# Patient Record
Sex: MALE | Race: WHITE | NOT HISPANIC OR LATINO | Employment: OTHER | URBAN - METROPOLITAN AREA
[De-identification: names, ages, dates, MRNs, and addresses within clinical notes are randomized per-mention and may not be internally consistent; named-entity substitution may affect disease eponyms.]

---

## 2017-07-17 ENCOUNTER — HOSPITAL ENCOUNTER (OUTPATIENT)
Dept: CARDIOLOGY | Facility: HOSPITAL | Age: 57
Setting detail: SPECIMEN
Discharge: HOME | End: 2017-07-17
Attending: INTERNAL MEDICINE | Admitting: INTERNAL MEDICINE

## 2017-07-17 LAB
CHOLEST SERPL-MCNC: 148 MG/DL
CHOLEST/HDLC SERPL: 6.6 {RATIO}
CONV LDL CHOLESTEROL DIRECT: 80 MG/DL (ref 0–100)
HDLC SERPL-MCNC: 23 MG/DL
LDLC/HDLC SERPL: 3.6 {RATIO}
LIPID INTERPRETATION: ABNORMAL
TRIGL SERPL-MCNC: 314 MG/DL
VLDLC SERPL CALC-MCNC: 45.4 MG/DL

## 2018-08-20 ENCOUNTER — HOSPITAL ENCOUNTER (OUTPATIENT)
Dept: FAMILY MEDICINE CLINIC | Facility: CLINIC | Age: 58
Setting detail: SPECIMEN
Discharge: HOME | End: 2018-08-20
Attending: INTERNAL MEDICINE | Admitting: INTERNAL MEDICINE

## 2018-08-20 LAB
CHOLEST SERPL-MCNC: 167 MG/DL
CHOLEST/HDLC SERPL: 6.3 {RATIO}
CONV LDL CHOLESTEROL DIRECT: 111 MG/DL (ref 0–100)
HDLC SERPL-MCNC: 26 MG/DL
LDLC/HDLC SERPL: 4.2 {RATIO}
LIPID INTERPRETATION: ABNORMAL
TRIGL SERPL-MCNC: 205 MG/DL
VLDLC SERPL CALC-MCNC: 30.1 MG/DL

## 2019-06-19 ENCOUNTER — HOSPITAL ENCOUNTER (EMERGENCY)
Dept: GENERAL RADIOLOGY | Facility: HOSPITAL | Age: 59
Discharge: HOME | End: 2019-06-19

## 2019-06-19 ENCOUNTER — TELEPHONE (OUTPATIENT)
Dept: CARDIOLOGY | Facility: CLINIC | Age: 59
End: 2019-06-19

## 2019-06-19 ENCOUNTER — HOSPITAL ENCOUNTER (EMERGENCY)
Facility: HOSPITAL | Age: 59
Discharge: LEFT BEFORE DISCHARGE | End: 2019-06-19
Attending: EMERGENCY MEDICINE | Admitting: EMERGENCY MEDICINE

## 2019-06-19 VITALS
HEIGHT: 67 IN | DIASTOLIC BLOOD PRESSURE: 77 MMHG | SYSTOLIC BLOOD PRESSURE: 148 MMHG | OXYGEN SATURATION: 97 % | WEIGHT: 196.65 LBS | TEMPERATURE: 97.7 F | HEART RATE: 94 BPM | RESPIRATION RATE: 18 BRPM | BODY MASS INDEX: 30.87 KG/M2

## 2019-06-19 DIAGNOSIS — I21.4 SUBENDOCARDIAL MI FIRST EPISODE CARE (HCC): ICD-10-CM

## 2019-06-19 DIAGNOSIS — R06.00 DYSPNEA, UNSPECIFIED TYPE: Primary | ICD-10-CM

## 2019-06-19 LAB
ANION GAP SERPL CALCULATED.3IONS-SCNC: 10 MMOL/L (ref 10–20)
BASOPHILS # BLD AUTO: 0.1 10*3/MM3 (ref 0–0.2)
BASOPHILS NFR BLD AUTO: 0.8 % (ref 0–1.5)
BNP SERPL-MCNC: 459 PG/ML
BUN BLD-MCNC: 17 MG/DL (ref 8–20)
BUN/CREAT SERPL: 17 (ref 6.2–20.3)
CALCIUM SPEC-SCNC: 9 MG/DL (ref 8.9–10.3)
CHLORIDE SERPL-SCNC: 106 MMOL/L (ref 101–111)
CO2 SERPL-SCNC: 23 MMOL/L (ref 22–32)
CREAT BLD-MCNC: 1 MG/DL (ref 0.7–1.2)
DEPRECATED RDW RBC AUTO: 49 FL (ref 37–54)
EOSINOPHIL # BLD AUTO: 0.4 10*3/MM3 (ref 0–0.4)
EOSINOPHIL NFR BLD AUTO: 5.6 % (ref 0.3–6.2)
ERYTHROCYTE [DISTWIDTH] IN BLOOD BY AUTOMATED COUNT: 15 % (ref 12.3–15.4)
GFR SERPL CREATININE-BSD FRML MDRD: 76 ML/MIN/1.73
GLUCOSE BLD-MCNC: 332 MG/DL (ref 65–99)
HCT VFR BLD AUTO: 37.5 % (ref 37.5–51)
HGB BLD-MCNC: 13 G/DL (ref 13–17.7)
LYMPHOCYTES # BLD AUTO: 1.9 10*3/MM3 (ref 0.7–3.1)
LYMPHOCYTES NFR BLD AUTO: 25.3 % (ref 19.6–45.3)
MCH RBC QN AUTO: 32.4 PG (ref 26.6–33)
MCHC RBC AUTO-ENTMCNC: 34.8 G/DL (ref 31.5–35.7)
MCV RBC AUTO: 93 FL (ref 79–97)
MONOCYTES # BLD AUTO: 0.5 10*3/MM3 (ref 0.1–0.9)
MONOCYTES NFR BLD AUTO: 6.4 % (ref 5–12)
NEUTROPHILS # BLD AUTO: 4.6 10*3/MM3 (ref 1.7–7)
NEUTROPHILS NFR BLD AUTO: 61.9 % (ref 42.7–76)
NRBC BLD AUTO-RTO: 0.2 /100 WBC (ref 0–0.2)
PLATELET # BLD AUTO: 165 10*3/MM3 (ref 140–450)
PMV BLD AUTO: 9.8 FL (ref 6–12)
POTASSIUM BLD-SCNC: 3.9 MMOL/L (ref 3.6–5.1)
RBC # BLD AUTO: 4.03 10*6/MM3 (ref 4.14–5.8)
SODIUM BLD-SCNC: 139 MMOL/L (ref 136–144)
TROPONIN I SERPL-MCNC: 0.12 NG/ML (ref 0–0.03)
WBC NRBC COR # BLD: 7.3 10*3/MM3 (ref 3.4–10.8)

## 2019-06-19 PROCEDURE — 93005 ELECTROCARDIOGRAM TRACING: CPT | Performed by: EMERGENCY MEDICINE

## 2019-06-19 PROCEDURE — 84484 ASSAY OF TROPONIN QUANT: CPT | Performed by: EMERGENCY MEDICINE

## 2019-06-19 PROCEDURE — 99283 EMERGENCY DEPT VISIT LOW MDM: CPT

## 2019-06-19 PROCEDURE — 83880 ASSAY OF NATRIURETIC PEPTIDE: CPT | Performed by: EMERGENCY MEDICINE

## 2019-06-19 PROCEDURE — 85025 COMPLETE CBC W/AUTO DIFF WBC: CPT | Performed by: EMERGENCY MEDICINE

## 2019-06-19 PROCEDURE — 93005 ELECTROCARDIOGRAM TRACING: CPT

## 2019-06-19 PROCEDURE — 71045 X-RAY EXAM CHEST 1 VIEW: CPT

## 2019-06-19 PROCEDURE — 80048 BASIC METABOLIC PNL TOTAL CA: CPT | Performed by: EMERGENCY MEDICINE

## 2019-06-19 RX ORDER — CLOPIDOGREL BISULFATE 75 MG/1
75 TABLET ORAL DAILY
COMMUNITY

## 2019-06-19 RX ORDER — BUDESONIDE AND FORMOTEROL FUMARATE DIHYDRATE 160; 4.5 UG/1; UG/1
2 AEROSOL RESPIRATORY (INHALATION)
Status: ON HOLD | COMMUNITY
End: 2019-06-26 | Stop reason: SDUPTHER

## 2019-06-19 RX ORDER — ICOSAPENT ETHYL 1000 MG/1
2 CAPSULE ORAL 2 TIMES DAILY WITH MEALS
COMMUNITY
End: 2019-07-02 | Stop reason: SDUPTHER

## 2019-06-19 RX ORDER — ISOSORBIDE MONONITRATE 30 MG/1
30 TABLET, EXTENDED RELEASE ORAL DAILY
COMMUNITY
End: 2019-06-21 | Stop reason: SDUPTHER

## 2019-06-19 RX ORDER — ASPIRIN 81 MG/1
81 TABLET, CHEWABLE ORAL DAILY
Status: ON HOLD | COMMUNITY
End: 2019-06-26 | Stop reason: SDUPTHER

## 2019-06-19 RX ORDER — COLCHICINE 0.6 MG/1
0.6 TABLET ORAL 2 TIMES DAILY
Status: ON HOLD | COMMUNITY
End: 2019-06-26 | Stop reason: SDUPTHER

## 2019-06-19 RX ORDER — ALBUTEROL SULFATE 1.25 MG/3ML
1 SOLUTION RESPIRATORY (INHALATION) EVERY 6 HOURS PRN
COMMUNITY
End: 2019-12-03 | Stop reason: ALTCHOICE

## 2019-06-19 RX ORDER — ALBUTEROL SULFATE 90 UG/1
2 AEROSOL, METERED RESPIRATORY (INHALATION) EVERY 4 HOURS PRN
Status: ON HOLD | COMMUNITY
End: 2019-06-26

## 2019-06-19 RX ORDER — ATORVASTATIN CALCIUM 40 MG/1
40 TABLET, FILM COATED ORAL DAILY
COMMUNITY

## 2019-06-19 RX ORDER — SODIUM CHLORIDE 0.9 % (FLUSH) 0.9 %
10 SYRINGE (ML) INJECTION AS NEEDED
Status: DISCONTINUED | OUTPATIENT
Start: 2019-06-19 | End: 2019-06-20 | Stop reason: HOSPADM

## 2019-06-19 RX ORDER — FUROSEMIDE 20 MG/1
20 TABLET ORAL 2 TIMES DAILY
COMMUNITY
End: 2019-06-21 | Stop reason: SDUPTHER

## 2019-06-19 RX ORDER — CARVEDILOL 3.12 MG/1
3.12 TABLET ORAL 3 TIMES DAILY
COMMUNITY
End: 2019-07-08 | Stop reason: SDUPTHER

## 2019-06-19 RX ORDER — GLIMEPIRIDE 4 MG/1
4 TABLET ORAL 2 TIMES DAILY
COMMUNITY

## 2019-06-19 RX ADMIN — Medication 10 ML: at 20:35

## 2019-06-19 NOTE — TELEPHONE ENCOUNTER
HAD MSG, PATIENT COUGHING, HAS APT TOMORROW. ASKING IF WE CAN SEND CHEST X-RAY ORDER TO [facility]?

## 2019-06-19 NOTE — TELEPHONE ENCOUNTER
Called woman back, explained that Dr Wing is out of the office today, can not order a chest xray today , suggested pt call pcp or Dr Wing may be able to tomorrow when he sees him for appt.  She understood said she has called and lm for pt's pcp.

## 2019-06-20 ENCOUNTER — OFFICE VISIT (OUTPATIENT)
Dept: CARDIOLOGY | Facility: CLINIC | Age: 59
End: 2019-06-20

## 2019-06-20 VITALS
HEIGHT: 68 IN | OXYGEN SATURATION: 98 % | WEIGHT: 195 LBS | DIASTOLIC BLOOD PRESSURE: 69 MMHG | SYSTOLIC BLOOD PRESSURE: 101 MMHG | HEART RATE: 97 BPM | BODY MASS INDEX: 29.55 KG/M2

## 2019-06-20 DIAGNOSIS — I10 ESSENTIAL HYPERTENSION: ICD-10-CM

## 2019-06-20 DIAGNOSIS — I50.22 CHRONIC SYSTOLIC (CONGESTIVE) HEART FAILURE (HCC): Primary | ICD-10-CM

## 2019-06-20 DIAGNOSIS — E78.5 DYSLIPIDEMIA: ICD-10-CM

## 2019-06-20 DIAGNOSIS — J43.1 PANLOBULAR EMPHYSEMA (HCC): ICD-10-CM

## 2019-06-20 DIAGNOSIS — I73.9 PERIPHERAL VASCULAR DISEASE (HCC): ICD-10-CM

## 2019-06-20 DIAGNOSIS — E11.9 TYPE II DIABETES MELLITUS WITH GOAL OF SYMPTOM MANAGEMENT (HCC): ICD-10-CM

## 2019-06-20 DIAGNOSIS — Z98.61 CAD S/P PERCUTANEOUS CORONARY ANGIOPLASTY: ICD-10-CM

## 2019-06-20 DIAGNOSIS — I25.10 CAD S/P PERCUTANEOUS CORONARY ANGIOPLASTY: ICD-10-CM

## 2019-06-20 DIAGNOSIS — R07.2 PRECORDIAL PAIN: ICD-10-CM

## 2019-06-20 PROCEDURE — 99214 OFFICE O/P EST MOD 30 MIN: CPT | Performed by: INTERNAL MEDICINE

## 2019-06-20 RX ORDER — FUROSEMIDE 20 MG/1
TABLET ORAL
COMMUNITY
Start: 2019-06-10 | End: 2019-06-21 | Stop reason: SDUPTHER

## 2019-06-20 RX ORDER — SULFAMETHOXAZOLE AND TRIMETHOPRIM 800; 160 MG/1; MG/1
1 TABLET ORAL 2 TIMES DAILY
Refills: 0 | Status: ON HOLD | COMMUNITY
Start: 2019-06-07 | End: 2019-06-26 | Stop reason: SDUPTHER

## 2019-06-20 RX ORDER — HYDROCODONE BITARTRATE AND ACETAMINOPHEN 10; 325 MG/1; MG/1
TABLET ORAL
Status: ON HOLD | COMMUNITY
End: 2019-06-26

## 2019-06-20 RX ORDER — TRAMADOL HYDROCHLORIDE 50 MG/1
TABLET ORAL
Status: ON HOLD | COMMUNITY
End: 2019-06-26 | Stop reason: SDUPTHER

## 2019-06-20 RX ORDER — CYANOCOBALAMIN (VITAMIN B-12) 500 MCG
TABLET ORAL EVERY 24 HOURS
Status: ON HOLD | COMMUNITY
Start: 2018-08-30 | End: 2019-06-26 | Stop reason: SDUPTHER

## 2019-06-20 RX ORDER — LISINOPRIL 10 MG/1
TABLET ORAL
COMMUNITY
End: 2019-06-21 | Stop reason: SDUPTHER

## 2019-06-20 RX ORDER — NITROGLYCERIN 0.4 MG/1
TABLET SUBLINGUAL
COMMUNITY
Start: 2013-03-18 | End: 2019-06-21 | Stop reason: SDUPTHER

## 2019-06-20 RX ORDER — COLCHICINE 0.6 MG/1
1 CAPSULE ORAL 2 TIMES DAILY
Refills: 0 | Status: ON HOLD | COMMUNITY
Start: 2019-06-03 | End: 2019-06-26 | Stop reason: SDUPTHER

## 2019-06-20 RX ORDER — IPRATROPIUM BROMIDE AND ALBUTEROL SULFATE 2.5; .5 MG/3ML; MG/3ML
SOLUTION RESPIRATORY (INHALATION)
COMMUNITY
End: 2019-10-07

## 2019-06-20 RX ORDER — GABAPENTIN 100 MG/1
CAPSULE ORAL
COMMUNITY
End: 2019-10-07

## 2019-06-20 RX ORDER — ALBUTEROL SULFATE 90 UG/1
AEROSOL, METERED RESPIRATORY (INHALATION)
COMMUNITY
End: 2019-10-07

## 2019-06-20 RX ORDER — ISOSORBIDE MONONITRATE 60 MG/1
TABLET, EXTENDED RELEASE ORAL
COMMUNITY
Start: 2017-01-24 | End: 2019-10-07

## 2019-06-20 RX ORDER — COLCHICINE 0.6 MG/1
TABLET ORAL EVERY 12 HOURS
COMMUNITY
Start: 2019-06-10 | End: 2019-10-07

## 2019-06-20 RX ORDER — ATORVASTATIN CALCIUM 20 MG/1
TABLET, FILM COATED ORAL
Status: ON HOLD | COMMUNITY
End: 2019-06-26 | Stop reason: SDUPTHER

## 2019-06-20 RX ORDER — BUDESONIDE AND FORMOTEROL FUMARATE DIHYDRATE 160; 4.5 UG/1; UG/1
AEROSOL RESPIRATORY (INHALATION)
COMMUNITY
Start: 2019-06-10 | End: 2019-10-07

## 2019-06-20 RX ORDER — DOXYCYCLINE 100 MG/1
100 TABLET ORAL 2 TIMES DAILY
Refills: 0 | Status: ON HOLD | COMMUNITY
Start: 2019-06-03 | End: 2019-06-26 | Stop reason: SDUPTHER

## 2019-06-21 ENCOUNTER — TELEPHONE (OUTPATIENT)
Dept: CARDIOLOGY | Facility: CLINIC | Age: 59
End: 2019-06-21

## 2019-06-21 RX ORDER — ISOSORBIDE MONONITRATE 30 MG/1
30 TABLET, EXTENDED RELEASE ORAL DAILY
Qty: 90 TABLET | Refills: 1 | Status: ON HOLD | OUTPATIENT
Start: 2019-06-21 | End: 2019-06-26 | Stop reason: SDUPTHER

## 2019-06-21 RX ORDER — FUROSEMIDE 20 MG/1
40 TABLET ORAL DAILY
Qty: 90 TABLET | Refills: 1 | Status: SHIPPED | OUTPATIENT
Start: 2019-06-21 | End: 2019-06-21 | Stop reason: DRUGHIGH

## 2019-06-21 RX ORDER — NITROGLYCERIN 0.4 MG/1
TABLET SUBLINGUAL
Qty: 25 TABLET | Refills: 0 | Status: SHIPPED | OUTPATIENT
Start: 2019-06-21 | End: 2019-12-03 | Stop reason: SDUPTHER

## 2019-06-21 RX ORDER — FUROSEMIDE 20 MG/1
20 TABLET ORAL 2 TIMES DAILY
COMMUNITY
End: 2019-07-02

## 2019-06-21 RX ORDER — LISINOPRIL 10 MG/1
10 TABLET ORAL DAILY
Qty: 90 TABLET | Refills: 1 | Status: SHIPPED | OUTPATIENT
Start: 2019-06-21 | End: 2019-06-21 | Stop reason: HOSPADM

## 2019-06-21 NOTE — TELEPHONE ENCOUNTER
Pts spouse called [facility] office regarding Home Health Nurse visit today 6/19/19 .  Pt was found to have abnormal lung sounds / slight chest pressure / presently wearing Lifevest, history of CHF.  Questioning medical direction from office.  Call was sent to [facility] office and pt was contacted at residence.  Based on symptoms pt was referred to [facility] ER Dept for complete cardiac assessment.  Spouse agreeable to proceed with pt to ER Dept.

## 2019-06-21 NOTE — TELEPHONE ENCOUNTER
Debra with home health [phone] left voicemail @ 12:14pm. Needs clarification on how pt is to take his Lisinopril & Lasix. OV and d/c summary from [facility] are different.

## 2019-06-21 NOTE — TELEPHONE ENCOUNTER
Called spoke to Debra. Med confusion.  Pt does not take Lisinopril, it was dc at the hospital.   Pt's bp has been good   Pt takes nitro, Isosorbide 30, and Lasix 20mg bid.  Pt has not had any edema and lungs sound good. Will continue taking it.     Called pharm to confirm meds.

## 2019-06-21 NOTE — TELEPHONE ENCOUNTER
NITRO STAT 0.4MG  PRN, LISINOPRIL 5MG 1 BID, FUROSMIDE 40MG ?? , ISOSORBIDE 30MG 1 QD  90 [facility] [place]    COMPLETELY OUT OF ISOSORBIE

## 2019-06-26 ENCOUNTER — HOSPITAL ENCOUNTER (INPATIENT)
Facility: HOSPITAL | Age: 59
LOS: 2 days | Discharge: HOME HEALTH | End: 2019-06-28
Attending: INTERNAL MEDICINE | Admitting: INTERNAL MEDICINE

## 2019-06-26 DIAGNOSIS — I10 BENIGN ESSENTIAL HTN: ICD-10-CM

## 2019-06-26 DIAGNOSIS — I73.9 PERIPHERAL VASCULAR DISEASE (HCC): ICD-10-CM

## 2019-06-26 DIAGNOSIS — R77.8 ELEVATED TROPONIN: ICD-10-CM

## 2019-06-26 DIAGNOSIS — E78.2 MULTIPLE-TYPE HYPERLIPIDEMIA: ICD-10-CM

## 2019-06-26 DIAGNOSIS — I25.700 CORONARY ARTERY DISEASE INVOLVING CORONARY BYPASS GRAFT WITH UNSTABLE ANGINA PECTORIS, UNSPECIFIED WHETHER NATIVE OR TRANSPLANTED HEART (HCC): ICD-10-CM

## 2019-06-26 DIAGNOSIS — I50.23 ACUTE ON CHRONIC SYSTOLIC CHF (CONGESTIVE HEART FAILURE) (HCC): Primary | ICD-10-CM

## 2019-06-26 DIAGNOSIS — R73.9 ACUTE HYPERGLYCEMIA: ICD-10-CM

## 2019-06-26 DIAGNOSIS — F17.200 TOBACCO DEPENDENCE SYNDROME: ICD-10-CM

## 2019-06-26 DIAGNOSIS — E66.9 OBESITY (BMI 30-39.9): ICD-10-CM

## 2019-06-26 PROBLEM — J44.9 COPD (CHRONIC OBSTRUCTIVE PULMONARY DISEASE) (HCC): Chronic | Status: ACTIVE | Noted: 2019-06-26

## 2019-06-26 PROBLEM — R06.00 DYSPNEA: Status: ACTIVE | Noted: 2019-06-26

## 2019-06-26 PROBLEM — I25.10 CORONARY ARTERY DISEASE: Status: ACTIVE | Noted: 2018-08-07

## 2019-06-26 PROBLEM — E11.9 TYPE 2 DIABETES MELLITUS: Chronic | Status: ACTIVE | Noted: 2019-06-26

## 2019-06-26 PROBLEM — I25.10 CORONARY ARTERY DISEASE: Chronic | Status: ACTIVE | Noted: 2018-08-07

## 2019-06-26 PROBLEM — R79.89 ELEVATED TROPONIN: Status: ACTIVE | Noted: 2019-06-26

## 2019-06-26 LAB
ANION GAP SERPL CALCULATED.3IONS-SCNC: 13.7 MMOL/L (ref 10–20)
APTT PPP: 23.6 SECONDS (ref 61–76.5)
APTT PPP: 30.9 SECONDS (ref 61–76.5)
BUN BLD-MCNC: 13 MG/DL (ref 8–20)
BUN/CREAT SERPL: 11.8 (ref 6.2–20.3)
CALCIUM SPEC-SCNC: 9 MG/DL (ref 8.9–10.3)
CHLORIDE SERPL-SCNC: 100 MMOL/L (ref 101–111)
CO2 SERPL-SCNC: 28 MMOL/L (ref 22–32)
CREAT BLD-MCNC: 1.1 MG/DL (ref 0.7–1.2)
FLUAV AG NPH QL: NEGATIVE
FLUBV AG NPH QL IA: NEGATIVE
GFR SERPL CREATININE-BSD FRML MDRD: 69 ML/MIN/1.73
GLUCOSE BLD-MCNC: 239 MG/DL (ref 65–99)
GLUCOSE BLDC GLUCOMTR-MCNC: 215 MG/DL (ref 70–105)
GLUCOSE BLDC GLUCOMTR-MCNC: 257 MG/DL (ref 70–105)
POTASSIUM BLD-SCNC: 3.7 MMOL/L (ref 3.6–5.1)
SODIUM BLD-SCNC: 138 MMOL/L (ref 136–144)
TROPONIN I SERPL-MCNC: 0.12 NG/ML (ref 0–0.03)
TROPONIN I SERPL-MCNC: 0.13 NG/ML (ref 0–0.03)

## 2019-06-26 PROCEDURE — 84484 ASSAY OF TROPONIN QUANT: CPT | Performed by: NURSE PRACTITIONER

## 2019-06-26 PROCEDURE — 99223 1ST HOSP IP/OBS HIGH 75: CPT | Performed by: INTERNAL MEDICINE

## 2019-06-26 PROCEDURE — 82962 GLUCOSE BLOOD TEST: CPT

## 2019-06-26 PROCEDURE — 93005 ELECTROCARDIOGRAM TRACING: CPT | Performed by: NURSE PRACTITIONER

## 2019-06-26 PROCEDURE — 85730 THROMBOPLASTIN TIME PARTIAL: CPT | Performed by: NURSE PRACTITIONER

## 2019-06-26 PROCEDURE — 83036 HEMOGLOBIN GLYCOSYLATED A1C: CPT | Performed by: NURSE PRACTITIONER

## 2019-06-26 PROCEDURE — 94640 AIRWAY INHALATION TREATMENT: CPT

## 2019-06-26 PROCEDURE — 63710000001 INSULIN LISPRO (HUMAN) PER 5 UNITS: Performed by: NURSE PRACTITIONER

## 2019-06-26 PROCEDURE — 87804 INFLUENZA ASSAY W/OPTIC: CPT | Performed by: INTERNAL MEDICINE

## 2019-06-26 PROCEDURE — 94799 UNLISTED PULMONARY SVC/PX: CPT

## 2019-06-26 PROCEDURE — 80048 BASIC METABOLIC PNL TOTAL CA: CPT | Performed by: NURSE PRACTITIONER

## 2019-06-26 PROCEDURE — 99222 1ST HOSP IP/OBS MODERATE 55: CPT | Performed by: INTERNAL MEDICINE

## 2019-06-26 PROCEDURE — 25010000002 FUROSEMIDE PER 20 MG: Performed by: NURSE PRACTITIONER

## 2019-06-26 RX ORDER — ASPIRIN 81 MG/1
81 TABLET ORAL DAILY
Status: DISCONTINUED | OUTPATIENT
Start: 2019-06-27 | End: 2019-06-28 | Stop reason: HOSPADM

## 2019-06-26 RX ORDER — ACETAMINOPHEN 325 MG/1
650 TABLET ORAL EVERY 4 HOURS PRN
Status: DISCONTINUED | OUTPATIENT
Start: 2019-06-26 | End: 2019-06-28 | Stop reason: HOSPADM

## 2019-06-26 RX ORDER — FUROSEMIDE 10 MG/ML
40 INJECTION INTRAMUSCULAR; INTRAVENOUS EVERY 8 HOURS
Status: DISCONTINUED | OUTPATIENT
Start: 2019-06-26 | End: 2019-06-28 | Stop reason: HOSPADM

## 2019-06-26 RX ORDER — SODIUM CHLORIDE 0.9 % (FLUSH) 0.9 %
3-10 SYRINGE (ML) INJECTION AS NEEDED
Status: DISCONTINUED | OUTPATIENT
Start: 2019-06-26 | End: 2019-06-28 | Stop reason: HOSPADM

## 2019-06-26 RX ORDER — NITROGLYCERIN 0.4 MG/1
0.4 TABLET SUBLINGUAL
Status: DISCONTINUED | OUTPATIENT
Start: 2019-06-26 | End: 2019-06-28 | Stop reason: HOSPADM

## 2019-06-26 RX ORDER — NICOTINE POLACRILEX 4 MG
15 LOZENGE BUCCAL
Status: DISCONTINUED | OUTPATIENT
Start: 2019-06-26 | End: 2019-06-28 | Stop reason: HOSPADM

## 2019-06-26 RX ORDER — ATORVASTATIN CALCIUM 40 MG/1
40 TABLET, FILM COATED ORAL NIGHTLY
Status: DISCONTINUED | OUTPATIENT
Start: 2019-06-26 | End: 2019-06-28 | Stop reason: HOSPADM

## 2019-06-26 RX ORDER — CLOPIDOGREL BISULFATE 75 MG/1
75 TABLET ORAL DAILY
Status: DISCONTINUED | OUTPATIENT
Start: 2019-06-26 | End: 2019-06-28 | Stop reason: HOSPADM

## 2019-06-26 RX ORDER — ALBUTEROL SULFATE 2.5 MG/3ML
2.5 SOLUTION RESPIRATORY (INHALATION) EVERY 6 HOURS PRN
Status: DISCONTINUED | OUTPATIENT
Start: 2019-06-26 | End: 2019-06-28 | Stop reason: HOSPADM

## 2019-06-26 RX ORDER — ALUMINA, MAGNESIA, AND SIMETHICONE 2400; 2400; 240 MG/30ML; MG/30ML; MG/30ML
15 SUSPENSION ORAL EVERY 6 HOURS PRN
Status: DISCONTINUED | OUTPATIENT
Start: 2019-06-26 | End: 2019-06-28 | Stop reason: HOSPADM

## 2019-06-26 RX ORDER — IPRATROPIUM BROMIDE AND ALBUTEROL SULFATE 2.5; .5 MG/3ML; MG/3ML
3 SOLUTION RESPIRATORY (INHALATION) EVERY 4 HOURS PRN
Status: DISCONTINUED | OUTPATIENT
Start: 2019-06-26 | End: 2019-06-28 | Stop reason: HOSPADM

## 2019-06-26 RX ORDER — GLIPIZIDE 5 MG/1
10 TABLET ORAL
Status: DISCONTINUED | OUTPATIENT
Start: 2019-06-27 | End: 2019-06-28 | Stop reason: HOSPADM

## 2019-06-26 RX ORDER — DEXTROSE MONOHYDRATE 25 G/50ML
25 INJECTION, SOLUTION INTRAVENOUS
Status: DISCONTINUED | OUTPATIENT
Start: 2019-06-26 | End: 2019-06-28 | Stop reason: HOSPADM

## 2019-06-26 RX ORDER — CARVEDILOL 3.12 MG/1
3.12 TABLET ORAL 2 TIMES DAILY WITH MEALS
Status: DISCONTINUED | OUTPATIENT
Start: 2019-06-26 | End: 2019-06-28 | Stop reason: HOSPADM

## 2019-06-26 RX ORDER — GABAPENTIN 100 MG/1
100 CAPSULE ORAL NIGHTLY
Status: DISCONTINUED | OUTPATIENT
Start: 2019-06-26 | End: 2019-06-28 | Stop reason: HOSPADM

## 2019-06-26 RX ORDER — ONDANSETRON 4 MG/1
4 TABLET, FILM COATED ORAL EVERY 6 HOURS PRN
Status: DISCONTINUED | OUTPATIENT
Start: 2019-06-26 | End: 2019-06-28 | Stop reason: HOSPADM

## 2019-06-26 RX ORDER — BISACODYL 10 MG
10 SUPPOSITORY, RECTAL RECTAL DAILY PRN
Status: DISCONTINUED | OUTPATIENT
Start: 2019-06-26 | End: 2019-06-28 | Stop reason: HOSPADM

## 2019-06-26 RX ORDER — CHOLECALCIFEROL (VITAMIN D3) 125 MCG
5 CAPSULE ORAL NIGHTLY PRN
Status: DISCONTINUED | OUTPATIENT
Start: 2019-06-26 | End: 2019-06-28 | Stop reason: HOSPADM

## 2019-06-26 RX ORDER — SODIUM CHLORIDE 0.9 % (FLUSH) 0.9 %
3 SYRINGE (ML) INJECTION EVERY 12 HOURS SCHEDULED
Status: DISCONTINUED | OUTPATIENT
Start: 2019-06-26 | End: 2019-06-28 | Stop reason: HOSPADM

## 2019-06-26 RX ORDER — ACETAMINOPHEN 650 MG/1
650 SUPPOSITORY RECTAL EVERY 4 HOURS PRN
Status: DISCONTINUED | OUTPATIENT
Start: 2019-06-26 | End: 2019-06-28 | Stop reason: HOSPADM

## 2019-06-26 RX ORDER — BUDESONIDE AND FORMOTEROL FUMARATE DIHYDRATE 160; 4.5 UG/1; UG/1
2 AEROSOL RESPIRATORY (INHALATION)
Status: DISCONTINUED | OUTPATIENT
Start: 2019-06-26 | End: 2019-06-28 | Stop reason: HOSPADM

## 2019-06-26 RX ORDER — ONDANSETRON 2 MG/ML
4 INJECTION INTRAMUSCULAR; INTRAVENOUS EVERY 6 HOURS PRN
Status: DISCONTINUED | OUTPATIENT
Start: 2019-06-26 | End: 2019-06-28 | Stop reason: HOSPADM

## 2019-06-26 RX ORDER — ISOSORBIDE MONONITRATE 60 MG/1
60 TABLET, EXTENDED RELEASE ORAL
Status: DISCONTINUED | OUTPATIENT
Start: 2019-06-27 | End: 2019-06-28 | Stop reason: HOSPADM

## 2019-06-26 RX ADMIN — SODIUM CHLORIDE, PRESERVATIVE FREE 3 ML: 5 INJECTION INTRAVENOUS at 22:15

## 2019-06-26 RX ADMIN — ATORVASTATIN CALCIUM 40 MG: 40 TABLET, FILM COATED ORAL at 20:03

## 2019-06-26 RX ADMIN — INSULIN LISPRO 4 UNITS: 100 INJECTION, SOLUTION INTRAVENOUS; SUBCUTANEOUS at 19:00

## 2019-06-26 RX ADMIN — BUDESONIDE AND FORMOTEROL FUMARATE DIHYDRATE 2 PUFF: 160; 4.5 AEROSOL RESPIRATORY (INHALATION) at 20:34

## 2019-06-26 RX ADMIN — INSULIN LISPRO 6 UNITS: 100 INJECTION, SOLUTION INTRAVENOUS; SUBCUTANEOUS at 22:15

## 2019-06-26 RX ADMIN — CLOPIDOGREL BISULFATE 75 MG: 75 TABLET ORAL at 20:03

## 2019-06-26 RX ADMIN — GABAPENTIN 100 MG: 100 CAPSULE ORAL at 20:03

## 2019-06-26 RX ADMIN — CARVEDILOL 3.12 MG: 3.12 TABLET, FILM COATED ORAL at 20:03

## 2019-06-26 RX ADMIN — FUROSEMIDE 40 MG: 10 INJECTION, SOLUTION INTRAVENOUS at 18:59

## 2019-06-27 PROBLEM — I25.700 CORONARY ARTERY DISEASE INVOLVING CORONARY BYPASS GRAFT WITH UNSTABLE ANGINA PECTORIS: Status: ACTIVE | Noted: 2019-06-26

## 2019-06-27 LAB
ANION GAP SERPL CALCULATED.3IONS-SCNC: 19.8 MMOL/L (ref 10–20)
APTT PPP: 23.3 SECONDS (ref 61–76.5)
BNP SERPL-MCNC: 554 PG/ML
BUN BLD-MCNC: 14 MG/DL (ref 8–20)
BUN/CREAT SERPL: 14 (ref 6.2–20.3)
CALCIUM SPEC-SCNC: 9.2 MG/DL (ref 8.9–10.3)
CHLORIDE SERPL-SCNC: 103 MMOL/L (ref 101–111)
CO2 SERPL-SCNC: 24 MMOL/L (ref 22–32)
CREAT BLD-MCNC: 1 MG/DL (ref 0.7–1.2)
GFR SERPL CREATININE-BSD FRML MDRD: 76 ML/MIN/1.73
GLUCOSE BLD-MCNC: 187 MG/DL (ref 65–99)
GLUCOSE BLDC GLUCOMTR-MCNC: 158 MG/DL (ref 70–105)
GLUCOSE BLDC GLUCOMTR-MCNC: 189 MG/DL (ref 70–105)
GLUCOSE BLDC GLUCOMTR-MCNC: 250 MG/DL (ref 70–105)
GLUCOSE BLDC GLUCOMTR-MCNC: 98 MG/DL (ref 70–105)
HBA1C MFR BLD: 6.6 % (ref 3.5–5.6)
MAGNESIUM SERPL-MCNC: 1.6 MG/DL (ref 1.8–2.5)
POTASSIUM BLD-SCNC: 3.8 MMOL/L (ref 3.6–5.1)
SODIUM BLD-SCNC: 143 MMOL/L (ref 136–144)
TROPONIN I SERPL-MCNC: 0.11 NG/ML (ref 0–0.03)

## 2019-06-27 PROCEDURE — 94799 UNLISTED PULMONARY SVC/PX: CPT

## 2019-06-27 PROCEDURE — 4A023N7 MEASUREMENT OF CARDIAC SAMPLING AND PRESSURE, LEFT HEART, PERCUTANEOUS APPROACH: ICD-10-PCS | Performed by: INTERNAL MEDICINE

## 2019-06-27 PROCEDURE — 0 IOPAMIDOL PER 1 ML: Performed by: INTERNAL MEDICINE

## 2019-06-27 PROCEDURE — 82962 GLUCOSE BLOOD TEST: CPT

## 2019-06-27 PROCEDURE — 99233 SBSQ HOSP IP/OBS HIGH 50: CPT | Performed by: INTERNAL MEDICINE

## 2019-06-27 PROCEDURE — 25010000002 FUROSEMIDE PER 20 MG: Performed by: NURSE PRACTITIONER

## 2019-06-27 PROCEDURE — B2151ZZ FLUOROSCOPY OF LEFT HEART USING LOW OSMOLAR CONTRAST: ICD-10-PCS | Performed by: INTERNAL MEDICINE

## 2019-06-27 PROCEDURE — 93459 L HRT ART/GRFT ANGIO: CPT | Performed by: INTERNAL MEDICINE

## 2019-06-27 PROCEDURE — C1769 GUIDE WIRE: HCPCS | Performed by: INTERNAL MEDICINE

## 2019-06-27 PROCEDURE — 99232 SBSQ HOSP IP/OBS MODERATE 35: CPT | Performed by: INTERNAL MEDICINE

## 2019-06-27 PROCEDURE — 25010000002 MIDAZOLAM PER 1 MG: Performed by: INTERNAL MEDICINE

## 2019-06-27 PROCEDURE — 25010000002 FENTANYL CITRATE (PF) 100 MCG/2ML SOLUTION: Performed by: INTERNAL MEDICINE

## 2019-06-27 PROCEDURE — C1894 INTRO/SHEATH, NON-LASER: HCPCS | Performed by: INTERNAL MEDICINE

## 2019-06-27 PROCEDURE — 83735 ASSAY OF MAGNESIUM: CPT | Performed by: NURSE PRACTITIONER

## 2019-06-27 PROCEDURE — B2181ZZ FLUOROSCOPY OF LEFT INTERNAL MAMMARY BYPASS GRAFT USING LOW OSMOLAR CONTRAST: ICD-10-PCS | Performed by: INTERNAL MEDICINE

## 2019-06-27 PROCEDURE — 80048 BASIC METABOLIC PNL TOTAL CA: CPT | Performed by: NURSE PRACTITIONER

## 2019-06-27 PROCEDURE — 63710000001 INSULIN LISPRO (HUMAN) PER 5 UNITS: Performed by: NURSE PRACTITIONER

## 2019-06-27 PROCEDURE — 83880 ASSAY OF NATRIURETIC PEPTIDE: CPT | Performed by: NURSE PRACTITIONER

## 2019-06-27 PROCEDURE — 84484 ASSAY OF TROPONIN QUANT: CPT | Performed by: NURSE PRACTITIONER

## 2019-06-27 PROCEDURE — 99152 MOD SED SAME PHYS/QHP 5/>YRS: CPT | Performed by: INTERNAL MEDICINE

## 2019-06-27 PROCEDURE — B2111ZZ FLUOROSCOPY OF MULTIPLE CORONARY ARTERIES USING LOW OSMOLAR CONTRAST: ICD-10-PCS | Performed by: INTERNAL MEDICINE

## 2019-06-27 PROCEDURE — 99153 MOD SED SAME PHYS/QHP EA: CPT | Performed by: INTERNAL MEDICINE

## 2019-06-27 PROCEDURE — 85730 THROMBOPLASTIN TIME PARTIAL: CPT | Performed by: NURSE PRACTITIONER

## 2019-06-27 PROCEDURE — B2131ZZ FLUOROSCOPY OF MULTIPLE CORONARY ARTERY BYPASS GRAFTS USING LOW OSMOLAR CONTRAST: ICD-10-PCS | Performed by: INTERNAL MEDICINE

## 2019-06-27 RX ORDER — MIDAZOLAM HYDROCHLORIDE 1 MG/ML
INJECTION INTRAMUSCULAR; INTRAVENOUS AS NEEDED
Status: DISCONTINUED | OUTPATIENT
Start: 2019-06-27 | End: 2019-06-27 | Stop reason: HOSPADM

## 2019-06-27 RX ORDER — SODIUM CHLORIDE 9 MG/ML
INJECTION, SOLUTION INTRAVENOUS CONTINUOUS PRN
Status: COMPLETED | OUTPATIENT
Start: 2019-06-27 | End: 2019-06-27

## 2019-06-27 RX ORDER — ATROPINE SULFATE 1 MG/ML
.5-1 INJECTION, SOLUTION INTRAMUSCULAR; INTRAVENOUS; SUBCUTANEOUS
Status: DISCONTINUED | OUTPATIENT
Start: 2019-06-27 | End: 2019-06-28 | Stop reason: HOSPADM

## 2019-06-27 RX ORDER — SODIUM CHLORIDE 9 MG/ML
250 INJECTION, SOLUTION INTRAVENOUS ONCE AS NEEDED
Status: DISCONTINUED | OUTPATIENT
Start: 2019-06-27 | End: 2019-06-28 | Stop reason: HOSPADM

## 2019-06-27 RX ORDER — SODIUM CHLORIDE 9 MG/ML
100 INJECTION, SOLUTION INTRAVENOUS CONTINUOUS
Status: DISCONTINUED | OUTPATIENT
Start: 2019-06-27 | End: 2019-06-28 | Stop reason: HOSPADM

## 2019-06-27 RX ORDER — LIDOCAINE HYDROCHLORIDE 20 MG/ML
INJECTION, SOLUTION INFILTRATION; PERINEURAL AS NEEDED
Status: DISCONTINUED | OUTPATIENT
Start: 2019-06-27 | End: 2019-06-27 | Stop reason: HOSPADM

## 2019-06-27 RX ORDER — FENTANYL CITRATE 50 UG/ML
INJECTION, SOLUTION INTRAMUSCULAR; INTRAVENOUS AS NEEDED
Status: DISCONTINUED | OUTPATIENT
Start: 2019-06-27 | End: 2019-06-27 | Stop reason: HOSPADM

## 2019-06-27 RX ADMIN — FUROSEMIDE 40 MG: 10 INJECTION, SOLUTION INTRAVENOUS at 02:38

## 2019-06-27 RX ADMIN — CARVEDILOL 3.12 MG: 3.12 TABLET, FILM COATED ORAL at 17:24

## 2019-06-27 RX ADMIN — ATORVASTATIN CALCIUM 40 MG: 40 TABLET, FILM COATED ORAL at 22:21

## 2019-06-27 RX ADMIN — ISOSORBIDE MONONITRATE 60 MG: 60 TABLET, EXTENDED RELEASE ORAL at 08:21

## 2019-06-27 RX ADMIN — GLIPIZIDE 10 MG: 5 TABLET ORAL at 08:21

## 2019-06-27 RX ADMIN — INSULIN LISPRO 4 UNITS: 100 INJECTION, SOLUTION INTRAVENOUS; SUBCUTANEOUS at 12:42

## 2019-06-27 RX ADMIN — ASPIRIN 81 MG: 81 TABLET, COATED ORAL at 08:21

## 2019-06-27 RX ADMIN — SODIUM CHLORIDE, PRESERVATIVE FREE 3 ML: 5 INJECTION INTRAVENOUS at 09:07

## 2019-06-27 RX ADMIN — FUROSEMIDE 40 MG: 10 INJECTION, SOLUTION INTRAVENOUS at 08:25

## 2019-06-27 RX ADMIN — CARVEDILOL 3.12 MG: 3.12 TABLET, FILM COATED ORAL at 08:21

## 2019-06-27 RX ADMIN — SODIUM CHLORIDE 100 ML/HR: 0.9 INJECTION, SOLUTION INTRAVENOUS at 20:12

## 2019-06-27 RX ADMIN — GABAPENTIN 100 MG: 100 CAPSULE ORAL at 22:21

## 2019-06-27 RX ADMIN — SODIUM CHLORIDE, PRESERVATIVE FREE 3 ML: 5 INJECTION INTRAVENOUS at 22:21

## 2019-06-27 RX ADMIN — CLOPIDOGREL BISULFATE 75 MG: 75 TABLET ORAL at 08:21

## 2019-06-27 RX ADMIN — BUDESONIDE AND FORMOTEROL FUMARATE DIHYDRATE 2 PUFF: 160; 4.5 AEROSOL RESPIRATORY (INHALATION) at 08:16

## 2019-06-28 VITALS
HEART RATE: 94 BPM | WEIGHT: 195.55 LBS | BODY MASS INDEX: 30.69 KG/M2 | HEIGHT: 67 IN | SYSTOLIC BLOOD PRESSURE: 113 MMHG | OXYGEN SATURATION: 94 % | RESPIRATION RATE: 16 BRPM | DIASTOLIC BLOOD PRESSURE: 62 MMHG | TEMPERATURE: 97.9 F

## 2019-06-28 LAB
ANION GAP SERPL CALCULATED.3IONS-SCNC: 13.5 MMOL/L (ref 10–20)
APTT PPP: 23.9 SECONDS (ref 61–76.5)
BASOPHILS # BLD AUTO: 0.1 10*3/MM3 (ref 0–0.2)
BASOPHILS NFR BLD AUTO: 0.7 % (ref 0–1.5)
BUN BLD-MCNC: 19 MG/DL (ref 8–20)
BUN/CREAT SERPL: 15.8 (ref 6.2–20.3)
CALCIUM SPEC-SCNC: 8.7 MG/DL (ref 8.9–10.3)
CHLORIDE SERPL-SCNC: 103 MMOL/L (ref 101–111)
CO2 SERPL-SCNC: 24 MMOL/L (ref 22–32)
CREAT BLD-MCNC: 1.2 MG/DL (ref 0.7–1.2)
DEPRECATED RDW RBC AUTO: 50.8 FL (ref 37–54)
EOSINOPHIL # BLD AUTO: 0.2 10*3/MM3 (ref 0–0.4)
EOSINOPHIL NFR BLD AUTO: 2.7 % (ref 0.3–6.2)
ERYTHROCYTE [DISTWIDTH] IN BLOOD BY AUTOMATED COUNT: 15.3 % (ref 12.3–15.4)
GFR SERPL CREATININE-BSD FRML MDRD: 62 ML/MIN/1.73
GLUCOSE BLD-MCNC: 261 MG/DL (ref 65–99)
GLUCOSE BLDC GLUCOMTR-MCNC: 201 MG/DL (ref 70–105)
GLUCOSE BLDC GLUCOMTR-MCNC: 290 MG/DL (ref 70–105)
HCT VFR BLD AUTO: 38.4 % (ref 37.5–51)
HGB BLD-MCNC: 13 G/DL (ref 13–17.7)
LYMPHOCYTES # BLD AUTO: 1.6 10*3/MM3 (ref 0.7–3.1)
LYMPHOCYTES NFR BLD AUTO: 20.3 % (ref 19.6–45.3)
MCH RBC QN AUTO: 32 PG (ref 26.6–33)
MCHC RBC AUTO-ENTMCNC: 33.9 G/DL (ref 31.5–35.7)
MCV RBC AUTO: 94.2 FL (ref 79–97)
MONOCYTES # BLD AUTO: 0.5 10*3/MM3 (ref 0.1–0.9)
MONOCYTES NFR BLD AUTO: 6.1 % (ref 5–12)
NEUTROPHILS # BLD AUTO: 5.5 10*3/MM3 (ref 1.7–7)
NEUTROPHILS NFR BLD AUTO: 70.2 % (ref 42.7–76)
NRBC BLD AUTO-RTO: 0.1 /100 WBC (ref 0–0.2)
PLATELET # BLD AUTO: 129 10*3/MM3 (ref 140–450)
PMV BLD AUTO: 10.2 FL (ref 6–12)
POTASSIUM BLD-SCNC: 3.5 MMOL/L (ref 3.6–5.1)
RBC # BLD AUTO: 4.08 10*6/MM3 (ref 4.14–5.8)
SODIUM BLD-SCNC: 137 MMOL/L (ref 136–144)
WBC NRBC COR # BLD: 7.9 10*3/MM3 (ref 3.4–10.8)

## 2019-06-28 PROCEDURE — 63710000001 INSULIN LISPRO (HUMAN) PER 5 UNITS: Performed by: NURSE PRACTITIONER

## 2019-06-28 PROCEDURE — 82962 GLUCOSE BLOOD TEST: CPT

## 2019-06-28 PROCEDURE — 94799 UNLISTED PULMONARY SVC/PX: CPT

## 2019-06-28 PROCEDURE — 99232 SBSQ HOSP IP/OBS MODERATE 35: CPT | Performed by: INTERNAL MEDICINE

## 2019-06-28 PROCEDURE — 99239 HOSP IP/OBS DSCHRG MGMT >30: CPT | Performed by: INTERNAL MEDICINE

## 2019-06-28 PROCEDURE — 85025 COMPLETE CBC W/AUTO DIFF WBC: CPT | Performed by: NURSE PRACTITIONER

## 2019-06-28 PROCEDURE — 80048 BASIC METABOLIC PNL TOTAL CA: CPT | Performed by: NURSE PRACTITIONER

## 2019-06-28 PROCEDURE — 25010000002 FUROSEMIDE PER 20 MG: Performed by: NURSE PRACTITIONER

## 2019-06-28 PROCEDURE — 93005 ELECTROCARDIOGRAM TRACING: CPT | Performed by: INTERNAL MEDICINE

## 2019-06-28 PROCEDURE — 93010 ELECTROCARDIOGRAM REPORT: CPT | Performed by: INTERNAL MEDICINE

## 2019-06-28 PROCEDURE — 85730 THROMBOPLASTIN TIME PARTIAL: CPT | Performed by: NURSE PRACTITIONER

## 2019-06-28 RX ORDER — POTASSIUM CHLORIDE 1.5 G/1.77G
40 POWDER, FOR SOLUTION ORAL AS NEEDED
Status: DISCONTINUED | OUTPATIENT
Start: 2019-06-28 | End: 2019-06-28 | Stop reason: HOSPADM

## 2019-06-28 RX ORDER — LISINOPRIL 2.5 MG/1
2.5 TABLET ORAL DAILY
Qty: 30 TABLET | Refills: 1 | Status: SHIPPED | OUTPATIENT
Start: 2019-06-28 | End: 2019-07-28

## 2019-06-28 RX ORDER — POTASSIUM CHLORIDE 20 MEQ/1
40 TABLET, EXTENDED RELEASE ORAL AS NEEDED
Status: DISCONTINUED | OUTPATIENT
Start: 2019-06-28 | End: 2019-06-28 | Stop reason: HOSPADM

## 2019-06-28 RX ADMIN — CARVEDILOL 3.12 MG: 3.12 TABLET, FILM COATED ORAL at 08:32

## 2019-06-28 RX ADMIN — INSULIN LISPRO 4 UNITS: 100 INJECTION, SOLUTION INTRAVENOUS; SUBCUTANEOUS at 08:32

## 2019-06-28 RX ADMIN — ASPIRIN 81 MG: 81 TABLET, COATED ORAL at 08:32

## 2019-06-28 RX ADMIN — GLIPIZIDE 10 MG: 5 TABLET ORAL at 08:32

## 2019-06-28 RX ADMIN — POTASSIUM CHLORIDE 40 MEQ: 1500 TABLET, EXTENDED RELEASE ORAL at 11:11

## 2019-06-28 RX ADMIN — CLOPIDOGREL BISULFATE 75 MG: 75 TABLET ORAL at 08:32

## 2019-06-28 RX ADMIN — INSULIN LISPRO 6 UNITS: 100 INJECTION, SOLUTION INTRAVENOUS; SUBCUTANEOUS at 11:47

## 2019-06-28 RX ADMIN — SODIUM CHLORIDE, PRESERVATIVE FREE 3 ML: 5 INJECTION INTRAVENOUS at 08:33

## 2019-06-28 RX ADMIN — FUROSEMIDE 40 MG: 10 INJECTION, SOLUTION INTRAVENOUS at 08:33

## 2019-06-28 RX ADMIN — FUROSEMIDE 40 MG: 10 INJECTION, SOLUTION INTRAVENOUS at 01:40

## 2019-06-28 RX ADMIN — BUDESONIDE AND FORMOTEROL FUMARATE DIHYDRATE 2 PUFF: 160; 4.5 AEROSOL RESPIRATORY (INHALATION) at 06:50

## 2019-06-28 RX ADMIN — ISOSORBIDE MONONITRATE 60 MG: 60 TABLET, EXTENDED RELEASE ORAL at 08:32

## 2019-06-29 ENCOUNTER — READMISSION MANAGEMENT (OUTPATIENT)
Dept: CALL CENTER | Facility: HOSPITAL | Age: 59
End: 2019-06-29

## 2019-06-29 NOTE — OUTREACH NOTE
Prep Survey      Responses   Facility patient discharged from?  [facility]   Is patient eligible?  Yes   Discharge diagnosis  Acute on chronic systolic CHF    Does the patient have one of the following disease processes/diagnoses(primary or secondary)?  CHF   Does the patient have Home health ordered?  Yes   What is the Home health agency?   Current with [facility]   Is there a DME ordered?  No   Prep survey completed?  Yes          Jayde Hatfield RN

## 2019-07-01 ENCOUNTER — READMISSION MANAGEMENT (OUTPATIENT)
Dept: CALL CENTER | Facility: HOSPITAL | Age: 59
End: 2019-07-01

## 2019-07-01 PROBLEM — Z98.61 CAD S/P PERCUTANEOUS CORONARY ANGIOPLASTY: Status: ACTIVE | Noted: 2019-07-01

## 2019-07-01 PROBLEM — I25.10 CAD S/P PERCUTANEOUS CORONARY ANGIOPLASTY: Status: ACTIVE | Noted: 2019-07-01

## 2019-07-01 NOTE — OUTREACH NOTE
CHF Week 1 Survey      Responses   Facility patient discharged from?  [facility]   Does the patient have one of the following disease processes/diagnoses(primary or secondary)?  CHF   Is there a successful TCM telephone encounter documented?  No   CHF Week 1 attempt successful?  Yes   Call start time  1746   Call end time  1750   Discharge diagnosis  Acute on chronic systolic CHF    Is patient permission given to speak with other caregiver?  No   Meds reviewed with patient/caregiver?  Yes   Is the patient having any side effects they believe may be caused by any medication additions or changes?  No   Does the patient have all medications ordered at discharge?  Yes   Is the patient taking all medications as directed (includes completed medication regime)?  Yes   Does the patient have a primary care provider?   Yes   Does the patient have an appointment with their PCP within 7 days of discharge?  No   Comments regarding PCP  Yamile Agarwal   Nursing Interventions  Educated patient on importance of making appointment   Has the patient kept scheduled appointments due by today?  N/A   Comments  Patient has appt with Dr Wing tomorrow.    What is the Home health agency?   Current with [facility]   Has home health visited the patient within 72 hours of discharge?  Call prior to 72 hours   Psychosocial issues?  No   Did the patient receive a copy of their discharge instructions?  Yes   Nursing interventions  Reviewed instructions with patient   What is the patient's perception of their health status since discharge?  Same   Nursing interventions  Nurse provided patient education   Is the patient weighing daily?  Yes   Does the patient have scales?  Yes   Daily weight interventions  Education provided on importance of daily weight   Is the patient able to teach back Heart Failure diet management?  Yes   Is the patient able to teach back Heart Failure Zones?  Yes   Is the patient able to teach back signs and symptoms of  worsening condition? (i.e. weight gain, shortness of air, etc.)  Yes   Is the patient/caregiver able to teach back the hierarchy of who to call/visit for symptoms/problems? PCP, Specialist, Home health nurse, Urgent Care, ED, 911  Yes    CHF Week 1 call completed?  Yes          Love Her RN

## 2019-07-02 ENCOUNTER — OFFICE VISIT (OUTPATIENT)
Dept: CARDIOLOGY | Facility: CLINIC | Age: 59
End: 2019-07-02

## 2019-07-02 ENCOUNTER — TELEPHONE (OUTPATIENT)
Dept: CARDIOLOGY | Facility: CLINIC | Age: 59
End: 2019-07-02

## 2019-07-02 VITALS
BODY MASS INDEX: 30.63 KG/M2 | OXYGEN SATURATION: 96 % | HEART RATE: 87 BPM | SYSTOLIC BLOOD PRESSURE: 107 MMHG | HEIGHT: 67 IN | DIASTOLIC BLOOD PRESSURE: 73 MMHG

## 2019-07-02 DIAGNOSIS — I10 ESSENTIAL HYPERTENSION: ICD-10-CM

## 2019-07-02 DIAGNOSIS — I50.22 CHRONIC SYSTOLIC CONGESTIVE HEART FAILURE (HCC): ICD-10-CM

## 2019-07-02 DIAGNOSIS — I25.10 CAD S/P PERCUTANEOUS CORONARY ANGIOPLASTY: Primary | ICD-10-CM

## 2019-07-02 DIAGNOSIS — Z98.61 CAD S/P PERCUTANEOUS CORONARY ANGIOPLASTY: Primary | ICD-10-CM

## 2019-07-02 DIAGNOSIS — I25.5 ISCHEMIC CARDIOMYOPATHY: ICD-10-CM

## 2019-07-02 DIAGNOSIS — E78.2 MIXED HYPERLIPIDEMIA: ICD-10-CM

## 2019-07-02 PROCEDURE — 99214 OFFICE O/P EST MOD 30 MIN: CPT | Performed by: INTERNAL MEDICINE

## 2019-07-02 RX ORDER — CARVEDILOL 3.12 MG/1
3.12 TABLET ORAL 3 TIMES DAILY
Qty: 90 TABLET | Refills: 5 | Status: CANCELLED | OUTPATIENT
Start: 2019-07-02

## 2019-07-02 RX ORDER — FENOFIBRATE 145 MG/1
145 TABLET, COATED ORAL DAILY
Qty: 90 TABLET | Refills: 3 | Status: SHIPPED | OUTPATIENT
Start: 2019-07-02 | End: 2020-06-30

## 2019-07-02 RX ORDER — FUROSEMIDE 40 MG/1
40 TABLET ORAL 2 TIMES DAILY
Qty: 180 TABLET | Refills: 3 | Status: SHIPPED | OUTPATIENT
Start: 2019-07-02 | End: 2020-05-08 | Stop reason: HOSPADM

## 2019-07-02 NOTE — PROGRESS NOTES
Subjective:     Encounter Date:07/02/2019      Patient ID: Bobby Steele Jr. is a 59 y.o. male.    Chief Complaint: Hospital follow-up  History of Present Illness: see below      Past Medical History:  Past Medical History:   Diagnosis Date   • CAD (coronary artery disease)     S/P CABG   • Chronic systolic CHF (congestive heart failure) (CMS/[facility])    • COPD (chronic obstructive pulmonary disease) (CMS/[facility])    • DM2 (diabetes mellitus, type 2) (CMS/[facility])    • Dyslipidemia    • Encounter for monitoring antiplatelet therapy    • Hypertension    • Myocardial infarction (CMS/[facility])     inferior wall MI--03/13   • DEBI (obstructive sleep apnea)     c-pap machine at    • Peripheral vascular disease (CMS/[facility])     S/P left fem-pop bypass   • Tobacco use      Past Surgical History:  Past Surgical History:   Procedure Laterality Date   • APPENDECTOMY      at age 8 or 9   • CARDIAC CATHETERIZATION      3-; [facility] 9-   • CARDIAC CATHETERIZATION Right 6/27/2019    Procedure: Cardiac Catheterization/with grafts groin access;  Surgeon: Juarez Wing MD;  Location: [facility] CATH INVASIVE LOCATION;  Service: Cardiovascular   • COLONOSCOPY     • CORONARY ARTERY BYPASS GRAFT      x3, 3-18-13-LIMA to LAD, and reverse individual SVG to lateral marginal and to PDA   • FEMORAL POPLITEAL BYPASS Left 01/09/2019    [facility]/ Dr. Jero Hatch   • HAND SURGERY Left     crushed hand   • TOE SURGERY      toe nail removal of big toe- age 8 or 9      Allergies:  No Known Allergies  Home Meds:  Current Meds:     Current Outpatient Medications:   •  albuterol (ACCUNEB) 1.25 MG/3ML nebulizer solution, Take 1 ampule by nebulization Every 6 (Six) Hours As Needed for Wheezing., Disp: , Rfl:   •  albuterol sulfate HFA (VENTOLIN HFA) 108 (90 Base) MCG/ACT inhaler, Ventolin HFA 90 mcg/actuation aerosol inhaler, Disp: , Rfl:   •  aspirin 81 MG tablet, ASPIRIN 81 MG ORAL TABLET, Disp: , Rfl:   •  atorvastatin (LIPITOR) 40 MG tablet, Take  40 mg by mouth Daily., Disp: , Rfl:   •  budesonide-formoterol (SYMBICORT) 160-4.5 MCG/ACT inhaler, SYMBICORT 160-4.5 MCG/ACT AERO, Disp: , Rfl:   •  carvedilol (COREG) 3.125 MG tablet, Take 3.125 mg by mouth 3 (Three) Times a Day., Disp: , Rfl:   •  clopidogrel (PLAVIX) 75 MG tablet, Take 75 mg by mouth Daily., Disp: , Rfl:   •  colchicine 0.6 MG tablet, Every 12 (Twelve) Hours., Disp: , Rfl:   •  gabapentin (NEURONTIN) 100 MG capsule, gabapentin 100 mg capsule, Disp: , Rfl:   •  glimepiride (AMARYL) 4 MG tablet, Take 4 mg by mouth Every Morning Before Breakfast., Disp: , Rfl:   •  ipratropium-albuterol (DUO-NEB) 0.5-2.5 mg/3 ml nebulizer, ipratropium-albuterol 0.5 mg-3 mg(2.5 mg base)/3 mL nebulization soln, Disp: , Rfl:   •  isosorbide mononitrate (IMDUR) 60 MG 24 hr tablet, isosorbide mononitrate ER 60 mg tablet,extended release 24 hr, Disp: , Rfl:   •  lisinopril (PRINIVIL,ZESTRIL) 2.5 MG tablet, Take 1 tablet by mouth Daily for 30 days., Disp: 30 tablet, Rfl: 1  •  metFORMIN (GLUCOPHAGE) 500 MG tablet, Take 500 mg by mouth 2 (Two) Times a Day With Meals., Disp: , Rfl:   •  nitroglycerin (NITROSTAT) 0.4 MG SL tablet, Take no more than 3 doses in 15 minutes., Disp: 25 tablet, Rfl: 0  Social History:   Social History     Tobacco Use   • Smoking status: Current Every Day Smoker     Packs/day: 1.00     Years: 25.00     Pack years: 25.00     Types: Cigarettes   • Smokeless tobacco: Never Used   • Tobacco comment: currently smokes 5 cigarettes per day   Substance Use Topics   • Alcohol use: No     Frequency: Never      Family History:  Family History   Problem Relation Age of Onset   • Hypertension Mother    • Diabetes Mother    • Heart disease Father         had CABG and re-do/  while recovering-had MI age 40s   • Diabetes Father    • Pancreatic cancer Sister    • Hyperlipidemia Brother    • Stroke Brother    • Heart disease Paternal Uncle         The following portions of the patient's history were reviewed and  "updated as appropriate: allergies, current medications, past family history, past medical history, past social history, past surgical history and problem list.    Review of Systems   Cardiovascular: Positive for chest pain and leg swelling.   Respiratory: Positive for shortness of breath.    Neurological: Negative for light-headedness and numbness.       Procedures       Objective:     Physical Exam   /73 (BP Location: Left arm, Patient Position: Sitting, Cuff Size: Adult)   Pulse 87   Ht 170.2 cm (67\")   SpO2 96%   BMI 30.63 kg/m²   General:  Appears in no acute distress  Eyes: Sclera is anicteric,  conjunctiva is clear   HEENT:  No JVD. Thyroid not visibly enlarged. No mucosal pallor or cyanosis  Respiratory: Respirations regular and unlabored at rest.  Bilaterally good breath sounds, with good air entry in all fields. No crackles, rubs or wheezes auscultated  Cardiovascular: S1,S2 Regular rate and rhythm. No murmur, rub or gallop auscultated. No carotid bruits. DP/PT pulses    . No pretibial pitting edema  Gastrointestinal: Abdomen soft, flat, non tender. Bowel sounds present. No hepatosplenomegaly. No ascites  Musculoskeletal:  No abnormal movements  Extremities: No digital clubbing or cyanosis  Skin: Color pink. Skin warm and dry to touch. No rashes  No xanthoma  Neuro: Alert and awake, no lateralizing deficits appreciated    Lab Reviewed from recent hospitalization       Assessment:          Diagnosis Plan   1. CAD S/P percutaneous coronary angioplasty     2. Chronic systolic congestive heart failure (CMS/HCC)     3. Ischemic cardiomyopathy     4. Essential hypertension     5. Mixed hyperlipidemia       History of Present Illness:   This is a 59-year-old white male with past medical history of     # Non ST-elevation myocardial infarction 5/12/19, SHILPA to SVG to RCA and proximal LAD leading into a small diagonal  # CAD status post CABG X3 V  # dilated cardiomyopathy  # COPD, continue tobacco abuse  # " Hypertension Hyperlipidemia     Here for hospital follow-up.  Patient was recently in the hospital with chest pain and shortness of breath after coughing, underwent cardiac cath 6/27/2019 which revealed patent stent to SVG to RCA patent stent in proximal LAD and small vessel disease and diagonal.  Patient is stating that he is not making enough urine 20 twice daily of Lasix.  Patient has chronic dyspnea on exertion relieved with rest.  Patient is currently on a LifeVest.    And is complaining of the cost of the vascepa cannot afford  Patient's arterial blood pressure is 107/73 heart rate 87 O2 sat of 96%     ASSESSMENT:  #Chronic CHF CHF, dilated cardiomyopathy   # NSTEMI 5/12/19  # CAD status post CABG  # COPD, tobacco abuse   # hypertension , hyperlipidemia      Recommendations:  Reviewed recent hospital data  Patient had repeat cath 6/27/2019 which revealed patent stent in SVG to RCA and patent stent in proximal LAD with severe disease in diagonal branch which is too small to be stented  Continue beta-blockers, isosorbide, aspirin, Plavix and statin as tolerated  We will continue low-dose ACE inhibitor  Increase furosemide to 40 mg p.o. twice daily  Monitor blood pressure  Patient has been fitted with a life vest  We will change vascepa to generic   we will follow-up and repeat echo as outpatient to see if his LV function improves or else we will consider ICD  Discussed with patient and his wife about CHF care

## 2019-07-08 ENCOUNTER — READMISSION MANAGEMENT (OUTPATIENT)
Dept: CALL CENTER | Facility: HOSPITAL | Age: 59
End: 2019-07-08

## 2019-07-08 ENCOUNTER — TELEPHONE (OUTPATIENT)
Dept: CARDIOLOGY | Facility: CLINIC | Age: 59
End: 2019-07-08

## 2019-07-08 RX ORDER — CARVEDILOL 3.12 MG/1
3.12 TABLET ORAL 3 TIMES DAILY
Qty: 90 TABLET | Refills: 1 | Status: SHIPPED | OUTPATIENT
Start: 2019-07-08 | End: 2019-09-19 | Stop reason: SDUPTHER

## 2019-07-08 NOTE — OUTREACH NOTE
CHF Week 2 Survey      Responses   Facility patient discharged from?  [facility]   Does the patient have one of the following disease processes/diagnoses(primary or secondary)?  CHF   Week 2 attempt successful?  Yes   Call start time  1128   Call end time  1132   Discharge diagnosis  Acute on chronic systolic CHF    Is patient permission given to speak with other caregiver?  Yes   List who call center can speak with  Suzie, spouse   Person spoke with today (if not patient) and relationship  Suzie, spouse   Meds reviewed with patient/caregiver?  Yes   Is the patient having any side effects they believe may be caused by any medication additions or changes?  No   Does the patient have all medications ordered at discharge?  Yes   Is the patient taking all medications as directed (includes completed medication regime)?  Yes   Does the patient have a primary care provider?   Yes   Comments regarding PCP  Yamile Agarwal   Has the patient kept scheduled appointments due by today?  Yes   What is the Home health agency?   Current with [facility]   Has home health visited the patient within 72 hours of discharge?  Yes   Psychosocial issues?  No   Did the patient receive a copy of their discharge instructions?  Yes   Nursing interventions  Reviewed instructions with patient   What is the patient's perception of their health status since discharge?  Improving   Nursing interventions  Nurse provided patient education   Is the patient weighing daily?  Yes   Does the patient have scales?  Yes   Is the patient able to teach back Heart Failure diet management?  Yes   Is the patient able to teach back Heart Failure Zones?  Yes   Is the patient able to teach back signs and symptoms of worsening condition? (i.e. weight gain, shortness of air, etc.)  Yes   Is the patient/caregiver able to teach back the hierarchy of who to call/visit for symptoms/problems? PCP, Specialist, Home health nurse, Urgent Care, ED, 911  Yes   CHF Week 2 call  completed?  Yes          Love Her RN

## 2019-07-09 ENCOUNTER — TELEPHONE (OUTPATIENT)
Dept: CARDIAC REHAB | Facility: HOSPITAL | Age: 59
End: 2019-07-09

## 2019-07-15 ENCOUNTER — READMISSION MANAGEMENT (OUTPATIENT)
Dept: CALL CENTER | Facility: HOSPITAL | Age: 59
End: 2019-07-15

## 2019-07-15 NOTE — OUTREACH NOTE
CHF Week 3 Survey      Responses   Facility patient discharged from?  [facility]   Does the patient have one of the following disease processes/diagnoses(primary or secondary)?  CHF   Week 3 attempt successful?  Yes   Call start time  1116   Call end time  1118   Discharge diagnosis  Acute on chronic systolic CHF    Is patient permission given to speak with other caregiver?  Yes   List who call center can speak with  celine Larsen   Meds reviewed with patient/caregiver?  Yes   Is the patient having any side effects they believe may be caused by any medication additions or changes?  No   Does the patient have all medications ordered at discharge?  Yes   Is the patient taking all medications as directed (includes completed medication regime)?  Yes   Does the patient have a primary care provider?   Yes   Does the patient have an appointment with their PCP within 7 days of discharge?  Greater than 7 days   Comments regarding PCP  Yamile Agarwal Appt 7/23/19.    Nursing Interventions  Verified appointment date/time/provider   Has the patient kept scheduled appointments due by today?  Yes   What is the Home health agency?   Current with [facility]   Has home health visited the patient within 72 hours of discharge?  Yes   Psychosocial issues?  No   Did the patient receive a copy of their discharge instructions?  Yes   Nursing interventions  Reviewed instructions with patient   Nursing interventions  Nurse provided patient education   Is the patient weighing daily?  Yes   Does the patient have scales?  Yes   Daily weight interventions  Education provided on importance of daily weight   Is the patient able to teach back Heart Failure diet management?  Yes   Is the patient able to teach back Heart Failure Zones?  Yes   Is the patient able to teach back signs and symptoms of worsening condition? (i.e. weight gain, shortness of air, etc.)  Yes   Is the patient/caregiver able to teach back the hierarchy of who to call/visit for  symptoms/problems? PCP, Specialist, Home health nurse, Urgent Care, ED, 911  Yes   CHF Week 3 call completed?  Yes          Love Her RN

## 2019-07-22 ENCOUNTER — READMISSION MANAGEMENT (OUTPATIENT)
Dept: CALL CENTER | Facility: HOSPITAL | Age: 59
End: 2019-07-22

## 2019-07-22 NOTE — OUTREACH NOTE
CHF Week 4 Survey      Responses   Facility patient discharged from?  [facility]   Does the patient have one of the following disease processes/diagnoses(primary or secondary)?  CHF   Week 4 attempt successful?  No   Rescheduled  Revoked   Revoke  Decline to participate [NO ANSWER, NO VM]          Krysten Nathan LPN

## 2019-09-06 RX ORDER — LISINOPRIL 2.5 MG/1
TABLET ORAL
COMMUNITY
End: 2019-09-16 | Stop reason: SDUPTHER

## 2019-09-10 ENCOUNTER — OFFICE VISIT (OUTPATIENT)
Dept: CARDIOLOGY | Facility: CLINIC | Age: 59
End: 2019-09-10

## 2019-09-10 VITALS
BODY MASS INDEX: 30.61 KG/M2 | HEIGHT: 67 IN | WEIGHT: 195 LBS | SYSTOLIC BLOOD PRESSURE: 119 MMHG | DIASTOLIC BLOOD PRESSURE: 79 MMHG | OXYGEN SATURATION: 96 % | HEART RATE: 85 BPM

## 2019-09-10 DIAGNOSIS — I10 ESSENTIAL HYPERTENSION: ICD-10-CM

## 2019-09-10 DIAGNOSIS — I50.22 CHRONIC SYSTOLIC CONGESTIVE HEART FAILURE (HCC): Primary | ICD-10-CM

## 2019-09-10 DIAGNOSIS — I25.10 CORONARY ARTERY DISEASE WITH HX OF MYOCARDIAL INFARCT W/O HX OF CABG: ICD-10-CM

## 2019-09-10 DIAGNOSIS — I25.2 CORONARY ARTERY DISEASE WITH HX OF MYOCARDIAL INFARCT W/O HX OF CABG: ICD-10-CM

## 2019-09-10 DIAGNOSIS — J43.1 PANLOBULAR EMPHYSEMA (HCC): ICD-10-CM

## 2019-09-10 DIAGNOSIS — E11.9 TYPE II DIABETES MELLITUS WITH GOAL OF SYMPTOM MANAGEMENT (HCC): ICD-10-CM

## 2019-09-10 DIAGNOSIS — I73.9 PERIPHERAL VASCULAR DISEASE (HCC): ICD-10-CM

## 2019-09-10 DIAGNOSIS — I25.5 ISCHEMIC CARDIOMYOPATHY: ICD-10-CM

## 2019-09-10 DIAGNOSIS — E78.5 DYSLIPIDEMIA: ICD-10-CM

## 2019-09-10 PROCEDURE — 99214 OFFICE O/P EST MOD 30 MIN: CPT | Performed by: INTERNAL MEDICINE

## 2019-09-10 RX ORDER — ISOSORBIDE MONONITRATE 30 MG/1
30 TABLET, EXTENDED RELEASE ORAL DAILY
COMMUNITY
End: 2019-12-24

## 2019-09-10 RX ORDER — SPIRONOLACTONE 25 MG/1
25 TABLET ORAL DAILY
Qty: 90 TABLET | Refills: 3 | Status: SHIPPED | OUTPATIENT
Start: 2019-09-10 | End: 2020-05-08 | Stop reason: HOSPADM

## 2019-09-10 NOTE — PROGRESS NOTES
Subjective:     Encounter Date:09/10/2019      Patient ID: Bobby Steele Jr. is a 59 y.o. male.    Chief Complaint: CHF, cardiomyopathy, echo follow-up  History of Present Illness See below      Past Medical History:  Past Medical History:   Diagnosis Date   • CAD (coronary artery disease)     S/P CABG   • Chronic systolic CHF (congestive heart failure) (CMS/Formerly [facility])    • COPD (chronic obstructive pulmonary disease) (CMS/Formerly [facility])    • DM2 (diabetes mellitus, type 2) (CMS/Formerly [facility])    • Dyslipidemia    • Encounter for monitoring antiplatelet therapy    • Hypertension    • Myocardial infarction (CMS/Formerly [facility])     inferior wall MI--03/13   • DEBI (obstructive sleep apnea)     c-pap machine at    • Peripheral vascular disease (CMS/Formerly [facility])     S/P left fem-pop bypass   • Tobacco use      Past Surgical History:  Past Surgical History:   Procedure Laterality Date   • APPENDECTOMY      at age 8 or 9   • CARDIAC CATHETERIZATION      3-; [facility] 9-   • CARDIAC CATHETERIZATION Right 6/27/2019    Procedure: Cardiac Catheterization/with grafts groin access;  Surgeon: Juarez Wing MD;  Location: [facility] INVASIVE LOCATION;  Service: Cardiovascular   • COLONOSCOPY     • CORONARY ARTERY BYPASS GRAFT      x3, 3-18-13-LIMA to LAD, and reverse individual SVG to lateral marginal and to PDA   • FEMORAL POPLITEAL BYPASS Left 01/09/2019    [facility]/ Dr. Jero Hatch   • HAND SURGERY Left     crushed hand   • TOE SURGERY      toe nail removal of big toe- age 8 or 9      Allergies:  No Known Allergies  Home Meds:  Current Meds:     Current Outpatient Medications:   •  albuterol (ACCUNEB) 1.25 MG/3ML nebulizer solution, Take 1 ampule by nebulization Every 6 (Six) Hours As Needed for Wheezing., Disp: , Rfl:   •  albuterol sulfate HFA (VENTOLIN HFA) 108 (90 Base) MCG/ACT inhaler, Ventolin HFA 90 mcg/actuation aerosol inhaler, Disp: , Rfl:   •  aspirin 81 MG tablet, PT TAKES ONE TAB BID, Disp: , Rfl:   •  atorvastatin (LIPITOR) 40 MG  tablet, Take 40 mg by mouth Daily., Disp: , Rfl:   •  budesonide-formoterol (SYMBICORT) 160-4.5 MCG/ACT inhaler, SYMBICORT 160-4.5 MCG/ACT AERO, Disp: , Rfl:   •  carvedilol (COREG) 3.125 MG tablet, Take 1 tablet by mouth 3 (Three) Times a Day., Disp: 90 tablet, Rfl: 1  •  clopidogrel (PLAVIX) 75 MG tablet, Take 75 mg by mouth Daily., Disp: , Rfl:   •  fenofibrate (TRICOR) 145 MG tablet, Take 1 tablet by mouth Daily., Disp: 90 tablet, Rfl: 3  •  furosemide (LASIX) 40 MG tablet, Take 1 tablet by mouth 2 (Two) Times a Day., Disp: 180 tablet, Rfl: 3  •  glimepiride (AMARYL) 4 MG tablet, Take 4 mg by mouth Every Morning Before Breakfast., Disp: , Rfl:   •  isosorbide mononitrate (IMDUR) 30 MG 24 hr tablet, Take 30 mg by mouth Daily., Disp: , Rfl:   •  lisinopril (PRINIVIL,ZESTRIL) 2.5 MG tablet, lisinopril 2.5 mg tablet, Disp: , Rfl:   •  metFORMIN (GLUCOPHAGE) 500 MG tablet, Take 500 mg by mouth 2 (Two) Times a Day With Meals., Disp: , Rfl:   •  nitroglycerin (NITROSTAT) 0.4 MG SL tablet, Take no more than 3 doses in 15 minutes., Disp: 25 tablet, Rfl: 0  •  colchicine 0.6 MG tablet, Every 12 (Twelve) Hours., Disp: , Rfl:   •  gabapentin (NEURONTIN) 100 MG capsule, gabapentin 100 mg capsule, Disp: , Rfl:   •  ipratropium-albuterol (DUO-NEB) 0.5-2.5 mg/3 ml nebulizer, ipratropium-albuterol 0.5 mg-3 mg(2.5 mg base)/3 mL nebulization soln, Disp: , Rfl:   •  isosorbide mononitrate (IMDUR) 60 MG 24 hr tablet, isosorbide mononitrate ER 60 mg tablet,extended release 24 hr, Disp: , Rfl:   •  spironolactone (ALDACTONE) 25 MG tablet, Take 1 tablet by mouth Daily., Disp: 90 tablet, Rfl: 3  Social History:   Social History     Tobacco Use   • Smoking status: Current Every Day Smoker     Packs/day: 1.00     Years: 25.00     Pack years: 25.00     Types: Cigarettes   • Smokeless tobacco: Never Used   • Tobacco comment: currently smokes 5 cigarettes per day   Substance Use Topics   • Alcohol use: No     Frequency: Never      Family  "History:  Family History   Problem Relation Age of Onset   • Hypertension Mother    • Diabetes Mother    • Heart disease Father         had CABG and re-do/  while recovering-had MI age 40s   • Diabetes Father    • Pancreatic cancer Sister    • Hyperlipidemia Brother    • Stroke Brother    • Heart disease Paternal Uncle         The following portions of the patient's history were reviewed and updated as appropriate: allergies, current medications, past family history, past medical history, past social history, past surgical history and problem list.    Review of Systems   Cardiovascular: Positive for leg swelling. Negative for chest pain and palpitations.   Respiratory: Positive for shortness of breath.    Neurological: Positive for dizziness. Negative for numbness.       Procedures echo done 2019 reveals EF of 35% with regional wall motion abnormalities       Objective:     Physical Exam   /79 (BP Location: Left arm, Patient Position: Sitting, Cuff Size: Adult)   Pulse 85   Ht 170.2 cm (67\")   Wt 88.5 kg (195 lb)   SpO2 96%   BMI 30.54 kg/m²   General:  Appears in no acute distress  Eyes: Sclera is anicteric,  conjunctiva is clear   HEENT:  No JVD. Thyroid not visibly enlarged. No mucosal pallor or cyanosis  Respiratory: Nonlabored breathing, clear to auscultation  Cardiovascular: S1,S2 Regular rate and rhythm.  2/6 holosystolic murmur, no  rub or gallop auscultated.. No pretibial pitting edema  Gastrointestinal: Abdomen soft, flat, non tender. Bowel sounds present.  Musculoskeletal:  No abnormal movements  Extremities: No digital clubbing or cyanosis  Skin: Color pink. Skin warm and dry to touch. No rashes  No xanthoma  Neuro: Alert and awake, no lateralizing deficits appreciated    Lab Review:       Assessment:          Diagnosis Plan   1. Chronic systolic congestive heart failure (CMS/HCC)  Case Request Cath Lab: ICD SC new, ST.SHIV      CBC (No Diff)    Basic Metabolic Panel    Protime-INR    " XR Chest 2 View   2. Ischemic cardiomyopathy  Case Request Cath Lab: ICD SC new, ST.SHIV      CBC (No Diff)    Basic Metabolic Panel    Protime-INR    XR Chest 2 View   3. Coronary artery disease with hx of myocardial infarct w/o hx of CABG  Case Request Cath Lab: ICD SC new, ST.SHIV      CBC (No Diff)    Basic Metabolic Panel    Protime-INR    XR Chest 2 View   4. Essential hypertension  Case Request Cath Lab: ICD SC new, ST.SHIV      CBC (No Diff)    Basic Metabolic Panel    Protime-INR    XR Chest 2 View   5. Dyslipidemia  Case Request Cath Lab: ICD SC new, ST.SHIV      CBC (No Diff)    Basic Metabolic Panel    Protime-INR    XR Chest 2 View   6. Peripheral vascular disease (CMS/HCC)  Case Request Cath Lab: ICD SC new, ST.SHIV      CBC (No Diff)    Basic Metabolic Panel    Protime-INR    XR Chest 2 View   7. Panlobular emphysema (CMS/HCC)  Case Request Cath Lab: ICD SC new, ST.SHIV      CBC (No Diff)    Basic Metabolic Panel    Protime-INR    XR Chest 2 View   8. Type II diabetes mellitus with goal of symptom management (CMS/HCC)  Case Request Cath Lab: ICD SC new, ST.SHIV      CBC (No Diff)    Basic Metabolic Panel    Protime-INR    XR Chest 2 View     CC : Echo CHF cardia myopathy follow-up    History of Present Illness:   This is a 59-year-old white male with past medical history of     # NSTEMI  5/12/19, SHILPA to SVG to RCA and proximal LAD leading into a small diagonal  # CAD status post CABG X3 V  # Ischemic cardiomyopathy with EF of 35%  # COPD, continue tobacco abuse  # Hypertension Hyperlipidemia     Here for follow-up.  Patient is complaining of class II-III dyspnea on exertion relieved with rest .  Patient was  recently in the hospital with chest pain and shortness of breath after coughing, underwent cardiac cath 6/27/2019 which revealed patent stent to SVG to RCA patent stent in proximal LAD and small vessel disease and diagonal.   Patient has chronic dyspnea on exertion relieved with rest.  Patient is  currently on a LifeVest. Patient's arterial blood pressure is 119/79 heart rate 85 O2 sat of 96%.  In an echocardiogram which is showing regional wall motion abnormalities and EF of 35%     ASSESSMENT:  #Chronic CHF CHF, ischemic cardiomyopathy 535%  # NSTEMI 5/12/19  # CAD status post CABG  # COPD, tobacco abuse   # hypertension , hyperlipidemia      Recommendations:  Patient continues to have severe LV dysfunction on optimal medical therapy  Patient had repeat cath 6/27/2019 which revealed patent stent in SVG to RCA and patent stent in proximal LAD with severe disease in diagonal branch which is too small to be stented  Continue beta-blockers, isosorbide, aspirin, Plavix and statin as tolerated  We will continue low-dose ACE inhibitor  Continue furosemide to 40 mg p.o. twice daily  Add Aldactone 25 mg  Monitor blood pressure  Discussed with patient and his wife about CHF care  Shared decision making for ICD since patient would benefit from single-chamber ICD for primary prevention, since he has continued CHF due to ischemic cardiomyopathy EF of 35% and is on optimal medical therapy, risk benefits alternatives explained.              Plan:

## 2019-09-11 ENCOUNTER — TELEPHONE (OUTPATIENT)
Dept: CARDIOLOGY | Facility: CLINIC | Age: 59
End: 2019-09-11

## 2019-09-11 NOTE — TELEPHONE ENCOUNTER
----- Message from Anatoly Madison sent at 9/10/2019 12:28 PM EDT -----  Regarding: Drug Interaction        [facility] called stating there  Is a possible drug interaction between patients Spironolactone and Lisinopril.  Please give [facility] a call.  Thanks.     Private car

## 2019-09-12 NOTE — TELEPHONE ENCOUNTER
Pt is being monitored, labs including potassium is being checked, this is a heart saving med, studies have been done regarding this.       Per Dr Wing

## 2019-09-16 RX ORDER — LISINOPRIL 2.5 MG/1
2.5 TABLET ORAL DAILY
Qty: 90 TABLET | Refills: 1 | Status: SHIPPED | OUTPATIENT
Start: 2019-09-16 | End: 2019-12-23

## 2019-09-19 ENCOUNTER — TELEPHONE (OUTPATIENT)
Dept: CARDIOLOGY | Facility: CLINIC | Age: 59
End: 2019-09-19

## 2019-09-19 RX ORDER — CARVEDILOL 3.12 MG/1
3.12 TABLET ORAL 3 TIMES DAILY
Qty: 270 TABLET | Refills: 1 | Status: SHIPPED | OUTPATIENT
Start: 2019-09-19 | End: 2020-05-05

## 2019-09-19 NOTE — TELEPHONE ENCOUNTER
[facility], [place]  Carvedilol 3.125 mg, takes 3 daily, 90 day supply with refills.,  Will run out this weekend.  Call wife, Suize if any issues, [phone]

## 2019-10-07 RX ORDER — BUDESONIDE AND FORMOTEROL FUMARATE DIHYDRATE 160; 4.5 UG/1; UG/1
2 AEROSOL RESPIRATORY (INHALATION) 2 TIMES DAILY
COMMUNITY
End: 2020-05-01

## 2019-10-07 RX ORDER — ALBUTEROL SULFATE 90 UG/1
2 AEROSOL, METERED RESPIRATORY (INHALATION) EVERY 4 HOURS PRN
COMMUNITY
End: 2019-12-03

## 2019-10-07 RX ORDER — ASPIRIN 81 MG/1
81 TABLET ORAL DAILY
COMMUNITY
End: 2023-01-23

## 2019-10-07 RX ORDER — IPRATROPIUM BROMIDE AND ALBUTEROL SULFATE 2.5; .5 MG/3ML; MG/3ML
3 SOLUTION RESPIRATORY (INHALATION) EVERY 4 HOURS PRN
COMMUNITY
End: 2020-05-01

## 2019-10-08 ENCOUNTER — HOSPITAL ENCOUNTER (OUTPATIENT)
Facility: HOSPITAL | Age: 59
Discharge: HOME | End: 2019-10-09
Attending: INTERNAL MEDICINE | Admitting: INTERNAL MEDICINE

## 2019-10-08 ENCOUNTER — LAB (OUTPATIENT)
Dept: LAB | Facility: HOSPITAL | Age: 59
End: 2019-10-08

## 2019-10-08 ENCOUNTER — HOSPITAL ENCOUNTER (OUTPATIENT)
Dept: GENERAL RADIOLOGY | Facility: HOSPITAL | Age: 59
Discharge: HOME | End: 2019-10-08

## 2019-10-08 DIAGNOSIS — I25.10 CORONARY ARTERY DISEASE WITH HX OF MYOCARDIAL INFARCT W/O HX OF CABG: ICD-10-CM

## 2019-10-08 DIAGNOSIS — I25.2 CORONARY ARTERY DISEASE WITH HX OF MYOCARDIAL INFARCT W/O HX OF CABG: ICD-10-CM

## 2019-10-08 DIAGNOSIS — E78.5 DYSLIPIDEMIA: ICD-10-CM

## 2019-10-08 DIAGNOSIS — I10 ESSENTIAL HYPERTENSION: ICD-10-CM

## 2019-10-08 DIAGNOSIS — J43.1 PANLOBULAR EMPHYSEMA (HCC): ICD-10-CM

## 2019-10-08 DIAGNOSIS — I73.9 PERIPHERAL VASCULAR DISEASE (HCC): ICD-10-CM

## 2019-10-08 DIAGNOSIS — I50.22 CHRONIC SYSTOLIC CONGESTIVE HEART FAILURE (HCC): ICD-10-CM

## 2019-10-08 DIAGNOSIS — I25.5 ISCHEMIC CARDIOMYOPATHY: ICD-10-CM

## 2019-10-08 DIAGNOSIS — E11.9 TYPE II DIABETES MELLITUS WITH GOAL OF SYMPTOM MANAGEMENT (HCC): ICD-10-CM

## 2019-10-08 LAB
ANION GAP SERPL CALCULATED.3IONS-SCNC: 13.3 MMOL/L (ref 5–15)
BUN BLD-MCNC: 30 MG/DL (ref 8–20)
BUN/CREAT SERPL: 23.1 (ref 6.2–20.3)
CALCIUM SPEC-SCNC: 9 MG/DL (ref 8.9–10.3)
CHLORIDE SERPL-SCNC: 103 MMOL/L (ref 101–111)
CO2 SERPL-SCNC: 28 MMOL/L (ref 22–32)
CREAT BLD-MCNC: 1.3 MG/DL (ref 0.7–1.2)
DEPRECATED RDW RBC AUTO: 45.1 FL (ref 37–54)
ERYTHROCYTE [DISTWIDTH] IN BLOOD BY AUTOMATED COUNT: 13.9 % (ref 12.3–15.4)
GFR SERPL CREATININE-BSD FRML MDRD: 57 ML/MIN/1.73
GLUCOSE BLD-MCNC: 179 MG/DL (ref 65–99)
GLUCOSE BLDC GLUCOMTR-MCNC: 195 MG/DL (ref 70–105)
HCT VFR BLD AUTO: 40 % (ref 37.5–51)
HGB BLD-MCNC: 13.9 G/DL (ref 13–17.7)
INR PPP: 1.02 (ref 0.9–1.1)
MCH RBC QN AUTO: 31.9 PG (ref 26.6–33)
MCHC RBC AUTO-ENTMCNC: 34.8 G/DL (ref 31.5–35.7)
MCV RBC AUTO: 91.6 FL (ref 79–97)
PLATELET # BLD AUTO: 178 10*3/MM3 (ref 140–450)
PMV BLD AUTO: 10 FL (ref 6–12)
POTASSIUM BLD-SCNC: 4.3 MMOL/L (ref 3.6–5.1)
PROTHROMBIN TIME: 10.6 SECONDS (ref 9.6–11.7)
RBC # BLD AUTO: 4.36 10*6/MM3 (ref 4.14–5.8)
SODIUM BLD-SCNC: 140 MMOL/L (ref 136–144)
WBC NRBC COR # BLD: 11.9 10*3/MM3 (ref 3.4–10.8)

## 2019-10-08 PROCEDURE — 25010000002 MIDAZOLAM PER 1 MG: Performed by: INTERNAL MEDICINE

## 2019-10-08 PROCEDURE — 85610 PROTHROMBIN TIME: CPT

## 2019-10-08 PROCEDURE — 85027 COMPLETE CBC AUTOMATED: CPT

## 2019-10-08 PROCEDURE — 82962 GLUCOSE BLOOD TEST: CPT

## 2019-10-08 PROCEDURE — A9270 NON-COVERED ITEM OR SERVICE: HCPCS | Performed by: INTERNAL MEDICINE

## 2019-10-08 PROCEDURE — 25010000002 FENTANYL CITRATE (PF) 100 MCG/2ML SOLUTION: Performed by: INTERNAL MEDICINE

## 2019-10-08 PROCEDURE — 33249 INSJ/RPLCMT DEFIB W/LEAD(S): CPT | Performed by: INTERNAL MEDICINE

## 2019-10-08 PROCEDURE — 71046 X-RAY EXAM CHEST 2 VIEWS: CPT

## 2019-10-08 PROCEDURE — 63710000001 SPIRONOLACTONE 25 MG TABLET: Performed by: INTERNAL MEDICINE

## 2019-10-08 PROCEDURE — 80048 BASIC METABOLIC PNL TOTAL CA: CPT

## 2019-10-08 PROCEDURE — 63710000001 HYDROCODONE-ACETAMINOPHEN 5-325 MG TABLET: Performed by: INTERNAL MEDICINE

## 2019-10-08 PROCEDURE — 36415 COLL VENOUS BLD VENIPUNCTURE: CPT

## 2019-10-08 PROCEDURE — C1722 AICD, SINGLE CHAMBER: HCPCS | Performed by: INTERNAL MEDICINE

## 2019-10-08 PROCEDURE — 93640 EP EVAL 1/2CHMBR PACG CVDFB: CPT | Performed by: INTERNAL MEDICINE

## 2019-10-08 PROCEDURE — C1892 INTRO/SHEATH,FIXED,PEEL-AWAY: HCPCS | Performed by: INTERNAL MEDICINE

## 2019-10-08 PROCEDURE — 63710000001 FUROSEMIDE 40 MG TABLET: Performed by: INTERNAL MEDICINE

## 2019-10-08 PROCEDURE — 99152 MOD SED SAME PHYS/QHP 5/>YRS: CPT | Performed by: INTERNAL MEDICINE

## 2019-10-08 PROCEDURE — 25010000002 VANCOMYCIN 10 G RECONSTITUTED SOLUTION: Performed by: INTERNAL MEDICINE

## 2019-10-08 PROCEDURE — 63710000001 CARVEDILOL 3.125 MG TABLET: Performed by: INTERNAL MEDICINE

## 2019-10-08 PROCEDURE — C1777 LEAD, AICD, ENDO SINGLE COIL: HCPCS | Performed by: INTERNAL MEDICINE

## 2019-10-08 PROCEDURE — 99153 MOD SED SAME PHYS/QHP EA: CPT | Performed by: INTERNAL MEDICINE

## 2019-10-08 DEVICE — IMPLANTABLE DEVICE: Type: IMPLANTABLE DEVICE | Status: FUNCTIONAL

## 2019-10-08 DEVICE — LD DEFIB OPTISURE DF4 1COIL 8F 58CM: Type: IMPLANTABLE DEVICE | Status: FUNCTIONAL

## 2019-10-08 RX ORDER — ATORVASTATIN CALCIUM 40 MG/1
40 TABLET, FILM COATED ORAL DAILY
Status: DISCONTINUED | OUTPATIENT
Start: 2019-10-09 | End: 2019-10-09 | Stop reason: HOSPADM

## 2019-10-08 RX ORDER — LIDOCAINE HYDROCHLORIDE AND EPINEPHRINE 10; 10 MG/ML; UG/ML
INJECTION, SOLUTION INFILTRATION; PERINEURAL AS NEEDED
Status: DISCONTINUED | OUTPATIENT
Start: 2019-10-08 | End: 2019-10-08 | Stop reason: HOSPADM

## 2019-10-08 RX ORDER — IPRATROPIUM BROMIDE AND ALBUTEROL SULFATE 2.5; .5 MG/3ML; MG/3ML
3 SOLUTION RESPIRATORY (INHALATION) EVERY 4 HOURS PRN
Status: DISCONTINUED | OUTPATIENT
Start: 2019-10-08 | End: 2019-10-09 | Stop reason: HOSPADM

## 2019-10-08 RX ORDER — MIDAZOLAM HYDROCHLORIDE 1 MG/ML
INJECTION INTRAMUSCULAR; INTRAVENOUS AS NEEDED
Status: DISCONTINUED | OUTPATIENT
Start: 2019-10-08 | End: 2019-10-08 | Stop reason: HOSPADM

## 2019-10-08 RX ORDER — ALBUTEROL SULFATE 2.5 MG/3ML
2.5 SOLUTION RESPIRATORY (INHALATION) EVERY 6 HOURS PRN
Status: DISCONTINUED | OUTPATIENT
Start: 2019-10-08 | End: 2019-10-09 | Stop reason: HOSPADM

## 2019-10-08 RX ORDER — GLIPIZIDE 5 MG/1
10 TABLET ORAL
Status: DISCONTINUED | OUTPATIENT
Start: 2019-10-09 | End: 2019-10-09 | Stop reason: HOSPADM

## 2019-10-08 RX ORDER — SPIRONOLACTONE 25 MG/1
25 TABLET ORAL DAILY
Status: DISCONTINUED | OUTPATIENT
Start: 2019-10-08 | End: 2019-10-08

## 2019-10-08 RX ORDER — FENOFIBRATE 145 MG/1
145 TABLET, COATED ORAL DAILY
Status: DISCONTINUED | OUTPATIENT
Start: 2019-10-09 | End: 2019-10-09 | Stop reason: HOSPADM

## 2019-10-08 RX ORDER — ALBUTEROL SULFATE 1.25 MG/3ML
1 SOLUTION RESPIRATORY (INHALATION) EVERY 6 HOURS PRN
Status: DISCONTINUED | OUTPATIENT
Start: 2019-10-08 | End: 2019-10-09 | Stop reason: HOSPADM

## 2019-10-08 RX ORDER — CLOPIDOGREL BISULFATE 75 MG/1
75 TABLET ORAL DAILY
Status: DISCONTINUED | OUTPATIENT
Start: 2019-10-09 | End: 2019-10-09 | Stop reason: HOSPADM

## 2019-10-08 RX ORDER — VANCOMYCIN HYDROCHLORIDE
15 ONCE
Status: COMPLETED | OUTPATIENT
Start: 2019-10-08 | End: 2019-10-08

## 2019-10-08 RX ORDER — FENTANYL CITRATE 50 UG/ML
INJECTION, SOLUTION INTRAMUSCULAR; INTRAVENOUS AS NEEDED
Status: DISCONTINUED | OUTPATIENT
Start: 2019-10-08 | End: 2019-10-08 | Stop reason: HOSPADM

## 2019-10-08 RX ORDER — SPIRONOLACTONE 25 MG/1
25 TABLET ORAL DAILY
Status: DISCONTINUED | OUTPATIENT
Start: 2019-10-08 | End: 2019-10-09 | Stop reason: HOSPADM

## 2019-10-08 RX ORDER — BUDESONIDE AND FORMOTEROL FUMARATE DIHYDRATE 160; 4.5 UG/1; UG/1
2 AEROSOL RESPIRATORY (INHALATION) 2 TIMES DAILY
Status: DISCONTINUED | OUTPATIENT
Start: 2019-10-08 | End: 2019-10-09 | Stop reason: HOSPADM

## 2019-10-08 RX ORDER — FUROSEMIDE 40 MG/1
40 TABLET ORAL 2 TIMES DAILY
Status: DISCONTINUED | OUTPATIENT
Start: 2019-10-08 | End: 2019-10-09 | Stop reason: HOSPADM

## 2019-10-08 RX ORDER — ISOSORBIDE MONONITRATE 30 MG/1
30 TABLET, EXTENDED RELEASE ORAL DAILY
Status: DISCONTINUED | OUTPATIENT
Start: 2019-10-09 | End: 2019-10-09 | Stop reason: HOSPADM

## 2019-10-08 RX ORDER — HYDROCODONE BITARTRATE AND ACETAMINOPHEN 5; 325 MG/1; MG/1
1 TABLET ORAL EVERY 4 HOURS PRN
Status: DISCONTINUED | OUTPATIENT
Start: 2019-10-08 | End: 2019-10-09 | Stop reason: HOSPADM

## 2019-10-08 RX ORDER — LISINOPRIL 5 MG/1
2.5 TABLET ORAL DAILY
Status: DISCONTINUED | OUTPATIENT
Start: 2019-10-09 | End: 2019-10-09 | Stop reason: HOSPADM

## 2019-10-08 RX ORDER — NITROGLYCERIN 0.4 MG/1
0.4 TABLET SUBLINGUAL
Status: DISCONTINUED | OUTPATIENT
Start: 2019-10-08 | End: 2019-10-09 | Stop reason: HOSPADM

## 2019-10-08 RX ORDER — CARVEDILOL 3.12 MG/1
3.12 TABLET ORAL EVERY 12 HOURS SCHEDULED
Status: DISCONTINUED | OUTPATIENT
Start: 2019-10-08 | End: 2019-10-09 | Stop reason: HOSPADM

## 2019-10-08 RX ORDER — ASPIRIN 81 MG/1
81 TABLET, CHEWABLE ORAL ONCE
Status: DISCONTINUED | OUTPATIENT
Start: 2019-10-08 | End: 2019-10-09 | Stop reason: HOSPADM

## 2019-10-08 RX ORDER — SODIUM CHLORIDE 9 MG/ML
30 INJECTION, SOLUTION INTRAVENOUS CONTINUOUS
Status: DISCONTINUED | OUTPATIENT
Start: 2019-10-08 | End: 2019-10-09 | Stop reason: HOSPADM

## 2019-10-08 RX ADMIN — FUROSEMIDE 40 MG: 40 TABLET ORAL at 19:43

## 2019-10-08 RX ADMIN — SPIRONOLACTONE 25 MG: 25 TABLET, FILM COATED ORAL at 19:43

## 2019-10-08 RX ADMIN — HYDROCODONE BITARTRATE AND ACETAMINOPHEN 1 TABLET: 5; 325 TABLET ORAL at 23:41

## 2019-10-08 RX ADMIN — HYDROCODONE BITARTRATE AND ACETAMINOPHEN 1 TABLET: 5; 325 TABLET ORAL at 18:41

## 2019-10-08 RX ADMIN — VANCOMYCIN HYDROCHLORIDE 1500 MG: 10 INJECTION, POWDER, LYOPHILIZED, FOR SOLUTION INTRAVENOUS at 11:04

## 2019-10-08 RX ADMIN — SODIUM CHLORIDE 30 ML/HR: 900 INJECTION, SOLUTION INTRAVENOUS at 09:12

## 2019-10-08 RX ADMIN — CARVEDILOL 3.12 MG: 3.12 TABLET, FILM COATED ORAL at 20:04

## 2019-10-08 NOTE — PLAN OF CARE
Problem: Patient Care Overview  Goal: Plan of Care Review  Outcome: Ongoing (interventions implemented as appropriate)   10/08/19 5343   Coping/Psychosocial   Plan of Care Reviewed With patient   Plan of Care Review   Progress no change   OTHER   Outcome Summary Back from lab, new device implanted. Still very groggy. Vitals stable on oxygen.      Goal: Individualization and Mutuality  Outcome: Ongoing (interventions implemented as appropriate)    Goal: Discharge Needs Assessment  Outcome: Ongoing (interventions implemented as appropriate)    Goal: Interprofessional Rounds/Family Conf  Outcome: Ongoing (interventions implemented as appropriate)      Problem: Cardiac Rhythm Management Device (Adult)  Goal: Signs and Symptoms of Listed Potential Problems Will be Absent, Minimized or Managed (Cardiac Rhythm Management Device)  Outcome: Ongoing (interventions implemented as appropriate)

## 2019-10-09 VITALS
RESPIRATION RATE: 20 BRPM | HEART RATE: 83 BPM | BODY MASS INDEX: 32.6 KG/M2 | OXYGEN SATURATION: 97 % | TEMPERATURE: 97.6 F | WEIGHT: 202.82 LBS | HEIGHT: 66 IN | SYSTOLIC BLOOD PRESSURE: 128 MMHG | DIASTOLIC BLOOD PRESSURE: 77 MMHG

## 2019-10-09 LAB — GLUCOSE BLDC GLUCOMTR-MCNC: 131 MG/DL (ref 70–105)

## 2019-10-09 PROCEDURE — 63710000001 GLIPIZIDE 5 MG TABLET: Performed by: INTERNAL MEDICINE

## 2019-10-09 PROCEDURE — 63710000001 CARVEDILOL 3.125 MG TABLET: Performed by: INTERNAL MEDICINE

## 2019-10-09 PROCEDURE — 63710000001 HYDROCODONE-ACETAMINOPHEN 5-325 MG TABLET: Performed by: INTERNAL MEDICINE

## 2019-10-09 PROCEDURE — G0378 HOSPITAL OBSERVATION PER HR: HCPCS

## 2019-10-09 PROCEDURE — 99024 POSTOP FOLLOW-UP VISIT: CPT | Performed by: INTERNAL MEDICINE

## 2019-10-09 PROCEDURE — 63710000001 FENOFIBRATE 145 MG TABLET: Performed by: INTERNAL MEDICINE

## 2019-10-09 PROCEDURE — 63710000001 LISINOPRIL 5 MG TABLET: Performed by: INTERNAL MEDICINE

## 2019-10-09 PROCEDURE — A9270 NON-COVERED ITEM OR SERVICE: HCPCS | Performed by: INTERNAL MEDICINE

## 2019-10-09 PROCEDURE — 63710000001 METFORMIN 500 MG TABLET: Performed by: INTERNAL MEDICINE

## 2019-10-09 PROCEDURE — 63710000001 ISOSORBIDE MONONITRATE 30 MG TABLET SUSTAINED-RELEASE 24 HOUR: Performed by: INTERNAL MEDICINE

## 2019-10-09 PROCEDURE — 63710000001 CLOPIDOGREL 75 MG TABLET: Performed by: INTERNAL MEDICINE

## 2019-10-09 PROCEDURE — 63710000001 FUROSEMIDE 40 MG TABLET: Performed by: INTERNAL MEDICINE

## 2019-10-09 PROCEDURE — 82962 GLUCOSE BLOOD TEST: CPT

## 2019-10-09 PROCEDURE — 63710000001 ATORVASTATIN 40 MG TABLET: Performed by: INTERNAL MEDICINE

## 2019-10-09 RX ADMIN — GLIPIZIDE 5 MG: 5 TABLET ORAL at 06:04

## 2019-10-09 RX ADMIN — CARVEDILOL 3.12 MG: 3.12 TABLET, FILM COATED ORAL at 06:05

## 2019-10-09 RX ADMIN — FUROSEMIDE 40 MG: 40 TABLET ORAL at 06:03

## 2019-10-09 RX ADMIN — ATORVASTATIN CALCIUM 40 MG: 40 TABLET, FILM COATED ORAL at 06:03

## 2019-10-09 RX ADMIN — METFORMIN HYDROCHLORIDE 500 MG: 500 TABLET, FILM COATED ORAL at 06:04

## 2019-10-09 RX ADMIN — ISOSORBIDE MONONITRATE 30 MG: 30 TABLET, EXTENDED RELEASE ORAL at 06:04

## 2019-10-09 RX ADMIN — CLOPIDOGREL BISULFATE 75 MG: 75 TABLET ORAL at 06:05

## 2019-10-09 RX ADMIN — HYDROCODONE BITARTRATE AND ACETAMINOPHEN 1 TABLET: 5; 325 TABLET ORAL at 06:02

## 2019-10-09 RX ADMIN — LISINOPRIL 2.5 MG: 5 TABLET ORAL at 06:03

## 2019-10-09 RX ADMIN — FENOFIBRATE 145 MG: 145 TABLET ORAL at 06:04

## 2019-10-09 NOTE — DISCHARGE SUMMARY
Date of Discharge:  10/9/2019    Discharge Diagnosis:   Active Hospital Problems    Diagnosis  POA   • Chronic systolic congestive heart failure (CMS/HCC) [I50.22]  Unknown     Priority: High   • Ischemic cardiomyopathy [I25.5]  Unknown     Priority: High   • Coronary artery disease with hx of myocardial infarct w/o hx of CABG [I25.10, I25.2]  Unknown     Priority: High   • Essential hypertension [I10]  Unknown     Priority: High   • Dyslipidemia [E78.5]  Unknown     Priority: High   • Panlobular emphysema (CMS/HCC) [J43.1]  Unknown     Priority: High   • Type II diabetes mellitus with goal of symptom management (CMS/HCC) [E11.9]  Unknown     Priority: High   • Peripheral vascular disease (CMS/HCC) [I73.9]  Unknown      Resolved Hospital Problems   No resolved problems to display.       Presenting Problem/History of Present Illness  Chronic systolic congestive heart failure (CMS/HCC) [I50.22]  Ischemic cardiomyopathy [I25.5]  Coronary artery disease with hx of myocardial infarct w/o hx of CABG [I25.10, I25.2]  Essential hypertension [I10]  Dyslipidemia [E78.5]  Peripheral vascular disease (CMS/HCC) [I73.9]  Panlobular emphysema (CMS/HCC) [J43.1]  Type II diabetes mellitus with goal of symptom management (CMS/HCC) [E11.9]  Chronic systolic congestive heart failure (CMS/HCC) [I50.22]      Hospital Course  This is a 59-year-old white male with past medical history of     # NSTEMI  5/12/19, SHILPA to SVG to RCA and proximal LAD leading into a small diagonal  # CAD status post CABG X3 V  # Ischemic cardiomyopathy with EF of 35% by echo 7/2/2019 and 5/25/2019: EF of 25% by cath 6/27/2019  # COPD, continue tobacco abuse  # Hypertension Hyperlipidemia     Here for ICD placement for primary prophylaxis.  Patient is complaining of class II-III dyspnea on exertion relieved with rest .  Patient was  recently in the hospital with chest pain and shortness of breath after coughing, underwent cardiac cath 6/27/2019 which revealed  patent stent to SVG to RCA patent stent in proximal LAD and small vessel disease and diagonal. .  Patient is currently on a LifeVest.. echocardiogram which is showing regional wall motion abnormalities and EF of 35%     ASSESSMENT:  #Chronic CHF CHF, ischemic cardiomyopathy 35%  # CAD status post CABG  # COPD, tobacco abuse   # hypertension , hyperlipidemia      Recommendations:  Patient continues to have severe LV dysfunction on optimal medical therapy  Patient had repeat cath 6/27/2019 which revealed patent stent in SVG to RCA and patent stent in proximal LAD with severe disease in diagonal branch which is too small to be stented  Continue beta-blockers, isosorbide, aspirin, Plavix and statin as tolerated  We will continue low-dose ACE inhibitor  Continue furosemide to 40 mg p.o. twice daily, Aldactone 25 mg  Monitor blood pressure  Discussed with patient and his wife about CHF care  Shared decision making for ICD since patient would benefit from single-chamber ICD for primary prevention, since he has continued CHF due to ischemic cardiomyopathy EF of 35% and is on optimal medical therapy, risk benefits alternatives explained.  Patient was brought in for single-chamber ICD and underwent ICD successfully.  Defibrillation thresholds were tested with noninvasive program stimulation and it was found to be working well.  Patient was monitored overnight is being discharged home on medical management to follow-up with my office in 1 week for staple removal.  Vision and his wife were counseled on wound care pacer care and CHF care       Procedures Performed  Procedure(s):  ICD SC new, ST.SHIV   DFT testing with noninvasive program stimulation       Consults:   Consults     No orders found for last 30 day(s).              Ejection Fraction  Cath EF 25% from 6/27/2019.    Echo EF Estimated  Lab Results   Component Value Date    ECHOEFEST 35 08/22/2019       Nuclear Stress Ejection Fraction  No components found for:  NUCEF    Cath Ejection Fraction Quantitative  No results found for: CATHEF    Condition on Discharge: Fair    Physical Exam at Discharge Physical Exam Physical Exam   General:  Appears in no acute distress  Eyes: Sclera is anicteric,  conjunctiva is clear   HEENT:  No JVD. Thyroid not visibly enlarged. No mucosal pallor or cyanosis  Respiratory: Respirations regular and unlabored at rest.  Bilaterally good breath sounds, with good air entry in all fields. No crackles, rubs or wheezes auscultated  Cardiovascular: S1,S2 Regular rate and rhythm. No murmur, rub or gallop auscultated. No carotid bruits. DP/PT pulses    . No pretibial pitting edema  Gastrointestinal: Abdomen soft, flat, non tender. Bowel sounds present. No hepatosplenomegaly. No ascites  Musculoskeletal: JOHNSON x4. No abnormal movements  Extremities: No digital clubbing or cyanosis  Skin: Color pink. Skin warm and dry to touch. No rashes  No xanthoma  Neuro: Alert and awake, no lateralizing deficits appreciated        Vital Signs  Temp:  [97.6 °F (36.4 °C)] 97.6 °F (36.4 °C)  Heart Rate:  [82-89] 83  Resp:  [20] 20  BP: (112-128)/(69-77) 128/77      Discharge Disposition Home  Home or Self Care    Discharge Medications     Discharge Medications      Changes to Medications      Instructions Start Date   nitroglycerin 0.4 MG SL tablet  Commonly known as:  NITROSTAT  What changed:    · how much to take  · how to take this  · when to take this  · reasons to take this  · additional instructions   Take no more than 3 doses in 15 minutes.         Continue These Medications      Instructions Start Date   albuterol 1.25 MG/3ML nebulizer solution  Commonly known as:  ACCUNEB   1 ampule, Nebulization, Every 6 Hours PRN      albuterol sulfate  (90 Base) MCG/ACT inhaler  Commonly known as:  PROVENTIL HFA;VENTOLIN HFA;PROAIR HFA   2 puffs, Inhalation, Every 4 Hours PRN      aspirin 81 MG chewable tablet   81 mg, Oral, 2 Times Daily      atorvastatin 40 MG  tablet  Commonly known as:  LIPITOR   40 mg, Oral, Daily      budesonide-formoterol 160-4.5 MCG/ACT inhaler  Commonly known as:  SYMBICORT   2 puffs, Inhalation, 2 Times Daily      carvedilol 3.125 MG tablet  Commonly known as:  COREG   3.125 mg, Oral, 3 Times Daily      clopidogrel 75 MG tablet  Commonly known as:  PLAVIX   75 mg, Oral, Daily      fenofibrate 145 MG tablet  Commonly known as:  TRICOR   145 mg, Oral, Daily      furosemide 40 MG tablet  Commonly known as:  LASIX   40 mg, Oral, 2 Times Daily      glimepiride 4 MG tablet  Commonly known as:  AMARYL   4 mg, Oral, 2 Times Daily      ipratropium-albuterol 0.5-2.5 mg/3 ml nebulizer  Commonly known as:  DUO-NEB   3 mL, Nebulization, Every 4 Hours PRN      isosorbide mononitrate 30 MG 24 hr tablet  Commonly known as:  IMDUR   30 mg, Oral, Daily      lisinopril 2.5 MG tablet  Commonly known as:  PRINIVIL,ZESTRIL   2.5 mg, Oral, Daily      metFORMIN 500 MG tablet  Commonly known as:  GLUCOPHAGE   500 mg, Oral, 2 Times Daily With Meals      spironolactone 25 MG tablet  Commonly known as:  ALDACTONE   25 mg, Oral, Daily             Discharge Diet:  Heart healthy diet    Activity at Discharge: Restrictions discussed with patient for the first 4 weeks of not lifting more than 5 pounds with left arm.    Follow-up Appointments  Future Appointments   Date Time Provider Department Center   10/15/2019 11:00 AM [facility]   12/3/2019 10:30 AM Juarez Wing MD [facility] None         Test Results Pending at Discharge       Juarez Wing MD  10/09/19  8:16 PM    Time: Discharge Time Spent:30

## 2019-10-09 NOTE — DISCHARGE INSTRUCTIONS
Pacemaker DC Instructions    After Procedure:    1) Avoid shoulder motion for the first twenty-four (24) hours.  2) It takes about 3 weeks for the pacemaker leads to anchor in the heart in order to maintain good placement.  Therefore; DO NOT:    a. Raise your arm on the operative side above your head for 2 weeks  b. Perform strenuous upper body work using the arm on the operative side for 2 weeks.    3) Wear arm sling, if ordered by physician, to help decrease the use of arm on operative side.    After Discharge:    1) Carry your pacemaker identification card in your wallet or purse  2) We recommend you wear a MEDIC-ALERT bracelet or necklace to alert medical personnel  3) A topical skin adhesive may be used to close the incision.  DO NOT rub, scratch, or pick at the wound.  Keep wound dry.  Avoid topical ointments.  Protect the wound from prolonged exposure to sunlight.  If steri strips are used, they should be left in place until seen by physician.  4) No driving until after your follow-up appointment with the Cardiologist.     **Please be sure to read the pacemaker booklet given to you at time of discharge**

## 2019-10-09 NOTE — PROGRESS NOTES
Discharge Planning Assessment   [facility]     Patient Name: Bobby Steele Jr.  MRN: [id number]  Today's Date: 10/9/2019    Admit Date: 10/8/2019    Discharge Needs Assessment     Row Name 10/09/19 0743       Living Environment    Lives With  spouse    Name(s) of Who Lives With Patient  Suzie - wife    Current Living Arrangements  home/apartment/condo    Primary Care Provided by  self    Family Caregiver if Needed  spouse    Family Caregiver Names  Suzie    Quality of Family Relationships  involved;helpful       Resource/Environmental Concerns    Resource/Environmental Concerns  none       Transition Planning    Patient/Family Anticipates Transition to  home with family    Patient/Family Anticipated Services at Transition  none    Transportation Anticipated  family or friend will provide       Discharge Needs Assessment    Concerns to be Addressed  no discharge needs identified;denies needs/concerns at this time    Equipment Currently Used at Home  nebulizer;cane, straight;glucometer    Anticipated Changes Related to Illness  none    Equipment Needed After Discharge  none        Discharge Plan     Row Name 10/09/19 7563       Plan    Plan  routine to home.  Pt denies any further needs.  Pt states no difficulties affording medication.  PCP Dr Agarwal    Patient/Family in Agreement with Plan  yes    Final Discharge Disposition Code  01 - home or self-care                   Lou Antony RN    [phone]

## 2019-10-09 NOTE — PLAN OF CARE
Problem: Cardiac Rhythm Management Device (Adult)  Goal: Signs and Symptoms of Listed Potential Problems Will be Absent, Minimized or Managed (Cardiac Rhythm Management Device)  Outcome: Ongoing (interventions implemented as appropriate)  Pain assessed as appropriate, no c/o pain at this time, will cont to monitor

## 2019-10-09 NOTE — PLAN OF CARE
Problem: Patient Care Overview  Goal: Individualization and Mutuality  Outcome: Outcome(s) achieved Date Met: 10/09/19    Goal: Discharge Needs Assessment  Outcome: Outcome(s) achieved Date Met: 10/08/19    Goal: Interprofessional Rounds/Family Conf  Outcome: Ongoing (interventions implemented as appropriate)

## 2019-10-10 ENCOUNTER — READMISSION MANAGEMENT (OUTPATIENT)
Dept: CALL CENTER | Facility: HOSPITAL | Age: 59
End: 2019-10-10

## 2019-10-10 NOTE — OUTREACH NOTE
Prep Survey      Responses   Facility patient discharged from?  [facility]   Is patient eligible?  Yes   Discharge diagnosis  CHF   Does the patient have one of the following disease processes/diagnoses(primary or secondary)?  CHF   Does the patient have Home health ordered?  No   Is there a DME ordered?  No   Medication alerts for this patient  NO CHANGES   Prep survey completed?  Yes          Krysten Nathan LPN

## 2019-10-11 ENCOUNTER — READMISSION MANAGEMENT (OUTPATIENT)
Dept: CALL CENTER | Facility: HOSPITAL | Age: 59
End: 2019-10-11

## 2019-10-11 NOTE — OUTREACH NOTE
CHF Week 1 Survey      Responses   Facility patient discharged from?  [facility]   Does the patient have one of the following disease processes/diagnoses(primary or secondary)?  CHF   Is there a successful TCM telephone encounter documented?  No   CHF Week 1 attempt successful?  Yes   Call start time  0904   Call end time  0908   Discharge diagnosis  CHF   Is patient permission given to speak with other caregiver?  Yes   List who call center can speak with  OMAR GALLAGHER   Person spoke with today (if not patient) and relationship  OMAR GALLAGHER - WIFE   Medication alerts for this patient  NO CHANGES   Meds reviewed with patient/caregiver?  Yes   Does the patient have all medications ordered at discharge?  N/A   Does the patient have a primary care provider?   Yes   Comments regarding PCP  WIFE STATES PATIENT HAS ALL FOLLOW UP APPTS MADE, BUT DID NOT REVEAL DATES AND TIMES TO THIS NURSE   Has the patient kept scheduled appointments due by today?  N/A   Did the patient receive a copy of their discharge instructions?  Yes   Nursing interventions  Reviewed instructions with patient   What is the patient's perception of their health status since discharge?  Improving   Nursing interventions  Nurse provided patient education   Is the patient weighing daily?  Yes   Does the patient have scales?  Yes   Daily weight interventions  Education provided on importance of daily weight   Is the patient able to teach back Heart Failure diet management?  Yes   Is the patient able to teach back Heart Failure Zones?  Yes   Is the patient able to teach back signs and symptoms of worsening condition? (i.e. weight gain, shortness of air, etc.)  Yes   Is the patient/caregiver able to teach back the hierarchy of who to call/visit for symptoms/problems? PCP, Specialist, Home health nurse, Urgent Care, ED, 911  Yes    CHF Week 1 call completed?  Yes          Krysten Nathan LPN

## 2019-10-14 ENCOUNTER — CLINICAL SUPPORT NO REQUIREMENTS (OUTPATIENT)
Dept: CARDIOLOGY | Facility: CLINIC | Age: 59
End: 2019-10-14

## 2019-10-14 DIAGNOSIS — I25.5 ISCHEMIC CARDIOMYOPATHY: ICD-10-CM

## 2019-10-14 DIAGNOSIS — I50.22 CHRONIC SYSTOLIC CONGESTIVE HEART FAILURE (HCC): ICD-10-CM

## 2019-10-14 DIAGNOSIS — I50.23 ACUTE ON CHRONIC SYSTOLIC CHF (CONGESTIVE HEART FAILURE) (HCC): Primary | ICD-10-CM

## 2019-10-14 DIAGNOSIS — Z95.810 PRESENCE OF AUTOMATIC CARDIOVERTER/DEFIBRILLATOR (AICD): ICD-10-CM

## 2019-10-15 ENCOUNTER — CLINICAL SUPPORT NO REQUIREMENTS (OUTPATIENT)
Dept: CARDIOLOGY | Facility: CLINIC | Age: 59
End: 2019-10-15

## 2019-10-15 DIAGNOSIS — Z95.810 PRESENCE OF AUTOMATIC CARDIOVERTER/DEFIBRILLATOR (AICD): ICD-10-CM

## 2019-10-15 DIAGNOSIS — I50.22 CHRONIC SYSTOLIC CONGESTIVE HEART FAILURE (HCC): ICD-10-CM

## 2019-10-15 DIAGNOSIS — I25.5 ISCHEMIC CARDIOMYOPATHY: ICD-10-CM

## 2019-10-15 DIAGNOSIS — I50.23 ACUTE ON CHRONIC SYSTOLIC CHF (CONGESTIVE HEART FAILURE) (HCC): Primary | ICD-10-CM

## 2019-10-15 PROCEDURE — 93282 PRGRMG EVAL IMPLANTABLE DFB: CPT | Performed by: INTERNAL MEDICINE

## 2019-10-15 NOTE — PROGRESS NOTES
Patient came to office s/p ICD placement. Removed dressing and staples from incision with no problem. Incision well approximated, no redness, drainage or heat to touch. Bruising to left chest area and sternum  Starting to resolve. Patient teaching r/t device function, left arm precaution and follow up care. Interrogation of device normal.

## 2019-10-18 ENCOUNTER — READMISSION MANAGEMENT (OUTPATIENT)
Dept: CALL CENTER | Facility: HOSPITAL | Age: 59
End: 2019-10-18

## 2019-10-18 NOTE — OUTREACH NOTE
CHF Week 2 Survey      Responses   Facility patient discharged from?  [facility]   Does the patient have one of the following disease processes/diagnoses(primary or secondary)?  CHF   Week 2 attempt successful?  Yes   Call start time  0822   Call end time  0826   Discharge diagnosis  CHF   Is patient permission given to speak with other caregiver?  Yes   List who call center can speak with  OMAR GALLAGHER   Person spoke with today (if not patient) and relationship  OMAR GALLAGHER - WIFE   Medication alerts for this patient  NO CHANGES   Meds reviewed with patient/caregiver?  Yes   Is the patient having any side effects they believe may be caused by any medication additions or changes?  No   Does the patient have all medications ordered at discharge?  N/A   Is the patient taking all medications as directed (includes completed medication regime)?  Yes   Does the patient have a primary care provider?   Yes   Comments regarding PCP  PATIENT HAS APPOINTMENT WITH HIS PCP NOVEMBER 8   Has the patient kept scheduled appointments due by today?  Yes   Has home health visited the patient within 72 hours of discharge?  N/A   Did the patient receive a copy of their discharge instructions?  Yes   Nursing interventions  Reviewed instructions with patient   What is the patient's perception of their health status since discharge?  Improving   Nursing interventions  Nurse provided patient education   Is the patient weighing daily?  Yes   Does the patient have scales?  Yes   Daily weight interventions  Education provided on importance of daily weight   Is the patient able to teach back Heart Failure diet management?  Yes   Is the patient able to teach back Heart Failure Zones?  Yes   Is the patient able to teach back signs and symptoms of worsening condition? (i.e. weight gain, shortness of air, etc.)  Yes   Is the patient/caregiver able to teach back the hierarchy of who to call/visit for symptoms/problems? PCP, Specialist, Home health nurse,  Urgent Care, ED, 911  Yes   CHF Week 2 call completed?  Yes          Krysten Nathan LPN

## 2019-10-25 ENCOUNTER — READMISSION MANAGEMENT (OUTPATIENT)
Dept: CALL CENTER | Facility: HOSPITAL | Age: 59
End: 2019-10-25

## 2019-10-25 NOTE — OUTREACH NOTE
CHF Week 3 Survey      Responses   Facility patient discharged from?  [facility]   Does the patient have one of the following disease processes/diagnoses(primary or secondary)?  CHF   Week 3 attempt successful?  No   Unsuccessful attempts  Attempt 1          Jessi Dye LPN

## 2019-10-28 ENCOUNTER — READMISSION MANAGEMENT (OUTPATIENT)
Dept: CALL CENTER | Facility: HOSPITAL | Age: 59
End: 2019-10-28

## 2019-10-28 NOTE — OUTREACH NOTE
CHF Week 3 Survey      Responses   Facility patient discharged from?  [facility]   Does the patient have one of the following disease processes/diagnoses(primary or secondary)?  CHF   Week 3 attempt successful?  Yes   Call start time  1052   Unsuccessful attempts  Attempt 2   Call end time  1059   Discharge diagnosis  CHF   Meds reviewed with patient/caregiver?  Yes   Does the patient have all medications ordered at discharge?  N/A   Is the patient taking all medications as directed (includes completed medication regime)?  Yes   Does the patient have a primary care provider?   Yes   Does the patient have an appointment with their PCP within 7 days of discharge?  Greater than 7 days   Comments regarding PCP  PATIENT HAS APPOINTMENT WITH HIS PCP NOVEMBER 8   What is preventing the patient from scheduling follow up appointments within 7 days of discharge?  Earlier appointment not available   Has the patient kept scheduled appointments due by today?  N/A   Comments  Patient has appt with Dr. Wing Dec 3   Has home health visited the patient within 72 hours of discharge?  N/A   Did the patient receive a copy of their discharge instructions?  Yes   Nursing interventions  Reviewed instructions with patient   What is the patient's perception of their health status since discharge?  Improving   Nursing interventions  Nurse provided patient education   Is the patient weighing daily?  Yes   Does the patient have scales?  Yes   Daily weight interventions  Education provided on importance of daily weight   Is the patient able to teach back Heart Failure diet management?  Yes   Is the patient able to teach back Heart Failure Zones?  Yes   Is the patient able to teach back signs and symptoms of worsening condition? (i.e. weight gain, shortness of air, etc.)  Yes   Is the patient/caregiver able to teach back the hierarchy of who to call/visit for symptoms/problems? PCP, Specialist, Home health nurse, Urgent Care, ED, 911  Yes    Additional teach back comments  Patient stated he started weighing himself yesterday.  States he has lost 7lb since being discharged.  Advised to write down and weigh every day.  Voiced his understanding regarding weight gain.   CHF Week 3 call completed?  Yes   Revoked  No further contact(revokes)-requires comment   Graduated/Revoked comments  Patient states he is doing well and started to weighing yesterday and will continue.  No questions or needs at this time          Jessi Dye LPN

## 2019-10-31 ENCOUNTER — CLINICAL SUPPORT NO REQUIREMENTS (OUTPATIENT)
Dept: CARDIOLOGY | Facility: CLINIC | Age: 59
End: 2019-10-31

## 2019-10-31 DIAGNOSIS — I25.5 ISCHEMIC CARDIOMYOPATHY: Primary | ICD-10-CM

## 2019-10-31 DIAGNOSIS — Z95.810 AICD (AUTOMATIC CARDIOVERTER/DEFIBRILLATOR) PRESENT: ICD-10-CM

## 2019-12-03 ENCOUNTER — OFFICE VISIT (OUTPATIENT)
Dept: CARDIOLOGY | Facility: CLINIC | Age: 59
End: 2019-12-03

## 2019-12-03 VITALS
WEIGHT: 206 LBS | OXYGEN SATURATION: 94 % | HEART RATE: 90 BPM | DIASTOLIC BLOOD PRESSURE: 67 MMHG | SYSTOLIC BLOOD PRESSURE: 96 MMHG | BODY MASS INDEX: 33.25 KG/M2

## 2019-12-03 DIAGNOSIS — I25.10 CORONARY ARTERY DISEASE WITH HX OF MYOCARDIAL INFARCT W/O HX OF CABG: ICD-10-CM

## 2019-12-03 DIAGNOSIS — E78.5 DYSLIPIDEMIA: ICD-10-CM

## 2019-12-03 DIAGNOSIS — I50.22 CHRONIC SYSTOLIC HEART FAILURE (HCC): Primary | ICD-10-CM

## 2019-12-03 DIAGNOSIS — I73.9 PERIPHERAL VASCULAR DISEASE (HCC): ICD-10-CM

## 2019-12-03 DIAGNOSIS — E11.9 TYPE II DIABETES MELLITUS WITH GOAL OF SYMPTOM MANAGEMENT (HCC): ICD-10-CM

## 2019-12-03 DIAGNOSIS — I10 ESSENTIAL HYPERTENSION: ICD-10-CM

## 2019-12-03 DIAGNOSIS — Z95.810 PRESENCE OF AUTOMATIC CARDIOVERTER/DEFIBRILLATOR (AICD): ICD-10-CM

## 2019-12-03 DIAGNOSIS — I25.2 CORONARY ARTERY DISEASE WITH HX OF MYOCARDIAL INFARCT W/O HX OF CABG: ICD-10-CM

## 2019-12-03 PROCEDURE — 93000 ELECTROCARDIOGRAM COMPLETE: CPT | Performed by: INTERNAL MEDICINE

## 2019-12-03 PROCEDURE — 99024 POSTOP FOLLOW-UP VISIT: CPT | Performed by: INTERNAL MEDICINE

## 2019-12-03 RX ORDER — NITROGLYCERIN 0.4 MG/1
TABLET SUBLINGUAL
Qty: 25 TABLET | Refills: 9 | Status: SHIPPED | OUTPATIENT
Start: 2019-12-03 | End: 2020-05-08 | Stop reason: HOSPADM

## 2019-12-03 NOTE — PROGRESS NOTES
Subjective:     Encounter Date:12/03/2019      Patient ID: Bobby Steele Jr. is a 59 y.o. male.    Chief Complaint: Post ICD follow-up visit  History of Present Illness     This is a 59-year-old white male with past medical history of     # NSTEMI  5/12/19, SHILPA to SVG to RCA and proximal LAD leading into a small diagonal  # CAD status post CABG X3 V  # Ischemic cardiomyopathy with EF of 35%, status post ICD 10/8/2019  # COPD, continue tobacco abuse  # Hypertension Hyperlipidemia     Here for follow-up.  Patient is complaining of class II  dyspnea on exertion relieved with rest .  Patient was  recently in the hospital with chest pain and shortness of breath after coughing, underwent cardiac cath 6/27/2019 which revealed patent stent to SVG to RCA patent stent in proximal LAD and small vessel disease and diagonal.   Patient has chronic dyspnea on exertion relieved with rest.  Patient is status post ICD placement 10/8/2019 for follow-up patient's arterial blood pressure is 96/67, heart rate 90, O2 sat of 94% on room air.       ASSESSMENT:  #Chronic CHF CHF, ischemic cardiomyopathy 35%, status post ICD 10/8/2019  # NSTEMI 5/12/19  # CAD status post CABG  # COPD, tobacco abuse   # hypertension , hyperlipidemia      Recommendations:  Reviewed wound care and pacer care  Patient had repeat cath 6/27/2019 which revealed patent stent in SVG to RCA and patent stent in proximal LAD with severe disease in diagonal branch which is too small to be stented  Continue beta-blockers, isosorbide, aspirin, Plavix and statin as tolerated  We will continue low-dose ACE inhibitor  Continue furosemide to 40 mg p.o. twice daily, Aldactone 25 mg  Monitor blood pressure  Discussed with patient and his wife about CHF care  Reviewed EKG results with patient  Counseled on CHF care  Counseled on diet exercise and smoking cessation    Past Medical History:  Past Medical History:   Diagnosis Date   • CAD (coronary artery disease)     S/P CABG    • Chronic systolic CHF (congestive heart failure) (CMS/[facility])    • COPD (chronic obstructive pulmonary disease) (CMS/[facility])    • DM2 (diabetes mellitus, type 2) (CMS/[facility])    • Dyslipidemia    • Encounter for monitoring antiplatelet therapy    • Hypertension    • Myocardial infarction (CMS/[facility])     inferior wall MI--03/13   • DEBI (obstructive sleep apnea)     c-pap machine at    • Peripheral vascular disease (CMS/[facility])     S/P left fem-pop bypass   • Tobacco use      Past Surgical History:  Past Surgical History:   Procedure Laterality Date   • APPENDECTOMY      at age 8 or 9   • CARDIAC CATHETERIZATION      3-; [facility] 9-   • CARDIAC CATHETERIZATION Right 6/27/2019    Procedure: Cardiac Catheterization/with grafts groin access;  Surgeon: Juarez Wing MD;  Location: [facility] CATH INVASIVE LOCATION;  Service: Cardiovascular   • CARDIAC ELECTROPHYSIOLOGY PROCEDURE N/A 10/8/2019    Procedure: ICD SC new, ST.SHIV ;  Surgeon: Juarez Wing MD;  Location: [facility] CATH INVASIVE LOCATION;  Service: Cardiovascular   • COLONOSCOPY     • CORONARY ARTERY BYPASS GRAFT      x3, 3-18-13-LIMA to LAD, and reverse individual SVG to lateral marginal and to PDA   • FEMORAL POPLITEAL BYPASS Left 01/09/2019    [facility]/ Dr. Jero Hatch   • HAND SURGERY Left     crushed hand   • TOE SURGERY      toe nail removal of big toe- age 8 or 9      Allergies:  No Known Allergies  Home Meds:  Current Meds:     Current Outpatient Medications:   •  aspirin 81 MG chewable tablet, Chew 81 mg 2 (Two) Times a Day., Disp: , Rfl:   •  atorvastatin (LIPITOR) 40 MG tablet, Take 40 mg by mouth Daily., Disp: , Rfl:   •  budesonide-formoterol (SYMBICORT) 160-4.5 MCG/ACT inhaler, Inhale 2 puffs 2 (Two) Times a Day., Disp: , Rfl:   •  carvedilol (COREG) 3.125 MG tablet, Take 1 tablet by mouth 3 (Three) Times a Day., Disp: 270 tablet, Rfl: 1  •  clopidogrel (PLAVIX) 75 MG tablet, Take 75 mg by mouth Daily., Disp: , Rfl:   •  fenofibrate  (TRICOR) 145 MG tablet, Take 1 tablet by mouth Daily., Disp: 90 tablet, Rfl: 3  •  furosemide (LASIX) 40 MG tablet, Take 1 tablet by mouth 2 (Two) Times a Day., Disp: 180 tablet, Rfl: 3  •  glimepiride (AMARYL) 4 MG tablet, Take 4 mg by mouth 2 (Two) Times a Day., Disp: , Rfl:   •  ipratropium-albuterol (DUO-NEB) 0.5-2.5 mg/3 ml nebulizer, Take 3 mL by nebulization Every 4 (Four) Hours As Needed for Wheezing., Disp: , Rfl:   •  isosorbide mononitrate (IMDUR) 30 MG 24 hr tablet, Take 30 mg by mouth Daily., Disp: , Rfl:   •  lisinopril (PRINIVIL,ZESTRIL) 2.5 MG tablet, Take 1 tablet by mouth Daily., Disp: 90 tablet, Rfl: 1  •  metFORMIN (GLUCOPHAGE) 500 MG tablet, Take 500 mg by mouth 2 (Two) Times a Day With Meals., Disp: , Rfl:   •  nitroglycerin (NITROSTAT) 0.4 MG SL tablet, Take no more than 3 doses in 15 minutes. (Patient taking differently: Place 0.4 mg under the tongue Every 5 (Five) Minutes As Needed (Take no more than 3 doses in 15 minutes.).), Disp: 25 tablet, Rfl: 0  •  spironolactone (ALDACTONE) 25 MG tablet, Take 1 tablet by mouth Daily., Disp: 90 tablet, Rfl: 3  •  albuterol (ACCUNEB) 1.25 MG/3ML nebulizer solution, Take 1 ampule by nebulization Every 6 (Six) Hours As Needed for Wheezing., Disp: , Rfl:   •  albuterol sulfate  (90 Base) MCG/ACT inhaler, Inhale 2 puffs Every 4 (Four) Hours As Needed for Wheezing., Disp: , Rfl:   Social History:   Social History     Tobacco Use   • Smoking status: Current Every Day Smoker     Packs/day: 1.00     Years: 25.00     Pack years: 25.00     Types: Cigarettes   • Smokeless tobacco: Never Used   • Tobacco comment: currently smokes 5 cigarettes per day   Substance Use Topics   • Alcohol use: No     Frequency: Never      Family History:  Family History   Problem Relation Age of Onset   • Hypertension Mother    • Diabetes Mother    • Heart disease Father         had CABG and re-do/  while recovering-had MI age 40s   • Diabetes Father    • Pancreatic cancer  Sister    • Hyperlipidemia Brother    • Stroke Brother    • Heart disease Paternal Uncle         The following portions of the patient's history were reviewed and updated as appropriate: allergies, current medications, past family history, past medical history, past social history, past surgical history and problem list.    Review of Systems   Constitution: Negative for malaise/fatigue.   Cardiovascular: Positive for chest pain. Negative for leg swelling and palpitations.   Respiratory: Positive for shortness of breath.    Skin: Negative for rash.   Neurological: Positive for dizziness and light-headedness. Negative for numbness.         ECG 12 Lead  Date/Time: 12/3/2019 1:04 PM  Performed by: Juarez Wing MD  Authorized by: Juarez iWng MD   Comparison: compared with previous ECG from 1/28/2019  Comparison to previous ECG: EKG done today reviewed by me shows sinus rhythm with rate of 83 bpm with small inferior Q waves and nonspecific ST segment depression lateral leads which is unchanged from EKG from 6/28/2019                 Objective:     Physical Exam  BP 96/67 (BP Location: Right arm, Patient Position: Sitting, Cuff Size: Large Adult)   Pulse 90   Wt 93.4 kg (206 lb)   SpO2 94%   BMI 33.25 kg/m²   General:  Appears in no acute distress  Eyes: Sclera is anicteric,  conjunctiva is clear   HEENT:  No JVD. Thyroid not visibly enlarged. No mucosal pallor or cyanosis  Respiratory: Respirations regular and unlabored at rest.  Bilaterally good breath sounds, with good air entry in all fields. No crackles, rubs or wheezes auscultated  Cardiovascular: S1,S2 Regular rate and rhythm. No murmur, rub or gallop auscultated. No pretibial pitting edema  Gastrointestinal: Abdomen soft, flat, non tender. Bowel sounds present.   Musculoskeletal:  No abnormal movements  Extremities: No digital clubbing or cyanosis  Skin: Color pink. Skin warm and dry to touch. No rashes  No xanthoma  Neuro: Alert and  awake, no lateralizing deficits appreciated    Lab Review:       Assessment:         No diagnosis found.       Plan:

## 2019-12-23 RX ORDER — LISINOPRIL 2.5 MG/1
TABLET ORAL
Qty: 90 TABLET | Refills: 1 | Status: SHIPPED | OUTPATIENT
Start: 2019-12-23 | End: 2020-10-14

## 2019-12-24 RX ORDER — ISOSORBIDE MONONITRATE 30 MG/1
TABLET, EXTENDED RELEASE ORAL
Qty: 90 TABLET | Refills: 0 | Status: SHIPPED | OUTPATIENT
Start: 2019-12-24 | End: 2020-05-08 | Stop reason: HOSPADM

## 2020-01-06 ENCOUNTER — TELEPHONE (OUTPATIENT)
Dept: CARDIOLOGY | Facility: CLINIC | Age: 60
End: 2020-01-06

## 2020-01-06 NOTE — TELEPHONE ENCOUNTER
VM left stating patient had chest pain, they pushed the button on the device, they are asking if anything could be seen.     Patient contacted, he is having intermittent chest pain that is intermittent. He was advised to go to the ER for an evaluation. Pt voiced understanding.

## 2020-01-11 ENCOUNTER — ON CAMPUS - OUTPATIENT (OUTPATIENT)
Dept: URBAN - METROPOLITAN AREA HOSPITAL 77 | Facility: HOSPITAL | Age: 60
End: 2020-01-11

## 2020-01-11 DIAGNOSIS — K85.90 ACUTE PANCREATITIS WITHOUT NECROSIS OR INFECTION, UNSPECIFIE: ICD-10-CM

## 2020-01-11 PROCEDURE — 99212 OFFICE O/P EST SF 10 MIN: CPT | Performed by: INTERNAL MEDICINE

## 2020-01-15 ENCOUNTER — CLINICAL SUPPORT NO REQUIREMENTS (OUTPATIENT)
Dept: CARDIOLOGY | Facility: CLINIC | Age: 60
End: 2020-01-15

## 2020-01-15 DIAGNOSIS — Z95.810 PRESENCE OF AUTOMATIC CARDIOVERTER/DEFIBRILLATOR (AICD): ICD-10-CM

## 2020-01-15 DIAGNOSIS — I25.5 ISCHEMIC CARDIOMYOPATHY: Primary | ICD-10-CM

## 2020-01-15 PROCEDURE — 93296 REM INTERROG EVL PM/IDS: CPT | Performed by: INTERNAL MEDICINE

## 2020-01-15 PROCEDURE — 93295 DEV INTERROG REMOTE 1/2/MLT: CPT | Performed by: INTERNAL MEDICINE

## 2020-05-01 ENCOUNTER — HOSPITAL ENCOUNTER (OUTPATIENT)
Facility: HOSPITAL | Age: 60
Setting detail: OBSERVATION
Discharge: HOME | End: 2020-05-03
Attending: HOSPITALIST | Admitting: HOSPITALIST

## 2020-05-01 ENCOUNTER — APPOINTMENT (OUTPATIENT)
Dept: GENERAL RADIOLOGY | Facility: HOSPITAL | Age: 60
End: 2020-05-01

## 2020-05-01 DIAGNOSIS — R06.00 DYSPNEA, UNSPECIFIED TYPE: Primary | ICD-10-CM

## 2020-05-01 DIAGNOSIS — R77.8 ELEVATED TROPONIN: ICD-10-CM

## 2020-05-01 PROBLEM — J44.1 COPD WITH ACUTE EXACERBATION (HCC): Status: ACTIVE | Noted: 2020-05-01

## 2020-05-01 PROBLEM — I25.9 CHEST PAIN DUE TO MYOCARDIAL ISCHEMIA: Status: ACTIVE | Noted: 2020-05-01

## 2020-05-01 LAB
ANION GAP SERPL CALCULATED.3IONS-SCNC: 16 MMOL/L (ref 5–15)
APTT PPP: 23.2 SECONDS (ref 24–31)
BASOPHILS # BLD AUTO: 0.1 10*3/MM3 (ref 0–0.2)
BASOPHILS NFR BLD AUTO: 0.7 % (ref 0–1.5)
BUN BLD-MCNC: 28 MG/DL (ref 8–23)
BUN/CREAT SERPL: 22.2 (ref 7–25)
CALCIUM SPEC-SCNC: 9.2 MG/DL (ref 8.6–10.5)
CHLORIDE SERPL-SCNC: 95 MMOL/L (ref 98–107)
CO2 SERPL-SCNC: 27 MMOL/L (ref 22–29)
CREAT BLD-MCNC: 1.26 MG/DL (ref 0.76–1.27)
DEPRECATED RDW RBC AUTO: 44.2 FL (ref 37–54)
EOSINOPHIL # BLD AUTO: 0.2 10*3/MM3 (ref 0–0.4)
EOSINOPHIL NFR BLD AUTO: 1.1 % (ref 0.3–6.2)
ERYTHROCYTE [DISTWIDTH] IN BLOOD BY AUTOMATED COUNT: 13.6 % (ref 12.3–15.4)
GFR SERPL CREATININE-BSD FRML MDRD: 58 ML/MIN/1.73
GLUCOSE BLD-MCNC: 343 MG/DL (ref 65–99)
GLUCOSE BLDC GLUCOMTR-MCNC: 303 MG/DL (ref 70–105)
GLUCOSE BLDC GLUCOMTR-MCNC: 327 MG/DL (ref 70–105)
HCT VFR BLD AUTO: 46.7 % (ref 37.5–51)
HGB BLD-MCNC: 16.2 G/DL (ref 13–17.7)
HOLD SPECIMEN: NORMAL
HOLD SPECIMEN: NORMAL
INR PPP: 1.05 (ref 0.9–1.1)
LYMPHOCYTES # BLD AUTO: 2.8 10*3/MM3 (ref 0.7–3.1)
LYMPHOCYTES NFR BLD AUTO: 19.9 % (ref 19.6–45.3)
MCH RBC QN AUTO: 31.6 PG (ref 26.6–33)
MCHC RBC AUTO-ENTMCNC: 34.7 G/DL (ref 31.5–35.7)
MCV RBC AUTO: 91.2 FL (ref 79–97)
MONOCYTES # BLD AUTO: 0.8 10*3/MM3 (ref 0.1–0.9)
MONOCYTES NFR BLD AUTO: 5.4 % (ref 5–12)
NEUTROPHILS # BLD AUTO: 10.3 10*3/MM3 (ref 1.7–7)
NEUTROPHILS NFR BLD AUTO: 72.9 % (ref 42.7–76)
NRBC BLD AUTO-RTO: 0 /100 WBC (ref 0–0.2)
NT-PROBNP SERPL-MCNC: 1115 PG/ML (ref 5–900)
PLATELET # BLD AUTO: 191 10*3/MM3 (ref 140–450)
PMV BLD AUTO: 10.6 FL (ref 6–12)
POTASSIUM BLD-SCNC: 3.5 MMOL/L (ref 3.5–5.2)
PROTHROMBIN TIME: 10.9 SECONDS (ref 9.6–11.7)
RBC # BLD AUTO: 5.12 10*6/MM3 (ref 4.14–5.8)
SODIUM BLD-SCNC: 138 MMOL/L (ref 136–145)
TROPONIN T SERPL-MCNC: 0.13 NG/ML (ref 0–0.03)
TROPONIN T SERPL-MCNC: 0.13 NG/ML (ref 0–0.03)
WBC NRBC COR # BLD: 14.1 10*3/MM3 (ref 3.4–10.8)
WHOLE BLOOD HOLD SPECIMEN: NORMAL
WHOLE BLOOD HOLD SPECIMEN: NORMAL

## 2020-05-01 PROCEDURE — 71045 X-RAY EXAM CHEST 1 VIEW: CPT

## 2020-05-01 PROCEDURE — 93005 ELECTROCARDIOGRAM TRACING: CPT | Performed by: HOSPITALIST

## 2020-05-01 PROCEDURE — 94799 UNLISTED PULMONARY SVC/PX: CPT

## 2020-05-01 PROCEDURE — 99220 PR INITIAL OBSERVATION CARE/DAY 70 MINUTES: CPT | Performed by: HOSPITALIST

## 2020-05-01 PROCEDURE — 84484 ASSAY OF TROPONIN QUANT: CPT | Performed by: NURSE PRACTITIONER

## 2020-05-01 PROCEDURE — 94640 AIRWAY INHALATION TREATMENT: CPT

## 2020-05-01 PROCEDURE — G0378 HOSPITAL OBSERVATION PER HR: HCPCS

## 2020-05-01 PROCEDURE — 96375 TX/PRO/DX INJ NEW DRUG ADDON: CPT

## 2020-05-01 PROCEDURE — 85025 COMPLETE CBC W/AUTO DIFF WBC: CPT | Performed by: NURSE PRACTITIONER

## 2020-05-01 PROCEDURE — 82962 GLUCOSE BLOOD TEST: CPT

## 2020-05-01 PROCEDURE — 99215 OFFICE O/P EST HI 40 MIN: CPT | Performed by: INTERNAL MEDICINE

## 2020-05-01 PROCEDURE — 93005 ELECTROCARDIOGRAM TRACING: CPT

## 2020-05-01 PROCEDURE — 63710000001 INSULIN LISPRO (HUMAN) PER 5 UNITS: Performed by: HOSPITALIST

## 2020-05-01 PROCEDURE — 96372 THER/PROPH/DIAG INJ SC/IM: CPT

## 2020-05-01 PROCEDURE — 80048 BASIC METABOLIC PNL TOTAL CA: CPT | Performed by: NURSE PRACTITIONER

## 2020-05-01 PROCEDURE — 85730 THROMBOPLASTIN TIME PARTIAL: CPT | Performed by: NURSE PRACTITIONER

## 2020-05-01 PROCEDURE — 85610 PROTHROMBIN TIME: CPT | Performed by: NURSE PRACTITIONER

## 2020-05-01 PROCEDURE — 84484 ASSAY OF TROPONIN QUANT: CPT | Performed by: HOSPITALIST

## 2020-05-01 PROCEDURE — 83880 ASSAY OF NATRIURETIC PEPTIDE: CPT | Performed by: NURSE PRACTITIONER

## 2020-05-01 PROCEDURE — 99284 EMERGENCY DEPT VISIT MOD MDM: CPT

## 2020-05-01 PROCEDURE — 25010000002 METHYLPREDNISOLONE PER 40 MG: Performed by: HOSPITALIST

## 2020-05-01 PROCEDURE — 25010000002 FUROSEMIDE PER 20 MG: Performed by: HOSPITALIST

## 2020-05-01 PROCEDURE — 96365 THER/PROPH/DIAG IV INF INIT: CPT

## 2020-05-01 PROCEDURE — 25010000002 HEPARIN (PORCINE) PER 1000 UNITS: Performed by: HOSPITALIST

## 2020-05-01 RX ORDER — NITROGLYCERIN 0.4 MG/1
0.4 TABLET SUBLINGUAL
Status: DISCONTINUED | OUTPATIENT
Start: 2020-05-01 | End: 2020-05-03 | Stop reason: HOSPADM

## 2020-05-01 RX ORDER — ATORVASTATIN CALCIUM 40 MG/1
40 TABLET, FILM COATED ORAL DAILY
Status: DISCONTINUED | OUTPATIENT
Start: 2020-05-01 | End: 2020-05-03 | Stop reason: HOSPADM

## 2020-05-01 RX ORDER — BENZONATATE 200 MG/1
200 CAPSULE ORAL 3 TIMES DAILY PRN
COMMUNITY
End: 2020-05-05

## 2020-05-01 RX ORDER — SODIUM CHLORIDE 0.9 % (FLUSH) 0.9 %
10 SYRINGE (ML) INJECTION AS NEEDED
Status: DISCONTINUED | OUTPATIENT
Start: 2020-05-01 | End: 2020-05-03 | Stop reason: HOSPADM

## 2020-05-01 RX ORDER — DEXTROSE MONOHYDRATE 25 G/50ML
25 INJECTION, SOLUTION INTRAVENOUS
Status: DISCONTINUED | OUTPATIENT
Start: 2020-05-01 | End: 2020-05-03 | Stop reason: HOSPADM

## 2020-05-01 RX ORDER — BISACODYL 5 MG/1
5 TABLET, DELAYED RELEASE ORAL DAILY PRN
Status: DISCONTINUED | OUTPATIENT
Start: 2020-05-01 | End: 2020-05-03 | Stop reason: HOSPADM

## 2020-05-01 RX ORDER — ACETAMINOPHEN 160 MG/5ML
650 SOLUTION ORAL EVERY 4 HOURS PRN
Status: DISCONTINUED | OUTPATIENT
Start: 2020-05-01 | End: 2020-05-03 | Stop reason: HOSPADM

## 2020-05-01 RX ORDER — BISACODYL 10 MG
10 SUPPOSITORY, RECTAL RECTAL DAILY PRN
Status: DISCONTINUED | OUTPATIENT
Start: 2020-05-01 | End: 2020-05-03 | Stop reason: HOSPADM

## 2020-05-01 RX ORDER — ISOSORBIDE MONONITRATE 30 MG/1
30 TABLET, EXTENDED RELEASE ORAL DAILY
Status: DISCONTINUED | OUTPATIENT
Start: 2020-05-01 | End: 2020-05-03 | Stop reason: HOSPADM

## 2020-05-01 RX ORDER — FUROSEMIDE 40 MG/1
40 TABLET ORAL 2 TIMES DAILY
Status: DISCONTINUED | OUTPATIENT
Start: 2020-05-01 | End: 2020-05-01

## 2020-05-01 RX ORDER — ASPIRIN 325 MG
325 TABLET ORAL DAILY
Status: DISCONTINUED | OUTPATIENT
Start: 2020-05-01 | End: 2020-05-03 | Stop reason: HOSPADM

## 2020-05-01 RX ORDER — PANTOPRAZOLE SODIUM 40 MG/1
40 TABLET, DELAYED RELEASE ORAL DAILY
Status: DISCONTINUED | OUTPATIENT
Start: 2020-05-01 | End: 2020-05-03 | Stop reason: HOSPADM

## 2020-05-01 RX ORDER — ALBUTEROL SULFATE 2.5 MG/3ML
2.5 SOLUTION RESPIRATORY (INHALATION) EVERY 6 HOURS PRN
COMMUNITY

## 2020-05-01 RX ORDER — METHYLPREDNISOLONE SODIUM SUCCINATE 40 MG/ML
40 INJECTION, POWDER, LYOPHILIZED, FOR SOLUTION INTRAMUSCULAR; INTRAVENOUS EVERY 8 HOURS SCHEDULED
Status: DISCONTINUED | OUTPATIENT
Start: 2020-05-01 | End: 2020-05-03 | Stop reason: HOSPADM

## 2020-05-01 RX ORDER — ONDANSETRON 2 MG/ML
4 INJECTION INTRAMUSCULAR; INTRAVENOUS EVERY 4 HOURS PRN
Status: DISCONTINUED | OUTPATIENT
Start: 2020-05-01 | End: 2020-05-03 | Stop reason: HOSPADM

## 2020-05-01 RX ORDER — CHOLECALCIFEROL (VITAMIN D3) 125 MCG
5 CAPSULE ORAL NIGHTLY PRN
Status: DISCONTINUED | OUTPATIENT
Start: 2020-05-01 | End: 2020-05-03 | Stop reason: HOSPADM

## 2020-05-01 RX ORDER — PREDNISONE 10 MG/1
10 TABLET ORAL DAILY
COMMUNITY
End: 2020-05-05

## 2020-05-01 RX ORDER — GUAIFENESIN AND CODEINE PHOSPHATE 100; 10 MG/5ML; MG/5ML
5 SOLUTION ORAL EVERY 4 HOURS PRN
Status: DISCONTINUED | OUTPATIENT
Start: 2020-05-01 | End: 2020-05-03 | Stop reason: HOSPADM

## 2020-05-01 RX ORDER — SPIRONOLACTONE 25 MG/1
25 TABLET ORAL DAILY
Status: DISCONTINUED | OUTPATIENT
Start: 2020-05-01 | End: 2020-05-03 | Stop reason: HOSPADM

## 2020-05-01 RX ORDER — TEMAZEPAM 15 MG/1
15 CAPSULE ORAL NIGHTLY PRN
Status: DISCONTINUED | OUTPATIENT
Start: 2020-05-01 | End: 2020-05-03 | Stop reason: HOSPADM

## 2020-05-01 RX ORDER — ACETAMINOPHEN 325 MG/1
650 TABLET ORAL EVERY 4 HOURS PRN
Status: DISCONTINUED | OUTPATIENT
Start: 2020-05-01 | End: 2020-05-03 | Stop reason: HOSPADM

## 2020-05-01 RX ORDER — FUROSEMIDE 10 MG/ML
40 INJECTION INTRAMUSCULAR; INTRAVENOUS EVERY 12 HOURS SCHEDULED
Status: DISCONTINUED | OUTPATIENT
Start: 2020-05-01 | End: 2020-05-03 | Stop reason: HOSPADM

## 2020-05-01 RX ORDER — HEPARIN SODIUM 5000 [USP'U]/ML
5000 INJECTION, SOLUTION INTRAVENOUS; SUBCUTANEOUS EVERY 8 HOURS SCHEDULED
Status: DISCONTINUED | OUTPATIENT
Start: 2020-05-01 | End: 2020-05-03 | Stop reason: HOSPADM

## 2020-05-01 RX ORDER — SODIUM CHLORIDE 0.9 % (FLUSH) 0.9 %
10 SYRINGE (ML) INJECTION EVERY 12 HOURS SCHEDULED
Status: DISCONTINUED | OUTPATIENT
Start: 2020-05-01 | End: 2020-05-03 | Stop reason: HOSPADM

## 2020-05-01 RX ORDER — LACTULOSE 10 G/15ML
20 SOLUTION ORAL 2 TIMES DAILY PRN
COMMUNITY
End: 2020-05-08 | Stop reason: HOSPADM

## 2020-05-01 RX ORDER — IPRATROPIUM BROMIDE AND ALBUTEROL SULFATE 2.5; .5 MG/3ML; MG/3ML
3 SOLUTION RESPIRATORY (INHALATION)
Status: DISCONTINUED | OUTPATIENT
Start: 2020-05-01 | End: 2020-05-03 | Stop reason: HOSPADM

## 2020-05-01 RX ORDER — ACETAMINOPHEN 650 MG/1
650 SUPPOSITORY RECTAL EVERY 4 HOURS PRN
Status: DISCONTINUED | OUTPATIENT
Start: 2020-05-01 | End: 2020-05-03 | Stop reason: HOSPADM

## 2020-05-01 RX ORDER — PANTOPRAZOLE SODIUM 40 MG/1
40 TABLET, DELAYED RELEASE ORAL DAILY
Status: ON HOLD | COMMUNITY
End: 2021-05-31

## 2020-05-01 RX ORDER — NICOTINE POLACRILEX 4 MG
15 LOZENGE BUCCAL
Status: DISCONTINUED | OUTPATIENT
Start: 2020-05-01 | End: 2020-05-03 | Stop reason: HOSPADM

## 2020-05-01 RX ORDER — ALBUTEROL SULFATE 90 UG/1
2 AEROSOL, METERED RESPIRATORY (INHALATION) EVERY 4 HOURS PRN
Status: ON HOLD | COMMUNITY
End: 2022-04-07

## 2020-05-01 RX ORDER — CLOPIDOGREL BISULFATE 75 MG/1
75 TABLET ORAL DAILY
Status: DISCONTINUED | OUTPATIENT
Start: 2020-05-01 | End: 2020-05-03 | Stop reason: HOSPADM

## 2020-05-01 RX ORDER — TEMAZEPAM 15 MG/1
15 CAPSULE ORAL NIGHTLY PRN
COMMUNITY
End: 2020-05-08 | Stop reason: HOSPADM

## 2020-05-01 RX ORDER — CARVEDILOL 3.12 MG/1
3.12 TABLET ORAL EVERY 12 HOURS SCHEDULED
Status: DISCONTINUED | OUTPATIENT
Start: 2020-05-01 | End: 2020-05-03 | Stop reason: HOSPADM

## 2020-05-01 RX ORDER — FUROSEMIDE 10 MG/ML
40 INJECTION INTRAMUSCULAR; INTRAVENOUS DAILY
Status: DISCONTINUED | OUTPATIENT
Start: 2020-05-01 | End: 2020-05-01

## 2020-05-01 RX ORDER — LACTULOSE 10 G/15ML
20 SOLUTION ORAL 2 TIMES DAILY PRN
Status: DISCONTINUED | OUTPATIENT
Start: 2020-05-01 | End: 2020-05-03 | Stop reason: HOSPADM

## 2020-05-01 RX ADMIN — ATORVASTATIN CALCIUM 40 MG: 40 TABLET, FILM COATED ORAL at 18:25

## 2020-05-01 RX ADMIN — FUROSEMIDE 40 MG: 10 INJECTION, SOLUTION INTRAMUSCULAR; INTRAVENOUS at 21:37

## 2020-05-01 RX ADMIN — IPRATROPIUM BROMIDE AND ALBUTEROL SULFATE 3 ML: .5; 3 SOLUTION RESPIRATORY (INHALATION) at 19:23

## 2020-05-01 RX ADMIN — CARVEDILOL 3.12 MG: 3.12 TABLET, FILM COATED ORAL at 21:35

## 2020-05-01 RX ADMIN — INSULIN LISPRO 5 UNITS: 100 INJECTION, SOLUTION INTRAVENOUS; SUBCUTANEOUS at 18:25

## 2020-05-01 RX ADMIN — PANTOPRAZOLE SODIUM 40 MG: 40 TABLET, DELAYED RELEASE ORAL at 18:25

## 2020-05-01 RX ADMIN — METHYLPREDNISOLONE SODIUM SUCCINATE 40 MG: 40 INJECTION, POWDER, FOR SOLUTION INTRAMUSCULAR; INTRAVENOUS at 21:37

## 2020-05-01 RX ADMIN — DOXYCYCLINE 100 MG: 100 INJECTION, POWDER, LYOPHILIZED, FOR SOLUTION INTRAVENOUS at 21:36

## 2020-05-01 RX ADMIN — Medication 10 ML: at 21:38

## 2020-05-01 RX ADMIN — ISOSORBIDE MONONITRATE 30 MG: 30 TABLET, EXTENDED RELEASE ORAL at 18:25

## 2020-05-01 RX ADMIN — SPIRONOLACTONE 25 MG: 25 TABLET, FILM COATED ORAL at 18:25

## 2020-05-01 RX ADMIN — HEPARIN SODIUM 5000 UNITS: 5000 INJECTION INTRAVENOUS; SUBCUTANEOUS at 21:37

## 2020-05-01 NOTE — ED NOTES
The patient has a hx of COPD and he was feeling short of air this morning.  He went to [facility] and the EKG caused the ER doctor to call DR. Wing his heart doctor.  The patient stated that they told him he was on the verge of a heart attack.  He was advised to come to [facility] to be admitted.  The patient left [facility] and his spouse drove him down to [facility].  Patient denies any pain at this time.  Pt. Has a hx of smoking and he smokes 1 pack a day.  The patient has a cough, no fever, no N/V.  The patient is stating at 98% on room air.  Pt denies that he uses oxygen at home only has an emergency inhaler.       Laina Silva RN  05/01/20 [id number]

## 2020-05-01 NOTE — ED PROVIDER NOTES
Subjective   Patient is a 60-year-old white male with history of hypertension, high cholesterol, COPD, CAD with CABG and stents, PVD with femoropopliteal bypass presents today with complaints of shortness of breath.  States he has had symptoms for the last 2 to 3 days.  States he has been using his home nebulizer and inhalers without improvement.  He does report a cough but states this is chronic for him and reports no new cough.  He denies any chest pain, sweats, nausea or vomiting.  He denies any fever or chills.  He denies any lower extremity pain or edema.  States he was initially seen at [facility] where he was told that he may be having a heart attack and was suggested to him to be transferred here by EMS.  He states he refused EMS and drove himself here.  He states he was given aspirin and nitroglycerin paste prior to leaving.          Review of Systems   Constitutional: Negative for chills and fever.   HENT: Negative for congestion and sore throat.    Respiratory: Positive for cough and shortness of breath. Negative for chest tightness.    Cardiovascular: Negative for chest pain, palpitations and leg swelling.   Gastrointestinal: Negative for abdominal pain, diarrhea, nausea and vomiting.   Genitourinary: Negative for difficulty urinating.   Musculoskeletal: Negative for back pain.   Skin: Negative for rash.   Neurological: Negative for dizziness and headaches.       Past Medical History:   Diagnosis Date   • CAD (coronary artery disease)     S/P CABG   • Chronic systolic CHF (congestive heart failure) (CMS/[facility])    • COPD (chronic obstructive pulmonary disease) (CMS/[facility])    • DM2 (diabetes mellitus, type 2) (CMS/[facility])    • Dyslipidemia    • Encounter for monitoring antiplatelet therapy    • Hypertension    • Myocardial infarction (CMS/[facility])     inferior wall MI--03/13   • DEBI (obstructive sleep apnea)     c-pap machine at    • Peripheral vascular disease (CMS/[facility])     S/P left fem-pop bypass   •  Tobacco use        No Known Allergies    Past Surgical History:   Procedure Laterality Date   • APPENDECTOMY      at age 8 or 9   • CARDIAC CATHETERIZATION      3-; [facility] 2014   • CARDIAC CATHETERIZATION Right 2019    Procedure: Cardiac Catheterization/with grafts groin access;  Surgeon: Juarez Wing MD;  Location: [facility] CATH INVASIVE LOCATION;  Service: Cardiovascular   • CARDIAC ELECTROPHYSIOLOGY PROCEDURE N/A 10/8/2019    Procedure: ICD SC new, ST.SHIV ;  Surgeon: Juarez Wing MD;  Location: [facility] CATH INVASIVE LOCATION;  Service: Cardiovascular   • COLONOSCOPY     • CORONARY ARTERY BYPASS GRAFT      x3, 3-18-13-LIMA to LAD, and reverse individual SVG to lateral marginal and to PDA   • FEMORAL POPLITEAL BYPASS Left 2019    [facility]/ Dr. Jero Hatch   • HAND SURGERY Left     crushed hand   • TOE SURGERY      toe nail removal of big toe- age 8 or 9       Family History   Problem Relation Age of Onset   • Hypertension Mother    • Diabetes Mother    • Heart disease Father         had CABG and re-do/  while recovering-had MI age 40s   • Diabetes Father    • Pancreatic cancer Sister    • Hyperlipidemia Brother    • Stroke Brother    • Heart disease Paternal Uncle        Social History     Socioeconomic History   • Marital status:      Spouse name: Not on file   • Number of children: Not on file   • Years of education: Not on file   • Highest education level: Not on file   Tobacco Use   • Smoking status: Current Every Day Smoker     Packs/day: 1.00     Years: 25.00     Pack years: 25.00     Types: Cigarettes   • Smokeless tobacco: Never Used   • Tobacco comment: currently smokes 5 cigarettes per day   Substance and Sexual Activity   • Alcohol use: No     Frequency: Never   • Drug use: No   • Sexual activity: Defer           Objective   Physical Exam   Constitutional: He appears well-developed.   Vital signs and triage nurse note reviewed.  Constitutional: Awake,  alert; well-developed and well-nourished. No acute distress is noted.  Obese.  HEENT: Normocephalic, atraumatic; pupils are PERRL with intact EOM; oropharynx is pink and moist without exudate or erythema.  No drooling or pooling of oral secretions.  Neck: Supple, full range of motion without pain; no cervical lymphadenopathy. Normal phonation.  Cardiovascular: Regular rate and rhythm, normal S1-S2.  No murmur noted.  Pulmonary: Respiratory effort regular nonlabored, breath sounds diffusely diminished.  No wheezes rhonchi or rales noted.  Abdomen: Soft, nontender, nondistended with normoactive bowel sounds; no rebound or guarding.  Musculoskeletal: Independent range of motion of all extremities with no palpable tenderness or edema.  Calves are symmetric and nontender.  Neuro: Alert oriented x3, speech is clear and appropriate, GCS 15.    Skin: Flesh tone, warm, dry, intact; no erythematous or petechial rash or lesion.        Procedures           ED Course  ED Course as of May 01 1427   Fri May 01, 2020   1250 EKG reviewed by me and interpreted by Dr. Nettles: Sinus rhythm with ventricular rate of 99.  Probable left atrial enlargement.  T wave abnormality diffusely without significant change from prior EKG on 6/28/2019.    [MD]      ED Course User Index  [MD] Nasra Forde APRN      Labs Reviewed   TROPONIN (IN-HOUSE) - Abnormal; Notable for the following components:       Result Value    Troponin T 0.131 (*)     All other components within normal limits    Narrative:     Troponin T Reference Range:  <= 0.03 ng/mL-   Negative for AMI  >0.03 ng/mL-     Abnormal for myocardial necrosis.  Clinicians would have to utilize clinical acumen, EKG, Troponin and serial changes to determine if it is an Acute Myocardial Infarction or myocardial injury due to an underlying chronic condition.       Results may be falsely decreased if patient taking Biotin.     BASIC METABOLIC PANEL - Abnormal; Notable for the following components:     Glucose 343 (*)     BUN 28 (*)     Chloride 95 (*)     eGFR Non  Amer 58 (*)     Anion Gap 16.0 (*)     All other components within normal limits    Narrative:     GFR Normal >60  Chronic Kidney Disease <60  Kidney Failure <15     APTT - Abnormal; Notable for the following components:    PTT 23.2 (*)     All other components within normal limits   BNP (IN-HOUSE) - Abnormal; Notable for the following components:    proBNP 1,115.0 (*)     All other components within normal limits    Narrative:     Among patients with dyspnea, NT-proBNP is highly sensitive for the detection of acute congestive heart failure. In addition NT-proBNP of <300 pg/ml effectively rules out acute congestive heart failure with 99% negative predictive value.    Results may be falsely decreased if patient taking Biotin.     CBC WITH AUTO DIFFERENTIAL - Abnormal; Notable for the following components:    WBC 14.10 (*)     Neutrophils, Absolute 10.30 (*)     All other components within normal limits   PROTIME-INR - Normal   RAINBOW DRAW    Narrative:     The following orders were created for panel order [place] Draw.  Procedure                               Abnormality         Status                     ---------                               -----------         ------                     Light Blue Top[[id number]]                                   Final result               Green Top (Gel)[[id number]]                                  Final result               Lavender Top[[id number]]                                     Final result               Gold Top - SST[[id number]]                                   Final result                 Please view results for these tests on the individual orders.   LIGHT BLUE TOP   GREEN TOP   LAVENDER TOP   GOLD TOP - SST   CBC AND DIFFERENTIAL    Narrative:     The following orders were created for panel order CBC & Differential.  Procedure                               Abnormality         Status                      ---------                               -----------         ------                     CBC Auto Differential[[id number]]        Abnormal            Final result                 Please view results for these tests on the individual orders.     No radiology results for the last day  Medications   sodium chloride 0.9 % flush 10 mL (has no administration in time range)                                          MDM  Number of Diagnoses or Management Options  Diagnosis management comments: Comorbidities: Hypertension, high cholesterol, diabetes, COPD, CAD with CABG, PVD with bypass  Differentials: Cardiac ischemia, ACS, CHF exacerbation, pneumonia, COPD exacerbation;this list is not all inclusive and does not constitute the entirety of considered causes  Discussion with provider:  Radiology interpretation: X-rays reviewed by me and interpreted by radiologist: As above  Lab interpretation: Labs viewed by me significant for: As above    Patient is placed on continuous cardiac monitor.  He had IV established.  He had labs, EKG and chest x-ray obtained.  Medical records were obtained from [facility] and reviewed.  Patient is noted to have Nitropaste on right upper arm that was applied prior to arrival.    Patient was discussed with Dr. Wing, patient's cardiologist as well as the hospitalist who agreed to admit.  Patient be admitted to the hospital for further evaluation and treatment.    Diagnosis and treatment plan discussed with patient.  Patient agreeable to plan.            Amount and/or Complexity of Data Reviewed  Clinical lab tests: ordered and reviewed  Tests in the radiology section of CPT®: ordered and reviewed    Patient Progress  Patient progress: stable      Final diagnoses:   Dyspnea, unspecified type   Elevated troponin            Nasra Forde, APRN  05/01/20 1422

## 2020-05-01 NOTE — CONSULTS
Cardiology Consult Note    Patient Identification:  Name: Bobby Steele Jr.  Age: 60 y.o.  Sex: male  :  1960  MRN: [id number]             Requesting Physician : Nasra Forde    Reason for Consultation / Chief Complaint : patient co-management shortness of breath elevated troponin    History of Present Illness:        This is a 60-year-old white male with past medical history of     # NSTEMI  19, SHILPA to SVG to RCA and proximal LAD leading into a small diagonal  # CAD status post CABG X3 V  # Ischemic cardiomyopathy with EF of 35%, status post ICD 10/8/2019  # COPD, continue tobacco abuse  # Hypertension   # Hyperlipidemia  # Diabetes with hemoglobin A1c of 7.4 on 6/3/19  #Positive family for premature heart disease with father MI 53    Here with complaint of shortness of breath with initially presented to [facility] emergency room was found to have elevated troponin therefore needed to be transferred here, I had spoken to the hospitalist to make a bed for the patient and by the time we called back the ER in the meantime signed out AMA at the rate and showed up and admitting here, was advised to go through the ER here for admission.  Patient is complaining of progressively worsening dyspnea on exertion relieved with rest .  Patient was  recently in the hospital with chest pain and shortness of breath after coughing, underwent cardiac cath 2019 which revealed patent stent to SVG to RCA patent stent in proximal LAD and small vessel disease and diagonal.   Patient has chronic dyspnea on exertion relieved with rest.  Patient is status post ICD placement 10/8/2019 .  Work-up here revealed elevated troponin at 0.131.  proBNP is 1115.  Potassium is 3.5 glucose elevated at 343        ASSESSMENT:  #elevated troponin  #Acute on chronic CHF, ischemic cardiomyopathy 35%, status post ICD 10/8/2019  # NSTEMI 19  # CAD status post CABG  # COPD, tobacco abuse   # hypertension ,  #  hyperlipidemia    #Hyperglycemia, and type 2 diabetes     Recommendations:  Telemetry  Diuresis as tolerated  Serial cardiac enzymes  Lexiscan Cardiolite after CHF is well compensated  We will follow-up and consider further evaluation and treatment    Patient had cath 6/27/2019 which revealed patent stent in SVG to RCA and patent stent in proximal LAD with severe disease in diagonal branch which is too small to be stented  Continue beta-blockers carvedilol, isosorbide, aspirin, Plavix and statin as tolerated  We will continue low-dose ACE inhibitor  Continue  Aldactone 25 mg  We will start IV furosemide and hold p.o. furosemide  Monitor blood pressure  Discussed with patient and his wife about CHF care  Counseled on smoking cessation  Counseled on CHF care  Counseled on diet exercise and smoking cessation  Diabetes control  Thank you very much for letting me participate in the care of this gentleman             Diagnosis Plan   1. Dyspnea, unspecified type     2. Elevated troponin                Past Medical History:  Past Medical History:   Diagnosis Date   • CAD (coronary artery disease)     S/P CABG   • Chronic systolic CHF (congestive heart failure) (CMS/[facility])    • COPD (chronic obstructive pulmonary disease) (CMS/[facility])    • DM2 (diabetes mellitus, type 2) (CMS/[facility])    • Dyslipidemia    • Encounter for monitoring antiplatelet therapy    • Hypertension    • Myocardial infarction (CMS/[facility])     inferior wall MI--03/13   • DEBI (obstructive sleep apnea)     c-pap machine at    • Peripheral vascular disease (CMS/[facility])     S/P left fem-pop bypass   • Tobacco use      Past Surgical History:  Past Surgical History:   Procedure Laterality Date   • APPENDECTOMY      at age 8 or 9   • CARDIAC CATHETERIZATION      3-; [facility] 9-   • CARDIAC CATHETERIZATION Right 6/27/2019    Procedure: Cardiac Catheterization/with grafts groin access;  Surgeon: Juarez Wing MD;  Location: [facility] CATH INVASIVE LOCATION;  Service:  Cardiovascular   • CARDIAC ELECTROPHYSIOLOGY PROCEDURE N/A 10/8/2019    Procedure: ICD SC new, ST.SHIV ;  Surgeon: Juarez Wing MD;  Location: [facility] CATH INVASIVE LOCATION;  Service: Cardiovascular   • COLONOSCOPY     • CORONARY ARTERY BYPASS GRAFT      x3, 3-18-13-LIMA to LAD, and reverse individual SVG to lateral marginal and to PDA   • FEMORAL POPLITEAL BYPASS Left 01/09/2019    [facility]/ Dr. Jero Hatch   • HAND SURGERY Left     crushed hand   • TOE SURGERY      toe nail removal of big toe- age 8 or 9      Allergies:  No Known Allergies  Home Meds:    (Not in a hospital admission)  Current Meds:     Current Facility-Administered Medications:   •  [COMPLETED] Insert peripheral IV, , , Once **AND** sodium chloride 0.9 % flush 10 mL, 10 mL, Intravenous, PRN, Leake, Nasra, APRN    Current Outpatient Medications:   •  albuterol (PROVENTIL) (2.5 MG/3ML) 0.083% nebulizer solution, Take 2.5 mg by nebulization Every 6 (Six) Hours As Needed for Wheezing or Shortness of Air., Disp: , Rfl:   •  aspirin 325 MG tablet, Take 325 mg by mouth Daily., Disp: , Rfl:   •  atorvastatin (LIPITOR) 40 MG tablet, Take 40 mg by mouth Daily., Disp: , Rfl:   •  carvedilol (COREG) 3.125 MG tablet, Take 1 tablet by mouth 3 (Three) Times a Day. (Patient taking differently: Take 3.125 mg by mouth Every 12 (Twelve) Hours.), Disp: 270 tablet, Rfl: 1  •  clopidogrel (PLAVIX) 75 MG tablet, Take 75 mg by mouth Daily., Disp: , Rfl:   •  fenofibrate (TRICOR) 145 MG tablet, Take 1 tablet by mouth Daily., Disp: 90 tablet, Rfl: 3  •  furosemide (LASIX) 40 MG tablet, Take 1 tablet by mouth 2 (Two) Times a Day., Disp: 180 tablet, Rfl: 3  •  glimepiride (AMARYL) 4 MG tablet, Take 4 mg by mouth 2 (Two) Times a Day., Disp: , Rfl:   •  isosorbide mononitrate (IMDUR) 30 MG 24 hr tablet, TAKE 1 TABLET BY MOUTH ONCE DAILY, Disp: 90 tablet, Rfl: 0  •  lisinopril (PRINIVIL,ZESTRIL) 2.5 MG tablet, TAKE 1 TABLET BY MOUTH ONCE DAILY, Disp: 90 tablet,  Rfl: 1  •  metFORMIN (GLUCOPHAGE) 500 MG tablet, Take 500 mg by mouth 2 (Two) Times a Day With Meals., Disp: , Rfl:   •  pantoprazole (PROTONIX) 40 MG EC tablet, Take 40 mg by mouth Daily., Disp: , Rfl:   •  predniSONE (DELTASONE) 10 MG tablet, Take 10 mg by mouth Daily., Disp: , Rfl:   •  spironolactone (ALDACTONE) 25 MG tablet, Take 1 tablet by mouth Daily., Disp: 90 tablet, Rfl: 3  •  temazepam (RESTORIL) 15 MG capsule, Take 15 mg by mouth At Night As Needed for Sleep., Disp: , Rfl:   •  albuterol sulfate  (90 Base) MCG/ACT inhaler, Inhale 2 puffs Every 4 (Four) Hours As Needed for Wheezing or Shortness of Air., Disp: , Rfl:   •  benzonatate (TESSALON) 200 MG capsule, Take 200 mg by mouth 3 (Three) Times a Day As Needed for Cough., Disp: , Rfl:   •  lactulose (CHRONULAC) 10 GM/15ML solution, Take 20 g by mouth 2 (Two) Times a Day As Needed (constipation)., Disp: , Rfl:   •  nitroglycerin (NITROSTAT) 0.4 MG SL tablet, Take no more than 3 doses in 15 minutes., Disp: 25 tablet, Rfl: 9  Social History:   Social History     Tobacco Use   • Smoking status: Current Every Day Smoker     Packs/day: 1.00     Years: 25.00     Pack years: 25.00     Types: Cigarettes   • Smokeless tobacco: Never Used   • Tobacco comment: currently smokes 5 cigarettes per day   Substance Use Topics   • Alcohol use: No     Frequency: Never      Family History:  Family History   Problem Relation Age of Onset   • Hypertension Mother    • Diabetes Mother    • Heart disease Father         had CABG and re-do/  while recovering-had MI age 40s   • Diabetes Father    • Pancreatic cancer Sister    • Hyperlipidemia Brother    • Stroke Brother    • Heart disease Paternal Uncle         Review of Systems : Review of Systems   Constitution: Negative for weight gain and weight loss.   HENT: Negative for nosebleeds.    Cardiovascular: Positive for dyspnea on exertion and palpitations. Negative for chest pain, near-syncope and syncope.   Respiratory:  "Positive for cough. Negative for hemoptysis and shortness of breath.    Endocrine: Negative for cold intolerance, heat intolerance, polydipsia and polyphagia.   Hematologic/Lymphatic: Does not bruise/bleed easily.   Skin: Negative for rash.   Musculoskeletal: Negative for arthritis and back pain.   Gastrointestinal: Negative for diarrhea, hematochezia, melena, nausea and vomiting.   Genitourinary: Negative for dysuria and hematuria.   Neurological: Negative for dizziness and seizures.   Psychiatric/Behavioral: Negative for altered mental status.          Constitutional:  Temp:  [97.9 °F (36.6 °C)] 97.9 °F (36.6 °C)  Heart Rate:  [94-97] 94  Resp:  [20] 20  BP: (119-128)/(59-82) 128/65    Physical Exam   /65   Pulse 94   Temp 97.9 °F (36.6 °C) (Oral)   Resp 20   Ht 172.7 cm (68\")   Wt 87.9 kg (193 lb 12.6 oz)   SpO2 97%   BMI 29.46 kg/m²   Physical Exam  General:  Appears in no acute distress  Eyes: Sclera is anicteric,  conjunctiva is clear   HEENT:  No JVD. Thyroid not visibly enlarged. No mucosal pallor or cyanosis  Respiratory: Respirations regular and unlabored at rest.  Decreased breath sounds bilateral bases  Cardiovascular: S1,S2 Regular rate and rhythm. No murmur, rub or gallop auscultated. No pretibial pitting edema  Gastrointestinal: Abdomen soft, flat, non tender. Bowel sounds present.   Musculoskeletal:  No abnormal movements  Extremities: No digital clubbing or cyanosis  Skin: Color pink. Skin warm and dry to touch. No rashes  No xanthoma  Neuro: Alert and awake, no lateralizing deficits appreciated    Cardiographics  ECG: EKG tracing was  personally reviewed by me  ECG 12 Lead   Preliminary Result   HEART RATE= 99  bpm   RR Interval= 608  ms   KY Interval= 152  ms   P Horizontal Axis= 2  deg   P Front Axis= 46  deg   QRSD Interval= 103  ms   QT Interval= 347  ms   QRS Axis= 84  deg   T Wave Axis= -78  deg   - ABNORMAL ECG -   Sinus rhythm   Probable left atrial enlargement   Repol abnrm " suggests ischemia, diffuse leads   Electronically Signed By:    Date and Time of Study: 2020-05-01 12:45:05          Telemetry: Sinus rhythm    Echocardiogram:   Results for orders placed in visit on 08/22/19   Adult Transthoracic Echo Limited W/ Cont if Necessary Per Protocol    Narrative · The left ventricular cavity is moderately dilated.  · Estimated EF = 35%.  · Left atrial cavity size is moderately dilated.  · Mild mitral valve regurgitation is present     Technically difficult study due to due to patient body habitus    Not all left ventricular walls are well visualized, inferior posterior   wall is best sidra segment.  Left ventricular enlargement seen,   septal dyskinesis and anteroapical hypokinesis seen.  Estimated LV   ejection fraction of 35% .  Concentric LVH seen   Normal RV size  Moderate left atrial enlargement seen   Aortic valve, mitral valve, tricuspid valve appears structurally normal,   mild MR seen  Proximal aorta appears normal in size  No significant pericardial effusion seen         Imaging  Chest X-ray:   Imaging Results (Last 24 Hours)     Procedure Component Value Units Date/Time    XR Chest 1 View [[id number]] Collected:  05/01/20 1424     Updated:  05/01/20 1427    Narrative:       DATE OF EXAM:  5/1/2020 1:34 PM     PROCEDURE:  XR CHEST 1 VW-     INDICATIONS:  soa; R06.00-Dyspnea, unspecified; R79.89-Other specified abnormal  findings of blood chemistry     COMPARISON:  2 view chest x-ray 10/08/2019, AP chest x-ray 06/19/2019, CT chest  05/12/2019.     TECHNIQUE:   Single radiographic AP view of the chest was obtained.     FINDINGS:  Cardiac silhouette appears mildly enlarged, but is exaggerated by  portable AP technique. The lungs appear grossly clear. No pneumothorax  or large pleural effusion is seen. Changes are redemonstrated from CABG.  Pacemaker is new.       Impression:       No acute cardiopulmonary abnormality is identified.     Electronically Signed By-Nadiya Rodriguez  On:5/1/2020 2:25 PM  This report was finalized on [id number] by  Nadiya Rodriguez, .          Lab Review: I have reviewed the labs  Results from last 7 days   Lab Units 05/01/20  1247   TROPONIN T ng/mL 0.131*         Results from last 7 days   Lab Units 05/01/20  1247   SODIUM mmol/L 138   POTASSIUM mmol/L 3.5   BUN mg/dL 28*   CREATININE mg/dL 1.26   CALCIUM mg/dL 9.2     @[id number]@  Results from last 7 days   Lab Units 05/01/20  1247   WBC 10*3/mm3 14.10*   HEMOGLOBIN g/dL 16.2   HEMATOCRIT % 46.7   PLATELETS 10*3/mm3 191     Results from last 7 days   Lab Units 05/01/20  1247   INR  1.05   APTT seconds 23.2*                       Juarez Wing MD  5/1/2020, 14:41      EMR Dragon/Transcription:   Dictated utilizing Dragon dictation

## 2020-05-02 LAB
ALBUMIN SERPL-MCNC: 4.2 G/DL (ref 3.5–5.2)
ALBUMIN/GLOB SERPL: 1.3 G/DL
ALP SERPL-CCNC: 51 U/L (ref 39–117)
ALT SERPL W P-5'-P-CCNC: 40 U/L (ref 1–41)
ANION GAP SERPL CALCULATED.3IONS-SCNC: 18 MMOL/L (ref 5–15)
ARTICHOKE IGE QN: 87 MG/DL (ref 0–100)
AST SERPL-CCNC: 23 U/L (ref 1–40)
BASOPHILS # BLD AUTO: 0 10*3/MM3 (ref 0–0.2)
BASOPHILS NFR BLD AUTO: 0.6 % (ref 0–1.5)
BILIRUB SERPL-MCNC: 0.5 MG/DL (ref 0.2–1.2)
BUN BLD-MCNC: 23 MG/DL (ref 8–23)
BUN/CREAT SERPL: 17.2 (ref 7–25)
CALCIUM SPEC-SCNC: 8.9 MG/DL (ref 8.6–10.5)
CHLORIDE SERPL-SCNC: 95 MMOL/L (ref 98–107)
CHOLEST SERPL-MCNC: 162 MG/DL (ref 0–200)
CO2 SERPL-SCNC: 27 MMOL/L (ref 22–29)
CREAT BLD-MCNC: 1.34 MG/DL (ref 0.76–1.27)
CRP SERPL-MCNC: 0.36 MG/DL (ref 0–0.5)
D DIMER PPP FEU-MCNC: 0.45 MCGFEU/ML (ref 0.17–0.59)
DEPRECATED RDW RBC AUTO: 43.3 FL (ref 37–54)
EOSINOPHIL # BLD AUTO: 0 10*3/MM3 (ref 0–0.4)
EOSINOPHIL NFR BLD AUTO: 0.1 % (ref 0.3–6.2)
ERYTHROCYTE [DISTWIDTH] IN BLOOD BY AUTOMATED COUNT: 13.5 % (ref 12.3–15.4)
FERRITIN SERPL-MCNC: 1431 NG/ML (ref 30–400)
GFR SERPL CREATININE-BSD FRML MDRD: 54 ML/MIN/1.73
GLOBULIN UR ELPH-MCNC: 3.2 GM/DL
GLUCOSE BLD-MCNC: 424 MG/DL (ref 65–99)
GLUCOSE BLDC GLUCOMTR-MCNC: 288 MG/DL (ref 70–105)
GLUCOSE BLDC GLUCOMTR-MCNC: 317 MG/DL (ref 70–105)
GLUCOSE BLDC GLUCOMTR-MCNC: 353 MG/DL (ref 70–105)
GLUCOSE BLDC GLUCOMTR-MCNC: 460 MG/DL (ref 70–105)
HCT VFR BLD AUTO: 43.7 % (ref 37.5–51)
HDLC SERPL-MCNC: 24 MG/DL (ref 40–60)
HGB BLD-MCNC: 15.2 G/DL (ref 13–17.7)
LDH SERPL-CCNC: 220 U/L (ref 135–225)
LDLC SERPL CALC-MCNC: ABNORMAL MG/DL
LDLC/HDLC SERPL: ABNORMAL {RATIO}
LYMPHOCYTES # BLD AUTO: 1.1 10*3/MM3 (ref 0.7–3.1)
LYMPHOCYTES NFR BLD AUTO: 13.1 % (ref 19.6–45.3)
MAGNESIUM SERPL-MCNC: 1.4 MG/DL (ref 1.6–2.4)
MCH RBC QN AUTO: 32 PG (ref 26.6–33)
MCHC RBC AUTO-ENTMCNC: 34.8 G/DL (ref 31.5–35.7)
MCV RBC AUTO: 92.1 FL (ref 79–97)
MONOCYTES # BLD AUTO: 0.3 10*3/MM3 (ref 0.1–0.9)
MONOCYTES NFR BLD AUTO: 3.1 % (ref 5–12)
NEUTROPHILS # BLD AUTO: 7.1 10*3/MM3 (ref 1.7–7)
NEUTROPHILS NFR BLD AUTO: 83.1 % (ref 42.7–76)
NRBC BLD AUTO-RTO: 0.1 /100 WBC (ref 0–0.2)
PHOSPHATE SERPL-MCNC: 4 MG/DL (ref 2.5–4.5)
PLATELET # BLD AUTO: 167 10*3/MM3 (ref 140–450)
PMV BLD AUTO: 10.9 FL (ref 6–12)
POTASSIUM BLD-SCNC: 4 MMOL/L (ref 3.5–5.2)
PROCALCITONIN SERPL-MCNC: 0.11 NG/ML (ref 0.1–0.25)
PROT SERPL-MCNC: 7.4 G/DL (ref 6–8.5)
RBC # BLD AUTO: 4.75 10*6/MM3 (ref 4.14–5.8)
SODIUM BLD-SCNC: 140 MMOL/L (ref 136–145)
T4 FREE SERPL-MCNC: 1.67 NG/DL (ref 0.93–1.7)
TRIGL SERPL-MCNC: 466 MG/DL (ref 0–150)
TROPONIN T SERPL-MCNC: 0.11 NG/ML (ref 0–0.03)
TSH SERPL DL<=0.05 MIU/L-ACNC: 0.84 UIU/ML (ref 0.27–4.2)
VLDLC SERPL-MCNC: ABNORMAL MG/DL
WBC NRBC COR # BLD: 8.5 10*3/MM3 (ref 3.4–10.8)

## 2020-05-02 PROCEDURE — 86140 C-REACTIVE PROTEIN: CPT | Performed by: HOSPITALIST

## 2020-05-02 PROCEDURE — 85379 FIBRIN DEGRADATION QUANT: CPT | Performed by: HOSPITALIST

## 2020-05-02 PROCEDURE — 83721 ASSAY OF BLOOD LIPOPROTEIN: CPT | Performed by: HOSPITALIST

## 2020-05-02 PROCEDURE — 94799 UNLISTED PULMONARY SVC/PX: CPT

## 2020-05-02 PROCEDURE — G0378 HOSPITAL OBSERVATION PER HR: HCPCS

## 2020-05-02 PROCEDURE — 85025 COMPLETE CBC W/AUTO DIFF WBC: CPT | Performed by: HOSPITALIST

## 2020-05-02 PROCEDURE — 84100 ASSAY OF PHOSPHORUS: CPT | Performed by: HOSPITALIST

## 2020-05-02 PROCEDURE — 96376 TX/PRO/DX INJ SAME DRUG ADON: CPT

## 2020-05-02 PROCEDURE — 80061 LIPID PANEL: CPT | Performed by: HOSPITALIST

## 2020-05-02 PROCEDURE — 84145 PROCALCITONIN (PCT): CPT | Performed by: HOSPITALIST

## 2020-05-02 PROCEDURE — 25010000002 MAGNESIUM SULFATE 2 GM/50ML SOLUTION: Performed by: HOSPITALIST

## 2020-05-02 PROCEDURE — 25010000002 HEPARIN (PORCINE) PER 1000 UNITS: Performed by: HOSPITALIST

## 2020-05-02 PROCEDURE — 83735 ASSAY OF MAGNESIUM: CPT | Performed by: HOSPITALIST

## 2020-05-02 PROCEDURE — 84443 ASSAY THYROID STIM HORMONE: CPT | Performed by: HOSPITALIST

## 2020-05-02 PROCEDURE — 84484 ASSAY OF TROPONIN QUANT: CPT | Performed by: HOSPITALIST

## 2020-05-02 PROCEDURE — 63710000001 INSULIN LISPRO (HUMAN) PER 5 UNITS: Performed by: HOSPITALIST

## 2020-05-02 PROCEDURE — 83615 LACTATE (LD) (LDH) ENZYME: CPT | Performed by: HOSPITALIST

## 2020-05-02 PROCEDURE — 83036 HEMOGLOBIN GLYCOSYLATED A1C: CPT | Performed by: HOSPITALIST

## 2020-05-02 PROCEDURE — 96372 THER/PROPH/DIAG INJ SC/IM: CPT

## 2020-05-02 PROCEDURE — 84439 ASSAY OF FREE THYROXINE: CPT | Performed by: HOSPITALIST

## 2020-05-02 PROCEDURE — 99215 OFFICE O/P EST HI 40 MIN: CPT | Performed by: INTERNAL MEDICINE

## 2020-05-02 PROCEDURE — 25010000002 METHYLPREDNISOLONE PER 40 MG: Performed by: HOSPITALIST

## 2020-05-02 PROCEDURE — 93005 ELECTROCARDIOGRAM TRACING: CPT | Performed by: HOSPITALIST

## 2020-05-02 PROCEDURE — 80053 COMPREHEN METABOLIC PANEL: CPT | Performed by: HOSPITALIST

## 2020-05-02 PROCEDURE — 82962 GLUCOSE BLOOD TEST: CPT

## 2020-05-02 PROCEDURE — 25010000002 FUROSEMIDE PER 20 MG: Performed by: HOSPITALIST

## 2020-05-02 PROCEDURE — 82728 ASSAY OF FERRITIN: CPT | Performed by: HOSPITALIST

## 2020-05-02 PROCEDURE — 96366 THER/PROPH/DIAG IV INF ADDON: CPT

## 2020-05-02 PROCEDURE — 99225 PR SBSQ OBSERVATION CARE/DAY 25 MINUTES: CPT | Performed by: HOSPITALIST

## 2020-05-02 RX ORDER — POTASSIUM CHLORIDE 1.5 G/1.77G
40 POWDER, FOR SOLUTION ORAL AS NEEDED
Status: DISCONTINUED | OUTPATIENT
Start: 2020-05-02 | End: 2020-05-03 | Stop reason: HOSPADM

## 2020-05-02 RX ORDER — MAGNESIUM SULFATE HEPTAHYDRATE 40 MG/ML
2 INJECTION, SOLUTION INTRAVENOUS AS NEEDED
Status: DISCONTINUED | OUTPATIENT
Start: 2020-05-02 | End: 2020-05-03 | Stop reason: HOSPADM

## 2020-05-02 RX ORDER — MAGNESIUM SULFATE HEPTAHYDRATE 40 MG/ML
4 INJECTION, SOLUTION INTRAVENOUS AS NEEDED
Status: DISCONTINUED | OUTPATIENT
Start: 2020-05-02 | End: 2020-05-03 | Stop reason: HOSPADM

## 2020-05-02 RX ORDER — POTASSIUM CHLORIDE 20 MEQ/1
40 TABLET, EXTENDED RELEASE ORAL AS NEEDED
Status: DISCONTINUED | OUTPATIENT
Start: 2020-05-02 | End: 2020-05-03 | Stop reason: HOSPADM

## 2020-05-02 RX ADMIN — ATORVASTATIN CALCIUM 40 MG: 40 TABLET, FILM COATED ORAL at 09:52

## 2020-05-02 RX ADMIN — IPRATROPIUM BROMIDE AND ALBUTEROL SULFATE 3 ML: .5; 3 SOLUTION RESPIRATORY (INHALATION) at 19:35

## 2020-05-02 RX ADMIN — METHYLPREDNISOLONE SODIUM SUCCINATE 40 MG: 40 INJECTION, POWDER, FOR SOLUTION INTRAMUSCULAR; INTRAVENOUS at 21:07

## 2020-05-02 RX ADMIN — ISOSORBIDE MONONITRATE 30 MG: 30 TABLET, EXTENDED RELEASE ORAL at 09:53

## 2020-05-02 RX ADMIN — INSULIN LISPRO 6 UNITS: 100 INJECTION, SOLUTION INTRAVENOUS; SUBCUTANEOUS at 09:52

## 2020-05-02 RX ADMIN — MAGNESIUM SULFATE HEPTAHYDRATE 2 G: 40 INJECTION, SOLUTION INTRAVENOUS at 18:06

## 2020-05-02 RX ADMIN — HEPARIN SODIUM 5000 UNITS: 5000 INJECTION INTRAVENOUS; SUBCUTANEOUS at 21:06

## 2020-05-02 RX ADMIN — FUROSEMIDE 40 MG: 10 INJECTION, SOLUTION INTRAMUSCULAR; INTRAVENOUS at 21:06

## 2020-05-02 RX ADMIN — HEPARIN SODIUM 5000 UNITS: 5000 INJECTION INTRAVENOUS; SUBCUTANEOUS at 16:43

## 2020-05-02 RX ADMIN — Medication 10 ML: at 10:14

## 2020-05-02 RX ADMIN — HEPARIN SODIUM 5000 UNITS: 5000 INJECTION INTRAVENOUS; SUBCUTANEOUS at 05:36

## 2020-05-02 RX ADMIN — DOXYCYCLINE 100 MG: 100 INJECTION, POWDER, LYOPHILIZED, FOR SOLUTION INTRAVENOUS at 09:52

## 2020-05-02 RX ADMIN — ASPIRIN 325 MG ORAL TABLET 325 MG: 325 PILL ORAL at 09:53

## 2020-05-02 RX ADMIN — METHYLPREDNISOLONE SODIUM SUCCINATE 40 MG: 40 INJECTION, POWDER, FOR SOLUTION INTRAMUSCULAR; INTRAVENOUS at 16:43

## 2020-05-02 RX ADMIN — CLOPIDOGREL BISULFATE 75 MG: 75 TABLET ORAL at 09:53

## 2020-05-02 RX ADMIN — CARVEDILOL 3.12 MG: 3.12 TABLET, FILM COATED ORAL at 21:07

## 2020-05-02 RX ADMIN — Medication 10 ML: at 21:08

## 2020-05-02 RX ADMIN — IPRATROPIUM BROMIDE AND ALBUTEROL SULFATE 3 ML: .5; 3 SOLUTION RESPIRATORY (INHALATION) at 11:04

## 2020-05-02 RX ADMIN — PANTOPRAZOLE SODIUM 40 MG: 40 TABLET, DELAYED RELEASE ORAL at 09:53

## 2020-05-02 RX ADMIN — FUROSEMIDE 40 MG: 10 INJECTION, SOLUTION INTRAMUSCULAR; INTRAVENOUS at 09:53

## 2020-05-02 RX ADMIN — CARVEDILOL 3.12 MG: 3.12 TABLET, FILM COATED ORAL at 09:53

## 2020-05-02 RX ADMIN — SPIRONOLACTONE 25 MG: 25 TABLET, FILM COATED ORAL at 09:53

## 2020-05-02 RX ADMIN — INSULIN LISPRO 14 UNITS: 100 INJECTION, SOLUTION INTRAVENOUS; SUBCUTANEOUS at 16:42

## 2020-05-02 RX ADMIN — DOXYCYCLINE 100 MG: 100 INJECTION, POWDER, LYOPHILIZED, FOR SOLUTION INTRAVENOUS at 21:08

## 2020-05-02 RX ADMIN — IPRATROPIUM BROMIDE AND ALBUTEROL SULFATE 3 ML: .5; 3 SOLUTION RESPIRATORY (INHALATION) at 07:26

## 2020-05-02 RX ADMIN — IPRATROPIUM BROMIDE AND ALBUTEROL SULFATE 3 ML: .5; 3 SOLUTION RESPIRATORY (INHALATION) at 15:41

## 2020-05-02 RX ADMIN — METHYLPREDNISOLONE SODIUM SUCCINATE 40 MG: 40 INJECTION, POWDER, FOR SOLUTION INTRAMUSCULAR; INTRAVENOUS at 05:35

## 2020-05-02 NOTE — PLAN OF CARE
Problem: Patient Care Overview  Goal: Plan of Care Review  Outcome: Ongoing (interventions implemented as appropriate)  Flowsheets  Taken 5/2/2020 1709  Progress: no change  Outcome Summary: pt is uncooperative. pt refused heart cath, but will agree to stress test. will continue to monitor.  Taken 5/2/2020 0701  Plan of Care Reviewed With: patient  Goal: Individualization and Mutuality  Outcome: Ongoing (interventions implemented as appropriate)  Goal: Discharge Needs Assessment  Outcome: Ongoing (interventions implemented as appropriate)  Goal: Interprofessional Rounds/Family Conf  Outcome: Ongoing (interventions implemented as appropriate)     Problem: Fall Risk (Adult)  Goal: Identify Related Risk Factors and Signs and Symptoms  Outcome: Ongoing (interventions implemented as appropriate)  Goal: Absence of Fall  Outcome: Ongoing (interventions implemented as appropriate)     Problem: Breathing Pattern Ineffective (Adult)  Goal: Identify Related Risk Factors and Signs and Symptoms  Outcome: Ongoing (interventions implemented as appropriate)  Goal: Effective Oxygenation/Ventilation  Outcome: Ongoing (interventions implemented as appropriate)  Goal: Anxiety/Fear Reduction  Outcome: Ongoing (interventions implemented as appropriate)

## 2020-05-02 NOTE — PLAN OF CARE
Problem: Patient Care Overview  Goal: Plan of Care Review  Outcome: Ongoing (interventions implemented as appropriate)  Flowsheets (Taken 5/2/2020 0313)  Progress: improving  Plan of Care Reviewed With: patient  Note:   Pt admitted from ER with dyspnea and elevated troponin, serial troponin is being done, cardio on board, pt is to have Lexiscan, no complaint of dyspnea during the night, no signs of distress noted, pt ambulated in the room to the bathroom, transfer self  from bed to chair. Blood glucose is monitoring, will cont to monitor.     Problem: Fall Risk (Adult)  Goal: Identify Related Risk Factors and Signs and Symptoms  Outcome: Ongoing (interventions implemented as appropriate)     Problem: Breathing Pattern Ineffective (Adult)  Goal: Effective Oxygenation/Ventilation  Outcome: Ongoing (interventions implemented as appropriate)   On room air, will cont to monitor.

## 2020-05-02 NOTE — PROGRESS NOTES
[facility] Medicine Services Daily Progress Note      Hospitalist Team  LOS 0 days      Patient Care Team:  Yamile Agarwal as PCP - General  Yamile Agarwal as PCP - Family Medicine  Yamile Agarwal as PCP - Claims Attributed  Juarez Wing MD as Consulting Physician (Cardiology)    Patient Location: 232/1      Subjective   Subjective     Chief Complaint / Subjective  Chief Complaint   Patient presents with   • Shortness of Breath         Brief Synopsis of Hospital Course/HPI  This is a 59-year-old  male with a history of ischemic cardiomyopathy status post AICD, CAD s/p CABG, DM, HTN, HLD, PVD s/p left LE femoral bypass, COPD, and continued tobacco dependence.  The patient claims to have had dry cough and shortness of breath for about a week and had presented to the ED at [facility] about a week ago and was placed on prednisone.  In the morning of 5/1/2020, the patient claims to have woken up with shortness of air that was refractory to his bronchodilators therefore went to [facility] ED.  The patient left AGAINST MEDICAL ADVICE from the ED and presented at [facility] ED after being directed there by his cardiologist.  He admits dry cough that is worse with laying supine and better when he sleeps on his side or sits up.  In addition, the patient did feel left-sided chest pressure intermittently with episodes of coughing.  Last C was in June 2019.  The patient denies recent fevers, chills, sweats, nausea, vomiting or abdominal pain.  This is a 59-year-old  male with a history of ischemic cardiomyopathy status post AICD, CAD s/p CABG, DM, HTN, HLD, PVD s/p left LE femoral bypass, COPD, and continued tobacco dependence.  The patient claims to have had dry cough and shortness of breath for about a week and had presented to the ED at [facility] about a week ago and was placed on prednisone.  In the morning of 5/1/2020, the  "patient claims to have woken up with shortness of air that was refractory to his bronchodilators therefore went to [facility] ED.  The patient left AGAINST MEDICAL ADVICE from the ED and presented at [facility] ED after being directed there by his cardiologist.  He admits dry cough that is worse with laying supine and better when he sleeps on his side or sits up.  In addition, the patient did feel left-sided chest pressure intermittently with episodes of coughing.  Last LHC was in June 2019.  The patient denies recent fevers, chills, sweats, nausea, vomiting or abdominal pain.    Date::    5/2/20: Afebrile.  Chest pain resolved.  Denies shortness of air.  Cardiology consulted.      Review of Systems   All other systems reviewed and are negative.        Objective   Objective      Vital Signs  Temp:  [97.6 °F (36.4 °C)-98.4 °F (36.9 °C)] 97.6 °F (36.4 °C)  Heart Rate:  [] 77  Resp:  [12-20] 18  BP: (106-137)/(52-82) 112/69  Oxygen Therapy  SpO2: 95 %  Pulse Oximetry Type: Intermittent  Device (Oxygen Therapy): room air  Flowsheet Rows      First Filed Value   Admission Height  172.7 cm (68\") Documented at 05/01/2020 1229   Admission Weight  87.9 kg (193 lb 12.6 oz) Documented at 05/01/2020 1229        Intake & Output (last 3 days)       04/29 0701 - 04/30 0700 04/30 0701 - 05/01 0700 05/01 0701 - 05/02 0700 05/02 0701 - 05/03 0700    P.O.   480 0    IV Piggyback   100     Total Intake(mL/kg)   580 (6.6) 0 (0)    Net   +580 0            Urine Unmeasured Occurrence   1 x         Lines, Drains & Airways    Active LDAs     Name:   Placement date:   Placement time:   Site:   Days:    Peripheral IV 05/02/20 1100 Anterior;Left Forearm   05/02/20    1100    Forearm   less than 1                  Physical Exam:    Physical Exam   Constitutional: He is oriented to person, place, and time. He appears well-developed.   HENT:   Head: Normocephalic and atraumatic.   Eyes: Pupils are equal, round, and reactive to light. " EOM are normal.   Neck: Normal range of motion. Neck supple.   Cardiovascular: Normal rate and regular rhythm.   Pulmonary/Chest: Effort normal and breath sounds normal.   Abdominal: Soft. Bowel sounds are normal.   Musculoskeletal: Normal range of motion.   Neurological: He is alert and oriented to person, place, and time.   Skin: Skin is warm.   Psychiatric: He has a normal mood and affect.            Wounds (last 24 hours)      LDA Wound     Row Name               [REMOVED] Wound 06/27/19 0845 Right anterior groin    Wound - Properties Group Date first assessed: 06/27/19  -AD, Heart cath site from 6/27  Time first assessed: 0845  -AD Side: Right  -AD Orientation: anterior  -AD Location: groin  -AD Resolution Date: 05/01/20  -AS, Not present on arrival  Resolution Time: 1250  -AS Wound Outcome: Healed  -AS Type: incision;surgical;other (see comments)  -AD       [REMOVED] Wound 10/08/19 1235 Left upper chest Incision    Wound - Properties Group Date first assessed: 10/08/19  -TJ Time first assessed: 1235  -TJ Side: Left  -TJ Orientation: upper  -TJ Location: chest  -TJ Primary Wound Type: Incision  -TJ Stage, Pressure Injury: medical device related  -TJ Resolution Date: 05/01/20  -AS Resolution Time: 1251  -AS Wound Outcome: Healed  -AS, Not present on arrival       User Key  (r) = Recorded By, (t) = Taken By, (c) = Cosigned By    Initials Name Provider Type    TJ Cami Vides RN Registered Nurse    Giana Morel RN Registered Nurse    AS Laina Silva LPN Licensed Nurse          Procedures:              Results Review:     I reviewed the patient's new clinical results.      Lab Results (last 24 hours)     Procedure Component Value Units Date/Time    POC Glucose Once [[id number]]  (Abnormal) Collected:  05/02/20 1125    Specimen:  Blood Updated:  05/02/20 1127     Glucose 317 mg/dL      Comment: Serial Number: [id number]:  [id number]       Ferritin [[id number]]  (Abnormal) Collected:  05/02/20 1028     Specimen:  Blood Updated:  05/02/20 1105     Ferritin 1,431.00 ng/mL     Narrative:       Results may be falsely decreased if patient taking Biotin.      Lactate Dehydrogenase [[id number]]  (Normal) Collected:  05/02/20 1028    Specimen:  Blood Updated:  05/02/20 1059      U/L      Comment: Specimen hemolyzed.  Results may be affected.       C-reactive Protein [[id number]]  (Normal) Collected:  05/02/20 1028    Specimen:  Blood Updated:  05/02/20 1059     C-Reactive Protein 0.36 mg/dL     D-dimer, Quantitative [[id number]]  (Normal) Collected:  05/02/20 0818    Specimen:  Blood Updated:  05/02/20 0845     D-Dimer, Quantitative 0.45 MCGFEU/mL     Narrative:       Reference Range  --------------------------------------------------------------------     < 0.50   Negative Predictive Value  0.50-0.59   Indeterminate    >= 0.60   Probable VTE             A very low percentage of patients with DVT may yield D-Dimer results   below the cut-off of 0.50 MCGFEU/mL.  This is known to be more   prevalent in patients with distal DVT.             Results of this test should always be interpreted in conjunction with   the patient's medical history, clinical presentation and other   findings.  Clinical diagnosis should not be based on the result of   INNOVANCE D-Dimer alone.    POC Glucose Once [[id number]]  (Abnormal) Collected:  05/02/20 0731    Specimen:  Blood Updated:  05/02/20 0732     Glucose 353 mg/dL      Comment: Serial Number: [id number]:  [id number]       CBC Auto Differential [[id number]]  (Abnormal) Collected:  05/02/20 0537    Specimen:  Blood Updated:  05/02/20 0731     WBC 8.50 10*3/mm3      RBC 4.75 10*6/mm3      Hemoglobin 15.2 g/dL      Hematocrit 43.7 %      MCV 92.1 fL      MCH 32.0 pg      MCHC 34.8 g/dL      Comment: Specimen was warmed prior to testing due to the presence of a cold agglutinin.         RDW 13.5 %      RDW-SD 43.3 fl      MPV 10.9 fL      Platelets 167 10*3/mm3      Neutrophil % 83.1 %       Lymphocyte % 13.1 %      Monocyte % 3.1 %      Eosinophil % 0.1 %      Basophil % 0.6 %      Neutrophils, Absolute 7.10 10*3/mm3      Lymphocytes, Absolute 1.10 10*3/mm3      Monocytes, Absolute 0.30 10*3/mm3      Eosinophils, Absolute 0.00 10*3/mm3      Basophils, Absolute 0.00 10*3/mm3      nRBC 0.1 /100 WBC     Comprehensive Metabolic Panel [[id number]]  (Abnormal) Collected:  05/02/20 0537    Specimen:  Blood Updated:  05/02/20 0717     Glucose 424 mg/dL      BUN 23 mg/dL      Creatinine 1.34 mg/dL      Sodium 140 mmol/L      Potassium 4.0 mmol/L      Chloride 95 mmol/L      CO2 27.0 mmol/L      Calcium 8.9 mg/dL      Total Protein 7.4 g/dL      Albumin 4.20 g/dL      ALT (SGPT) 40 U/L      AST (SGOT) 23 U/L      Alkaline Phosphatase 51 U/L      Total Bilirubin 0.5 mg/dL      eGFR Non African Amer 54 mL/min/1.73      Globulin 3.2 gm/dL      A/G Ratio 1.3 g/dL      BUN/Creatinine Ratio 17.2     Anion Gap 18.0 mmol/L     Narrative:       GFR Normal >60  Chronic Kidney Disease <60  Kidney Failure <15      TSH [[id number]]  (Normal) Collected:  05/02/20 0537    Specimen:  Blood Updated:  05/02/20 0716     TSH 0.841 uIU/mL      Comment: TSH results may be falsely decreased if patient taking Biotin.       Procalcitonin [[id number]]  (Normal) Collected:  05/02/20 0537    Specimen:  Blood Updated:  05/02/20 0716     Procalcitonin 0.11 ng/mL     Narrative:       As a Marker for Sepsis (Non-Neonates):   1. <0.5 ng/mL represents a low risk of severe sepsis and/or septic shock.  1. >2 ng/mL represents a high risk of severe sepsis and/or septic shock.    As a Marker for Lower Respiratory Tract Infections that require antibiotic therapy:  PCT on Admission     Antibiotic Therapy             6-12 Hrs later  > 0.5                Strongly Recommended            >0.25 - <0.5         Recommended  0.1 - 0.25           Discouraged                   Remeasure/reassess PCT  <0.1                 Strongly Discouraged           "Remeasure/reassess PCT      As 28 day mortality risk marker: \"Change in Procalcitonin Result\" (> 80 % or <=80 %) if Day 0 (or Day 1) and Day 4 values are available. Refer to [email or web address]   Change in PCT <=80 %   A decrease of PCT levels below or equal to 80 % defines a positive change in PCT test result representing a higher risk for 28-day all-cause mortality of patients diagnosed with severe sepsis or septic shock.  Change in PCT > 80 %   A decrease of PCT levels of more than 80 % defines a negative change in PCT result representing a lower risk for 28-day all-cause mortality of patients diagnosed with severe sepsis or septic shock.                Results may be falsely decreased if patient taking Biotin.     T4, Free [[id number]]  (Normal) Collected:  05/02/20 0537    Specimen:  Blood Updated:  05/02/20 0716     Free T4 1.67 ng/dL     Narrative:       Results may be falsely increased if patient taking Biotin.      Lipid Panel [[id number]]  (Abnormal) Collected:  05/02/20 0537    Specimen:  Blood Updated:  05/02/20 0715     Total Cholesterol 162 mg/dL      Triglycerides 466 mg/dL      HDL Cholesterol 24 mg/dL      LDL Cholesterol  --     Comment: Unable to calculate        VLDL Cholesterol --     Comment: Unable to calculate        LDL/HDL Ratio --     Comment: Unable to calculate       Narrative:       Cholesterol Reference Ranges  (U.S. Department of Health and Human Services ATP III Classifications)    Desirable          <200 mg/dL  Borderline High    200-239 mg/dL  High Risk          >240 mg/dL      Triglyceride Reference Ranges  (U.S. Department of Health and Human Services ATP III Classifications)    Normal           <150 mg/dL  Borderline High  150-199 mg/dL  High             200-499 mg/dL  Very High        >500 mg/dL    HDL Reference Ranges  (U.S. Department of Health and Human Services ATP III Classifcations)    Low     <40 mg/dl (major risk factor for CHD)  High    >60 mg/dl " ('negative' risk factor for CHD)        LDL Reference Ranges  (U.S. Department of Health and Human Services ATP III Classifcations)    Optimal          <100 mg/dL  Near Optimal     100-129 mg/dL  Borderline High  130-159 mg/dL  High             160-189 mg/dL  Very High        >189 mg/dL    Phosphorus [[id number]]  (Normal) Collected:  05/02/20 0537    Specimen:  Blood Updated:  05/02/20 0715     Phosphorus 4.0 mg/dL     Magnesium [[id number]]  (Abnormal) Collected:  05/02/20 0537    Specimen:  Blood Updated:  05/02/20 0714     Magnesium 1.4 mg/dL     LDL Cholesterol, Direct [[id number]] Collected:  05/02/20 0537    Specimen:  Blood Updated:  05/02/20 0714    Hemoglobin A1c [[id number]] Collected:  05/02/20 0537    Specimen:  Blood Updated:  05/02/20 0637    Troponin [[id number]]  (Abnormal) Collected:  05/02/20 0033    Specimen:  Blood Updated:  05/02/20 0232     Troponin T 0.112 ng/mL     Narrative:       Troponin T Reference Range:  <= 0.03 ng/mL-   Negative for AMI  >0.03 ng/mL-     Abnormal for myocardial necrosis.  Clinicians would have to utilize clinical acumen, EKG, Troponin and serial changes to determine if it is an Acute Myocardial Infarction or myocardial injury due to an underlying chronic condition.       Results may be falsely decreased if patient taking Biotin.      POC Glucose Once [[id number]]  (Abnormal) Collected:  05/01/20 2057    Specimen:  Blood Updated:  05/01/20 2058     Glucose 327 mg/dL      Comment: Serial Number: [id number]:  [id number]       Troponin [[id number]]  (Abnormal) Collected:  05/01/20 1850    Specimen:  Blood Updated:  05/01/20 1949     Troponin T 0.133 ng/mL     Narrative:       Troponin T Reference Range:  <= 0.03 ng/mL-   Negative for AMI  >0.03 ng/mL-     Abnormal for myocardial necrosis.  Clinicians would have to utilize clinical acumen, EKG, Troponin and serial changes to determine if it is an Acute Myocardial Infarction or myocardial injury due to an underlying chronic  condition.       Results may be falsely decreased if patient taking Biotin.      POC Glucose Once [[id number]]  (Abnormal) Collected:  05/01/20 1818    Specimen:  Blood Updated:  05/01/20 1819     Glucose 303 mg/dL      Comment: Serial Number: [id number]:  [id number]       Fults Draw [[id number]] Collected:  05/01/20 1247    Specimen:  Blood Updated:  05/01/20 1400    Narrative:       The following orders were created for panel order Fults Draw.  Procedure                               Abnormality         Status                     ---------                               -----------         ------                     Light Blue Top[[id number]]                                   Final result               Green Top (Gel)[[id number]]                                  Final result               Lavender Top[[id number]]                                     Final result               Gold Top - SST[[id number]]                                   Final result                 Please view results for these tests on the individual orders.    Light Blue Top [[id number]] Collected:  05/01/20 1247    Specimen:  Blood Updated:  05/01/20 1400     Extra Tube hold for add-on     Comment: Auto resulted       Lavender Top [[id number]] Collected:  05/01/20 1247    Specimen:  Blood Updated:  05/01/20 1400     Extra Tube hold for add-on     Comment: Auto resulted       Green Top (Gel) [[id number]] Collected:  05/01/20 1247    Specimen:  Blood Updated:  05/01/20 1400     Extra Tube Hold for add-ons.     Comment: Auto resulted.       Gold Top - SST [[id number]] Collected:  05/01/20 1247    Specimen:  Blood Updated:  05/01/20 1400     Extra Tube Hold for add-ons.     Comment: Auto resulted.       Basic Metabolic Panel [[id number]]  (Abnormal) Collected:  05/01/20 1247    Specimen:  Blood Updated:  05/01/20 1339     Glucose 343 mg/dL      BUN 28 mg/dL      Creatinine 1.26 mg/dL      Sodium 138 mmol/L      Potassium 3.5 mmol/L      Chloride 95  mmol/L      CO2 27.0 mmol/L      Calcium 9.2 mg/dL      eGFR Non African Amer 58 mL/min/1.73      BUN/Creatinine Ratio 22.2     Anion Gap 16.0 mmol/L     Narrative:       GFR Normal >60  Chronic Kidney Disease <60  Kidney Failure <15      BNP [[id number]]  (Abnormal) Collected:  05/01/20 1247    Specimen:  Blood Updated:  05/01/20 1338     proBNP 1,115.0 pg/mL     Narrative:       Among patients with dyspnea, NT-proBNP is highly sensitive for the detection of acute congestive heart failure. In addition NT-proBNP of <300 pg/ml effectively rules out acute congestive heart failure with 99% negative predictive value.    Results may be falsely decreased if patient taking Biotin.      Protime-INR [[id number]]  (Normal) Collected:  05/01/20 1247    Specimen:  Blood Updated:  05/01/20 1336     Protime 10.9 Seconds      INR 1.05    aPTT [[id number]]  (Abnormal) Collected:  05/01/20 1247    Specimen:  Blood Updated:  05/01/20 1336     PTT 23.2 seconds     CBC & Differential [[id number]] Collected:  05/01/20 1247    Specimen:  Blood Updated:  05/01/20 1331    Narrative:       The following orders were created for panel order CBC & Differential.  Procedure                               Abnormality         Status                     ---------                               -----------         ------                     CBC Auto Differential[[id number]]        Abnormal            Final result                 Please view results for these tests on the individual orders.    CBC Auto Differential [[id number]]  (Abnormal) Collected:  05/01/20 1247    Specimen:  Blood Updated:  05/01/20 1331     WBC 14.10 10*3/mm3      RBC 5.12 10*6/mm3      Hemoglobin 16.2 g/dL      Hematocrit 46.7 %      MCV 91.2 fL      MCH 31.6 pg      MCHC 34.7 g/dL      RDW 13.6 %      RDW-SD 44.2 fl      MPV 10.6 fL      Platelets 191 10*3/mm3      Neutrophil % 72.9 %      Lymphocyte % 19.9 %      Monocyte % 5.4 %      Eosinophil % 1.1 %      Basophil % 0.7 %       Neutrophils, Absolute 10.30 10*3/mm3      Lymphocytes, Absolute 2.80 10*3/mm3      Monocytes, Absolute 0.80 10*3/mm3      Eosinophils, Absolute 0.20 10*3/mm3      Basophils, Absolute 0.10 10*3/mm3      nRBC 0.0 /100 WBC     Troponin [[id number]]  (Abnormal) Collected:  05/01/20 1247    Specimen:  Blood Updated:  05/01/20 1316     Troponin T 0.131 ng/mL     Narrative:       Troponin T Reference Range:  <= 0.03 ng/mL-   Negative for AMI  >0.03 ng/mL-     Abnormal for myocardial necrosis.  Clinicians would have to utilize clinical acumen, EKG, Troponin and serial changes to determine if it is an Acute Myocardial Infarction or myocardial injury due to an underlying chronic condition.       Results may be falsely decreased if patient taking Biotin.          No results found for: HGBA1C  Results from last 7 days   Lab Units 05/01/20  1247   INR  1.05           No results found for: LIPASE  Lab Results   Component Value Date    CHOL 162 05/02/2020    TRIG 466 (H) 05/02/2020    HDL 24 (L) 05/02/2020    LDL  05/02/2020      Comment:      Unable to calculate       No results found for: INTRAOP, PREDX, FINALDX, COMDX    Microbiology Results (last 10 days)     ** No results found for the last 240 hours. **          ECG/EMG Results (most recent)     Procedure Component Value Units Date/Time    ECG 12 Lead [[id number]] Collected:  05/01/20 1245     Updated:  05/01/20 1246    Narrative:       HEART RATE= 99  bpm  RR Interval= 608  ms  TX Interval= 152  ms  P Horizontal Axis= 2  deg  P Front Axis= 46  deg  QRSD Interval= 103  ms  QT Interval= 347  ms  QRS Axis= 84  deg  T Wave Axis= -78  deg  - ABNORMAL ECG -  Sinus rhythm  Probable left atrial enlargement  Repol abnrm suggests ischemia, diffuse leads  Electronically Signed By:   Date and Time of Study: 2020-05-01 12:45:05    ECG 12 Lead [[id number]] Collected:  05/02/20 0720     Updated:  05/02/20 0721    Narrative:       HEART RATE= 76  bpm  RR Interval= 784  ms  TX Interval= 156   ms  P Horizontal Axis= -16  deg  P Front Axis= 71  deg  QRSD Interval= 103  ms  QT Interval= 361  ms  QRS Axis= 82  deg  T Wave Axis= 180  deg  - ABNORMAL ECG -  Sinus rhythm  Probable left atrial enlargement  Repol abnrm suggests ischemia, diffuse leads  Electronically Signed By:   Date and Time of Study: 2020-05-02 07:20:19               Results for orders placed in visit on 08/22/19   Adult Transthoracic Echo Limited W/ Cont if Necessary Per Protocol    Narrative · The left ventricular cavity is moderately dilated.  · Estimated EF = 35%.  · Left atrial cavity size is moderately dilated.  · Mild mitral valve regurgitation is present     Technically difficult study due to due to patient body habitus    Not all left ventricular walls are well visualized, inferior posterior   wall is best sidra segment.  Left ventricular enlargement seen,   septal dyskinesis and anteroapical hypokinesis seen.  Estimated LV   ejection fraction of 35% .  Concentric LVH seen   Normal RV size  Moderate left atrial enlargement seen   Aortic valve, mitral valve, tricuspid valve appears structurally normal,   mild MR seen  Proximal aorta appears normal in size  No significant pericardial effusion seen         Xr Chest 1 View    Result Date: 5/1/2020  No acute cardiopulmonary abnormality is identified.  Electronically Signed By-Nadiya Rodriguez On:5/1/2020 2:25 PM This report was finalized on [id number] by  Nadiya Rodriguez, .          Xrays, labs reviewed personally by physician.    Medication Review:   I have reviewed the patient's current medication list      Scheduled Meds    aspirin 325 mg Oral Daily   atorvastatin 40 mg Oral Daily   carvedilol 3.125 mg Oral Q12H   clopidogrel 75 mg Oral Daily   doxycycline 100 mg Intravenous Q12H   furosemide 40 mg Intravenous Q12H   heparin (porcine) 5,000 Units Subcutaneous Q8H   insulin lispro 0-7 Units Subcutaneous TID AC   ipratropium-albuterol 3 mL Nebulization 4x Daily - RT   isosorbide  mononitrate 30 mg Oral Daily   methylPREDNISolone sodium succinate 40 mg Intravenous Q8H   pantoprazole 40 mg Oral Daily   sodium chloride 10 mL Intravenous Q12H   spironolactone 25 mg Oral Daily       Meds Infusions       Meds PRN  •  acetaminophen **OR** acetaminophen **OR** acetaminophen  •  bisacodyl  •  bisacodyl  •  dextrose  •  dextrose  •  glucagon (human recombinant)  •  guaiFENesin-codeine  •  insulin lispro **AND** insulin lispro  •  lactulose  •  melatonin  •  nitroglycerin  •  ondansetron  •  [COMPLETED] Insert peripheral IV **AND** sodium chloride  •  sodium chloride  •  temazepam    Assessment/Plan   Assessment/Plan     Active Hospital Problems    Diagnosis  POA   • **Dyspnea [R06.00]  Yes     Priority: High   • COPD with acute exacerbation (CMS/[facility]) [J44.1]  Yes     Priority: High   • Chest pain due to myocardial ischemia [I25.9]  Yes     Priority: Medium   • Type 2 diabetes mellitus (CMS/[facility]) [E11.9]  Yes      Resolved Hospital Problems   No resolved problems to display.       MEDICAL DECISION MAKING COMPLEXITY BY PROBLEM:     Chest pain:  -Cycle cardiac enzymes every 6 hours  -Continue aspirin, and nitroglycerin PRN  -Cardiology consulted    Acute on chronic systolic heart failure:  -Continue Lasix     COPD exacerbation:  -Continue bronchodilators, doxycycline and Solu-Medrol  -Counseled the patient on tobacco cessation     Ischemic cardiomyopathy s/p AICD  -Continue IV Lasix and Aldactone        Diabetes mellitus complicated with diabetic neuropathy  -Continue ISS  -Hold metformin and glimepiride during hospitalization     CAD, h/o CABG x3 (2013):  -Last [facility] June 2019  -On aspirin, Coreg, Lipitor, Plavix, Imdur, lisinopril at home     Essential hypertension:  -Continue Coreg and Aldactone     Hyperlipidemia  -Continue statin     PVD s/p Left femoral bypass (2/2019)  - continue aspirin, Plavix, and statin        VTE Prophylaxis -   Mechanical Order History:     None      Pharmalogical Order  History:     Ordered     Dose Route Frequency Stop    05/01/20 1653  heparin (porcine) 5000 UNIT/ML injection 5,000 Units      5,000 Units SC Every 8 Hours Scheduled --            Code Status -   Code Status and Medical Interventions:   Ordered at: 05/01/20 1631     Level Of Support Discussed With:    Patient     Code Status:    CPR     Medical Interventions (Level of Support Prior to Arrest):    Full     Discharge Planning      Destination      Coordination has not been started for this encounter.      Durable Medical Equipment      Coordination has not been started for this encounter.      Dialysis/Infusion      Coordination has not been started for this encounter.      Home Medical Care      Coordination has not been started for this encounter.      Therapy      Coordination has not been started for this encounter.      Community Resources      Coordination has not been started for this encounter.            Electronically signed by Dileep Alvarez DO, 05/02/20, 12:22.  [facility] Hospitalist Team

## 2020-05-02 NOTE — PROGRESS NOTES
Cardiology [facility]      Patient Care Team:  Yamile Agarwal as PCP - General  Yamile Agarwal as PCP - Family Medicine  Yamile Agarwal as PCP - Claims Attributed  Juarez Wing MD as Consulting Physician (Cardiology)        Cardiology assessment and plan      Elevated troponin  Mild nonspecific elevation with no significant trend  Acute on chronic systolic heart failure  Congestive heart failure NYHA class IV  Ischemic cardiomyopathy with a LV ejection fraction of 35%  History of NSTEMI  5/12/19, SHILPA to SVG to RCA and proximal LAD leading into a small diagonal  CAD status post CABG X3 V  Ischemic cardiomyopathy with EF of 35%, status post ICD 10/8/2019  COPD, continue tobacco abuse  Hypertension   Hyperlipidemia  Diabetes with hemoglobin A1c of 7.4 on 6/3/19  Chronic renal insufficiency  EKG shows normal sinus rhythm with nonspecific ST-T changes    Recommendations:  Telemetry  Diuresis as tolerated  Patient clinically feels better  Lexiscan Cardiolite in a.m. if patient is agreeable  Patient had cath 6/27/2019 which revealed patent stent in SVG to RCA and patent stent in proximal LAD with severe disease in diagonal branch which is too small to be stented  Continue beta-blockers carvedilol, isosorbide, aspirin, Plavix and statin as tolerated  We will continue low-dose ACE inhibitor  Continue  Aldactone 25 mg  We will start IV furosemide and hold p.o. furosemide  Monitor blood pressure  Discussed with patient and his wife about CHF care  Counseled on smoking cessation  Counseled on CHF care  Counseled on diet exercise and smoking cessation  Diabetes control  Patient reluctant to have further inpatient work-up likes to go home today  Risk benefits and alternatives discussed with the patient  If patient is willing to stay in the hospital we will schedule patient for stress test tomorrow for further stratification    Thank you very much for letting me participate in the care of this  "gentleman                  Chief Complaint: Shortness of breath and dyspnea on exertion    Subjective clinically feels better    Interval History: Denies any chest pain    History taken from: patient    Review of Systems:  Review of Systems   Constitution: Negative for chills, decreased appetite and malaise/fatigue.   HENT: Negative for congestion.    Eyes: Negative for blurred vision and double vision.   Cardiovascular: Positive for dyspnea on exertion. Negative for chest pain, irregular heartbeat, leg swelling, near-syncope, orthopnea, palpitations, paroxysmal nocturnal dyspnea and syncope.   Respiratory: Positive for shortness of breath. Negative for cough.    Hematologic/Lymphatic: Negative for adenopathy. Does not bruise/bleed easily.   Skin: Negative for rash.   Gastrointestinal: Negative for bloating and abdominal pain.   Neurological: Negative for dizziness and focal weakness.       Objective    Vital Signs  Visit Vitals  /69 (BP Location: Right arm, Patient Position: Lying)   Pulse 77   Temp 97.6 °F (36.4 °C) (Oral)   Resp 18   Ht 170.2 cm (67\")   Wt 88.3 kg (194 lb 10.7 oz)   SpO2 95%   BMI 30.49 kg/m²     Oxygen Therapy  SpO2: 95 %  Pulse Oximetry Type: Intermittent  Device (Oxygen Therapy): room air  Flowsheet Rows      First Filed Value   Admission Height  172.7 cm (68\") Documented at 05/01/2020 1229   Admission Weight  87.9 kg (193 lb 12.6 oz) Documented at 05/01/2020 1229        Intake & Output (last 3 days)       04/29 0701 - 04/30 0700 04/30 0701 - 05/01 0700 05/01 0701 - 05/02 0700 05/02 0701 - 05/03 0700    P.O.   480 0    IV Piggyback   100     Total Intake(mL/kg)   580 (6.6) 0 (0)    Net   +580 0            Urine Unmeasured Occurrence   1 x         Lines, Drains & Airways    Active LDAs     Name:   Placement date:   Placement time:   Site:   Days:    Peripheral IV 05/01/20 1245 Right Arm   05/01/20    1245    Arm   less than 1                Physical Exam:  Physical Exam   Constitutional: " He is oriented to person, place, and time. He appears well-developed and well-nourished.   HENT:   Head: Normocephalic and atraumatic.   Eyes: Pupils are equal, round, and reactive to light. Conjunctivae are normal.   Neck: Normal range of motion. Neck supple. No thyromegaly present.   Cardiovascular: Normal rate, regular rhythm, S1 normal, S2 normal and intact distal pulses. PMI is displaced.   Murmur heard.   Early systolic murmur is present with a grade of 2/6.  Pulmonary/Chest: Effort normal and breath sounds normal.   Abdominal: Soft. Bowel sounds are normal.   Musculoskeletal: He exhibits no edema.   Neurological: He is alert and oriented to person, place, and time.   Skin: Skin is warm.   Nursing note and vitals reviewed.      Results Review:     I reviewed the patient's new clinical results.    Lab Results (last 24 hours)     Procedure Component Value Units Date/Time    POC Glucose Once [[id number]]  (Abnormal) Collected:  05/02/20 1125    Specimen:  Blood Updated:  05/02/20 1127     Glucose 317 mg/dL      Comment: Serial Number: [id number]:  [id number]       Ferritin [[id number]]  (Abnormal) Collected:  05/02/20 1028    Specimen:  Blood Updated:  05/02/20 1105     Ferritin 1,431.00 ng/mL     Narrative:       Results may be falsely decreased if patient taking Biotin.      Lactate Dehydrogenase [[id number]]  (Normal) Collected:  05/02/20 1028    Specimen:  Blood Updated:  05/02/20 1059      U/L      Comment: Specimen hemolyzed.  Results may be affected.       C-reactive Protein [[id number]]  (Normal) Collected:  05/02/20 1028    Specimen:  Blood Updated:  05/02/20 1059     C-Reactive Protein 0.36 mg/dL     D-dimer, Quantitative [[id number]]  (Normal) Collected:  05/02/20 0818    Specimen:  Blood Updated:  05/02/20 0845     D-Dimer, Quantitative 0.45 MCGFEU/mL     Narrative:       Reference Range  --------------------------------------------------------------------     < 0.50   Negative Predictive  Value  0.50-0.59   Indeterminate    >= 0.60   Probable VTE             A very low percentage of patients with DVT may yield D-Dimer results   below the cut-off of 0.50 MCGFEU/mL.  This is known to be more   prevalent in patients with distal DVT.             Results of this test should always be interpreted in conjunction with   the patient's medical history, clinical presentation and other   findings.  Clinical diagnosis should not be based on the result of   IN[place]OV[place] D-Dimer alone.    POC Glucose Once [[id number]]  (Abnormal) Collected:  05/02/20 0731    Specimen:  Blood Updated:  05/02/20 0732     Glucose 353 mg/dL      Comment: Serial Number: [id number]:  [id number]       CBC Auto Differential [[id number]]  (Abnormal) Collected:  05/02/20 0537    Specimen:  Blood Updated:  05/02/20 0731     WBC 8.50 10*3/mm3      RBC 4.75 10*6/mm3      Hemoglobin 15.2 g/dL      Hematocrit 43.7 %      MCV 92.1 fL      MCH 32.0 pg      MCHC 34.8 g/dL      Comment: Specimen was warmed prior to testing due to the presence of a cold agglutinin.         RDW 13.5 %      RDW-SD 43.3 fl      MPV 10.9 fL      Platelets 167 10*3/mm3      Neutrophil % 83.1 %      Lymphocyte % 13.1 %      Monocyte % 3.1 %      Eosinophil % 0.1 %      Basophil % 0.6 %      Neutrophils, Absolute 7.10 10*3/mm3      Lymphocytes, Absolute 1.10 10*3/mm3      Monocytes, Absolute 0.30 10*3/mm3      Eosinophils, Absolute 0.00 10*3/mm3      Basophils, Absolute 0.00 10*3/mm3      nRBC 0.1 /100 WBC     Comprehensive Metabolic Panel [[id number]]  (Abnormal) Collected:  05/02/20 0537    Specimen:  Blood Updated:  05/02/20 0717     Glucose 424 mg/dL      BUN 23 mg/dL      Creatinine 1.34 mg/dL      Sodium 140 mmol/L      Potassium 4.0 mmol/L      Chloride 95 mmol/L      CO2 27.0 mmol/L      Calcium 8.9 mg/dL      Total Protein 7.4 g/dL      Albumin 4.20 g/dL      ALT (SGPT) 40 U/L      AST (SGOT) 23 U/L      Alkaline Phosphatase 51 U/L      Total Bilirubin 0.5 mg/dL      " eGFR Non African Amer 54 mL/min/1.73      Globulin 3.2 gm/dL      A/G Ratio 1.3 g/dL      BUN/Creatinine Ratio 17.2     Anion Gap 18.0 mmol/L     Narrative:       GFR Normal >60  Chronic Kidney Disease <60  Kidney Failure <15      TSH [[id number]]  (Normal) Collected:  05/02/20 0537    Specimen:  Blood Updated:  05/02/20 0716     TSH 0.841 uIU/mL      Comment: TSH results may be falsely decreased if patient taking Biotin.       Procalcitonin [[id number]]  (Normal) Collected:  05/02/20 0537    Specimen:  Blood Updated:  05/02/20 0716     Procalcitonin 0.11 ng/mL     Narrative:       As a Marker for Sepsis (Non-Neonates):   1. <0.5 ng/mL represents a low risk of severe sepsis and/or septic shock.  1. >2 ng/mL represents a high risk of severe sepsis and/or septic shock.    As a Marker for Lower Respiratory Tract Infections that require antibiotic therapy:  PCT on Admission     Antibiotic Therapy             6-12 Hrs later  > 0.5                Strongly Recommended            >0.25 - <0.5         Recommended  0.1 - 0.25           Discouraged                   Remeasure/reassess PCT  <0.1                 Strongly Discouraged          Remeasure/reassess PCT      As 28 day mortality risk marker: \"Change in Procalcitonin Result\" (> 80 % or <=80 %) if Day 0 (or Day 1) and Day 4 values are available. Refer to http://[email or web address]-pct-calculator.com/   Change in PCT <=80 %   A decrease of PCT levels below or equal to 80 % defines a positive change in PCT test result representing a higher risk for 28-day all-cause mortality of patients diagnosed with severe sepsis or septic shock.  Change in PCT > 80 %   A decrease of PCT levels of more than 80 % defines a negative change in PCT result representing a lower risk for 28-day all-cause mortality of patients diagnosed with severe sepsis or septic shock.                Results may be falsely decreased if patient taking Biotin.     T4, Free [[id number]]  (Normal) Collected:  05/02/20 0537 "    Specimen:  Blood Updated:  05/02/20 0716     Free T4 1.67 ng/dL     Narrative:       Results may be falsely increased if patient taking Biotin.      Lipid Panel [[id number]]  (Abnormal) Collected:  05/02/20 0537    Specimen:  Blood Updated:  05/02/20 0715     Total Cholesterol 162 mg/dL      Triglycerides 466 mg/dL      HDL Cholesterol 24 mg/dL      LDL Cholesterol  --     Comment: Unable to calculate        VLDL Cholesterol --     Comment: Unable to calculate        LDL/HDL Ratio --     Comment: Unable to calculate       Narrative:       Cholesterol Reference Ranges  (U.S. Department of Health and Human Services ATP III Classifications)    Desirable          <200 mg/dL  Borderline High    200-239 mg/dL  High Risk          >240 mg/dL      Triglyceride Reference Ranges  (U.S. Department of Health and Human Services ATP III Classifications)    Normal           <150 mg/dL  Borderline High  150-199 mg/dL  High             200-499 mg/dL  Very High        >500 mg/dL    HDL Reference Ranges  (U.S. Department of Health and Human Services ATP III Classifcations)    Low     <40 mg/dl (major risk factor for CHD)  High    >60 mg/dl ('negative' risk factor for CHD)        LDL Reference Ranges  (U.S. Department of Health and Human Services ATP III Classifcations)    Optimal          <100 mg/dL  Near Optimal     100-129 mg/dL  Borderline High  130-159 mg/dL  High             160-189 mg/dL  Very High        >189 mg/dL    Phosphorus [[id number]]  (Normal) Collected:  05/02/20 0537    Specimen:  Blood Updated:  05/02/20 0715     Phosphorus 4.0 mg/dL     Magnesium [[id number]]  (Abnormal) Collected:  05/02/20 0537    Specimen:  Blood Updated:  05/02/20 0714     Magnesium 1.4 mg/dL     LDL Cholesterol, Direct [[id number]] Collected:  05/02/20 0537    Specimen:  Blood Updated:  05/02/20 0714    Hemoglobin A1c [[id number]] Collected:  05/02/20 0537    Specimen:  Blood Updated:  05/02/20 0637    Troponin [[id number]]  (Abnormal) Collected:   05/02/20 0033    Specimen:  Blood Updated:  05/02/20 0232     Troponin T 0.112 ng/mL     Narrative:       Troponin T Reference Range:  <= 0.03 ng/mL-   Negative for AMI  >0.03 ng/mL-     Abnormal for myocardial necrosis.  Clinicians would have to utilize clinical acumen, EKG, Troponin and serial changes to determine if it is an Acute Myocardial Infarction or myocardial injury due to an underlying chronic condition.       Results may be falsely decreased if patient taking Biotin.      POC Glucose Once [[id number]]  (Abnormal) Collected:  05/01/20 2057    Specimen:  Blood Updated:  05/01/20 2058     Glucose 327 mg/dL      Comment: Serial Number: [id number]:  [id number]       Troponin [[id number]]  (Abnormal) Collected:  05/01/20 1850    Specimen:  Blood Updated:  05/01/20 1949     Troponin T 0.133 ng/mL     Narrative:       Troponin T Reference Range:  <= 0.03 ng/mL-   Negative for AMI  >0.03 ng/mL-     Abnormal for myocardial necrosis.  Clinicians would have to utilize clinical acumen, EKG, Troponin and serial changes to determine if it is an Acute Myocardial Infarction or myocardial injury due to an underlying chronic condition.       Results may be falsely decreased if patient taking Biotin.      POC Glucose Once [[id number]]  (Abnormal) Collected:  05/01/20 1818    Specimen:  Blood Updated:  05/01/20 1819     Glucose 303 mg/dL      Comment: Serial Number: [id number]:  [id number]       [place] Draw [[id number]] Collected:  05/01/20 1247    Specimen:  Blood Updated:  05/01/20 1400    Narrative:       The following orders were created for panel order [place] Draw.  Procedure                               Abnormality         Status                     ---------                               -----------         ------                     Light Blue Top[[id number]]                                   Final result               Green Top (Gel)[[id number]]                                  Final result                Lavender Top[[id number]]                                     Final result               Gold Top - SST[[id number]]                                   Final result                 Please view results for these tests on the individual orders.    Light Blue Top [[id number]] Collected:  05/01/20 1247    Specimen:  Blood Updated:  05/01/20 1400     Extra Tube hold for add-on     Comment: Auto resulted       Lavender Top [[id number]] Collected:  05/01/20 1247    Specimen:  Blood Updated:  05/01/20 1400     Extra Tube hold for add-on     Comment: Auto resulted       Green Top (Gel) [[id number]] Collected:  05/01/20 1247    Specimen:  Blood Updated:  05/01/20 1400     Extra Tube Hold for add-ons.     Comment: Auto resulted.       Gold Top - SST [[id number]] Collected:  05/01/20 1247    Specimen:  Blood Updated:  05/01/20 1400     Extra Tube Hold for add-ons.     Comment: Auto resulted.       Basic Metabolic Panel [[id number]]  (Abnormal) Collected:  05/01/20 1247    Specimen:  Blood Updated:  05/01/20 1339     Glucose 343 mg/dL      BUN 28 mg/dL      Creatinine 1.26 mg/dL      Sodium 138 mmol/L      Potassium 3.5 mmol/L      Chloride 95 mmol/L      CO2 27.0 mmol/L      Calcium 9.2 mg/dL      eGFR Non African Amer 58 mL/min/1.73      BUN/Creatinine Ratio 22.2     Anion Gap 16.0 mmol/L     Narrative:       GFR Normal >60  Chronic Kidney Disease <60  Kidney Failure <15      BNP [[id number]]  (Abnormal) Collected:  05/01/20 1247    Specimen:  Blood Updated:  05/01/20 1338     proBNP 1,115.0 pg/mL     Narrative:       Among patients with dyspnea, NT-proBNP is highly sensitive for the detection of acute congestive heart failure. In addition NT-proBNP of <300 pg/ml effectively rules out acute congestive heart failure with 99% negative predictive value.    Results may be falsely decreased if patient taking Biotin.      Protime-INR [[id number]]  (Normal) Collected:  05/01/20 1247    Specimen:  Blood Updated:  05/01/20 1336     Protime 10.9  Seconds      INR 1.05    aPTT [[id number]]  (Abnormal) Collected:  05/01/20 1247    Specimen:  Blood Updated:  05/01/20 1336     PTT 23.2 seconds     CBC & Differential [[id number]] Collected:  05/01/20 1247    Specimen:  Blood Updated:  05/01/20 1331    Narrative:       The following orders were created for panel order CBC & Differential.  Procedure                               Abnormality         Status                     ---------                               -----------         ------                     CBC Auto Differential[[id number]]        Abnormal            Final result                 Please view results for these tests on the individual orders.    CBC Auto Differential [[id number]]  (Abnormal) Collected:  05/01/20 1247    Specimen:  Blood Updated:  05/01/20 1331     WBC 14.10 10*3/mm3      RBC 5.12 10*6/mm3      Hemoglobin 16.2 g/dL      Hematocrit 46.7 %      MCV 91.2 fL      MCH 31.6 pg      MCHC 34.7 g/dL      RDW 13.6 %      RDW-SD 44.2 fl      MPV 10.6 fL      Platelets 191 10*3/mm3      Neutrophil % 72.9 %      Lymphocyte % 19.9 %      Monocyte % 5.4 %      Eosinophil % 1.1 %      Basophil % 0.7 %      Neutrophils, Absolute 10.30 10*3/mm3      Lymphocytes, Absolute 2.80 10*3/mm3      Monocytes, Absolute 0.80 10*3/mm3      Eosinophils, Absolute 0.20 10*3/mm3      Basophils, Absolute 0.10 10*3/mm3      nRBC 0.0 /100 WBC     Troponin [[id number]]  (Abnormal) Collected:  05/01/20 1247    Specimen:  Blood Updated:  05/01/20 1316     Troponin T 0.131 ng/mL     Narrative:       Troponin T Reference Range:  <= 0.03 ng/mL-   Negative for AMI  >0.03 ng/mL-     Abnormal for myocardial necrosis.  Clinicians would have to utilize clinical acumen, EKG, Troponin and serial changes to determine if it is an Acute Myocardial Infarction or myocardial injury due to an underlying chronic condition.       Results may be falsely decreased if patient taking Biotin.          Results for orders placed in visit on 08/22/19    Adult Transthoracic Echo Limited W/ Cont if Necessary Per Protocol    Narrative · The left ventricular cavity is moderately dilated.  · Estimated EF = 35%.  · Left atrial cavity size is moderately dilated.  · Mild mitral valve regurgitation is present     Technically difficult study due to due to patient body habitus    Not all left ventricular walls are well visualized, inferior posterior   wall is best sidra segment.  Left ventricular enlargement seen,   septal dyskinesis and anteroapical hypokinesis seen.  Estimated LV   ejection fraction of 35% .  Concentric LVH seen   Normal RV size  Moderate left atrial enlargement seen   Aortic valve, mitral valve, tricuspid valve appears structurally normal,   mild MR seen  Proximal aorta appears normal in size  No significant pericardial effusion seen           Medication Review:   I have reviewed the patient's current medication list  Scheduled Meds:  aspirin 325 mg Oral Daily   atorvastatin 40 mg Oral Daily   carvedilol 3.125 mg Oral Q12H   clopidogrel 75 mg Oral Daily   doxycycline 100 mg Intravenous Q12H   furosemide 40 mg Intravenous Q12H   heparin (porcine) 5,000 Units Subcutaneous Q8H   insulin lispro 0-7 Units Subcutaneous TID AC   ipratropium-albuterol 3 mL Nebulization 4x Daily - RT   isosorbide mononitrate 30 mg Oral Daily   methylPREDNISolone sodium succinate 40 mg Intravenous Q8H   pantoprazole 40 mg Oral Daily   sodium chloride 10 mL Intravenous Q12H   spironolactone 25 mg Oral Daily     Continuous Infusions:   PRN Meds:.•  acetaminophen **OR** acetaminophen **OR** acetaminophen  •  bisacodyl  •  bisacodyl  •  dextrose  •  dextrose  •  glucagon (human recombinant)  •  guaiFENesin-codeine  •  insulin lispro **AND** insulin lispro  •  lactulose  •  melatonin  •  nitroglycerin  •  ondansetron  •  [COMPLETED] Insert peripheral IV **AND** sodium chloride  •  sodium chloride  •  temazepam    ECG/EMG Results (last 24 hours)     Procedure Component Value Units  Date/Time    ECG 12 Lead [[id number]] Collected:  05/01/20 1245     Updated:  05/01/20 1246    Narrative:       HEART RATE= 99  bpm  RR Interval= 608  ms  NH Interval= 152  ms  P Horizontal Axis= 2  deg  P Front Axis= 46  deg  QRSD Interval= 103  ms  QT Interval= 347  ms  QRS Axis= 84  deg  T Wave Axis= -78  deg  - ABNORMAL ECG -  Sinus rhythm  Probable left atrial enlargement  Repol abnrm suggests ischemia, diffuse leads  Electronically Signed By:   Date and Time of Study: 2020-05-01 12:45:05    ECG 12 Lead [[id number]] Collected:  05/02/20 0720     Updated:  05/02/20 0721    Narrative:       HEART RATE= 76  bpm  RR Interval= 784  ms  NH Interval= 156  ms  P Horizontal Axis= -16  deg  P Front Axis= 71  deg  QRSD Interval= 103  ms  QT Interval= 361  ms  QRS Axis= 82  deg  T Wave Axis= 180  deg  - ABNORMAL ECG -  Sinus rhythm  Probable left atrial enlargement  Repol abnrm suggests ischemia, diffuse leads  Electronically Signed By:   Date and Time of Study: 2020-05-02 07:20:19          Imaging Results (Last 24 Hours)     Procedure Component Value Units Date/Time    XR Chest 1 View [[id number]] Collected:  05/01/20 1424     Updated:  05/01/20 1427    Narrative:       DATE OF EXAM:  5/1/2020 1:34 PM     PROCEDURE:  XR CHEST 1 VW-     INDICATIONS:  soa; R06.00-Dyspnea, unspecified; R79.89-Other specified abnormal  findings of blood chemistry     COMPARISON:  2 view chest x-ray 10/08/2019, AP chest x-ray 06/19/2019, CT chest  05/12/2019.     TECHNIQUE:   Single radiographic AP view of the chest was obtained.     FINDINGS:  Cardiac silhouette appears mildly enlarged, but is exaggerated by  portable AP technique. The lungs appear grossly clear. No pneumothorax  or large pleural effusion is seen. Changes are redemonstrated from CABG.  Pacemaker is new.       Impression:       No acute cardiopulmonary abnormality is identified.     Electronically Signed By-Nadiya Rodriguez On:5/1/2020 2:25 PM  This report was finalized on  [id number] by  Nadiya Rodriguez, .          Assessment/Plan       Dyspnea    Type 2 diabetes mellitus (CMS/HCC)    Chest pain due to myocardial ischemia    COPD with acute exacerbation (CMS/HCC)        Fady Gongora MD  05/02/20  11:31

## 2020-05-03 ENCOUNTER — APPOINTMENT (OUTPATIENT)
Dept: NUCLEAR MEDICINE | Facility: HOSPITAL | Age: 60
End: 2020-05-03

## 2020-05-03 VITALS
OXYGEN SATURATION: 96 % | HEART RATE: 90 BPM | DIASTOLIC BLOOD PRESSURE: 84 MMHG | SYSTOLIC BLOOD PRESSURE: 125 MMHG | HEIGHT: 67 IN | TEMPERATURE: 97.6 F | WEIGHT: 194.67 LBS | BODY MASS INDEX: 30.55 KG/M2 | RESPIRATION RATE: 16 BRPM

## 2020-05-03 LAB
ANION GAP SERPL CALCULATED.3IONS-SCNC: 16 MMOL/L (ref 5–15)
BASOPHILS # BLD AUTO: 0 10*3/MM3 (ref 0–0.2)
BASOPHILS NFR BLD AUTO: 0.1 % (ref 0–1.5)
BH CV NUCLEAR PRIOR STUDY: 3
BH CV STRESS BP STAGE 1: NORMAL
BH CV STRESS BP STAGE 2: NORMAL
BH CV STRESS BP STAGE 3: 103
BH CV STRESS BP STAGE 4: NORMAL
BH CV STRESS COMMENTS STAGE 1: NORMAL
BH CV STRESS DOSE REGADENOSON STAGE 1: 0.4
BH CV STRESS DURATION MIN STAGE 1: 1
BH CV STRESS DURATION MIN STAGE 2: 1
BH CV STRESS DURATION MIN STAGE 3: 1
BH CV STRESS DURATION MIN STAGE 4: 0
BH CV STRESS DURATION SEC STAGE 1: 0
BH CV STRESS DURATION SEC STAGE 2: 0
BH CV STRESS DURATION SEC STAGE 3: 0
BH CV STRESS DURATION SEC STAGE 4: 56
BH CV STRESS HR STAGE 1: 95
BH CV STRESS HR STAGE 2: 105
BH CV STRESS HR STAGE 3: 106
BH CV STRESS HR STAGE 4: 103
BH CV STRESS PROTOCOL 1: NORMAL
BH CV STRESS RECOVERY BP: NORMAL MMHG
BH CV STRESS RECOVERY HR: 94 BPM
BH CV STRESS STAGE 1: 1
BH CV STRESS STAGE 2: 2
BH CV STRESS STAGE 3: 3
BH CV STRESS STAGE 4: 4
BUN BLD-MCNC: 33 MG/DL (ref 8–23)
BUN/CREAT SERPL: 20.9 (ref 7–25)
CALCIUM SPEC-SCNC: 9.2 MG/DL (ref 8.6–10.5)
CHLORIDE SERPL-SCNC: 95 MMOL/L (ref 98–107)
CO2 SERPL-SCNC: 24 MMOL/L (ref 22–29)
CREAT BLD-MCNC: 1.58 MG/DL (ref 0.76–1.27)
DEPRECATED RDW RBC AUTO: 43.3 FL (ref 37–54)
EOSINOPHIL # BLD AUTO: 0 10*3/MM3 (ref 0–0.4)
EOSINOPHIL NFR BLD AUTO: 0 % (ref 0.3–6.2)
ERYTHROCYTE [DISTWIDTH] IN BLOOD BY AUTOMATED COUNT: 13.3 % (ref 12.3–15.4)
GFR SERPL CREATININE-BSD FRML MDRD: 45 ML/MIN/1.73
GLUCOSE BLD-MCNC: 479 MG/DL (ref 65–99)
GLUCOSE BLDC GLUCOMTR-MCNC: 264 MG/DL (ref 70–105)
GLUCOSE BLDC GLUCOMTR-MCNC: 340 MG/DL (ref 70–105)
GLUCOSE BLDC GLUCOMTR-MCNC: 411 MG/DL (ref 70–105)
HCT VFR BLD AUTO: 41.2 % (ref 37.5–51)
HGB BLD-MCNC: 14.6 G/DL (ref 13–17.7)
LV EF NUC BP: 42 %
LYMPHOCYTES # BLD AUTO: 0.9 10*3/MM3 (ref 0.7–3.1)
LYMPHOCYTES NFR BLD AUTO: 9.4 % (ref 19.6–45.3)
MAGNESIUM SERPL-MCNC: 2.4 MG/DL (ref 1.6–2.4)
MAXIMAL PREDICTED HEART RATE: 160 BPM
MCH RBC QN AUTO: 32.3 PG (ref 26.6–33)
MCHC RBC AUTO-ENTMCNC: 35.5 G/DL (ref 31.5–35.7)
MCV RBC AUTO: 90.9 FL (ref 79–97)
MONOCYTES # BLD AUTO: 0.3 10*3/MM3 (ref 0.1–0.9)
MONOCYTES NFR BLD AUTO: 2.8 % (ref 5–12)
NEUTROPHILS # BLD AUTO: 8.2 10*3/MM3 (ref 1.7–7)
NEUTROPHILS NFR BLD AUTO: 87.7 % (ref 42.7–76)
NRBC BLD AUTO-RTO: 0.1 /100 WBC (ref 0–0.2)
PERCENT MAX PREDICTED HR: 66.25 %
PLATELET # BLD AUTO: 143 10*3/MM3 (ref 140–450)
PMV BLD AUTO: 10.5 FL (ref 6–12)
POTASSIUM BLD-SCNC: 4.2 MMOL/L (ref 3.5–5.2)
RBC # BLD AUTO: 4.54 10*6/MM3 (ref 4.14–5.8)
SODIUM BLD-SCNC: 135 MMOL/L (ref 136–145)
STRESS BASELINE BP: NORMAL MMHG
STRESS BASELINE HR: 90 BPM
STRESS PERCENT HR: 78 %
STRESS POST PEAK BP: NORMAL MMHG
STRESS POST PEAK HR: 106 BPM
STRESS TARGET HR: 136 BPM
WBC NRBC COR # BLD: 9.3 10*3/MM3 (ref 3.4–10.8)

## 2020-05-03 PROCEDURE — 25010000002 FUROSEMIDE PER 20 MG: Performed by: HOSPITALIST

## 2020-05-03 PROCEDURE — 25010000002 MAGNESIUM SULFATE 2 GM/50ML SOLUTION: Performed by: HOSPITALIST

## 2020-05-03 PROCEDURE — 63710000001 INSULIN LISPRO (HUMAN) PER 5 UNITS: Performed by: HOSPITALIST

## 2020-05-03 PROCEDURE — 80048 BASIC METABOLIC PNL TOTAL CA: CPT | Performed by: HOSPITALIST

## 2020-05-03 PROCEDURE — 85025 COMPLETE CBC W/AUTO DIFF WBC: CPT | Performed by: HOSPITALIST

## 2020-05-03 PROCEDURE — 94799 UNLISTED PULMONARY SVC/PX: CPT

## 2020-05-03 PROCEDURE — 99217 PR OBSERVATION CARE DISCHARGE MANAGEMENT: CPT | Performed by: HOSPITALIST

## 2020-05-03 PROCEDURE — 82962 GLUCOSE BLOOD TEST: CPT

## 2020-05-03 PROCEDURE — G0378 HOSPITAL OBSERVATION PER HR: HCPCS

## 2020-05-03 PROCEDURE — 78452 HT MUSCLE IMAGE SPECT MULT: CPT | Performed by: INTERNAL MEDICINE

## 2020-05-03 PROCEDURE — 25010000002 METHYLPREDNISOLONE PER 40 MG: Performed by: HOSPITALIST

## 2020-05-03 PROCEDURE — 93018 CV STRESS TEST I&R ONLY: CPT | Performed by: NURSE PRACTITIONER

## 2020-05-03 PROCEDURE — 96376 TX/PRO/DX INJ SAME DRUG ADON: CPT

## 2020-05-03 PROCEDURE — 99214 OFFICE O/P EST MOD 30 MIN: CPT | Performed by: INTERNAL MEDICINE

## 2020-05-03 PROCEDURE — 25010000002 HEPARIN (PORCINE) PER 1000 UNITS: Performed by: HOSPITALIST

## 2020-05-03 PROCEDURE — 83735 ASSAY OF MAGNESIUM: CPT | Performed by: HOSPITALIST

## 2020-05-03 PROCEDURE — 25010000002 REGADENOSON 0.4 MG/5ML SOLUTION: Performed by: HOSPITALIST

## 2020-05-03 PROCEDURE — 96372 THER/PROPH/DIAG INJ SC/IM: CPT

## 2020-05-03 PROCEDURE — 93017 CV STRESS TEST TRACING ONLY: CPT

## 2020-05-03 PROCEDURE — A9500 TC99M SESTAMIBI: HCPCS | Performed by: HOSPITALIST

## 2020-05-03 PROCEDURE — 78452 HT MUSCLE IMAGE SPECT MULT: CPT

## 2020-05-03 PROCEDURE — 0 TECHNETIUM SESTAMIBI: Performed by: HOSPITALIST

## 2020-05-03 RX ORDER — METOLAZONE 5 MG/1
5 TABLET ORAL DAILY
Qty: 5 TABLET | Refills: 0 | Status: SHIPPED | OUTPATIENT
Start: 2020-05-03 | End: 2020-05-08 | Stop reason: HOSPADM

## 2020-05-03 RX ADMIN — INSULIN LISPRO 8 UNITS: 100 INJECTION, SOLUTION INTRAVENOUS; SUBCUTANEOUS at 12:29

## 2020-05-03 RX ADMIN — HEPARIN SODIUM 5000 UNITS: 5000 INJECTION INTRAVENOUS; SUBCUTANEOUS at 05:41

## 2020-05-03 RX ADMIN — SPIRONOLACTONE 25 MG: 25 TABLET, FILM COATED ORAL at 09:41

## 2020-05-03 RX ADMIN — INSULIN LISPRO 14 UNITS: 100 INJECTION, SOLUTION INTRAVENOUS; SUBCUTANEOUS at 05:45

## 2020-05-03 RX ADMIN — INSULIN LISPRO 12 UNITS: 100 INJECTION, SOLUTION INTRAVENOUS; SUBCUTANEOUS at 09:40

## 2020-05-03 RX ADMIN — CLOPIDOGREL BISULFATE 75 MG: 75 TABLET ORAL at 09:41

## 2020-05-03 RX ADMIN — TECHNETIUM TC 99M SESTAMIBI 1 DOSE: 1 INJECTION INTRAVENOUS at 07:30

## 2020-05-03 RX ADMIN — METHYLPREDNISOLONE SODIUM SUCCINATE 40 MG: 40 INJECTION, POWDER, FOR SOLUTION INTRAMUSCULAR; INTRAVENOUS at 05:42

## 2020-05-03 RX ADMIN — ISOSORBIDE MONONITRATE 30 MG: 30 TABLET, EXTENDED RELEASE ORAL at 09:41

## 2020-05-03 RX ADMIN — IPRATROPIUM BROMIDE AND ALBUTEROL SULFATE 3 ML: .5; 3 SOLUTION RESPIRATORY (INHALATION) at 14:42

## 2020-05-03 RX ADMIN — IPRATROPIUM BROMIDE AND ALBUTEROL SULFATE 3 ML: .5; 3 SOLUTION RESPIRATORY (INHALATION) at 10:57

## 2020-05-03 RX ADMIN — Medication 10 ML: at 09:39

## 2020-05-03 RX ADMIN — ATORVASTATIN CALCIUM 40 MG: 40 TABLET, FILM COATED ORAL at 09:41

## 2020-05-03 RX ADMIN — IPRATROPIUM BROMIDE AND ALBUTEROL SULFATE 3 ML: .5; 3 SOLUTION RESPIRATORY (INHALATION) at 07:09

## 2020-05-03 RX ADMIN — CARVEDILOL 3.12 MG: 3.12 TABLET, FILM COATED ORAL at 09:41

## 2020-05-03 RX ADMIN — TECHNETIUM TC 99M SESTAMIBI 1 DOSE: 1 INJECTION INTRAVENOUS at 08:35

## 2020-05-03 RX ADMIN — ASPIRIN 325 MG ORAL TABLET 325 MG: 325 PILL ORAL at 09:39

## 2020-05-03 RX ADMIN — MAGNESIUM SULFATE HEPTAHYDRATE 2 G: 40 INJECTION, SOLUTION INTRAVENOUS at 00:02

## 2020-05-03 RX ADMIN — REGADENOSON 0.4 MG: 0.08 INJECTION, SOLUTION INTRAVENOUS at 08:35

## 2020-05-03 RX ADMIN — PANTOPRAZOLE SODIUM 40 MG: 40 TABLET, DELAYED RELEASE ORAL at 09:41

## 2020-05-03 NOTE — PLAN OF CARE
Problem: Patient Care Overview  Goal: Plan of Care Review  Outcome: Ongoing (interventions implemented as appropriate)  Flowsheets (Taken 5/3/2020 8841)  Progress: no change  Plan of Care Reviewed With: patient  Note:   Pt sitting up most of the night, upset on the phone with family, pt initially wanted to leave AMA, education is done, pt agreed to stay for stress test today, states , he will be leaving right after stress test.   No signs of distress noted, pt ambulated in the room, had shower, no other complaint, will cont to monitor.     Problem: Fall Risk (Adult)  Goal: Absence of Fall  Outcome: Ongoing (interventions implemented as appropriate)     Problem: Breathing Pattern Ineffective (Adult)  Goal: Identify Related Risk Factors and Signs and Symptoms  Outcome: Ongoing (interventions implemented as appropriate)   No shortness of breath reported, will cont to monitor.

## 2020-05-03 NOTE — PROGRESS NOTES
[facility]      Patient Care Team:  Yamile Agarwal as PCP - General  Yamile Agarwal as PCP - Family Medicine  Yamile Agarwal as PCP - Claims Attributed  Juarez Wing MD as Consulting Physician (Cardiology)        Cardiology assessment and plan      Elevated troponin  Mild nonspecific elevation with no significant trend  Acute on chronic systolic heart failure  Congestive heart failure NYHA class IV  Ischemic cardiomyopathy with a LV ejection fraction of 35%  History of NSTEMI  5/12/19, SHILPA to SVG to RCA and proximal LAD leading into a small diagonal  CAD status post CABG X3 V  Ischemic cardiomyopathy with EF of 35%, status post ICD 10/8/2019  COPD, continue tobacco abuse  Hypertension   Hyperlipidemia  Diabetes with hemoglobin A1c of 7.4 on 6/3/19  Chronic renal insufficiency  EKG shows normal sinus rhythm with nonspecific ST-T changes    Recommendations:  Telemetry  Diuresis as tolerated  Patient clinically feels better  Lexiscan Cardiolite   Patient had cath 6/27/2019 which revealed patent stent in SVG to RCA and patent stent in proximal LAD with severe disease in diagonal branch which is too small to be stented  Continue beta-blockers carvedilol, isosorbide, aspirin, Plavix and statin as tolerated  We will continue low-dose ACE inhibitor  Continue  Aldactone 25 mg  We will start IV furosemide and hold p.o. furosemide  Monitor blood pressure  Discussed with patient and his wife about CHF care  Counseled on smoking cessation  Counseled on CHF care  Counseled on diet exercise and smoking cessation  Diabetes control  Patient reluctant to have further inpatient work-up likes to go home today  Risk benefits and alternatives discussed with the patient  Schedule for stress test this morning    Thank you very much for letting me participate in the care of this gentleman                  Chief Complaint: Shortness of breath and dyspnea on exertion    Subjective clinically feels better    Interval  "History: Denies any chest pain    History taken from: patient    Review of Systems:  Review of Systems   Constitution: Negative for chills, decreased appetite and malaise/fatigue.   HENT: Negative for congestion.    Eyes: Negative for blurred vision and double vision.   Cardiovascular: Positive for dyspnea on exertion. Negative for chest pain, irregular heartbeat, leg swelling, near-syncope, orthopnea, palpitations, paroxysmal nocturnal dyspnea and syncope.   Respiratory: Positive for shortness of breath. Negative for cough.    Hematologic/Lymphatic: Negative for adenopathy. Does not bruise/bleed easily.   Skin: Negative for rash.   Gastrointestinal: Negative for bloating and abdominal pain.   Neurological: Negative for dizziness and focal weakness.       Objective    Vital Signs  Visit Vitals  /84 (BP Location: Left arm, Patient Position: Sitting)   Pulse 86   Temp 97.6 °F (36.4 °C) (Oral)   Resp 16   Ht 170.2 cm (67\")   Wt 88.3 kg (194 lb 10.7 oz)   SpO2 96%   BMI 30.49 kg/m²     Oxygen Therapy  SpO2: 96 %  Pulse Oximetry Type: Intermittent  Device (Oxygen Therapy): room air  Flowsheet Rows      First Filed Value   Admission Height  172.7 cm (68\") Documented at 05/01/2020 1229   Admission Weight  87.9 kg (193 lb 12.6 oz) Documented at 05/01/2020 1229        Intake & Output (last 3 days)       04/30 0701 - 05/01 0700 05/01 0701 - 05/02 0700 05/02 0701 - 05/03 0700 05/03 0701 - 05/04 0700    P.O.  480 1080     I.V. (mL/kg)   148.3 (1.7)     IV Piggyback  100 200     Total Intake(mL/kg)  580 (6.6) 1428.3 (16.2)     Net  +580 +1428.3             Urine Unmeasured Occurrence  1 x          Lines, Drains & Airways    Active LDAs     Name:   Placement date:   Placement time:   Site:   Days:    Peripheral IV 05/01/20 1245 Right Arm   05/01/20    1245    Arm   less than 1                Physical Exam:  Physical Exam   Constitutional: He is oriented to person, place, and time. He appears well-developed and " well-nourished.   HENT:   Head: Normocephalic and atraumatic.   Eyes: Pupils are equal, round, and reactive to light. Conjunctivae are normal.   Neck: Normal range of motion. Neck supple. No thyromegaly present.   Cardiovascular: Normal rate, regular rhythm, S1 normal, S2 normal and intact distal pulses. PMI is displaced.   Murmur heard.   Early systolic murmur is present with a grade of 2/6.  Pulmonary/Chest: Effort normal and breath sounds normal.   Abdominal: Soft. Bowel sounds are normal.   Musculoskeletal: He exhibits no edema.   Neurological: He is alert and oriented to person, place, and time.   Skin: Skin is warm.   Nursing note and vitals reviewed.      Results Review:     I reviewed the patient's new clinical results.    Lab Results (last 24 hours)     Procedure Component Value Units Date/Time    POC Glucose Once [[id number]]  (Abnormal) Collected:  05/03/20 0716    Specimen:  Blood Updated:  05/03/20 0718     Glucose 340 mg/dL      Comment: Serial Number: [id number]:  [id number]       POC Glucose Once [[id number]]  (Abnormal) Collected:  05/03/20 0544    Specimen:  Blood Updated:  05/03/20 0545     Glucose 411 mg/dL      Comment: Serial Number: [id number]:  [id number]       Magnesium [[id number]]  (Normal) Collected:  05/03/20 0358    Specimen:  Blood Updated:  05/03/20 0526     Magnesium 2.4 mg/dL     Basic Metabolic Panel [[id number]]  (Abnormal) Collected:  05/03/20 0358    Specimen:  Blood Updated:  05/03/20 0525     Glucose 479 mg/dL      BUN 33 mg/dL      Creatinine 1.58 mg/dL      Sodium 135 mmol/L      Potassium 4.2 mmol/L      Chloride 95 mmol/L      CO2 24.0 mmol/L      Calcium 9.2 mg/dL      eGFR Non African Amer 45 mL/min/1.73      BUN/Creatinine Ratio 20.9     Anion Gap 16.0 mmol/L     Narrative:       GFR Normal >60  Chronic Kidney Disease <60  Kidney Failure <15      CBC & Differential [[id number]] Collected:  05/03/20 0358    Specimen:  Blood Updated:  05/03/20 0448    Narrative:        The following orders were created for panel order CBC & Differential.  Procedure                               Abnormality         Status                     ---------                               -----------         ------                     CBC Auto Differential[[id number]]        Abnormal            Final result                 Please view results for these tests on the individual orders.    CBC Auto Differential [[id number]]  (Abnormal) Collected:  05/03/20 0358    Specimen:  Blood Updated:  05/03/20 0448     WBC 9.30 10*3/mm3      RBC 4.54 10*6/mm3      Hemoglobin 14.6 g/dL      Hematocrit 41.2 %      MCV 90.9 fL      MCH 32.3 pg      MCHC 35.5 g/dL      RDW 13.3 %      RDW-SD 43.3 fl      MPV 10.5 fL      Platelets 143 10*3/mm3      Neutrophil % 87.7 %      Lymphocyte % 9.4 %      Monocyte % 2.8 %      Eosinophil % 0.0 %      Basophil % 0.1 %      Neutrophils, Absolute 8.20 10*3/mm3      Lymphocytes, Absolute 0.90 10*3/mm3      Monocytes, Absolute 0.30 10*3/mm3      Eosinophils, Absolute 0.00 10*3/mm3      Basophils, Absolute 0.00 10*3/mm3      nRBC 0.1 /100 WBC     POC Glucose Once [[id number]]  (Abnormal) Collected:  05/02/20 2103    Specimen:  Blood Updated:  05/02/20 2104     Glucose 288 mg/dL      Comment: Serial Number: [id number]:  [id number]       POC Glucose Once [[id number]]  (Abnormal) Collected:  05/02/20 1628    Specimen:  Blood Updated:  05/02/20 1630     Glucose 460 mg/dL      Comment: Serial Number: [id number]:  [id number]       LDL Cholesterol, Direct [[id number]]  (Normal) Collected:  05/02/20 0537    Specimen:  Blood Updated:  05/02/20 1327     LDL Cholesterol  87 mg/dL     Narrative:       LDL Reference Ranges    (U[place] Department of Health and Human Services ATP III Classifications)    Optimal          <100 mg/dl  Near Optimal     100-129 mg/dl  Borderline High  130-159 mg/dl  High             160-189 mg/dl  Very High        >189 mg/dl      POC Glucose Once [[id number]]   (Abnormal) Collected:  05/02/20 1125    Specimen:  Blood Updated:  05/02/20 1127     Glucose 317 mg/dL      Comment: Serial Number: [id number]:  [id number]       Ferritin [[id number]]  (Abnormal) Collected:  05/02/20 1028    Specimen:  Blood Updated:  05/02/20 1105     Ferritin 1,431.00 ng/mL     Narrative:       Results may be falsely decreased if patient taking Biotin.      Lactate Dehydrogenase [[id number]]  (Normal) Collected:  05/02/20 1028    Specimen:  Blood Updated:  05/02/20 1059      U/L      Comment: Specimen hemolyzed.  Results may be affected.       C-reactive Protein [[id number]]  (Normal) Collected:  05/02/20 1028    Specimen:  Blood Updated:  05/02/20 1059     C-Reactive Protein 0.36 mg/dL         Results for orders placed in visit on 08/22/19   Adult Transthoracic Echo Limited W/ Cont if Necessary Per Protocol    Narrative · The left ventricular cavity is moderately dilated.  · Estimated EF = 35%.  · Left atrial cavity size is moderately dilated.  · Mild mitral valve regurgitation is present     Technically difficult study due to due to patient body habitus    Not all left ventricular walls are well visualized, inferior posterior   wall is best sidra segment.  Left ventricular enlargement seen,   septal dyskinesis and anteroapical hypokinesis seen.  Estimated LV   ejection fraction of 35% .  Concentric LVH seen   Normal RV size  Moderate left atrial enlargement seen   Aortic valve, mitral valve, tricuspid valve appears structurally normal,   mild MR seen  Proximal aorta appears normal in size  No significant pericardial effusion seen           Medication Review:   I have reviewed the patient's current medication list  Scheduled Meds:    aspirin 325 mg Oral Daily   atorvastatin 40 mg Oral Daily   carvedilol 3.125 mg Oral Q12H   clopidogrel 75 mg Oral Daily   doxycycline 100 mg Intravenous Q12H   furosemide 40 mg Intravenous Q12H   heparin (porcine) 5,000 Units Subcutaneous Q8H    insulin lispro 0-14 Units Subcutaneous TID AC   ipratropium-albuterol 3 mL Nebulization 4x Daily - RT   isosorbide mononitrate 30 mg Oral Daily   methylPREDNISolone sodium succinate 40 mg Intravenous Q8H   pantoprazole 40 mg Oral Daily   sodium chloride 10 mL Intravenous Q12H   spironolactone 25 mg Oral Daily     Continuous Infusions:   PRN Meds:.•  acetaminophen **OR** acetaminophen **OR** acetaminophen  •  bisacodyl  •  bisacodyl  •  dextrose  •  dextrose  •  glucagon (human recombinant)  •  guaiFENesin-codeine  •  insulin lispro **AND** insulin lispro  •  lactulose  •  magnesium sulfate **OR** magnesium sulfate **OR** magnesium sulfate  •  melatonin  •  nitroglycerin  •  ondansetron  •  potassium & sodium phosphates **OR** potassium & sodium phosphates  •  potassium chloride  •  potassium chloride  •  [COMPLETED] Insert peripheral IV **AND** sodium chloride  •  sodium chloride  •  temazepam    ECG/EMG Results (last 24 hours)     Procedure Component Value Units Date/Time    ECG 12 Lead [[id number]] Collected:  05/01/20 1245     Updated:  05/01/20 1246    Narrative:       HEART RATE= 99  bpm  RR Interval= 608  ms  DC Interval= 152  ms  P Horizontal Axis= 2  deg  P Front Axis= 46  deg  QRSD Interval= 103  ms  QT Interval= 347  ms  QRS Axis= 84  deg  T Wave Axis= -78  deg  - ABNORMAL ECG -  Sinus rhythm  Probable left atrial enlargement  Repol abnrm suggests ischemia, diffuse leads  Electronically Signed By:   Date and Time of Study: 2020-05-01 12:45:05    ECG 12 Lead [[id number]] Collected:  05/02/20 0720     Updated:  05/02/20 0721    Narrative:       HEART RATE= 76  bpm  RR Interval= 784  ms  DC Interval= 156  ms  P Horizontal Axis= -16  deg  P Front Axis= 71  deg  QRSD Interval= 103  ms  QT Interval= 361  ms  QRS Axis= 82  deg  T Wave Axis= 180  deg  - ABNORMAL ECG -  Sinus rhythm  Probable left atrial enlargement  Repol abnrm suggests ischemia, diffuse leads  Electronically Signed By:   Date and Time of Study:  2020-05-02 07:20:19          Imaging Results (Last 24 Hours)     ** No results found for the last 24 hours. **          Assessment/Plan       Dyspnea    Type 2 diabetes mellitus (CMS/HCC)    Chest pain due to myocardial ischemia    COPD with acute exacerbation (CMS/HCC)        Fady Gongora MD  05/03/20  09:45

## 2020-05-03 NOTE — PROGRESS NOTES
[facility] Medicine Services Daily Progress Note      Hospitalist Team  LOS 0 days      Patient Care Team:  Yamile Agarwal as PCP - General  Yamile Agarwal as PCP - Family Medicine  Yamile Agarwal as PCP - Claims Attributed  Juarez Wing MD as Consulting Physician (Cardiology)    Patient Location: 232/1      Subjective   Subjective     Chief Complaint / Subjective  Chief Complaint   Patient presents with   • Shortness of Breath         Brief Synopsis of Hospital Course/HPI  This is a 59-year-old  male with a history of ischemic cardiomyopathy status post AICD, CAD s/p CABG, DM, HTN, HLD, PVD s/p left LE femoral bypass, COPD, and continued tobacco dependence.  The patient claims to have had dry cough and shortness of breath for about a week and had presented to the ED at [facility] about a week ago and was placed on prednisone.  In the morning of 5/1/2020, the patient claims to have woken up with shortness of air that was refractory to his bronchodilators therefore went to [facility] ED.  The patient left AGAINST MEDICAL ADVICE from the ED and presented at [facility] ED after being directed there by his cardiologist.  He admits dry cough that is worse with laying supine and better when he sleeps on his side or sits up.  In addition, the patient did feel left-sided chest pressure intermittently with episodes of coughing.  Last C was in June 2019.  The patient denies recent fevers, chills, sweats, nausea, vomiting or abdominal pain.  This is a 59-year-old  male with a history of ischemic cardiomyopathy status post AICD, CAD s/p CABG, DM, HTN, HLD, PVD s/p left LE femoral bypass, COPD, and continued tobacco dependence.  The patient claims to have had dry cough and shortness of breath for about a week and had presented to the ED at [facility] about a week ago and was placed on prednisone.  In the morning of 5/1/2020, the  "patient claims to have woken up with shortness of air that was refractory to his bronchodilators therefore went to [facility] ED.  The patient left AGAINST MEDICAL ADVICE from the ED and presented at [facility] ED after being directed there by his cardiologist.  He admits dry cough that is worse with laying supine and better when he sleeps on his side or sits up.  In addition, the patient did feel left-sided chest pressure intermittently with episodes of coughing.  Last LHC was in June 2019.  The patient denies recent fevers, chills, sweats, nausea, vomiting or abdominal pain.    Date::    5/2/20: Afebrile.  Chest pain resolved.  Denies shortness of air.  Cardiology consulted.  5/3/20: SOA better. S/p stress test this am      Review of Systems   All other systems reviewed and are negative.        Objective   Objective      Vital Signs  Temp:  [97.6 °F (36.4 °C)-98.4 °F (36.9 °C)] 97.6 °F (36.4 °C)  Heart Rate:  [] 88  Resp:  [14-17] 16  BP: (103-138)/(78-91) 125/84  Oxygen Therapy  SpO2: 96 %  Pulse Oximetry Type: Intermittent  Device (Oxygen Therapy): room air  Flowsheet Rows      First Filed Value   Admission Height  172.7 cm (68\") Documented at 05/01/2020 1229   Admission Weight  87.9 kg (193 lb 12.6 oz) Documented at 05/01/2020 1229        Intake & Output (last 3 days)       04/30 0701 - 05/01 0700 05/01 0701 - 05/02 0700 05/02 0701 - 05/03 0700 05/03 0701 - 05/04 0700    P.O.  480 1080 480    I.V. (mL/kg)   148.3 (1.7)     IV Piggyback  100 200     Total Intake(mL/kg)  580 (6.6) 1428.3 (16.2) 480 (5.4)    Net  +580 +1428.3 +480            Urine Unmeasured Occurrence  1 x          Lines, Drains & Airways    Active LDAs     Name:   Placement date:   Placement time:   Site:   Days:    Peripheral IV 05/02/20 1100 Anterior;Left Forearm   05/02/20    1100    Forearm   less than 1                  Physical Exam:    Physical Exam   Constitutional: He is oriented to person, place, and time. He appears " well-developed.   HENT:   Head: Normocephalic and atraumatic.   Eyes: Pupils are equal, round, and reactive to light. EOM are normal.   Neck: Normal range of motion. Neck supple.   Cardiovascular: Normal rate and regular rhythm.   Pulmonary/Chest: Effort normal and breath sounds normal.   Abdominal: Soft. Bowel sounds are normal.   Musculoskeletal: Normal range of motion.   Neurological: He is alert and oriented to person, place, and time.   Skin: Skin is warm.   Psychiatric: He has a normal mood and affect.             Procedures:              Results Review:     I reviewed the patient's new clinical results.      Lab Results (last 24 hours)     Procedure Component Value Units Date/Time    POC Glucose Once [[id number]]  (Abnormal) Collected:  05/03/20 1147    Specimen:  Blood Updated:  05/03/20 1148     Glucose 264 mg/dL      Comment: Serial Number: [id number]:  [id number]       POC Glucose Once [[id number]]  (Abnormal) Collected:  05/03/20 0716    Specimen:  Blood Updated:  05/03/20 0718     Glucose 340 mg/dL      Comment: Serial Number: [id number]:  [id number]       POC Glucose Once [[id number]]  (Abnormal) Collected:  05/03/20 0544    Specimen:  Blood Updated:  05/03/20 0545     Glucose 411 mg/dL      Comment: Serial Number: [id number]:  [id number]       Magnesium [[id number]]  (Normal) Collected:  05/03/20 0358    Specimen:  Blood Updated:  05/03/20 0526     Magnesium 2.4 mg/dL     Basic Metabolic Panel [[id number]]  (Abnormal) Collected:  05/03/20 0358    Specimen:  Blood Updated:  05/03/20 0525     Glucose 479 mg/dL      BUN 33 mg/dL      Creatinine 1.58 mg/dL      Sodium 135 mmol/L      Potassium 4.2 mmol/L      Chloride 95 mmol/L      CO2 24.0 mmol/L      Calcium 9.2 mg/dL      eGFR Non African Amer 45 mL/min/1.73      BUN/Creatinine Ratio 20.9     Anion Gap 16.0 mmol/L     Narrative:       GFR Normal >60  Chronic Kidney Disease <60  Kidney Failure <15      CBC & Differential [[id number]]  Collected:  05/03/20 0358    Specimen:  Blood Updated:  05/03/20 0448    Narrative:       The following orders were created for panel order CBC & Differential.  Procedure                               Abnormality         Status                     ---------                               -----------         ------                     CBC Auto Differential[[id number]]        Abnormal            Final result                 Please view results for these tests on the individual orders.    CBC Auto Differential [[id number]]  (Abnormal) Collected:  05/03/20 0358    Specimen:  Blood Updated:  05/03/20 0448     WBC 9.30 10*3/mm3      RBC 4.54 10*6/mm3      Hemoglobin 14.6 g/dL      Hematocrit 41.2 %      MCV 90.9 fL      MCH 32.3 pg      MCHC 35.5 g/dL      RDW 13.3 %      RDW-SD 43.3 fl      MPV 10.5 fL      Platelets 143 10*3/mm3      Neutrophil % 87.7 %      Lymphocyte % 9.4 %      Monocyte % 2.8 %      Eosinophil % 0.0 %      Basophil % 0.1 %      Neutrophils, Absolute 8.20 10*3/mm3      Lymphocytes, Absolute 0.90 10*3/mm3      Monocytes, Absolute 0.30 10*3/mm3      Eosinophils, Absolute 0.00 10*3/mm3      Basophils, Absolute 0.00 10*3/mm3      nRBC 0.1 /100 WBC     POC Glucose Once [[id number]]  (Abnormal) Collected:  05/02/20 2103    Specimen:  Blood Updated:  05/02/20 2104     Glucose 288 mg/dL      Comment: Serial Number: [id number]:  [id number]       POC Glucose Once [[id number]]  (Abnormal) Collected:  05/02/20 1628    Specimen:  Blood Updated:  05/02/20 1630     Glucose 460 mg/dL      Comment: Serial Number: [id number]:  [id number]           No results found for: HGBA1C  Results from last 7 days   Lab Units 05/01/20  1247   INR  1.05           No results found for: LIPASE  Lab Results   Component Value Date    CHOL 162 05/02/2020    TRIG 466 (H) 05/02/2020    HDL 24 (L) 05/02/2020    LDL  05/02/2020      Comment:      Unable to calculate    LDL 87 05/02/2020       No results found for: INTRAOP, PREDX,  FINALDX, COMDX    Microbiology Results (last 10 days)     ** No results found for the last 240 hours. **          ECG/EMG Results (most recent)     Procedure Component Value Units Date/Time    ECG 12 Lead [[id number]] Collected:  05/01/20 1245     Updated:  05/01/20 1246    Narrative:       HEART RATE= 99  bpm  RR Interval= 608  ms  MO Interval= 152  ms  P Horizontal Axis= 2  deg  P Front Axis= 46  deg  QRSD Interval= 103  ms  QT Interval= 347  ms  QRS Axis= 84  deg  T Wave Axis= -78  deg  - ABNORMAL ECG -  Sinus rhythm  Probable left atrial enlargement  Repol abnrm suggests ischemia, diffuse leads  Electronically Signed By:   Date and Time of Study: 2020-05-01 12:45:05    ECG 12 Lead [[id number]] Collected:  05/02/20 0720     Updated:  05/02/20 0721    Narrative:       HEART RATE= 76  bpm  RR Interval= 784  ms  MO Interval= 156  ms  P Horizontal Axis= -16  deg  P Front Axis= 71  deg  QRSD Interval= 103  ms  QT Interval= 361  ms  QRS Axis= 82  deg  T Wave Axis= 180  deg  - ABNORMAL ECG -  Sinus rhythm  Probable left atrial enlargement  Repol abnrm suggests ischemia, diffuse leads  Electronically Signed By:   Date and Time of Study: 2020-05-02 07:20:19               Results for orders placed in visit on 08/22/19   Adult Transthoracic Echo Limited W/ Cont if Necessary Per Protocol    Narrative · The left ventricular cavity is moderately dilated.  · Estimated EF = 35%.  · Left atrial cavity size is moderately dilated.  · Mild mitral valve regurgitation is present     Technically difficult study due to due to patient body habitus    Not all left ventricular walls are well visualized, inferior posterior   wall is best sidra segment.  Left ventricular enlargement seen,   septal dyskinesis and anteroapical hypokinesis seen.  Estimated LV   ejection fraction of 35% .  Concentric LVH seen   Normal RV size  Moderate left atrial enlargement seen   Aortic valve, mitral valve, tricuspid valve appears structurally normal,    mild MR seen  Proximal aorta appears normal in size  No significant pericardial effusion seen         Xr Chest 1 View    Result Date: 5/1/2020  No acute cardiopulmonary abnormality is identified.  Electronically Signed By-Nadiya Rodriguez On:5/1/2020 2:25 PM This report was finalized on [id number] by  Nadiya Rodriguez, .          Xrays, labs reviewed personally by physician.    Medication Review:   I have reviewed the patient's current medication list      Scheduled Meds    aspirin 325 mg Oral Daily   atorvastatin 40 mg Oral Daily   carvedilol 3.125 mg Oral Q12H   clopidogrel 75 mg Oral Daily   doxycycline 100 mg Intravenous Q12H   furosemide 40 mg Intravenous Q12H   heparin (porcine) 5,000 Units Subcutaneous Q8H   insulin lispro 0-14 Units Subcutaneous TID AC   ipratropium-albuterol 3 mL Nebulization 4x Daily - RT   isosorbide mononitrate 30 mg Oral Daily   methylPREDNISolone sodium succinate 40 mg Intravenous Q8H   pantoprazole 40 mg Oral Daily   sodium chloride 10 mL Intravenous Q12H   spironolactone 25 mg Oral Daily       Meds Infusions       Meds PRN  •  acetaminophen **OR** acetaminophen **OR** acetaminophen  •  bisacodyl  •  bisacodyl  •  dextrose  •  dextrose  •  glucagon (human recombinant)  •  guaiFENesin-codeine  •  insulin lispro **AND** insulin lispro  •  lactulose  •  magnesium sulfate **OR** magnesium sulfate **OR** magnesium sulfate  •  melatonin  •  nitroglycerin  •  ondansetron  •  potassium & sodium phosphates **OR** potassium & sodium phosphates  •  potassium chloride  •  potassium chloride  •  [COMPLETED] Insert peripheral IV **AND** sodium chloride  •  sodium chloride  •  temazepam    Assessment/Plan   Assessment/Plan     Active Hospital Problems    Diagnosis  POA   • **Dyspnea [R06.00]  Yes     Priority: High   • COPD with acute exacerbation (CMS/[facility]) [J44.1]  Yes     Priority: High   • Chest pain due to myocardial ischemia [I25.9]  Yes     Priority: Medium   • Type 2 diabetes mellitus  (CMS/[facility]) [E11.9]  Yes      Resolved Hospital Problems   No resolved problems to display.       MEDICAL DECISION MAKING COMPLEXITY BY PROBLEM:     Chest pain:  -Cycle cardiac enzymes every 6 hours  -Continue aspirin, and nitroglycerin PRN  -Cardiology consulted    Acute on chronic systolic heart failure:  -Continue Lasix     COPD exacerbation:  -Continue bronchodilators, doxycycline and Solu-Medrol  -Counseled the patient on tobacco cessation     Ischemic cardiomyopathy s/p AICD  -Continue IV Lasix and Aldactone        Diabetes mellitus complicated with diabetic neuropathy  -Continue ISS  -Hold metformin and glimepiride during hospitalization     CAD, h/o CABG x3 (2013):  -Last [facility] June 2019  -On aspirin, Coreg, Lipitor, Plavix, Imdur, lisinopril at home     Essential hypertension:  -Continue Coreg and Aldactone     Hyperlipidemia  -Continue statin     PVD s/p Left femoral bypass (2/2019)  - continue aspirin, Plavix, and statin        VTE Prophylaxis -   Mechanical Order History:     None      Pharmalogical Order History:     Ordered     Dose Route Frequency Stop    05/01/20 1653  heparin (porcine) 5000 UNIT/ML injection 5,000 Units      5,000 Units SC Every 8 Hours Scheduled --            Code Status -   Code Status and Medical Interventions:   Ordered at: 05/01/20 1631     Level Of Support Discussed With:    Patient     Code Status:    CPR     Medical Interventions (Level of Support Prior to Arrest):    Full     Discharge Planning      Destination      Coordination has not been started for this encounter.      Durable Medical Equipment      Coordination has not been started for this encounter.      Dialysis/Infusion      Coordination has not been started for this encounter.      Home Medical Care      Coordination has not been started for this encounter.      Therapy      Coordination has not been started for this encounter.      Community Resources      Coordination has not been started for this encounter.             Electronically signed by Dileep Alvarez DO, 05/03/20, 14:06.  [facility] Hospitalist Team

## 2020-05-03 NOTE — DISCHARGE SUMMARY
[facility] Medicine Services  DISCHARGE SUMMARY        Prepared For PCP:  Yamile Agarwal    Patient Name: Bobby Steele Jr.  : 1960  MRN: [id number]      Date of Admission:   2020    Date of Discharge:  5/3/2020    Length of stay:  LOS: 0 days     Hospital Course     Presenting Problem:   Elevated troponin [R79.89]  Dyspnea, unspecified type [R06.00]      Active Hospital Problems    Diagnosis  POA   • **Acute on chronic systolic CHF (congestive heart failure) (CMS/[facility]) [I50.23]  Yes     Priority: High   • Chest pain due to myocardial ischemia [I25.9]  Yes     Priority: High   • COPD with acute exacerbation (CMS/[facility]) [J44.1]  Yes     Priority: High   • Dyspnea [R06.00]  Yes     Priority: High   • Dyslipidemia [E78.5]  Yes     Priority: Medium   • Type 2 diabetes mellitus (CMS/[facility]) [E11.9]  Yes     Priority: Medium   • Coronary artery disease involving coronary bypass graft with unstable angina pectoris (CMS/[facility]) [I25.700]  Yes     Priority: Medium   • Benign essential HTN [I10]  Yes      Resolved Hospital Problems   No resolved problems to display.           Hospital Course:    The patient was placed on observation for acute on chronic systolic heart failure, COPD exacerbation and chest pain work-up.  He was evaluated by his cardiologist, Dr. Wing.  Symptoms improved with aggressive diuresis with IV Lasix.  The patient underwent stress test in the morning 5/3/2020.  He would like to follow-up with his cardiologist, Dr. Wing, in the next 2 weeks.  The patient does not want left heart catheterization during current hospitalization.  He would like to be discharged home since his symptoms of shortness of breath and chest pain have resolved.  He has agreed to stop smoking.  Small course of metolazone has been prescribed to facilitate diuresis.        Recommendation for Outpatient Providers:             Reasons For Change In Medications and Indications for New  Medications:        Day of Discharge     HPI:     This is a 59-year-old  male with a history of ischemic cardiomyopathy s/p AICD, CAD s/p CABG, DM, HTN, HLD, PVD s/p left LE femoral bypass, COPD, and continued tobacco dependence.  The patient claims to have had dry cough and shortness of breath for about a week and had presented to the ED at [facility] about a week ago and was placed on prednisone and then discharged home.    In the morning of 5/1/2020, the patient claims to have woken up with shortness of air that was refractory to his bronchodilators therefore went to [facility] ED again.  The patient left AGAINST MEDICAL ADVICE from the ED and presented at [facility] ED after being directed there by his cardiologist.  He admits dry cough that is worse with laying supine and better when he sleeps in a recumbent position or sits up.  In addition, the patient did feel left-sided chest pressure intermittently with episodes of coughing.  Last C was in June 2019 and follows with Dr Wing.  In the ED, the patient denied recent fevers, chills, sweats, nausea, vomiting or abdominal pain.      Vital Signs:   Temp:  [97.6 °F (36.4 °C)-98.4 °F (36.9 °C)] 97.6 °F (36.4 °C)  Heart Rate:  [] 90  Resp:  [14-17] 16  BP: (103-138)/(78-91) 125/84     Physical Exam:  Physical Exam   Constitutional: He is oriented to person, place, and time. He appears well-developed and well-nourished.   HENT:   Head: Normocephalic and atraumatic.   Eyes: Pupils are equal, round, and reactive to light. EOM are normal.   Neck: Normal range of motion. Neck supple.   Cardiovascular: Normal rate and regular rhythm.   Pulmonary/Chest: Effort normal and breath sounds normal.   Abdominal: Soft. Bowel sounds are normal.   Musculoskeletal: Normal range of motion.   Neurological: He is alert and oriented to person, place, and time.   Skin: Skin is warm.   Psychiatric: He has a normal mood and affect.       Pertinent   and/or Most Recent Results     Results from last 7 days   Lab Units 05/03/20  0358 05/02/20  0537 05/01/20  1247   WBC 10*3/mm3 9.30 8.50 14.10*   HEMOGLOBIN g/dL 14.6 15.2 16.2   HEMATOCRIT % 41.2 43.7 46.7   PLATELETS 10*3/mm3 143 167 191   SODIUM mmol/L 135* 140 138   POTASSIUM mmol/L 4.2 4.0 3.5   CHLORIDE mmol/L 95* 95* 95*   CO2 mmol/L 24.0 27.0 27.0   BUN mg/dL 33* 23 28*   CREATININE mg/dL 1.58* 1.34* 1.26   GLUCOSE mg/dL 479* 424* 343*   CALCIUM mg/dL 9.2 8.9 9.2     Results from last 7 days   Lab Units 05/02/20  0537 05/01/20  1247   BILIRUBIN mg/dL 0.5  --    ALK PHOS U/L 51  --    ALT (SGPT) U/L 40  --    AST (SGOT) U/L 23  --    PROTIME Seconds  --  10.9   INR   --  1.05   APTT seconds  --  23.2*     Results from last 7 days   Lab Units 05/02/20  0537   CHOLESTEROL mg/dL 162   TRIGLYCERIDES mg/dL 466*   HDL CHOL mg/dL 24*     Results from last 7 days   Lab Units 05/02/20  0537 05/02/20  0033 05/01/20  1850 05/01/20  1247   TSH uIU/mL 0.841  --   --   --    PROBNP pg/mL  --   --   --  1,115.0*   TROPONIN T ng/mL  --  0.112* 0.133* 0.131*   PROCALCITONIN ng/mL 0.11  --   --   --        Brief Urine Lab Results     None          Microbiology Results Abnormal     None          Xr Chest 1 View    Result Date: 5/1/2020  Impression: No acute cardiopulmonary abnormality is identified.  Electronically Signed By-Nadiya Rodriguez On:5/1/2020 2:25 PM This report was finalized on [id number] by  Nadiya Rodriguez, .                Results for orders placed in visit on 08/22/19   Adult Transthoracic Echo Limited W/ Cont if Necessary Per Protocol    Narrative · The left ventricular cavity is moderately dilated.  · Estimated EF = 35%.  · Left atrial cavity size is moderately dilated.  · Mild mitral valve regurgitation is present     Technically difficult study due to due to patient body habitus    Not all left ventricular walls are well visualized, inferior posterior   wall is best sidra segment.  Left ventricular  enlargement seen,   septal dyskinesis and anteroapical hypokinesis seen.  Estimated LV   ejection fraction of 35% .  Concentric LVH seen   Normal RV size  Moderate left atrial enlargement seen   Aortic valve, mitral valve, tricuspid valve appears structurally normal,   mild MR seen  Proximal aorta appears normal in size  No significant pericardial effusion seen                 Test Results Pending at Discharge   Order Current Status    Hemoglobin A1c In process            Procedures Performed           Consults:   Consults     Date and Time Order Name Status Description    5/1/2020 1653 Inpatient Cardiology Consult      5/1/2020 1415 Cardiology (on-call MD unless specified) Completed     5/1/2020 1415 Hospitalist (on-call MD unless specified) Completed             Discharge Details        Discharge Medications      New Medications      Instructions Start Date   doxycycline 100 mg in sodium chloride 0.9 % 100 mL IVPB   100 mg, Intravenous, Every 12 Hours Scheduled      metOLazone 5 MG tablet  Commonly known as:  ZAROXOLYN   5 mg, Oral, Daily         Changes to Medications      Instructions Start Date   carvedilol 3.125 MG tablet  Commonly known as:  COREG  What changed:  when to take this   3.125 mg, Oral, 3 Times Daily         Continue These Medications      Instructions Start Date   albuterol sulfate  (90 Base) MCG/ACT inhaler  Commonly known as:  PROVENTIL HFA;VENTOLIN HFA;PROAIR HFA   2 puffs, Inhalation, Every 4 Hours PRN      albuterol (2.5 MG/3ML) 0.083% nebulizer solution  Commonly known as:  PROVENTIL   2.5 mg, Nebulization, Every 6 Hours PRN      aspirin 325 MG tablet   325 mg, Oral, Daily      atorvastatin 40 MG tablet  Commonly known as:  LIPITOR   40 mg, Oral, Daily      benzonatate 200 MG capsule  Commonly known as:  TESSALON   200 mg, Oral, 3 Times Daily PRN      clopidogrel 75 MG tablet  Commonly known as:  PLAVIX   75 mg, Oral, Daily      fenofibrate 145 MG tablet  Commonly known as:  TRICOR    145 mg, Oral, Daily      furosemide 40 MG tablet  Commonly known as:  LASIX   40 mg, Oral, 2 Times Daily      glimepiride 4 MG tablet  Commonly known as:  AMARYL   4 mg, Oral, 2 Times Daily      isosorbide mononitrate 30 MG 24 hr tablet  Commonly known as:  IMDUR   TAKE 1 TABLET BY MOUTH ONCE DAILY      lactulose 10 GM/15ML solution  Commonly known as:  CHRONULAC   20 g, Oral, 2 Times Daily PRN      lisinopril 2.5 MG tablet  Commonly known as:  PRINIVIL,ZESTRIL   TAKE 1 TABLET BY MOUTH ONCE DAILY      metFORMIN 500 MG tablet  Commonly known as:  GLUCOPHAGE   500 mg, Oral, 2 Times Daily With Meals      nitroglycerin 0.4 MG SL tablet  Commonly known as:  Nitrostat   Take no more than 3 doses in 15 minutes.      pantoprazole 40 MG EC tablet  Commonly known as:  PROTONIX   40 mg, Oral, Daily      predniSONE 10 MG tablet  Commonly known as:  DELTASONE   10 mg, Oral, Daily      spironolactone 25 MG tablet  Commonly known as:  ALDACTONE   25 mg, Oral, Daily      temazepam 15 MG capsule  Commonly known as:  RESTORIL   15 mg, Oral, Nightly PRN             No Known Allergies      Discharge Disposition:  Home or Self Care    Diet:  Hospital:  Diet Order   Procedures   • Diet Diabetic/Consistent Carbs; Diabetic - Consistent Carb         Discharge Activity:         CODE STATUS:    Code Status and Medical Interventions:   Ordered at: 05/01/20 1631     Level Of Support Discussed With:    Patient     Code Status:    CPR     Medical Interventions (Level of Support Prior to Arrest):    Full         Follow-up Appointments  Future Appointments   Date Time Provider Department Center   6/2/2020 10:00 AM JOSÉ LUIS BEE [facility] NA   6/2/2020 10:30 AM Juarez Wing MD [facility] None   6/15/2020  6:00 AM [facility] [place] NA       Additional Instructions for the Follow-ups that You Need to Schedule     Discharge Follow-up with PCP   As directed        Currently Documented PCP:    Yamile Agarwal    PCP Phone Number:    [phone]     Follow Up Details:  2 weeks               Condition on Discharge:      Stable    Electronically signed by Dileep Alvarez DO, 05/03/20, 3:39 PM.    Time: I spent  20 minutes on this discharge activity which included face-to-face encounter with the patient/reviewing the data in the system/coordination of the care with the nursing staff as well as consultants/documentation/entering orders.

## 2020-05-04 ENCOUNTER — READMISSION MANAGEMENT (OUTPATIENT)
Dept: CALL CENTER | Facility: HOSPITAL | Age: 60
End: 2020-05-04

## 2020-05-04 LAB — HBA1C MFR BLD: 10 % (ref 3.5–5.6)

## 2020-05-04 NOTE — OUTREACH NOTE
Prep Survey      Responses   [facility]?  [facility]   Is LACE score < 7 ?  No   Eligibility  Readm Mgmt   Discharge diagnosis  Acute on chronic systolic CHF, chest pain, COPD exacerbation, Dyspnea, Type 2 DM, CAD   COVID-19 Test Status  Not tested   Does the patient have one of the following disease processes/diagnoses(primary or secondary)?  CHF   Does the patient have Home health ordered?  No   Is there a DME ordered?  No   Comments regarding appointments  Needs to schedule f/u with PCP in 2 weeks   Prep survey completed?  Yes          Leny Rendon RN

## 2020-05-05 ENCOUNTER — READMISSION MANAGEMENT (OUTPATIENT)
Dept: CALL CENTER | Facility: HOSPITAL | Age: 60
End: 2020-05-05

## 2020-05-05 ENCOUNTER — APPOINTMENT (OUTPATIENT)
Dept: CT IMAGING | Facility: HOSPITAL | Age: 60
End: 2020-05-05

## 2020-05-05 ENCOUNTER — HOSPITAL ENCOUNTER (INPATIENT)
Facility: HOSPITAL | Age: 60
LOS: 3 days | Discharge: HOME | End: 2020-05-08
Attending: EMERGENCY MEDICINE | Admitting: INTERNAL MEDICINE

## 2020-05-05 DIAGNOSIS — R77.8 ELEVATED TROPONIN: Primary | ICD-10-CM

## 2020-05-05 DIAGNOSIS — E11.65 TYPE 2 DIABETES MELLITUS WITH HYPERGLYCEMIA, WITH LONG-TERM CURRENT USE OF INSULIN (HCC): ICD-10-CM

## 2020-05-05 DIAGNOSIS — F17.200 TOBACCO DEPENDENCE SYNDROME: ICD-10-CM

## 2020-05-05 DIAGNOSIS — Z79.4 TYPE 2 DIABETES MELLITUS WITH HYPERGLYCEMIA, WITH LONG-TERM CURRENT USE OF INSULIN (HCC): ICD-10-CM

## 2020-05-05 DIAGNOSIS — I25.5 ISCHEMIC CARDIOMYOPATHY: ICD-10-CM

## 2020-05-05 DIAGNOSIS — R20.2 PARESTHESIAS: ICD-10-CM

## 2020-05-05 DIAGNOSIS — Z95.810 PRESENCE OF AUTOMATIC CARDIOVERTER/DEFIBRILLATOR (AICD): ICD-10-CM

## 2020-05-05 DIAGNOSIS — I10 BENIGN ESSENTIAL HTN: ICD-10-CM

## 2020-05-05 DIAGNOSIS — I20.0 UNSTABLE ANGINA (HCC): ICD-10-CM

## 2020-05-05 DIAGNOSIS — I25.2 CORONARY ARTERY DISEASE WITH HX OF MYOCARDIAL INFARCT W/O HX OF CABG: ICD-10-CM

## 2020-05-05 DIAGNOSIS — I50.22 CHRONIC SYSTOLIC CONGESTIVE HEART FAILURE (HCC): ICD-10-CM

## 2020-05-05 DIAGNOSIS — I95.9 HYPOTENSION, UNSPECIFIED HYPOTENSION TYPE: ICD-10-CM

## 2020-05-05 DIAGNOSIS — I25.10 CORONARY ARTERY DISEASE WITH HX OF MYOCARDIAL INFARCT W/O HX OF CABG: ICD-10-CM

## 2020-05-05 LAB
ANION GAP SERPL CALCULATED.3IONS-SCNC: 15 MMOL/L (ref 5–15)
BASOPHILS # BLD AUTO: 0.1 10*3/MM3 (ref 0–0.2)
BASOPHILS NFR BLD AUTO: 0.7 % (ref 0–1.5)
BUN BLD-MCNC: 42 MG/DL (ref 8–23)
BUN/CREAT SERPL: 19.8 (ref 7–25)
CALCIUM SPEC-SCNC: 9.6 MG/DL (ref 8.6–10.5)
CHLORIDE SERPL-SCNC: 88 MMOL/L (ref 98–107)
CO2 SERPL-SCNC: 26 MMOL/L (ref 22–29)
CREAT BLD-MCNC: 2.12 MG/DL (ref 0.76–1.27)
DEPRECATED RDW RBC AUTO: 43.8 FL (ref 37–54)
EOSINOPHIL # BLD AUTO: 0.2 10*3/MM3 (ref 0–0.4)
EOSINOPHIL NFR BLD AUTO: 1.4 % (ref 0.3–6.2)
ERYTHROCYTE [DISTWIDTH] IN BLOOD BY AUTOMATED COUNT: 13.5 % (ref 12.3–15.4)
GFR SERPL CREATININE-BSD FRML MDRD: 32 ML/MIN/1.73
GLUCOSE BLD-MCNC: 519 MG/DL (ref 65–99)
GLUCOSE BLDC GLUCOMTR-MCNC: 258 MG/DL (ref 70–105)
GLUCOSE BLDC GLUCOMTR-MCNC: >500 MG/DL (ref 70–105)
HCT VFR BLD AUTO: 45.1 % (ref 37.5–51)
HGB BLD-MCNC: 16.3 G/DL (ref 13–17.7)
HOLD SPECIMEN: NORMAL
LYMPHOCYTES # BLD AUTO: 2.3 10*3/MM3 (ref 0.7–3.1)
LYMPHOCYTES NFR BLD AUTO: 15.7 % (ref 19.6–45.3)
MCH RBC QN AUTO: 33.2 PG (ref 26.6–33)
MCHC RBC AUTO-ENTMCNC: 36.2 G/DL (ref 31.5–35.7)
MCV RBC AUTO: 91.7 FL (ref 79–97)
MONOCYTES # BLD AUTO: 1.1 10*3/MM3 (ref 0.1–0.9)
MONOCYTES NFR BLD AUTO: 7.2 % (ref 5–12)
NEUTROPHILS # BLD AUTO: 10.9 10*3/MM3 (ref 1.7–7)
NEUTROPHILS NFR BLD AUTO: 75 % (ref 42.7–76)
NRBC BLD AUTO-RTO: 0 /100 WBC (ref 0–0.2)
PLATELET # BLD AUTO: 186 10*3/MM3 (ref 140–450)
PMV BLD AUTO: 11.3 FL (ref 6–12)
POTASSIUM BLD-SCNC: 3.8 MMOL/L (ref 3.5–5.2)
RBC # BLD AUTO: 4.92 10*6/MM3 (ref 4.14–5.8)
SODIUM BLD-SCNC: 129 MMOL/L (ref 136–145)
WBC NRBC COR # BLD: 14.6 10*3/MM3 (ref 3.4–10.8)
WHOLE BLOOD HOLD SPECIMEN: NORMAL

## 2020-05-05 PROCEDURE — 99284 EMERGENCY DEPT VISIT MOD MDM: CPT

## 2020-05-05 PROCEDURE — 99222 1ST HOSP IP/OBS MODERATE 55: CPT | Performed by: NURSE PRACTITIONER

## 2020-05-05 PROCEDURE — 70450 CT HEAD/BRAIN W/O DYE: CPT

## 2020-05-05 PROCEDURE — 82962 GLUCOSE BLOOD TEST: CPT

## 2020-05-05 PROCEDURE — 63710000001 INSULIN REGULAR HUMAN PER 5 UNITS: Performed by: EMERGENCY MEDICINE

## 2020-05-05 PROCEDURE — 83880 ASSAY OF NATRIURETIC PEPTIDE: CPT | Performed by: NURSE PRACTITIONER

## 2020-05-05 PROCEDURE — 93005 ELECTROCARDIOGRAM TRACING: CPT | Performed by: EMERGENCY MEDICINE

## 2020-05-05 PROCEDURE — 85025 COMPLETE CBC W/AUTO DIFF WBC: CPT | Performed by: EMERGENCY MEDICINE

## 2020-05-05 PROCEDURE — 80048 BASIC METABOLIC PNL TOTAL CA: CPT | Performed by: EMERGENCY MEDICINE

## 2020-05-05 RX ORDER — SPIRONOLACTONE 25 MG/1
25 TABLET ORAL DAILY
Status: DISCONTINUED | OUTPATIENT
Start: 2020-05-06 | End: 2020-05-06

## 2020-05-05 RX ORDER — ALBUTEROL SULFATE 2.5 MG/3ML
2.5 SOLUTION RESPIRATORY (INHALATION) EVERY 6 HOURS PRN
Status: DISCONTINUED | OUTPATIENT
Start: 2020-05-05 | End: 2020-05-08 | Stop reason: HOSPADM

## 2020-05-05 RX ORDER — PANTOPRAZOLE SODIUM 40 MG/1
40 TABLET, DELAYED RELEASE ORAL DAILY
Status: DISCONTINUED | OUTPATIENT
Start: 2020-05-06 | End: 2020-05-08 | Stop reason: HOSPADM

## 2020-05-05 RX ORDER — FUROSEMIDE 40 MG/1
40 TABLET ORAL 2 TIMES DAILY
Status: DISCONTINUED | OUTPATIENT
Start: 2020-05-05 | End: 2020-05-06

## 2020-05-05 RX ORDER — ACETAMINOPHEN 325 MG/1
650 TABLET ORAL EVERY 4 HOURS PRN
Status: DISCONTINUED | OUTPATIENT
Start: 2020-05-05 | End: 2020-05-08

## 2020-05-05 RX ORDER — CHOLECALCIFEROL (VITAMIN D3) 125 MCG
5 CAPSULE ORAL NIGHTLY PRN
Status: DISCONTINUED | OUTPATIENT
Start: 2020-05-05 | End: 2020-05-08 | Stop reason: HOSPADM

## 2020-05-05 RX ORDER — CARVEDILOL 3.12 MG/1
3.12 TABLET ORAL 2 TIMES DAILY WITH MEALS
Status: DISCONTINUED | OUTPATIENT
Start: 2020-05-05 | End: 2020-05-06

## 2020-05-05 RX ORDER — ISOSORBIDE MONONITRATE 30 MG/1
30 TABLET, EXTENDED RELEASE ORAL DAILY
Status: DISCONTINUED | OUTPATIENT
Start: 2020-05-06 | End: 2020-05-06

## 2020-05-05 RX ORDER — ALUMINA, MAGNESIA, AND SIMETHICONE 2400; 2400; 240 MG/30ML; MG/30ML; MG/30ML
15 SUSPENSION ORAL EVERY 6 HOURS PRN
Status: DISCONTINUED | OUTPATIENT
Start: 2020-05-05 | End: 2020-05-08 | Stop reason: HOSPADM

## 2020-05-05 RX ORDER — LISINOPRIL 5 MG/1
2.5 TABLET ORAL DAILY
Status: DISCONTINUED | OUTPATIENT
Start: 2020-05-06 | End: 2020-05-06

## 2020-05-05 RX ORDER — ACETAMINOPHEN 650 MG/1
650 SUPPOSITORY RECTAL EVERY 4 HOURS PRN
Status: DISCONTINUED | OUTPATIENT
Start: 2020-05-05 | End: 2020-05-08 | Stop reason: HOSPADM

## 2020-05-05 RX ORDER — NICOTINE POLACRILEX 4 MG
15 LOZENGE BUCCAL
Status: DISCONTINUED | OUTPATIENT
Start: 2020-05-05 | End: 2020-05-08 | Stop reason: HOSPADM

## 2020-05-05 RX ORDER — CARVEDILOL 3.12 MG/1
3.12 TABLET ORAL 2 TIMES DAILY WITH MEALS
COMMUNITY
End: 2020-09-17 | Stop reason: SDUPTHER

## 2020-05-05 RX ORDER — DEXTROSE MONOHYDRATE 25 G/50ML
25 INJECTION, SOLUTION INTRAVENOUS
Status: DISCONTINUED | OUTPATIENT
Start: 2020-05-05 | End: 2020-05-08 | Stop reason: HOSPADM

## 2020-05-05 RX ORDER — ONDANSETRON 4 MG/1
4 TABLET, FILM COATED ORAL EVERY 6 HOURS PRN
Status: DISCONTINUED | OUTPATIENT
Start: 2020-05-05 | End: 2020-05-08 | Stop reason: HOSPADM

## 2020-05-05 RX ORDER — ACETAMINOPHEN 160 MG/5ML
650 SOLUTION ORAL EVERY 4 HOURS PRN
Status: DISCONTINUED | OUTPATIENT
Start: 2020-05-05 | End: 2020-05-08 | Stop reason: HOSPADM

## 2020-05-05 RX ORDER — BISACODYL 5 MG/1
5 TABLET, DELAYED RELEASE ORAL DAILY PRN
Status: DISCONTINUED | OUTPATIENT
Start: 2020-05-05 | End: 2020-05-08 | Stop reason: HOSPADM

## 2020-05-05 RX ORDER — ONDANSETRON 2 MG/ML
4 INJECTION INTRAMUSCULAR; INTRAVENOUS EVERY 6 HOURS PRN
Status: DISCONTINUED | OUTPATIENT
Start: 2020-05-05 | End: 2020-05-08 | Stop reason: HOSPADM

## 2020-05-05 RX ORDER — METOLAZONE 5 MG/1
5 TABLET ORAL DAILY
Status: DISCONTINUED | OUTPATIENT
Start: 2020-05-06 | End: 2020-05-06

## 2020-05-05 RX ORDER — SODIUM CHLORIDE 0.9 % (FLUSH) 0.9 %
10 SYRINGE (ML) INJECTION AS NEEDED
Status: DISCONTINUED | OUTPATIENT
Start: 2020-05-05 | End: 2020-05-08 | Stop reason: HOSPADM

## 2020-05-05 RX ORDER — ATORVASTATIN CALCIUM 40 MG/1
40 TABLET, FILM COATED ORAL DAILY
Status: DISCONTINUED | OUTPATIENT
Start: 2020-05-06 | End: 2020-05-08 | Stop reason: HOSPADM

## 2020-05-05 RX ORDER — FENOFIBRATE 145 MG/1
145 TABLET, COATED ORAL DAILY
Status: DISCONTINUED | OUTPATIENT
Start: 2020-05-06 | End: 2020-05-08 | Stop reason: HOSPADM

## 2020-05-05 RX ORDER — ASPIRIN 325 MG
325 TABLET ORAL DAILY
Status: DISCONTINUED | OUTPATIENT
Start: 2020-05-06 | End: 2020-05-08 | Stop reason: HOSPADM

## 2020-05-05 RX ORDER — CLOPIDOGREL BISULFATE 75 MG/1
75 TABLET ORAL DAILY
Status: DISCONTINUED | OUTPATIENT
Start: 2020-05-06 | End: 2020-05-08 | Stop reason: HOSPADM

## 2020-05-05 RX ADMIN — CARVEDILOL 3.12 MG: 3.12 TABLET, FILM COATED ORAL at 23:03

## 2020-05-05 RX ADMIN — INSULIN HUMAN 6 UNITS: 100 INJECTION, SOLUTION PARENTERAL at 21:58

## 2020-05-05 RX ADMIN — SODIUM CHLORIDE 500 ML: 900 INJECTION, SOLUTION INTRAVENOUS at 23:24

## 2020-05-05 RX ADMIN — SODIUM CHLORIDE 500 ML: 900 INJECTION, SOLUTION INTRAVENOUS at 20:02

## 2020-05-05 RX ADMIN — FUROSEMIDE 40 MG: 40 TABLET ORAL at 23:03

## 2020-05-05 RX ADMIN — SODIUM CHLORIDE 500 ML: 0.9 INJECTION, SOLUTION INTRAVENOUS at 22:45

## 2020-05-05 NOTE — OUTREACH NOTE
CHF Week 1 Survey      Responses   [facility] [place] patient discharged from?  [facility]   Does the patient have one of the following disease processes/diagnoses(primary or secondary)?  CHF   Is there a successful TCM telephone encounter documented?  No   CHF Week 1 attempt successful?  Yes   Call start time  1259   Call end time  1311   Discharge diagnosis  Acute on chronic systolic CHF, chest pain, COPD exacerbation, Dyspnea, Type 2 DM, CAD   Medication alerts for this patient  Wife reports patient did not come home on IV antibiotics. PO Doycycline instead.    Meds reviewed with patient/caregiver?  Yes   Is the patient having any side effects they believe may be caused by any medication additions or changes?  No   Does the patient have all medications ordered at discharge?  Yes   Is the patient taking all medications as directed (includes completed medication regime)?  Yes   Comments regarding appointments  Cardiology on 6/2/2020   Does the patient have an appointment with their PCP within 7 days of discharge?  Greater than 7 days   Comments regarding PCP  PCP:  Yamile Agarwal- has a followup scheduled on 5/26/2020   What is preventing the patient from scheduling follow up appointments within 7 days of discharge?  Earlier appointment not available   Nursing Interventions  Verified appointment date/time/provider   Has the patient kept scheduled appointments due by today?  N/A   Has home health visited the patient within 72 hours of discharge?  N/A   Psychosocial issues?  No   Comments  Wife reports he is doing much better. No fluid retention. Instructed her patient needs to weigh daily. She verbalized understanding.    Did the patient receive a copy of their discharge instructions?  Yes   Nursing interventions  Reviewed instructions with patient   What is the patient's perception of their health status since discharge?  Improving   Nursing interventions  Nurse provided patient education   Is the patient weighing  daily?  No   Does the patient have scales?  Yes   Daily weight interventions  Education provided on importance of daily weight   Is the patient able to teach back Heart Failure diet management?  Yes   Is the patient able to teach back Heart Failure Zones?  Yes   Is the patient able to teach back signs and symptoms of worsening condition? (i.e. weight gain, shortness of air, etc.)  Yes   Is the patient/caregiver able to teach back the hierarchy of who to call/visit for symptoms/problems? PCP, Specialist, Home health nurse, Urgent Care, ED, 911  Yes    CHF Week 1 call completed?  Yes          Ayaz Tovar RN

## 2020-05-05 NOTE — ED TRIAGE NOTES
Pt reports was discharged from here on Sunday for fluid retention,pt sent home with new oral med-doxycycline. Pt c/o waking up this morning with LUE numbness, tingling to thumb and fingers, pt thinks this is a side effect of new medication.

## 2020-05-06 ENCOUNTER — APPOINTMENT (OUTPATIENT)
Dept: GENERAL RADIOLOGY | Facility: HOSPITAL | Age: 60
End: 2020-05-06

## 2020-05-06 ENCOUNTER — APPOINTMENT (OUTPATIENT)
Dept: CARDIOLOGY | Facility: HOSPITAL | Age: 60
End: 2020-05-06

## 2020-05-06 PROBLEM — I50.22 CHRONIC SYSTOLIC CONGESTIVE HEART FAILURE (HCC): Chronic | Status: ACTIVE | Noted: 2019-06-26

## 2020-05-06 LAB
ANION GAP SERPL CALCULATED.3IONS-SCNC: 16 MMOL/L (ref 5–15)
ARTICHOKE IGE QN: 64 MG/DL (ref 0–100)
B PERT DNA SPEC QL NAA+PROBE: NOT DETECTED
BASOPHILS # BLD AUTO: 0 10*3/MM3 (ref 0–0.2)
BASOPHILS NFR BLD AUTO: 0.3 % (ref 0–1.5)
BILIRUB UR QL STRIP: NEGATIVE
BUN BLD-MCNC: 43 MG/DL (ref 8–23)
BUN/CREAT SERPL: 20.7 (ref 7–25)
C PNEUM DNA NPH QL NAA+NON-PROBE: NOT DETECTED
CALCIUM SPEC-SCNC: 8.7 MG/DL (ref 8.6–10.5)
CHLORIDE SERPL-SCNC: 95 MMOL/L (ref 98–107)
CHOLEST SERPL-MCNC: 150 MG/DL (ref 0–200)
CLARITY UR: CLEAR
CO2 SERPL-SCNC: 23 MMOL/L (ref 22–29)
COLOR UR: YELLOW
CREAT BLD-MCNC: 2.08 MG/DL (ref 0.76–1.27)
D-LACTATE SERPL-SCNC: 1.4 MMOL/L (ref 0.5–2)
D-LACTATE SERPL-SCNC: 2.1 MMOL/L (ref 0.5–2)
DEPRECATED RDW RBC AUTO: 42.9 FL (ref 37–54)
EOSINOPHIL # BLD AUTO: 0.2 10*3/MM3 (ref 0–0.4)
EOSINOPHIL NFR BLD AUTO: 1.7 % (ref 0.3–6.2)
ERYTHROCYTE [DISTWIDTH] IN BLOOD BY AUTOMATED COUNT: 13.1 % (ref 12.3–15.4)
FLUAV H1 2009 PAND RNA NPH QL NAA+PROBE: NOT DETECTED
FLUAV H1 HA GENE NPH QL NAA+PROBE: NOT DETECTED
FLUAV H3 RNA NPH QL NAA+PROBE: NOT DETECTED
FLUAV SUBTYP SPEC NAA+PROBE: NOT DETECTED
FLUBV RNA ISLT QL NAA+PROBE: NOT DETECTED
FOLATE SERPL-MCNC: 9.01 NG/ML (ref 4.78–24.2)
GFR SERPL CREATININE-BSD FRML MDRD: 33 ML/MIN/1.73
GLUCOSE BLD-MCNC: 248 MG/DL (ref 65–99)
GLUCOSE BLDC GLUCOMTR-MCNC: 254 MG/DL (ref 70–105)
GLUCOSE BLDC GLUCOMTR-MCNC: 307 MG/DL (ref 70–105)
GLUCOSE BLDC GLUCOMTR-MCNC: 329 MG/DL (ref 70–105)
GLUCOSE UR STRIP-MCNC: NEGATIVE MG/DL
HADV DNA SPEC NAA+PROBE: NOT DETECTED
HCOV 229E RNA SPEC QL NAA+PROBE: NOT DETECTED
HCOV HKU1 RNA SPEC QL NAA+PROBE: NOT DETECTED
HCOV NL63 RNA SPEC QL NAA+PROBE: NOT DETECTED
HCOV OC43 RNA SPEC QL NAA+PROBE: NOT DETECTED
HCT VFR BLD AUTO: 41.1 % (ref 37.5–51)
HDLC SERPL-MCNC: 20 MG/DL (ref 40–60)
HGB BLD-MCNC: 14.5 G/DL (ref 13–17.7)
HGB UR QL STRIP.AUTO: NEGATIVE
HMPV RNA NPH QL NAA+NON-PROBE: NOT DETECTED
HPIV1 RNA SPEC QL NAA+PROBE: NOT DETECTED
HPIV2 RNA SPEC QL NAA+PROBE: NOT DETECTED
HPIV3 RNA NPH QL NAA+PROBE: NOT DETECTED
HPIV4 P GENE NPH QL NAA+PROBE: NOT DETECTED
KETONES UR QL STRIP: NEGATIVE
L PNEUMO1 AG UR QL IA: NEGATIVE
LACTATE HOLD SPECIMEN: NORMAL
LDLC SERPL CALC-MCNC: ABNORMAL MG/DL
LDLC/HDLC SERPL: ABNORMAL {RATIO}
LEUKOCYTE ESTERASE UR QL STRIP.AUTO: NEGATIVE
LYMPHOCYTES # BLD AUTO: 3.6 10*3/MM3 (ref 0.7–3.1)
LYMPHOCYTES NFR BLD AUTO: 30.6 % (ref 19.6–45.3)
M PNEUMO IGG SER IA-ACNC: NOT DETECTED
MCH RBC QN AUTO: 32.3 PG (ref 26.6–33)
MCHC RBC AUTO-ENTMCNC: 35.3 G/DL (ref 31.5–35.7)
MCV RBC AUTO: 91.5 FL (ref 79–97)
MONOCYTES # BLD AUTO: 0.8 10*3/MM3 (ref 0.1–0.9)
MONOCYTES NFR BLD AUTO: 7 % (ref 5–12)
NEUTROPHILS # BLD AUTO: 7.2 10*3/MM3 (ref 1.7–7)
NEUTROPHILS NFR BLD AUTO: 60.4 % (ref 42.7–76)
NITRITE UR QL STRIP: NEGATIVE
NRBC BLD AUTO-RTO: 0.1 /100 WBC (ref 0–0.2)
NT-PROBNP SERPL-MCNC: 1554 PG/ML (ref 5–900)
PH UR STRIP.AUTO: <=5 [PH] (ref 5–8)
PLATELET # BLD AUTO: 152 10*3/MM3 (ref 140–450)
PMV BLD AUTO: 10.7 FL (ref 6–12)
POTASSIUM BLD-SCNC: 3.6 MMOL/L (ref 3.5–5.2)
PROT UR QL STRIP: NEGATIVE
RBC # BLD AUTO: 4.5 10*6/MM3 (ref 4.14–5.8)
RHINOVIRUS RNA SPEC NAA+PROBE: NOT DETECTED
RSV RNA NPH QL NAA+NON-PROBE: NOT DETECTED
S PNEUM AG SPEC QL LA: NEGATIVE
SODIUM BLD-SCNC: 134 MMOL/L (ref 136–145)
SP GR UR STRIP: 1.01 (ref 1–1.03)
TRIGL SERPL-MCNC: 596 MG/DL (ref 0–150)
TROPONIN T SERPL-MCNC: 0.07 NG/ML (ref 0–0.03)
TSH SERPL DL<=0.05 MIU/L-ACNC: 1.49 UIU/ML (ref 0.27–4.2)
UROBILINOGEN UR QL STRIP: NORMAL
VIT B12 BLD-MCNC: 691 PG/ML (ref 211–946)
VLDLC SERPL-MCNC: ABNORMAL MG/DL
WBC NRBC COR # BLD: 11.8 10*3/MM3 (ref 3.4–10.8)

## 2020-05-06 PROCEDURE — 0099U HC BIOFIRE FILMARRAY RESP PANEL 1: CPT | Performed by: NURSE PRACTITIONER

## 2020-05-06 PROCEDURE — 25010000002 VANCOMYCIN 10 G RECONSTITUTED SOLUTION: Performed by: NURSE PRACTITIONER

## 2020-05-06 PROCEDURE — 83605 ASSAY OF LACTIC ACID: CPT | Performed by: NURSE PRACTITIONER

## 2020-05-06 PROCEDURE — 87040 BLOOD CULTURE FOR BACTERIA: CPT | Performed by: NURSE PRACTITIONER

## 2020-05-06 PROCEDURE — 63710000001 INSULIN LISPRO (HUMAN) PER 5 UNITS: Performed by: NURSE PRACTITIONER

## 2020-05-06 PROCEDURE — 99221 1ST HOSP IP/OBS SF/LOW 40: CPT | Performed by: PSYCHIATRY & NEUROLOGY

## 2020-05-06 PROCEDURE — 82962 GLUCOSE BLOOD TEST: CPT

## 2020-05-06 PROCEDURE — 93306 TTE W/DOPPLER COMPLETE: CPT

## 2020-05-06 PROCEDURE — 81003 URINALYSIS AUTO W/O SCOPE: CPT | Performed by: NURSE PRACTITIONER

## 2020-05-06 PROCEDURE — 25010000002 CEFEPIME PER 500 MG: Performed by: NURSE PRACTITIONER

## 2020-05-06 PROCEDURE — 87899 AGENT NOS ASSAY W/OPTIC: CPT | Performed by: NURSE PRACTITIONER

## 2020-05-06 PROCEDURE — 99223 1ST HOSP IP/OBS HIGH 75: CPT | Performed by: INTERNAL MEDICINE

## 2020-05-06 PROCEDURE — 05H933Z INSERTION OF INFUSION DEVICE INTO RIGHT BRACHIAL VEIN, PERCUTANEOUS APPROACH: ICD-10-PCS | Performed by: NURSE PRACTITIONER

## 2020-05-06 PROCEDURE — 80048 BASIC METABOLIC PNL TOTAL CA: CPT | Performed by: NURSE PRACTITIONER

## 2020-05-06 PROCEDURE — 71045 X-RAY EXAM CHEST 1 VIEW: CPT

## 2020-05-06 PROCEDURE — 83721 ASSAY OF BLOOD LIPOPROTEIN: CPT | Performed by: NURSE PRACTITIONER

## 2020-05-06 PROCEDURE — C1751 CATH, INF, PER/CENT/MIDLINE: HCPCS

## 2020-05-06 PROCEDURE — 82607 VITAMIN B-12: CPT | Performed by: INTERNAL MEDICINE

## 2020-05-06 PROCEDURE — 80061 LIPID PANEL: CPT | Performed by: NURSE PRACTITIONER

## 2020-05-06 PROCEDURE — 25010000002 CALCIUM GLUCONATE-NACL 1-0.675 GM/50ML-% SOLUTION: Performed by: NURSE PRACTITIONER

## 2020-05-06 PROCEDURE — 63710000001 INSULIN GLARGINE PER 5 UNITS: Performed by: INTERNAL MEDICINE

## 2020-05-06 PROCEDURE — G0108 DIAB MANAGE TRN  PER INDIV: HCPCS

## 2020-05-06 PROCEDURE — 84484 ASSAY OF TROPONIN QUANT: CPT | Performed by: INTERNAL MEDICINE

## 2020-05-06 PROCEDURE — 82746 ASSAY OF FOLIC ACID SERUM: CPT | Performed by: NURSE PRACTITIONER

## 2020-05-06 PROCEDURE — 84443 ASSAY THYROID STIM HORMONE: CPT | Performed by: INTERNAL MEDICINE

## 2020-05-06 PROCEDURE — 63710000001 INSULIN LISPRO (HUMAN) PER 5 UNITS: Performed by: INTERNAL MEDICINE

## 2020-05-06 PROCEDURE — 85025 COMPLETE CBC W/AUTO DIFF WBC: CPT | Performed by: NURSE PRACTITIONER

## 2020-05-06 PROCEDURE — 97162 PT EVAL MOD COMPLEX 30 MIN: CPT

## 2020-05-06 PROCEDURE — 25010000002 SULFUR HEXAFLUORIDE MICROSPH 60.7-25 MG RECONSTITUTED SUSPENSION: Performed by: INTERNAL MEDICINE

## 2020-05-06 PROCEDURE — 82565 ASSAY OF CREATININE: CPT | Performed by: NURSE PRACTITIONER

## 2020-05-06 RX ORDER — SODIUM CHLORIDE 9 MG/ML
75 INJECTION, SOLUTION INTRAVENOUS CONTINUOUS
Status: DISCONTINUED | OUTPATIENT
Start: 2020-05-06 | End: 2020-05-07

## 2020-05-06 RX ORDER — INSULIN GLARGINE 100 [IU]/ML
10 INJECTION, SOLUTION SUBCUTANEOUS DAILY
Status: DISCONTINUED | OUTPATIENT
Start: 2020-05-06 | End: 2020-05-08 | Stop reason: HOSPADM

## 2020-05-06 RX ORDER — NOREPINEPHRINE BIT/0.9 % NACL 8 MG/250ML
.02-.3 INFUSION BOTTLE (ML) INTRAVENOUS
Status: DISCONTINUED | OUTPATIENT
Start: 2020-05-06 | End: 2020-05-07

## 2020-05-06 RX ORDER — VANCOMYCIN 1.75 GRAM/500 ML IN 0.9 % SODIUM CHLORIDE INTRAVENOUS
20 ONCE
Status: DISCONTINUED | OUTPATIENT
Start: 2020-05-06 | End: 2020-05-06

## 2020-05-06 RX ORDER — CALCIUM GLUCONATE 20 MG/ML
1 INJECTION, SOLUTION INTRAVENOUS ONCE
Status: COMPLETED | OUTPATIENT
Start: 2020-05-06 | End: 2020-05-06

## 2020-05-06 RX ADMIN — SULFUR HEXAFLUORIDE 3 ML: KIT at 12:45

## 2020-05-06 RX ADMIN — ASPIRIN 325 MG ORAL TABLET 325 MG: 325 PILL ORAL at 08:37

## 2020-05-06 RX ADMIN — FENOFIBRATE 145 MG: 145 TABLET ORAL at 08:37

## 2020-05-06 RX ADMIN — SODIUM CHLORIDE 75 ML/HR: 900 INJECTION, SOLUTION INTRAVENOUS at 08:38

## 2020-05-06 RX ADMIN — ATORVASTATIN CALCIUM 40 MG: 40 TABLET, FILM COATED ORAL at 08:37

## 2020-05-06 RX ADMIN — INSULIN GLARGINE 10 UNITS: 100 INJECTION, SOLUTION SUBCUTANEOUS at 10:18

## 2020-05-06 RX ADMIN — CEFEPIME HYDROCHLORIDE 2 G: 2 INJECTION, POWDER, FOR SOLUTION INTRAVENOUS at 05:13

## 2020-05-06 RX ADMIN — PANTOPRAZOLE SODIUM 40 MG: 40 TABLET, DELAYED RELEASE ORAL at 08:37

## 2020-05-06 RX ADMIN — SODIUM CHLORIDE 75 ML/HR: 900 INJECTION, SOLUTION INTRAVENOUS at 23:32

## 2020-05-06 RX ADMIN — INSULIN LISPRO 10 UNITS: 100 INJECTION, SOLUTION INTRAVENOUS; SUBCUTANEOUS at 13:17

## 2020-05-06 RX ADMIN — INSULIN LISPRO 8 UNITS: 100 INJECTION, SOLUTION INTRAVENOUS; SUBCUTANEOUS at 17:24

## 2020-05-06 RX ADMIN — INSULIN LISPRO 7 UNITS: 100 INJECTION, SOLUTION INTRAVENOUS; SUBCUTANEOUS at 08:37

## 2020-05-06 RX ADMIN — CLOPIDOGREL BISULFATE 75 MG: 75 TABLET ORAL at 08:37

## 2020-05-06 RX ADMIN — CALCIUM GLUCONATE 1 G: 20 INJECTION, SOLUTION INTRAVENOUS at 06:33

## 2020-05-06 RX ADMIN — SODIUM CHLORIDE 500 ML: 900 INJECTION, SOLUTION INTRAVENOUS at 04:34

## 2020-05-06 NOTE — ED NOTES
Spoke with [facility]-hospitalist team via phone regarding pt's BP maintaining at 70s/40s. Pt HR and O2 WNL, PT is asymptomatic at this time. Pt has had 1.5 L NS. Hospitalist team to call back with further orders.     Mabel Reaves, LULU  05/06/20 0031       Mabel Reaves RN  05/06/20 0122

## 2020-05-06 NOTE — PLAN OF CARE
Problem: Patient Care Overview  Goal: Plan of Care Review  Flowsheets (Taken 5/6/2020 5037)  Plan of Care Reviewed With: patient  Note:   Pts remains hypotensive despite 2.5 liters of fluid, [facility] aware and has seen the pt. Possible need to transfer pt to ICU, will continue to monitor closely.

## 2020-05-06 NOTE — CONSULTS
"Diabetes Education  Assessment/Teaching    Patient Name:  Bobby Steele Jr.  YOB: 1960  MRN: [id number]  Admit Date:  5/5/2020      Assessment Date:  5/6/2020    Most Recent Value   General Information    Referral From:  Other (comment) [Follow-up for insulin teaching and giving glucometer.]   Height  170.2 cm (67\")   Height Method  Stated   Weight  88.5 kg (195 lb)   Weight Method  Bed scale   Pregnancy Assessment   Diabetes History   What type of diabetes do you have?  Type 2   Length of Diabetes Diagnosis  10 + years   Current DM knowledge  fair   Have you had diabetes education/teaching in the past?  no   Do you test your blood sugar at home?    Have you had high blood sugar? (>140mg/dl)    How often do you have high blood sugar?    When was your last high blood sugar?    Do you have any diabetes complications?  circulation problems   Education Preferences   What areas of diabetes would you like to learn about?  medications for diabetes, testing my blood sugar at home, avoiding high blood sugar, avoiding low blood sugar   Nutrition Information   Assessment Topics   Taking Medication - Assessment  Needs education   Problem Solving - Assessment  Needs education   Reducing Risk - Assessment  Needs education   Monitoring - Assessment  Needs education   DM Goals   Taking Medication - Goal  Today   Problem Solving - Goal  Today   Reducing Risk - Goal  Today   Monitoring - Goal  Today            Most Recent Value   DM Education Needs   Meter  Meter provided   Meter Type  One Touch [Patient given One Touch Verio Flex with return demonstartion completed by patient using the lancing device, inserting the test strip into the meter, and applying blood to the test strip.  Blood sugar 187.]   Frequency of Testing  AC/HS [Log book given to patient with discussion on checking blood sugar before meals and at bedtime.]   Medication  Insulin, Actions, Side effects, Administration, Pen [Handouts given with discussion " on types of insulin, storage of insulin, disposal of needles, injection sites, rotation of sites, and how to use an insulin pen.]   Problem Solving  Hypoglycemia, Hyperglycemia, Signs, Symptoms, Treatment [Handouts given with discussion on hyperglycemia and hypoglycemia signs, symptoms, and treatment.]   Reducing Risks  A1C testing [A1C info sheet given to patient with A1C result of 10.0% on 5/2/2020.]   Healthy Eating  Other (comment) [Patient states he eats what his wife fixes.  He tries to avoid bread and sugary foods.]   Physical Activity  Walking   Discharge Plan  Home   Motivation  Engaged, Strong   Teaching Method  Discussion, Demonstration, Handouts, Teach back   Patient Response  Verbalized understanding, Demonstrates adequately            Other Comments:  Patient did return demonstration on applying the pen needle to the insulin pen, priming the pen, administering the shot into the foam pad, and holding the pen for 10 seconds after administration. Patient given sample pack of 4 pen needles. Prescriptions started in discharge orders for lancets, test strips, alcohol wipes, Lantus, and pen needles. No further questions or needs related to diabetes at this time.        Electronically signed by:  Love Goodman RN  05/06/20 18:58

## 2020-05-06 NOTE — OUTREACH NOTE
CHF Week 2 Survey      Responses   [facility] patient discharged from?  [facility]   Does the patient have one of the following disease processes/diagnoses(primary or secondary)?  CHF   Week 2 attempt successful?  No   Revoke  Readmitted          Dolly Copeland RN

## 2020-05-06 NOTE — CONSULTS
Cardiology Consult Note    Patient Identification:  Name: Bobby Steele Jr.  Age: 60 y.o.  Sex: male  :  1960  MRN: [id number]             Requesting Physician : Scar Veronica intensivist nurse practitioner    Reason for Consultation / Chief Complaint : patient co-management hypertension, CAD, CABG, PCI    History of Present Illness:         This is a 60-year-old white male with past medical history of     # NSTEMI  19, SHILPA to SVG to RCA and proximal LAD leading into a small diagonal  # CAD status post CABG X3 V  # Ischemic cardiomyopathy with EF of 35%, status post ICD 10/8/2019  # COPD, continue tobacco abuse  # Hypertension   # Hyperlipidemia  # Diabetes with hemoglobin A1c of 7.4 on 6/3/19  #Positive family for premature heart disease with father MI 53     Here with complaint of unilateral tingling and numbness in left hand.  Patient was recently in the hospital with elevated troponin and proBNP was diagnosed with CHF diuresed and sent home on metolazone.  Patient called me as outpatient saying that he is got tingling and numbness and thinks it is a side effect of metolazone.  Since it was unilateral I advised him to come to the ER.  Work-up in the ER revealed a proBNP of 1554 which is worse than his discharge 1115.  BUN/creatinine were 42/2.12 compared to his discharge BUN/creatinine of 33/1.58 and a baseline around 1.3.  D-dimer 0.45, CBC with a white count of 14.6.  Normal UA.  CT head 2020- for active bleed.  Chest x-ray no acute infiltrates or effusions.  EKG reviewed by me from 2020 reveals sinus tachycardia at the rate of 100 bpm with nonspecific ST depression in lateral leads.    Patient had stress test 2020 which was abnormal with old inferior wall MI with cole-infarct and ischemia and EF of 42%     Patient  underwent cardiac cath 2019 which revealed patent stent to SVG to RCA patent stent in proximal LAD and small vessel disease and diagonal.   Patient has chronic  dyspnea on exertion relieved with rest.  Patient is status post ICD placement 10/8/2019 .          ASSESSMENT:   #Tingling and numbness unilateral in left hand  #Hypotension  #elevated troponin  #  chronic CHF, ischemic cardiomyopathy 35%, status post ICD 10/8/2019  # NSTEMI 5/12/19  # CAD status post CABG  # COPD, tobacco abuse   # hypertension ,  #  hyperlipidemia   #Hyperglycemia, and type 2 diabetes  #VINICIO on CKD     Recommendations:  Telemetry  Gentle hydration  Vasopressors as needed  Hold ACE inhibitor's, beta-blockers and diuretics metolazone  Serial cardiac enzymes, patient's troponin was elevated last admission    We will follow-up and consider further evaluation and treatment     Patient had cath 6/27/2019 which revealed patent stent in SVG to RCA and patent stent in proximal LAD with severe disease in diagonal branch which is too small to be stented  Continue , aspirin, Plavix and statin as tolerated    Monitor blood pressure  Discussed with patient and his wife   Counseled on smoking cessation  Counseled on CHF care  Counseled on diet exercise and smoking cessation  Diabetes control   Thank you very much for letting me participate in the care of this gentleman  We will follow           Diagnosis Plan   1. Paresthesias     2. Hypotension, unspecified hypotension type                Past Medical History:  Past Medical History:   Diagnosis Date   • CAD (coronary artery disease)     S/P CABG   • Chronic systolic CHF (congestive heart failure) (CMS/[facility])    • COPD (chronic obstructive pulmonary disease) (CMS/[facility])    • DM2 (diabetes mellitus, type 2) (CMS/[facility])    • Dyslipidemia    • Encounter for monitoring antiplatelet therapy    • Hypertension    • Myocardial infarction (CMS/[facility])     inferior wall MI--03/13   • DEBI (obstructive sleep apnea)     c-pap machine at    • Peripheral vascular disease (CMS/[facility])     S/P left fem-pop bypass   • Tobacco use      Past Surgical History:  Past Surgical History:    Procedure Laterality Date   • APPENDECTOMY      at age 8 or 9   • CARDIAC CATHETERIZATION      3-; [facility] 9-   • CARDIAC CATHETERIZATION Right 6/27/2019    Procedure: Cardiac Catheterization/with grafts groin access;  Surgeon: Juarez Wing MD;  Location: [facility] CATH INVASIVE LOCATION;  Service: Cardiovascular   • CARDIAC ELECTROPHYSIOLOGY PROCEDURE N/A 10/8/2019    Procedure: ICD SC new, ST.SHIV ;  Surgeon: Juarez Wing MD;  Location: [facility] CATH INVASIVE LOCATION;  Service: Cardiovascular   • COLONOSCOPY     • CORONARY ARTERY BYPASS GRAFT      x3, 3-18-13-LIMA to LAD, and reverse individual SVG to lateral marginal and to PDA   • FEMORAL POPLITEAL BYPASS Left 01/09/2019    [facility]/ Dr. Jero Hatch   • HAND SURGERY Left     crushed hand   • TOE SURGERY      toe nail removal of big toe- age 8 or 9      Allergies:  No Known Allergies  Home Meds:  Medications Prior to Admission   Medication Sig Dispense Refill Last Dose   • albuterol (PROVENTIL) (2.5 MG/3ML) 0.083% nebulizer solution Take 2.5 mg by nebulization Every 6 (Six) Hours As Needed for Wheezing or Shortness of Air.   4/30/2020 at Unknown time   • albuterol sulfate  (90 Base) MCG/ACT inhaler Inhale 2 puffs Every 4 (Four) Hours As Needed for Wheezing or Shortness of Air.   Unknown at Unknown time   • aspirin 325 MG tablet Take 325 mg by mouth Daily.   4/30/2020 at Unknown time   • atorvastatin (LIPITOR) 40 MG tablet Take 40 mg by mouth Daily.   4/30/2020 at Unknown time   • carvedilol (COREG) 3.125 MG tablet Take 3.125 mg by mouth 2 (Two) Times a Day With Meals.      • clopidogrel (PLAVIX) 75 MG tablet Take 75 mg by mouth Daily.   4/30/2020 at Unknown time   • doxycycline 100 mg in sodium chloride 0.9 % 100 mL IVPB Infuse 100 mg into a venous catheter Every 12 (Twelve) Hours for 3 days. Indications: COPD exacerbation 6 tablet 0    • fenofibrate (TRICOR) 145 MG tablet Take 1 tablet by mouth Daily. 90 tablet 3 4/30/2020  at Unknown time   • furosemide (LASIX) 40 MG tablet Take 1 tablet by mouth 2 (Two) Times a Day. 180 tablet 3 4/30/2020 at Unknown time   • glimepiride (AMARYL) 4 MG tablet Take 4 mg by mouth 2 (Two) Times a Day.   4/30/2020 at Unknown time   • isosorbide mononitrate (IMDUR) 30 MG 24 hr tablet TAKE 1 TABLET BY MOUTH ONCE DAILY 90 tablet 0 4/30/2020 at Unknown time   • lactulose (CHRONULAC) 10 GM/15ML solution Take 20 g by mouth 2 (Two) Times a Day As Needed (constipation).   Unknown at Unknown time   • lisinopril (PRINIVIL,ZESTRIL) 2.5 MG tablet TAKE 1 TABLET BY MOUTH ONCE DAILY 90 tablet 1 4/30/2020 at Unknown time   • metFORMIN (GLUCOPHAGE) 500 MG tablet Take 500 mg by mouth 2 (Two) Times a Day With Meals.   4/30/2020 at Unknown time   • metOLazone (ZAROXOLYN) 5 MG tablet Take 1 tablet by mouth Daily. 5 tablet 0    • nitroglycerin (NITROSTAT) 0.4 MG SL tablet Take no more than 3 doses in 15 minutes. 25 tablet 9 Unknown at Unknown time   • pantoprazole (PROTONIX) 40 MG EC tablet Take 40 mg by mouth Daily.   4/30/2020 at Unknown time   • spironolactone (ALDACTONE) 25 MG tablet Take 1 tablet by mouth Daily. 90 tablet 3 4/30/2020 at Unknown time   • temazepam (RESTORIL) 15 MG capsule Take 15 mg by mouth At Night As Needed for Sleep.   4/30/2020 at Unknown time     Current Meds:     Current Facility-Administered Medications:   •  acetaminophen (TYLENOL) tablet 650 mg, 650 mg, Oral, Q4H PRN **OR** acetaminophen (TYLENOL) 160 MG/5ML solution 650 mg, 650 mg, Oral, Q4H PRN **OR** acetaminophen (TYLENOL) suppository 650 mg, 650 mg, Rectal, Q4H PRN, Darren June APRN  •  albuterol (PROVENTIL) nebulizer solution 0.083% 2.5 mg/3mL, 2.5 mg, Nebulization, Q6H PRN, Darren June APRN  •  aluminum-magnesium hydroxide-simethicone (MAALOX MAX) 400-400-40 MG/5ML suspension 15 mL, 15 mL, Oral, Q6H PRN, Darren June APRN  •  aspirin tablet 325 mg, 325 mg, Oral, Daily, Darren June APRN, 325 mg at 05/06/20  0837  •  atorvastatin (LIPITOR) tablet 40 mg, 40 mg, Oral, Daily, Darren June, APRN, 40 mg at 05/06/20 0837  •  bisacodyl (DULCOLAX) EC tablet 5 mg, 5 mg, Oral, Daily PRN, Darren June APRN  •  clopidogrel (PLAVIX) tablet 75 mg, 75 mg, Oral, Daily, Darren June APRN, 75 mg at 05/06/20 0837  •  dextrose (D50W) 25 g/ 50mL Intravenous Solution 25 g, 25 g, Intravenous, Q15 Min PRN, Darren June APRN  •  dextrose (GLUTOSE) oral gel 15 g, 15 g, Oral, Q15 Min PRN, Darren June APRN  •  fenofibrate (TRICOR) tablet 145 mg, 145 mg, Oral, Daily, Darren June APRN, 145 mg at 05/06/20 0837  •  glucagon (human recombinant) (GLUCAGEN DIAGNOSTIC) injection 1 mg, 1 mg, Subcutaneous, Q15 Min PRN, Darren June APRN  •  insulin glargine (LANTUS) injection 10 Units, 10 Units, Subcutaneous, Daily, Reid Singleton MD, 10 Units at 05/06/20 1018  •  insulin lispro (humaLOG) injection 0-14 Units, 0-14 Units, Subcutaneous, TID AC **AND** insulin lispro (humaLOG) injection 0-14 Units, 0-14 Units, Subcutaneous, PRN, Reid Singleton MD  •  magnesium hydroxide (MILK OF MAGNESIA) suspension 2400 mg/10mL 10 mL, 10 mL, Oral, Daily PRN, Darren June APRN  •  melatonin tablet 5 mg, 5 mg, Oral, Nightly PRN, Darren June APRN  •  norepinephrine (LEVOPHED) 8 mg/250 mL (32 mcg/mL) in sodium chloride 0.9% infusion (premix), 0.02-0.3 mcg/kg/min, Intravenous, Titrated, Scar Alexis APRN  •  ondansetron (ZOFRAN) tablet 4 mg, 4 mg, Oral, Q6H PRN **OR** ondansetron (ZOFRAN) injection 4 mg, 4 mg, Intravenous, Q6H PRN, Darren June APRN  •  pantoprazole (PROTONIX) EC tablet 40 mg, 40 mg, Oral, Daily, Darren June APRN, 40 mg at 05/06/20 0837  •  [COMPLETED] Insert peripheral IV, , , Once **AND** sodium chloride 0.9 % flush 10 mL, 10 mL, Intravenous, PRN, Bobo Downs MD  •  sodium chloride 0.9 % infusion, 75 mL/hr, Intravenous, Continuous, Scar Alexis APRN, Last  "Rate: 75 mL/hr at 20 0838, 75 mL/hr at 20  Social History:   Social History     Tobacco Use   • Smoking status: Current Every Day Smoker     Packs/day: 1.00     Years: 25.00     Pack years: 25.00     Types: Cigarettes   • Smokeless tobacco: Never Used   • Tobacco comment: currently smokes 5 cigarettes per day   Substance Use Topics   • Alcohol use: No     Frequency: Never      Family History:  Family History   Problem Relation Age of Onset   • Hypertension Mother    • Diabetes Mother    • Heart disease Father         had CABG and re-do/  while recovering-had MI age 40s   • Diabetes Father    • Pancreatic cancer Sister    • Hyperlipidemia Brother    • Stroke Brother    • Heart disease Paternal Uncle         Review of Systems : Review of Systems   Constitution: Positive for malaise/fatigue. Negative for fever, weight gain and weight loss.   HENT: Negative for nosebleeds.    Cardiovascular: Negative for chest pain, dyspnea on exertion, near-syncope, palpitations and syncope.   Respiratory: Negative for cough, hemoptysis and shortness of breath.    Endocrine: Negative for cold intolerance, heat intolerance, polydipsia and polyphagia.   Hematologic/Lymphatic: Does not bruise/bleed easily.   Skin: Negative for rash.   Musculoskeletal: Negative for arthritis and back pain.   Gastrointestinal: Negative for diarrhea, hematochezia, melena, nausea and vomiting.   Genitourinary: Negative for dysuria and hematuria.   Neurological: Positive for dizziness, paresthesias and weakness. Negative for seizures.   Psychiatric/Behavioral: Negative for altered mental status.              Constitutional:  Temp:  [96.3 °F (35.7 °C)-98 °F (36.7 °C)] 97.5 °F (36.4 °C)  Heart Rate:  [] 83  Resp:  [12-18] 18  BP: ()/(37-72) 95/61    Physical Exam   BP 95/61   Pulse 83   Temp 97.5 °F (36.4 °C) (Oral)   Resp 18   Ht 170.2 cm (67\")   Wt 88.7 kg (195 lb 8.8 oz)   SpO2 98%   BMI 30.63 kg/m²   Physical " Exam  General:  Appears in no acute distress  Eyes: Sclera is anicteric,  conjunctiva is clear   HEENT:  No JVD. Thyroid not visibly enlarged. No mucosal pallor or cyanosis  Respiratory: Respirations regular and unlabored at rest.  Bilaterally good breath sounds, with good air entry in all fields. No crackles, rubs or wheezes auscultated  Cardiovascular: S1,S2 Regular rate and rhythm. No murmur, rub or gallop auscultated. No pretibial pitting edema  Gastrointestinal: Abdomen soft, flat, non tender. Bowel sounds present.   Musculoskeletal:  No abnormal movements  Extremities: No digital clubbing or cyanosis  Skin: Color pink. Skin warm and dry to touch. No rashes  No xanthoma  Neuro: Alert and awake, no lateralizing deficits appreciated    Cardiographics  ECG: EKG tracing was  personally reviewed by me  ECG 12 Lead   Preliminary Result   HEART RATE= 100  bpm   RR Interval= 600  ms   PA Interval= 151  ms   P Horizontal Axis= -21  deg   P Front Axis= 81  deg   QRSD Interval= 108  ms   QT Interval= 357  ms   QRS Axis= 83  deg   T Wave Axis= 244  deg   - ABNORMAL ECG -   Sinus tachycardia   Probable left atrial enlargement   Repol abnrm suggests ischemia, diffuse leads   When compared with ECG of 02-May-2020 7:20:19,   Significant rate increase   Electronically Signed By:    Date and Time of Study: 2020-05-05 19:41:13          Telemetry: Sinus rhythm    Echocardiogram:   Results for orders placed in visit on 08/22/19   Adult Transthoracic Echo Limited W/ Cont if Necessary Per Protocol    Narrative · The left ventricular cavity is moderately dilated.  · Estimated EF = 35%.  · Left atrial cavity size is moderately dilated.  · Mild mitral valve regurgitation is present     Technically difficult study due to due to patient body habitus    Not all left ventricular walls are well visualized, inferior posterior   wall is best sidra segment.  Left ventricular enlargement seen,   septal dyskinesis and anteroapical  hypokinesis seen.  Estimated LV   ejection fraction of 35% .  Concentric LVH seen   Normal RV size  Moderate left atrial enlargement seen   Aortic valve, mitral valve, tricuspid valve appears structurally normal,   mild MR seen  Proximal aorta appears normal in size  No significant pericardial effusion seen         Imaging  Chest X-ray:   Imaging Results (Last 24 Hours)     Procedure Component Value Units Date/Time    XR Chest 1 View [[id number]] Collected:  05/06/20 0357     Updated:  05/06/20 0402    Narrative:       DATE OF EXAM:  5/6/2020 1:00 AM     PROCEDURE:  XR CHEST 1 VW-     INDICATIONS:  Shortness of breath/air (sob/soa); R20.2-Paresthesias of the skin;  I95.9-Hypotension, unspecified. History of chronic cough and COPD.     COMPARISON:  05/01/2020, 1:36 PM.     TECHNIQUE:   Single radiographic AP view of the chest was obtained.     FINDINGS:  A single view of the chest reveals no cardiac enlargement and no focal  infiltrate. No pneumothorax.  There is a left-sided cardiac implantable  electronic device (CIED) in place, seen previously. The patient has  undergone median sternotomy and CABG surgery. External artifacts obscure  detail. There is suspected chronic calcified granulomatous disease of  the chest. Probably no significant interval change is seen since the  prior study.       Impression:       No acute infiltrate is appreciated.     Electronically Signed By-Dr. Олег Alcantar MD On:5/6/2020 4:00 AM  This report was finalized on [id number] by Dr. Олег Alcantar MD.    CT Head Without Contrast [[id number]] Collected:  05/05/20 2040     Updated:  05/05/20 2045    Narrative:       CT HEAD WO CONTRAST-     Date of Exam: 5/5/2020 8:15 PM     Indication: Sensation loss.  Left arm numbness      Comparison Exams: None available.     Technique: Multiple axial images were obtained from the skull base to  the vertex without the administration of IV contrast. The axial data was  used to generate reformatted  images in the coronal and sagittal planes.  Automated exposure control and iterative reconstruction methods were  used.     FINDINGS:  There is mild generalized parenchymal volume loss.  There is no acute  infarct or hemorrhage. No abnormal extra-axial fluid collections are  seen. There is no mass effect or hydrocephalus.     There is no evidence of skull fracture. Visualized paranasal sinuses and  mastoid air cells are clear.  There is a 1.4 cm soft tissue density  nodule within the skin of the scalp overlying the left frontal vertex  this could represent a sebaceous or dermal cyst or neoplastic process.   Clinical correlation recommended.     The globes and orbits are within normal limits.       Impression:          1. No acute intracranial abnormality.  2.  1.4 cm soft tissue density nodule within the skin overlying the left  frontal vertex.  This could represent dermal or sebaceous cyst or  neoplastic process.  Clinical correlation recommended.     Electronically Signed By-Garry Plascencia On:5/5/2020 8:43 PM  This report was finalized on [id number] by  Garry Plascencia, .          Lab Review: I have reviewed the labs  Results from last 7 days   Lab Units 05/02/20  0033 05/01/20  1850 05/01/20  1247   TROPONIN T ng/mL 0.112* 0.133* 0.131*     Results from last 7 days   Lab Units 05/03/20  0358   MAGNESIUM mg/dL 2.4     Results from last 7 days   Lab Units 05/06/20  0422   SODIUM mmol/L 134*   POTASSIUM mmol/L 3.6   BUN mg/dL 43*   CREATININE mg/dL 2.08*   CALCIUM mg/dL 8.7     @[id number]@  Results from last 7 days   Lab Units 05/06/20  0422 05/05/20  1949 05/03/20  0358   WBC 10*3/mm3 11.80* 14.60* 9.30   HEMOGLOBIN g/dL 14.5 16.3 14.6   HEMATOCRIT % 41.1 45.1 41.2   PLATELETS 10*3/mm3 152 186 143     Results from last 7 days   Lab Units 05/01/20  1247   INR  1.05   APTT seconds 23.2*                   Juarez Wing MD  5/6/2020, 11:04      EMR Dragon/Transcription:   Dictated utilizing Dragon  dictation

## 2020-05-06 NOTE — PLAN OF CARE
Problem: Patient Care Overview  Goal: Plan of Care Review  Outcome: Ongoing (interventions implemented as appropriate)  Flowsheets (Taken 5/6/2020 3378)  Plan of Care Reviewed With: patient  Outcome Summary: 59 yo male adm for onset of LUE paresthesias. CT scan of head (-). MRI pending, awaiting clearance due to presence of pacemaker. Pt moved to ICU this AM due to marked hypotension. Pt able to ambulate 350 ft independently, w/o loss of balance or soa. Other than continued paresthesias in distal L fingers, no continued signs of cva. Neuro following. Pt educated on signs/symptoms of cva. No need for further PT, as pt is indep in all mobility. Will sign off.  PPE worn: gloves, mask, face shield.

## 2020-05-06 NOTE — ED PROVIDER NOTES
Subjective   Patient is a 60-year-old male who states he woke up this morning feeling numb and tingling in his left arm.  He states that improved throughout the day until he just has tingling in his left hand and fingers.  He denies headache cough fever chest pain shortness of breath or other complaint.          Review of Systems  Negative for previous headache ears throat cough fever chest pain shortness of breath abdominal pain [place] discharges weight loss or other associated complaints.  Complete review systems was obtained and is otherwise negative  Past Medical History:   Diagnosis Date   • CAD (coronary artery disease)     S/P CABG   • Chronic systolic CHF (congestive heart failure) (CMS/[facility])    • COPD (chronic obstructive pulmonary disease) (CMS/[facility])    • DM2 (diabetes mellitus, type 2) (CMS/[facility])    • Dyslipidemia    • Encounter for monitoring antiplatelet therapy    • Hypertension    • Myocardial infarction (CMS/[facility])     inferior wall MI--03/13   • DEBI (obstructive sleep apnea)     c-pap machine at    • Peripheral vascular disease (CMS/[facility])     S/P left fem-pop bypass   • Tobacco use        No Known Allergies    Past Surgical History:   Procedure Laterality Date   • APPENDECTOMY      at age 8 or 9   • CARDIAC CATHETERIZATION      3-; [facility] 9-   • CARDIAC CATHETERIZATION Right 6/27/2019    Procedure: Cardiac Catheterization/with grafts groin access;  Surgeon: Juarez Wing MD;  Location: [facility] CATH INVASIVE LOCATION;  Service: Cardiovascular   • CARDIAC ELECTROPHYSIOLOGY PROCEDURE N/A 10/8/2019    Procedure: ICD SC new, ST.SHIV ;  Surgeon: Juarez Wing MD;  Location: [facility] CATH INVASIVE LOCATION;  Service: Cardiovascular   • COLONOSCOPY     • CORONARY ARTERY BYPASS GRAFT      x3, 3-18-13-LIMA to LAD, and reverse individual SVG to lateral marginal and to PDA   • FEMORAL POPLITEAL BYPASS Left 01/09/2019    [facility]/ Dr. Jero Hatch   • HAND SURGERY Left     crushed hand    • TOE SURGERY      toe nail removal of big toe- age 8 or 9       Family History   Problem Relation Age of Onset   • Hypertension Mother    • Diabetes Mother    • Heart disease Father         had CABG and re-do/  while recovering-had MI age 40s   • Diabetes Father    • Pancreatic cancer Sister    • Hyperlipidemia Brother    • Stroke Brother    • Heart disease Paternal Uncle        Social History     Socioeconomic History   • Marital status:      Spouse name: Not on file   • Number of children: Not on file   • Years of education: Not on file   • Highest education level: Not on file   Tobacco Use   • Smoking status: Current Every Day Smoker     Packs/day: 1.00     Years: 25.00     Pack years: 25.00     Types: Cigarettes   • Smokeless tobacco: Never Used   • Tobacco comment: currently smokes 5 cigarettes per day   Substance and Sexual Activity   • Alcohol use: No     Frequency: Never   • Drug use: No   • Sexual activity: Defer           Objective   Physical Exam  Neurologic exam is nonfocal.  NIH is 0.  HEENT exam shows TMs to be clear.  Oropharynx comers but sclera nonicteric.  Neck has no adenopathy JVD or bruits.  Lungs are clear.  Heart has a regular rate rhythm without murmur gallop.  Chest is nontender.  Abdomen is soft nontender.  Extremity exam is no cyanosis or edema.  Procedures  My EKG interpretation shows normal sinus rhythm with no acute ST change         ED Course            Results for orders placed or performed during the hospital encounter of 20   Basic Metabolic Panel   Result Value Ref Range    Glucose 519 (C) 65 - 99 mg/dL    BUN 42 (H) 8 - 23 mg/dL    Creatinine 2.12 (H) 0.76 - 1.27 mg/dL    Sodium 129 (L) 136 - 145 mmol/L    Potassium 3.8 3.5 - 5.2 mmol/L    Chloride 88 (L) 98 - 107 mmol/L    CO2 26.0 22.0 - 29.0 mmol/L    Calcium 9.6 8.6 - 10.5 mg/dL    eGFR Non African Amer 32 (L) >60 mL/min/1.73    BUN/Creatinine Ratio 19.8 7.0 - 25.0    Anion Gap 15.0 5.0 - 15.0 mmol/L   CBC  Auto Differential   Result Value Ref Range    WBC 14.60 (H) 3.40 - 10.80 10*3/mm3    RBC 4.92 4.14 - 5.80 10*6/mm3    Hemoglobin 16.3 13.0 - 17.7 g/dL    Hematocrit 45.1 37.5 - 51.0 %    MCV 91.7 79.0 - 97.0 fL    MCH 33.2 (H) 26.6 - 33.0 pg    MCHC 36.2 (H) 31.5 - 35.7 g/dL    RDW 13.5 12.3 - 15.4 %    RDW-SD 43.8 37.0 - 54.0 fl    MPV 11.3 6.0 - 12.0 fL    Platelets 186 140 - 450 10*3/mm3    Neutrophil % 75.0 42.7 - 76.0 %    Lymphocyte % 15.7 (L) 19.6 - 45.3 %    Monocyte % 7.2 5.0 - 12.0 %    Eosinophil % 1.4 0.3 - 6.2 %    Basophil % 0.7 0.0 - 1.5 %    Neutrophils, Absolute 10.90 (H) 1.70 - 7.00 10*3/mm3    Lymphocytes, Absolute 2.30 0.70 - 3.10 10*3/mm3    Monocytes, Absolute 1.10 (H) 0.10 - 0.90 10*3/mm3    Eosinophils, Absolute 0.20 0.00 - 0.40 10*3/mm3    Basophils, Absolute 0.10 0.00 - 0.20 10*3/mm3    nRBC 0.0 0.0 - 0.2 /100 WBC   POC Glucose Once   Result Value Ref Range    Glucose >500 (C) 70 - 105 mg/dL   Light Blue Top   Result Value Ref Range    Extra Tube hold for add-on    Gold Top - SST   Result Value Ref Range    Extra Tube Hold for add-ons.      Ct Head Without Contrast    Result Date: 5/5/2020   1. No acute intracranial abnormality. 2.  1.4 cm soft tissue density nodule within the skin overlying the left frontal vertex.  This could represent dermal or sebaceous cyst or neoplastic process.  Clinical correlation recommended.  Electronically Signed By-Garry Plascencia On:5/5/2020 8:43 PM This report was finalized on [id number] by  Garry Plascencia, .                                      MDM  Number of Diagnoses or Management Options  Diagnosis management comments: Patient has normal neurologic exam without focal deficit.  CT scan shows no acute abnormality.  Patient did have one episode of hypotension which responded to 500 cc normal saline bolus.  He is normotensive at this time.  There is no evidence of infectious process or metabolic abnormality.  Patient was brought in the hospital for further  neurologic evaluation.  I did speak to the on-call hospitalist.    Risk of Complications, Morbidity, and/or Mortality  Presenting problems: high  Diagnostic procedures: high  Management options: high    Patient Progress  Patient progress: stable      Final diagnoses:   Paresthesias   Hypotension, unspecified hypotension type            Bobo Downs MD  05/05/20 3338

## 2020-05-06 NOTE — H&P
[facility] Medicine Services      Patient Name: Bobby Steele Jr.  : 1960  MRN: [id number]  Primary Care Physician: Yamile Agarwal  Date of admission: 2020    Patient Care Team:  Yamile Agarwal as PCP - General  Yamile Agarwal as PCP - Family Medicine  Yamile Agarwal as PCP - Claims Attributed  Juarez Wing MD as Consulting Physician (Cardiology)          Subjective   History Present Illness     Chief Complaint:   Chief Complaint   Patient presents with   • Numbness       Mr. Steele is a 60 y.o.  male with a history of CAD, COPD, diabetes type 2, hyperlipidemia, hypertension, tobacco abuse and chronic systolic heart failure presented to the [facility] ED on 2020 with a complaint of left hand and finger tingling.  Patient states he woke up this morning with his left arm feeling numb and as the day progressed it slowly resolved however now his fingers and his hand are still tingling.  Patient states he sleeps with his hands above his head every night and this sometimes happens and it normally resolves but it has not yet resolved.  So he decided to be seen in the ED today.  Patient denies fever, cough, chest pain, shortness of breath and headache.    In the ED, CT head no acute intracranial abnormality.  Glucose 519, BUN 42, creatinine 2.12, sodium 129, potassium 3.8, WBC 14.6, Hgb 16.3, HCT 45.1, platelets 186.  NIH is 0.  EKG normal sinus rhythm.  Patient was given 6 units regular insulin IV x1.  500 mL normal saline bolus x2.  Will be admitted for further evaluation management.      Review of Systems   Constitution: Negative.   HENT: Negative.    Eyes: Negative.    Cardiovascular: Negative.    Respiratory: Negative.    Endocrine: Negative.    Hematologic/Lymphatic: Negative.    Skin: Negative.    Musculoskeletal: Negative.    Gastrointestinal: Negative.    Genitourinary: Negative.    Neurological: Positive for numbness and paresthesias.    Psychiatric/Behavioral: Negative.    Allergic/Immunologic: Negative.    All other systems reviewed and are negative.          Personal History     Past Medical History:   Past Medical History:   Diagnosis Date   • CAD (coronary artery disease)     S/P CABG   • Chronic systolic CHF (congestive heart failure) (CMS/[facility])    • COPD (chronic obstructive pulmonary disease) (CMS/[facility])    • DM2 (diabetes mellitus, type 2) (CMS/[facility])    • Dyslipidemia    • Encounter for monitoring antiplatelet therapy    • Hypertension    • Myocardial infarction (CMS/[facility])     inferior wall MI--03/13   • DEBI (obstructive sleep apnea)     c-pap machine at    • Peripheral vascular disease (CMS/[facility])     S/P left fem-pop bypass   • Tobacco use        Surgical History:      Past Surgical History:   Procedure Laterality Date   • APPENDECTOMY      at age 8 or 9   • CARDIAC CATHETERIZATION      3-; [facility] 9-   • CARDIAC CATHETERIZATION Right 6/27/2019    Procedure: Cardiac Catheterization/with grafts groin access;  Surgeon: Juarez Wing MD;  Location:  [facility] CATH INVASIVE LOCATION;  Service: Cardiovascular   • CARDIAC ELECTROPHYSIOLOGY PROCEDURE N/A 10/8/2019    Procedure: ICD SC new, ST.SHIV ;  Surgeon: Juarez Wing MD;  Location:  [facility] CATH INVASIVE LOCATION;  Service: Cardiovascular   • COLONOSCOPY     • CORONARY ARTERY BYPASS GRAFT      x3, 3-18-13-LIMA to LAD, and reverse individual SVG to lateral marginal and to PDA   • FEMORAL POPLITEAL BYPASS Left 01/09/2019    [facility]/ Dr. Jero Hatch   • HAND SURGERY Left     crushed hand   • TOE SURGERY      toe nail removal of big toe- age 8 or 9           Family History: family history includes Diabetes in his father and mother; Heart disease in his father and paternal uncle; Hyperlipidemia in his brother; Hypertension in his mother; Pancreatic cancer in his sister; Stroke in his brother. Otherwise pertinent FHx was reviewed and unremarkable.     Social History:   reports that he has been smoking cigarettes. He has a 25.00 pack-year smoking history. He has never used smokeless tobacco. He reports that he does not drink alcohol or use drugs.      Medications:  Prior to Admission medications    Medication Sig Start Date End Date Taking? Authorizing Provider   albuterol (PROVENTIL) (2.5 MG/3ML) 0.083% nebulizer solution Take 2.5 mg by nebulization Every 6 (Six) Hours As Needed for Wheezing or Shortness of Air.   Yes Shirley Eng MD   albuterol sulfate  (90 Base) MCG/ACT inhaler Inhale 2 puffs Every 4 (Four) Hours As Needed for Wheezing or Shortness of Air.   Yes Shirley Eng MD   aspirin 325 MG tablet Take 325 mg by mouth Daily.   Yes Shirley Eng MD   atorvastatin (LIPITOR) 40 MG tablet Take 40 mg by mouth Daily.   Yes Shirley Eng MD   carvedilol (COREG) 3.125 MG tablet Take 3.125 mg by mouth 2 (Two) Times a Day With Meals.   Yes Shirley Eng MD   clopidogrel (PLAVIX) 75 MG tablet Take 75 mg by mouth Daily.   Yes Shirley Eng MD   doxycycline 100 mg in sodium chloride 0.9 % 100 mL IVPB Infuse 100 mg into a venous catheter Every 12 (Twelve) Hours for 3 days. Indications: COPD exacerbation 5/3/20 5/6/20 Yes Dileep Alvarez DO   fenofibrate (TRICOR) 145 MG tablet Take 1 tablet by mouth Daily. 7/2/19  Yes Juarez Wing MD   furosemide (LASIX) 40 MG tablet Take 1 tablet by mouth 2 (Two) Times a Day. 7/2/19  Yes Juarez Wing MD   glimepiride (AMARYL) 4 MG tablet Take 4 mg by mouth 2 (Two) Times a Day.   Yes Shirley Eng MD   isosorbide mononitrate (IMDUR) 30 MG 24 hr tablet TAKE 1 TABLET BY MOUTH ONCE DAILY 12/24/19  Yes Juarez Wing MD   lactulose (CHRONULAC) 10 GM/15ML solution Take 20 g by mouth 2 (Two) Times a Day As Needed (constipation).   Yes Shirley Eng MD   lisinopril (PRINIVIL,ZESTRIL) 2.5 MG tablet TAKE 1 TABLET BY MOUTH ONCE DAILY 12/23/19  Yes  Juarez Wing MD   metFORMIN (GLUCOPHAGE) 500 MG tablet Take 500 mg by mouth 2 (Two) Times a Day With Meals.   Yes Shirley Eng MD   metOLazone (ZAROXOLYN) 5 MG tablet Take 1 tablet by mouth Daily. 5/3/20  Yes Dileep Alvarez DO   nitroglycerin (NITROSTAT) 0.4 MG SL tablet Take no more than 3 doses in 15 minutes. 12/3/19  Yes Juarez Wing MD   pantoprazole (PROTONIX) 40 MG EC tablet Take 40 mg by mouth Daily.   Yes Shirely Eng MD   spironolactone (ALDACTONE) 25 MG tablet Take 1 tablet by mouth Daily. 9/10/19  Yes Juarez Wing MD   temazepam (RESTORIL) 15 MG capsule Take 15 mg by mouth At Night As Needed for Sleep.   Yes Shirley Eng MD   benzonatate (TESSALON) 200 MG capsule Take 200 mg by mouth 3 (Three) Times a Day As Needed for Cough.  5/5/20  Shirley Eng MD   carvedilol (COREG) 3.125 MG tablet Take 1 tablet by mouth 3 (Three) Times a Day.  Patient taking differently: Take 3.125 mg by mouth Every 12 (Twelve) Hours. 9/19/19 5/5/20  Juarez Wing MD   predniSONE (DELTASONE) 10 MG tablet Take 10 mg by mouth Daily.  5/5/20  Shirley Eng MD       Allergies:  No Known Allergies    Objective   Objective     Vital Signs  Temp:  [98 °F (36.7 °C)] 98 °F (36.7 °C)  Heart Rate:  [] 90  Resp:  [18] 18  BP: ()/(45-64) 91/63  SpO2:  [94 %-97 %] 96 %  on   ;      Body mass index is 29.5 kg/m².    Physical Exam   Constitutional: He is oriented to person, place, and time. He appears well-developed and well-nourished.   HENT:   Head: Normocephalic.   Eyes: Pupils are equal, round, and reactive to light. Conjunctivae are normal.   Neck: Normal range of motion.   Cardiovascular: Normal rate, regular rhythm, normal heart sounds and intact distal pulses.   Pulmonary/Chest: Effort normal. He has wheezes.   Abdominal: Soft. Bowel sounds are normal.   Musculoskeletal: Normal range of motion.   Neurological: He is alert and  oriented to person, place, and time.   Numbness and tingling to the left fingertips   Skin: Skin is warm and dry. Capillary refill takes 2 to 3 seconds.   Vitals reviewed.      Results Review:  I have personally reviewed most recent cardiac tracings, lab results and radiology images and interpretations and agree with findings.    Results from last 7 days   Lab Units 05/05/20 1949 05/01/20  1247   WBC 10*3/mm3 14.60*   < > 14.10*   HEMOGLOBIN g/dL 16.3   < > 16.2   HEMATOCRIT % 45.1   < > 46.7   PLATELETS 10*3/mm3 186   < > 191   INR   --   --  1.05    < > = values in this interval not displayed.     Results from last 7 days   Lab Units 05/05/20 1949 05/02/20  0537 05/02/20  0033 05/01/20  1850 05/01/20  1247   SODIUM mmol/L 129*   < > 140  --   --  138   POTASSIUM mmol/L 3.8   < > 4.0  --   --  3.5   CHLORIDE mmol/L 88*   < > 95*  --   --  95*   CO2 mmol/L 26.0   < > 27.0  --   --  27.0   BUN mg/dL 42*   < > 23  --   --  28*   CREATININE mg/dL 2.12*   < > 1.34*  --   --  1.26   GLUCOSE mg/dL 519*   < > 424*  --   --  343*   CALCIUM mg/dL 9.6   < > 8.9  --   --  9.2   ALT (SGPT) U/L  --   --  40  --   --   --    AST (SGOT) U/L  --   --  23  --   --   --    TROPONIN T ng/mL  --   --   --  0.112* 0.133* 0.131*   PROBNP pg/mL  --   --   --   --   --  1,115.0*   PROCALCITONIN ng/mL  --   --  0.11  --   --   --     < > = values in this interval not displayed.     Estimated Creatinine Clearance: 39.9 mL/min (A) (by C-G formula based on SCr of 2.12 mg/dL (H)).  Brief Urine Lab Results     None          Microbiology Results (last 10 days)     ** No results found for the last 240 hours. **          ECG/EMG Results (most recent)     Procedure Component Value Units Date/Time    ECG 12 Lead [[id number]] Collected:  05/05/20 1941     Updated:  05/05/20 1942    Narrative:       HEART RATE= 100  bpm  RR Interval= 600  ms  NE Interval= 151  ms  P Horizontal Axis= -21  deg  P Front Axis= 81  deg  QRSD Interval= 108  ms  QT  Interval= 357  ms  QRS Axis= 83  deg  T Wave Axis= 244  deg  - ABNORMAL ECG -  Sinus tachycardia  Probable left atrial enlargement  Repol abnrm suggests ischemia, diffuse leads  When compared with ECG of 02-May-2020 7:20:19,  Significant rate increase  Electronically Signed By:   Date and Time of Study: 2020-05-05 19:41:13               Results for orders placed in visit on 08/22/19   Adult Transthoracic Echo Limited W/ Cont if Necessary Per Protocol    Narrative · The left ventricular cavity is moderately dilated.  · Estimated EF = 35%.  · Left atrial cavity size is moderately dilated.  · Mild mitral valve regurgitation is present     Technically difficult study due to due to patient body habitus    Not all left ventricular walls are well visualized, inferior posterior   wall is best sidra segment.  Left ventricular enlargement seen,   septal dyskinesis and anteroapical hypokinesis seen.  Estimated LV   ejection fraction of 35% .  Concentric LVH seen   Normal RV size  Moderate left atrial enlargement seen   Aortic valve, mitral valve, tricuspid valve appears structurally normal,   mild MR seen  Proximal aorta appears normal in size  No significant pericardial effusion seen         Ct Head Without Contrast    Result Date: 5/5/2020   1. No acute intracranial abnormality. 2.  1.4 cm soft tissue density nodule within the skin overlying the left frontal vertex.  This could represent dermal or sebaceous cyst or neoplastic process.  Clinical correlation recommended.  Electronically Signed By-Garry Plascencia On:5/5/2020 8:43 PM This report was finalized on [id number] by  Garry Plascencia, .    Xr Chest 1 View    Result Date: 5/1/2020  No acute cardiopulmonary abnormality is identified.  Electronically Signed By-Nadiya Rodriguez On:5/1/2020 2:25 PM This report was finalized on [id number] by  Nadiya Rodriguez, .        Estimated Creatinine Clearance: 39.9 mL/min (A) (by C-G formula based on SCr of 2.12 mg/dL  (H)).    Assessment/Plan   Assessment/Plan       Active Hospital Problems    Diagnosis  POA   • **Paresthesias [R20.2]  Yes     Priority: High   • Presence of automatic cardioverter/defibrillator (AICD) [Z95.810]  Yes     Priority: Medium   • Type 2 diabetes mellitus (CMS/[facility]) [E11.9]  Yes     Priority: Medium   • Tobacco dependence syndrome [F17.200]  Yes     Priority: Medium   • Benign essential HTN [I10]  Yes     Priority: Medium   • COPD (chronic obstructive pulmonary disease) (CMS/[facility]) [J44.9]  Yes     Priority: Medium   • Coronary artery disease involving coronary bypass graft with unstable angina pectoris (CMS/[facility]) [I25.700]  Yes     Priority: Medium   • Coronary artery disease [I25.10]  Yes     Priority: Medium   • Peripheral vascular disease (CMS/[facility]) [I73.9]  Yes     Priority: Medium   • Multiple-type hyperlipidemia [E78.2]  Yes     Priority: Medium      Resolved Hospital Problems   No resolved problems to display.     Paresthesia  -Left hand fingertips  -Initial NIH 0  -Neurochecks every 4  -Neurology to see    COPD  -Not in an acute exacerbation  -Continue Proventil mini neb q. PRN    CAD  -Continue Plavix, Imdur    Chronic systolic heart failure  -Continue Lasix, Zaroxolyn, Aldactone, Coreg  -EF 35% (echo dated 8/13/2019)    Hyperlipidemia  -Continue statin    Diabetes type 2  -Hold p.o. Medications  -Accu-Cheks AC  -Sliding scale insulin as needed  -Diabetes educator to see    GERD  -Continue PPI    Tobacco abuse  -Encourage cessation    AICD in situ            VTE Prophylaxis -   Mechanical Order History:      Ordered        05/05/20 2247  Place Sequential Compression Device  Once         05/05/20 2247  Maintain Sequential Compression Device  Continuous                 Pharmalogical Order History:     None          CODE STATUS:    Code Status and Medical Interventions:   Ordered at: 05/05/20 2247     Level Of Support Discussed With:    Patient     Code Status:    CPR     Medical Interventions (Level  of Support Prior to Arrest):    Full           I discussed the patient's findings and my recommendations with patient.        Electronically signed by JOY Green, 05/05/20, 10:49 PM.  [facility] Hospitalist Team

## 2020-05-06 NOTE — CONSULTS
Picc Team Consult:  Successful placement of ultrasound guided 5fr triple lumen picc at bedside. Attempted placement in right basilic vein, but unable to advance catheter. Attention turned to brachial vein and placed without incident. LULU Barger notified that picc is okay to use at this time.

## 2020-05-06 NOTE — SIGNIFICANT NOTE
0030  Called regarding pt's b/p being low 79/50, 85/53, 85/56, patient is asymptomatic, sitting on the side of the bed in no acute distress.  Patient given 500 mL normal saline IV bolus.  Will check chest x-ray, and BNP.  -We will hold diuretics for now

## 2020-05-06 NOTE — CONSULTS
"Diabetes Education  Assessment/Teaching    Patient Name:  Bobby Steele Jr.  YOB: 1960  MRN: [id number]  Admit Date:  5/5/2020      Assessment Date:  5/6/2020    Most Recent Value   General Information    Referral From:  Blood glucose, A1c, MD order [MD consult per stroke protocol, on 5/2/2020 A1c was 10.0%, and admission blood sugar was 519. ]   Height  170.2 cm (67\")   Height Method  Stated   Weight  88.7 kg (195 lb 8.8 oz)   Weight Method  Bed scale   Pregnancy Assessment   Diabetes History   What type of diabetes do you have?  Type 2   Current DM knowledge  fair   Have you had diabetes education/teaching in the past?  no   Do you test your blood sugar at home?  no [Patient states he had a meter but was unable to get test strips because they stopped making the meter.  Hasn't checked blood sugar for over a year.]   Have you had high blood sugar? (>140mg/dl)  yes   How often do you have high blood sugar?  unknown   When was your last high blood sugar?  Admission  blood sugar 519.   Do you have any diabetes complications?  heart disease, circulation problems   Education Preferences   What areas of diabetes would you like to learn about?  avoiding high blood sugar, medications for diabetes, testing my blood sugar at home   Nutrition Information   Assessment Topics   Taking Medication - Assessment  Needs education   Problem Solving - Assessment  Needs education   Reducing Risk - Assessment  Needs education   Monitoring - Assessment  Needs education   DM Goals   Taking Medication - Goal  Today   Problem Solving - Goal  Today   Reducing Risk - Goal  Today   Monitoring - Goal  Today            Most Recent Value   DM Education Needs   Meter  Needs meter   Medication  Oral [Patient states he takes Metformin 500 mg BID and Glimepiride 4 mg BID at home.]   Problem Solving  Hyperglycemia, Signs, Symptoms, Treatment   Reducing Risks  A1C testing [On 5/2/2020 A1c was 10.0%.  Discussed A1C target and healthy blood " sugar range.]   Healthy Eating  Other (comment) [Patient states he eats what his wife fixes.  He tries to avoid bread and sugary foods.]   Physical Activity  Walking   Discharge Plan  Home   Motivation  Moderate   Teaching Method  Discussion   Patient Response  Verbalized understanding            Other Comments:  Telephone conversation per recommendations due to COVID 19.  Discussed with patient the importance of checking blood sugar regularly. Also discussed with patient the possibility of going home on insulin. Doctor has now ordered Lantus 10 units daily.  Will see patient later today to give glucometer and do insulin teaching.        Electronically signed by:  Love Goodman RN  05/06/20 10:54

## 2020-05-06 NOTE — CONSULTS
PULMONARY/ CRITICAL CARE CONSULT NOTE        Patient Name:  Bobby Steele Jr.    :  1960    Medical Record:  [id number]    REQUESTING PHYSICIAN    Yamile Agarwal    PRIMARY CARE PHYSICIAN     Yamile Agarwal    REASON FOR CONSULTATION    Bobby Steele Jr. is a 60 y.o. male with a PMH of CAD, COPD, diabetes type 2, hyperlipidemia, hypertension, tobacco abuse and chronic systolic heart failure who we were asked to see in consultation for hypotension.    He presented to the [facility] ED on 2020 with a complaint of left hand and finger tingling.  Patient states he woke up this morning with his left arm feeling numb and as the day progressed it slowly resolved however now his fingers and his hand are still tingling.  Patient states he sleeps with his hands above his head every night and this sometimes happens and it normally resolves but it has not yet resolved. Patient denies fever, cough, chest pain, shortness of breath and headache.  In the ED, CT head no acute intracranial abnormality.  Glucose 519, BUN 42, creatinine 2.12, sodium 129, potassium 3.8, WBC 14.6, Hgb 16.3, HCT 45.1, platelets 186.  NIH is 0.  EKG normal sinus rhythm.  Patient was given 6 units regular insulin IV x1.  500 mL normal saline bolus x2.  He remained hypotensive despite 1500 cc of IVFs with a SBP in the 70's and he  Is being admitted to the ICU for pressor requirements.       Patient with recent hospitalization last week where he was treated for exacerbation of his CHF and had metolazone added to his home regimen upon discharge.       REVIEW OF SYSTEMS    Constitutional:  Denies fever or chills   Eyes:  Denies change in visual acuity   HENT:  Denies nasal congestion or sore throat   Respiratory:  Positive for cough and shortness of breath which are at baseline.    Cardiovascular:  Denies chest pain or edema   GI:  Denies abdominal pain, nausea, vomiting, bloody stools or diarrhea   :  Denies dysuria    Musculoskeletal:  Denies back pain or joint pain   Integument:  Denies rash   Neurologic:  Left arm numbness resolved.    Endocrine:  Denies polyuria or polydipsia   Lymphatic:  Denies swollen glands   Psychiatric:  Denies depression or anxiety     HOME MEDICATIONS  Prior to Admission medications    Medication Sig Start Date End Date Taking? Authorizing Provider   albuterol (PROVENTIL) (2.5 MG/3ML) 0.083% nebulizer solution Take 2.5 mg by nebulization Every 6 (Six) Hours As Needed for Wheezing or Shortness of Air.   Yes Shirley Eng MD   albuterol sulfate  (90 Base) MCG/ACT inhaler Inhale 2 puffs Every 4 (Four) Hours As Needed for Wheezing or Shortness of Air.   Yes Shirley Eng MD   aspirin 325 MG tablet Take 325 mg by mouth Daily.   Yes Shirley Eng MD   atorvastatin (LIPITOR) 40 MG tablet Take 40 mg by mouth Daily.   Yes Shirley Eng MD   carvedilol (COREG) 3.125 MG tablet Take 3.125 mg by mouth 2 (Two) Times a Day With Meals.   Yes Shirley Eng MD   clopidogrel (PLAVIX) 75 MG tablet Take 75 mg by mouth Daily.   Yes Shirley Eng MD   doxycycline 100 mg in sodium chloride 0.9 % 100 mL IVPB Infuse 100 mg into a venous catheter Every 12 (Twelve) Hours for 3 days. Indications: COPD exacerbation 5/3/20 5/6/20 Yes Dileep Alvarez DO   fenofibrate (TRICOR) 145 MG tablet Take 1 tablet by mouth Daily. 7/2/19  Yes Juarez Wing MD   furosemide (LASIX) 40 MG tablet Take 1 tablet by mouth 2 (Two) Times a Day. 7/2/19  Yes Juarez Wing MD   glimepiride (AMARYL) 4 MG tablet Take 4 mg by mouth 2 (Two) Times a Day.   Yes Shirley Eng MD   isosorbide mononitrate (IMDUR) 30 MG 24 hr tablet TAKE 1 TABLET BY MOUTH ONCE DAILY 12/24/19  Yes Juarez Wing MD   lactulose (CHRONULAC) 10 GM/15ML solution Take 20 g by mouth 2 (Two) Times a Day As Needed (constipation).   Yes Shirley Eng MD   lisinopril  (PRINIVIL,ZESTRIL) 2.5 MG tablet TAKE 1 TABLET BY MOUTH ONCE DAILY 12/23/19  Yes Juarez Wing MD   metFORMIN (GLUCOPHAGE) 500 MG tablet Take 500 mg by mouth 2 (Two) Times a Day With Meals.   Yes Provider, MD Shirley   metOLazone (ZAROXOLYN) 5 MG tablet Take 1 tablet by mouth Daily. 5/3/20  Yes Dileep Alvarez DO   nitroglycerin (NITROSTAT) 0.4 MG SL tablet Take no more than 3 doses in 15 minutes. 12/3/19  Yes Juarez Wing MD   pantoprazole (PROTONIX) 40 MG EC tablet Take 40 mg by mouth Daily.   Yes Provider, MD Shirley   spironolactone (ALDACTONE) 25 MG tablet Take 1 tablet by mouth Daily. 9/10/19  Yes Juarez Wing MD   temazepam (RESTORIL) 15 MG capsule Take 15 mg by mouth At Night As Needed for Sleep.   Yes Provider, MD Shirley       MEDICAL HISTORY    Past Medical History:   Diagnosis Date   • CAD (coronary artery disease)     S/P CABG   • Chronic systolic CHF (congestive heart failure) (CMS/[facility])    • COPD (chronic obstructive pulmonary disease) (CMS/[facility])    • DM2 (diabetes mellitus, type 2) (CMS/[facility])    • Dyslipidemia    • Encounter for monitoring antiplatelet therapy    • Hypertension    • Myocardial infarction (CMS/[facility])     inferior wall MI--03/13   • DEBI (obstructive sleep apnea)     c-pap machine at    • Peripheral vascular disease (CMS/[facility])     S/P left fem-pop bypass   • Tobacco use         SURGICAL HISTORY    Past Surgical History:   Procedure Laterality Date   • APPENDECTOMY      at age 8 or 9   • CARDIAC CATHETERIZATION      3-; [facility] 9-   • CARDIAC CATHETERIZATION Right 6/27/2019    Procedure: Cardiac Catheterization/with grafts groin access;  Surgeon: Juarez Wing MD;  Location: [facility] CATH INVASIVE LOCATION;  Service: Cardiovascular   • CARDIAC ELECTROPHYSIOLOGY PROCEDURE N/A 10/8/2019    Procedure: ICD SC new, ST.SHIV ;  Surgeon: Juarez Wing MD;  Location: [facility] CATH INVASIVE LOCATION;  Service:  Cardiovascular   • COLONOSCOPY     • CORONARY ARTERY BYPASS GRAFT      x3, 3-18-13-LIMA to LAD, and reverse individual SVG to lateral marginal and to PDA   • FEMORAL POPLITEAL BYPASS Left 2019    [facility]/ Dr. Jero Hatch   • HAND SURGERY Left     crushed hand   • TOE SURGERY      toe nail removal of big toe- age 8 or 9        FAMILY HISTORY    Family History   Problem Relation Age of Onset   • Hypertension Mother    • Diabetes Mother    • Heart disease Father         had CABG and re-do/  while recovering-had MI age 40s   • Diabetes Father    • Pancreatic cancer Sister    • Hyperlipidemia Brother    • Stroke Brother    • Heart disease Paternal Uncle        SOCIAL HISTORY    Social History     Tobacco Use   • Smoking status: Current Every Day Smoker     Packs/day: 1.00     Years: 25.00     Pack years: 25.00     Types: Cigarettes   • Smokeless tobacco: Never Used   • Tobacco comment: currently smokes 5 cigarettes per day   Substance Use Topics   • Alcohol use: No     Frequency: Never        ALLERGIES    No Known Allergies    MEDICATIONS    Scheduled Meds:  [START ON 2020] !Vancomycin Level Draw Needed  Does not apply Once   [START ON 2020] !Vancomycin Level Draw Needed  Does not apply Once   aspirin 325 mg Oral Daily   atorvastatin 40 mg Oral Daily   carvedilol 3.125 mg Oral BID With Meals   cefepime 2 g Intravenous Q12H   clopidogrel 75 mg Oral Daily   fenofibrate 145 mg Oral Daily   insulin lispro 0-9 Units Subcutaneous TID AC   isosorbide mononitrate 30 mg Oral Daily   lisinopril 2.5 mg Oral Daily   pantoprazole 40 mg Oral Daily   [START ON 2020] vancomycin 15 mg/kg Intravenous Q24H   vancomycin 20 mg/kg Intravenous Once     Continuous Infusions:  norepinephrine 0.02-0.3 mcg/kg/min   Pharmacy to dose vancomycin      PRN Meds:.acetaminophen **OR** acetaminophen **OR** acetaminophen  •  albuterol  •  aluminum-magnesium hydroxide-simethicone  •  bisacodyl  •  dextrose  •  dextrose  •  glucagon  (human recombinant)  •  insulin lispro **AND** insulin lispro  •  magnesium hydroxide  •  melatonin  •  ondansetron **OR** ondansetron  •  Pharmacy to dose vancomycin  •  [COMPLETED] Insert peripheral IV **AND** sodium chloride      PHYSICAL EXAM    tMax 24 hrs:  Temp (24hrs), Av.4 °F (36.3 °C), Min:96.3 °F (35.7 °C), Max:98 °F (36.7 °C)    Vitals Ranges:  Temp:  [96.3 °F (35.7 °C)-98 °F (36.7 °C)] 97.9 °F (36.6 °C)  Heart Rate:  [] 79  Resp:  [12-18] 18  BP: ()/(37-64) 88/52  Intake and Output Last 3 Shifts:  I/O last 3 completed shifts:  In: 2840 [P.O.:240; IV Piggyback:2600]  Out: 425 [Urine:425]    Constitutional:   Alert, no acute respiratory distress   HEENT:   Atraumatic, PERRL, conjunctiva normal, moist oral mucosa, no nasal discharge.  Trachea is midline.  Respiratory:   No respiratory distress, normal breath sounds, no rales, no wheezing   Cardiovascular:   Normal rate, normal rhythm and no murmurs.  Pulses 2+ and equal in all four extremities.    GI:   Soft, nondistended, positive bowel sounds.  Extremities:   No edema, cyanosis or tenderness.  Integument:   No rashes.   Neurologic:   Alert & oriented x 3, CN 2-12 normal, normal motor function, normal sensory function, no focal deficits noted   Psychiatric:   Speech and behavior appropriate     LABS    Lab Results (last 24 hours)     Procedure Component Value Units Date/Time    Respiratory Panel, PCR - Swab, Nasopharynx [[id number]]  (Normal) Collected:  20 0424    Specimen:  Swab from Nasopharynx Updated:  20 0646     ADENOVIRUS, PCR Not Detected     Coronavirus 229E Not Detected     Coronavirus HKU1 Not Detected     Coronavirus NL63 Not Detected     Coronavirus OC43 Not Detected     Human Metapneumovirus Not Detected     Human Rhinovirus/Enterovirus Not Detected     Influenza B PCR Not Detected     Parainfluenza Virus 1 Not Detected     Parainfluenza Virus 2 Not Detected     Parainfluenza Virus 3 Not Detected     Parainfluenza  Virus 4 Not Detected     Bordetella pertussis pcr Not Detected     Influenza A H1 2009 PCR Not Detected     Chlamydophila pneumoniae PCR Not Detected     Mycoplasma pneumo by PCR Not Detected     Influenza A PCR Not Detected     Influenza A H3 Not Detected     Influenza A H1 Not Detected     RSV, PCR Not Detected    Narrative:       The coronavirus on the RVP is NOT COVID-19 and is NOT indicative of infection with COVID-19.     TSH [[id number]]  (Normal) Collected:  05/06/20 0422    Specimen:  Blood Updated:  05/06/20 0612     TSH 1.490 uIU/mL      Comment: TSH results may be falsely decreased if patient taking Biotin.       Lipid Panel [[id number]]  (Abnormal) Collected:  05/06/20 0422    Specimen:  Blood Updated:  05/06/20 0609     Total Cholesterol 150 mg/dL      Triglycerides 596 mg/dL      HDL Cholesterol 20 mg/dL      LDL Cholesterol  --     Comment: Unable to calculate        VLDL Cholesterol --     Comment: Unable to calculate        LDL/HDL Ratio --     Comment: Unable to calculate       Narrative:       Cholesterol Reference Ranges  (U.S. Department of Health and Human Services ATP III Classifications)    Desirable          <200 mg/dL  Borderline High    200-239 mg/dL  High Risk          >240 mg/dL      Triglyceride Reference Ranges  (U.S. Department of Health and Human Services ATP III Classifications)    Normal           <150 mg/dL  Borderline High  150-199 mg/dL  High             200-499 mg/dL  Very High        >500 mg/dL    HDL Reference Ranges  (U.S. Department of Health and Human Services ATP III Classifcations)    Low     <40 mg/dl (major risk factor for CHD)  High    >60 mg/dl ('negative' risk factor for CHD)        LDL Reference Ranges  (U.S. Department of Health and Human Services ATP III Classifcations)    Optimal          <100 mg/dL  Near Optimal     100-129 mg/dL  Borderline High  130-159 mg/dL  High             160-189 mg/dL  Very High        >189 mg/dL    Basic Metabolic Panel [[id number]]   (Abnormal) Collected:  05/06/20 0422    Specimen:  Blood Updated:  05/06/20 0609     Glucose 248 mg/dL      BUN 43 mg/dL      Creatinine 2.08 mg/dL      Sodium 134 mmol/L      Potassium 3.6 mmol/L      Chloride 95 mmol/L      CO2 23.0 mmol/L      Calcium 8.7 mg/dL      eGFR Non African Amer 33 mL/min/1.73      BUN/Creatinine Ratio 20.7     Anion Gap 16.0 mmol/L     Narrative:       GFR Normal >60  Chronic Kidney Disease <60  Kidney Failure <15      LDL Cholesterol, Direct [[id number]] Collected:  05/06/20 0422    Specimen:  Blood Updated:  05/06/20 0609    Urinalysis With Culture If Indicated - Urine, Clean Catch [[id number]]  (Normal) Collected:  05/06/20 0429    Specimen:  Urine, Clean Catch Updated:  05/06/20 0530     Color, UA Yellow     Appearance, UA Clear     pH, UA <=5.0     Specific Gravity, UA 1.010     Glucose, UA Negative     Ketones, UA Negative     Bilirubin, UA Negative     Blood, UA Negative     Protein, UA Negative     Leuk Esterase, UA Negative     Nitrite, UA Negative     Urobilinogen, UA 0.2 E.U./dL    Narrative:       Urine microscopic not indicated.    Lactic Acid, Plasma [[id number]]  (Abnormal) Collected:  05/06/20 0448    Specimen:  Blood Updated:  05/06/20 0529     Lactate 2.1 mmol/L     Lactic Acid, Reflex Timer (This will reflex a repeat order 3-3:15 hours after ordered.) [[id number]] Collected:  05/06/20 0448    Specimen:  Blood Updated:  05/06/20 0529    S. Pneumo Ag Urine or CSF - Urine, Urine, Clean Catch [[id number]]  (Normal) Collected:  05/06/20 0429    Specimen:  Urine, Clean Catch Updated:  05/06/20 0524     Strep Pneumo Ag Negative    Legionella Antigen, Urine - Urine, Urine, Clean Catch [[id number]]  (Normal) Collected:  05/06/20 0429    Specimen:  Urine, Clean Catch Updated:  05/06/20 0523     LEGIONELLA ANTIGEN, URINE Negative    CBC Auto Differential [[id number]]  (Abnormal) Collected:  05/06/20 0422    Specimen:  Blood Updated:  05/06/20 0509     WBC 11.80 10*3/mm3      RBC 4.50  10*6/mm3      Hemoglobin 14.5 g/dL      Hematocrit 41.1 %      MCV 91.5 fL      MCH 32.3 pg      MCHC 35.3 g/dL      RDW 13.1 %      RDW-SD 42.9 fl      MPV 10.7 fL      Platelets 152 10*3/mm3      Neutrophil % 60.4 %      Lymphocyte % 30.6 %      Monocyte % 7.0 %      Eosinophil % 1.7 %      Basophil % 0.3 %      Neutrophils, Absolute 7.20 10*3/mm3      Lymphocytes, Absolute 3.60 10*3/mm3      Monocytes, Absolute 0.80 10*3/mm3      Eosinophils, Absolute 0.20 10*3/mm3      Basophils, Absolute 0.00 10*3/mm3      nRBC 0.1 /100 WBC     Vitamin B12 [[id number]] Collected:  05/06/20 0422    Specimen:  Blood Updated:  05/06/20 0458    Blood Culture - Blood, Hand, Right [[id number]] Collected:  05/06/20 0422    Specimen:  Blood from Hand, Right Updated:  05/06/20 0457    BNP [[id number]]  (Abnormal) Collected:  05/05/20 1949    Specimen:  Blood from Arm, Left Updated:  05/06/20 0120     proBNP 1,554.0 pg/mL     Narrative:       Among patients with dyspnea, NT-proBNP is highly sensitive for the detection of acute congestive heart failure. In addition NT-proBNP of <300 pg/ml effectively rules out acute congestive heart failure with 99% negative predictive value.    Results may be falsely decreased if patient taking Biotin.      POC Glucose Once [[id number]]  (Abnormal) Collected:  05/05/20 2336    Specimen:  Blood Updated:  05/05/20 2338     Glucose 258 mg/dL      Comment: Serial Number: [id number]:  [id number]       Extra Tubes [[id number]] Collected:  05/05/20 1949    Specimen:  Blood from Arm, Left Updated:  05/05/20 2100    Narrative:       The following orders were created for panel order Extra Tubes.  Procedure                               Abnormality         Status                     ---------                               -----------         ------                     Light Blue Top[[id number]]                                   Final result               Gold Top - [facility][[id number]]                                    Final result                 Please view results for these tests on the individual orders.    Light Blue Top [[id number]] Collected:  05/05/20 1949    Specimen:  Blood from Arm, Left Updated:  05/05/20 2100     Extra Tube hold for add-on     Comment: Auto resulted       Gold Top - [facility] [[id number]] Collected:  05/05/20 1949    Specimen:  Blood from Arm, Left Updated:  05/05/20 2100     Extra Tube Hold for add-ons.     Comment: Auto resulted.       Basic Metabolic Panel [[id number]]  (Abnormal) Collected:  05/05/20 1949    Specimen:  Blood from Arm, Left Updated:  05/05/20 2021     Glucose 519 mg/dL      BUN 42 mg/dL      Creatinine 2.12 mg/dL      Sodium 129 mmol/L      Potassium 3.8 mmol/L      Chloride 88 mmol/L      CO2 26.0 mmol/L      Calcium 9.6 mg/dL      eGFR Non African Amer 32 mL/min/1.73      BUN/Creatinine Ratio 19.8     Anion Gap 15.0 mmol/L     Narrative:       GFR Normal >60  Chronic Kidney Disease <60  Kidney Failure <15      CBC & Differential [[id number]] Collected:  05/05/20 1949    Specimen:  Blood from Arm, Left Updated:  05/05/20 1955    Narrative:       The following orders were created for panel order CBC & Differential.  Procedure                               Abnormality         Status                     ---------                               -----------         ------                     CBC Auto Differential[[id number]]        Abnormal            Final result                 Please view results for these tests on the individual orders.    CBC Auto Differential [[id number]]  (Abnormal) Collected:  05/05/20 1949    Specimen:  Blood from Arm, Left Updated:  05/05/20 1955     WBC 14.60 10*3/mm3      RBC 4.92 10*6/mm3      Hemoglobin 16.3 g/dL      Hematocrit 45.1 %      MCV 91.7 fL      MCH 33.2 pg      MCHC 36.2 g/dL      RDW 13.5 %      RDW-SD 43.8 fl      MPV 11.3 fL      Platelets 186 10*3/mm3      Neutrophil % 75.0 %      Lymphocyte % 15.7 %      Monocyte % 7.2 %      Eosinophil % 1.4 %       Basophil % 0.7 %      Neutrophils, Absolute 10.90 10*3/mm3      Lymphocytes, Absolute 2.30 10*3/mm3      Monocytes, Absolute 1.10 10*3/mm3      Eosinophils, Absolute 0.20 10*3/mm3      Basophils, Absolute 0.10 10*3/mm3      nRBC 0.0 /100 WBC     POC Glucose Once [[id number]]  (Abnormal) Collected:  05/05/20 1925    Specimen:  Blood Updated:  05/05/20 1925     Glucose >500 mg/dL      Comment: Serial Number: [id number]:  [id number]                 CBC  Results from last 7 days   Lab Units 05/06/20 0422 05/05/20 1949 05/03/20 0358 05/02/20 0537 05/01/20  1247   WBC 10*3/mm3 11.80* 14.60* 9.30 8.50 14.10*   RBC 10*6/mm3 4.50 4.92 4.54 4.75 5.12   HEMOGLOBIN g/dL 14.5 16.3 14.6 15.2 16.2   HEMATOCRIT % 41.1 45.1 41.2 43.7 46.7   MCV fL 91.5 91.7 90.9 92.1 91.2   PLATELETS 10*3/mm3 152 186 143 167 191       BMP  Results from last 7 days   Lab Units 05/06/20 0422 05/05/20 1949 05/03/20 0358 05/02/20 0537 05/01/20  1247   SODIUM mmol/L 134* 129* 135* 140 138   POTASSIUM mmol/L 3.6 3.8 4.2 4.0 3.5   CHLORIDE mmol/L 95* 88* 95* 95* 95*   CO2 mmol/L 23.0 26.0 24.0 27.0 27.0   BUN mg/dL 43* 42* 33* 23 28*   CREATININE mg/dL 2.08* 2.12* 1.58* 1.34* 1.26   GLUCOSE mg/dL 248* 519* 479* 424* 343*   MAGNESIUM mg/dL  --   --  2.4 1.4*  --    PHOSPHORUS mg/dL  --   --   --  4.0  --        CMP Results from last 7 days   Lab Units 05/06/20 0422 05/05/20 1949 05/03/20 0358 05/02/20 0537 05/01/20  1247   SODIUM mmol/L 134* 129* 135* 140 138   POTASSIUM mmol/L 3.6 3.8 4.2 4.0 3.5   CHLORIDE mmol/L 95* 88* 95* 95* 95*   CO2 mmol/L 23.0 26.0 24.0 27.0 27.0   BUN mg/dL 43* 42* 33* 23 28*   CREATININE mg/dL 2.08* 2.12* 1.58* 1.34* 1.26   GLUCOSE mg/dL 248* 519* 479* 424* 343*   ALBUMIN g/dL  --   --   --  4.20  --    BILIRUBIN mg/dL  --   --   --  0.5  --    ALK PHOS U/L  --   --   --  51  --    AST (SGOT) U/L  --   --   --  23  --    ALT (SGPT) U/L  --   --   --  40  --          proBNP      TROPONIN  Results from last 7 days    Lab Units 05/02/20  0033   TROPONIN T ng/mL 0.112*       CoAg  Results from last 7 days   Lab Units 05/01/20  1247   INR  1.05   APTT seconds 23.2*       Creatinine Clearance  Estimated Creatinine Clearance: 40.1 mL/min (A) (by C-G formula based on SCr of 2.08 mg/dL (H)).    ABG        Microbiology  Microbiology Results (last 10 days)     Procedure Component Value - Date/Time    Legionella Antigen, Urine - Urine, Urine, Clean Catch [[id number]]  (Normal) Collected:  05/06/20 0429    Lab Status:  Final result Specimen:  Urine, Clean Catch Updated:  05/06/20 0523     LEGIONELLA ANTIGEN, URINE Negative    S. Pneumo Ag Urine or CSF - Urine, Urine, Clean Catch [[id number]]  (Normal) Collected:  05/06/20 0429    Lab Status:  Final result Specimen:  Urine, Clean Catch Updated:  05/06/20 0524     Strep Pneumo Ag Negative    Respiratory Panel, PCR - Swab, Nasopharynx [[id number]]  (Normal) Collected:  05/06/20 0424    Lab Status:  Final result Specimen:  Swab from Nasopharynx Updated:  05/06/20 0646     ADENOVIRUS, PCR Not Detected     Coronavirus 229E Not Detected     Coronavirus HKU1 Not Detected     Coronavirus NL63 Not Detected     Coronavirus OC43 Not Detected     Human Metapneumovirus Not Detected     Human Rhinovirus/Enterovirus Not Detected     Influenza B PCR Not Detected     Parainfluenza Virus 1 Not Detected     Parainfluenza Virus 2 Not Detected     Parainfluenza Virus 3 Not Detected     Parainfluenza Virus 4 Not Detected     Bordetella pertussis pcr Not Detected     Influenza A H1 2009 PCR Not Detected     Chlamydophila pneumoniae PCR Not Detected     Mycoplasma pneumo by PCR Not Detected     Influenza A PCR Not Detected     Influenza A H3 Not Detected     Influenza A H1 Not Detected     RSV, PCR Not Detected    Narrative:       The coronavirus on the RVP is NOT COVID-19 and is NOT indicative of infection with COVID-19.           No results found for: ACANTHNAEG, AFBCX, BPERTUSSISCX, BLOODCX  No results found  for: BCIDPCR, CXREFLEX, CSFCX, CULTURETIS  No results found for: CULTURES, HSVCX, URCX  No results found for: EYECULTURE, GCCX, LABHSV  No results found for: LEGIONELLA, MRSACX, MUMPSCX, MYCOPLASCX  No results found for: NOCARDIACX, STOOLCX  No results found for: THROATCX, UNSTIMCULT, URINECX, CULTURE, VZVCULTUR  No results found for: VIRALCULTU, WOUNDCX    IMAGING & OTHER STUDIES    Imaging Results (Last 72 Hours)     Procedure Component Value Units Date/Time    XR Chest 1 View [[id number]] Collected:  05/06/20 0357     Updated:  05/06/20 0402    Narrative:       DATE OF EXAM:  5/6/2020 1:00 AM     PROCEDURE:  XR CHEST 1 VW-     INDICATIONS:  Shortness of breath/air (sob/soa); R20.2-Paresthesias of the skin;  I95.9-Hypotension, unspecified. History of chronic cough and COPD.     COMPARISON:  05/01/2020, 1:36 PM.     TECHNIQUE:   Single radiographic AP view of the chest was obtained.     FINDINGS:  A single view of the chest reveals no cardiac enlargement and no focal  infiltrate. No pneumothorax.  There is a left-sided cardiac implantable  electronic device (CIED) in place, seen previously. The patient has  undergone median sternotomy and CABG surgery. External artifacts obscure  detail. There is suspected chronic calcified granulomatous disease of  the chest. Probably no significant interval change is seen since the  prior study.       Impression:       No acute infiltrate is appreciated.     Electronically Signed By-Dr. Олег Alcantar MD On:5/6/2020 4:00 AM  This report was finalized on [id number] by Dr. Олег Alcantar MD.    CT Head Without Contrast [[id number]] Collected:  05/05/20 2040     Updated:  05/05/20 2045    Narrative:       CT HEAD WO CONTRAST-     Date of Exam: 5/5/2020 8:15 PM     Indication: Sensation loss.  Left arm numbness      Comparison Exams: None available.     Technique: Multiple axial images were obtained from the skull base to  the vertex without the administration of IV contrast. The  axial data was  used to generate reformatted images in the coronal and sagittal planes.  Automated exposure control and iterative reconstruction methods were  used.     FINDINGS:  There is mild generalized parenchymal volume loss.  There is no acute  infarct or hemorrhage. No abnormal extra-axial fluid collections are  seen. There is no mass effect or hydrocephalus.     There is no evidence of skull fracture. Visualized paranasal sinuses and  mastoid air cells are clear.  There is a 1.4 cm soft tissue density  nodule within the skin of the scalp overlying the left frontal vertex  this could represent a sebaceous or dermal cyst or neoplastic process.   Clinical correlation recommended.     The globes and orbits are within normal limits.       Impression:          1. No acute intracranial abnormality.  2.  1.4 cm soft tissue density nodule within the skin overlying the left  frontal vertex.  This could represent dermal or sebaceous cyst or  neoplastic process.  Clinical correlation recommended.     Electronically Signed By-Garry Plascencia On:5/5/2020 8:43 PM  This report was finalized on [id number] by  Garry Plascencia, .            ASSESSMENT/PLAN  Hypotension  -Pan culture pending  -Start on broad spectrum abx with Cefepime and Vanc  -Echo pending  -Additional 500 cc bolus without improvement in BP, start on Levophed  -Question dehydration from addition of metolazone to home regimen  -Lactic acid 2.1    VINICIO  -Creatinine 2.12, was 1.58 on 05/03/20  -Monitor creatinine  -IVFs    Paresthesia  -Left hand fingertips  -Initial NIH 0  -Neurochecks every 4  -Neurology to see  -B12 and folate  Levels  pending     COPD  -Not in an acute exacerbation  -Continue Proventil mini neb q. PRN     CAD  -Continue Plavix, Imdur     Chronic systolic heart failure with AICD  -Hold Lasix, Zaroxolyn, Aldactone, Coreg given hypotension  -EF 35% (echo dated 8/13/2019)     Hyperlipidemia  -Continue statin     Diabetes type 2  -Hold p.o.  Medications  -Accu-Cheks AC  -Sliding scale insulin as needed  -Diabetes educator to see     GERD  -Continue PPI     Tobacco abuse  -Encourage cessation       Thank you for this opportunity to take part in this patient's care and we will follow the patient along with you.              JOY Ram    Attending physician statement:    Total critical care time spent is 32 minutes which does not include any time for procedures.  Critical care time is exclusive of time spent by the nurse practitioner.  Above note scribed by nurse practitioner for me and later reviewed by me for accuracy . I've examined the patient and reviewed all labs and images.  Doubt any infection. DC Abx.  Continue IVF. Has not required any pressors yet.  Otherwise stable   I have directly participated in the evaluation and management of this patient.  Reid Singleton MD

## 2020-05-06 NOTE — PROGRESS NOTES
Discharge Planning Assessment  [facility]     Patient Name: Bobby Steele Jr.  MRN: [id number]  Today's Date: 5/6/2020    Admit Date: 5/5/2020    Discharge Needs Assessment     Row Name 05/06/20 1055       Living Environment    Lives With  spouse    Name(s) of Who Lives With Patient  Suzie    Current Living Arrangements  home/apartment/condo    Primary Care Provided by  self    Provides Primary Care For  no one    Family Caregiver if Needed  spouse    Family Caregiver Names  Wife Suzie    Able to Return to Prior Arrangements  yes       Resource/Environmental Concerns    Resource/Environmental Concerns  none       Transition Planning    Patient/Family Anticipates Transition to  home with family    Patient/Family Anticipated Services at Transition  none    Transportation Anticipated  car, drives self       Discharge Needs Assessment    Readmission Within the Last 30 Days  no previous admission in last 30 days    Concerns to be Addressed  no discharge needs identified    Equipment Currently Used at Home  glucometer;cane, straight    Anticipated Changes Related to Illness  none    Equipment Needed After Discharge  none    Provided Post Acute Provider List?  N/A    Discharge Coordination/Progress  Anticipate routine discharge home with spouse. Patient denied any needs at this time.         Discharge Plan     Row Name 05/06/20 1101       Plan    Plan  Anticipate routine home with spouse. Patient denies any needs at this time.     Patient/Family in Agreement with Plan  yes    Plan Comments  Due to covid 19 pandemic,  working off site. Spoke with patient over the phone. Patient lives with his wife and is pretty independent. Dr. Agarwal is PCP. Patient has had [facility] in the past. Patient uses no DME at this time. Barriers to discharge: patient is hypotensive and on levophed.         Demographic Summary     Row Name 05/06/20 1100       General Information    Admission Type  inpatient    Arrived From  emergency  department    Required Notices Provided  Important Message from Medicare    Reason for Consult  discharge planning    Preferred Language  English     Used During This Interaction  no       Contact Information    Permission Granted to Share Info With          Functional Status     Row Name 05/06/20 1100       Functional Status    Usual Activity Tolerance  good    Current Activity Tolerance  moderate       Mental Status    General Appearance WDL  WDL       Mental Status Summary    Recent Changes in Mental Status/Cognitive Functioning  no changes            Anna [place] [address]   [phone]  office  [phone]  fax  [email or web address]

## 2020-05-06 NOTE — SIGNIFICANT NOTE
ST received orders per routine CVA protocol. Pt passed swallow screen and is currently receiving a regular consistency diet. Per nurse report, pt is tolerating well. CT of the head showed no acute infarct. Per nurse, pt to have MRI. Will await results of MRI to determine course. If positive for acute infarct, ST will complete speech/language and cognition evaluation. If negative, will sign off as per protocol.

## 2020-05-06 NOTE — PROGRESS NOTES
"Pharmacy Antimicrobial Dosing Service    Subjective:  Bobby Steele Jr. is a 60 y.o.male admitted with paresthesias. Pharmacy has been consulted to dose Vancomycin for possible sepsis.        Assessment/Plan    1. Day #1 Vancomycin: Goal trough 10-20 mcg/mL. 1750mg x1.  Then, 1250mg IV q24 h.  Peak 5/8 at 1000 and trough 5/9 at 0500, or sooner if clinically indicated.    2. Day #1 Cefepime: 2gm IV q12h for estCrCl 30-59 mL/min.    Will continue to monitor drug levels, renal function, culture and sensitivities, and patient clinical status.       Objective:  Relevant clinical data and objective history reviewed:  172.7 cm (68\")   87.5 kg (192 lb 12.8 oz)   Ideal body weight: 68.4 kg (150 lb 12.7 oz)  Adjusted ideal body weight: 76 kg (167 lb 9.6 oz)  Body mass index is 29.32 kg/m².        Results from last 7 days   Lab Units 05/05/20 1949 05/03/20 0358 05/02/20  0537   CREATININE mg/dL 2.12* 1.58* 1.34*     Estimated Creatinine Clearance: 39.8 mL/min (A) (by C-G formula based on SCr of 2.12 mg/dL (H)).  No intake/output data recorded.    Results from last 7 days   Lab Units 05/05/20 1949 05/03/20 0358 05/02/20  0537   WBC 10*3/mm3 14.60* 9.30 8.50     Temperature    05/05/20 1923 05/06/20 0355   Temp: 98 °F (36.7 °C) 96.3 °F (35.7 °C)     Baseline culture/source/susceptibility:  Microbiology Results (last 10 days)       ** No results found for the last 240 hours. **             Anti-Infectives (From admission, onward)      Ordered     Dose/Rate Route Frequency Start Stop    05/06/20 0424  !Vancomycin Level Draw Needed     Ordering Provider:  Scar Alexis APRN     Does not apply Once 05/09/20 0500      05/06/20 0424  !Vancomycin Level Draw Needed     Ordering Provider:  Scar Alexis APRN     Does not apply Once 05/08/20 1000      05/06/20 0424  vancomycin 1250 mg/250 mL 0.9% NS IVPB ([facility])     Ordering Provider:  Scar Alexis APRN    15 mg/kg × 87.5 kg  over 120 Minutes Intravenous Every 24 Hours " 05/07/20 0600 05/13/20 0559    05/06/20 0418  cefepime 2 gm IVPB in 100 ml NS (MBP)     Ordering Provider:  Scar Alexis APRN    2 g  25 mL/hr over 4 Hours Intravenous Every 12 Hours 05/06/20 1700 05/13/20 1659    05/06/20 0424  vancomycin IVPB 1750 mg in 0.9% Sodium Chloride (premix) 500 mL     Ordering Provider:  Scar Alexis APRN    20 mg/kg × 87.5 kg  over 120 Minutes Intravenous Once 05/06/20 0600      05/06/20 0418  cefepime 2 gm IVPB in 100 ml NS (MBP)     Ordering Provider:  Scar Alexis APRN    2 g  200 mL/hr over 30 Minutes Intravenous Once 05/06/20 0500      05/06/20 0418  Pharmacy to dose vancomycin     Ordering Provider:  Scar Alexis APRN     Does not apply Continuous PRN 05/06/20 0416 05/13/20 0415            Chris Montana PharmD  05/06/20 04:26

## 2020-05-06 NOTE — CONSULTS
Chief Complaint:   Left arm numbness.      HPI:  The patient is a 60 year old gentleman with multiple medical problems including CAD, S/p CABG, COPD, PVD, HLD, DM II, s/p MI, OSAS, s/p fem pop bypass who presented to ER of [facility] yesterday secondary to numbness of left arm.  He woke up with it. He also had tingling sensation of left fingers of left hand. It occurred a few times in the past, however it persisted for much longer.  It prompted him to seek medical attention. He did not have chest pain, cough, fever, shortnes of air. The patient states that numbness has almost resolved except to tips of the left hand fingers.  The patient denies double vision, speech and swallowing problems and focal weakness.      ROS: Constitutional: no weakness, No CP, No SOA, no bowel and bladder problems, numbness and tingling +, no skin rash, no abdominal pain.      PMH:  Past Medical History:   Diagnosis Date   • CAD (coronary artery disease)     S/P CABG   • Chronic systolic CHF (congestive heart failure) (CMS/[facility])    • COPD (chronic obstructive pulmonary disease) (CMS/[facility])    • DM2 (diabetes mellitus, type 2) (CMS/[facility])    • Dyslipidemia    • Encounter for monitoring antiplatelet therapy    • Hypertension    • Myocardial infarction (CMS/[facility])     inferior wall MI--03/13   • DEBI (obstructive sleep apnea)     c-pap machine at    • Peripheral vascular disease (CMS/[facility])     S/P left fem-pop bypass   • Tobacco use      Social History     Socioeconomic History   • Marital status:      Spouse name: Not on file   • Number of children: Not on file   • Years of education: Not on file   • Highest education level: Not on file   Tobacco Use   • Smoking status: Current Every Day Smoker     Packs/day: 1.00     Years: 25.00     Pack years: 25.00     Types: Cigarettes   • Smokeless tobacco: Never Used   • Tobacco comment: currently smokes 5 cigarettes per day   Substance and Sexual Activity   • Alcohol use: No     Frequency: Never   • Drug  use: No   • Sexual activity: Defer     Past Surgical History:   Procedure Laterality Date   • APPENDECTOMY      at age 8 or 9   • CARDIAC CATHETERIZATION      3-; [facility] 2014   • CARDIAC CATHETERIZATION Right 2019    Procedure: Cardiac Catheterization/with grafts groin access;  Surgeon: Juarez Wing MD;  Location:  [facility] CATH INVASIVE LOCATION;  Service: Cardiovascular   • CARDIAC ELECTROPHYSIOLOGY PROCEDURE N/A 10/8/2019    Procedure: ICD SC new, ST.SHIV ;  Surgeon: Juarez Wing MD;  Location: [facility] CATH INVASIVE LOCATION;  Service: Cardiovascular   • COLONOSCOPY     • CORONARY ARTERY BYPASS GRAFT      x3, 3-18-13-LIMA to LAD, and reverse individual SVG to lateral marginal and to PDA   • FEMORAL POPLITEAL BYPASS Left 2019    [facility]/ Dr. Jero Hatch   • HAND SURGERY Left     crushed hand   • TOE SURGERY      toe nail removal of big toe- age 8 or 9     Family History   Problem Relation Age of Onset   • Hypertension Mother    • Diabetes Mother    • Heart disease Father         had CABG and re-do/  while recovering-had MI age 40s   • Diabetes Father    • Pancreatic cancer Sister    • Hyperlipidemia Brother    • Stroke Brother    • Heart disease Paternal Uncle        Labs:  Lab Results (last 24 hours)     Procedure Component Value Units Date/Time    LDL Cholesterol, Direct [[id number]]  (Normal) Collected:  20 0422    Specimen:  Blood Updated:  20 1259     LDL Cholesterol  64 mg/dL     Narrative:       LDL Reference Ranges    (U.S. Department of Health and Human Services ATP III Classifications)    Optimal          <100 mg/dl  Near Optimal     100-129 mg/dl  Borderline High  130-159 mg/dl  High             160-189 mg/dl  Very High        >189 mg/dl      POC Glucose Once [[id number]]  (Abnormal) Collected:  20 1156    Specimen:  Blood Updated:  20 1157     Glucose 307 mg/dL      Comment: Serial Number: [id number]:  [id number]       Vitamin B12  [[id number]]  (Normal) Collected:  05/06/20 0422    Specimen:  Blood Updated:  05/06/20 1105     Vitamin B-12 691 pg/mL     Narrative:       Results may be falsely increased if patient taking Biotin.      Lactic Acid, Reflex [[id number]]  (Normal) Collected:  05/06/20 0857    Specimen:  Blood Updated:  05/06/20 0929     Lactate 1.4 mmol/L     Folate [[id number]] Collected:  05/06/20 0857    Specimen:  Blood Updated:  05/06/20 0901    Lactic Acid, Reflex Timer (This will reflex a repeat order 3-3:15 hours after ordered.) [[id number]] Collected:  05/06/20 0448    Specimen:  Blood Updated:  05/06/20 0830     Hold Tube Hold for add-ons.     Comment: Auto resulted.       Blood Culture - Blood, Arm, Right [[id number]] Collected:  05/06/20 0803    Specimen:  Blood from Arm, Right Updated:  05/06/20 0823    POC Glucose Once [[id number]]  (Abnormal) Collected:  05/06/20 0803    Specimen:  Blood Updated:  05/06/20 0804     Glucose 329 mg/dL      Comment: Serial Number: [id number]:  [id number]       Respiratory Panel, PCR - Swab, Nasopharynx [[id number]]  (Normal) Collected:  05/06/20 0424    Specimen:  Swab from Nasopharynx Updated:  05/06/20 0646     ADENOVIRUS, PCR Not Detected     Coronavirus 229E Not Detected     Coronavirus HKU1 Not Detected     Coronavirus NL63 Not Detected     Coronavirus OC43 Not Detected     Human Metapneumovirus Not Detected     Human Rhinovirus/Enterovirus Not Detected     Influenza B PCR Not Detected     Parainfluenza Virus 1 Not Detected     Parainfluenza Virus 2 Not Detected     Parainfluenza Virus 3 Not Detected     Parainfluenza Virus 4 Not Detected     Bordetella pertussis pcr Not Detected     Influenza A H1 2009 PCR Not Detected     Chlamydophila pneumoniae PCR Not Detected     Mycoplasma pneumo by PCR Not Detected     Influenza A PCR Not Detected     Influenza A H3 Not Detected     Influenza A H1 Not Detected     RSV, PCR Not Detected    Narrative:       The coronavirus on the RVP is NOT  COVID-19 and is NOT indicative of infection with COVID-19.     TSH [[id number]]  (Normal) Collected:  05/06/20 0422    Specimen:  Blood Updated:  05/06/20 0612     TSH 1.490 uIU/mL      Comment: TSH results may be falsely decreased if patient taking Biotin.       Lipid Panel [[id number]]  (Abnormal) Collected:  05/06/20 0422    Specimen:  Blood Updated:  05/06/20 0609     Total Cholesterol 150 mg/dL      Triglycerides 596 mg/dL      HDL Cholesterol 20 mg/dL      LDL Cholesterol  --     Comment: Unable to calculate        VLDL Cholesterol --     Comment: Unable to calculate        LDL/HDL Ratio --     Comment: Unable to calculate       Narrative:       Cholesterol Reference Ranges  (U.S. Department of Health and Human Services ATP III Classifications)    Desirable          <200 mg/dL  Borderline High    200-239 mg/dL  High Risk          >240 mg/dL      Triglyceride Reference Ranges  (U.S. Department of Health and Human Services ATP III Classifications)    Normal           <150 mg/dL  Borderline High  150-199 mg/dL  High             200-499 mg/dL  Very High        >500 mg/dL    HDL Reference Ranges  (U.S. Department of Health and Human Services ATP III Classifcations)    Low     <40 mg/dl (major risk factor for CHD)  High    >60 mg/dl ('negative' risk factor for CHD)        LDL Reference Ranges  (U.S. Department of Health and Human Services ATP III Classifcations)    Optimal          <100 mg/dL  Near Optimal     100-129 mg/dL  Borderline High  130-159 mg/dL  High             160-189 mg/dL  Very High        >189 mg/dL    Basic Metabolic Panel [[id number]]  (Abnormal) Collected:  05/06/20 0422    Specimen:  Blood Updated:  05/06/20 0609     Glucose 248 mg/dL      BUN 43 mg/dL      Creatinine 2.08 mg/dL      Sodium 134 mmol/L      Potassium 3.6 mmol/L      Chloride 95 mmol/L      CO2 23.0 mmol/L      Calcium 8.7 mg/dL      eGFR Non African Amer 33 mL/min/1.73      BUN/Creatinine Ratio 20.7     Anion Gap 16.0 mmol/L      Narrative:       GFR Normal >60  Chronic Kidney Disease <60  Kidney Failure <15      Urinalysis With Culture If Indicated - Urine, Clean Catch [[id number]]  (Normal) Collected:  05/06/20 0429    Specimen:  Urine, Clean Catch Updated:  05/06/20 0530     Color, UA Yellow     Appearance, UA Clear     pH, UA <=5.0     Specific Gravity, UA 1.010     Glucose, UA Negative     Ketones, UA Negative     Bilirubin, UA Negative     Blood, UA Negative     Protein, UA Negative     Leuk Esterase, UA Negative     Nitrite, UA Negative     Urobilinogen, UA 0.2 E.U./dL    Narrative:       Urine microscopic not indicated.    Lactic Acid, Plasma [[id number]]  (Abnormal) Collected:  05/06/20 0448    Specimen:  Blood Updated:  05/06/20 0529     Lactate 2.1 mmol/L     S. Pneumo Ag Urine or CSF - Urine, Urine, Clean Catch [[id number]]  (Normal) Collected:  05/06/20 0429    Specimen:  Urine, Clean Catch Updated:  05/06/20 0524     Strep Pneumo Ag Negative    Legionella Antigen, Urine - Urine, Urine, Clean Catch [[id number]]  (Normal) Collected:  05/06/20 0429    Specimen:  Urine, Clean Catch Updated:  05/06/20 0523     LEGIONELLA ANTIGEN, URINE Negative    CBC Auto Differential [[id number]]  (Abnormal) Collected:  05/06/20 0422    Specimen:  Blood Updated:  05/06/20 0509     WBC 11.80 10*3/mm3      RBC 4.50 10*6/mm3      Hemoglobin 14.5 g/dL      Hematocrit 41.1 %      MCV 91.5 fL      MCH 32.3 pg      MCHC 35.3 g/dL      RDW 13.1 %      RDW-SD 42.9 fl      MPV 10.7 fL      Platelets 152 10*3/mm3      Neutrophil % 60.4 %      Lymphocyte % 30.6 %      Monocyte % 7.0 %      Eosinophil % 1.7 %      Basophil % 0.3 %      Neutrophils, Absolute 7.20 10*3/mm3      Lymphocytes, Absolute 3.60 10*3/mm3      Monocytes, Absolute 0.80 10*3/mm3      Eosinophils, Absolute 0.20 10*3/mm3      Basophils, Absolute 0.00 10*3/mm3      nRBC 0.1 /100 WBC     Blood Culture - Blood, Hand, Right [[id number]] Collected:  05/06/20 0422    Specimen:  Blood from Hand, Right  Updated:  05/06/20 0457    BNP [[id number]]  (Abnormal) Collected:  05/05/20 1949    Specimen:  Blood from Arm, Left Updated:  05/06/20 0120     proBNP 1,554.0 pg/mL     Narrative:       Among patients with dyspnea, NT-proBNP is highly sensitive for the detection of acute congestive heart failure. In addition NT-proBNP of <300 pg/ml effectively rules out acute congestive heart failure with 99% negative predictive value.    Results may be falsely decreased if patient taking Biotin.      POC Glucose Once [[id number]]  (Abnormal) Collected:  05/05/20 2336    Specimen:  Blood Updated:  05/05/20 2338     Glucose 258 mg/dL      Comment: Serial Number: [id number]:  [id number]       Extra Tubes [[id number]] Collected:  05/05/20 1949    Specimen:  Blood from Arm, Left Updated:  05/05/20 2100    Narrative:       The following orders were created for panel order Extra Tubes.  Procedure                               Abnormality         Status                     ---------                               -----------         ------                     Light Blue Top[[id number]]                                   Final result               Gold Top - SST[[id number]]                                   Final result                 Please view results for these tests on the individual orders.    Light Blue Top [[id number]] Collected:  05/05/20 1949    Specimen:  Blood from Arm, Left Updated:  05/05/20 2100     Extra Tube hold for add-on     Comment: Auto resulted       Gold Top - SST [[id number]] Collected:  05/05/20 1949    Specimen:  Blood from Arm, Left Updated:  05/05/20 2100     Extra Tube Hold for add-ons.     Comment: Auto resulted.       Basic Metabolic Panel [[id number]]  (Abnormal) Collected:  05/05/20 1949    Specimen:  Blood from Arm, Left Updated:  05/05/20 2021     Glucose 519 mg/dL      BUN 42 mg/dL      Creatinine 2.12 mg/dL      Sodium 129 mmol/L      Potassium 3.8 mmol/L      Chloride 88 mmol/L      CO2 26.0 mmol/L       Calcium 9.6 mg/dL      eGFR Non African Amer 32 mL/min/1.73      BUN/Creatinine Ratio 19.8     Anion Gap 15.0 mmol/L     Narrative:       GFR Normal >60  Chronic Kidney Disease <60  Kidney Failure <15      CBC & Differential [[id number]] Collected:  05/05/20 1949    Specimen:  Blood from Arm, Left Updated:  05/05/20 1955    Narrative:       The following orders were created for panel order CBC & Differential.  Procedure                               Abnormality         Status                     ---------                               -----------         ------                     CBC Auto Differential[[id number]]        Abnormal            Final result                 Please view results for these tests on the individual orders.    CBC Auto Differential [[id number]]  (Abnormal) Collected:  05/05/20 1949    Specimen:  Blood from Arm, Left Updated:  05/05/20 1955     WBC 14.60 10*3/mm3      RBC 4.92 10*6/mm3      Hemoglobin 16.3 g/dL      Hematocrit 45.1 %      MCV 91.7 fL      MCH 33.2 pg      MCHC 36.2 g/dL      RDW 13.5 %      RDW-SD 43.8 fl      MPV 11.3 fL      Platelets 186 10*3/mm3      Neutrophil % 75.0 %      Lymphocyte % 15.7 %      Monocyte % 7.2 %      Eosinophil % 1.4 %      Basophil % 0.7 %      Neutrophils, Absolute 10.90 10*3/mm3      Lymphocytes, Absolute 2.30 10*3/mm3      Monocytes, Absolute 1.10 10*3/mm3      Eosinophils, Absolute 0.20 10*3/mm3      Basophils, Absolute 0.10 10*3/mm3      nRBC 0.0 /100 WBC     POC Glucose Once [[id number]]  (Abnormal) Collected:  05/05/20 1925    Specimen:  Blood Updated:  05/05/20 1925     Glucose >500 mg/dL      Comment: Serial Number: [id number]:  [id number]               Medications:  Schedule Meds    aspirin 325 mg Oral Daily   atorvastatin 40 mg Oral Daily   clopidogrel 75 mg Oral Daily   fenofibrate 145 mg Oral Daily   insulin glargine 10 Units Subcutaneous Daily   insulin lispro 0-14 Units Subcutaneous TID AC   pantoprazole 40 mg Oral Daily         MED'S  PRN  •  acetaminophen **OR** acetaminophen **OR** acetaminophen  •  albuterol  •  aluminum-magnesium hydroxide-simethicone  •  bisacodyl  •  dextrose  •  dextrose  •  glucagon (human recombinant)  •  insulin lispro **AND** insulin lispro  •  magnesium hydroxide  •  melatonin  •  ondansetron **OR** ondansetron  •  [COMPLETED] Insert peripheral IV **AND** sodium chloride    Vitals:  Vitals:    05/06/20 1420   BP: 97/58   Pulse: 84   Resp:    Temp: 97.8   SpO2:          Physical Exam:  The patient is lying in bed in no apparent distress.  Head NC, Neck supple. Lungs CTA, CV  S1-S2.  Abdomen soft. Ext no edema.    Neurologic Exam: The patient is awake, alert, oriented x 3, speech is fluent with good comprehension, follow commands, name common objects.  CN VFFC, EOMI, no facial droop. Motor 5/5.  Sensory light touch intact.  Reflexes absent, plantar mute.  Cerebellum finger to nose intact.  Gait able to walk with minimum assistance.      Impression:  The patient is a 60 year old gentleman with multiple medical problems including, DM, CAD, s/p   CABG, PVD who had developed numbness of the left arm which has resolved.  The clinical features are suggestive of TIA vs., small higher parietal stroke.  The patient was not a candidate for tPA secondary to being out of window period.      Recomendations:  Will continue ASA and Plavix at the current dose.  Will obtain a MRI of Brain without contrast and will consider 2-D Echocardiogram.  Will obtain blood work up.  The patient is advised to keep BP < 130/80, HbA1C < 6.5, Vitamin B12 >500, LDL < 70.  The patient is also advised to stop smoking. Will follow.

## 2020-05-06 NOTE — PLAN OF CARE
Problem: Patient Care Overview  Goal: Plan of Care Review  Outcome: Ongoing (interventions implemented as appropriate)  Flowsheets (Taken 5/6/2020 3913)  Progress: no change  Plan of Care Reviewed With: patient  Outcome Summary: Patient is currently on room air and NS @75. Plan for a MRI tomorrow once cardiologist fills out form for his AICD. Will continue to monitor.

## 2020-05-07 ENCOUNTER — APPOINTMENT (OUTPATIENT)
Dept: CARDIOLOGY | Facility: HOSPITAL | Age: 60
End: 2020-05-07

## 2020-05-07 ENCOUNTER — APPOINTMENT (OUTPATIENT)
Dept: MRI IMAGING | Facility: HOSPITAL | Age: 60
End: 2020-05-07

## 2020-05-07 PROBLEM — I25.2 CORONARY ARTERY DISEASE WITH HX OF MYOCARDIAL INFARCT W/O HX OF CABG: Chronic | Status: ACTIVE | Noted: 2019-09-10

## 2020-05-07 PROBLEM — Z79.4 TYPE 2 DIABETES MELLITUS WITH HYPERGLYCEMIA, WITH LONG-TERM CURRENT USE OF INSULIN: Status: ACTIVE | Noted: 2020-05-05

## 2020-05-07 PROBLEM — E11.65 TYPE 2 DIABETES MELLITUS WITH HYPERGLYCEMIA (HCC): Chronic | Status: ACTIVE | Noted: 2019-06-26

## 2020-05-07 PROBLEM — K21.9 GERD WITHOUT ESOPHAGITIS: Chronic | Status: ACTIVE | Noted: 2020-05-07

## 2020-05-07 PROBLEM — J44.1 COPD WITH ACUTE EXACERBATION: Status: RESOLVED | Noted: 2020-05-01 | Resolved: 2020-05-07

## 2020-05-07 PROBLEM — N17.9 ACUTE KIDNEY INJURY: Status: ACTIVE | Noted: 2020-05-05

## 2020-05-07 PROBLEM — I25.9 CHEST PAIN DUE TO MYOCARDIAL ISCHEMIA: Status: RESOLVED | Noted: 2020-05-01 | Resolved: 2020-05-07

## 2020-05-07 PROBLEM — I25.700 CORONARY ARTERY DISEASE INVOLVING CORONARY BYPASS GRAFT WITH UNSTABLE ANGINA PECTORIS: Chronic | Status: ACTIVE | Noted: 2019-06-26

## 2020-05-07 PROBLEM — N17.9 ACUTE KIDNEY INJURY (HCC): Status: RESOLVED | Noted: 2020-05-05 | Resolved: 2020-05-07

## 2020-05-07 PROBLEM — E11.65 TYPE 2 DIABETES MELLITUS WITH HYPERGLYCEMIA, WITH LONG-TERM CURRENT USE OF INSULIN: Status: ACTIVE | Noted: 2020-05-05

## 2020-05-07 PROBLEM — R06.00 DYSPNEA: Status: RESOLVED | Noted: 2019-06-26 | Resolved: 2020-05-07

## 2020-05-07 PROBLEM — I95.9 HYPOTENSION: Status: RESOLVED | Noted: 2020-05-06 | Resolved: 2020-05-07

## 2020-05-07 PROBLEM — R79.89 ELEVATED TROPONIN: Status: ACTIVE | Noted: 2020-05-06

## 2020-05-07 PROBLEM — Z95.810 PRESENCE OF AUTOMATIC CARDIOVERTER/DEFIBRILLATOR (AICD): Chronic | Status: ACTIVE | Noted: 2019-10-15

## 2020-05-07 PROBLEM — N17.9 ACUTE KIDNEY INJURY: Status: ACTIVE | Noted: 2020-05-07

## 2020-05-07 PROBLEM — E11.65 TYPE 2 DIABETES MELLITUS WITH HYPERGLYCEMIA (HCC): Status: ACTIVE | Noted: 2019-06-26

## 2020-05-07 PROBLEM — R77.8 ELEVATED TROPONIN: Status: ACTIVE | Noted: 2020-05-06

## 2020-05-07 PROBLEM — I25.10 CORONARY ARTERY DISEASE WITH HX OF MYOCARDIAL INFARCT W/O HX OF CABG: Chronic | Status: ACTIVE | Noted: 2019-09-10

## 2020-05-07 PROBLEM — I25.5 ISCHEMIC CARDIOMYOPATHY: Chronic | Status: ACTIVE | Noted: 2019-09-10

## 2020-05-07 PROBLEM — I20.0 UNSTABLE ANGINA: Status: ACTIVE | Noted: 2020-05-05

## 2020-05-07 PROBLEM — I95.9 HYPOTENSION: Status: ACTIVE | Noted: 2020-05-06

## 2020-05-07 PROBLEM — G47.33 OSA (OBSTRUCTIVE SLEEP APNEA): Chronic | Status: ACTIVE | Noted: 2020-05-07

## 2020-05-07 LAB
ANION GAP SERPL CALCULATED.3IONS-SCNC: 13 MMOL/L (ref 5–15)
ARTICHOKE IGE QN: 72 MG/DL (ref 0–100)
BH CV ECHO MEAS - ACS: 2.1 CM
BH CV ECHO MEAS - AO MAX PG (FULL): -0.74 MMHG
BH CV ECHO MEAS - AO MAX PG: 3.5 MMHG
BH CV ECHO MEAS - AO MEAN PG (FULL): 0.48 MMHG
BH CV ECHO MEAS - AO MEAN PG: 2.1 MMHG
BH CV ECHO MEAS - AO ROOT AREA (BSA CORRECTED): 2.1
BH CV ECHO MEAS - AO ROOT AREA: 14.1 CM^2
BH CV ECHO MEAS - AO ROOT DIAM: 4.2 CM
BH CV ECHO MEAS - AO V2 MAX: 93.9 CM/SEC
BH CV ECHO MEAS - AO V2 MEAN: 67.6 CM/SEC
BH CV ECHO MEAS - AO V2 VTI: 17.5 CM
BH CV ECHO MEAS - ASC AORTA: 3.6 CM
BH CV ECHO MEAS - AVA(I,A): 3.5 CM^2
BH CV ECHO MEAS - AVA(I,D): 3.5 CM^2
BH CV ECHO MEAS - AVA(V,A): 4 CM^2
BH CV ECHO MEAS - AVA(V,D): 4 CM^2
BH CV ECHO MEAS - BSA(HAYCOCK): 2.1 M^2
BH CV ECHO MEAS - BSA: 2 M^2
BH CV ECHO MEAS - BZI_BMI: 30.5 KILOGRAMS/M^2
BH CV ECHO MEAS - BZI_METRIC_HEIGHT: 170.2 CM
BH CV ECHO MEAS - BZI_METRIC_WEIGHT: 88.5 KG
BH CV ECHO MEAS - CONTRAST EF 4CH: 49 CM2
BH CV ECHO MEAS - EDV(CUBED): 146.5 ML
BH CV ECHO MEAS - EDV(MOD-SP4): 101.5 ML
BH CV ECHO MEAS - EDV(TEICH): 133.7 ML
BH CV ECHO MEAS - EF(CUBED): 49.1 %
BH CV ECHO MEAS - EF(MOD-SP4): 49.5 %
BH CV ECHO MEAS - EF(TEICH): 40.9 %
BH CV ECHO MEAS - ESV(CUBED): 74.6 ML
BH CV ECHO MEAS - ESV(MOD-SP4): 51.3 ML
BH CV ECHO MEAS - ESV(TEICH): 79 ML
BH CV ECHO MEAS - FS: 20.1 %
BH CV ECHO MEAS - IVS/LVPW: 0.89
BH CV ECHO MEAS - IVSD: 1.2 CM
BH CV ECHO MEAS - LA DIMENSION(2D): 3.6 CM
BH CV ECHO MEAS - LV DIASTOLIC VOL/BSA (35-75): 50.7 ML/M^2
BH CV ECHO MEAS - LV MASS(C)D: 261 GRAMS
BH CV ECHO MEAS - LV MASS(C)DI: 130.5 GRAMS/M^2
BH CV ECHO MEAS - LV MAX PG: 4.3 MMHG
BH CV ECHO MEAS - LV MEAN PG: 1.6 MMHG
BH CV ECHO MEAS - LV SYSTOLIC VOL/BSA (12-30): 25.6 ML/M^2
BH CV ECHO MEAS - LV V1 MAX: 103.2 CM/SEC
BH CV ECHO MEAS - LV V1 MEAN: 57.8 CM/SEC
BH CV ECHO MEAS - LV V1 VTI: 16.8 CM
BH CV ECHO MEAS - LVIDD: 5.3 CM
BH CV ECHO MEAS - LVIDS: 4.2 CM
BH CV ECHO MEAS - LVOT AREA: 3.6 CM^2
BH CV ECHO MEAS - LVOT DIAM: 2.1 CM
BH CV ECHO MEAS - LVPWD: 1.3 CM
BH CV ECHO MEAS - MV A MAX VEL: 89 CM/SEC
BH CV ECHO MEAS - MV DEC SLOPE: 377.7 CM/SEC^2
BH CV ECHO MEAS - MV DEC TIME: 0.17 SEC
BH CV ECHO MEAS - MV E MAX VEL: 65.8 CM/SEC
BH CV ECHO MEAS - MV E/A: 0.74
BH CV ECHO MEAS - MV MAX PG: 3.5 MMHG
BH CV ECHO MEAS - MV MEAN PG: 1.4 MMHG
BH CV ECHO MEAS - MV V2 MAX: 93.3 CM/SEC
BH CV ECHO MEAS - MV V2 MEAN: 56.3 CM/SEC
BH CV ECHO MEAS - MV V2 VTI: 23.4 CM
BH CV ECHO MEAS - MVA(VTI): 2.6 CM^2
BH CV ECHO MEAS - PA ACC TIME: 0.07 SEC
BH CV ECHO MEAS - PA MAX PG (FULL): 0.68 MMHG
BH CV ECHO MEAS - PA MAX PG: 3.1 MMHG
BH CV ECHO MEAS - PA MEAN PG (FULL): 0.69 MMHG
BH CV ECHO MEAS - PA MEAN PG: 1.8 MMHG
BH CV ECHO MEAS - PA PR(ACCEL): 47.9 MMHG
BH CV ECHO MEAS - PA V2 MAX: 87.8 CM/SEC
BH CV ECHO MEAS - PA V2 MEAN: 61.4 CM/SEC
BH CV ECHO MEAS - PA V2 VTI: 15.3 CM
BH CV ECHO MEAS - PULM A REVS DUR: 0.12 SEC
BH CV ECHO MEAS - PULM A REVS VEL: 29.9 CM/SEC
BH CV ECHO MEAS - PULM DIAS VEL: 35 CM/SEC
BH CV ECHO MEAS - PULM S/D: 1.5
BH CV ECHO MEAS - PULM SYS VEL: 54.1 CM/SEC
BH CV ECHO MEAS - PVA(I,A): 3.2 CM^2
BH CV ECHO MEAS - PVA(I,D): 3.2 CM^2
BH CV ECHO MEAS - PVA(V,A): 3.1 CM^2
BH CV ECHO MEAS - PVA(V,D): 3.1 CM^2
BH CV ECHO MEAS - QP/QS: 0.81
BH CV ECHO MEAS - RAP SYSTOLE: 3 MMHG
BH CV ECHO MEAS - RV MAX PG: 2.4 MMHG
BH CV ECHO MEAS - RV MEAN PG: 1.1 MMHG
BH CV ECHO MEAS - RV V1 MAX: 77.5 CM/SEC
BH CV ECHO MEAS - RV V1 MEAN: 46.7 CM/SEC
BH CV ECHO MEAS - RV V1 VTI: 14 CM
BH CV ECHO MEAS - RVDD: 2.7 CM
BH CV ECHO MEAS - RVOT AREA: 3.5 CM^2
BH CV ECHO MEAS - RVOT DIAM: 2.1 CM
BH CV ECHO MEAS - RVSP: 8.4 MMHG
BH CV ECHO MEAS - SI(AO): 122.9 ML/M^2
BH CV ECHO MEAS - SI(CUBED): 36 ML/M^2
BH CV ECHO MEAS - SI(LVOT): 30.3 ML/M^2
BH CV ECHO MEAS - SI(MOD-SP4): 25.1 ML/M^2
BH CV ECHO MEAS - SI(TEICH): 27.3 ML/M^2
BH CV ECHO MEAS - SV(AO): 245.9 ML
BH CV ECHO MEAS - SV(CUBED): 71.9 ML
BH CV ECHO MEAS - SV(LVOT): 60.6 ML
BH CV ECHO MEAS - SV(MOD-SP4): 50.2 ML
BH CV ECHO MEAS - SV(RVOT): 49.3 ML
BH CV ECHO MEAS - SV(TEICH): 54.7 ML
BH CV ECHO MEAS - TR MAX VEL: 116.3 CM/SEC
BH CV XLRA MEAS LEFT CCA RATIO VEL: 109 CM/SEC
BH CV XLRA MEAS LEFT DIST CCA EDV: -17.2 CM/SEC
BH CV XLRA MEAS LEFT DIST CCA PSV: -61.6 CM/SEC
BH CV XLRA MEAS LEFT DIST ICA EDV: -28 CM/SEC
BH CV XLRA MEAS LEFT DIST ICA PSV: -80.1 CM/SEC
BH CV XLRA MEAS LEFT ICA RATIO VEL: -80.1 CM/SEC
BH CV XLRA MEAS LEFT ICA/CCA RATIO: -0.73
BH CV XLRA MEAS LEFT PROX CCA EDV: 19.6 CM/SEC
BH CV XLRA MEAS LEFT PROX CCA PSV: 109 CM/SEC
BH CV XLRA MEAS LEFT PROX ECA PSV: -77.4 CM/SEC
BH CV XLRA MEAS LEFT PROX ICA EDV: -16.3 CM/SEC
BH CV XLRA MEAS LEFT PROX ICA PSV: -48.4 CM/SEC
BH CV XLRA MEAS LEFT PROX SCLA PSV: 103 CM/SEC
BH CV XLRA MEAS LEFT VERTEBRAL A PSV: 47 CM/SEC
BH CV XLRA MEAS RIGHT CCA RATIO VEL: 93.8 CM/SEC
BH CV XLRA MEAS RIGHT DIST CCA EDV: 24.9 CM/SEC
BH CV XLRA MEAS RIGHT DIST CCA PSV: 88.8 CM/SEC
BH CV XLRA MEAS RIGHT DIST ICA EDV: -30.2 CM/SEC
BH CV XLRA MEAS RIGHT DIST ICA PSV: -88.4 CM/SEC
BH CV XLRA MEAS RIGHT ICA RATIO VEL: -88.4 CM/SEC
BH CV XLRA MEAS RIGHT ICA/CCA RATIO: -0.94
BH CV XLRA MEAS RIGHT PROX CCA EDV: 14.3 CM/SEC
BH CV XLRA MEAS RIGHT PROX CCA PSV: 93.8 CM/SEC
BH CV XLRA MEAS RIGHT PROX ECA PSV: -118 CM/SEC
BH CV XLRA MEAS RIGHT PROX ICA EDV: -23.5 CM/SEC
BH CV XLRA MEAS RIGHT PROX ICA PSV: -52.2 CM/SEC
BH CV XLRA MEAS RIGHT PROX SCLA PSV: 91.1 CM/SEC
BH CV XLRA MEAS RIGHT VERTEBRAL A PSV: 48.3 CM/SEC
BUN BLD-MCNC: 30 MG/DL (ref 8–23)
BUN/CREAT SERPL: 24 (ref 7–25)
CALCIUM SPEC-SCNC: 9.3 MG/DL (ref 8.6–10.5)
CHLORIDE SERPL-SCNC: 95 MMOL/L (ref 98–107)
CHOLEST SERPL-MCNC: 149 MG/DL (ref 0–200)
CO2 SERPL-SCNC: 27 MMOL/L (ref 22–29)
CREAT BLD-MCNC: 1.25 MG/DL (ref 0.76–1.27)
CREAT BLD-MCNC: 1.96 MG/DL (ref 0.76–1.27)
GFR SERPL CREATININE-BSD FRML MDRD: 35 ML/MIN/1.73
GFR SERPL CREATININE-BSD FRML MDRD: 59 ML/MIN/1.73
GLUCOSE BLD-MCNC: 254 MG/DL (ref 65–99)
GLUCOSE BLDC GLUCOMTR-MCNC: 259 MG/DL (ref 70–105)
GLUCOSE BLDC GLUCOMTR-MCNC: 275 MG/DL (ref 70–105)
GLUCOSE BLDC GLUCOMTR-MCNC: 336 MG/DL (ref 70–105)
HDLC SERPL-MCNC: 24 MG/DL (ref 40–60)
LDLC SERPL CALC-MCNC: ABNORMAL MG/DL
LDLC/HDLC SERPL: ABNORMAL {RATIO}
MAGNESIUM SERPL-MCNC: 1.5 MG/DL (ref 1.6–2.4)
PHOSPHATE SERPL-MCNC: 3.1 MG/DL (ref 2.5–4.5)
POTASSIUM BLD-SCNC: 4 MMOL/L (ref 3.5–5.2)
SODIUM BLD-SCNC: 135 MMOL/L (ref 136–145)
TRIGL SERPL-MCNC: 504 MG/DL (ref 0–150)
TROPONIN T SERPL-MCNC: 0.06 NG/ML (ref 0–0.03)
TROPONIN T SERPL-MCNC: 0.07 NG/ML (ref 0–0.03)
TROPONIN T SERPL-MCNC: 0.07 NG/ML (ref 0–0.03)
TROPONIN T SERPL-MCNC: 0.08 NG/ML (ref 0–0.03)
TSH SERPL DL<=0.05 MIU/L-ACNC: 1.14 UIU/ML (ref 0.27–4.2)
VIT B12 BLD-MCNC: 660 PG/ML (ref 211–946)
VLDLC SERPL-MCNC: ABNORMAL MG/DL

## 2020-05-07 PROCEDURE — 84100 ASSAY OF PHOSPHORUS: CPT | Performed by: INTERNAL MEDICINE

## 2020-05-07 PROCEDURE — 99233 SBSQ HOSP IP/OBS HIGH 50: CPT | Performed by: INTERNAL MEDICINE

## 2020-05-07 PROCEDURE — 82607 VITAMIN B-12: CPT | Performed by: PSYCHIATRY & NEUROLOGY

## 2020-05-07 PROCEDURE — 83735 ASSAY OF MAGNESIUM: CPT | Performed by: INTERNAL MEDICINE

## 2020-05-07 PROCEDURE — 63710000001 INSULIN LISPRO (HUMAN) PER 5 UNITS: Performed by: NURSE PRACTITIONER

## 2020-05-07 PROCEDURE — 93306 TTE W/DOPPLER COMPLETE: CPT | Performed by: INTERNAL MEDICINE

## 2020-05-07 PROCEDURE — 82962 GLUCOSE BLOOD TEST: CPT

## 2020-05-07 PROCEDURE — 99231 SBSQ HOSP IP/OBS SF/LOW 25: CPT | Performed by: PSYCHIATRY & NEUROLOGY

## 2020-05-07 PROCEDURE — 93880 EXTRACRANIAL BILAT STUDY: CPT

## 2020-05-07 PROCEDURE — 80061 LIPID PANEL: CPT | Performed by: PSYCHIATRY & NEUROLOGY

## 2020-05-07 PROCEDURE — 25010000002 MAGNESIUM SULFATE 2 GM/50ML SOLUTION: Performed by: NURSE PRACTITIONER

## 2020-05-07 PROCEDURE — 63710000001 INSULIN GLARGINE PER 5 UNITS: Performed by: INTERNAL MEDICINE

## 2020-05-07 PROCEDURE — 84443 ASSAY THYROID STIM HORMONE: CPT | Performed by: PSYCHIATRY & NEUROLOGY

## 2020-05-07 PROCEDURE — 70551 MRI BRAIN STEM W/O DYE: CPT

## 2020-05-07 PROCEDURE — 84484 ASSAY OF TROPONIN QUANT: CPT | Performed by: NURSE PRACTITIONER

## 2020-05-07 PROCEDURE — 84484 ASSAY OF TROPONIN QUANT: CPT | Performed by: INTERNAL MEDICINE

## 2020-05-07 PROCEDURE — 63710000001 INSULIN LISPRO (HUMAN) PER 5 UNITS: Performed by: INTERNAL MEDICINE

## 2020-05-07 PROCEDURE — 80048 BASIC METABOLIC PNL TOTAL CA: CPT | Performed by: INTERNAL MEDICINE

## 2020-05-07 PROCEDURE — 99232 SBSQ HOSP IP/OBS MODERATE 35: CPT | Performed by: INTERNAL MEDICINE

## 2020-05-07 PROCEDURE — 83721 ASSAY OF BLOOD LIPOPROTEIN: CPT | Performed by: PSYCHIATRY & NEUROLOGY

## 2020-05-07 RX ORDER — MAGNESIUM SULFATE 1 G/100ML
1 INJECTION INTRAVENOUS AS NEEDED
Status: DISCONTINUED | OUTPATIENT
Start: 2020-05-07 | End: 2020-05-08 | Stop reason: HOSPADM

## 2020-05-07 RX ORDER — CARVEDILOL 3.12 MG/1
3.12 TABLET ORAL 2 TIMES DAILY WITH MEALS
Status: DISCONTINUED | OUTPATIENT
Start: 2020-05-07 | End: 2020-05-08 | Stop reason: HOSPADM

## 2020-05-07 RX ORDER — POTASSIUM CHLORIDE 20 MEQ/1
40 TABLET, EXTENDED RELEASE ORAL AS NEEDED
Status: DISCONTINUED | OUTPATIENT
Start: 2020-05-07 | End: 2020-05-08 | Stop reason: HOSPADM

## 2020-05-07 RX ORDER — MAGNESIUM SULFATE HEPTAHYDRATE 40 MG/ML
2 INJECTION, SOLUTION INTRAVENOUS ONCE
Status: COMPLETED | OUTPATIENT
Start: 2020-05-07 | End: 2020-05-07

## 2020-05-07 RX ORDER — GLIPIZIDE 5 MG/1
10 TABLET ORAL
Status: DISCONTINUED | OUTPATIENT
Start: 2020-05-07 | End: 2020-05-08 | Stop reason: HOSPADM

## 2020-05-07 RX ORDER — LISINOPRIL 5 MG/1
2.5 TABLET ORAL
Status: DISCONTINUED | OUTPATIENT
Start: 2020-05-07 | End: 2020-05-08 | Stop reason: HOSPADM

## 2020-05-07 RX ORDER — DIPHENHYDRAMINE HYDROCHLORIDE 50 MG/ML
50 INJECTION INTRAMUSCULAR; INTRAVENOUS ONCE
Status: DISCONTINUED | OUTPATIENT
Start: 2020-05-07 | End: 2020-05-07

## 2020-05-07 RX ORDER — MAGNESIUM SULFATE HEPTAHYDRATE 40 MG/ML
2 INJECTION, SOLUTION INTRAVENOUS AS NEEDED
Status: DISCONTINUED | OUTPATIENT
Start: 2020-05-07 | End: 2020-05-08 | Stop reason: HOSPADM

## 2020-05-07 RX ORDER — ALBUTEROL SULFATE 2.5 MG/3ML
2.5 SOLUTION RESPIRATORY (INHALATION) EVERY 4 HOURS PRN
Status: DISCONTINUED | OUTPATIENT
Start: 2020-05-07 | End: 2020-05-07

## 2020-05-07 RX ORDER — DIPHENHYDRAMINE HYDROCHLORIDE 50 MG/ML
50 INJECTION INTRAMUSCULAR; INTRAVENOUS ONCE
Status: COMPLETED | OUTPATIENT
Start: 2020-05-08 | End: 2020-05-08

## 2020-05-07 RX ADMIN — INSULIN LISPRO 10 UNITS: 100 INJECTION, SOLUTION INTRAVENOUS; SUBCUTANEOUS at 18:01

## 2020-05-07 RX ADMIN — CLOPIDOGREL BISULFATE 75 MG: 75 TABLET ORAL at 08:21

## 2020-05-07 RX ADMIN — INSULIN LISPRO 8 UNITS: 100 INJECTION, SOLUTION INTRAVENOUS; SUBCUTANEOUS at 12:01

## 2020-05-07 RX ADMIN — MAGNESIUM SULFATE HEPTAHYDRATE 2 G: 40 INJECTION, SOLUTION INTRAVENOUS at 12:36

## 2020-05-07 RX ADMIN — MAGNESIUM HYDROXIDE 10 ML: 2400 SUSPENSION ORAL at 22:50

## 2020-05-07 RX ADMIN — ATORVASTATIN CALCIUM 40 MG: 40 TABLET, FILM COATED ORAL at 08:21

## 2020-05-07 RX ADMIN — INSULIN LISPRO 8 UNITS: 100 INJECTION, SOLUTION INTRAVENOUS; SUBCUTANEOUS at 08:23

## 2020-05-07 RX ADMIN — INSULIN GLARGINE 10 UNITS: 100 INJECTION, SOLUTION SUBCUTANEOUS at 08:21

## 2020-05-07 RX ADMIN — ASPIRIN 325 MG ORAL TABLET 325 MG: 325 PILL ORAL at 08:21

## 2020-05-07 RX ADMIN — CARVEDILOL 3.12 MG: 3.12 TABLET, FILM COATED ORAL at 17:54

## 2020-05-07 RX ADMIN — LISINOPRIL 2.5 MG: 5 TABLET ORAL at 12:36

## 2020-05-07 RX ADMIN — FENOFIBRATE 145 MG: 145 TABLET ORAL at 08:21

## 2020-05-07 RX ADMIN — BISACODYL 5 MG: 5 TABLET, COATED ORAL at 18:02

## 2020-05-07 RX ADMIN — GLIPIZIDE 10 MG: 5 TABLET ORAL at 17:54

## 2020-05-07 RX ADMIN — PANTOPRAZOLE SODIUM 40 MG: 40 TABLET, DELAYED RELEASE ORAL at 08:21

## 2020-05-07 NOTE — PROGRESS NOTES
PULMONARY/ CRITICAL CARE PROGRESS NOTE        Patient Name:  Bobby Steele Jr.    :  1960    Medical Record:  [id number]    REQUESTING PHYSICIAN    Yamile Agarwal    PRIMARY CARE PHYSICIAN     Yamile Agarwal    REASON FOR CONSULTATION    Bobby Steele Jr. is a 60 y.o. male with a PMH of CAD, COPD, diabetes type 2, hyperlipidemia, hypertension, tobacco abuse and chronic systolic heart failure who we were asked to see in consultation for hypotension.    He presented to the [facility] ED on 2020 with a complaint of left hand and finger tingling.  Patient states he woke up this morning with his left arm feeling numb and as the day progressed it slowly resolved however now his fingers and his hand are still tingling.  Patient states he sleeps with his hands above his head every night and this sometimes happens and it normally resolves but it has not yet resolved. Patient denies fever, cough, chest pain, shortness of breath and headache.  In the ED, CT head no acute intracranial abnormality.  Glucose 519, BUN 42, creatinine 2.12, sodium 129, potassium 3.8, WBC 14.6, Hgb 16.3, HCT 45.1, platelets 186.  NIH is 0.  EKG normal sinus rhythm.  Patient was given 6 units regular insulin IV x1.  500 mL normal saline bolus x2.  He remained hypotensive despite 1500 cc of IVFs with a SBP in the 70's and he  Is being admitted to the ICU for pressor requirements.       Patient with recent hospitalization last week where he was treated for exacerbation of his CHF and had metolazone added to his home regimen upon discharge.         :  No new issues overnight.       REVIEW OF SYSTEMS    As above     HOME MEDICATIONS  Prior to Admission medications    Medication Sig Start Date End Date Taking? Authorizing Provider   albuterol (PROVENTIL) (2.5 MG/3ML) 0.083% nebulizer solution Take 2.5 mg by nebulization Every 6 (Six) Hours As Needed for Wheezing or Shortness of Air.   Yes Provider, MD Shirley   albuterol  sulfate  (90 Base) MCG/ACT inhaler Inhale 2 puffs Every 4 (Four) Hours As Needed for Wheezing or Shortness of Air.   Yes Shirley Eng MD   aspirin 325 MG tablet Take 325 mg by mouth Daily.   Yes Shirley Eng MD   atorvastatin (LIPITOR) 40 MG tablet Take 40 mg by mouth Daily.   Yes Shirley Eng MD   carvedilol (COREG) 3.125 MG tablet Take 3.125 mg by mouth 2 (Two) Times a Day With Meals.   Yes Shirley Eng MD   clopidogrel (PLAVIX) 75 MG tablet Take 75 mg by mouth Daily.   Yes Shirley Eng MD   doxycycline 100 mg in sodium chloride 0.9 % 100 mL IVPB Infuse 100 mg into a venous catheter Every 12 (Twelve) Hours for 3 days. Indications: COPD exacerbation 5/3/20 5/6/20 Yes Dileep Alvarez DO   fenofibrate (TRICOR) 145 MG tablet Take 1 tablet by mouth Daily. 7/2/19  Yes Juarez Wing MD   furosemide (LASIX) 40 MG tablet Take 1 tablet by mouth 2 (Two) Times a Day. 7/2/19  Yes Juarez Wing MD   glimepiride (AMARYL) 4 MG tablet Take 4 mg by mouth 2 (Two) Times a Day.   Yes Shirley Eng MD   isosorbide mononitrate (IMDUR) 30 MG 24 hr tablet TAKE 1 TABLET BY MOUTH ONCE DAILY 12/24/19  Yes Juarez Wing MD   lactulose (CHRONULAC) 10 GM/15ML solution Take 20 g by mouth 2 (Two) Times a Day As Needed (constipation).   Yes Shirley Eng MD   lisinopril (PRINIVIL,ZESTRIL) 2.5 MG tablet TAKE 1 TABLET BY MOUTH ONCE DAILY 12/23/19  Yes Juarez Wing MD   metFORMIN (GLUCOPHAGE) 500 MG tablet Take 500 mg by mouth 2 (Two) Times a Day With Meals.   Yes Shirley Eng MD   metOLazone (ZAROXOLYN) 5 MG tablet Take 1 tablet by mouth Daily. 5/3/20  Yes Dileep Alvarez DO   nitroglycerin (NITROSTAT) 0.4 MG SL tablet Take no more than 3 doses in 15 minutes. 12/3/19  Yes Juarez Wing MD   pantoprazole (PROTONIX) 40 MG EC tablet Take 40 mg by mouth Daily.   Yes Provider, MD Shirley    spironolactone (ALDACTONE) 25 MG tablet Take 1 tablet by mouth Daily. 9/10/19  Yes Juarez Wing MD   temazepam (RESTORIL) 15 MG capsule Take 15 mg by mouth At Night As Needed for Sleep.   Yes Provider, MD Shirley       MEDICAL HISTORY    Past Medical History:   Diagnosis Date   • CAD (coronary artery disease)     S/P CABG   • Chronic systolic CHF (congestive heart failure) (CMS/[facility])    • COPD (chronic obstructive pulmonary disease) (CMS/[facility])    • DM2 (diabetes mellitus, type 2) (CMS/[facility])    • Dyslipidemia    • Encounter for monitoring antiplatelet therapy    • Hypertension    • Myocardial infarction (CMS/[facility])     inferior wall MI--   • DEBI (obstructive sleep apnea)     c-pap machine at    • Peripheral vascular disease (CMS/[facility])     S/P left fem-pop bypass   • Tobacco use         SURGICAL HISTORY    Past Surgical History:   Procedure Laterality Date   • APPENDECTOMY      at age 8 or 9   • CARDIAC CATHETERIZATION      3-; [facility] 2014   • CARDIAC CATHETERIZATION Right 2019    Procedure: Cardiac Catheterization/with grafts groin access;  Surgeon: Juarez Wing MD;  Location: [facility] CATH INVASIVE LOCATION;  Service: Cardiovascular   • CARDIAC ELECTROPHYSIOLOGY PROCEDURE N/A 10/8/2019    Procedure: ICD SC new, ST.SHIV ;  Surgeon: Juarez Wing MD;  Location: [facility] CATH INVASIVE LOCATION;  Service: Cardiovascular   • COLONOSCOPY     • CORONARY ARTERY BYPASS GRAFT      x3, 3-18-13-LIMA to LAD, and reverse individual SVG to lateral marginal and to PDA   • FEMORAL POPLITEAL BYPASS Left 2019    [facility]/ Dr. Jero Hatch   • HAND SURGERY Left     crushed hand   • TOE SURGERY      toe nail removal of big toe- age 8 or 9        FAMILY HISTORY    Family History   Problem Relation Age of Onset   • Hypertension Mother    • Diabetes Mother    • Heart disease Father         had CABG and re-do/  while recovering-had MI age 40s   • Diabetes Father    • Pancreatic  cancer Sister    • Hyperlipidemia Brother    • Stroke Brother    • Heart disease Paternal Uncle        SOCIAL HISTORY    Social History     Tobacco Use   • Smoking status: Current Every Day Smoker     Packs/day: 1.00     Years: 25.00     Pack years: 25.00     Types: Cigarettes   • Smokeless tobacco: Never Used   • Tobacco comment: currently smokes 5 cigarettes per day   Substance Use Topics   • Alcohol use: No     Frequency: Never        ALLERGIES    No Known Allergies    MEDICATIONS    Scheduled Meds:    aspirin 325 mg Oral Daily   atorvastatin 40 mg Oral Daily   clopidogrel 75 mg Oral Daily   fenofibrate 145 mg Oral Daily   insulin glargine 10 Units Subcutaneous Daily   insulin lispro 0-14 Units Subcutaneous TID AC   pantoprazole 40 mg Oral Daily     Continuous Infusions:    norepinephrine 0.02-0.3 mcg/kg/min Last Rate: Stopped (20 0715)   sodium chloride 75 mL/hr Last Rate: 75 mL/hr (20 2332)     PRN Meds:.•  acetaminophen **OR** acetaminophen **OR** acetaminophen  •  albuterol  •  aluminum-magnesium hydroxide-simethicone  •  bisacodyl  •  dextrose  •  dextrose  •  glucagon (human recombinant)  •  insulin lispro **AND** insulin lispro  •  magnesium hydroxide  •  melatonin  •  ondansetron **OR** ondansetron  •  [COMPLETED] Insert peripheral IV **AND** sodium chloride      PHYSICAL EXAM    tMax 24 hrs:  Temp (24hrs), Av.2 °F (36.8 °C), Min:97.7 °F (36.5 °C), Max:98.7 °F (37.1 °C)    Vitals Ranges:  Temp:  [97.7 °F (36.5 °C)-98.7 °F (37.1 °C)] 98.7 °F (37.1 °C)  Heart Rate:  [75-96] 86  BP: ()/(54-92) 109/77  Intake and Output Last 3 Shifts:  I/O last 3 completed shifts:  In: 5218 [P.O.:1080; I.V.:1538; IV Piggyback:2600]  Out: 3750 [Urine:3750]    Constitutional:   Alert, no acute respiratory distress   HEENT:   Atraumatic, PERRL, conjunctiva normal, moist oral mucosa, no nasal discharge.  Trachea is midline.  Respiratory:   No respiratory distress, normal breath sounds, no rales, no wheezing    Cardiovascular:   Normal rate, normal rhythm and no murmurs.  Pulses 2+ and equal in all four extremities.    GI:   Soft, nondistended, positive bowel sounds.  Extremities:   No edema, cyanosis or tenderness.  Integument:   No rashes.   Neurologic:   Alert & oriented x 3, CN 2-12 normal, normal motor function, normal sensory function, no focal deficits noted   Psychiatric:   Speech and behavior appropriate     LABS    Lab Results (last 24 hours)     Procedure Component Value Units Date/Time    Blood Culture - Blood, Arm, Right [[id number]] Collected:  05/06/20 0803    Specimen:  Blood from Arm, Right Updated:  05/07/20 0830     Blood Culture No growth at 24 hours    POC Glucose Once [[id number]]  (Abnormal) Collected:  05/07/20 0817    Specimen:  Blood Updated:  05/07/20 0818     Glucose 275 mg/dL      Comment: Serial Number: [id number]:  [id number]       Troponin [[id number]]  (Abnormal) Collected:  05/07/20 0535    Specimen:  Blood Updated:  05/07/20 0610     Troponin T 0.077 ng/mL     Narrative:       Troponin T Reference Range:  <= 0.03 ng/mL-   Negative for AMI  >0.03 ng/mL-     Abnormal for myocardial necrosis.  Clinicians would have to utilize clinical acumen, EKG, Troponin and serial changes to determine if it is an Acute Myocardial Infarction or myocardial injury due to an underlying chronic condition.       Results may be falsely decreased if patient taking Biotin.      Vitamin B12 [[id number]] Collected:  05/07/20 0536    Specimen:  Blood Updated:  05/07/20 0546    Blood Culture - Blood, Hand, Right [[id number]] Collected:  05/06/20 0422    Specimen:  Blood from Hand, Right Updated:  05/07/20 0500     Blood Culture No growth at 24 hours    Troponin [[id number]]  (Abnormal) Collected:  05/07/20 0023    Specimen:  Blood Updated:  05/07/20 0104     Troponin T 0.072 ng/mL     Narrative:       Troponin T Reference Range:  <= 0.03 ng/mL-   Negative for AMI  >0.03 ng/mL-     Abnormal for myocardial necrosis.   Clinicians would have to utilize clinical acumen, EKG, Troponin and serial changes to determine if it is an Acute Myocardial Infarction or myocardial injury due to an underlying chronic condition.       Results may be falsely decreased if patient taking Biotin.      TSH [[id number]]  (Normal) Collected:  05/07/20 0023    Specimen:  Blood Updated:  05/07/20 0059     TSH 1.140 uIU/mL      Comment: TSH results may be falsely decreased if patient taking Biotin.       Lipid Panel [[id number]]  (Abnormal) Collected:  05/07/20 0023    Specimen:  Blood Updated:  05/07/20 0054     Total Cholesterol 149 mg/dL      Triglycerides 504 mg/dL      HDL Cholesterol 24 mg/dL      LDL Cholesterol  --     Comment: Unable to calculate        VLDL Cholesterol --     Comment: Unable to calculate        LDL/HDL Ratio --     Comment: Unable to calculate       Narrative:       Cholesterol Reference Ranges  (U.S. Department of Health and Human Services ATP III Classifications)    Desirable          <200 mg/dL  Borderline High    200-239 mg/dL  High Risk          >240 mg/dL      Triglyceride Reference Ranges  (U.S. Department of Health and Human Services ATP III Classifications)    Normal           <150 mg/dL  Borderline High  150-199 mg/dL  High             200-499 mg/dL  Very High        >500 mg/dL    HDL Reference Ranges  (U.S. Department of Health and Human Services ATP III Classifcations)    Low     <40 mg/dl (major risk factor for CHD)  High    >60 mg/dl ('negative' risk factor for CHD)        LDL Reference Ranges  (U.S. Department of Health and Human Services ATP III Classifcations)    Optimal          <100 mg/dL  Near Optimal     100-129 mg/dL  Borderline High  130-159 mg/dL  High             160-189 mg/dL  Very High        >189 mg/dL    LDL Cholesterol, Direct [[id number]] Collected:  05/07/20 0023    Specimen:  Blood Updated:  05/07/20 0054    Creatinine, Serum [[id number]]  (Abnormal) Collected:  05/06/20 0422    Specimen:  Blood  Updated:  05/07/20 0038     Creatinine 1.96 mg/dL      eGFR Non African Amer 35 mL/min/1.73     Narrative:       GFR Normal >60  Chronic Kidney Disease <60  Kidney Failure <15      Troponin [[id number]]  (Abnormal) Collected:  05/06/20 1723    Specimen:  Blood Updated:  05/06/20 1753     Troponin T 0.066 ng/mL     Narrative:       Troponin T Reference Range:  <= 0.03 ng/mL-   Negative for AMI  >0.03 ng/mL-     Abnormal for myocardial necrosis.  Clinicians would have to utilize clinical acumen, EKG, Troponin and serial changes to determine if it is an Acute Myocardial Infarction or myocardial injury due to an underlying chronic condition.       Results may be falsely decreased if patient taking Biotin.      POC Glucose Once [[id number]]  (Abnormal) Collected:  05/06/20 1622    Specimen:  Blood Updated:  05/06/20 1623     Glucose 254 mg/dL      Comment: Serial Number: [id number]:  [id number]       Folate [[id number]]  (Normal) Collected:  05/06/20 0857    Specimen:  Blood Updated:  05/06/20 1619     Folate 9.01 ng/mL     Narrative:       Results may be falsely increased if patient taking Biotin.      LDL Cholesterol, Direct [[id number]]  (Normal) Collected:  05/06/20 0422    Specimen:  Blood Updated:  05/06/20 1259     LDL Cholesterol  64 mg/dL     Narrative:       LDL Reference Ranges    (U.S. Department of Health and Human Services ATP III Classifications)    Optimal          <100 mg/dl  Near Optimal     100-129 mg/dl  Borderline High  130-159 mg/dl  High             160-189 mg/dl  Very High        >189 mg/dl      POC Glucose Once [[id number]]  (Abnormal) Collected:  05/06/20 1156    Specimen:  Blood Updated:  05/06/20 1157     Glucose 307 mg/dL      Comment: Serial Number: [id number]:  [id number]       Vitamin B12 [[id number]]  (Normal) Collected:  05/06/20 0422    Specimen:  Blood Updated:  05/06/20 1105     Vitamin B-12 691 pg/mL     Narrative:       Results may be falsely increased if patient taking  Biotin.      Lactic Acid, Reflex [[id number]]  (Normal) Collected:  05/06/20 0857    Specimen:  Blood Updated:  05/06/20 0929     Lactate 1.4 mmol/L               CBC  Results from last 7 days   Lab Units 05/06/20 0422 05/05/20 1949 05/03/20 0358 05/02/20 0537 05/01/20  1247   WBC 10*3/mm3 11.80* 14.60* 9.30 8.50 14.10*   RBC 10*6/mm3 4.50 4.92 4.54 4.75 5.12   HEMOGLOBIN g/dL 14.5 16.3 14.6 15.2 16.2   HEMATOCRIT % 41.1 45.1 41.2 43.7 46.7   MCV fL 91.5 91.7 90.9 92.1 91.2   PLATELETS 10*3/mm3 152 186 143 167 191       BMP  Results from last 7 days   Lab Units 05/06/20 0422 05/05/20 1949 05/03/20 0358 05/02/20 0537 05/01/20  1247   SODIUM mmol/L 134* 129* 135* 140 138   POTASSIUM mmol/L 3.6 3.8 4.2 4.0 3.5   CHLORIDE mmol/L 95* 88* 95* 95* 95*   CO2 mmol/L 23.0 26.0 24.0 27.0 27.0   BUN mg/dL 43* 42* 33* 23 28*   CREATININE mg/dL 1.96*  2.08* 2.12* 1.58* 1.34* 1.26   GLUCOSE mg/dL 248* 519* 479* 424* 343*   MAGNESIUM mg/dL  --   --  2.4 1.4*  --    PHOSPHORUS mg/dL  --   --   --  4.0  --        CMP   Results from last 7 days   Lab Units 05/06/20 0422 05/05/20 1949 05/03/20 0358 05/02/20 0537 05/01/20  1247   SODIUM mmol/L 134* 129* 135* 140 138   POTASSIUM mmol/L 3.6 3.8 4.2 4.0 3.5   CHLORIDE mmol/L 95* 88* 95* 95* 95*   CO2 mmol/L 23.0 26.0 24.0 27.0 27.0   BUN mg/dL 43* 42* 33* 23 28*   CREATININE mg/dL 1.96*  2.08* 2.12* 1.58* 1.34* 1.26   GLUCOSE mg/dL 248* 519* 479* 424* 343*   ALBUMIN g/dL  --   --   --  4.20  --    BILIRUBIN mg/dL  --   --   --  0.5  --    ALK PHOS U/L  --   --   --  51  --    AST (SGOT) U/L  --   --   --  23  --    ALT (SGPT) U/L  --   --   --  40  --          proBNP      TROPONIN  Results from last 7 days   Lab Units 05/07/20  0535   TROPONIN T ng/mL 0.077*       CoAg  Results from last 7 days   Lab Units 05/01/20  1247   INR  1.05   APTT seconds 23.2*       Creatinine Clearance  Estimated Creatinine Clearance: 40.3 mL/min (A) (by C-G formula based on SCr of 2.08 mg/dL  (H)).    ABG        Microbiology  Microbiology Results (last 10 days)     Procedure Component Value - Date/Time    Blood Culture - Blood, Arm, Right [[id number]] Collected:  05/06/20 0803    Lab Status:  Preliminary result Specimen:  Blood from Arm, Right Updated:  05/07/20 0830     Blood Culture No growth at 24 hours    Legionella Antigen, Urine - Urine, Urine, Clean Catch [[id number]]  (Normal) Collected:  05/06/20 0429    Lab Status:  Final result Specimen:  Urine, Clean Catch Updated:  05/06/20 0523     LEGIONELLA ANTIGEN, URINE Negative    S. Pneumo Ag Urine or CSF - Urine, Urine, Clean Catch [[id number]]  (Normal) Collected:  05/06/20 0429    Lab Status:  Final result Specimen:  Urine, Clean Catch Updated:  05/06/20 0524     Strep Pneumo Ag Negative    Respiratory Panel, PCR - Swab, Nasopharynx [[id number]]  (Normal) Collected:  05/06/20 0424    Lab Status:  Final result Specimen:  Swab from Nasopharynx Updated:  05/06/20 0646     ADENOVIRUS, PCR Not Detected     Coronavirus 229E Not Detected     Coronavirus HKU1 Not Detected     Coronavirus NL63 Not Detected     Coronavirus OC43 Not Detected     Human Metapneumovirus Not Detected     Human Rhinovirus/Enterovirus Not Detected     Influenza B PCR Not Detected     Parainfluenza Virus 1 Not Detected     Parainfluenza Virus 2 Not Detected     Parainfluenza Virus 3 Not Detected     Parainfluenza Virus 4 Not Detected     Bordetella pertussis pcr Not Detected     Influenza A H1 2009 PCR Not Detected     Chlamydophila pneumoniae PCR Not Detected     Mycoplasma pneumo by PCR Not Detected     Influenza A PCR Not Detected     Influenza A H3 Not Detected     Influenza A H1 Not Detected     RSV, PCR Not Detected    Narrative:       The coronavirus on the RVP is NOT COVID-19 and is NOT indicative of infection with COVID-19.     Blood Culture - Blood, Hand, Right [[id number]] Collected:  05/06/20 0422    Lab Status:  Preliminary result Specimen:  Blood from Hand, Right Updated:   05/07/20 0500     Blood Culture No growth at 24 hours          No results found for: ACANTHNAEG, AFBCX, BPERTUSSISCX, BLOODCX  No results found for: BCIDPCR, CXREFLEX, CSFCX, CULTURETIS  No results found for: CULTURES, HSVCX, URCX  No results found for: EYECULTURE, GCCX, LABHSV  No results found for: LEGIONELLA, MRSACX, MUMPSCX, MYCOPLASCX  No results found for: NOCARDIACX, STOOLCX  No results found for: THROATCX, UNSTIMCULT, URINECX, CULTURE, VZVCULTUR  No results found for: VIRALCULTU, WOUNDCX    IMAGING & OTHER STUDIES    Imaging Results (Last 72 Hours)     Procedure Component Value Units Date/Time    XR Chest 1 View [[id number]] Collected:  05/06/20 0357     Updated:  05/06/20 0402    Narrative:       DATE OF EXAM:  5/6/2020 1:00 AM     PROCEDURE:  XR CHEST 1 VW-     INDICATIONS:  Shortness of breath/air (sob/soa); R20.2-Paresthesias of the skin;  I95.9-Hypotension, unspecified. History of chronic cough and COPD.     COMPARISON:  05/01/2020, 1:36 PM.     TECHNIQUE:   Single radiographic AP view of the chest was obtained.     FINDINGS:  A single view of the chest reveals no cardiac enlargement and no focal  infiltrate. No pneumothorax.  There is a left-sided cardiac implantable  electronic device (CIED) in place, seen previously. The patient has  undergone median sternotomy and CABG surgery. External artifacts obscure  detail. There is suspected chronic calcified granulomatous disease of  the chest. Probably no significant interval change is seen since the  prior study.       Impression:       No acute infiltrate is appreciated.     Electronically Signed By-Dr. Олег Alcantar MD On:5/6/2020 4:00 AM  This report was finalized on [id number] by Dr. Олег Alcantar MD.    CT Head Without Contrast [[id number]] Collected:  05/05/20 2040     Updated:  05/05/20 2045    Narrative:       CT HEAD WO CONTRAST-     Date of Exam: 5/5/2020 8:15 PM     Indication: Sensation loss.  Left arm numbness      Comparison Exams: None  available.     Technique: Multiple axial images were obtained from the skull base to  the vertex without the administration of IV contrast. The axial data was  used to generate reformatted images in the coronal and sagittal planes.  Automated exposure control and iterative reconstruction methods were  used.     FINDINGS:  There is mild generalized parenchymal volume loss.  There is no acute  infarct or hemorrhage. No abnormal extra-axial fluid collections are  seen. There is no mass effect or hydrocephalus.     There is no evidence of skull fracture. Visualized paranasal sinuses and  mastoid air cells are clear.  There is a 1.4 cm soft tissue density  nodule within the skin of the scalp overlying the left frontal vertex  this could represent a sebaceous or dermal cyst or neoplastic process.   Clinical correlation recommended.     The globes and orbits are within normal limits.       Impression:          1. No acute intracranial abnormality.  2.  1.4 cm soft tissue density nodule within the skin overlying the left  frontal vertex.  This could represent dermal or sebaceous cyst or  neoplastic process.  Clinical correlation recommended.     Electronically Signed By-Garry Plascencia On:5/5/2020 8:43 PM  This report was finalized on [id number] by  Garry Plascencia, .            ASSESSMENT/PLAN  Hypotension  -pan cx neg to date   -off abx   -Echo report pending   -Additional 500 cc bolus without improvement in BP, start on Levophed - OFF   -Question dehydration from addition of metolazone to home regimen  -Lactic acid 1.4  -Hopefully can stop IVF once Cr improves and BP remains stable    VINICIO    -Monitor creatinine  -IVFs  -Cr now 1.25    Paresthesia  -Left hand fingertips possibly Carpal tunnel syndrome  -Initial NIH 0  -Neurochecks every 4  -Neurology following  -B12 691 and folate 9     COPD  -Not in an acute exacerbation  -Continue Proventil mini neb q. PRN     CAD  -Continue Plavix, Imdur     Chronic systolic heart  failure with AICD  -Hold Lasix, Zaroxolyn, Aldactone, Coreg given hypotension  -EF 35% (echo dated 8/13/2019)     Hyperlipidemia  -Continue statin     Diabetes type 2  -Accu-Cheks AC  -Sliding scale insulin as needed  -Diabetes educator to see     GERD  -Continue PPI     Tobacco abuse  -Encourage cessation     PT/OT   Diet ordered  Transfer out of ICU, Hospitalist to see    Attending physician statement:  Above note scribed by nurse practitioner for me and later reviewed for accuracy. I've examined the patient and reviewed all labs and images.   I have directly participated in the evaluation and management of this patient.  Reid otto MD

## 2020-05-07 NOTE — PROGRESS NOTES
Cardiology Progress Note    Patient Identification:  Name: Bobby Steele Jr.  Age: 60 y.o.  Sex: male  :  1960  MRN: [id number]                 Follow Up / Chief Complaint: Follow-up for elevated troponin, hypertension, CAD, CABG, CHF, cardiomyopathy, PCI  Chief Complaint   Patient presents with   • Numbness       Interval History: Patient's blood pressure is improved is off vasopressors       Subjective: Denies any chest pain shortness of breath      Objective:         History of Present Illness:          This is a 60-year-old white male with past medical history of     # NSTEMI  19, SHILPA to SVG to RCA and proximal LAD leading into a small diagonal  # CAD status post CABG X3 V  # Ischemic cardiomyopathy with EF of 35%, status post ICD 10/8/2019  # COPD, continue tobacco abuse  # Hypertension   # Hyperlipidemia  # Diabetes with hemoglobin A1c of 7.4 on 6/3/19  #Positive family for premature heart disease with father MI 53     Here with complaint of unilateral tingling and numbness in left hand.  Patient was recently in the hospital with elevated troponin and proBNP was diagnosed with CHF diuresed and sent home on metolazone.  Patient called me as outpatient saying that he is got tingling and numbness and thinks it is a side effect of metolazone.  Since it was unilateral I advised him to come to the ER.  Work-up in the ER revealed a proBNP of 1554 which is worse than his discharge 1115.  BUN/creatinine were 42/2.12 compared to his discharge BUN/creatinine of 33/1.58 and a baseline around 1.3.  D-dimer 0.45, CBC with a white count of 14.6.  Normal UA.  CT head 2020- for active bleed.  Chest x-ray no acute infiltrates or effusions.  EKG reviewed by me from 2020 reveals sinus tachycardia at the rate of 100 bpm with nonspecific ST depression in lateral leads.     Patient had stress test 2020 which was abnormal with old inferior wall MI with cole-infarct and ischemia and EF of 42%      Patient   underwent cardiac cath 6/27/2019 which revealed patent stent to SVG to RCA patent stent in proximal LAD and small vessel disease and diagonal.   Patient has chronic dyspnea on exertion relieved with rest.  Patient is status post ICD placement 10/8/2019 .           ASSESSMENT:   #Tingling and numbness unilateral in left hand  #Hypotension  #elevated troponin, possible non-ST elevation MI  #  chronic CHF, ischemic cardiomyopathy 35%, status post ICD 10/8/2019  # NSTEMI 5/12/19  # CAD status post CABG  # COPD, tobacco abuse   # hypertension ,  #  hyperlipidemia   #Hyperglycemia, and type 2 diabetes  #VINICIO on CKD     Recommendations:  Telemetry is revealing sinus rhythm  Gentle hydration  Patient is off vasopressors  Hold  diuretics metolazone  Will gradually restart beta-blockers and ACE inhibitor's as tolerated  Serial cardiac enzymes are mildly elevated  We will proceed with cardiac cath to evaluate coronary anatomy risk benefits alternatives explained  We will follow-up and consider further evaluation and treatment     Patient had cath 6/27/2019 which revealed patent stent in SVG to RCA and patent stent in proximal LAD with severe disease in diagonal branch which is too small to be stented  Continue , aspirin, Plavix and statin as tolerated     Monitor blood pressure  Discussed with patient and his wife   Counseled on smoking cessation  Counseled on CHF care  Counseled on diet exercise and smoking cessation  Diabetes control   Patient is getting MRI today called [employer] ICD rep to come and adjust ICD device patient can have MRI done  We will follow  Discussed with RN taking care of patient       Past Medical History:  Past Medical History:   Diagnosis Date   • CAD (coronary artery disease)     S/P CABG   • Chronic systolic CHF (congestive heart failure) (CMS/Former[facility])    • COPD (chronic obstructive pulmonary disease) (CMS/Formerly [facility])    • DM2 (diabetes mellitus, type 2) (CMS/Formerly [facility])    • Dyslipidemia    • Encounter for  monitoring antiplatelet therapy    • Hypertension    • Myocardial infarction (CMS/HCC)     inferior wall MI--   • DEBI (obstructive sleep apnea)     noncompliant with CPAP   • Peripheral vascular disease (CMS/HCC)     S/P left fem-pop bypass   • Tobacco use      Past Surgical History:  Past Surgical History:   Procedure Laterality Date   • APPENDECTOMY      at age 8 or 9   • CARDIAC CATHETERIZATION      3-; [facility] 2014   • CARDIAC CATHETERIZATION Right 2019    Procedure: Cardiac Catheterization/with grafts groin access;  Surgeon: Juarez Wing MD;  Location: [facility] CATH INVASIVE LOCATION;  Service: Cardiovascular   • CARDIAC ELECTROPHYSIOLOGY PROCEDURE N/A 10/8/2019    Procedure: ICD SC new, ST.SHIV ;  Surgeon: Juarez Wing MD;  Location: [facility] CATH INVASIVE LOCATION;  Service: Cardiovascular   • COLONOSCOPY     • CORONARY ARTERY BYPASS GRAFT      x3, 3-18-13-LIMA to LAD, and reverse individual SVG to lateral marginal and to PDA   • FEMORAL POPLITEAL BYPASS Left 2019    [facility]/ Dr. Jero Hatch   • HAND SURGERY Left     crushed hand   • TOE SURGERY      toe nail removal of big toe- age 8 or 9        Social History:   Social History     Tobacco Use   • Smoking status: Current Every Day Smoker     Packs/day: 1.00     Years: 25.00     Pack years: 25.00     Types: Cigarettes   • Smokeless tobacco: Never Used   • Tobacco comment: currently smokes 5 cigarettes per day   Substance Use Topics   • Alcohol use: No     Frequency: Never      Family History:  Family History   Problem Relation Age of Onset   • Hypertension Mother    • Diabetes Mother    • Heart disease Father         had CABG and re-do/  while recovering-had MI age 40s   • Diabetes Father    • Pancreatic cancer Sister    • Hyperlipidemia Brother    • Stroke Brother    • Heart disease Paternal Uncle           Allergies:  No Known Allergies  Scheduled Meds:    aspirin 325 mg Daily   atorvastatin 40 mg Daily  "  clopidogrel 75 mg Daily   fenofibrate 145 mg Daily   glipizide 10 mg BID AC   insulin glargine 10 Units Daily   insulin lispro 0-14 Units TID AC   pantoprazole 40 mg Daily           INTAKE AND OUTPUT:    Intake/Output Summary (Last 24 hours) at 5/7/2020 1148  Last data filed at 5/7/2020 0825  Gross per 24 hour   Intake 2378 ml   Output 3575 ml   Net -1197 ml       Review of Systems:   Review of Systems   Constitution: Positive for fever. Negative for chills.   Cardiovascular: Negative for chest pain and dyspnea on exertion.   Respiratory: Negative for cough and hemoptysis.    Gastrointestinal: Negative for nausea and vomiting.       Constitutional:  Temp:  [97.7 °F (36.5 °C)-98.7 °F (37.1 °C)] 98.7 °F (37.1 °C)  Heart Rate:  [75-96] 92  BP: ()/(54-92) 109/77    Physical Exam   /77   Pulse 92   Temp 98.7 °F (37.1 °C) (Oral)   Resp 18   Ht 170.2 cm (67\")   Wt 89.3 kg (196 lb 13.9 oz)   SpO2 97%   BMI 30.83 kg/m²   General:  Appears in no acute distress  Eyes: Sclera is anicteric,  conjunctiva is clear   HEENT:  No JVD. Thyroid not visibly enlarged. No mucosal pallor or cyanosis  Respiratory: Respirations regular and unlabored at rest.  Clear to auscultation  Cardiovascular: S1,S2 Regular rate and rhythm. No murmur, rub or gallop auscultated.  . No pretibial pitting edema  Gastrointestinal: Abdomen soft, flat, non tender. Bowel sounds present.   Musculoskeletal:  No abnormal movements  Extremities: No digital clubbing or cyanosis  Skin: Color pink. Skin warm and dry to touch. No rashes  No xanthoma  Neuro: Alert and awake, no lateralizing deficits appreciated        Cardiographics  Telemetry: Sinus rhythm    ECG:   ECG 12 Lead   Preliminary Result   HEART RATE= 100  bpm   RR Interval= 600  ms   IL Interval= 151  ms   P Horizontal Axis= -21  deg   P Front Axis= 81  deg   QRSD Interval= 108  ms   QT Interval= 357  ms   QRS Axis= 83  deg   T Wave Axis= 244  deg   - ABNORMAL ECG -   Sinus tachycardia " "  Probable left atrial enlargement   Repol abnrm suggests ischemia, diffuse leads   When compared with ECG of 02-May-2020 7:20:19,   Significant rate increase   Electronically Signed By:    Date and Time of Study: 2020-05-05 19:41:13        I have personally reviewed EKG    Echocardiogram: Results for orders placed during the hospital encounter of 05/05/20   Adult Transthoracic Echo Complete W/ Cont if Necessary Per Protocol    Narrative Technically difficult study.  Mild LVE with severe apical hypokinesis and, moderate global hypokinesis   estimated LV ejection fraction of 35%   Normal RV size  Mild left atrial enlargement seen  Aortic valve, mitral valve, tricuspid valve appears structurally normal,   no significant regurgitation seen.  No pericardial effusion seen.  Proximal aorta appears normal in size.       Lab Review   I have reviewed the labs  Results from last 7 days   Lab Units 05/07/20  0535 05/07/20  0023 05/06/20  1723   TROPONIN T ng/mL 0.077* 0.072* 0.066*     Results from last 7 days   Lab Units 05/07/20  0535   MAGNESIUM mg/dL 1.5*     Results from last 7 days   Lab Units 05/07/20  0535   SODIUM mmol/L 135*   POTASSIUM mmol/L 4.0   BUN mg/dL 30*   CREATININE mg/dL 1.25   CALCIUM mg/dL 9.3         Results from last 7 days   Lab Units 05/06/20  0422 05/05/20  1949 05/03/20  0358   WBC 10*3/mm3 11.80* 14.60* 9.30   HEMOGLOBIN g/dL 14.5 16.3 14.6   HEMATOCRIT % 41.1 45.1 41.2   PLATELETS 10*3/mm3 152 186 143     Results from last 7 days   Lab Units 05/01/20  1247   INR  1.05   APTT seconds 23.2*       RADIOLOGY:  Imaging Results (Last 24 Hours)     ** No results found for the last 24 hours. **                )5/7/2020  Juarez Wing MD      EMR Dragon/Transcription:   \"Dictated utilizing Dragon dictation\".   "

## 2020-05-07 NOTE — PLAN OF CARE
Pt reports he feels he is at his baseline.  No further numbness or tingling.  Will discharge from OT caseload at this time.    PPE: mask, gloves, faceshield.

## 2020-05-07 NOTE — NURSING NOTE
Patient did go down and have his MRI with the [employer] representative being present. There was no acute distress while the patient was off the unit to MRI.   Patient report was called to [facility] as the patient is transporting to . The patient is aware of going to be transported to the [facility] and that Dr. Wing is scheduling him for a left heart cath. All belongings were packed up at this time. And we are awaiting transport.

## 2020-05-07 NOTE — CONSULTS
[facility] Medicine Services Daily Progress Note      Hospitalist Team  LOS 2 days      Patient Care Team:  Yamile Agarwal as PCP - General  Yamile Agarwal as PCP - Family Medicine  Yamile Agarwal as PCP - Claims Attributed  Juarez Wing MD as Consulting Physician (Cardiology)    Patient Location: 2213/1      Subjective   Subjective     Chief Complaint / Subjective  Chief Complaint   Patient presents with   • Numbness         Brief Synopsis of Hospital Course/HPI    Mr. Steele is a 60 y.o.  male with a history of CAD, COPD, diabetes type 2, hyperlipidemia, hypertension, tobacco abuse and chronic systolic heart failure who presented to the [facility] ED on 05/05/2020 with a complaint of paresthesia of his left hand and fingers. In the ED the patient's CT head was unremarkable. His NIH was reportedly 0. His labs revealed acute hyperglycemia (519) and acute kidney injury. He was given insulin and IV fluids. He became hypotensive, but his blood pressure improved with IV fluids. He was admitted for further evaluation.     After admission the patient became hypotensive again. He was transferred to ICU for vasopressor support.       Hospital course:    05/06/2020:  Remains on Levophed. Troponin elevated at 0.066. Cardiology consulted. Neurology also consulted. Diabetes educator consulted for uncontrolled Type 2 diabetes with A1c 10.0% (05/02/20).     05/07:  BP improved; off Levophed. Troponin increasing (0.072, 0.077). 2D echo pending. Renal function has returned to normal limits. MRI brain pending. Patient downgraded to MERY and Hospitalist team re-consulted for medical management.       The patient denies any current complaints, including left hand paresthesia, chest pain, or shortness of breath. He states he wants to go home. He is refusing MRI because he states Dr. Wing told him previously that he could not have a MRI after his ICD was placed. The  "patient's ICD was just placed in Oct. 2019. I requested the patient's RN contact Dr. Wing's office to clarify MRI compatibility. The patient's blood pressure remains stable, but he has not been restarted on any of his antihypertensives yet. Defer to cardiology.           Review of Systems   All other systems reviewed and are negative.        Objective   Objective      Vital Signs  Temp:  [97.7 °F (36.5 °C)-98.7 °F (37.1 °C)] 98.7 °F (37.1 °C)  Heart Rate:  [75-96] 86  BP: ()/(54-92) 109/77  Oxygen Therapy  SpO2: 97 %  Pulse Oximetry Type: Continuous  Device (Oxygen Therapy): room air  Flowsheet Rows      First Filed Value   Admission Height  172.7 cm (68\") Documented at 05/05/2020 1923   Admission Weight  88 kg (194 lb 0.1 oz) Documented at 05/05/2020 1923        Intake & Output (last 3 days)       05/04 0701 - 05/05 0700 05/05 0701 - 05/06 0700 05/06 0701 - 05/07 0700 05/07 0701 - 05/08 0700    P.O.  240 840     I.V. (mL/kg)   1538 (17.2)     IV Piggyback  2600      Total Intake(mL/kg)  2840 (32) 2378 (26.6)     Urine (mL/kg/hr)  425 3325 (1.6) 700 (2)    Total Output  425 3325 700    Net  +2415 -947 -700            Urine Unmeasured Occurrence    3 x        Lines, Drains & Airways    Active LDAs     Name:   Placement date:   Placement time:   Site:   Days:    PICC Triple Lumen 05/06/20 Right Brachial   05/06/20    1010    Brachial   1    Peripheral IV 05/05/20 1942 Left Antecubital   05/05/20 1942    Antecubital   1                  Physical Exam:    Physical Exam   Constitutional: He is oriented to person, place, and time. He appears well-developed.   obese   HENT:   Head: Normocephalic and atraumatic.   Mouth/Throat: Oropharynx is clear and moist.   Eyes: Pupils are equal, round, and reactive to light. Conjunctivae and EOM are normal.   Neck: Normal range of motion. Neck supple.   Cardiovascular: Normal rate, regular rhythm and intact distal pulses.   Pulmonary/Chest: Effort normal. He has decreased " breath sounds in the right lower field and the left lower field.   On room air   Abdominal: Soft. Bowel sounds are normal. He exhibits no distension. There is no tenderness.   Musculoskeletal: Normal range of motion. He exhibits no edema.   Neurological: He is alert and oriented to person, place, and time.   Skin: Skin is warm and dry. Capillary refill takes 2 to 3 seconds.   Psychiatric: He has a normal mood and affect. His behavior is normal.   Vitals reviewed.            Procedures:    n/a          Results Review:     I reviewed the patient's new clinical results.      Lab Results (last 24 hours)     Procedure Component Value Units Date/Time    Magnesium [[id number]]  (Abnormal) Collected:  05/07/20 0535    Specimen:  Blood Updated:  05/07/20 0942     Magnesium 1.5 mg/dL     Basic Metabolic Panel [[id number]]  (Abnormal) Collected:  05/07/20 0535    Specimen:  Blood Updated:  05/07/20 0941     Glucose 254 mg/dL      BUN 30 mg/dL      Creatinine 1.25 mg/dL      Sodium 135 mmol/L      Potassium 4.0 mmol/L      Chloride 95 mmol/L      CO2 27.0 mmol/L      Calcium 9.3 mg/dL      eGFR Non African Amer 59 mL/min/1.73      BUN/Creatinine Ratio 24.0     Anion Gap 13.0 mmol/L     Narrative:       GFR Normal >60  Chronic Kidney Disease <60  Kidney Failure <15      Phosphorus [[id number]]  (Normal) Collected:  05/07/20 0535    Specimen:  Blood Updated:  05/07/20 0941     Phosphorus 3.1 mg/dL     Blood Culture - Blood, Arm, Right [[id number]] Collected:  05/06/20 0803    Specimen:  Blood from Arm, Right Updated:  05/07/20 0830     Blood Culture No growth at 24 hours    POC Glucose Once [[id number]]  (Abnormal) Collected:  05/07/20 0817    Specimen:  Blood Updated:  05/07/20 0818     Glucose 275 mg/dL      Comment: Serial Number: [id number]:  [id number]       Troponin [[id number]]  (Abnormal) Collected:  05/07/20 0535    Specimen:  Blood Updated:  05/07/20 0610     Troponin T 0.077 ng/mL     Narrative:       Troponin T  Reference Range:  <= 0.03 ng/mL-   Negative for AMI  >0.03 ng/mL-     Abnormal for myocardial necrosis.  Clinicians would have to utilize clinical acumen, EKG, Troponin and serial changes to determine if it is an Acute Myocardial Infarction or myocardial injury due to an underlying chronic condition.       Results may be falsely decreased if patient taking Biotin.      Vitamin B12 [[id number]] Collected:  05/07/20 0536    Specimen:  Blood Updated:  05/07/20 0546    Blood Culture - Blood, Hand, Right [[id number]] Collected:  05/06/20 0422    Specimen:  Blood from Hand, Right Updated:  05/07/20 0500     Blood Culture No growth at 24 hours    Troponin [[id number]]  (Abnormal) Collected:  05/07/20 0023    Specimen:  Blood Updated:  05/07/20 0104     Troponin T 0.072 ng/mL     Narrative:       Troponin T Reference Range:  <= 0.03 ng/mL-   Negative for AMI  >0.03 ng/mL-     Abnormal for myocardial necrosis.  Clinicians would have to utilize clinical acumen, EKG, Troponin and serial changes to determine if it is an Acute Myocardial Infarction or myocardial injury due to an underlying chronic condition.       Results may be falsely decreased if patient taking Biotin.      TSH [[id number]]  (Normal) Collected:  05/07/20 0023    Specimen:  Blood Updated:  05/07/20 0059     TSH 1.140 uIU/mL      Comment: TSH results may be falsely decreased if patient taking Biotin.       Lipid Panel [[id number]]  (Abnormal) Collected:  05/07/20 0023    Specimen:  Blood Updated:  05/07/20 0054     Total Cholesterol 149 mg/dL      Triglycerides 504 mg/dL      HDL Cholesterol 24 mg/dL      LDL Cholesterol  --     Comment: Unable to calculate        VLDL Cholesterol --     Comment: Unable to calculate        LDL/HDL Ratio --     Comment: Unable to calculate       Narrative:       Cholesterol Reference Ranges  (U.S. Department of Health and Human Services ATP III Classifications)    Desirable          <200 mg/dL  Borderline High    200-239 mg/dL  High  Risk          >240 mg/dL      Triglyceride Reference Ranges  (U.S. Department of Health and Human Services ATP III Classifications)    Normal           <150 mg/dL  Borderline High  150-199 mg/dL  High             200-499 mg/dL  Very High        >500 mg/dL    HDL Reference Ranges  (U.S. Department of Health and Human Services ATP III Classifcations)    Low     <40 mg/dl (major risk factor for CHD)  High    >60 mg/dl ('negative' risk factor for CHD)        LDL Reference Ranges  (U.S. Department of Health and Human Services ATP III Classifcations)    Optimal          <100 mg/dL  Near Optimal     100-129 mg/dL  Borderline High  130-159 mg/dL  High             160-189 mg/dL  Very High        >189 mg/dL    LDL Cholesterol, Direct [[id number]] Collected:  05/07/20 0023    Specimen:  Blood Updated:  05/07/20 0054    Creatinine, Serum [[id number]]  (Abnormal) Collected:  05/06/20 0422    Specimen:  Blood Updated:  05/07/20 0038     Creatinine 1.96 mg/dL      eGFR Non African Amer 35 mL/min/1.73     Narrative:       GFR Normal >60  Chronic Kidney Disease <60  Kidney Failure <15      Troponin [[id number]]  (Abnormal) Collected:  05/06/20 1723    Specimen:  Blood Updated:  05/06/20 1753     Troponin T 0.066 ng/mL     Narrative:       Troponin T Reference Range:  <= 0.03 ng/mL-   Negative for AMI  >0.03 ng/mL-     Abnormal for myocardial necrosis.  Clinicians would have to utilize clinical acumen, EKG, Troponin and serial changes to determine if it is an Acute Myocardial Infarction or myocardial injury due to an underlying chronic condition.       Results may be falsely decreased if patient taking Biotin.      POC Glucose Once [[id number]]  (Abnormal) Collected:  05/06/20 1622    Specimen:  Blood Updated:  05/06/20 1623     Glucose 254 mg/dL      Comment: Serial Number: [id number]:  [id number]       Folate [[id number]]  (Normal) Collected:  05/06/20 0857    Specimen:  Blood Updated:  05/06/20 1619     Folate 9.01 ng/mL      Narrative:       Results may be falsely increased if patient taking Biotin.      LDL Cholesterol, Direct [[id number]]  (Normal) Collected:  05/06/20 0422    Specimen:  Blood Updated:  05/06/20 1259     LDL Cholesterol  64 mg/dL     Narrative:       LDL Reference Ranges    (U.S. Department of Health and Human Services ATP III Classifications)    Optimal          <100 mg/dl  Near Optimal     100-129 mg/dl  Borderline High  130-159 mg/dl  High             160-189 mg/dl  Very High        >189 mg/dl      POC Glucose Once [[id number]]  (Abnormal) Collected:  05/06/20 1156    Specimen:  Blood Updated:  05/06/20 1157     Glucose 307 mg/dL      Comment: Serial Number: [id number]:  [id number]       Vitamin B12 [[id number]]  (Normal) Collected:  05/06/20 0422    Specimen:  Blood Updated:  05/06/20 1105     Vitamin B-12 691 pg/mL     Narrative:       Results may be falsely increased if patient taking Biotin.          No results found for: HGBA1C  Results from last 7 days   Lab Units 05/01/20  1247   INR  1.05           No results found for: LIPASE  Lab Results   Component Value Date    CHOL 149 05/07/2020    TRIG 504 (H) 05/07/2020    HDL 24 (L) 05/07/2020    LDL  05/07/2020      Comment:      Unable to calculate       No results found for: INTRAOP, PREDX, FINALDX, COMDX    Microbiology Results (last 10 days)     Procedure Component Value - Date/Time    Blood Culture - Blood, Arm, Right [[id number]] Collected:  05/06/20 0803    Lab Status:  Preliminary result Specimen:  Blood from Arm, Right Updated:  05/07/20 0830     Blood Culture No growth at 24 hours    Legionella Antigen, Urine - Urine, Urine, Clean Catch [[id number]]  (Normal) Collected:  05/06/20 0429    Lab Status:  Final result Specimen:  Urine, Clean Catch Updated:  05/06/20 0523     LEGIONELLA ANTIGEN, URINE Negative    S. Pneumo Ag Urine or CSF - Urine, Urine, Clean Catch [[id number]]  (Normal) Collected:  05/06/20 0429    Lab Status:  Final result Specimen:  Urine,  Clean Catch Updated:  05/06/20 0524     Strep Pneumo Ag Negative    Respiratory Panel, PCR - Swab, Nasopharynx [[id number]]  (Normal) Collected:  05/06/20 0424    Lab Status:  Final result Specimen:  Swab from Nasopharynx Updated:  05/06/20 0646     ADENOVIRUS, PCR Not Detected     Coronavirus 229E Not Detected     Coronavirus HKU1 Not Detected     Coronavirus NL63 Not Detected     Coronavirus OC43 Not Detected     Human Metapneumovirus Not Detected     Human Rhinovirus/Enterovirus Not Detected     Influenza B PCR Not Detected     Parainfluenza Virus 1 Not Detected     Parainfluenza Virus 2 Not Detected     Parainfluenza Virus 3 Not Detected     Parainfluenza Virus 4 Not Detected     Bordetella pertussis pcr Not Detected     Influenza A H1 2009 PCR Not Detected     Chlamydophila pneumoniae PCR Not Detected     Mycoplasma pneumo by PCR Not Detected     Influenza A PCR Not Detected     Influenza A H3 Not Detected     Influenza A H1 Not Detected     RSV, PCR Not Detected    Narrative:       The coronavirus on the RVP is NOT COVID-19 and is NOT indicative of infection with COVID-19.     Blood Culture - Blood, Hand, Right [[id number]] Collected:  05/06/20 0422    Lab Status:  Preliminary result Specimen:  Blood from Hand, Right Updated:  05/07/20 0500     Blood Culture No growth at 24 hours          ECG/EMG Results (most recent)     Procedure Component Value Units Date/Time    ECG 12 Lead [[id number]] Collected:  05/05/20 1941     Updated:  05/05/20 1942    Narrative:       HEART RATE= 100  bpm  RR Interval= 600  ms  AR Interval= 151  ms  P Horizontal Axis= -21  deg  P Front Axis= 81  deg  QRSD Interval= 108  ms  QT Interval= 357  ms  QRS Axis= 83  deg  T Wave Axis= 244  deg  - ABNORMAL ECG -  Sinus tachycardia  Probable left atrial enlargement  Repol abnrm suggests ischemia, diffuse leads  When compared with ECG of 02-May-2020 7:20:19,  Significant rate increase  Electronically Signed By:   Date and Time of Study:  2020-05-05 19:41:13    Adult Transthoracic Echo Complete W/ Cont if Necessary Per Protocol [[id number]] Collected:  05/06/20 1214     Updated:  05/06/20 1304     BSA 2.0 m^2      RVIDd 2.7 cm      IVSd 1.2 cm      LVIDd 5.3 cm      LVIDs 4.2 cm      LVPWd 1.3 cm      IVS/LVPW 0.89     FS 20.1 %      EDV(Teich) 133.7 ml      ESV(Teich) 79.0 ml      EF(Teich) 40.9 %      EDV(cubed) 146.5 ml      ESV(cubed) 74.6 ml      EF(cubed) 49.1 %      LV mass(C)d 261.0 grams      LV mass(C)dI 130.5 grams/m^2      SV(Teich) 54.7 ml      SI(Teich) 27.3 ml/m^2      SV(cubed) 71.9 ml      SI(cubed) 36.0 ml/m^2      Ao root diam 4.2 cm      Ao root area 14.1 cm^2      ACS 2.1 cm      asc Aorta Diam 3.6 cm      LVOT diam 2.1 cm      LVOT area 3.6 cm^2      RVOT diam 2.1 cm      RVOT area 3.5 cm^2      EDV(MOD-sp4) 101.5 ml      ESV(MOD-sp4) 51.3 ml      EF(MOD-sp4) 49.5 %      SV(MOD-sp4) 50.2 ml      SI(MOD-sp4) 25.1 ml/m^2      Ao root area (BSA corrected) 2.1     LV Valerio Vol (BSA corrected) 50.7 ml/m^2      LV Sys Vol (BSA corrected) 25.6 ml/m^2      MV E max devon 65.8 cm/sec      MV A max devon 89.0 cm/sec      MV E/A 0.74     MV V2 max 93.3 cm/sec      MV max PG 3.5 mmHg      MV V2 mean 56.3 cm/sec      MV mean PG 1.4 mmHg      MV V2 VTI 23.4 cm      MVA(VTI) 2.6 cm^2      MV dec slope 377.7 cm/sec^2      MV dec time 0.17 sec      Ao pk devon 93.9 cm/sec      Ao max PG 3.5 mmHg      Ao max PG (full) -0.74 mmHg      Ao V2 mean 67.6 cm/sec      Ao mean PG 2.1 mmHg      Ao mean PG (full) 0.48 mmHg      Ao V2 VTI 17.5 cm      EVERETTE(I,A) 3.5 cm^2      EVERETTE(I,D) 3.5 cm^2      EVERETTE(V,A) 4.0 cm^2      EVERETTE(V,D) 4.0 cm^2      LV V1 max PG 4.3 mmHg      LV V1 mean PG 1.6 mmHg      LV V1 max 103.2 cm/sec      LV V1 mean 57.8 cm/sec      LV V1 VTI 16.8 cm      SV(Ao) 245.9 ml      SI(Ao) 122.9 ml/m^2      SV(LVOT) 60.6 ml      SV(RVOT) 49.3 ml      SI(LVOT) 30.3 ml/m^2      PA V2 max 87.8 cm/sec      PA max PG 3.1 mmHg      PA max PG (full) 0.68 mmHg       PA V2 mean 61.4 cm/sec      PA mean PG 1.8 mmHg      PA mean PG (full) 0.69 mmHg      PA V2 VTI 15.3 cm      PVA(I,A) 3.2 cm^2       CV ECHO NESHA - PVA(I,D) 3.2 cm^2      BH CV ECHO NESHA - PVA(V,A) 3.1 cm^2       CV ECHO NESHA - PVA(V,D) 3.1 cm^2      PA acc time 0.07 sec      RV V1 max PG 2.4 mmHg      RV V1 mean PG 1.1 mmHg      RV V1 max 77.5 cm/sec      RV V1 mean 46.7 cm/sec      RV V1 VTI 14.0 cm      TR max jose 116.3 cm/sec      RVSP(TR) 8.4 mmHg      RAP systole 3.0 mmHg      PA pr(Accel) 47.9 mmHg      Pulm Sys Jose 54.1 cm/sec      Pulm Valerio Jose 35.0 cm/sec      Pulm S/D 1.5     Qp/Qs 0.81     Pulm A Revs Dur 0.12 sec      Pulm A Revs Jose 29.9 cm/sec       CV ECHO NESHA - BZI_BMI 30.5 kilograms/m^2       CV ECHO NESHA - BSA(HAYCOCK) 2.1 m^2       CV ECHO NESHA - BZI_METRIC_WEIGHT 88.5 kg       CV ECHO NESHA - BZI_METRIC_HEIGHT 170.2 cm      LA dimension(2D) 3.6 cm      EF - Contrast (4Ch) 49.0 cm2           Results for orders placed during the hospital encounter of 05/05/20   Duplex Carotid Ultrasound CAR    Narrative · Proximal right internal carotid artery plaque without significant   stenosis.  · Proximal left internal carotid artery plaque without significant   stenosis.          Results for orders placed in visit on 08/22/19   Adult Transthoracic Echo Limited W/ Cont if Necessary Per Protocol    Narrative · The left ventricular cavity is moderately dilated.  · Estimated EF = 35%.  · Left atrial cavity size is moderately dilated.  · Mild mitral valve regurgitation is present     Technically difficult study due to due to patient body habitus    Not all left ventricular walls are well visualized, inferior posterior   wall is best sidra segment.  Left ventricular enlargement seen,   septal dyskinesis and anteroapical hypokinesis seen.  Estimated LV   ejection fraction of 35% .  Concentric LVH seen   Normal RV size  Moderate left atrial enlargement seen   Aortic valve, mitral valve,  tricuspid valve appears structurally normal,   mild MR seen  Proximal aorta appears normal in size  No significant pericardial effusion seen         Ct Head Without Contrast    Result Date: 5/5/2020   1. No acute intracranial abnormality. 2.  1.4 cm soft tissue density nodule within the skin overlying the left frontal vertex.  This could represent dermal or sebaceous cyst or neoplastic process.  Clinical correlation recommended.  Electronically Signed By-Garry Plascencia On:5/5/2020 8:43 PM This report was finalized on [id number] by  Garry Plascencia, .    Xr Chest 1 View    Result Date: 5/6/2020  No acute infiltrate is appreciated.  Electronically Signed By-Dr. Олег Alcantar MD On:5/6/2020 4:00 AM This report was finalized on [id number] by Dr. Олег Alcantar MD.    Xr Chest 1 View    Result Date: 5/1/2020  No acute cardiopulmonary abnormality is identified.  Electronically Signed By-Nadiya Rodriguez On:5/1/2020 2:25 PM This report was finalized on [id number] by  Nadiya Rodriguez, .          Xrays, labs reviewed personally by physician.    Medication Review:   I have reviewed the patient's current medication list      Scheduled Meds    aspirin 325 mg Oral Daily   atorvastatin 40 mg Oral Daily   clopidogrel 75 mg Oral Daily   fenofibrate 145 mg Oral Daily   insulin glargine 10 Units Subcutaneous Daily   insulin lispro 0-14 Units Subcutaneous TID AC   pantoprazole 40 mg Oral Daily       Meds Infusions       Meds PRN  •  acetaminophen **OR** acetaminophen **OR** acetaminophen  •  albuterol  •  aluminum-magnesium hydroxide-simethicone  •  bisacodyl  •  dextrose  •  dextrose  •  glucagon (human recombinant)  •  insulin lispro **AND** insulin lispro  •  magnesium hydroxide  •  magnesium sulfate **OR** magnesium sulfate in D5W 1g/100mL (PREMIX)  •  melatonin  •  ondansetron **OR** ondansetron  •  potassium chloride  •  [COMPLETED] Insert peripheral IV **AND** sodium chloride          Assessment/Plan   Assessment/Plan      Active Hospital Problems    Diagnosis  POA   • **Paresthesias [R20.2]  Yes     Priority: High   • Elevated troponin [R79.89]  Yes     Priority: Medium   • Type 2 diabetes mellitus with hyperglycemia (CMS/[facility]) [E11.65]  Yes     Priority: Low   • GERD without esophagitis [K21.9]  Yes   • DEBI (obstructive sleep apnea) [G47.33]  Yes   • Presence of automatic cardioverter/defibrillator (AICD) [Z95.810]  Yes   • Ischemic cardiomyopathy [I25.5]  Yes   • Coronary artery disease with hx of myocardial infarct w/o hx of CABG [I25.10, I25.2]  Not Applicable   • Tobacco dependence syndrome [F17.200]  Yes   • Benign essential HTN [I10]  Yes   • COPD (chronic obstructive pulmonary disease) (CMS/[facility]) [J44.9]  Yes   • Chronic systolic congestive heart failure (CMS/[facility]) [I50.22]  Yes   • Obesity (BMI 30-39.9) [E66.9]  Yes   • Peripheral vascular disease (CMS/[facility]) [I73.9]  Yes   • Multiple-type hyperlipidemia [E78.2]  Yes      Resolved Hospital Problems    Diagnosis Date Resolved POA   • Hypotension [I95.9] 05/07/2020 Yes     Priority: High   • Acute kidney injury (CMS/[facility]) [N17.9] 05/07/2020 Yes     Priority: Medium       MEDICAL DECISION MAKING COMPLEXITY BY PROBLEM:       Paresthesias  -r/o TIA/CVA  -symptoms now resolved  -CTH:  No acute findings  -neurology consulted  -MRI brain pending  -2D echo pending  -bilateral carotid duplex:  No significant stenosis   -B12 691, TSH WNL, A1c 10.0%, LDL unable to calculate  -antithrombotics:  Aspirin 325 mg + Plavix 75 mg PO daily  -statin:  Atorvastatin 40 mg PO qHS  -PT/OT signed off  -CM following     Elevated troponin  -demand ischemia secondary to hypotension vs NSTEMI  -serial troponin:  0.066, 0.072, 0.077  -cardiology following  -on aspirin, Plavix, and statin  -2D echo pending  -05/02/20 stress Myoview:  Abnormal myocardial perfusion study suggestive of old inferior MI with cole-infarct ischemia involving the inferior lateral and inferior apical segments.  Left ventricular  ejection fraction is mildly reduced (Calculated EF = 42%).  There is no prior study available for comparison.  Clinical correlation suggested.    Type 2 diabetes mellitus with hyperglycemia   -A1c 10.0% on 05/02/20  -diabetes educator consulted  -accu check TID AC with SSI  -continue Lantus  -restart glimepiride (glipizide substituted)  -metformin on hold d/t VINICIO    Benign essential HTN, chronic  -patient was hypotensive after admission and transferred to ICU for vasopressor support; now off Levophed and BP improved  -antihypertensives on hold; restart per cardiology's recommendation   -closely monitor BP    Chronic systolic congestive heart failure secondary to Ischemic cardiomyopathy, ICD in situ  -compensated  -Lasix, metolazone, Aldactone, Coreg, lisinopril, and Imdur on hold d/t hypotension/VINICIO  -monitor I&Os and daily weight  -cardiology following  -2D echo pending     Peripheral vascular disease   -continue aspirin and statin     COPD (chronic obstructive pulmonary disease), Tobacco dependence syndrome  -stable without exacerbation  -continue albuterol PRN  -encourage smoking cessation     Obesity (BMI 30-39.9)  -BMI 30.83  -encourage lifestyle modifications     Coronary artery disease with hx of myocardial infarct w/o hx of CABG / HLD   -continue aspirin, Plavix, statin, and fenofibrate   -Imdur and antihypertensives on hold d/t hypotension     GERD without esophagitis  -continue PPI     DEBI (obstructive sleep apnea)  -noncompliant with CPAP         VTE Prophylaxis -   Mechanical Order History:      Ordered        05/06/20 1721  Place Sequential Compression Device  Once         05/06/20 1721  Maintain Sequential Compression Device  Continuous         05/05/20 2247  Place Sequential Compression Device  Once         05/05/20 2247  Maintain Sequential Compression Device  Continuous                 Pharmalogical Order History:     None            Code Status -   Code Status and Medical Interventions:   Ordered  at: 05/05/20 7867     Level Of Support Discussed With:    Patient     Code Status:    CPR     Medical Interventions (Level of Support Prior to Arrest):    Full           Discharge Planning        Destination      Coordination has not been started for this encounter.      Durable Medical Equipment      Coordination has not been started for this encounter.      Dialysis/Infusion      Coordination has not been started for this encounter.      Home Medical Care      Coordination has not been started for this encounter.      Therapy      Coordination has not been started for this encounter.      Community Resources      Coordination has not been started for this encounter.            Electronically signed by JOY Cosby, 05/07/20, 10:58.  [facility] Hospitalist Team

## 2020-05-07 NOTE — PROGRESS NOTES
Cc:  Follow up of possible TIA.      S:   The patient is sitting in no apparent distress.     O:  Vitals stable. O/E the patient is awake, alert, follow commands.  Speech is good.  CN EOMI, no facial droop. Motor 5/5.      A:  Possible TIA.      P:  Work up in progress.  MRI of Brain pending. Will follow.

## 2020-05-08 VITALS
TEMPERATURE: 97.5 F | RESPIRATION RATE: 22 BRPM | WEIGHT: 194.67 LBS | HEART RATE: 81 BPM | SYSTOLIC BLOOD PRESSURE: 112 MMHG | HEIGHT: 67 IN | OXYGEN SATURATION: 95 % | DIASTOLIC BLOOD PRESSURE: 66 MMHG | BODY MASS INDEX: 30.55 KG/M2

## 2020-05-08 PROBLEM — R79.89 ELEVATED TROPONIN: Chronic | Status: ACTIVE | Noted: 2020-05-06

## 2020-05-08 PROBLEM — R77.8 ELEVATED TROPONIN: Chronic | Status: ACTIVE | Noted: 2020-05-06

## 2020-05-08 PROBLEM — I20.0 UNSTABLE ANGINA: Chronic | Status: ACTIVE | Noted: 2020-05-05

## 2020-05-08 LAB
ANION GAP SERPL CALCULATED.3IONS-SCNC: 12 MMOL/L (ref 5–15)
BASOPHILS # BLD AUTO: 0 10*3/MM3 (ref 0–0.2)
BASOPHILS NFR BLD AUTO: 0.5 % (ref 0–1.5)
BUN BLD-MCNC: 32 MG/DL (ref 8–23)
BUN/CREAT SERPL: 28.6 (ref 7–25)
CALCIUM SPEC-SCNC: 9.4 MG/DL (ref 8.6–10.5)
CHLORIDE SERPL-SCNC: 92 MMOL/L (ref 98–107)
CO2 SERPL-SCNC: 28 MMOL/L (ref 22–29)
CREAT BLD-MCNC: 1.12 MG/DL (ref 0.76–1.27)
DEPRECATED RDW RBC AUTO: 42.9 FL (ref 37–54)
EOSINOPHIL # BLD AUTO: 0.2 10*3/MM3 (ref 0–0.4)
EOSINOPHIL NFR BLD AUTO: 2 % (ref 0.3–6.2)
ERYTHROCYTE [DISTWIDTH] IN BLOOD BY AUTOMATED COUNT: 13.4 % (ref 12.3–15.4)
GFR SERPL CREATININE-BSD FRML MDRD: 67 ML/MIN/1.73
GLUCOSE BLD-MCNC: 237 MG/DL (ref 65–99)
GLUCOSE BLDC GLUCOMTR-MCNC: 137 MG/DL (ref 70–105)
GLUCOSE BLDC GLUCOMTR-MCNC: 191 MG/DL (ref 70–105)
GLUCOSE BLDC GLUCOMTR-MCNC: 221 MG/DL (ref 70–105)
HCT VFR BLD AUTO: 41.2 % (ref 37.5–51)
HGB BLD-MCNC: 14.5 G/DL (ref 13–17.7)
LYMPHOCYTES # BLD AUTO: 2.3 10*3/MM3 (ref 0.7–3.1)
LYMPHOCYTES NFR BLD AUTO: 23.2 % (ref 19.6–45.3)
MAGNESIUM SERPL-MCNC: 2.1 MG/DL (ref 1.6–2.4)
MCH RBC QN AUTO: 31.7 PG (ref 26.6–33)
MCHC RBC AUTO-ENTMCNC: 35.1 G/DL (ref 31.5–35.7)
MCV RBC AUTO: 90.2 FL (ref 79–97)
MONOCYTES # BLD AUTO: 0.6 10*3/MM3 (ref 0.1–0.9)
MONOCYTES NFR BLD AUTO: 6.6 % (ref 5–12)
NEUTROPHILS # BLD AUTO: 6.6 10*3/MM3 (ref 1.7–7)
NEUTROPHILS NFR BLD AUTO: 67.7 % (ref 42.7–76)
NRBC BLD AUTO-RTO: 0.1 /100 WBC (ref 0–0.2)
PLATELET # BLD AUTO: 113 10*3/MM3 (ref 140–450)
PMV BLD AUTO: 10.9 FL (ref 6–12)
POTASSIUM BLD-SCNC: 4 MMOL/L (ref 3.5–5.2)
RBC # BLD AUTO: 4.57 10*6/MM3 (ref 4.14–5.8)
SODIUM BLD-SCNC: 132 MMOL/L (ref 136–145)
WBC NRBC COR # BLD: 9.8 10*3/MM3 (ref 3.4–10.8)

## 2020-05-08 PROCEDURE — B2181ZZ FLUOROSCOPY OF LEFT INTERNAL MAMMARY BYPASS GRAFT USING LOW OSMOLAR CONTRAST: ICD-10-PCS | Performed by: INTERNAL MEDICINE

## 2020-05-08 PROCEDURE — 63710000001 INSULIN LISPRO (HUMAN) PER 5 UNITS: Performed by: NURSE PRACTITIONER

## 2020-05-08 PROCEDURE — 99152 MOD SED SAME PHYS/QHP 5/>YRS: CPT | Performed by: INTERNAL MEDICINE

## 2020-05-08 PROCEDURE — 99153 MOD SED SAME PHYS/QHP EA: CPT | Performed by: INTERNAL MEDICINE

## 2020-05-08 PROCEDURE — 83735 ASSAY OF MAGNESIUM: CPT | Performed by: NURSE PRACTITIONER

## 2020-05-08 PROCEDURE — 25010000002 PHENYLEPHRINE 10 MG/ML SOLUTION: Performed by: INTERNAL MEDICINE

## 2020-05-08 PROCEDURE — 25010000002 MIDAZOLAM PER 1 MG: Performed by: INTERNAL MEDICINE

## 2020-05-08 PROCEDURE — 93459 L HRT ART/GRFT ANGIO: CPT | Performed by: INTERNAL MEDICINE

## 2020-05-08 PROCEDURE — 85025 COMPLETE CBC W/AUTO DIFF WBC: CPT | Performed by: NURSE PRACTITIONER

## 2020-05-08 PROCEDURE — 4A023N7 MEASUREMENT OF CARDIAC SAMPLING AND PRESSURE, LEFT HEART, PERCUTANEOUS APPROACH: ICD-10-PCS | Performed by: INTERNAL MEDICINE

## 2020-05-08 PROCEDURE — C1894 INTRO/SHEATH, NON-LASER: HCPCS | Performed by: INTERNAL MEDICINE

## 2020-05-08 PROCEDURE — 25010000002 FENTANYL CITRATE (PF) 100 MCG/2ML SOLUTION: Performed by: INTERNAL MEDICINE

## 2020-05-08 PROCEDURE — 80048 BASIC METABOLIC PNL TOTAL CA: CPT | Performed by: NURSE PRACTITIONER

## 2020-05-08 PROCEDURE — C1769 GUIDE WIRE: HCPCS | Performed by: INTERNAL MEDICINE

## 2020-05-08 PROCEDURE — B2151ZZ FLUOROSCOPY OF LEFT HEART USING LOW OSMOLAR CONTRAST: ICD-10-PCS | Performed by: INTERNAL MEDICINE

## 2020-05-08 PROCEDURE — 99232 SBSQ HOSP IP/OBS MODERATE 35: CPT | Performed by: INTERNAL MEDICINE

## 2020-05-08 PROCEDURE — 0 IOPAMIDOL PER 1 ML: Performed by: INTERNAL MEDICINE

## 2020-05-08 PROCEDURE — 99239 HOSP IP/OBS DSCHRG MGMT >30: CPT | Performed by: INTERNAL MEDICINE

## 2020-05-08 PROCEDURE — 99231 SBSQ HOSP IP/OBS SF/LOW 25: CPT | Performed by: PSYCHIATRY & NEUROLOGY

## 2020-05-08 PROCEDURE — 63710000001 INSULIN GLARGINE PER 5 UNITS: Performed by: NURSE PRACTITIONER

## 2020-05-08 PROCEDURE — B2111ZZ FLUOROSCOPY OF MULTIPLE CORONARY ARTERIES USING LOW OSMOLAR CONTRAST: ICD-10-PCS | Performed by: INTERNAL MEDICINE

## 2020-05-08 PROCEDURE — 82962 GLUCOSE BLOOD TEST: CPT

## 2020-05-08 PROCEDURE — 25010000002 DIPHENHYDRAMINE PER 50 MG: Performed by: INTERNAL MEDICINE

## 2020-05-08 RX ORDER — NITROGLYCERIN 5 MG/ML
INJECTION, SOLUTION INTRAVENOUS AS NEEDED
Status: DISCONTINUED | OUTPATIENT
Start: 2020-05-08 | End: 2020-05-08 | Stop reason: HOSPADM

## 2020-05-08 RX ORDER — PHENYLEPHRINE HYDROCHLORIDE 10 MG/ML
INJECTION INTRAVENOUS AS NEEDED
Status: DISCONTINUED | OUTPATIENT
Start: 2020-05-08 | End: 2020-05-08 | Stop reason: HOSPADM

## 2020-05-08 RX ORDER — ACETAMINOPHEN 325 MG/1
650 TABLET ORAL EVERY 4 HOURS PRN
Status: DISCONTINUED | OUTPATIENT
Start: 2020-05-08 | End: 2020-05-08 | Stop reason: HOSPADM

## 2020-05-08 RX ORDER — SODIUM CHLORIDE 9 MG/ML
100 INJECTION, SOLUTION INTRAVENOUS CONTINUOUS
Status: DISCONTINUED | OUTPATIENT
Start: 2020-05-08 | End: 2020-05-08 | Stop reason: HOSPADM

## 2020-05-08 RX ORDER — FENTANYL CITRATE 50 UG/ML
INJECTION, SOLUTION INTRAMUSCULAR; INTRAVENOUS AS NEEDED
Status: DISCONTINUED | OUTPATIENT
Start: 2020-05-08 | End: 2020-05-08 | Stop reason: HOSPADM

## 2020-05-08 RX ORDER — MIDAZOLAM HYDROCHLORIDE 1 MG/ML
INJECTION INTRAMUSCULAR; INTRAVENOUS AS NEEDED
Status: DISCONTINUED | OUTPATIENT
Start: 2020-05-08 | End: 2020-05-08 | Stop reason: HOSPADM

## 2020-05-08 RX ORDER — PEN NEEDLE, DIABETIC 30 GX3/16"
1 NEEDLE, DISPOSABLE MISCELLANEOUS DAILY
Qty: 100 EACH | Refills: 0 | Status: ON HOLD | OUTPATIENT
Start: 2020-05-08 | End: 2021-05-31

## 2020-05-08 RX ORDER — LIDOCAINE HYDROCHLORIDE 20 MG/ML
INJECTION, SOLUTION INFILTRATION; PERINEURAL AS NEEDED
Status: DISCONTINUED | OUTPATIENT
Start: 2020-05-08 | End: 2020-05-08 | Stop reason: HOSPADM

## 2020-05-08 RX ORDER — DEXTROSE MONOHYDRATE 50 MG/ML
30 INJECTION, SOLUTION INTRAVENOUS CONTINUOUS
Status: DISCONTINUED | OUTPATIENT
Start: 2020-05-08 | End: 2020-05-08 | Stop reason: HOSPADM

## 2020-05-08 RX ORDER — LANCETS 30 GAUGE
1 EACH MISCELLANEOUS
Qty: 200 EACH | Refills: 0 | Status: ON HOLD | OUTPATIENT
Start: 2020-05-08 | End: 2021-05-31

## 2020-05-08 RX ORDER — ISOPROPYL ALCOHOL 0.7 ML/1
1 SWAB TOPICAL
Qty: 200 EACH | Refills: 0 | Status: ON HOLD | OUTPATIENT
Start: 2020-05-08 | End: 2021-05-31

## 2020-05-08 RX ADMIN — SODIUM CHLORIDE 100 ML/HR: 900 INJECTION, SOLUTION INTRAVENOUS at 16:24

## 2020-05-08 RX ADMIN — INSULIN GLARGINE 10 UNITS: 100 INJECTION, SOLUTION SUBCUTANEOUS at 08:17

## 2020-05-08 RX ADMIN — LISINOPRIL 2.5 MG: 5 TABLET ORAL at 11:52

## 2020-05-08 RX ADMIN — ASPIRIN 325 MG ORAL TABLET 325 MG: 325 PILL ORAL at 08:11

## 2020-05-08 RX ADMIN — FENOFIBRATE 145 MG: 145 TABLET ORAL at 16:01

## 2020-05-08 RX ADMIN — DIPHENHYDRAMINE HYDROCHLORIDE 50 MG: 50 INJECTION, SOLUTION INTRAMUSCULAR; INTRAVENOUS at 12:35

## 2020-05-08 RX ADMIN — PANTOPRAZOLE SODIUM 40 MG: 40 TABLET, DELAYED RELEASE ORAL at 16:02

## 2020-05-08 RX ADMIN — INSULIN LISPRO 5 UNITS: 100 INJECTION, SOLUTION INTRAVENOUS; SUBCUTANEOUS at 08:10

## 2020-05-08 RX ADMIN — CARVEDILOL 3.12 MG: 3.12 TABLET, FILM COATED ORAL at 17:35

## 2020-05-08 RX ADMIN — CARVEDILOL 3.12 MG: 3.12 TABLET, FILM COATED ORAL at 11:51

## 2020-05-08 RX ADMIN — CLOPIDOGREL BISULFATE 75 MG: 75 TABLET ORAL at 16:02

## 2020-05-08 RX ADMIN — INSULIN LISPRO 3 UNITS: 100 INJECTION, SOLUTION INTRAVENOUS; SUBCUTANEOUS at 11:53

## 2020-05-08 RX ADMIN — DEXTROSE MONOHYDRATE 30 ML/HR: 5 INJECTION, SOLUTION INTRAVENOUS at 08:33

## 2020-05-08 RX ADMIN — ATORVASTATIN CALCIUM 40 MG: 40 TABLET, FILM COATED ORAL at 16:01

## 2020-05-08 RX ADMIN — GLIPIZIDE 10 MG: 5 TABLET ORAL at 17:35

## 2020-05-08 NOTE — PROGRESS NOTES
Continued Stay Note  JO-ANN Ortiz     Patient Name: Bobby Steele Jr.  MRN: [id number]  Today's Date: 5/8/2020    Admit Date: 5/5/2020    Discharge Plan      Anticipate routine d/c to home. DC Barriers - Patient had MRI done today. Pending Cardiac cath    Shelbie Hernandez RN, CM  Office Phone [phone]  Cell [phone]

## 2020-05-08 NOTE — NURSING NOTE
Pt v/s stable and monitored following heart catheterization. Pt no c/o of pain,cath site and peripheral pulses assessed. Pt will return to hospital 5/12/20 for intervention with Cardiology due to findings of heart catheterization. Pt AOx4. Rn could not due careplan and education due to Rn with nightshift completing discharge.

## 2020-05-08 NOTE — PROGRESS NOTES
PULMONARY/ CRITICAL CARE PROGRESS NOTE        Patient Name:  Bobby Steele Jr.    :  1960    Medical Record:  [id number]    REQUESTING PHYSICIAN    Yamile Agarwal    PRIMARY CARE PHYSICIAN     Yamile Agarwal    REASON FOR CONSULTATION    Bobby Steele Jr. is a 60 y.o. male with a PMH of CAD, COPD, diabetes type 2, hyperlipidemia, hypertension, tobacco abuse and chronic systolic heart failure who we were asked to see in consultation for hypotension.    He presented to the [facility] ED on 2020 with a complaint of left hand and finger tingling.  Patient states he woke up this morning with his left arm feeling numb and as the day progressed it slowly resolved however now his fingers and his hand are still tingling.  Patient states he sleeps with his hands above his head every night and this sometimes happens and it normally resolves but it has not yet resolved. Patient denies fever, cough, chest pain, shortness of breath and headache.  In the ED, CT head no acute intracranial abnormality.  Glucose 519, BUN 42, creatinine 2.12, sodium 129, potassium 3.8, WBC 14.6, Hgb 16.3, HCT 45.1, platelets 186.  NIH is 0.  EKG normal sinus rhythm.  Patient was given 6 units regular insulin IV x1.  500 mL normal saline bolus x2.  He remained hypotensive despite 1500 cc of IVFs with a SBP in the 70's and he  Is being admitted to the ICU for pressor requirements.       Patient with recent hospitalization last week where he was treated for exacerbation of his CHF and had metolazone added to his home regimen upon discharge.         :  No new issues overnight.    no SOA/CP       REVIEW OF SYSTEMS    As above     HOME MEDICATIONS  Prior to Admission medications    Medication Sig Start Date End Date Taking? Authorizing Provider   albuterol (PROVENTIL) (2.5 MG/3ML) 0.083% nebulizer solution Take 2.5 mg by nebulization Every 6 (Six) Hours As Needed for Wheezing or Shortness of Air.   Yes Provider, MD Shirley    albuterol sulfate  (90 Base) MCG/ACT inhaler Inhale 2 puffs Every 4 (Four) Hours As Needed for Wheezing or Shortness of Air.   Yes Shirley Eng MD   aspirin 325 MG tablet Take 325 mg by mouth Daily.   Yes Shirley Eng MD   atorvastatin (LIPITOR) 40 MG tablet Take 40 mg by mouth Daily.   Yes Shirley Eng MD   carvedilol (COREG) 3.125 MG tablet Take 3.125 mg by mouth 2 (Two) Times a Day With Meals.   Yes Shirley Eng MD   clopidogrel (PLAVIX) 75 MG tablet Take 75 mg by mouth Daily.   Yes ProviderShirley MD   doxycycline 100 mg in sodium chloride 0.9 % 100 mL IVPB Infuse 100 mg into a venous catheter Every 12 (Twelve) Hours for 3 days. Indications: COPD exacerbation 5/3/20 5/6/20 Yes Dileep Alvarez DO   fenofibrate (TRICOR) 145 MG tablet Take 1 tablet by mouth Daily. 7/2/19  Yes Juarez Wing MD   furosemide (LASIX) 40 MG tablet Take 1 tablet by mouth 2 (Two) Times a Day. 7/2/19  Yes Juarez Wing MD   glimepiride (AMARYL) 4 MG tablet Take 4 mg by mouth 2 (Two) Times a Day.   Yes Shirley Eng MD   isosorbide mononitrate (IMDUR) 30 MG 24 hr tablet TAKE 1 TABLET BY MOUTH ONCE DAILY 12/24/19  Yes Juarez Wing MD   lactulose (CHRONULAC) 10 GM/15ML solution Take 20 g by mouth 2 (Two) Times a Day As Needed (constipation).   Yes Shirley Eng MD   lisinopril (PRINIVIL,ZESTRIL) 2.5 MG tablet TAKE 1 TABLET BY MOUTH ONCE DAILY 12/23/19  Yes Juarez Wing MD   metFORMIN (GLUCOPHAGE) 500 MG tablet Take 500 mg by mouth 2 (Two) Times a Day With Meals.   Yes Shirley Eng MD   metOLazone (ZAROXOLYN) 5 MG tablet Take 1 tablet by mouth Daily. 5/3/20  Yes Dileep Alvarez DO   nitroglycerin (NITROSTAT) 0.4 MG SL tablet Take no more than 3 doses in 15 minutes. 12/3/19  Yes Juarez Wing MD   pantoprazole (PROTONIX) 40 MG EC tablet Take 40 mg by mouth Daily.   Yes Provider, Historical,  MD   spironolactone (ALDACTONE) 25 MG tablet Take 1 tablet by mouth Daily. 9/10/19  Yes Juarez Wing MD   temazepam (RESTORIL) 15 MG capsule Take 15 mg by mouth At Night As Needed for Sleep.   Yes Provider, MD Shirley       MEDICAL HISTORY    Past Medical History:   Diagnosis Date   • CAD (coronary artery disease)     S/P CABG   • Chronic systolic CHF (congestive heart failure) (CMS/[facility])    • COPD (chronic obstructive pulmonary disease) (CMS/[facility])    • DM2 (diabetes mellitus, type 2) (CMS/[facility])    • Dyslipidemia    • Encounter for monitoring antiplatelet therapy    • Hypertension    • Myocardial infarction (CMS/[facility])     inferior wall MI--   • DEBI (obstructive sleep apnea)     noncompliant with CPAP   • Peripheral vascular disease (CMS/[facility])     S/P left fem-pop bypass   • Tobacco use         SURGICAL HISTORY    Past Surgical History:   Procedure Laterality Date   • APPENDECTOMY      at age 8 or 9   • CARDIAC CATHETERIZATION      3-; [facility] 2014   • CARDIAC CATHETERIZATION Right 2019    Procedure: Cardiac Catheterization/with grafts groin access;  Surgeon: Juarez Wing MD;  Location: [facility] CATH INVASIVE LOCATION;  Service: Cardiovascular   • CARDIAC ELECTROPHYSIOLOGY PROCEDURE N/A 10/8/2019    Procedure: ICD SC new, ST.SHIV ;  Surgeon: Juarez Wing MD;  Location: [facility] CATH INVASIVE LOCATION;  Service: Cardiovascular   • COLONOSCOPY     • CORONARY ARTERY BYPASS GRAFT      x3, 3-18-13-LIMA to LAD, and reverse individual SVG to lateral marginal and to PDA   • FEMORAL POPLITEAL BYPASS Left 2019    [facility]/ Dr. Jero Hatch   • HAND SURGERY Left     crushed hand   • TOE SURGERY      toe nail removal of big toe- age 8 or 9        FAMILY HISTORY    Family History   Problem Relation Age of Onset   • Hypertension Mother    • Diabetes Mother    • Heart disease Father         had CABG and re-do/  while recovering-had MI age 40s   • Diabetes Father    •  Pancreatic cancer Sister    • Hyperlipidemia Brother    • Stroke Brother    • Heart disease Paternal Uncle        SOCIAL HISTORY    Social History     Tobacco Use   • Smoking status: Current Every Day Smoker     Packs/day: 1.00     Years: 25.00     Pack years: 25.00     Types: Cigarettes   • Smokeless tobacco: Never Used   • Tobacco comment: currently smokes 5 cigarettes per day   Substance Use Topics   • Alcohol use: No     Frequency: Never        ALLERGIES    No Known Allergies    MEDICATIONS    Scheduled Meds:    aspirin 325 mg Oral Daily   atorvastatin 40 mg Oral Daily   carvedilol 3.125 mg Oral BID With Meals   clopidogrel 75 mg Oral Daily   fenofibrate 145 mg Oral Daily   glipizide 10 mg Oral BID AC   insulin glargine 10 Units Subcutaneous Daily   insulin lispro 0-14 Units Subcutaneous TID AC   lisinopril 2.5 mg Oral Q24H   pantoprazole 40 mg Oral Daily     Continuous Infusions:    dextrose 30 mL/hr Last Rate: 30 mL/hr (20 08)   sodium chloride 100 mL/hr      PRN Meds:.•  [DISCONTINUED] acetaminophen **OR** acetaminophen **OR** acetaminophen  •  acetaminophen  •  albuterol  •  aluminum-magnesium hydroxide-simethicone  •  bisacodyl  •  dextrose  •  dextrose  •  glucagon (human recombinant)  •  insulin lispro **AND** insulin lispro  •  magnesium hydroxide  •  magnesium sulfate **OR** magnesium sulfate in D5W 1g/100mL (PREMIX)  •  melatonin  •  ondansetron **OR** ondansetron  •  potassium chloride  •  [COMPLETED] Insert peripheral IV **AND** sodium chloride      PHYSICAL EXAM    tMax 24 hrs:  Temp (24hrs), Av.9 °F (37.2 °C), Min:98.3 °F (36.8 °C), Max:99.3 °F (37.4 °C)    Vitals Ranges:  Temp:  [98.3 °F (36.8 °C)-99.3 °F (37.4 °C)] 99.3 °F (37.4 °C)  Heart Rate:  [69-91] 69  Resp:  [15-19] 17  BP: ()/(53-81) 110/63  Intake and Output Last 3 Shifts:  I/O last 3 completed shifts:  In: 2916 [P.O.:1378; I.V.:1538]  Out: 2625 [Urine:2625]    Constitutional:   Alert, no acute respiratory distress    HEENT:   Atraumatic, PERRL, conjunctiva normal, moist oral mucosa, no nasal discharge.  Trachea is midline.  Respiratory:   No respiratory distress, normal breath sounds, no rales, no wheezing   Cardiovascular:   Normal rate, normal rhythm and no murmurs.  Pulses 2+ and equal in all four extremities.    GI:   Soft, nondistended, positive bowel sounds.  Extremities:   No edema, cyanosis or tenderness.  Integument:   No rashes.   Neurologic:   Alert & oriented x 3, CN 2-12 normal, normal motor function, normal sensory function, no focal deficits noted   Psychiatric:   Speech and behavior appropriate     LABS    Lab Results (last 24 hours)     Procedure Component Value Units Date/Time    POC Glucose Once [[id number]]  (Abnormal) Collected:  05/08/20 1133    Specimen:  Blood Updated:  05/08/20 1134     Glucose 191 mg/dL      Comment: Serial Number: [id number]:  [id number]       Magnesium [[id number]]  (Normal) Collected:  05/08/20 0904    Specimen:  Blood Updated:  05/08/20 0957     Magnesium 2.1 mg/dL     Basic Metabolic Panel [[id number]]  (Abnormal) Collected:  05/08/20 0904    Specimen:  Blood Updated:  05/08/20 0949     Glucose 237 mg/dL      BUN 32 mg/dL      Creatinine 1.12 mg/dL      Sodium 132 mmol/L      Potassium 4.0 mmol/L      Chloride 92 mmol/L      CO2 28.0 mmol/L      Calcium 9.4 mg/dL      eGFR Non African Amer 67 mL/min/1.73      BUN/Creatinine Ratio 28.6     Anion Gap 12.0 mmol/L     Narrative:       GFR Normal >60  Chronic Kidney Disease <60  Kidney Failure <15      CBC & Differential [[id number]] Collected:  05/08/20 0904    Specimen:  Blood Updated:  05/08/20 0936    Narrative:       The following orders were created for panel order CBC & Differential.  Procedure                               Abnormality         Status                     ---------                               -----------         ------                     CBC Auto Differential[[id number]]        Abnormal            Final  result                 Please view results for these tests on the individual orders.    CBC Auto Differential [[id number]]  (Abnormal) Collected:  05/08/20 0904    Specimen:  Blood Updated:  05/08/20 0936     WBC 9.80 10*3/mm3      RBC 4.57 10*6/mm3      Hemoglobin 14.5 g/dL      Comment: Result checked         Hematocrit 41.2 %      MCV 90.2 fL      MCH 31.7 pg      MCHC 35.1 g/dL      RDW 13.4 %      RDW-SD 42.9 fl      MPV 10.9 fL      Platelets 113 10*3/mm3      Neutrophil % 67.7 %      Lymphocyte % 23.2 %      Monocyte % 6.6 %      Eosinophil % 2.0 %      Basophil % 0.5 %      Neutrophils, Absolute 6.60 10*3/mm3      Lymphocytes, Absolute 2.30 10*3/mm3      Monocytes, Absolute 0.60 10*3/mm3      Eosinophils, Absolute 0.20 10*3/mm3      Basophils, Absolute 0.00 10*3/mm3      nRBC 0.1 /100 WBC     Blood Culture - Blood, Arm, Right [[id number]] Collected:  05/06/20 0803    Specimen:  Blood from Arm, Right Updated:  05/08/20 0830     Blood Culture No growth at 2 days    POC Glucose Once [[id number]]  (Abnormal) Collected:  05/08/20 0730    Specimen:  Blood Updated:  05/08/20 0732     Glucose 221 mg/dL      Comment: Serial Number: [id number]:  [id number]       Blood Culture - Blood, Hand, Right [[id number]] Collected:  05/06/20 0422    Specimen:  Blood from Hand, Right Updated:  05/08/20 0500     Blood Culture No growth at 2 days    Troponin [[id number]]  (Abnormal) Collected:  05/07/20 1815    Specimen:  Blood Updated:  05/07/20 1859     Troponin T 0.063 ng/mL     Narrative:       Troponin T Reference Range:  <= 0.03 ng/mL-   Negative for AMI  >0.03 ng/mL-     Abnormal for myocardial necrosis.  Clinicians would have to utilize clinical acumen, EKG, Troponin and serial changes to determine if it is an Acute Myocardial Infarction or myocardial injury due to an underlying chronic condition.       Results may be falsely decreased if patient taking Biotin.      POC Glucose Once [[id number]]  (Abnormal) Collected:   05/07/20 1754    Specimen:  Blood Updated:  05/07/20 1755     Glucose 336 mg/dL      Comment: Serial Number: [id number]:  [id number]                 CBC  Results from last 7 days   Lab Units 05/08/20 0904 05/06/20 0422 05/05/20 1949 05/03/20 0358 05/02/20  0537   WBC 10*3/mm3 9.80 11.80* 14.60* 9.30 8.50   RBC 10*6/mm3 4.57 4.50 4.92 4.54 4.75   HEMOGLOBIN g/dL 14.5 14.5 16.3 14.6 15.2   HEMATOCRIT % 41.2 41.1 45.1 41.2 43.7   MCV fL 90.2 91.5 91.7 90.9 92.1   PLATELETS 10*3/mm3 113* 152 186 143 167       BMP  Results from last 7 days   Lab Units 05/08/20 0904 05/07/20 0535 05/06/20 0422 05/05/20 1949 05/03/20 0358 05/02/20  0537   SODIUM mmol/L 132* 135* 134* 129* 135* 140   POTASSIUM mmol/L 4.0 4.0 3.6 3.8 4.2 4.0   CHLORIDE mmol/L 92* 95* 95* 88* 95* 95*   CO2 mmol/L 28.0 27.0 23.0 26.0 24.0 27.0   BUN mg/dL 32* 30* 43* 42* 33* 23   CREATININE mg/dL 1.12 1.25 1.96*  2.08* 2.12* 1.58* 1.34*   GLUCOSE mg/dL 237* 254* 248* 519* 479* 424*   MAGNESIUM mg/dL 2.1 1.5*  --   --  2.4 1.4*   PHOSPHORUS mg/dL  --  3.1  --   --   --  4.0       CMP   Results from last 7 days   Lab Units 05/08/20 0904 05/07/20 0535 05/06/20 0422 05/05/20 1949 05/03/20 0358 05/02/20  0537   SODIUM mmol/L 132* 135* 134* 129* 135* 140   POTASSIUM mmol/L 4.0 4.0 3.6 3.8 4.2 4.0   CHLORIDE mmol/L 92* 95* 95* 88* 95* 95*   CO2 mmol/L 28.0 27.0 23.0 26.0 24.0 27.0   BUN mg/dL 32* 30* 43* 42* 33* 23   CREATININE mg/dL 1.12 1.25 1.96*  2.08* 2.12* 1.58* 1.34*   GLUCOSE mg/dL 237* 254* 248* 519* 479* 424*   ALBUMIN g/dL  --   --   --   --   --  4.20   BILIRUBIN mg/dL  --   --   --   --   --  0.5   ALK PHOS U/L  --   --   --   --   --  51   AST (SGOT) U/L  --   --   --   --   --  23   ALT (SGPT) U/L  --   --   --   --   --  40         proBNP      TROPONIN  Results from last 7 days   Lab Units 05/07/20  1815   TROPONIN T ng/mL 0.063*       CoAg        Creatinine Clearance  Estimated Creatinine Clearance: 74.4 mL/min (by C-G formula  based on SCr of 1.12 mg/dL).    ABG        Microbiology  Microbiology Results (last 10 days)     Procedure Component Value - Date/Time    Blood Culture - Blood, Arm, Right [[id number]] Collected:  05/06/20 0803    Lab Status:  Preliminary result Specimen:  Blood from Arm, Right Updated:  05/08/20 0830     Blood Culture No growth at 2 days    Legionella Antigen, Urine - Urine, Urine, Clean Catch [[id number]]  (Normal) Collected:  05/06/20 0429    Lab Status:  Final result Specimen:  Urine, Clean Catch Updated:  05/06/20 0523     LEGIONELLA ANTIGEN, URINE Negative    S. Pneumo Ag Urine or CSF - Urine, Urine, Clean Catch [[id number]]  (Normal) Collected:  05/06/20 0429    Lab Status:  Final result Specimen:  Urine, Clean Catch Updated:  05/06/20 0524     Strep Pneumo Ag Negative    Respiratory Panel, PCR - Swab, Nasopharynx [[id number]]  (Normal) Collected:  05/06/20 0424    Lab Status:  Final result Specimen:  Swab from Nasopharynx Updated:  05/06/20 0646     ADENOVIRUS, PCR Not Detected     Coronavirus 229E Not Detected     Coronavirus HKU1 Not Detected     Coronavirus NL63 Not Detected     Coronavirus OC43 Not Detected     Human Metapneumovirus Not Detected     Human Rhinovirus/Enterovirus Not Detected     Influenza B PCR Not Detected     Parainfluenza Virus 1 Not Detected     Parainfluenza Virus 2 Not Detected     Parainfluenza Virus 3 Not Detected     Parainfluenza Virus 4 Not Detected     Bordetella pertussis pcr Not Detected     Influenza A H1 2009 PCR Not Detected     Chlamydophila pneumoniae PCR Not Detected     Mycoplasma pneumo by PCR Not Detected     Influenza A PCR Not Detected     Influenza A H3 Not Detected     Influenza A H1 Not Detected     RSV, PCR Not Detected    Narrative:       The coronavirus on the RVP is NOT COVID-19 and is NOT indicative of infection with COVID-19.     Blood Culture - Blood, Hand, Right [[id number]] Collected:  05/06/20 0422    Lab Status:  Preliminary result Specimen:  Blood from  Hand, Right Updated:  05/08/20 0500     Blood Culture No growth at 2 days          No results found for: ACANTHNAEG, AFBCX, BPERTUSSISCX, BLOODCX  No results found for: BCIDPCR, CXREFLEX, CSFCX, CULTURETIS  No results found for: CULTURES, HSVCX, URCX  No results found for: EYECULTURE, GCCX, LABHSV  No results found for: LEGIONELLA, MRSACX, MUMPSCX, MYCOPLASCX  No results found for: NOCARDIACX, STOOLCX  No results found for: THROATCX, UNSTIMCULT, URINECX, CULTURE, VZVCULTUR  No results found for: VIRALCULTU, WOUNDCX    IMAGING & OTHER STUDIES    Imaging Results (Last 72 Hours)     Procedure Component Value Units Date/Time    MRI Brain Without Contrast [[id number]] Collected:  05/07/20 1423     Updated:  05/07/20 1427    Narrative:          DATE OF EXAM:   5/7/2020 1:40 PM     PROCEDURE:   MRI BRAIN WO CONTRAST-     INDICATIONS:   Stroke; R20.2-Paresthesia of skin; I95.9-Hypotension, unspecified     COMPARISON:  No Comparisons Available     TECHNIQUE:   Routine magnetic resonance imaging of the brain was performed without  administration of contrast.     FINDINGS:   There is generalized prominence of the ventricles and CSF containing  spaces. No intra or extra-axial mass lesions, fluid collections, or mass  effect are seen. No abnormal areas of low or high signal are identified.  Diffusion imaging appears normal. There is a scalp lesion over the left  parietal convexity, likely representing a sebaceous cyst        Impression:          1. Generalized atrophy. No acute intracranial findings.  2. Benign-appearing lesion in the scalp over the left parietal  convexity, possibly a sebaceous cyst.     Electronically Signed By-Ethan Hooper On:5/7/2020 2:25 PM  This report was finalized on [id number] by  Ethan Hooper, .    XR Chest 1 View [[id number]] Collected:  05/06/20 0357     Updated:  05/06/20 0402    Narrative:       DATE OF EXAM:  5/6/2020 1:00 AM     PROCEDURE:  XR CHEST 1 VW-     INDICATIONS:  Shortness of breath/air  (sob/soa); R20.2-Paresthesias of the skin;  I95.9-Hypotension, unspecified. History of chronic cough and COPD.     COMPARISON:  05/01/2020, 1:36 PM.     TECHNIQUE:   Single radiographic AP view of the chest was obtained.     FINDINGS:  A single view of the chest reveals no cardiac enlargement and no focal  infiltrate. No pneumothorax.  There is a left-sided cardiac implantable  electronic device (CIED) in place, seen previously. The patient has  undergone median sternotomy and CABG surgery. External artifacts obscure  detail. There is suspected chronic calcified granulomatous disease of  the chest. Probably no significant interval change is seen since the  prior study.       Impression:       No acute infiltrate is appreciated.     Electronically Signed By-Dr. Олег Alcantar MD On:5/6/2020 4:00 AM  This report was finalized on [id number] by Dr. Олег Alcantar MD.    CT Head Without Contrast [[id number]] Collected:  05/05/20 2040     Updated:  05/05/20 2045    Narrative:       CT HEAD WO CONTRAST-     Date of Exam: 5/5/2020 8:15 PM     Indication: Sensation loss.  Left arm numbness      Comparison Exams: None available.     Technique: Multiple axial images were obtained from the skull base to  the vertex without the administration of IV contrast. The axial data was  used to generate reformatted images in the coronal and sagittal planes.  Automated exposure control and iterative reconstruction methods were  used.     FINDINGS:  There is mild generalized parenchymal volume loss.  There is no acute  infarct or hemorrhage. No abnormal extra-axial fluid collections are  seen. There is no mass effect or hydrocephalus.     There is no evidence of skull fracture. Visualized paranasal sinuses and  mastoid air cells are clear.  There is a 1.4 cm soft tissue density  nodule within the skin of the scalp overlying the left frontal vertex  this could represent a sebaceous or dermal cyst or neoplastic process.   Clinical  correlation recommended.     The globes and orbits are within normal limits.       Impression:          1. No acute intracranial abnormality.  2.  1.4 cm soft tissue density nodule within the skin overlying the left  frontal vertex.  This could represent dermal or sebaceous cyst or  neoplastic process.  Clinical correlation recommended.     Electronically Signed By-Garry Plascencia On:5/5/2020 8:43 PM  This report was finalized on [id number] by  Garry Plascencia, .            ASSESSMENT/PLAN  Hypotension  -pan cx neg to date   -off abx   -Echo report pending   -Additional 500 cc bolus without improvement in BP, start on Levophed - OFF   -Question dehydration from addition of metolazone to home regimen  -Lactic acid 1.4  -Hopefully can stop IVF once Cr improves and BP remains stable    VINICIO    -Monitor creatinine  - on IVFs   -Cr now 1.12  - D/C IVF tomorrow if Creat stable.     Paresthesia  -Left hand fingertips possibly Carpal tunnel syndrome  -Initial NIH 0  -Neurochecks every 4  -Neurology following  -B12 691 and folate 9     COPD  -Not in an acute exacerbation  -Continue Proventil mini neb q. PRN     CAD  -Continue Plavix, Imdur     Chronic systolic heart failure with AICD  -Holding Lasix, Zaroxolyn, Aldactone, due to VINICIO   -On Coreg, Lisinopril   -EF 35% (echo dated 8/13/2019)     Hyperlipidemia  -Continue statin     Diabetes type 2  -Accu-Cheks AC  -Sliding scale insulin as needed  -Diabetes educator to see     GERD  -Continue PPI     Tobacco abuse  -Encourage cessation     PT/OT

## 2020-05-08 NOTE — PROGRESS NOTES
Cardiology Progress Note    Patient Identification:  Name: Bobby Steele Jr.  Age: 60 y.o.  Sex: male  :  1960  MRN: [id number]                 Follow Up / Chief Complaint: Follow-up for elevated troponin, hypertension, CAD, CABG, CHF, cardiomyopathy, PCI  Chief Complaint   Patient presents with   • Numbness       Interval History: Patient's blood pressure is improved is off vasopressors       Subjective: Denies any chest pain shortness of breath.  Left arm tingling and numbness is improved      Objective: Patient had MRI of the head which is negative for active stroke         History of Present Illness:          This is a 60-year-old white male with past medical history of     # NSTEMI  19, SHILPA to SVG to RCA and proximal LAD leading into a small diagonal  # CAD status post CABG X3 V  # Ischemic cardiomyopathy with EF of 35%, status post ICD 10/8/2019  # COPD, continue tobacco abuse  # Hypertension   # Hyperlipidemia  # Diabetes with hemoglobin A1c of 7.4 on 6/3/19  #Positive family for premature heart disease with father MI 53     Here with complaint of unilateral tingling and numbness in left hand.  Patient was recently in the hospital with elevated troponin and proBNP was diagnosed with CHF diuresed and sent home on metolazone.  Patient called me as outpatient saying that he is got tingling and numbness and thinks it is a side effect of metolazone.  Since it was unilateral I advised him to come to the ER.  Work-up in the ER revealed a proBNP of 1554 which is worse than his discharge 1115.  BUN/creatinine were 42/2.12 compared to his discharge BUN/creatinine of 33/1.58 and a baseline around 1.3.  D-dimer 0.45, CBC with a white count of 14.6.  Normal UA.  CT head 2020- for active bleed.  Chest x-ray no acute infiltrates or effusions.  EKG reviewed by me from 2020 reveals sinus tachycardia at the rate of 100 bpm with nonspecific ST depression in lateral leads.     Patient had stress test  5/2/2020 which was abnormal with old inferior wall MI with cole-infarct and ischemia and EF of 42%      Patient  underwent cardiac cath 6/27/2019 which revealed patent stent to SVG to RCA patent stent in proximal LAD and small vessel disease and diagonal.   Patient has chronic dyspnea on exertion relieved with rest.  Patient is status post ICD placement 10/8/2019 .           ASSESSMENT:   #Tingling and numbness unilateral in left hand  #Hypotension  #elevated troponin, possible non-ST elevation MI  #  chronic CHF, ischemic cardiomyopathy 35%, status post ICD 10/8/2019  # NSTEMI 5/12/19  # CAD status post CABG  # COPD, tobacco abuse   # hypertension ,  #  hyperlipidemia   #Hyperglycemia, and type 2 diabetes  #VINICIO on CKD     Recommendations:  Telemetry is revealing sinus rhythm  Gentle hydration  Patient is off vasopressors  Hold  diuretics metolazone  Will gradually restart beta-blockers and ACE inhibitor's as tolerated  Serial cardiac enzymes are mildly elevated  We will proceed with cardiac cath to evaluate coronary anatomy risk benefits alternatives explained  We will follow-up and consider further evaluation and treatment     Patient had cath 6/27/2019 which revealed patent stent in SVG to RCA and patent stent in proximal LAD with severe disease in diagonal branch which is too small to be stented  Continue , aspirin, Plavix and statin as tolerated     Monitor blood pressure  Discussed with patient and his wife   Counseled on smoking cessation  Counseled on CHF care  Counseled on diet exercise and smoking cessation  Diabetes control   Patient is getting MRI today called [employer] rep to come and adjust ICD device patient can have MRI done  We will follow  Discussed with RN taking care of patient       Past Medical History:  Past Medical History:   Diagnosis Date   • CAD (coronary artery disease)     S/P CABG   • Chronic systolic CHF (congestive heart failure) (CMS/[facility])    • COPD (chronic obstructive pulmonary  disease) (CMS/[facility])    • DM2 (diabetes mellitus, type 2) (CMS/[facility])    • Dyslipidemia    • Encounter for monitoring antiplatelet therapy    • Hypertension    • Myocardial infarction (CMS/HCC)     inferior wall MI--   • DEBI (obstructive sleep apnea)     noncompliant with CPAP   • Peripheral vascular disease (CMS/[facility])     S/P left fem-pop bypass   • Tobacco use      Past Surgical History:  Past Surgical History:   Procedure Laterality Date   • APPENDECTOMY      at age 8 or 9   • CARDIAC CATHETERIZATION      3-; [facility] 2014   • CARDIAC CATHETERIZATION Right 2019    Procedure: Cardiac Catheterization/with grafts groin access;  Surgeon: Juarez Wing MD;  Location:  [facility] CATH INVASIVE LOCATION;  Service: Cardiovascular   • CARDIAC ELECTROPHYSIOLOGY PROCEDURE N/A 10/8/2019    Procedure: ICD SC new, ST.SHIV ;  Surgeon: Juarez Wing MD;  Location:  [facility] CATH INVASIVE LOCATION;  Service: Cardiovascular   • COLONOSCOPY     • CORONARY ARTERY BYPASS GRAFT      x3, 3-18-13-LIMA to LAD, and reverse individual SVG to lateral marginal and to PDA   • FEMORAL POPLITEAL BYPASS Left 2019    [facility]/ Dr. Jero Hatch   • HAND SURGERY Left     crushed hand   • TOE SURGERY      toe nail removal of big toe- age 8 or 9        Social History:   Social History     Tobacco Use   • Smoking status: Current Every Day Smoker     Packs/day: 1.00     Years: 25.00     Pack years: 25.00     Types: Cigarettes   • Smokeless tobacco: Never Used   • Tobacco comment: currently smokes 5 cigarettes per day   Substance Use Topics   • Alcohol use: No     Frequency: Never      Family History:  Family History   Problem Relation Age of Onset   • Hypertension Mother    • Diabetes Mother    • Heart disease Father         had CABG and re-do/  while recovering-had MI age 40s   • Diabetes Father    • Pancreatic cancer Sister    • Hyperlipidemia Brother    • Stroke Brother    • Heart disease Paternal Uncle      "      Allergies:  No Known Allergies  Scheduled Meds:    aspirin 325 mg Daily   atorvastatin 40 mg Daily   carvedilol 3.125 mg BID With Meals   clopidogrel 75 mg Daily   diphenhydrAMINE 50 mg Once   fenofibrate 145 mg Daily   glipizide 10 mg BID AC   insulin glargine 10 Units Daily   insulin lispro 0-14 Units TID AC   lisinopril 2.5 mg Q24H   pantoprazole 40 mg Daily           INTAKE AND OUTPUT:    Intake/Output Summary (Last 24 hours) at 5/8/2020 1158  Last data filed at 5/8/2020 0840  Gross per 24 hour   Intake 1818 ml   Output --   Net 1818 ml       Review of Systems:   Review of Systems   Constitution: Negative for chills and fever.   Cardiovascular: Negative for chest pain and dyspnea on exertion.   Respiratory: Negative for cough and hemoptysis.    Gastrointestinal: Negative for nausea and vomiting.       Constitutional:  Temp:  [98.3 °F (36.8 °C)-99.3 °F (37.4 °C)] 99.3 °F (37.4 °C)  Heart Rate:  [78-91] 79  Resp:  [15-19] 19  BP: ()/(54-90) 100/68    Physical Exam   /68 (BP Location: Left arm, Patient Position: Sitting)   Pulse 79   Temp 99.3 °F (37.4 °C) (Oral)   Resp 19   Ht 170.2 cm (67\")   Wt 88.3 kg (194 lb 10.7 oz)   SpO2 91%   BMI 30.49 kg/m²   General:  Appears in no acute distress  Eyes: Sclera is anicteric,  conjunctiva is clear   HEENT:  No JVD. Thyroid not visibly enlarged. No mucosal pallor or cyanosis  Respiratory: Respirations regular and unlabored at rest.  Clear to auscultation  Cardiovascular: S1,S2 Regular rate and rhythm. No murmur, rub or gallop auscultated.  . No pretibial pitting edema  Gastrointestinal: Abdomen soft, flat, non tender. Bowel sounds present.   Musculoskeletal:  No abnormal movements  Extremities: No digital clubbing or cyanosis  Skin: Color pink. Skin warm and dry to touch. No rashes  No xanthoma  Neuro: Alert and awake, no lateralizing deficits appreciated        Cardiographics  Telemetry: Sinus rhythm    ECG:   ECG 12 Lead   Preliminary Result "   HEART RATE= 100  bpm   RR Interval= 600  ms   ID Interval= 151  ms   P Horizontal Axis= -21  deg   P Front Axis= 81  deg   QRSD Interval= 108  ms   QT Interval= 357  ms   QRS Axis= 83  deg   T Wave Axis= 244  deg   - ABNORMAL ECG -   Sinus tachycardia   Probable left atrial enlargement   Repol abnrm suggests ischemia, diffuse leads   When compared with ECG of 02-May-2020 7:20:19,   Significant rate increase   Electronically Signed By:    Date and Time of Study: 2020-05-05 19:41:13        I have personally reviewed EKG    Echocardiogram:   Results for orders placed during the hospital encounter of 05/05/20   Adult Transthoracic Echo Complete W/ Cont if Necessary Per Protocol    Narrative Technically difficult study.  Mild LVE with severe apical hypokinesis and, moderate global hypokinesis   estimated LV ejection fraction of 35%   Normal RV size  Mild left atrial enlargement seen  Aortic valve, mitral valve, tricuspid valve appears structurally normal,   no significant regurgitation seen.  No pericardial effusion seen.  Proximal aorta appears normal in size.       Lab Review   I have reviewed the labs  Results from last 7 days   Lab Units 05/07/20  1815 05/07/20  1203 05/07/20  0535   TROPONIN T ng/mL 0.063* 0.070* 0.077*     Results from last 7 days   Lab Units 05/08/20  0904   MAGNESIUM mg/dL 2.1     Results from last 7 days   Lab Units 05/08/20  0904   SODIUM mmol/L 132*   POTASSIUM mmol/L 4.0   BUN mg/dL 32*   CREATININE mg/dL 1.12   CALCIUM mg/dL 9.4         Results from last 7 days   Lab Units 05/08/20  0904 05/06/20  0422 05/05/20  1949   WBC 10*3/mm3 9.80 11.80* 14.60*   HEMOGLOBIN g/dL 14.5 14.5 16.3   HEMATOCRIT % 41.2 41.1 45.1   PLATELETS 10*3/mm3 113* 152 186     Results from last 7 days   Lab Units 05/01/20  1247   INR  1.05   APTT seconds 23.2*       RADIOLOGY:  Imaging Results (Last 24 Hours)     Procedure Component Value Units Date/Time    MRI Brain Without Contrast [[id number]] Collected:  05/07/20  "1423     Updated:  05/07/20 1427    Narrative:          DATE OF EXAM:   5/7/2020 1:40 PM     PROCEDURE:   MRI BRAIN WO CONTRAST-     INDICATIONS:   Stroke; R20.2-Paresthesia of skin; I95.9-Hypotension, unspecified     COMPARISON:  No Comparisons Available     TECHNIQUE:   Routine magnetic resonance imaging of the brain was performed without  administration of contrast.     FINDINGS:   There is generalized prominence of the ventricles and CSF containing  spaces. No intra or extra-axial mass lesions, fluid collections, or mass  effect are seen. No abnormal areas of low or high signal are identified.  Diffusion imaging appears normal. There is a scalp lesion over the left  parietal convexity, likely representing a sebaceous cyst        Impression:          1. Generalized atrophy. No acute intracranial findings.  2. Benign-appearing lesion in the scalp over the left parietal  convexity, possibly a sebaceous cyst.     Electronically Signed By-Ethan Hooper On:5/7/2020 2:25 PM  This report was finalized on [id number] by  Ethan Hooper, .                )5/8/2020  Juarez Wing MD      [facility] Monster/Transcription:   \"Dictated utilizing Dragon dictation\".   "

## 2020-05-08 NOTE — PLAN OF CARE
Problem: Patient Care Overview  Goal: Plan of Care Review  Outcome: Ongoing (interventions implemented as appropriate)  Goal: Individualization and Mutuality  Outcome: Ongoing (interventions implemented as appropriate)  Goal: Discharge Needs Assessment  Outcome: Ongoing (interventions implemented as appropriate)  Goal: Interprofessional Rounds/Family Conf  Outcome: Ongoing (interventions implemented as appropriate)     Problem: Fall Risk (Adult)  Goal: Identify Related Risk Factors and Signs and Symptoms  Outcome: Ongoing (interventions implemented as appropriate)  Goal: Absence of Fall  Outcome: Ongoing (interventions implemented as appropriate)     Problem: Stroke (Ischemic) (Adult)  Goal: Signs and Symptoms of Listed Potential Problems Will be Absent, Minimized or Managed (Stroke)  Outcome: Ongoing (interventions implemented as appropriate)

## 2020-05-08 NOTE — NURSING NOTE
Prior to discharge RN and Dr Bustamante were unable to reach Dr Wing to clarify meds prior to procedure on Tuesday. RN and jonah discussed with patient the need to call cardiology on Monday to decide what meds will need to be held if any.

## 2020-05-08 NOTE — H&P (VIEW-ONLY)
Cardiology Progress Note    Patient Identification:  Name: Bobby Steele Jr.  Age: 60 y.o.  Sex: male  :  1960  MRN: [id number]                 Follow Up / Chief Complaint: Follow-up for elevated troponin, hypertension, CAD, CABG, CHF, cardiomyopathy, PCI  Chief Complaint   Patient presents with   • Numbness       Interval History: Patient's blood pressure is improved is off vasopressors       Subjective: Denies any chest pain shortness of breath.  Left arm tingling and numbness is improved      Objective: Patient had MRI of the head which is negative for active stroke         History of Present Illness:          This is a 60-year-old white male with past medical history of     # NSTEMI  19, SHILPA to SVG to RCA and proximal LAD leading into a small diagonal  # CAD status post CABG X3 V  # Ischemic cardiomyopathy with EF of 35%, status post ICD 10/8/2019  # COPD, continue tobacco abuse  # Hypertension   # Hyperlipidemia  # Diabetes with hemoglobin A1c of 7.4 on 6/3/19  #Positive family for premature heart disease with father MI 53     Here with complaint of unilateral tingling and numbness in left hand.  Patient was recently in the hospital with elevated troponin and proBNP was diagnosed with CHF diuresed and sent home on metolazone.  Patient called me as outpatient saying that he is got tingling and numbness and thinks it is a side effect of metolazone.  Since it was unilateral I advised him to come to the ER.  Work-up in the ER revealed a proBNP of 1554 which is worse than his discharge 1115.  BUN/creatinine were 42/2.12 compared to his discharge BUN/creatinine of 33/1.58 and a baseline around 1.3.  D-dimer 0.45, CBC with a white count of 14.6.  Normal UA.  CT head 2020- for active bleed.  Chest x-ray no acute infiltrates or effusions.  EKG reviewed by me from 2020 reveals sinus tachycardia at the rate of 100 bpm with nonspecific ST depression in lateral leads.     Patient had stress test  5/2/2020 which was abnormal with old inferior wall MI with cole-infarct and ischemia and EF of 42%      Patient  underwent cardiac cath 6/27/2019 which revealed patent stent to SVG to RCA patent stent in proximal LAD and small vessel disease and diagonal.   Patient has chronic dyspnea on exertion relieved with rest.  Patient is status post ICD placement 10/8/2019 .           ASSESSMENT:   #Tingling and numbness unilateral in left hand  #Hypotension  #elevated troponin, possible non-ST elevation MI  #  chronic CHF, ischemic cardiomyopathy 35%, status post ICD 10/8/2019  # NSTEMI 5/12/19  # CAD status post CABG  # COPD, tobacco abuse   # hypertension ,  #  hyperlipidemia   #Hyperglycemia, and type 2 diabetes  #VINICIO on CKD     Recommendations:  Telemetry is revealing sinus rhythm  Gentle hydration  Patient is off vasopressors  Hold  diuretics metolazone  Will gradually restart beta-blockers and ACE inhibitor's as tolerated  Serial cardiac enzymes are mildly elevated  We will proceed with cardiac cath to evaluate coronary anatomy risk benefits alternatives explained  We will follow-up and consider further evaluation and treatment     Patient had cath 6/27/2019 which revealed patent stent in SVG to RCA and patent stent in proximal LAD with severe disease in diagonal branch which is too small to be stented  Continue , aspirin, Plavix and statin as tolerated     Monitor blood pressure  Discussed with patient and his wife   Counseled on smoking cessation  Counseled on CHF care  Counseled on diet exercise and smoking cessation  Diabetes control   Patient is getting MRI today called [employer] rep to come and adjust ICD device patient can have MRI done  We will follow  Discussed with RN taking care of patient       Past Medical History:  Past Medical History:   Diagnosis Date   • CAD (coronary artery disease)     S/P CABG   • Chronic systolic CHF (congestive heart failure) (CMS/[facility])    • COPD (chronic obstructive pulmonary  disease) (CMS/[facility])    • DM2 (diabetes mellitus, type 2) (CMS/[facility])    • Dyslipidemia    • Encounter for monitoring antiplatelet therapy    • Hypertension    • Myocardial infarction (CMS/HCC)     inferior wall MI--   • DEBI (obstructive sleep apnea)     noncompliant with CPAP   • Peripheral vascular disease (CMS/[facility])     S/P left fem-pop bypass   • Tobacco use      Past Surgical History:  Past Surgical History:   Procedure Laterality Date   • APPENDECTOMY      at age 8 or 9   • CARDIAC CATHETERIZATION      3-; [facility] 2014   • CARDIAC CATHETERIZATION Right 2019    Procedure: Cardiac Catheterization/with grafts groin access;  Surgeon: Juarez Wing MD;  Location:  [facility]ATH [facility];  Service: Cardiovascular   • CARDIAC ELECTROPHYSIOLOGY PROCEDURE N/A 10/8/2019    Procedure: ICD SC new, ST.SHIV ;  Surgeon: Juarez Wing MD;  Location:  [facility];  Service: Cardiovascular   • COLONOSCOPY     • CORONARY ARTERY BYPASS GRAFT      x3, 3-18-13-LIMA to LAD, and reverse individual SVG to lateral marginal and to PDA   • FEMORAL POPLITEAL BYPASS Left 2019    [facility]/ Dr. Jero Hatch   • HAND SURGERY Left     crushed hand   • TOE SURGERY      toe nail removal of big toe- age 8 or 9        Social History:   Social History     Tobacco Use   • Smoking status: Current Every Day Smoker     Packs/day: 1.00     Years: 25.00     Pack years: 25.00     Types: Cigarettes   • Smokeless tobacco: Never Used   • Tobacco comment: currently smokes 5 cigarettes per day   Substance Use Topics   • Alcohol use: No     Frequency: Never      Family History:  Family History   Problem Relation Age of Onset   • Hypertension Mother    • Diabetes Mother    • Heart disease Father         had CABG and re-do/  while recovering-had MI age 40s   • Diabetes Father    • Pancreatic cancer Sister    • Hyperlipidemia Brother    • Stroke Brother    • Heart disease Paternal Uncle      "      Allergies:  No Known Allergies  Scheduled Meds:    aspirin 325 mg Daily   atorvastatin 40 mg Daily   carvedilol 3.125 mg BID With Meals   clopidogrel 75 mg Daily   diphenhydrAMINE 50 mg Once   fenofibrate 145 mg Daily   glipizide 10 mg BID AC   insulin glargine 10 Units Daily   insulin lispro 0-14 Units TID AC   lisinopril 2.5 mg Q24H   pantoprazole 40 mg Daily           INTAKE AND OUTPUT:    Intake/Output Summary (Last 24 hours) at 5/8/2020 1158  Last data filed at 5/8/2020 0840  Gross per 24 hour   Intake 1818 ml   Output --   Net 1818 ml       Review of Systems:   Review of Systems   Constitution: Negative for chills and fever.   Cardiovascular: Negative for chest pain and dyspnea on exertion.   Respiratory: Negative for cough and hemoptysis.    Gastrointestinal: Negative for nausea and vomiting.       Constitutional:  Temp:  [98.3 °F (36.8 °C)-99.3 °F (37.4 °C)] 99.3 °F (37.4 °C)  Heart Rate:  [78-91] 79  Resp:  [15-19] 19  BP: ()/(54-90) 100/68    Physical Exam   /68 (BP Location: Left arm, Patient Position: Sitting)   Pulse 79   Temp 99.3 °F (37.4 °C) (Oral)   Resp 19   Ht 170.2 cm (67\")   Wt 88.3 kg (194 lb 10.7 oz)   SpO2 91%   BMI 30.49 kg/m²   General:  Appears in no acute distress  Eyes: Sclera is anicteric,  conjunctiva is clear   HEENT:  No JVD. Thyroid not visibly enlarged. No mucosal pallor or cyanosis  Respiratory: Respirations regular and unlabored at rest.  Clear to auscultation  Cardiovascular: S1,S2 Regular rate and rhythm. No murmur, rub or gallop auscultated.  . No pretibial pitting edema  Gastrointestinal: Abdomen soft, flat, non tender. Bowel sounds present.   Musculoskeletal:  No abnormal movements  Extremities: No digital clubbing or cyanosis  Skin: Color pink. Skin warm and dry to touch. No rashes  No xanthoma  Neuro: Alert and awake, no lateralizing deficits appreciated        Cardiographics  Telemetry: Sinus rhythm    ECG:   ECG 12 Lead   Preliminary Result "   HEART RATE= 100  bpm   RR Interval= 600  ms   ND Interval= 151  ms   P Horizontal Axis= -21  deg   P Front Axis= 81  deg   QRSD Interval= 108  ms   QT Interval= 357  ms   QRS Axis= 83  deg   T Wave Axis= 244  deg   - ABNORMAL ECG -   Sinus tachycardia   Probable left atrial enlargement   Repol abnrm suggests ischemia, diffuse leads   When compared with ECG of 02-May-2020 7:20:19,   Significant rate increase   Electronically Signed By:    Date and Time of Study: 2020-05-05 19:41:13        I have personally reviewed EKG    Echocardiogram:   Results for orders placed during the hospital encounter of 05/05/20   Adult Transthoracic Echo Complete W/ Cont if Necessary Per Protocol    Narrative Technically difficult study.  Mild LVE with severe apical hypokinesis and, moderate global hypokinesis   estimated LV ejection fraction of 35%   Normal RV size  Mild left atrial enlargement seen  Aortic valve, mitral valve, tricuspid valve appears structurally normal,   no significant regurgitation seen.  No pericardial effusion seen.  Proximal aorta appears normal in size.       Lab Review   I have reviewed the labs  Results from last 7 days   Lab Units 05/07/20  1815 05/07/20  1203 05/07/20  0535   TROPONIN T ng/mL 0.063* 0.070* 0.077*     Results from last 7 days   Lab Units 05/08/20  0904   MAGNESIUM mg/dL 2.1     Results from last 7 days   Lab Units 05/08/20  0904   SODIUM mmol/L 132*   POTASSIUM mmol/L 4.0   BUN mg/dL 32*   CREATININE mg/dL 1.12   CALCIUM mg/dL 9.4         Results from last 7 days   Lab Units 05/08/20  0904 05/06/20  0422 05/05/20  1949   WBC 10*3/mm3 9.80 11.80* 14.60*   HEMOGLOBIN g/dL 14.5 14.5 16.3   HEMATOCRIT % 41.2 41.1 45.1   PLATELETS 10*3/mm3 113* 152 186     Results from last 7 days   Lab Units 05/01/20  1247   INR  1.05   APTT seconds 23.2*       RADIOLOGY:  Imaging Results (Last 24 Hours)     Procedure Component Value Units Date/Time    MRI Brain Without Contrast [[id number]] Collected:  05/07/20  "1423     Updated:  05/07/20 1427    Narrative:          DATE OF EXAM:   5/7/2020 1:40 PM     PROCEDURE:   MRI BRAIN WO CONTRAST-     INDICATIONS:   Stroke; R20.2-Paresthesia of skin; I95.9-Hypotension, unspecified     COMPARISON:  No Comparisons Available     TECHNIQUE:   Routine magnetic resonance imaging of the brain was performed without  administration of contrast.     FINDINGS:   There is generalized prominence of the ventricles and CSF containing  spaces. No intra or extra-axial mass lesions, fluid collections, or mass  effect are seen. No abnormal areas of low or high signal are identified.  Diffusion imaging appears normal. There is a scalp lesion over the left  parietal convexity, likely representing a sebaceous cyst        Impression:          1. Generalized atrophy. No acute intracranial findings.  2. Benign-appearing lesion in the scalp over the left parietal  convexity, possibly a sebaceous cyst.     Electronically Signed By-Ethan Hooper On:5/7/2020 2:25 PM  This report was finalized on [id number] by  Ethan Hooper, .                )5/8/2020  Juarez Wing MD      [facility] Monster/Transcription:   \"Dictated utilizing Dragon dictation\".   "

## 2020-05-08 NOTE — SIGNIFICANT NOTE
ST following pt as orders received per stroke workup. Noted results of MRI with no new infarct evident. Pt passed swallow screen and tolerating diet with no reported difficulties. ST will sign off at this point as per protocol. Please re-consult if our services are warranted in the future. Thank you!

## 2020-05-08 NOTE — PROGRESS NOTES
Cc:   Follow up of TIA      S:   The patient is lying in bed, s/p cardiac cath.    O: Vitals stable. O/E the patient is lying in bed in no apparent distress.  Awake, alert, oriented x 3.  Speech is good.  Follow commands. CN EOMI, no facial droop. Motor unchanged.      A:  60 year old gentleman with multiple medical problems who probably had a TIA. Work up was unremarkable.     P:  Will continue current care.  Patient is signed off.  Please call for further assistance.

## 2020-05-09 ENCOUNTER — READMISSION MANAGEMENT (OUTPATIENT)
Dept: CALL CENTER | Facility: HOSPITAL | Age: 60
End: 2020-05-09

## 2020-05-09 NOTE — OUTREACH NOTE
Prep Survey      Responses   [facility]?  [facility]   Is LACE score < 7 ?  No   Eligibility  Readm Mgmt   Discharge diagnosis  Paresthesias   Does the patient have one of the following disease processes/diagnoses(primary or secondary)?  Other   Does the patient have Home health ordered?  No   Is there a DME ordered?  No   Prep survey completed?  Yes          Dolly Copeland RN

## 2020-05-09 NOTE — DISCHARGE SUMMARY
[facility] Medicine Services  DISCHARGE SUMMARY        Prepared For PCP:  Yamile Agarwal    Patient Name: Bobby Steele Jr.  : 1960  MRN: [id number]      Date of Admission:   2020    Date of Discharge:  2020    Length of stay:  LOS: 3 days     Hospital Course     Presenting Problem:   Paresthesias [R20.2]  Hypotension, unspecified hypotension type [I95.9]  Hypotension [I95.9]      Active Hospital Problems    Diagnosis  POA   • **Paresthesias [R20.2]  Yes   • GERD without esophagitis [K21.9]  Yes   • DEBI (obstructive sleep apnea) [G47.33]  Yes   • Elevated troponin [R79.89]  Yes   • Unstable angina (CMS/[facility]) [I20.0]  Yes   • Type 2 diabetes mellitus with hyperglycemia, with long-term current use of insulin (CMS/[facility]) [E11.65, Z79.4]  Yes   • Presence of automatic cardioverter/defibrillator (AICD) [Z95.810]  Yes   • Ischemic cardiomyopathy [I25.5]  Yes   • Coronary artery disease with hx of myocardial infarct w/o hx of CABG [I25.10, I25.2]  Not Applicable   • Tobacco dependence syndrome [F17.200]  Yes   • Benign essential HTN [I10]  Yes   • COPD (chronic obstructive pulmonary disease) (CMS/[facility]) [J44.9]  Yes   • Chronic systolic congestive heart failure (CMS/[facility]) [I50.22]  Yes   • Obesity (BMI 30-39.9) [E66.9]  Yes   • Peripheral vascular disease (CMS/[facility]) [I73.9]  Yes   • Multiple-type hyperlipidemia [E78.2]  Yes      Resolved Hospital Problems    Diagnosis Date Resolved POA   • Hypotension [I95.9] 2020 Yes   • Acute kidney injury (CMS/[facility]) [N17.9] 2020 Yes           Hospital Course:  Bobby Steele Jr. is a 60 y.o. male with a history of CAD, COPD, diabetes type 2, hyperlipidemia, hypertension, tobacco abuse and chronic systolic heart failure presented to the [facility] ED on 2020 with a complaint of left hand and finger tingling.  Patient states he woke up this morning with his left arm feeling numb and as the day progressed it slowly resolved however  now his fingers and his hand are still tingling.  Patient states he sleeps with his hands above his head every night and this sometimes happens and it normally resolves but it has not yet resolved.  So he decided to be seen in the ED today.  Patient denies fever, cough, chest pain, shortness of breath and headache.     In the ED, CT head no acute intracranial abnormality.  Glucose 519, BUN 42, creatinine 2.12, sodium 129, potassium 3.8, WBC 14.6, Hgb 16.3, HCT 45.1, platelets 186.  NIH is 0.  EKG normal sinus rhythm.  Patient was given 6 units regular insulin IV x1.  500 mL normal saline bolus x2.    Patient acutely became hypotensive without any symptoms while sitting bedside, he received multiple fluid boluses with little effect on his blood pressure.  Critical care was consulted and the patient was transferred to ICU and started on Levophed.  Patient was also evaluated by cardiology.  He had had a recent stress test on a previous admission on 5/2/2020 and once again had elevated troponins on this admission.  He had metolazone added to his regimen on the previous admission but it was discontinued along with other medications that would drop his blood pressure on this admission.  He was able to wean off of Levophed the next day and transfer back out to the medical floor.  (Please see the medication list in the note below).  Once the patient was more stabilized he did undergo cardiac catheterization on 5/8/2020 which which showed:  Severe triple-vessel disease with patent CAMPOS to LAD  New occlusion at the anastomotic site of SVG to RCA  Chronically occluded SVG to diagonal with stent patent and proximal LAD with 80% diagonal disease  High-grade disease in mid circumflex and total distal circumflex  Severe LV dysfunction EF of 35%    Since the patient had acute kidney injury on this admission, the cardiologist decided to stage an Impella assisted attempted PCI to the totally occluded RCA diagonal and LCx in a staged  manner on a planned readmission.  His kidney function did improve with IV fluids.    Neurology was also consulted to evaluate the patient for the presenting complaint of paresthesias.  Clinical features were thought suggestive of a TIA versus a small higher parietal stroke.  Patient was not a candidate for TPA secondary to being outside a recommended window of treatment.  CT head and MRI brain were unremarkable with no acute intracranial findings.    Patient was able to discharge home in good condition after the cardiac catheterization with instructions for readmission for the upcoming procedure.    Recommendation for Outpatient Providers:             Reasons For Change In Medications and Indications for New Medications:        Day of Discharge     HPI: No new complaints or acute issues.      Vital Signs:   Temp:  [97.5 °F (36.4 °C)-99.3 °F (37.4 °C)] 97.5 °F (36.4 °C)  Heart Rate:  [68-86] 81  Resp:  [13-22] 22  BP: ()/(53-81) 112/66     Physical Exam:  General: well-developed and well-nourished, NAD  Head: Normocephalic and atraumatic.   Eyes: EOM are normal. Pupils are equal, round, and reactive to light.   Heart: Normal rate and regular rhythm. No murmur   Chest: Effort normal and breath sounds normal. No wheezing, rales or rhonchi  Abdominal: Soft. NT/ND. Bowel sounds present  Musculoskeletal: Normal ROM.  No edema. No calf tenderness.  Neurological: AAOx3, no focal deficits  Skin: Skin is warm and dry. No rash  Psychiatric: Normal mood and affect.    Pertinent  and/or Most Recent Results     Results from last 7 days   Lab Units 05/08/20  0904 05/07/20  0535 05/06/20  0422 05/05/20  1949 05/03/20  0358 05/02/20  0537   WBC 10*3/mm3 9.80  --  11.80* 14.60* 9.30 8.50   HEMOGLOBIN g/dL 14.5  --  14.5 16.3 14.6 15.2   HEMATOCRIT % 41.2  --  41.1 45.1 41.2 43.7   PLATELETS 10*3/mm3 113*  --  152 186 143 167   SODIUM mmol/L 132* 135* 134* 129* 135* 140   POTASSIUM mmol/L 4.0 4.0 3.6 3.8 4.2 4.0   CHLORIDE  mmol/L 92* 95* 95* 88* 95* 95*   CO2 mmol/L 28.0 27.0 23.0 26.0 24.0 27.0   BUN mg/dL 32* 30* 43* 42* 33* 23   CREATININE mg/dL 1.12 1.25 1.96*  2.08* 2.12* 1.58* 1.34*   GLUCOSE mg/dL 237* 254* 248* 519* 479* 424*   CALCIUM mg/dL 9.4 9.3 8.7 9.6 9.2 8.9     Results from last 7 days   Lab Units 05/02/20  0537   BILIRUBIN mg/dL 0.5   ALK PHOS U/L 51   ALT (SGPT) U/L 40   AST (SGOT) U/L 23     Results from last 7 days   Lab Units 05/07/20  0023 05/06/20  0422 05/02/20  0537   CHOLESTEROL mg/dL 149 150 162   TRIGLYCERIDES mg/dL 504* 596* 466*   HDL CHOL mg/dL 24* 20* 24*     Results from last 7 days   Lab Units 05/07/20  1815 05/07/20  1203 05/07/20  0535 05/07/20  0023 05/06/20  1723 05/06/20  0857 05/06/20  0448 05/06/20  0422 05/05/20  1949 05/02/20  0537   TSH uIU/mL  --   --   --  1.140  --   --   --  1.490  --  0.841   HEMOGLOBIN A1C %  --   --   --   --   --   --   --   --   --  10.0*   PROBNP pg/mL  --   --   --   --   --   --   --   --  1,554.0*  --    TROPONIN T ng/mL 0.063* 0.070* 0.077* 0.072* 0.066*  --   --   --   --   --    PROCALCITONIN ng/mL  --   --   --   --   --   --   --   --   --  0.11   LACTATE mmol/L  --   --   --   --   --  1.4 2.1*  --   --   --        Brief Urine Lab Results  (Last result in the past 365 days)      Color   Clarity   Blood   Leuk Est   Nitrite   Protein   CREAT   Urine HCG        05/06/20 0429 Yellow Clear Negative Negative Negative Negative               Microbiology Results Abnormal     Procedure Component Value - Date/Time    Blood Culture - Blood, Arm, Right [[id number]] Collected:  05/06/20 0803    Lab Status:  Preliminary result Specimen:  Blood from Arm, Right Updated:  05/08/20 0830     Blood Culture No growth at 2 days    Blood Culture - Blood, Hand, Right [[id number]] Collected:  05/06/20 0422    Lab Status:  Preliminary result Specimen:  Blood from Hand, Right Updated:  05/08/20 0500     Blood Culture No growth at 2 days    Respiratory Panel, PCR - Swab, Nasopharynx  [[id number]]  (Normal) Collected:  05/06/20 0424    Lab Status:  Final result Specimen:  Swab from Nasopharynx Updated:  05/06/20 0646     ADENOVIRUS, PCR Not Detected     Coronavirus 229E Not Detected     Coronavirus HKU1 Not Detected     Coronavirus NL63 Not Detected     Coronavirus OC43 Not Detected     Human Metapneumovirus Not Detected     Human Rhinovirus/Enterovirus Not Detected     Influenza B PCR Not Detected     Parainfluenza Virus 1 Not Detected     Parainfluenza Virus 2 Not Detected     Parainfluenza Virus 3 Not Detected     Parainfluenza Virus 4 Not Detected     Bordetella pertussis pcr Not Detected     Influenza A H1 2009 PCR Not Detected     Chlamydophila pneumoniae PCR Not Detected     Mycoplasma pneumo by PCR Not Detected     Influenza A PCR Not Detected     Influenza A H3 Not Detected     Influenza A H1 Not Detected     RSV, PCR Not Detected    Narrative:       The coronavirus on the RVP is NOT COVID-19 and is NOT indicative of infection with COVID-19.     S. Pneumo Ag Urine or CSF - Urine, Urine, Clean Catch [[id number]]  (Normal) Collected:  05/06/20 0429    Lab Status:  Final result Specimen:  Urine, Clean Catch Updated:  05/06/20 0524     Strep Pneumo Ag Negative    Legionella Antigen, Urine - Urine, Urine, Clean Catch [[id number]]  (Normal) Collected:  05/06/20 0429    Lab Status:  Final result Specimen:  Urine, Clean Catch Updated:  05/06/20 0523     LEGIONELLA ANTIGEN, URINE Negative          Ct Head Without Contrast    Result Date: 5/5/2020  Impression:  1. No acute intracranial abnormality. 2.  1.4 cm soft tissue density nodule within the skin overlying the left frontal vertex.  This could represent dermal or sebaceous cyst or neoplastic process.  Clinical correlation recommended.  Electronically Signed By-Garry Plascencia On:5/5/2020 8:43 PM This report was finalized on [id number] by  Garry Plascencia, .    Mri Brain Without Contrast    Result Date: 5/7/2020  Impression:  1. Generalized  atrophy. No acute intracranial findings. 2. Benign-appearing lesion in the scalp over the left parietal convexity, possibly a sebaceous cyst.  Electronically Signed By-Ethan Hooper On:5/7/2020 2:25 PM This report was finalized on [id number] by  Ethan Hooper, .    Xr Chest 1 View    Result Date: 5/6/2020  Impression: No acute infiltrate is appreciated.  Electronically Signed By-Dr. Олег Alcantar MD On:5/6/2020 4:00 AM This report was finalized on [id number] by Dr. Олег Alcantar MD.    Xr Chest 1 View    Result Date: 5/1/2020  Impression: No acute cardiopulmonary abnormality is identified.  Electronically Signed By-Nadiya Rodriguez On:5/1/2020 2:25 PM This report was finalized on [id number] by  Nadiya Rodriguez, .      Results for orders placed during the hospital encounter of 05/05/20   Duplex Carotid Ultrasound CAR    Narrative · Proximal right internal carotid artery plaque without significant   stenosis.  · Proximal left internal carotid artery plaque without significant   stenosis.          Results for orders placed during the hospital encounter of 05/05/20   Duplex Carotid Ultrasound CAR    Narrative · Proximal right internal carotid artery plaque without significant   stenosis.  · Proximal left internal carotid artery plaque without significant   stenosis.          Results for orders placed during the hospital encounter of 05/05/20   Adult Transthoracic Echo Complete W/ Cont if Necessary Per Protocol    Narrative Technically difficult study.  Mild LVE with severe apical hypokinesis and, moderate global hypokinesis   estimated LV ejection fraction of 35%   Normal RV size  Mild left atrial enlargement seen  Aortic valve, mitral valve, tricuspid valve appears structurally normal,   no significant regurgitation seen.  No pericardial effusion seen.  Proximal aorta appears normal in size.               Test Results Pending at Discharge   Order Current Status    Blood Culture - Blood, Arm, Right Preliminary result     Blood Culture - Blood, Hand, Right Preliminary result            Procedures Performed  Procedure(s):  Left Heart Cath with grafts         Consults:   Consults     Date and Time Order Name Status Description    5/7/2020 1632 Inpatient Hospitalist Consult      5/7/2020 1012 Inpatient Hospitalist Consult Completed     5/6/2020 0816 Inpatient Cardiology Consult Completed     5/6/2020 0553 Inpatient Intensivist Consult Completed     5/5/2020 2105 Hospitalist (on-call MD unless specified) Completed     5/1/2020 1415 Cardiology (on-call MD unless specified) Completed     5/1/2020 1415 Hospitalist (on-call MD unless specified) Completed             Discharge Details        Discharge Medications      New Medications      Instructions Start Date   Alcohol Wipes 70 % pads   1 each, Does not apply, 4 Times Daily Before Meals & Nightly      glucose blood test strip  Commonly known as:  OneTouch Verio   1 each, Other, 4 Times Daily Before Meals & Nightly, Use as instructed      Insulin Glargine 100 UNIT/ML injection pen  Commonly known as:  LANTUS SOLOSTAR   10 Units, Subcutaneous, Nightly      OneTouch Delica Plus [id number] misc   1 each, Does not apply, 4 Times Daily Before Meals & Nightly      Pen Needles 32G X 4 MM misc   1 each, Does not apply, Daily         Continue These Medications      Instructions Start Date   albuterol sulfate  (90 Base) MCG/ACT inhaler  Commonly known as:  PROVENTIL HFA;VENTOLIN HFA;PROAIR HFA   2 puffs, Inhalation, Every 4 Hours PRN      albuterol (2.5 MG/3ML) 0.083% nebulizer solution  Commonly known as:  PROVENTIL   2.5 mg, Nebulization, Every 6 Hours PRN      aspirin 325 MG tablet   325 mg, Oral, Daily      atorvastatin 40 MG tablet  Commonly known as:  LIPITOR   40 mg, Oral, Daily      carvedilol 3.125 MG tablet  Commonly known as:  COREG   3.125 mg, Oral, 2 Times Daily With Meals      clopidogrel 75 MG tablet  Commonly known as:  PLAVIX   75 mg, Oral, Daily      fenofibrate 145 MG  tablet  Commonly known as:  TRICOR   145 mg, Oral, Daily      glimepiride 4 MG tablet  Commonly known as:  AMARYL   4 mg, Oral, 2 Times Daily      lisinopril 2.5 MG tablet  Commonly known as:  PRINIVIL,ZESTRIL   TAKE 1 TABLET BY MOUTH ONCE DAILY      pantoprazole 40 MG EC tablet  Commonly known as:  PROTONIX   40 mg, Oral, Daily         Stop These Medications    doxycycline 100 mg in sodium chloride 0.9 % 100 mL IVPB     furosemide 40 MG tablet  Commonly known as:  LASIX     isosorbide mononitrate 30 MG 24 hr tablet  Commonly known as:  IMDUR     lactulose 10 GM/15ML solution  Commonly known as:  CHRONULAC     metFORMIN 500 MG tablet  Commonly known as:  GLUCOPHAGE     metOLazone 5 MG tablet  Commonly known as:  ZAROXOLYN     nitroglycerin 0.4 MG SL tablet  Commonly known as:  Nitrostat     spironolactone 25 MG tablet  Commonly known as:  ALDACTONE     temazepam 15 MG capsule  Commonly known as:  RESTORIL            No Known Allergies      Discharge Disposition:  Home or Self Care    Diet:  Hospital:  Diet Order   Procedures   • Diet Cardiac, Diabetic/Consistent Carbs; Healthy Heart; Diabetic - Consistent Carb         Discharge Activity:   Activity Instructions     Activity as Tolerated              CODE STATUS:    Code Status and Medical Interventions:   Ordered at: 05/05/20 1652     Level Of Support Discussed With:    Patient     Code Status:    CPR     Medical Interventions (Level of Support Prior to Arrest):    Full         Follow-up Appointments  Future Appointments   Date Time Provider Department Center   [facility]   6/2/2020 10:30 AM Juarez Wing MD [facility] [place] [facility] None   6/15/2020  6:00 AM [facility]       Additional Instructions for the Follow-ups that You Need to Schedule     Call MD With Problems / Concerns   As directed      Instructions: Call [phone] or email  [email or web address] for problems or concerns.    Order Comments:  Instructions: Call [phone] or email [email or web address] for problems or concerns.          Discharge Follow-up with PCP   As directed       Currently Documented PCP:    Yamile Agarwal    PCP Phone Number:    [phone]     Follow Up Details:  as scheduled         Discharge Follow-up with Specialty: Cardiology - Dr. Wing; 4 Days   As directed      Specialty:  Cardiology - Dr. Wing    Follow Up:  4 Days    Follow Up Details:  Cardiac procedure at [facility] - call his office for time and to review medications.                 Condition on Discharge:      Stable      This patient has been examined wearing appropriate Personal Protective Equipment. 05/08/20      Electronically signed by Claire Bustamante MD, 05/08/20, 8:03 PM.      Time: I spent  35  minutes on this discharge activity which included face-to-face encounter with the patient/reviewing the data in the system/coordination of the care with the nursing staff as well as consultants/documentation/entering orders.

## 2020-05-11 ENCOUNTER — TELEPHONE (OUTPATIENT)
Dept: CARDIOLOGY | Facility: CLINIC | Age: 60
End: 2020-05-11

## 2020-05-11 ENCOUNTER — LAB (OUTPATIENT)
Dept: LAB | Facility: HOSPITAL | Age: 60
End: 2020-05-11

## 2020-05-11 ENCOUNTER — HOSPITAL ENCOUNTER (OUTPATIENT)
Dept: GENERAL RADIOLOGY | Facility: HOSPITAL | Age: 60
Discharge: HOME | End: 2020-05-11
Admitting: INTERNAL MEDICINE

## 2020-05-11 DIAGNOSIS — I21.4 NON-ST ELEVATION MI (NSTEMI) (HCC): ICD-10-CM

## 2020-05-11 DIAGNOSIS — I10 BENIGN ESSENTIAL HTN: ICD-10-CM

## 2020-05-11 DIAGNOSIS — I25.10 CAD S/P PERCUTANEOUS CORONARY ANGIOPLASTY: ICD-10-CM

## 2020-05-11 DIAGNOSIS — I50.22 CHRONIC SYSTOLIC CONGESTIVE HEART FAILURE (HCC): ICD-10-CM

## 2020-05-11 DIAGNOSIS — E78.5 DYSLIPIDEMIA: ICD-10-CM

## 2020-05-11 DIAGNOSIS — E11.65 TYPE 2 DIABETES MELLITUS WITH HYPERGLYCEMIA, WITH LONG-TERM CURRENT USE OF INSULIN (HCC): ICD-10-CM

## 2020-05-11 DIAGNOSIS — I21.4 NON-ST ELEVATION MI (NSTEMI) (HCC): Primary | ICD-10-CM

## 2020-05-11 DIAGNOSIS — Z79.4 TYPE 2 DIABETES MELLITUS WITH HYPERGLYCEMIA, WITH LONG-TERM CURRENT USE OF INSULIN (HCC): ICD-10-CM

## 2020-05-11 DIAGNOSIS — Z98.61 CAD S/P PERCUTANEOUS CORONARY ANGIOPLASTY: ICD-10-CM

## 2020-05-11 DIAGNOSIS — I25.5 ISCHEMIC CARDIOMYOPATHY: ICD-10-CM

## 2020-05-11 LAB
ANION GAP SERPL CALCULATED.3IONS-SCNC: 9.8 MMOL/L (ref 5–15)
BACTERIA SPEC AEROBE CULT: NORMAL
BACTERIA SPEC AEROBE CULT: NORMAL
BUN BLD-MCNC: 25 MG/DL (ref 8–23)
BUN/CREAT SERPL: 18.7 (ref 7–25)
CALCIUM SPEC-SCNC: 9.8 MG/DL (ref 8.6–10.5)
CHLORIDE SERPL-SCNC: 95 MMOL/L (ref 98–107)
CO2 SERPL-SCNC: 27.2 MMOL/L (ref 22–29)
CREAT BLD-MCNC: 1.34 MG/DL (ref 0.76–1.27)
DEPRECATED RDW RBC AUTO: 42.6 FL (ref 37–54)
ERYTHROCYTE [DISTWIDTH] IN BLOOD BY AUTOMATED COUNT: 12.5 % (ref 12.3–15.4)
GFR SERPL CREATININE-BSD FRML MDRD: 54 ML/MIN/1.73
GLUCOSE BLD-MCNC: 332 MG/DL (ref 65–99)
HCT VFR BLD AUTO: 42.3 % (ref 37.5–51)
HGB BLD-MCNC: 14.6 G/DL (ref 13–17.7)
INR PPP: 0.97 (ref 0.9–1.1)
MCH RBC QN AUTO: 32.2 PG (ref 26.6–33)
MCHC RBC AUTO-ENTMCNC: 34.5 G/DL (ref 31.5–35.7)
MCV RBC AUTO: 93.2 FL (ref 79–97)
PLATELET # BLD AUTO: 155 10*3/MM3 (ref 140–450)
PMV BLD AUTO: 12.7 FL (ref 6–12)
POTASSIUM BLD-SCNC: 5.1 MMOL/L (ref 3.5–5.2)
PROTHROMBIN TIME: 10.2 SECONDS (ref 9.6–11.7)
RBC # BLD AUTO: 4.54 10*6/MM3 (ref 4.14–5.8)
SODIUM BLD-SCNC: 132 MMOL/L (ref 136–145)
WBC NRBC COR # BLD: 9.95 10*3/MM3 (ref 3.4–10.8)

## 2020-05-11 PROCEDURE — 71046 X-RAY EXAM CHEST 2 VIEWS: CPT

## 2020-05-11 PROCEDURE — 36415 COLL VENOUS BLD VENIPUNCTURE: CPT

## 2020-05-11 PROCEDURE — U0004 COV-19 TEST NON-CDC HGH THRU: HCPCS

## 2020-05-11 PROCEDURE — 80048 BASIC METABOLIC PNL TOTAL CA: CPT

## 2020-05-11 PROCEDURE — 85610 PROTHROMBIN TIME: CPT

## 2020-05-11 PROCEDURE — 85027 COMPLETE CBC AUTOMATED: CPT

## 2020-05-11 NOTE — TELEPHONE ENCOUNTER
Pt's wife Suzie called saying she needs info regarding a procedure tomorrow    Pt had cath on Friday.     [phone]

## 2020-05-11 NOTE — PROGRESS NOTES
Case Management Discharge Note           Provided Post Acute Provider List?: N/A         Final Discharge Disposition Code: 01 - home or self-care

## 2020-05-12 ENCOUNTER — TELEPHONE (OUTPATIENT)
Dept: DIABETES SERVICES | Facility: HOSPITAL | Age: 60
End: 2020-05-12

## 2020-05-12 ENCOUNTER — READMISSION MANAGEMENT (OUTPATIENT)
Dept: CALL CENTER | Facility: HOSPITAL | Age: 60
End: 2020-05-12

## 2020-05-12 LAB
REF LAB TEST METHOD: NORMAL
SARS-COV-2 RNA RESP QL NAA+PROBE: NOT DETECTED

## 2020-05-12 NOTE — TELEPHONE ENCOUNTER
Pt called educator back. Pt states he did receive all the necessary rxs. He states he has the insulin pen and has been taking the injections at hs as prescribed. He states he also picked up test strips and lancets and has been checking bs bid daily. Discussed importance of notifying MD of readings. Pt states bs are still running in the 200s. Pt verbalized understanding of importance of f/u with MD. Pt with no additional questions at this time.

## 2020-05-12 NOTE — OUTREACH NOTE
Medical Week 1 Survey      Responses   [facility] patient discharged from?  [facility]   COVID-19 Test Status  Not tested   Does the patient have one of the following disease processes/diagnoses(primary or secondary)?  Other   Is there a successful TCM telephone encounter documented?  No   Week 1 attempt successful?  No   Unsuccessful attempts  Attempt 1          Jessi Dye LPN

## 2020-05-13 ENCOUNTER — READMISSION MANAGEMENT (OUTPATIENT)
Dept: CALL CENTER | Facility: HOSPITAL | Age: 60
End: 2020-05-13

## 2020-05-13 ENCOUNTER — HOSPITAL ENCOUNTER (INPATIENT)
Facility: HOSPITAL | Age: 60
LOS: 2 days | Discharge: HOME | End: 2020-05-15
Attending: INTERNAL MEDICINE | Admitting: INTERNAL MEDICINE

## 2020-05-13 DIAGNOSIS — E11.65 TYPE 2 DIABETES MELLITUS WITH HYPERGLYCEMIA, WITH LONG-TERM CURRENT USE OF INSULIN (HCC): ICD-10-CM

## 2020-05-13 DIAGNOSIS — E78.5 DYSLIPIDEMIA: ICD-10-CM

## 2020-05-13 DIAGNOSIS — Z79.4 TYPE 2 DIABETES MELLITUS WITH HYPERGLYCEMIA, WITH LONG-TERM CURRENT USE OF INSULIN (HCC): ICD-10-CM

## 2020-05-13 DIAGNOSIS — I21.4 NON-ST ELEVATION MI (NSTEMI) (HCC): ICD-10-CM

## 2020-05-13 DIAGNOSIS — I25.10 CAD S/P PERCUTANEOUS CORONARY ANGIOPLASTY: ICD-10-CM

## 2020-05-13 DIAGNOSIS — I50.22 CHRONIC SYSTOLIC CONGESTIVE HEART FAILURE (HCC): ICD-10-CM

## 2020-05-13 DIAGNOSIS — Z98.61 CAD S/P PERCUTANEOUS CORONARY ANGIOPLASTY: ICD-10-CM

## 2020-05-13 DIAGNOSIS — I25.5 ISCHEMIC CARDIOMYOPATHY: ICD-10-CM

## 2020-05-13 DIAGNOSIS — I10 BENIGN ESSENTIAL HTN: ICD-10-CM

## 2020-05-13 LAB
ACT BLD: 131 SECONDS (ref 89–137)
ACT BLD: 186 SECONDS (ref 89–137)
ACT BLD: 219 SECONDS (ref 89–137)
ACT BLD: 224 SECONDS (ref 89–137)
GLUCOSE BLDC GLUCOMTR-MCNC: 176 MG/DL (ref 70–105)
GLUCOSE BLDC GLUCOMTR-MCNC: 182 MG/DL (ref 70–105)
GLUCOSE BLDC GLUCOMTR-MCNC: 248 MG/DL (ref 70–105)

## 2020-05-13 PROCEDURE — C1725 CATH, TRANSLUMIN NON-LASER: HCPCS | Performed by: INTERNAL MEDICINE

## 2020-05-13 PROCEDURE — 25010000002 HEPARIN (PORCINE) PER 1000 UNITS: Performed by: INTERNAL MEDICINE

## 2020-05-13 PROCEDURE — B2111ZZ FLUOROSCOPY OF MULTIPLE CORONARY ARTERIES USING LOW OSMOLAR CONTRAST: ICD-10-PCS | Performed by: INTERNAL MEDICINE

## 2020-05-13 PROCEDURE — 027135Z DILATION OF CORONARY ARTERY, TWO ARTERIES WITH TWO DRUG-ELUTING INTRALUMINAL DEVICES, PERCUTANEOUS APPROACH: ICD-10-PCS | Performed by: INTERNAL MEDICINE

## 2020-05-13 PROCEDURE — C1769 GUIDE WIRE: HCPCS | Performed by: INTERNAL MEDICINE

## 2020-05-13 PROCEDURE — C1885 CATH, TRANSLUMIN ANGIO LASER: HCPCS | Performed by: INTERNAL MEDICINE

## 2020-05-13 PROCEDURE — C1894 INTRO/SHEATH, NON-LASER: HCPCS | Performed by: INTERNAL MEDICINE

## 2020-05-13 PROCEDURE — C1874 STENT, COATED/COV W/DEL SYS: HCPCS | Performed by: INTERNAL MEDICINE

## 2020-05-13 PROCEDURE — 02HA3RJ INSERTION OF SHORT-TERM EXTERNAL HEART ASSIST SYSTEM INTO HEART, INTRAOPERATIVE, PERCUTANEOUS APPROACH: ICD-10-PCS | Performed by: INTERNAL MEDICINE

## 2020-05-13 PROCEDURE — 25010000002 MIDAZOLAM PER 1 MG: Performed by: INTERNAL MEDICINE

## 2020-05-13 PROCEDURE — 99153 MOD SED SAME PHYS/QHP EA: CPT | Performed by: INTERNAL MEDICINE

## 2020-05-13 PROCEDURE — 94799 UNLISTED PULMONARY SVC/PX: CPT

## 2020-05-13 PROCEDURE — 82962 GLUCOSE BLOOD TEST: CPT

## 2020-05-13 PROCEDURE — 92937 PRQ TRLUML REVSC CAB GRF 1: CPT | Performed by: INTERNAL MEDICINE

## 2020-05-13 PROCEDURE — 33990 INSJ PERQ VAD L HRT ARTERIAL: CPT | Performed by: INTERNAL MEDICINE

## 2020-05-13 PROCEDURE — 0 IOPAMIDOL PER 1 ML: Performed by: INTERNAL MEDICINE

## 2020-05-13 PROCEDURE — 25010000002 HYDROMORPHONE PER 4 MG: Performed by: INTERNAL MEDICINE

## 2020-05-13 PROCEDURE — 92928 PRQ TCAT PLMT NTRAC ST 1 LES: CPT | Performed by: INTERNAL MEDICINE

## 2020-05-13 PROCEDURE — C9604 PERC D-E COR REVASC T CABG S: HCPCS | Performed by: INTERNAL MEDICINE

## 2020-05-13 PROCEDURE — C1760 CLOSURE DEV, VASC: HCPCS | Performed by: INTERNAL MEDICINE

## 2020-05-13 PROCEDURE — C1887 CATHETER, GUIDING: HCPCS | Performed by: INTERNAL MEDICINE

## 2020-05-13 PROCEDURE — 99152 MOD SED SAME PHYS/QHP 5/>YRS: CPT | Performed by: INTERNAL MEDICINE

## 2020-05-13 PROCEDURE — 5A0221D ASSISTANCE WITH CARDIAC OUTPUT USING IMPELLER PUMP, CONTINUOUS: ICD-10-PCS | Performed by: INTERNAL MEDICINE

## 2020-05-13 PROCEDURE — 4A023N7 MEASUREMENT OF CARDIAC SAMPLING AND PRESSURE, LEFT HEART, PERCUTANEOUS APPROACH: ICD-10-PCS | Performed by: INTERNAL MEDICINE

## 2020-05-13 PROCEDURE — 25010000002 PHENYLEPHRINE 10 MG/ML SOLUTION: Performed by: INTERNAL MEDICINE

## 2020-05-13 PROCEDURE — 85347 COAGULATION TIME ACTIVATED: CPT

## 2020-05-13 PROCEDURE — 25010000002 FENTANYL CITRATE (PF) 100 MCG/2ML SOLUTION: Performed by: INTERNAL MEDICINE

## 2020-05-13 PROCEDURE — C9600 PERC DRUG-EL COR STENT SING: HCPCS | Performed by: INTERNAL MEDICINE

## 2020-05-13 DEVICE — XIENCE SIERRA™ EVEROLIMUS ELUTING CORONARY STENT SYSTEM 3.00 MM X 12 MM / RAPID-EXCHANGE
Type: IMPLANTABLE DEVICE | Status: FUNCTIONAL
Brand: XIENCE SIERRA™

## 2020-05-13 DEVICE — XIENCE SIERRA™ EVEROLIMUS ELUTING CORONARY STENT SYSTEM 2.25 MM X 15 MM / RAPID-EXCHANGE
Type: IMPLANTABLE DEVICE | Status: FUNCTIONAL
Brand: XIENCE SIERRA™

## 2020-05-13 RX ORDER — FENTANYL CITRATE 50 UG/ML
INJECTION, SOLUTION INTRAMUSCULAR; INTRAVENOUS AS NEEDED
Status: DISCONTINUED | OUTPATIENT
Start: 2020-05-13 | End: 2020-05-13 | Stop reason: HOSPADM

## 2020-05-13 RX ORDER — SODIUM CHLORIDE 9 MG/ML
100 INJECTION, SOLUTION INTRAVENOUS CONTINUOUS
Status: DISCONTINUED | OUTPATIENT
Start: 2020-05-13 | End: 2020-05-15 | Stop reason: HOSPADM

## 2020-05-13 RX ORDER — CLOPIDOGREL BISULFATE 75 MG/1
75 TABLET ORAL DAILY
Status: DISCONTINUED | OUTPATIENT
Start: 2020-05-14 | End: 2020-05-15 | Stop reason: HOSPADM

## 2020-05-13 RX ORDER — ASPIRIN 325 MG
TABLET ORAL AS NEEDED
Status: DISCONTINUED | OUTPATIENT
Start: 2020-05-13 | End: 2020-05-13 | Stop reason: HOSPADM

## 2020-05-13 RX ORDER — HYDROMORPHONE HCL 110MG/55ML
PATIENT CONTROLLED ANALGESIA SYRINGE INTRAVENOUS AS NEEDED
Status: DISCONTINUED | OUTPATIENT
Start: 2020-05-13 | End: 2020-05-13 | Stop reason: HOSPADM

## 2020-05-13 RX ORDER — SODIUM CHLORIDE 9 MG/ML
30 INJECTION, SOLUTION INTRAVENOUS CONTINUOUS
Status: DISCONTINUED | OUTPATIENT
Start: 2020-05-13 | End: 2020-05-15 | Stop reason: HOSPADM

## 2020-05-13 RX ORDER — LISINOPRIL 5 MG/1
2.5 TABLET ORAL DAILY
Status: DISCONTINUED | OUTPATIENT
Start: 2020-05-13 | End: 2020-05-15 | Stop reason: HOSPADM

## 2020-05-13 RX ORDER — MIDAZOLAM HYDROCHLORIDE 1 MG/ML
INJECTION INTRAMUSCULAR; INTRAVENOUS AS NEEDED
Status: DISCONTINUED | OUTPATIENT
Start: 2020-05-13 | End: 2020-05-13 | Stop reason: HOSPADM

## 2020-05-13 RX ORDER — ALBUTEROL SULFATE 2.5 MG/3ML
2.5 SOLUTION RESPIRATORY (INHALATION) EVERY 6 HOURS PRN
Status: DISCONTINUED | OUTPATIENT
Start: 2020-05-13 | End: 2020-05-15 | Stop reason: HOSPADM

## 2020-05-13 RX ORDER — NITROGLYCERIN 5 MG/ML
INJECTION, SOLUTION INTRAVENOUS AS NEEDED
Status: DISCONTINUED | OUTPATIENT
Start: 2020-05-13 | End: 2020-05-13 | Stop reason: HOSPADM

## 2020-05-13 RX ORDER — ATORVASTATIN CALCIUM 40 MG/1
40 TABLET, FILM COATED ORAL NIGHTLY
Status: DISCONTINUED | OUTPATIENT
Start: 2020-05-13 | End: 2020-05-15 | Stop reason: HOSPADM

## 2020-05-13 RX ORDER — PANTOPRAZOLE SODIUM 40 MG/1
40 TABLET, DELAYED RELEASE ORAL DAILY
Status: DISCONTINUED | OUTPATIENT
Start: 2020-05-14 | End: 2020-05-15 | Stop reason: HOSPADM

## 2020-05-13 RX ORDER — CLOPIDOGREL BISULFATE 75 MG/1
TABLET ORAL AS NEEDED
Status: DISCONTINUED | OUTPATIENT
Start: 2020-05-13 | End: 2020-05-13 | Stop reason: HOSPADM

## 2020-05-13 RX ORDER — ACETAMINOPHEN 325 MG/1
650 TABLET ORAL EVERY 4 HOURS PRN
Status: DISCONTINUED | OUTPATIENT
Start: 2020-05-13 | End: 2020-05-15 | Stop reason: HOSPADM

## 2020-05-13 RX ORDER — LIDOCAINE HYDROCHLORIDE 20 MG/ML
INJECTION, SOLUTION INFILTRATION; PERINEURAL AS NEEDED
Status: DISCONTINUED | OUTPATIENT
Start: 2020-05-13 | End: 2020-05-13 | Stop reason: HOSPADM

## 2020-05-13 RX ORDER — SODIUM CHLORIDE 0.9 % (FLUSH) 0.9 %
10 SYRINGE (ML) INJECTION EVERY 12 HOURS SCHEDULED
Status: DISCONTINUED | OUTPATIENT
Start: 2020-05-13 | End: 2020-05-15 | Stop reason: HOSPADM

## 2020-05-13 RX ORDER — ASPIRIN 325 MG
325 TABLET ORAL DAILY
Status: DISCONTINUED | OUTPATIENT
Start: 2020-05-14 | End: 2020-05-15 | Stop reason: HOSPADM

## 2020-05-13 RX ORDER — HEPARIN SODIUM 1000 [USP'U]/ML
INJECTION, SOLUTION INTRAVENOUS; SUBCUTANEOUS AS NEEDED
Status: DISCONTINUED | OUTPATIENT
Start: 2020-05-13 | End: 2020-05-13 | Stop reason: HOSPADM

## 2020-05-13 RX ORDER — CARVEDILOL 3.12 MG/1
3.12 TABLET ORAL 2 TIMES DAILY WITH MEALS
Status: DISCONTINUED | OUTPATIENT
Start: 2020-05-13 | End: 2020-05-15 | Stop reason: HOSPADM

## 2020-05-13 RX ORDER — PHENYLEPHRINE HYDROCHLORIDE 10 MG/ML
INJECTION INTRAVENOUS AS NEEDED
Status: DISCONTINUED | OUTPATIENT
Start: 2020-05-13 | End: 2020-05-13 | Stop reason: HOSPADM

## 2020-05-13 RX ORDER — SODIUM CHLORIDE 0.9 % (FLUSH) 0.9 %
10 SYRINGE (ML) INJECTION AS NEEDED
Status: DISCONTINUED | OUTPATIENT
Start: 2020-05-13 | End: 2020-05-15 | Stop reason: HOSPADM

## 2020-05-13 RX ADMIN — CARVEDILOL 3.12 MG: 3.12 TABLET, FILM COATED ORAL at 19:01

## 2020-05-13 RX ADMIN — ATORVASTATIN CALCIUM 40 MG: 40 TABLET, FILM COATED ORAL at 21:37

## 2020-05-13 RX ADMIN — LISINOPRIL 2.5 MG: 5 TABLET ORAL at 19:01

## 2020-05-13 RX ADMIN — Medication 10 ML: at 21:38

## 2020-05-13 RX ADMIN — SODIUM CHLORIDE 100 ML/HR: 900 INJECTION, SOLUTION INTRAVENOUS at 17:08

## 2020-05-13 RX ADMIN — SODIUM CHLORIDE 30 ML/HR: 900 INJECTION, SOLUTION INTRAVENOUS at 13:31

## 2020-05-13 NOTE — NURSING NOTE
Patient arrived on unit from cath lab recovery. RN to Rn bedside shift report was completed. Patient had a Fem Stop on left femoral site from impella assisted cath procedure. Drainage from 4x4 on site could be seen, was reported to be old drainage. Right arterial femoral sheath was discussed, site was checked and charted on. Patient was assessed and found to be in stable condition. Upon one of the left femoral site checks new bleeding was noted. Fem stop was removed and stacey pressure was held while fem stop was adjusted and put back on femoral site. Pressure on fem stop was increased to occlude pulse in left foot for 3 mins, then pressure was slowly decreased to be 30 mmhg above patient current systolic BP and held there for 20 mins. Fem stop pressure was again decreased to 30 mmhg and kept there. Left femoral site is currently NOT bleeding and right femoral sit has no bleeding and dressing is clean, dry, and intact. Patient currently resting in bed in stable condition. Will cont to monitor pt.

## 2020-05-13 NOTE — PLAN OF CARE
Problem: Patient Care Overview  Goal: Plan of Care Review  Outcome: Ongoing (interventions implemented as appropriate)  Flowsheets (Taken 5/13/2020 4622)  Progress: improving  Plan of Care Reviewed With: patient  Note:   PT IS POST CATH WITH A FEMO-STOP IN PLACE...THIS CON'T TO BLEED AFTER THE PT WAS RECEIVED AND ALSO HAS A HEMATOMA ON THE LEFT SIDE OF GROIN..DR LUCIA WAS CALLED...HE HAS NOT CALLED BACK AT THIS TIME...WILL CON'T TO UPDATE THE ON CALL MD ON PT CONDITION...

## 2020-05-13 NOTE — NURSING NOTE
Pt arrived approx 1700..FEMO-STOP ALREADY ON  APPLIED.... CHECKED SITE FOR BLEEDING AND WAS ACTIVELY BLEEDING....REAPPLIED FEMO-STOP..PT HAD HEMATOMA ON THE LEFT SIDE OF GROIN WHEN WE RECEIVED HIM..THIS IS STILL ON GOING TO STOP THE BLEEDING.. WAS CALLED TO BE INFORMED OF THE PROBLEM..WILL CON'T TO ASSESS AND MONITOR..

## 2020-05-14 LAB
ANION GAP SERPL CALCULATED.3IONS-SCNC: 10 MMOL/L (ref 5–15)
BUN BLD-MCNC: 13 MG/DL (ref 8–23)
BUN/CREAT SERPL: 13 (ref 7–25)
CALCIUM SPEC-SCNC: 8.9 MG/DL (ref 8.6–10.5)
CHLORIDE SERPL-SCNC: 109 MMOL/L (ref 98–107)
CO2 SERPL-SCNC: 23 MMOL/L (ref 22–29)
CREAT BLD-MCNC: 1 MG/DL (ref 0.76–1.27)
DEPRECATED RDW RBC AUTO: 43.3 FL (ref 37–54)
ERYTHROCYTE [DISTWIDTH] IN BLOOD BY AUTOMATED COUNT: 13.3 % (ref 12.3–15.4)
GFR SERPL CREATININE-BSD FRML MDRD: 76 ML/MIN/1.73
GLUCOSE BLD-MCNC: 186 MG/DL (ref 65–99)
GLUCOSE BLDC GLUCOMTR-MCNC: 184 MG/DL (ref 70–105)
GLUCOSE BLDC GLUCOMTR-MCNC: 235 MG/DL (ref 70–105)
HCT VFR BLD AUTO: 36.3 % (ref 37.5–51)
HGB BLD-MCNC: 12.7 G/DL (ref 13–17.7)
MCH RBC QN AUTO: 32.1 PG (ref 26.6–33)
MCHC RBC AUTO-ENTMCNC: 34.9 G/DL (ref 31.5–35.7)
MCV RBC AUTO: 91.8 FL (ref 79–97)
PLATELET # BLD AUTO: 129 10*3/MM3 (ref 140–450)
PMV BLD AUTO: 10 FL (ref 6–12)
POTASSIUM BLD-SCNC: 4.3 MMOL/L (ref 3.5–5.2)
RBC # BLD AUTO: 3.96 10*6/MM3 (ref 4.14–5.8)
SODIUM BLD-SCNC: 142 MMOL/L (ref 136–145)
WBC NRBC COR # BLD: 9.3 10*3/MM3 (ref 3.4–10.8)

## 2020-05-14 PROCEDURE — 93005 ELECTROCARDIOGRAM TRACING: CPT | Performed by: INTERNAL MEDICINE

## 2020-05-14 PROCEDURE — 82962 GLUCOSE BLOOD TEST: CPT

## 2020-05-14 PROCEDURE — 99232 SBSQ HOSP IP/OBS MODERATE 35: CPT | Performed by: INTERNAL MEDICINE

## 2020-05-14 PROCEDURE — 80048 BASIC METABOLIC PNL TOTAL CA: CPT | Performed by: INTERNAL MEDICINE

## 2020-05-14 PROCEDURE — 93010 ELECTROCARDIOGRAM REPORT: CPT | Performed by: INTERNAL MEDICINE

## 2020-05-14 PROCEDURE — 85027 COMPLETE CBC AUTOMATED: CPT | Performed by: INTERNAL MEDICINE

## 2020-05-14 PROCEDURE — 94799 UNLISTED PULMONARY SVC/PX: CPT

## 2020-05-14 RX ADMIN — ASPIRIN 325 MG ORAL TABLET 325 MG: 325 PILL ORAL at 08:39

## 2020-05-14 RX ADMIN — CLOPIDOGREL BISULFATE 75 MG: 75 TABLET ORAL at 08:39

## 2020-05-14 RX ADMIN — CARVEDILOL 3.12 MG: 3.12 TABLET, FILM COATED ORAL at 08:39

## 2020-05-14 RX ADMIN — PANTOPRAZOLE SODIUM 40 MG: 40 TABLET, DELAYED RELEASE ORAL at 08:39

## 2020-05-14 RX ADMIN — LISINOPRIL 2.5 MG: 5 TABLET ORAL at 08:39

## 2020-05-14 RX ADMIN — CARVEDILOL 3.12 MG: 3.12 TABLET, FILM COATED ORAL at 17:48

## 2020-05-14 RX ADMIN — ATORVASTATIN CALCIUM 40 MG: 40 TABLET, FILM COATED ORAL at 20:17

## 2020-05-14 RX ADMIN — Medication 10 ML: at 20:17

## 2020-05-14 RX ADMIN — Medication 10 ML: at 08:43

## 2020-05-14 NOTE — PROGRESS NOTES
Discharge Planning Assessment   [facility]     Patient Name: Bobby Steele Jr.  MRN: [id number]  Today's Date: 5/14/2020    Admit Date: 5/13/2020    Discharge Needs Assessment     Row Name 05/14/20 0921       Living Environment    Lives With  spouse    Name(s) of Who Lives With Patient  wifeSuzie    Current Living Arrangements  home/apartment/condo    Primary Care Provided by  self    Provides Primary Care For  no one    Family Caregiver if Needed  spouse    Quality of Family Relationships  helpful;supportive    Able to Return to Prior Arrangements  yes       Resource/Environmental Concerns    Resource/Environmental Concerns  none    Transportation Concerns  car, none       Transition Planning    Patient/Family Anticipates Transition to  home with family    Patient/Family Anticipated Services at Transition  none    Transportation Anticipated  family or friend will provide       Discharge Needs Assessment    Concerns to be Addressed  denies needs/concerns at this time    Equipment Currently Used at Home  walker, standard;cane, straight;bipap/cpap;nebulizer    Anticipated Changes Related to Illness  none    Equipment Needed After Discharge  none        Discharge Plan     Row Name 05/14/20 0921       Plan    Plan  D/C Plan: Home with family    Patient/Family in Agreement with Plan  yes    Plan Comments  Due to COVID-19 pandemic,  working off site. Called and spoke to patient via phone. Patient IADLs and drives. Pharmacy verified. Denies any difficulty affording meds. PCP ERICK Agarwal. Denies any d/c needs at this time. Barrier to D/C: 2L O2, s/p heart cath.         Expected Discharge Date and Time     Expected Discharge Date Expected Discharge Time    May 14, 2020         Demographic Summary     Row Name 05/14/20 0920       General Information    Admission Type  inpatient    Arrived From  emergency department    Referral Source  admission list    Reason for Consult  discharge planning    Preferred Language   English     Used During This Interaction  no        Functional Status     Row Name 05/14/20 0920       Functional Status    Usual Activity Tolerance  good    Current Activity Tolerance  moderate       Functional Status, IADL    Medications  independent    Meal Preparation  independent    Housekeeping  independent    Laundry  independent    Shopping  independent       Mental Status Summary    Recent Changes in Mental Status/Cognitive Functioning  no changes         Case Management Readmission Assessment Note       Case Management Readmission Assessment (all recorded)      Readmission Interview     Row Name 05/14/20 0920             Readmission Indications    Is this hospitalization related to the prior hospital diagnosis?  Yes      What was the reason you were admitted?  chest pain, CHF      [facility] Name 05/14/20 0920             Recommendation for rehospitalization    Did you speak with your physician prior to coming to the hospital  No      Did you seek care elsewhere prior to coming to the hospital?  No      Row Name 05/14/20 0920             Follow up appointment    Do you have a PCP?  Yes      Did you have an appointment with PCP/specialist after hospitalization within 7 days?  No      [facility] Name 05/14/20 0920             Medications    Did you have newly prescribed medications at discharge?  No      Are you taking all of you prescribed medications?  Yes      Row Name 05/14/20 0920             Discharge Instructions    Did you understand your discharge instructions?  Yes      Were you given a number of someone to call if you had questions or concerns?  Yes      Row Name 05/14/20 0920             Index discharge location/services    Where did you go upon discharge?  Home      Do you have supportive family or friends in the home?  Yes      Row Name 05/14/20 0920             Discharge Readiness    On a scale of 1-5 (5 being well prepared), how ready were you for discharge  5

## 2020-05-14 NOTE — DISCHARGE SUMMARY
Date of Discharge:  5/14/2020    Discharge Diagnosis: CAD s/p PCI, ICM     Presenting Problem/History of Present Illness  Active Hospital Problems    Diagnosis  POA   • Non-ST elevation MI (NSTEMI) (CMS/[facility]) [I21.4]  Unknown   • CAD S/P percutaneous coronary angioplasty [I25.10, Z98.61]  Unknown   • Dyslipidemia [E78.5]  Unknown   • Type 2 diabetes mellitus with hyperglycemia, with long-term current use of insulin (CMS/HCC) [E11.65, Z79.4]  Not Applicable   • Ischemic cardiomyopathy [I25.5]  Unknown   • Chronic systolic congestive heart failure (CMS/[facility]) [I50.22]  Unknown   • Benign essential HTN [I10]  Unknown      Resolved Hospital Problems   No resolved problems to display.          Hospital Course  Patient was brought in for an elective repeat cardiac cath for stent placement with impella assistance.  Patient has successful PTCA and drug-eluting stent to SVG to RCA and native proximal LCx with Impella LV assist catheter backup.  Patient was observed in CVCU; he did experience some bleeding and fem stop device was used to help control bleeding.  Patient's bleeding stopped and has been up and moving around the room without any issues.  Patient denies complaints and wants to go home today.  Discussed post cath discharge instructions with patient including increasing no lifting, pushing pulling restrictions to 10 days.   Discussed with Dr. Wing who will see patient prior to discharge. Discussed with LULU Barger as well.     Procedures Performed    Procedure(s):  Percutaneous Coronary Intervention, Impella backup  Left Ventricular Assist Device  -------------------       Consults:   Consults     Date and Time Order Name Status Description    5/7/2020 1012 Inpatient Hospitalist Consult Completed     5/6/2020 0816 Inpatient Cardiology Consult Completed     5/6/2020 0553 Inpatient Intensivist Consult Completed     5/5/2020 2105 Hospitalist (on-call MD unless specified) Completed     5/1/2020 1416 Cardiology  (on-call MD unless specified) Completed     5/1/2020 1415 Hospitalist (on-call MD unless specified) Completed           Pertinent Test Results:   cardiac graphics:    ECG:   ECG 12 Lead   Preliminary Result   HEART RATE= 86  bpm   RR Interval= 696  ms   NE Interval= 161  ms   P Horizontal Axis= 1  deg   P Front Axis= 46  deg   QRSD Interval= 97  ms   QT Interval= 324  ms   QRS Axis= 73  deg   T Wave Axis= 195  deg   - ABNORMAL ECG -   Sinus rhythm   Nonspecific repol abnormality, diffuse leads   When compared with ECG of 05-May-2020 19:41:13,   Significant axis, voltage or hypertrophy change   Electronically Signed By:    Date and Time of Study: 2020-05-14 04:00:21         Echocardiogram:   Results for orders placed during the hospital encounter of 05/05/20   Adult Transthoracic Echo Complete W/ Cont if Necessary Per Protocol    Narrative Technically difficult study.  Mild LVE with severe apical hypokinesis and, moderate global hypokinesis   estimated LV ejection fraction of 35%   Normal RV size  Mild left atrial enlargement seen  Aortic valve, mitral valve, tricuspid valve appears structurally normal,   no significant regurgitation seen.  No pericardial effusion seen.  Proximal aorta appears normal in size.      Stress Test:     LABS:    CBC    Results from last 7 days   Lab Units 05/14/20  0356 05/11/20  1439 05/08/20  0904   WBC 10*3/mm3 9.30 9.95 9.80   HEMOGLOBIN g/dL 12.7* 14.6 14.5   PLATELETS 10*3/mm3 129* 155 113*     BMP   Results from last 7 days   Lab Units 05/14/20  0356 05/11/20  1439 05/08/20  0904   SODIUM mmol/L 142 132* 132*   POTASSIUM mmol/L 4.3 5.1 4.0   CHLORIDE mmol/L 109* 95* 92*   CO2 mmol/L 23.0 27.2 28.0   BUN mg/dL 13 25* 32*   CREATININE mg/dL 1.00 1.34* 1.12   GLUCOSE mg/dL 186* 332* 237*   MAGNESIUM mg/dL  --   --  2.1     Coag   Results from last 7 days   Lab Units 05/11/20  1439   INR  0.97     HbA1C   Lab Results   Component Value Date    HGBA1C 10.0 (H) 05/02/2020    HGBA1C 6.6 (H)  06/26/2019    HGBA1C 7.0 (H) 05/30/2019       Imaging Results (Last 24 Hours)     ** No results found for the last 24 hours. **          Results from last 7 days   Lab Units 05/07/20  1815   TROPONIN T ng/mL 0.063*         Condition on Discharge:  Stable    Vital Signs  Temp:  [97.6 °F (36.4 °C)-98.2 °F (36.8 °C)] 97.6 °F (36.4 °C)  Heart Rate:  [75-95] 85  Resp:  [17-20] 17  BP: ()/(40-98) 105/72  Arterial Line BP: (108-151)/(59-81) 134/69    Physical Exam:  Physical Exam   Constitutional: He is oriented to person, place, and time. He appears well-developed and well-nourished.   HENT:   Head: Normocephalic and atraumatic.   Eyes: Conjunctivae are normal.   Neck: Neck supple. No JVD present.   Cardiovascular: Normal rate, regular rhythm, normal heart sounds and intact distal pulses.   No murmur heard.  Pulmonary/Chest: Effort normal. He has wheezes.   Few scattered wheezes noted    Abdominal: Bowel sounds are normal. He exhibits distension.   Musculoskeletal: Normal range of motion.   Trace bilateral lower extremity edema- chronic per patient   Neurological: He is alert and oriented to person, place, and time.   Skin: Skin is warm and dry.   Right and left groin sites with dressing clean and dry.  Significant bruising noted to left groin.  Right groin with mild bruising.  No hematoma noted    Psychiatric: He has a normal mood and affect. His behavior is normal.   Nursing note and vitals reviewed.      Discharge Disposition  Home or Self Care    Discharge Medications     Discharge Medications      Continue These Medications      Instructions Start Date   albuterol sulfate  (90 Base) MCG/ACT inhaler  Commonly known as:  PROVENTIL HFA;VENTOLIN HFA;PROAIR HFA   2 puffs, Inhalation, Every 4 Hours PRN      albuterol (2.5 MG/3ML) 0.083% nebulizer solution  Commonly known as:  PROVENTIL   2.5 mg, Nebulization, Every 6 Hours PRN      Alcohol Wipes 70 % pads   1 each, Does not apply, 4 Times Daily Before Meals &  Nightly      aspirin 325 MG tablet   325 mg, Oral, Daily      atorvastatin 40 MG tablet  Commonly known as:  LIPITOR   40 mg, Oral, Daily      carvedilol 3.125 MG tablet  Commonly known as:  COREG   3.125 mg, Oral, 2 Times Daily With Meals      clopidogrel 75 MG tablet  Commonly known as:  PLAVIX   75 mg, Oral, Daily      fenofibrate 145 MG tablet  Commonly known as:  TRICOR   145 mg, Oral, Daily      glimepiride 4 MG tablet  Commonly known as:  AMARYL   4 mg, Oral, 2 Times Daily      glucose blood test strip  Commonly known as:  OneTouch Verio   1 each, Other, 4 Times Daily Before Meals & Nightly, Use as instructed      Insulin Glargine 100 UNIT/ML injection pen  Commonly known as:  LANTUS SOLOSTAR   10 Units, Subcutaneous, Nightly      lisinopril 2.5 MG tablet  Commonly known as:  PRINIVIL,ZESTRIL   TAKE 1 TABLET BY MOUTH ONCE DAILY      OneTouch Delica Plus [id number] misc   1 each, Does not apply, 4 Times Daily Before Meals & Nightly      pantoprazole 40 MG EC tablet  Commonly known as:  PROTONIX   40 mg, Oral, Daily      Pen Needles 32G X 4 MM misc   1 each, Does not apply, Daily             Discharge Diet: 2gm sodium     Activity at Discharge:     Follow-up Appointments  Future Appointments   Date Time Provider Department Center   [facility] CARD [facility] NA   6/2/2020 10:30 AM Juarez Wing MD [facility] None   6/15/2020  6:00 AM [facility] NA CARD CTR NA     Additional Instructions for the Follow-ups that You Need to Schedule     Ambulatory Referral to Cardiac Rehab   As directed            Test Results Pending at Discharge       Aranza Ramirez, JOY  05/14/20  10:21    Time: Discharge 32 min

## 2020-05-14 NOTE — PROGRESS NOTES
Cardiology Progress Note    Patient Identification:  Name: Bobby Steele Jr.  Age: 60 y.o.  Sex: male  :  1960  MRN: [id number]                 Follow Up / Chief Complaint: CAD, PCI.    Interval History: Patient underwent Impella assisted PCI to SVG to RCA and native LCx 2020       Subjective: Denies any chest pain or shortness of breath      Objective: Reportedly had oozing in the left groin and has some ecchymosis in the left groin        History of Present Illness:          This is a 60-year-old white male with past medical history of     # NSTEMI  19, SHILPA to SVG to RCA and proximal LAD leading into a small diagonal  # CAD status post CABG X3 V  # Ischemic cardiomyopathy with EF of 35%, status post ICD 10/8/2019  # COPD, continue tobacco abuse  # Hypertension   # Hyperlipidemia  # Diabetes with hemoglobin A1c of 7.4 on 6/3/19  #Positive family for premature heart disease with father MI 53     Here Impella assisted PCI.  Patient was recently in the hospital with elevated troponin and proBNP was diagnosed with CHF diuresed and sent home on metolazone.  Patient called me as outpatient saying that he is got tingling and numbness and thinks it is a side effect of metolazone.  Since it was unilateral I advised him to come to the ER.  Work-up in the ER revealed a proBNP of 1554 which is worse than his discharge 1115.  BUN/creatinine were 42/2.12 compared to his discharge BUN/creatinine of 33/1.58 and a baseline around 1.3.  D-dimer 0.45, CBC with a white count of 14.6.  Normal UA.  CT head 2020- for active bleed.  Chest x-ray no acute infiltrates or effusions.  EKG reviewed by me from 2020 reveals sinus tachycardia at the rate of 100 bpm with nonspecific ST depression in lateral leads.     Patient had stress test 2020 which was abnormal with old inferior wall MI with cole-infarct and ischemia and EF of 42%      Patient  underwent cardiac cath 2019 which revealed patent stent to SVG to  RCA patent stent in proximal LAD and small vessel disease and diagonal.   Patient has chronic dyspnea on exertion relieved with rest.  Patient is status post ICD placement 10/8/2019.  Patient underwent Impella assisted drug-eluting stent to SVG to RCA and native LCx           ASSESSMENT:     #.  Unstable angina  #. H CAD status post PCI  # elevated troponin, recent non-ST elevation MI  #  chronic CHF, ischemic cardiomyopathy 35%, status post ICD 10/8/2019  # NSTEMI 5/12/19  # CAD status post CABG  # COPD, tobacco abuse   # hypertension ,  #  hyperlipidemia   #Hyperglycemia, and type 2 diabetes  #VINICIO on CKD     Recommendations:  Telemetry is revealing sinus rhythm  Continue medical management with beta-blockers ACE inhibitor statins Plavix aspirin as tolerated  We will transfer to telemetry and increase activity  Monitor groin bleeding closely and consider getting CBC  Patient CBC does not reveal any drop in hemoglobin  We will follow-up and consider further evaluation and treatment     Monitor blood pressure  Discussed with patient and his wife   Counseled on smoking cessation  Counseled on CHF care  Counseled on diet exercise and smoking cessation  Diabetes control   We will follow  Discussed with RN taking care of patient         Past Medical History:  Past Medical History:   Diagnosis Date   • CAD (coronary artery disease)     S/P CABG   • Chronic systolic CHF (congestive heart failure) (CMS/[facility])    • COPD (chronic obstructive pulmonary disease) (CMS/[facility])    • DM2 (diabetes mellitus, type 2) (CMS/[facility])    • Dyslipidemia    • Encounter for monitoring antiplatelet therapy    • Hypertension    • Myocardial infarction (CMS/[facility])     inferior wall MI--03/13   • DEBI (obstructive sleep apnea)     noncompliant with CPAP   • Peripheral vascular disease (CMS/[facility])     S/P left fem-pop bypass   • Tobacco use      Past Surgical History:  Past Surgical History:   Procedure Laterality Date   • APPENDECTOMY      at age 8 or 9   •  CARDIAC CATHETERIZATION      3-; [facility] 2014   • CARDIAC CATHETERIZATION Right 2019    Procedure: Cardiac Catheterization/with grafts groin access;  Surgeon: Juarez Wing MD;  Location: [facility] CATH INVASIVE LOCATION;  Service: Cardiovascular   • CARDIAC CATHETERIZATION N/A 2020    Procedure: Left Heart Cath with grafts;  Surgeon: Juarez Wing MD;  Location: [facility] CATH INVASIVE LOCATION;  Service: Cardiovascular;  Laterality: N/A;   • CARDIAC ELECTROPHYSIOLOGY PROCEDURE N/A 10/8/2019    Procedure: ICD SC new, ST.SHIV ;  Surgeon: Juarez Wing MD;  Location: [facility] CATH INVASIVE LOCATION;  Service: Cardiovascular   • COLONOSCOPY     • CORONARY ARTERY BYPASS GRAFT      x3, 3-18-13-LIMA to LAD, and reverse individual SVG to lateral marginal and to PDA   • FEMORAL POPLITEAL BYPASS Left 2019    [facility]/ Dr. Jero Hatch   • HAND SURGERY Left     crushed hand   • TOE SURGERY      toe nail removal of big toe- age 8 or 9        Social History:   Social History     Tobacco Use   • Smoking status: Current Every Day Smoker     Packs/day: 1.00     Years: 25.00     Pack years: 25.00     Types: Cigarettes   • Smokeless tobacco: Never Used   • Tobacco comment: currently smokes 5 cigarettes per day   Substance Use Topics   • Alcohol use: No     Frequency: Never      Family History:  Family History   Problem Relation Age of Onset   • Hypertension Mother    • Diabetes Mother    • Heart disease Father         had CABG and re-do/  while recovering-had MI age 40s   • Diabetes Father    • Pancreatic cancer Sister    • Hyperlipidemia Brother    • Stroke Brother    • Heart disease Paternal Uncle           Allergies:  No Known Allergies  Scheduled Meds:    aspirin 325 mg Daily   atorvastatin 40 mg Nightly   carvedilol 3.125 mg BID With Meals   clopidogrel 75 mg Daily   lisinopril 2.5 mg Daily   pantoprazole 40 mg Daily   sodium chloride 10 mL Q12H           INTAKE AND  "OUTPUT:    Intake/Output Summary (Last 24 hours) at 5/14/2020 1308  Last data filed at 5/14/2020 0600  Gross per 24 hour   Intake 1868 ml   Output 575 ml   Net 1293 ml       Review of Systems:   Review of Systems   Constitution: Negative for chills and fever.   Cardiovascular: Negative for chest pain and palpitations.   Respiratory: Negative for cough and hemoptysis.    Gastrointestinal: Negative for nausea and vomiting.         Constitutional:  Temp:  [97.6 °F (36.4 °C)-98.2 °F (36.8 °C)] 97.6 °F (36.4 °C)  Heart Rate:  [75-95] 88  Resp:  [17-20] 17  BP: ()/(40-98) 106/63  Arterial Line BP: (108-151)/(59-81) 134/69    Physical Exam   /63   Pulse 88   Temp 97.6 °F (36.4 °C) (Oral)   Resp 17   Ht 170.2 cm (67\")   Wt 89.2 kg (196 lb 10.4 oz)   SpO2 92%   BMI 30.80 kg/m²   General:  Appears in no acute distress  Eyes: Sclera is anicteric,  conjunctiva is clear   HEENT:  No JVD. Thyroid not visibly enlarged. No mucosal pallor or cyanosis  Respiratory: Respirations regular and unlabored at rest.  Bilaterally good breath sounds, with good air entry in all fields. No crackles, rubs or wheezes auscultated  Cardiovascular: S1,S2 Regular rate and rhythm. No murmur, rub or gallop auscultated.  . No pretibial pitting edema  Gastrointestinal: Abdomen soft, flat, non tender. Bowel sounds present.   Musculoskeletal:  No abnormal movements  Extremities: Left groin has ecchymosis, no hematoma  Skin: Color pink. Skin warm and dry to touch. No rashes  No xanthoma  Neuro: Alert and awake, no lateralizing deficits appreciated        Cardiographics  Telemetry: sinus rhythm    ECG:   ECG 12 Lead   Final Result   HEART RATE= 86  bpm   RR Interval= 696  ms   AZ Interval= 161  ms   P Horizontal Axis= 1  deg   P Front Axis= 46  deg   QRSD Interval= 97  ms   QT Interval= 324  ms   QRS Axis= 73  deg   T Wave Axis= 195  deg   - ABNORMAL ECG -   Sinus rhythm   Nonspecific repol abnormality, diffuse leads   When compared with " "ECG of 05-May-2020 19:41:13,   Significant axis, voltage or hypertrophy change   Electronically Signed By: Juarez Wing (AJ) 14-May-2020 11:18:33   Date and Time of Study: 2020-05-14 04:00:21        I have personally reviewed EKG    Echocardiogram: Results for orders placed during the hospital encounter of 05/05/20   Adult Transthoracic Echo Complete W/ Cont if Necessary Per Protocol    Narrative Technically difficult study.  Mild LVE with severe apical hypokinesis and, moderate global hypokinesis   estimated LV ejection fraction of 35%   Normal RV size  Mild left atrial enlargement seen  Aortic valve, mitral valve, tricuspid valve appears structurally normal,   no significant regurgitation seen.  No pericardial effusion seen.  Proximal aorta appears normal in size.       Lab Review   I have reviewed the labs  Results from last 7 days   Lab Units 05/07/20  1815   TROPONIN T ng/mL 0.063*     Results from last 7 days   Lab Units 05/08/20  0904   MAGNESIUM mg/dL 2.1     Results from last 7 days   Lab Units 05/14/20  0356   SODIUM mmol/L 142   POTASSIUM mmol/L 4.3   BUN mg/dL 13   CREATININE mg/dL 1.00   CALCIUM mg/dL 8.9         Results from last 7 days   Lab Units 05/14/20  0356 05/11/20  1439 05/08/20  0904   WBC 10*3/mm3 9.30 9.95 9.80   HEMOGLOBIN g/dL 12.7* 14.6 14.5   HEMATOCRIT % 36.3* 42.3 41.2   PLATELETS 10*3/mm3 129* 155 113*     Results from last 7 days   Lab Units 05/11/20  1439   INR  0.97       RADIOLOGY:  Imaging Results (Last 24 Hours)     ** No results found for the last 24 hours. **                )5/14/2020  Juarez Wing MD      EMR Dragon/Transcription:   \"Dictated utilizing Dragon dictation\".   "

## 2020-05-14 NOTE — OUTREACH NOTE
Medical Week 1 Survey      Responses   [facility] patient discharged from?  [facility]   COVID-19 Test Status  Not tested   Does the patient have one of the following disease processes/diagnoses(primary or secondary)?  Other   Is there a successful TCM telephone encounter documented?  No   Week 1 attempt successful?  No   Revoke  Readmitted          Jayde Hatfield RN

## 2020-05-14 NOTE — PLAN OF CARE
Cancel discharge today    Transfer patient to PCU to observe again overnight for any active bleeding from groin sites.      Progress note to Follow from Dr. Wing

## 2020-05-14 NOTE — CONSULTS
Cardiac rehab evaluation s/p stent. Cardiac rehab brochure, antiplatelet, heart failure sheet, healthy eating packet with sodium facts given. Explained program and benefits. [place] would be closer for patient. Will send information there after discharge.

## 2020-05-14 NOTE — PLAN OF CARE
Patient transferred from CVCU to PCU (2117). Patient rest most of the day with no complaints. Will continue to monitor.  Problem: Patient Care Overview  Goal: Plan of Care Review  Outcome: Ongoing (interventions implemented as appropriate)  Goal: Individualization and Mutuality  Outcome: Ongoing (interventions implemented as appropriate)  Goal: Discharge Needs Assessment  Outcome: Ongoing (interventions implemented as appropriate)  Goal: Interprofessional Rounds/Family Conf  Outcome: Ongoing (interventions implemented as appropriate)     Problem: Cardiac: ACS (Acute Coronary Syndrome) (Adult)  Goal: Signs and Symptoms of Listed Potential Problems Will be Absent, Minimized or Managed (Cardiac: ACS)  Outcome: Ongoing (interventions implemented as appropriate)

## 2020-05-15 ENCOUNTER — READMISSION MANAGEMENT (OUTPATIENT)
Dept: CALL CENTER | Facility: HOSPITAL | Age: 60
End: 2020-05-15

## 2020-05-15 VITALS
HEART RATE: 88 BPM | TEMPERATURE: 98 F | BODY MASS INDEX: 31.76 KG/M2 | DIASTOLIC BLOOD PRESSURE: 69 MMHG | HEIGHT: 67 IN | WEIGHT: 202.38 LBS | OXYGEN SATURATION: 93 % | RESPIRATION RATE: 20 BRPM | SYSTOLIC BLOOD PRESSURE: 119 MMHG

## 2020-05-15 LAB
ANION GAP SERPL CALCULATED.3IONS-SCNC: 11 MMOL/L (ref 5–15)
BASOPHILS # BLD AUTO: 0.1 10*3/MM3 (ref 0–0.2)
BASOPHILS NFR BLD AUTO: 1 % (ref 0–1.5)
BUN BLD-MCNC: 14 MG/DL (ref 8–23)
BUN/CREAT SERPL: 11.9 (ref 7–25)
CALCIUM SPEC-SCNC: 9.1 MG/DL (ref 8.6–10.5)
CHLORIDE SERPL-SCNC: 103 MMOL/L (ref 98–107)
CO2 SERPL-SCNC: 26 MMOL/L (ref 22–29)
CREAT BLD-MCNC: 1.18 MG/DL (ref 0.76–1.27)
DEPRECATED RDW RBC AUTO: 43.8 FL (ref 37–54)
EOSINOPHIL # BLD AUTO: 0.2 10*3/MM3 (ref 0–0.4)
EOSINOPHIL NFR BLD AUTO: 2.3 % (ref 0.3–6.2)
ERYTHROCYTE [DISTWIDTH] IN BLOOD BY AUTOMATED COUNT: 13.4 % (ref 12.3–15.4)
GFR SERPL CREATININE-BSD FRML MDRD: 63 ML/MIN/1.73
GLUCOSE BLD-MCNC: 252 MG/DL (ref 65–99)
HCT VFR BLD AUTO: 33.2 % (ref 37.5–51)
HGB BLD-MCNC: 12.1 G/DL (ref 13–17.7)
LYMPHOCYTES # BLD AUTO: 1.9 10*3/MM3 (ref 0.7–3.1)
LYMPHOCYTES NFR BLD AUTO: 26.1 % (ref 19.6–45.3)
MCH RBC QN AUTO: 33.2 PG (ref 26.6–33)
MCHC RBC AUTO-ENTMCNC: 36.3 G/DL (ref 31.5–35.7)
MCV RBC AUTO: 91.4 FL (ref 79–97)
MONOCYTES # BLD AUTO: 0.5 10*3/MM3 (ref 0.1–0.9)
MONOCYTES NFR BLD AUTO: 6.7 % (ref 5–12)
NEUTROPHILS # BLD AUTO: 4.7 10*3/MM3 (ref 1.7–7)
NEUTROPHILS NFR BLD AUTO: 63.9 % (ref 42.7–76)
NRBC BLD AUTO-RTO: 0 /100 WBC (ref 0–0.2)
PLATELET # BLD AUTO: 132 10*3/MM3 (ref 140–450)
PMV BLD AUTO: 10.6 FL (ref 6–12)
POTASSIUM BLD-SCNC: 4.3 MMOL/L (ref 3.5–5.2)
RBC # BLD AUTO: 3.63 10*6/MM3 (ref 4.14–5.8)
SODIUM BLD-SCNC: 140 MMOL/L (ref 136–145)
WBC NRBC COR # BLD: 7.4 10*3/MM3 (ref 3.4–10.8)

## 2020-05-15 PROCEDURE — 99238 HOSP IP/OBS DSCHRG MGMT 30/<: CPT | Performed by: INTERNAL MEDICINE

## 2020-05-15 PROCEDURE — 80048 BASIC METABOLIC PNL TOTAL CA: CPT | Performed by: NURSE PRACTITIONER

## 2020-05-15 PROCEDURE — 85025 COMPLETE CBC W/AUTO DIFF WBC: CPT | Performed by: NURSE PRACTITIONER

## 2020-05-15 RX ADMIN — LISINOPRIL 2.5 MG: 5 TABLET ORAL at 09:16

## 2020-05-15 RX ADMIN — CLOPIDOGREL BISULFATE 75 MG: 75 TABLET ORAL at 09:17

## 2020-05-15 RX ADMIN — CARVEDILOL 3.12 MG: 3.12 TABLET, FILM COATED ORAL at 09:14

## 2020-05-15 RX ADMIN — PANTOPRAZOLE SODIUM 40 MG: 40 TABLET, DELAYED RELEASE ORAL at 09:14

## 2020-05-15 RX ADMIN — Medication 10 ML: at 09:18

## 2020-05-15 RX ADMIN — ASPIRIN 325 MG ORAL TABLET 325 MG: 325 PILL ORAL at 09:14

## 2020-05-15 NOTE — DISCHARGE INSTR - DIET
Healthy heart diet  · Heart-healthy meal planning includes eating less unhealthy fats, eating more healthy fats, and making other changes in your diet.  · Eat a balanced diet. This includes fruits and vegetables, low-fat or nonfat dairy, lean protein, nuts and legumes, whole grains, and heart-healthy oils and fats.

## 2020-05-15 NOTE — OUTREACH NOTE
Prep Survey      Responses   [facility] facility patient discharged from?  [facility]   Is LACE score < 7 ?  No   Eligibility  Readm Mgmt   Discharge diagnosis  non-stemi MI   COVID-19 Test Status  Negative   Does the patient have one of the following disease processes/diagnoses(primary or secondary)?  Acute MI (STEMI,NSTEMI)   Does the patient have Home health ordered?  No   Is there a DME ordered?  No   Prep survey completed?  Yes          Gypsy Collins RN         normal...

## 2020-05-15 NOTE — DISCHARGE SUMMARY
Date of Discharge:  5/15/2020    Discharge Diagnosis:   Active Hospital Problems    Diagnosis  POA   • Non-ST elevation MI (NSTEMI) (CMS/Formerly [facility]) [I21.4]  Unknown     Priority: High   • CAD S/P percutaneous coronary angioplasty [I25.10, Z98.61]  Unknown     Priority: High   • Dyslipidemia [E78.5]  Unknown     Priority: High   • Type 2 diabetes mellitus with hyperglycemia, with long-term current use of insulin (CMS/Formerly [facility]) [E11.65, Z79.4]  Not Applicable     Priority: High   • Ischemic cardiomyopathy [I25.5]  Unknown     Priority: High   • Chronic systolic congestive heart failure (CMS/Formerly [facility]) [I50.22]  Unknown   • Benign essential HTN [I10]  Unknown      Resolved Hospital Problems   No resolved problems to display.       Presenting Problem/History of Present Illness  Non-ST elevation MI (NSTEMI) (CMS/Formerly [facility]) [I21.4]  CAD S/P percutaneous coronary angioplasty [I25.10, Z98.61]  Chronic systolic congestive heart failure (CMS/Formerly [facility]) [I50.22]  Type 2 diabetes mellitus with hyperglycemia, with long-term current use of insulin (CMS/Formerly [facility]) [E11.65, Z79.4]  Ischemic cardiomyopathy [I25.5]  Benign essential HTN [I10]  Dyslipidemia [E78.5]  Non-ST elevation MI (NSTEMI) (CMS/Formerly [facility]) [I21.4]      Hospital Course  Patient is a 60 y.o. male.   with past medical history of     # NSTEMI  5/12/19, SHILPA to SVG to RCA and proximal LAD leading into a small diagonal  # CAD status post CABG X3 V  # Ischemic cardiomyopathy with EF of 35%, status post ICD 10/8/2019  # COPD, continue tobacco abuse  # Hypertension   # Hyperlipidemia  # Diabetes with hemoglobin A1c of 7.4 on 6/3/19  #Positive family for premature heart disease with father MI 53     Here Impella assisted PCI.  Patient was recently in the hospital with elevated troponin and proBNP was diagnosed with CHF diuresed and sent home on metolazone.  Patient called me as outpatient saying that he is got tingling and numbness and thinks it is a side effect of metolazone.  Since it was unilateral I advised  him to come to the ER.  Work-up in the ER revealed a proBNP of 1554 which is worse than his discharge 1115.  BUN/creatinine were 42/2.12 compared to his discharge BUN/creatinine of 33/1.58 and a baseline around 1.3.  D-dimer 0.45, CBC with a white count of 14.6.  Normal UA.  CT head 5/5/2020- for active bleed.  Chest x-ray no acute infiltrates or effusions.  EKG reviewed by me from 5/5/2020 reveals sinus tachycardia at the rate of 100 bpm with nonspecific ST depression in lateral leads.     Patient had stress test 5/2/2020 which was abnormal with old inferior wall MI with cole-infarct and ischemia and EF of 42%      Patient  underwent cardiac cath 6/27/2019 which revealed patent stent to SVG to RCA patent stent in proximal LAD and small vessel disease and diagonal.   Patient has chronic dyspnea on exertion relieved with rest.  Patient is status post ICD placement 10/8/2019.  Patient underwent Impella assisted drug-eluting stent to SVG to RCA and native LCx           ASSESSMENT:      #.  Unstable angina  #. H CAD status post PCI  # elevated troponin, recent non-ST elevation MI  #  chronic CHF, ischemic cardiomyopathy 35%, status post ICD 10/8/2019  # NSTEMI 5/12/19  # CAD status post CABG  # COPD, tobacco abuse   # hypertension ,  #  hyperlipidemia   #Hyperglycemia, and type 2 diabetes  #History of VINICIO on CKD     Recommendations:  Telemetry is revealing sinus rhythm  Continue medical management with beta-blockers ACE inhibitor statins Plavix aspirin as tolerated  We will transfer to telemetry and increase activity  Monitor groin bleeding closely and consider getting CBC  Patient CBC does not reveal any drop in hemoglobin  We will follow-up and consider further evaluation and treatment     Monitor blood pressure  Discussed with patient and his wife   Counseled on smoking cessation  Counseled on CHF care  Counseled on diet exercise and smoking cessation  Diabetes control   We will follow  Discussed with RN taking care  of patient        Procedures Performed  Procedure(s):  Percutaneous Coronary Intervention, Impella backup  Left Ventricular Assist Device       Consults:   Consults     Date and Time Order Name Status Description    5/7/2020 1012 Inpatient Hospitalist Consult Completed     5/6/2020 0816 Inpatient Cardiology Consult Completed     5/6/2020 0553 Inpatient Intensivist Consult Completed     5/5/2020 2105 Hospitalist (on-call MD unless specified) Completed     5/1/2020 1415 Cardiology (on-call MD unless specified) Completed     5/1/2020 1415 Hospitalist (on-call MD unless specified) Completed           Pertinent Test Results:     Ejection Fraction  Lab Results   Component Value Date    EF 42 05/03/2020       Echo EF Estimated  Lab Results   Component Value Date    ECHOEFEST 35 08/22/2019       Nuclear Stress Ejection Fraction  No components found for: NUCEF    Cath Ejection Fraction Quantitative  No results found for: CATHEF    Condition on Discharge: Stable    Physical Exam at Discharge Physical Exam Physical Exam   General:  Appears in no acute distress  Eyes: Sclera is anicteric,  conjunctiva is clear   HEENT:  No JVD. Thyroid not visibly enlarged. No mucosal pallor or cyanosis  Respiratory: Respirations regular and unlabored at rest.  Bilaterally good breath sounds, with good air entry in all fields. No crackles, rubs or wheezes auscultated  Cardiovascular: S1,S2 Regular rate and rhythm. No murmur, rub or gallop auscultated. No carotid bruits. DP/PT pulses    . No pretibial pitting edema  Gastrointestinal: Abdomen soft, flat, non tender. Bowel sounds present. No hepatosplenomegaly. No ascites  Musculoskeletal: JOHNSON x4. No abnormal movements  Extremities: No digital clubbing or cyanosis, groin with ecchymosis on the left groin.  Skin: Color pink. Skin warm and dry to touch. No rashes  No xanthoma  Neuro: Alert and awake, no lateralizing deficits appreciated        Vital Signs  Temp:  [97.5 °F (36.4 °C)-98.1 °F (36.7  °C)] 98 °F (36.7 °C)  Heart Rate:  [] 88  Resp:  [17-20] 20  BP: (106-125)/(56-73) 119/69      Discharge Disposition  Home or Self Care    Discharge Medications     Discharge Medications      Continue These Medications      Instructions Start Date   albuterol sulfate  (90 Base) MCG/ACT inhaler  Commonly known as:  PROVENTIL HFA;VENTOLIN HFA;PROAIR HFA   2 puffs, Inhalation, Every 4 Hours PRN      albuterol (2.5 MG/3ML) 0.083% nebulizer solution  Commonly known as:  PROVENTIL   2.5 mg, Nebulization, Every 6 Hours PRN      Alcohol Wipes 70 % pads   1 each, Does not apply, 4 Times Daily Before Meals & Nightly      aspirin 325 MG tablet   325 mg, Oral, Daily      atorvastatin 40 MG tablet  Commonly known as:  LIPITOR   40 mg, Oral, Daily      carvedilol 3.125 MG tablet  Commonly known as:  COREG   3.125 mg, Oral, 2 Times Daily With Meals      clopidogrel 75 MG tablet  Commonly known as:  PLAVIX   75 mg, Oral, Daily      fenofibrate 145 MG tablet  Commonly known as:  TRICOR   145 mg, Oral, Daily      glimepiride 4 MG tablet  Commonly known as:  AMARYL   4 mg, Oral, 2 Times Daily      glucose blood test strip  Commonly known as:  OneTouch Verio   1 each, Other, 4 Times Daily Before Meals & Nightly, Use as instructed      Insulin Glargine 100 UNIT/ML injection pen  Commonly known as:  LANTUS SOLOSTAR   10 Units, Subcutaneous, Nightly      lisinopril 2.5 MG tablet  Commonly known as:  PRINIVIL,ZESTRIL   TAKE 1 TABLET BY MOUTH ONCE DAILY      OneTouch Delica Plus Fchcyx19R misc   1 each, Does not apply, 4 Times Daily Before Meals & Nightly      pantoprazole 40 MG EC tablet  Commonly known as:  PROTONIX   40 mg, Oral, Daily      Pen Needles 32G X 4 MM misc   1 each, Does not apply, Daily             Discharge Diet:     Activity at Discharge:     Follow-up Appointments  Future Appointments   Date Time Provider Department Center   6/2/2020 10:00 AM JOSÉ LUIS BEE, [facility]    6/2/2020 10:30 AM Juarez Wing MD [facility] None   6/15/2020  6:00 AM PACEART REMOTE, [facility]     Additional Instructions for the Follow-ups that You Need to Schedule     Ambulatory Referral to Cardiac Rehab   As directed            Test Results Pending at Discharge       Juarez Wing MD  05/15/20  10:06    Time: Discharge Time Spent:30

## 2020-05-15 NOTE — DISCHARGE INSTR - ACTIVITY
Activity  · Do not drive for 24 hours if you were given a medicine to help you relax (sedative).  · Do not lift anything that is heavier than 10 lb (4.5 kg) for 5 days after your procedure or as directed by your health care provider.  · Ask your health care provider when it is okay for you:  ? To return to work or school.  ? To resume usual physical activities or sports.  ? To resume sexual activity.

## 2020-05-15 NOTE — PLAN OF CARE
Problem: Patient Care Overview  Goal: Plan of Care Review  Outcome: Ongoing (interventions implemented as appropriate)  Flowsheets  Taken 5/15/2020 0422  Progress: improving  Taken 5/15/2020 0400  Plan of Care Reviewed With: patient  Note:   Pt slept comfortably throughout the night with no complaints. Groin sights ecchymotic. No signs of active bleeding. Dressing clean, dry and intact. Plan is to return home following D/C.

## 2020-05-18 ENCOUNTER — READMISSION MANAGEMENT (OUTPATIENT)
Dept: CALL CENTER | Facility: HOSPITAL | Age: 60
End: 2020-05-18

## 2020-05-18 NOTE — OUTREACH NOTE
AMI Week 1 Survey      Responses   [facility] patient discharged from?  [facility]   Does the patient have one of the following disease processes/diagnoses(primary or secondary)?  Acute MI (STEMI,NSTEMI)   Is there a successful TCM telephone encounter documented?  No   Week 1 attempt successful?  Yes   Call start time  1115   Call end time  1117   Discharge diagnosis  non-stemi MI   Is patient permission given to speak with other caregiver?  Yes   Person spoke with today (if not patient) and relationship  Spouse   Meds reviewed with patient/caregiver?  Yes   Is the patient having any side effects they believe may be caused by any medication additions or changes?  No   Does the patient have all prescriptions related to this admission filled (includes statins,anticoagulants,HTN meds,anti-arrhythmia meds)  Yes   Is the patient taking all medications as directed (includes completed medication regime)?  Yes   Does the patient have a primary care provider?   Yes   Does the patient have an appointment with their PCP,cardiologist,or clinic within 7 days of discharge?  Yes   Has the patient kept scheduled appointments due by today?  Yes   Has home health visited the patient within 72 hours of discharge?  N/A   Psychosocial issues?  No   Did the patient receive a copy of their discharge instructions?  Yes   Nursing interventions  Reviewed instructions with patient   What is the patient's perception of their health status since discharge?  Improving   Nursing interventions  Nurse provided patient education   Is the patient/caregiver able to teach back signs and symptoms of when to call for help immediately:  Sudden chest discomfort, Sudden discomfort in arms, back, neck or jaw   Nursing interventions  Nurse provided patient education   Is the pateint /caregiver able to teach back the importance of cardiac rehab?  Yes   Nursing interventions  Provided education on importance of cardiac rehab   Is the patient/caregiver able to  teach back lifestyle changes to help prevent MIs  Regular exercise as approved by provider, Heart healthy diet, Maintaining a healthy weight   Is the patient/caregiver able to teach back ways to prevent a second heart attack:  Follow up with MD, Manage risk factors, Participate in Cardiac Rehab, Take medications   Is the patient/caregiver able to teach back the hierarchy of who to call/visit for symptoms/problems? PCP, Specialist, Home health nurse, Urgent Care, ED, 911  Yes   Week 1 call completed?  Yes          Gavin Vergara, RN

## 2020-05-26 ENCOUNTER — READMISSION MANAGEMENT (OUTPATIENT)
Dept: CALL CENTER | Facility: HOSPITAL | Age: 60
End: 2020-05-26

## 2020-05-26 NOTE — OUTREACH NOTE
AMI Week 2 Survey      Responses   [facility] patient discharged from?  [facility]   Does the patient have one of the following disease processes/diagnoses(primary or secondary)?  Acute MI (STEMI,NSTEMI)   Week 2 attempt successful?  No   Unsuccessful attempts  Attempt 1          Jessi Dye LPN

## 2020-05-29 ENCOUNTER — READMISSION MANAGEMENT (OUTPATIENT)
Dept: CALL CENTER | Facility: HOSPITAL | Age: 60
End: 2020-05-29

## 2020-05-29 ENCOUNTER — TRANSCRIBE ORDERS (OUTPATIENT)
Dept: CARDIAC REHAB | Facility: HOSPITAL | Age: 60
End: 2020-05-29

## 2020-05-29 DIAGNOSIS — Z95.5 STATUS POST INSERTION OF DRUG ELUTING CORONARY ARTERY STENT: Primary | ICD-10-CM

## 2020-05-29 DIAGNOSIS — I21.4 NSTEMI (NON-ST ELEVATED MYOCARDIAL INFARCTION) (HCC): ICD-10-CM

## 2020-05-29 NOTE — OUTREACH NOTE
AMI Week 2 Survey      Responses   [facility] patient discharged from?  [facility]   Does the patient have one of the following disease processes/diagnoses(primary or secondary)?  Acute MI (STEMI,NSTEMI)   Week 2 attempt successful?  Yes   Call start time  1123   Call end time  1124   Discharge diagnosis  non-stemi MI   Is patient permission given to speak with other caregiver?  Yes   List who call center can speak with  Suzie- Wife   Person spoke with today (if not patient) and relationship  Suzie- Wife   Meds reviewed with patient/caregiver?  Yes   Is the patient having any side effects they believe may be caused by any medication additions or changes?  No   Does the patient have all prescriptions related to this admission filled (includes statins,anticoagulants,HTN meds,anti-arrhythmia meds)  Yes   Is the patient taking all medications as directed (includes completed medication regime)?  Yes   Does the patient have a primary care provider?   Yes   Does the patient have an appointment with their PCP,cardiologist,or clinic within 7 days of discharge?  Yes   Has the patient kept scheduled appointments due by today?  Yes   Psychosocial issues?  No   Did the patient receive a copy of their discharge instructions?  Yes   Nursing interventions  Reviewed instructions with patient, Educated on MyChart   What is the patient's perception of their health status since discharge?  Improving   Nursing interventions  Nurse provided patient education   Is the patient/caregiver able to teach back the hierarchy of who to call/visit for symptoms/problems? PCP, Specialist, Home health nurse, Urgent Care, ED, 911  Yes   Week 2 call completed?  Yes          Libby Bergman RN

## 2020-06-02 ENCOUNTER — OFFICE VISIT (OUTPATIENT)
Dept: CARDIOLOGY | Facility: CLINIC | Age: 60
End: 2020-06-02

## 2020-06-02 VITALS
BODY MASS INDEX: 31.39 KG/M2 | HEIGHT: 67 IN | OXYGEN SATURATION: 94 % | HEART RATE: 90 BPM | WEIGHT: 200 LBS | DIASTOLIC BLOOD PRESSURE: 64 MMHG | SYSTOLIC BLOOD PRESSURE: 97 MMHG

## 2020-06-02 DIAGNOSIS — E78.5 DYSLIPIDEMIA: ICD-10-CM

## 2020-06-02 DIAGNOSIS — I50.22 CHRONIC SYSTOLIC CONGESTIVE HEART FAILURE (HCC): ICD-10-CM

## 2020-06-02 DIAGNOSIS — Z79.4 TYPE 2 DIABETES MELLITUS WITH HYPERGLYCEMIA, WITH LONG-TERM CURRENT USE OF INSULIN (HCC): ICD-10-CM

## 2020-06-02 DIAGNOSIS — I25.5 ISCHEMIC CARDIOMYOPATHY: ICD-10-CM

## 2020-06-02 DIAGNOSIS — I25.10 CAD S/P PERCUTANEOUS CORONARY ANGIOPLASTY: Primary | ICD-10-CM

## 2020-06-02 DIAGNOSIS — Z95.810 ICD (IMPLANTABLE CARDIOVERTER-DEFIBRILLATOR) IN PLACE: ICD-10-CM

## 2020-06-02 DIAGNOSIS — E11.65 TYPE 2 DIABETES MELLITUS WITH HYPERGLYCEMIA, WITH LONG-TERM CURRENT USE OF INSULIN (HCC): ICD-10-CM

## 2020-06-02 DIAGNOSIS — I10 BENIGN ESSENTIAL HTN: ICD-10-CM

## 2020-06-02 DIAGNOSIS — Z98.61 CAD S/P PERCUTANEOUS CORONARY ANGIOPLASTY: Primary | ICD-10-CM

## 2020-06-02 PROCEDURE — 99214 OFFICE O/P EST MOD 30 MIN: CPT | Performed by: INTERNAL MEDICINE

## 2020-06-02 PROCEDURE — 93000 ELECTROCARDIOGRAM COMPLETE: CPT | Performed by: INTERNAL MEDICINE

## 2020-06-02 NOTE — PROGRESS NOTES
Subjective:     Encounter Date:06/02/2020      Patient ID: Bobby Steele Jr. is a 60 y.o. male.    Chief Complaint : History of CAD CABG CHF cardiomyopathy PCI here for hospital follow-up  History of Present Illness       This is a 60-year-old white male with past medical history of     # NSTEMI  5/12/19, SHILPA to SVG to RCA and proximal LAD leading into a small diagonal, cath 5/11/2020 underwent SHILPA to SVG to RCA and native proximal LCx with Impella assistance  # CAD status post CABG X3 V  # Ischemic cardiomyopathy with EF of 35%, status post ICD 10/8/2019  # COPD, continue tobacco abuse  # Hypertension   # Hyperlipidemia  # Diabetes with hemoglobin A1c of 7.4 on 6/3/19  #Positive family for premature heart disease with father MI 53     Here for follow-up.  Patient denies any chest pain shortness of breath palpitations.  Is feeling followed better since PCI.  Patient was recently in the hospital with complaint of unilateral tingling and numbness in left hand.  Had MRI of the head done which was negative for stroke.  patient was recently in the hospital with elevated troponin and proBNP was diagnosed with CHF diuresed and sent home on metolazone.  Patient called me as outpatient saying that he is got tingling and numbness and thinks it is a side effect of metolazone.  Since it was unilateral I advised him to come to the ER.  Work-up in the ER revealed a proBNP of 1554 which is worse than his discharge 1115.  BUN/creatinine were 42/2.12 compared to his discharge BUN/creatinine of 33/1.58 and a baseline around 1.3.D-dimer 0.45, CBC with a white count of 14.6.  Normal UA.  CT head 5/5/2020- for active bleed.  Chest x-ray no acute infiltrates or effusions.  EKG reviewed by me from 5/5/2020 reveals sinus tachycardia at the rate of 100 bpm with nonspecific ST depression in lateral leads.     Patient had stress test 5/2/2020 which was abnormal with old inferior wall MI with cole-infarct and ischemia and EF of  42%      Patient  underwent cardiac cath 6/27/2019 which revealed patent stent to SVG to RCA patent stent in proximal LAD and small vessel disease and diagonal.   Patient has chronic dyspnea on exertion relieved with rest.  Patient is status post ICD placement 10/8/2019 .  Patient had repeat catheter 11/20/2018 SHILPA to SVG to RCA and native proximal LCx with Impella back     Patient's arterial blood pressure is 97/64, heart rate 90, O2 sat of 94% on room     ASSESSMENT:   #Status post PTCA and stent I  #  chronic CHF, ischemic cardiomyopathy 35%, status post ICD 10/8/2019  # NSTEMI 5/12/19  # CAD status post CABG  # COPD, tobacco abuse   # hypertension ,  #  hyperlipidemia   #Hyperglycemia, and type 2 diabetes  #  CKD     Recommendations:    Will continue beta-blockers and ACE inhibitor's as tolerated  Continue , aspirin, Plavix and statin as tolerated   Monitor blood pressure  Advised patient to check blood pressure at  Counseled on smoking cessation  Counseled on CHF care  Counseled on diet exercise and smoking cessation  Diabetes control   Reviewed EKG results with patient  We will follow-up in pacemaker clinic  Check lipid profile CMP and CK in follow-up  Will follow-up in 6 months or sooner if needed  Reviewed records of recent admission and reviewed the results with patient    Assessment:         No diagnosis found.       Plan:         Past Medical History:  Past Medical History:   Diagnosis Date   • CAD (coronary artery disease)     S/P CABG   • Chronic systolic CHF (congestive heart failure) (CMS/[facility])    • COPD (chronic obstructive pulmonary disease) (CMS/[facility])    • DM2 (diabetes mellitus, type 2) (CMS/[facility])    • Dyslipidemia    • Encounter for monitoring antiplatelet therapy    • Hypertension    • Myocardial infarction (CMS/[facility])     inferior wall MI--03/13   • DEBI (obstructive sleep apnea)     noncompliant with CPAP   • Peripheral vascular disease (CMS/[facility])     S/P left fem-pop bypass   • Tobacco use      Past  Surgical History:  Past Surgical History:   Procedure Laterality Date   • APPENDECTOMY      at age 8 or 9   • BIVENTRICULAR ASSIST DEVICE/LEFT VENTRICULAR ASSIST DEVICE INSERTION N/A 5/13/2020    Procedure: Left Ventricular Assist Device;  Surgeon: Juarez Wing MD;  Location: [facility] CATH INVASIVE LOCATION;  Service: Cardiovascular;  Laterality: N/A;   • CARDIAC CATHETERIZATION      3-; [facility] 9-   • CARDIAC CATHETERIZATION Right 6/27/2019    Procedure: Cardiac Catheterization/with grafts groin access;  Surgeon: Juarez Wing MD;  Location: [facility] CATH INVASIVE LOCATION;  Service: Cardiovascular   • CARDIAC CATHETERIZATION N/A 5/8/2020    Procedure: Left Heart Cath with grafts;  Surgeon: Juarez Wing MD;  Location: [facility] CATH INVASIVE LOCATION;  Service: Cardiovascular;  Laterality: N/A;   • CARDIAC CATHETERIZATION N/A 5/13/2020    Procedure: Percutaneous Coronary Intervention, Impella backup;  Surgeon: Juarez Wing MD;  Location: [facility] CATH INVASIVE LOCATION;  Service: Cardiovascular;  Laterality: N/A;   • CARDIAC ELECTROPHYSIOLOGY PROCEDURE N/A 10/8/2019    Procedure: ICD SC new, ST.SHIV ;  Surgeon: Juarez Wing MD;  Location: [facility] CATH INVASIVE LOCATION;  Service: Cardiovascular   • COLONOSCOPY     • CORONARY ARTERY BYPASS GRAFT      x3, 3-18-13-LIMA to LAD, and reverse individual SVG to lateral marginal and to PDA   • FEMORAL POPLITEAL BYPASS Left 01/09/2019    [facility]/ Dr. Jero Hatch   • HAND SURGERY Left     crushed hand   • TOE SURGERY      toe nail removal of big toe- age 8 or 9      Allergies:  No Known Allergies  Home Meds:  Current Meds:     Current Outpatient Medications:   •  albuterol (PROVENTIL) (2.5 MG/3ML) 0.083% nebulizer solution, Take 2.5 mg by nebulization Every 6 (Six) Hours As Needed for Wheezing or Shortness of Air., Disp: , Rfl:   •  albuterol sulfate  (90 Base) MCG/ACT inhaler, Inhale 2 puffs Every 4 (Four)  Hours As Needed for Wheezing or Shortness of Air., Disp: , Rfl:   •  Alcohol Swabs (ALCOHOL WIPES) 70 % pads, 1 each 4 (Four) Times a Day Before Meals & at Bedtime., Disp: 200 each, Rfl: 0  •  aspirin 325 MG tablet, Take 325 mg by mouth Daily., Disp: , Rfl:   •  atorvastatin (LIPITOR) 40 MG tablet, Take 40 mg by mouth Daily., Disp: , Rfl:   •  carvedilol (COREG) 3.125 MG tablet, Take 3.125 mg by mouth 2 (Two) Times a Day With Meals., Disp: , Rfl:   •  clopidogrel (PLAVIX) 75 MG tablet, Take 75 mg by mouth Daily., Disp: , Rfl:   •  fenofibrate (TRICOR) 145 MG tablet, Take 1 tablet by mouth Daily., Disp: 90 tablet, Rfl: 3  •  glimepiride (AMARYL) 4 MG tablet, Take 4 mg by mouth 2 (Two) Times a Day., Disp: , Rfl:   •  glucose blood (OneTouch Verio) test strip, 1 each by Other route 4 (Four) Times a Day Before Meals & at Bedtime. Use as instructed, Disp: 200 each, Rfl: 0  •  Insulin Glargine (LANTUS SOLOSTAR) 100 UNIT/ML injection pen, Inject 10 Units under the skin into the appropriate area as directed Every Night., Disp: 3 mL, Rfl: 2  •  Insulin Pen Needle (PEN NEEDLES) 32G X 4 MM misc, 1 each Daily., Disp: 100 each, Rfl: 0  •  Lancets (ONETOUCH DELICA PLUS [id number]) misc, 1 each 4 (Four) Times a Day Before Meals & at Bedtime., Disp: 200 each, Rfl: 0  •  lisinopril (PRINIVIL,ZESTRIL) 2.5 MG tablet, TAKE 1 TABLET BY MOUTH ONCE DAILY, Disp: 90 tablet, Rfl: 1  •  pantoprazole (PROTONIX) 40 MG EC tablet, Take 40 mg by mouth Daily., Disp: , Rfl:   Social History:   Social History     Tobacco Use   • Smoking status: Current Every Day Smoker     Packs/day: 1.00     Years: 25.00     Pack years: 25.00     Types: Cigarettes   • Smokeless tobacco: Never Used   • Tobacco comment: currently smokes 5 cigarettes per day   Substance Use Topics   • Alcohol use: No     Frequency: Never      Family History:  Family History   Problem Relation Age of Onset   • Hypertension Mother    • Diabetes Mother    • Heart disease Father         had  "CABG and re-do/  while recovering-had MI age 40s   • Diabetes Father    • Pancreatic cancer Sister    • Hyperlipidemia Brother    • Stroke Brother    • Heart disease Paternal Uncle         The following portions of the patient's history were reviewed and updated as appropriate: allergies, current medications, past family history, past medical history, past social history, past surgical history and problem list.      Review of Systems   Cardiovascular: Positive for leg swelling. Negative for chest pain and palpitations.   Respiratory: Positive for shortness of breath.    Neurological: Negative for dizziness and numbness.     Comprehensive review of systems were reviewed and all others review of systems were found to be negative other than HPI      ECG 12 Lead  Date/Time: 2020 10:50 AM  Performed by: Juarez Wing MD  Authorized by: Juarez Wing MD   Comparison: compared with previous ECG from 2020  Comparison to previous ECG: EKG done today reviewed by me shows sinus rhythm with rate of 85 bpm with nonspecific ST-T changes in inferolateral leads unchanged from EKG from 20                 Objective:     Physical Exam  BP 97/64 (BP Location: Left arm, Patient Position: Sitting, Cuff Size: Large Adult)   Pulse 90   Ht 170.2 cm (67\")   Wt 90.7 kg (200 lb)   SpO2 94%   BMI 31.32 kg/m²   General:  Appears in no acute distress  Eyes: Sclera is anicteric,  conjunctiva is clear   HEENT:  No JVD. Thyroid not visibly enlarged. No mucosal pallor or cyanosis  Respiratory: Respirations regular and unlabored at rest.  Bilaterally good breath sounds, with good air entry in all fields. No crackles, rubs or wheezes auscultated  Cardiovascular: S1,S2 Regular rate and rhythm. No murmur, rub or gallop auscultated. No pretibial pitting edema  Gastrointestinal: Abdomen soft, flat, non tender. Bowel sounds present.   Musculoskeletal:  No abnormal movements  Extremities: No digital clubbing or " cyanosis  Skin: Color pink. Skin warm and dry to touch. No rashes  No xanthoma  Neuro: Alert and awake, no lateralizing deficits appreciated    Lab Reviewed:

## 2020-06-03 ENCOUNTER — READMISSION MANAGEMENT (OUTPATIENT)
Dept: CALL CENTER | Facility: HOSPITAL | Age: 60
End: 2020-06-03

## 2020-06-03 NOTE — OUTREACH NOTE
AMI Week 3 Survey      Responses   [facility] patient discharged from?  [facility]   Does the patient have one of the following disease processes/diagnoses(primary or secondary)?  Acute MI (STEMI,NSTEMI)   Week 3 attempt successful?  Yes   Call start time  1650   Call end time  1651   Discharge diagnosis  non-stemi MI   List who call center can speak with  Suzie- Wife   Person spoke with today (if not patient) and relationship  Suzie- Wife   Meds reviewed with patient/caregiver?  Yes   Is the patient taking all medications as directed (includes completed medication regime)?  Yes   Has the patient kept scheduled appointments due by today?  Yes   Psychosocial issues?  No   Comments  Pt is doing well, no concerns at this time    What is the patient's perception of their health status since discharge?  Improving   Week 3 call completed?  Yes          Aleisha Mark RN

## 2020-06-10 ENCOUNTER — READMISSION MANAGEMENT (OUTPATIENT)
Dept: CALL CENTER | Facility: HOSPITAL | Age: 60
End: 2020-06-10

## 2020-06-10 NOTE — OUTREACH NOTE
AMI Week 4 Survey      Responses   [facility] patient discharged from?  [facility]   Does the patient have one of the following disease processes/diagnoses(primary or secondary)?  Acute MI (STEMI,NSTEMI)   Week 4 attempt successful?  No          Krysten Nathan LPN

## 2020-06-15 ENCOUNTER — CLINICAL SUPPORT NO REQUIREMENTS (OUTPATIENT)
Dept: CARDIOLOGY | Facility: CLINIC | Age: 60
End: 2020-06-15

## 2020-06-15 DIAGNOSIS — Z95.810 PRESENCE OF AUTOMATIC CARDIOVERTER/DEFIBRILLATOR (AICD): ICD-10-CM

## 2020-06-15 DIAGNOSIS — I25.5 ISCHEMIC CARDIOMYOPATHY: Primary | ICD-10-CM

## 2020-06-15 PROCEDURE — 93296 REM INTERROG EVL PM/IDS: CPT | Performed by: INTERNAL MEDICINE

## 2020-06-15 PROCEDURE — 93295 DEV INTERROG REMOTE 1/2/MLT: CPT | Performed by: INTERNAL MEDICINE

## 2020-06-30 DIAGNOSIS — E78.2 MIXED HYPERLIPIDEMIA: ICD-10-CM

## 2020-06-30 RX ORDER — FENOFIBRATE 145 MG/1
TABLET, COATED ORAL
Qty: 90 TABLET | Refills: 2 | Status: SHIPPED | OUTPATIENT
Start: 2020-06-30 | End: 2021-04-22

## 2020-07-17 DIAGNOSIS — I50.22 CHRONIC SYSTOLIC CONGESTIVE HEART FAILURE (HCC): ICD-10-CM

## 2020-07-20 DIAGNOSIS — I50.22 CHRONIC SYSTOLIC CONGESTIVE HEART FAILURE (HCC): ICD-10-CM

## 2020-07-20 RX ORDER — FUROSEMIDE 40 MG/1
TABLET ORAL
Qty: 180 TABLET | Refills: 0 | OUTPATIENT
Start: 2020-07-20

## 2020-07-30 ENCOUNTER — OUTSIDE FACILITY SERVICE (OUTPATIENT)
Dept: CARDIOLOGY | Facility: CLINIC | Age: 60
End: 2020-07-30

## 2020-07-30 PROCEDURE — 99223 1ST HOSP IP/OBS HIGH 75: CPT | Performed by: INTERNAL MEDICINE

## 2020-07-31 ENCOUNTER — OUTSIDE FACILITY SERVICE (OUTPATIENT)
Dept: CARDIOLOGY | Facility: CLINIC | Age: 60
End: 2020-07-31

## 2020-07-31 PROCEDURE — 93306 TTE W/DOPPLER COMPLETE: CPT | Performed by: INTERNAL MEDICINE

## 2020-07-31 PROCEDURE — 99232 SBSQ HOSP IP/OBS MODERATE 35: CPT | Performed by: INTERNAL MEDICINE

## 2020-08-01 ENCOUNTER — OUTSIDE FACILITY SERVICE (OUTPATIENT)
Dept: CARDIOLOGY | Facility: CLINIC | Age: 60
End: 2020-08-01

## 2020-08-01 PROCEDURE — 99232 SBSQ HOSP IP/OBS MODERATE 35: CPT | Performed by: INTERNAL MEDICINE

## 2020-08-02 ENCOUNTER — OUTSIDE FACILITY SERVICE (OUTPATIENT)
Dept: CARDIOLOGY | Facility: CLINIC | Age: 60
End: 2020-08-02

## 2020-08-02 PROCEDURE — 99232 SBSQ HOSP IP/OBS MODERATE 35: CPT | Performed by: INTERNAL MEDICINE

## 2020-08-03 PROCEDURE — 99232 SBSQ HOSP IP/OBS MODERATE 35: CPT | Performed by: INTERNAL MEDICINE

## 2020-09-16 ENCOUNTER — TELEPHONE (OUTPATIENT)
Dept: CARDIOLOGY | Facility: CLINIC | Age: 60
End: 2020-09-16

## 2020-09-16 NOTE — TELEPHONE ENCOUNTER
DR MARRERO  PH: [phone]  FX: [phone]    REOPERATIVE FEMORAL-ANTERIOR TIBIAL BYPASS GRAFT W/ CRYOVEIN    NOT SCHEDULED YET    *LAID ON DR LUCIA'S DESK FOR REVIEW*

## 2020-09-17 RX ORDER — CARVEDILOL 3.12 MG/1
3.12 TABLET ORAL 2 TIMES DAILY WITH MEALS
Qty: 180 TABLET | Refills: 1 | Status: SHIPPED | OUTPATIENT
Start: 2020-09-17

## 2020-09-18 ENCOUNTER — TELEPHONE (OUTPATIENT)
Dept: CARDIOLOGY | Facility: CLINIC | Age: 60
End: 2020-09-18

## 2020-09-18 NOTE — TELEPHONE ENCOUNTER
Dr Hatch office wants pt to hold blood thinners for a procedure,  Dr Wing said no because pt had stent in May. Too soon to hold thinners.     I let their office know.     Dr Wing said to schedule appt with pt if he feels he needs to come in to discuss

## 2020-09-18 NOTE — TELEPHONE ENCOUNTER
Valencia from Dr Hatch office called they want pt to hold blood thinners Dr Wing had written on clearance to not hold blood thinners.     I discussed with Dr Wing pt had stent in May can not hold blood thinners.  I called and let Valencia know, she wants it in writing and to fax to them.    I will discuss with Dr Wing.

## 2020-09-21 ENCOUNTER — TELEPHONE (OUTPATIENT)
Dept: CARDIOLOGY | Facility: CLINIC | Age: 60
End: 2020-09-21

## 2020-09-21 NOTE — TELEPHONE ENCOUNTER
Received a call from patient's vascular surgeon Dr. Thomas, patient is having gangrene of the toes and impending amputation needs to have emergent surgery.  Cannot wait 6 months to hold Plavix.  Discussed risk benefits alternatives of holding Plavix before 6 months after drug-eluting stent.  Since this is emergent situation and patient has impending amputation for his gangrenous toes would recommend holding Plavix for 3 days before surgery and restart as soon as possible.

## 2020-10-14 RX ORDER — LISINOPRIL 2.5 MG/1
TABLET ORAL
Qty: 90 TABLET | Refills: 0 | Status: ON HOLD | OUTPATIENT
Start: 2020-10-14 | End: 2021-05-31

## 2020-10-19 ENCOUNTER — TELEPHONE (OUTPATIENT)
Dept: CARDIAC REHAB | Facility: HOSPITAL | Age: 60
End: 2020-10-19

## 2020-12-01 ENCOUNTER — OFFICE VISIT (OUTPATIENT)
Dept: CARDIOLOGY | Facility: CLINIC | Age: 60
End: 2020-12-01

## 2020-12-01 VITALS
HEIGHT: 67 IN | SYSTOLIC BLOOD PRESSURE: 105 MMHG | BODY MASS INDEX: 27.78 KG/M2 | HEART RATE: 74 BPM | OXYGEN SATURATION: 93 % | DIASTOLIC BLOOD PRESSURE: 71 MMHG | WEIGHT: 177 LBS

## 2020-12-01 DIAGNOSIS — E78.2 MIXED HYPERLIPIDEMIA: ICD-10-CM

## 2020-12-01 DIAGNOSIS — E78.5 DYSLIPIDEMIA: ICD-10-CM

## 2020-12-01 DIAGNOSIS — E11.65 TYPE 2 DIABETES MELLITUS WITH HYPERGLYCEMIA, WITH LONG-TERM CURRENT USE OF INSULIN (HCC): ICD-10-CM

## 2020-12-01 DIAGNOSIS — Z98.61 CAD S/P PERCUTANEOUS CORONARY ANGIOPLASTY: ICD-10-CM

## 2020-12-01 DIAGNOSIS — I50.22 CHRONIC SYSTOLIC CONGESTIVE HEART FAILURE (HCC): ICD-10-CM

## 2020-12-01 DIAGNOSIS — N52.9 ERECTILE DYSFUNCTION, UNSPECIFIED ERECTILE DYSFUNCTION TYPE: Primary | ICD-10-CM

## 2020-12-01 DIAGNOSIS — I10 BENIGN ESSENTIAL HTN: ICD-10-CM

## 2020-12-01 DIAGNOSIS — I25.5 ISCHEMIC CARDIOMYOPATHY: ICD-10-CM

## 2020-12-01 DIAGNOSIS — Z79.4 TYPE 2 DIABETES MELLITUS WITH HYPERGLYCEMIA, WITH LONG-TERM CURRENT USE OF INSULIN (HCC): ICD-10-CM

## 2020-12-01 DIAGNOSIS — I25.10 CAD S/P PERCUTANEOUS CORONARY ANGIOPLASTY: ICD-10-CM

## 2020-12-01 DIAGNOSIS — Z95.810 PRESENCE OF AUTOMATIC CARDIOVERTER/DEFIBRILLATOR (AICD): ICD-10-CM

## 2020-12-01 DIAGNOSIS — Z95.810 ICD (IMPLANTABLE CARDIOVERTER-DEFIBRILLATOR) IN PLACE: ICD-10-CM

## 2020-12-01 DIAGNOSIS — I73.9 PAD (PERIPHERAL ARTERY DISEASE) (HCC): ICD-10-CM

## 2020-12-01 PROCEDURE — 99214 OFFICE O/P EST MOD 30 MIN: CPT | Performed by: INTERNAL MEDICINE

## 2020-12-01 RX ORDER — ISOSORBIDE MONONITRATE 30 MG/1
30 TABLET, EXTENDED RELEASE ORAL DAILY
COMMUNITY
End: 2020-12-01 | Stop reason: SDUPTHER

## 2020-12-01 RX ORDER — DULOXETIN HYDROCHLORIDE 60 MG/1
CAPSULE, DELAYED RELEASE ORAL
Status: ON HOLD | COMMUNITY
Start: 2020-11-07 | End: 2022-04-07

## 2020-12-01 RX ORDER — SPIRONOLACTONE 25 MG/1
25 TABLET ORAL DAILY
COMMUNITY

## 2020-12-01 RX ORDER — FUROSEMIDE 40 MG/1
40 TABLET ORAL 2 TIMES DAILY
COMMUNITY
End: 2021-03-26

## 2020-12-01 NOTE — PROGRESS NOTES
Subjective:     Encounter Date:12/01/2020      Patient ID: Bobby Steele Jr. is a 60 y.o. male.    Chief Complaint : Follow-up for CHF, cardiomyopathy, CAD, CABG, PCI, dyslipidemia, peripheral arterial disease, hypertension, history of diabetes  History of Present Illness       This is a 60-year-old white male with past medical history of     # NSTEMI  5/12/19, SHILPA to SVG to RCA and proximal LAD leading into a small diagonal, cath 5/11/2020 underwent SHILPA to SVG to RCA and native proximal LCx with Impella assistance  # CAD status post CABG X3 V  # Ischemic cardiomyopathy with EF of 35%, status post ICD 10/8/2019  # COPD, continue tobacco abuse  # Hypertension   # Hyperlipidemia  # Diabetes with hemoglobin A1c of 7.4 on 6/3/19  #Positive family for premature heart disease with father MI 53     Here for follow-up.  Patient is complaining of erectile dysfunction and wants to try nitroglycerin paste locally for erectile dysfunction since his friends used it and had success.  Patient denies any chest pain shortness of breath.  Is not very active because of recent foot surgery for gangrene and wound VAC.  Review of records revealed  BUN/creatinine were 42/2.12 compared to his discharge BUN/creatinine of 33/1.58 and a baseline around 1.3.D-dimer 0.45, CBC with a white count of 14.6.  Normal UA.  CT head 5/5/2020- for active bleed.  Chest x-ray no acute infiltrates or effusions.  Labs from 5/15/2020 reveal BMP with glucose of 252 CBC with a platelet count of 132.  Labs from 5/7/2020 reveal cholesterol 149 HDL low at 24.  EKG reviewed by me from 5/5/2020 reveals sinus tachycardia at the rate of 100 bpm with nonspecific ST depression in lateral leads.     Patient had stress test 5/2/2020 which was abnormal with old inferior wall MI with cole-infarct and ischemia and EF of 42%      Patient  underwent cardiac cath 6/27/2019 which revealed patent stent to SVG to RCA patent stent in proximal LAD and small vessel disease and  diagonal.   Patient has chronic dyspnea on exertion relieved with rest.  Patient is status post ICD placement 10/8/2019 .  Patient had repeat catheter 11/20/2018 SHILPA to SVG to RCA and native proximal LCx with Impella back     Patient's arterial blood pressure is 105/71, heart rate 74, O2 sat of 93% on room     ASSESSMENT:   #Erectile dysfunction  #  chronic CHF, ischemic cardiomyopathy 35%, status post ICD 10/8/2019  # NSTEMI 5/12/19  # CAD status post CABG  # COPD, tobacco abuse   # hypertension ,  #Dyslipidemia  #Hyperglycemia, and type 2 diabetes  #  CKD  # PAD     Recommendations:    We will give Nitropaste and discontinue isosorbide  Continue medical management with aspirin, clopidogrel, atorvastatin, carvedilol, furosemide, lisinopril and Aldactone as tolerated  Advised patient to monitor blood pressure at home.  Counseled on smoking cessation  Advised flu shot in shingles shot advised him to follow-up with PMD for the same.  Follow-up in pacemaker clinic  Will follow-up in 6 months or sooner if needed  Reviewed records of recent admission and reviewed the results with patient  Reviewed low HDL and counseled on walking exercise for low HDL      Assessment:          Diagnosis Plan   1. Erectile dysfunction, unspecified erectile dysfunction type     2. Chronic systolic congestive heart failure (CMS/HCC)     3. Mixed hyperlipidemia     4. Ischemic cardiomyopathy     5. Presence of automatic cardioverter/defibrillator (AICD)     6. CAD S/P percutaneous coronary angioplasty     7. Type 2 diabetes mellitus with hyperglycemia, with long-term current use of insulin (CMS/[facility])     8. Benign essential HTN     9. Dyslipidemia     10. PAD (peripheral artery disease) (CMS/[facility])            Plan:         Past Medical History:  Past Medical History:   Diagnosis Date   • CAD (coronary artery disease)     S/P CABG   • Chronic systolic CHF (congestive heart failure) (CMS/[facility])    • COPD (chronic obstructive pulmonary disease)  (CMS/[facility])    • DM2 (diabetes mellitus, type 2) (CMS/[facility])    • Dyslipidemia    • Encounter for monitoring antiplatelet therapy    • Hypertension    • Myocardial infarction (CMS/[facility])     inferior wall MI--03/13   • DEBI (obstructive sleep apnea)     noncompliant with CPAP   • Peripheral vascular disease (CMS/[facility])     S/P left fem-pop bypass   • Tobacco use      Past Surgical History:  Past Surgical History:   Procedure Laterality Date   • AMPUTATION FOOT / TOE      left   • APPENDECTOMY      at age 8 or 9   • BIVENTRICULAR ASSIST DEVICE/LEFT VENTRICULAR ASSIST DEVICE INSERTION N/A 5/13/2020    Procedure: Left Ventricular Assist Device;  Surgeon: Juarez Wing MD;  Location: [facility] CATH INVASIVE LOCATION;  Service: Cardiovascular;  Laterality: N/A;   • CARDIAC CATHETERIZATION      3-; [facility] 9-   • CARDIAC CATHETERIZATION Right 6/27/2019    Procedure: Cardiac Catheterization/with grafts groin access;  Surgeon: Juarez Wing MD;  Location: [facility] CATH INVASIVE LOCATION;  Service: Cardiovascular   • CARDIAC CATHETERIZATION N/A 5/8/2020    Procedure: Left Heart Cath with grafts;  Surgeon: Juarez Wing MD;  Location: [facility] CATH INVASIVE LOCATION;  Service: Cardiovascular;  Laterality: N/A;   • CARDIAC CATHETERIZATION N/A 5/13/2020    Procedure: Percutaneous Coronary Intervention, Impella backup;  Surgeon: Juarez Wing MD;  Location: [facility] CATH INVASIVE LOCATION;  Service: Cardiovascular;  Laterality: N/A;   • CARDIAC ELECTROPHYSIOLOGY PROCEDURE N/A 10/8/2019    Procedure: ICD SC new, ST.SHIV ;  Surgeon: Juarez Wing MD;  Location: [facility] CATH INVASIVE LOCATION;  Service: Cardiovascular   • COLONOSCOPY     • CORONARY ARTERY BYPASS GRAFT      x3, 3-18-13-LIMA to LAD, and reverse individual SVG to lateral marginal and to PDA   • FEMORAL POPLITEAL BYPASS Left 01/09/2019    [facility]/ Dr. Jero Hatch   • HAND SURGERY Left     crushed hand   • TOE SURGERY       toe nail removal of big toe- age 8 or 9      Allergies:  No Known Allergies  Home Meds:  Current Meds:     Current Outpatient Medications:   •  albuterol (PROVENTIL) (2.5 MG/3ML) 0.083% nebulizer solution, Take 2.5 mg by nebulization Every 6 (Six) Hours As Needed for Wheezing or Shortness of Air., Disp: , Rfl:   •  albuterol sulfate  (90 Base) MCG/ACT inhaler, Inhale 2 puffs Every 4 (Four) Hours As Needed for Wheezing or Shortness of Air., Disp: , Rfl:   •  Alcohol Swabs (ALCOHOL WIPES) 70 % pads, 1 each 4 (Four) Times a Day Before Meals & at Bedtime., Disp: 200 each, Rfl: 0  •  aspirin 325 MG tablet, Take 325 mg by mouth Daily., Disp: , Rfl:   •  atorvastatin (LIPITOR) 40 MG tablet, Take 40 mg by mouth Daily., Disp: , Rfl:   •  carvedilol (COREG) 3.125 MG tablet, Take 1 tablet by mouth 2 (Two) Times a Day With Meals., Disp: 180 tablet, Rfl: 1  •  clopidogrel (PLAVIX) 75 MG tablet, Take 75 mg by mouth Daily., Disp: , Rfl:   •  DULoxetine (CYMBALTA) 60 MG capsule, TAKE 1 CAPSULE BY MOUTH ONCE DAILY FOR 90 DAYS, Disp: , Rfl:   •  fenofibrate (TRICOR) 145 MG tablet, Take 1 tablet by mouth once daily, Disp: 90 tablet, Rfl: 2  •  furosemide (LASIX) 40 MG tablet, Take 40 mg by mouth 2 (Two) Times a Day., Disp: , Rfl:   •  glimepiride (AMARYL) 4 MG tablet, Take 4 mg by mouth 2 (Two) Times a Day., Disp: , Rfl:   •  glucose blood (OneTouch Verio) test strip, 1 each by Other route 4 (Four) Times a Day Before Meals & at Bedtime. Use as instructed, Disp: 200 each, Rfl: 0  •  Insulin Glargine (LANTUS SOLOSTAR) 100 UNIT/ML injection pen, Inject 10 Units under the skin into the appropriate area as directed Every Night., Disp: 3 mL, Rfl: 2  •  Insulin Pen Needle (PEN NEEDLES) 32G X 4 MM misc, 1 each Daily., Disp: 100 each, Rfl: 0  •  Lancets (ONETOUCH DELICA PLUS [id number]) misc, 1 each 4 (Four) Times a Day Before Meals & at Bedtime., Disp: 200 each, Rfl: 0  •  lisinopril (PRINIVIL,ZESTRIL) 2.5 MG tablet, Take 1 tablet by  "mouth once daily, Disp: 90 tablet, Rfl: 0  •  pantoprazole (PROTONIX) 40 MG EC tablet, Take 40 mg by mouth Daily., Disp: , Rfl:   •  spironolactone (ALDACTONE) 25 MG tablet, Take 25 mg by mouth Daily., Disp: , Rfl:   •  nitroglycerin (NITROSTAT) 2 % ointment, Place 0.5 inches on the skin as directed by provider Daily., Disp: 30 g, Rfl: 5  Social History:   Social History     Tobacco Use   • Smoking status: Current Every Day Smoker     Packs/day: 1.00     Years: 25.00     Pack years: 25.00     Types: Cigarettes   • Smokeless tobacco: Never Used   • Tobacco comment: currently smokes 5 cigarettes per day   Substance Use Topics   • Alcohol use: No     Frequency: Never      Family History:  Family History   Problem Relation Age of Onset   • Hypertension Mother    • Diabetes Mother    • Heart disease Father         had CABG and re-do/  while recovering-had MI age 40s   • Diabetes Father    • Pancreatic cancer Sister    • Hyperlipidemia Brother    • Stroke Brother    • Heart disease Paternal Uncle         The following portions of the patient's history were reviewed and updated as appropriate: allergies, current medications, past family history, past medical history, past social history, past surgical history and problem list.      Review of Systems   Constitution: Negative for malaise/fatigue.   Cardiovascular: Positive for leg swelling. Negative for chest pain and palpitations.   Respiratory: Positive for cough and shortness of breath.    Skin: Negative for rash.   Genitourinary:        Erectile dysfunction   Neurological: Negative for dizziness, light-headedness and numbness.     All other systems are negative    Procedures EKG from 2020 reviewed by me shows sinus rhythm at the rate of 85 bpm with nonspecific ST-T changes       Objective:     Physical Exam  /71   Pulse 74   Ht 170.2 cm (67\")   Wt 80.3 kg (177 lb)   SpO2 93%   BMI 27.72 kg/m²   General:  Appears in no acute distress  Eyes: Sclera is " anicteric,  conjunctiva is clear   HEENT:  No JVD. Thyroid not visibly enlarged. No mucosal pallor or cyanosis  Respiratory: Respirations regular and unlabored at rest.  Scattered rhonchi  Cardiovascular: S1,S2 Regular rate and rhythm.  2/6 holosystolic murmur, no  rub or gallop auscultated. No pretibial pitting edema  Gastrointestinal: Abdomen soft, flat, non tender. Bowel sounds present.   Musculoskeletal:  No abnormal movements  Extremities: Left foot is in a boot with wound VAC  Skin: Color pink. Skin warm and dry to touch. No rashes  No xanthoma  Neuro: Alert and awake, no lateralizing deficits appreciated    Lab Reviewed:

## 2020-12-12 ENCOUNTER — APPOINTMENT (OUTPATIENT)
Dept: GENERAL RADIOLOGY | Facility: HOSPITAL | Age: 60
End: 2020-12-12

## 2020-12-12 ENCOUNTER — HOSPITAL ENCOUNTER (OUTPATIENT)
Facility: HOSPITAL | Age: 60
Setting detail: OBSERVATION
LOS: 1 days | Discharge: HOME | End: 2020-12-13
Attending: HOSPITALIST | Admitting: HOSPITALIST

## 2020-12-12 PROBLEM — J96.01 ACUTE HYPOXEMIC RESPIRATORY FAILURE: Status: ACTIVE | Noted: 2020-12-12

## 2020-12-12 LAB
ALBUMIN SERPL-MCNC: 4.3 G/DL (ref 3.5–5.2)
ALBUMIN/GLOB SERPL: 1.4 G/DL
ALP SERPL-CCNC: 50 U/L (ref 39–117)
ALT SERPL W P-5'-P-CCNC: 28 U/L (ref 1–41)
ANION GAP SERPL CALCULATED.3IONS-SCNC: 11 MMOL/L (ref 5–15)
AST SERPL-CCNC: 22 U/L (ref 1–40)
B PARAPERT DNA SPEC QL NAA+PROBE: NOT DETECTED
B PERT DNA SPEC QL NAA+PROBE: NOT DETECTED
BASOPHILS # BLD AUTO: 0 10*3/MM3 (ref 0–0.2)
BASOPHILS NFR BLD AUTO: 0.3 % (ref 0–1.5)
BILIRUB SERPL-MCNC: 0.3 MG/DL (ref 0–1.2)
BUN SERPL-MCNC: 16 MG/DL (ref 8–23)
BUN/CREAT SERPL: 14.5 (ref 7–25)
C PNEUM DNA NPH QL NAA+NON-PROBE: NOT DETECTED
CALCIUM SPEC-SCNC: 9.2 MG/DL (ref 8.6–10.5)
CHLORIDE SERPL-SCNC: 99 MMOL/L (ref 98–107)
CO2 SERPL-SCNC: 30 MMOL/L (ref 22–29)
CREAT SERPL-MCNC: 1.1 MG/DL (ref 0.76–1.27)
DEPRECATED RDW RBC AUTO: 45.5 FL (ref 37–54)
EOSINOPHIL # BLD AUTO: 0 10*3/MM3 (ref 0–0.4)
EOSINOPHIL NFR BLD AUTO: 0 % (ref 0.3–6.2)
ERYTHROCYTE [DISTWIDTH] IN BLOOD BY AUTOMATED COUNT: 14.2 % (ref 12.3–15.4)
FLUAV SUBTYP SPEC NAA+PROBE: NOT DETECTED
FLUBV RNA ISLT QL NAA+PROBE: NOT DETECTED
GFR SERPL CREATININE-BSD FRML MDRD: 68 ML/MIN/1.73
GLOBULIN UR ELPH-MCNC: 3.1 GM/DL
GLUCOSE BLDC GLUCOMTR-MCNC: 160 MG/DL (ref 70–105)
GLUCOSE BLDC GLUCOMTR-MCNC: 326 MG/DL (ref 70–105)
GLUCOSE SERPL-MCNC: 234 MG/DL (ref 65–99)
HADV DNA SPEC NAA+PROBE: NOT DETECTED
HCOV 229E RNA SPEC QL NAA+PROBE: NOT DETECTED
HCOV HKU1 RNA SPEC QL NAA+PROBE: NOT DETECTED
HCOV NL63 RNA SPEC QL NAA+PROBE: NOT DETECTED
HCOV OC43 RNA SPEC QL NAA+PROBE: NOT DETECTED
HCT VFR BLD AUTO: 39.7 % (ref 37.5–51)
HGB BLD-MCNC: 13 G/DL (ref 13–17.7)
HMPV RNA NPH QL NAA+NON-PROBE: NOT DETECTED
HPIV1 RNA SPEC QL NAA+PROBE: NOT DETECTED
HPIV2 RNA SPEC QL NAA+PROBE: NOT DETECTED
HPIV3 RNA NPH QL NAA+PROBE: NOT DETECTED
HPIV4 P GENE NPH QL NAA+PROBE: NOT DETECTED
LYMPHOCYTES # BLD AUTO: 0.6 10*3/MM3 (ref 0.7–3.1)
LYMPHOCYTES NFR BLD AUTO: 9 % (ref 19.6–45.3)
M PNEUMO IGG SER IA-ACNC: NOT DETECTED
MAGNESIUM SERPL-MCNC: 1.2 MG/DL (ref 1.6–2.4)
MCH RBC QN AUTO: 30.4 PG (ref 26.6–33)
MCHC RBC AUTO-ENTMCNC: 32.8 G/DL (ref 31.5–35.7)
MCV RBC AUTO: 92.8 FL (ref 79–97)
MONOCYTES # BLD AUTO: 0.2 10*3/MM3 (ref 0.1–0.9)
MONOCYTES NFR BLD AUTO: 2.5 % (ref 5–12)
NEUTROPHILS NFR BLD AUTO: 5.7 10*3/MM3 (ref 1.7–7)
NEUTROPHILS NFR BLD AUTO: 88.2 % (ref 42.7–76)
NRBC BLD AUTO-RTO: 0 /100 WBC (ref 0–0.2)
NT-PROBNP SERPL-MCNC: 3670 PG/ML (ref 0–900)
PHOSPHATE SERPL-MCNC: 3.4 MG/DL (ref 2.5–4.5)
PLATELET # BLD AUTO: 166 10*3/MM3 (ref 140–450)
PMV BLD AUTO: 10.6 FL (ref 6–12)
POTASSIUM SERPL-SCNC: 4.2 MMOL/L (ref 3.5–5.2)
PROCALCITONIN SERPL-MCNC: 0.14 NG/ML (ref 0–0.25)
PROT SERPL-MCNC: 7.4 G/DL (ref 6–8.5)
RBC # BLD AUTO: 4.28 10*6/MM3 (ref 4.14–5.8)
RHINOVIRUS RNA SPEC NAA+PROBE: NOT DETECTED
RSV RNA NPH QL NAA+NON-PROBE: NOT DETECTED
SARS-COV-2 RNA NPH QL NAA+NON-PROBE: NOT DETECTED
SODIUM SERPL-SCNC: 140 MMOL/L (ref 136–145)
TROPONIN T SERPL-MCNC: 0.01 NG/ML (ref 0–0.03)
TROPONIN T SERPL-MCNC: <0.01 NG/ML (ref 0–0.03)
TSH SERPL DL<=0.05 MIU/L-ACNC: 0.62 UIU/ML (ref 0.27–4.2)
WBC # BLD AUTO: 6.4 10*3/MM3 (ref 3.4–10.8)

## 2020-12-12 PROCEDURE — 80053 COMPREHEN METABOLIC PANEL: CPT | Performed by: HOSPITALIST

## 2020-12-12 PROCEDURE — 82962 GLUCOSE BLOOD TEST: CPT

## 2020-12-12 PROCEDURE — 84443 ASSAY THYROID STIM HORMONE: CPT | Performed by: HOSPITALIST

## 2020-12-12 PROCEDURE — 84484 ASSAY OF TROPONIN QUANT: CPT | Performed by: HOSPITALIST

## 2020-12-12 PROCEDURE — 71045 X-RAY EXAM CHEST 1 VIEW: CPT

## 2020-12-12 PROCEDURE — 99220 PR INITIAL OBSERVATION CARE/DAY 70 MINUTES: CPT | Performed by: HOSPITALIST

## 2020-12-12 PROCEDURE — 83880 ASSAY OF NATRIURETIC PEPTIDE: CPT | Performed by: HOSPITALIST

## 2020-12-12 PROCEDURE — 25010000002 HEPARIN (PORCINE) PER 1000 UNITS: Performed by: HOSPITALIST

## 2020-12-12 PROCEDURE — 63710000001 PREDNISONE PER 1 MG: Performed by: HOSPITALIST

## 2020-12-12 PROCEDURE — 63710000001 INSULIN GLARGINE PER 5 UNITS: Performed by: HOSPITALIST

## 2020-12-12 PROCEDURE — 63710000001 INSULIN LISPRO (HUMAN) PER 5 UNITS: Performed by: HOSPITALIST

## 2020-12-12 PROCEDURE — 25010000002 FUROSEMIDE PER 20 MG: Performed by: HOSPITALIST

## 2020-12-12 PROCEDURE — 96375 TX/PRO/DX INJ NEW DRUG ADDON: CPT

## 2020-12-12 PROCEDURE — 96365 THER/PROPH/DIAG IV INF INIT: CPT

## 2020-12-12 PROCEDURE — 0202U NFCT DS 22 TRGT SARS-COV-2: CPT | Performed by: HOSPITALIST

## 2020-12-12 PROCEDURE — 85025 COMPLETE CBC W/AUTO DIFF WBC: CPT | Performed by: HOSPITALIST

## 2020-12-12 PROCEDURE — 84145 PROCALCITONIN (PCT): CPT | Performed by: HOSPITALIST

## 2020-12-12 PROCEDURE — 83735 ASSAY OF MAGNESIUM: CPT | Performed by: HOSPITALIST

## 2020-12-12 PROCEDURE — 84100 ASSAY OF PHOSPHORUS: CPT | Performed by: HOSPITALIST

## 2020-12-12 PROCEDURE — 25010000002 MAGNESIUM SULFATE 2 GM/50ML SOLUTION: Performed by: HOSPITALIST

## 2020-12-12 PROCEDURE — 96372 THER/PROPH/DIAG INJ SC/IM: CPT

## 2020-12-12 PROCEDURE — 96366 THER/PROPH/DIAG IV INF ADDON: CPT

## 2020-12-12 RX ORDER — HEPARIN SODIUM 5000 [USP'U]/ML
5000 INJECTION, SOLUTION INTRAVENOUS; SUBCUTANEOUS EVERY 8 HOURS SCHEDULED
Status: DISCONTINUED | OUTPATIENT
Start: 2020-12-12 | End: 2020-12-13 | Stop reason: HOSPADM

## 2020-12-12 RX ORDER — ACETAMINOPHEN 325 MG/1
650 TABLET ORAL EVERY 4 HOURS PRN
Status: DISCONTINUED | OUTPATIENT
Start: 2020-12-12 | End: 2020-12-13 | Stop reason: HOSPADM

## 2020-12-12 RX ORDER — ONDANSETRON 2 MG/ML
4 INJECTION INTRAMUSCULAR; INTRAVENOUS EVERY 4 HOURS PRN
Status: DISCONTINUED | OUTPATIENT
Start: 2020-12-12 | End: 2020-12-13 | Stop reason: HOSPADM

## 2020-12-12 RX ORDER — BISACODYL 10 MG
10 SUPPOSITORY, RECTAL RECTAL DAILY PRN
Status: DISCONTINUED | OUTPATIENT
Start: 2020-12-12 | End: 2020-12-13 | Stop reason: HOSPADM

## 2020-12-12 RX ORDER — MAGNESIUM SULFATE HEPTAHYDRATE 40 MG/ML
2 INJECTION, SOLUTION INTRAVENOUS AS NEEDED
Status: DISCONTINUED | OUTPATIENT
Start: 2020-12-12 | End: 2020-12-13 | Stop reason: HOSPADM

## 2020-12-12 RX ORDER — PANTOPRAZOLE SODIUM 40 MG/1
40 TABLET, DELAYED RELEASE ORAL DAILY
Status: DISCONTINUED | OUTPATIENT
Start: 2020-12-12 | End: 2020-12-13 | Stop reason: HOSPADM

## 2020-12-12 RX ORDER — NICOTINE POLACRILEX 4 MG
15 LOZENGE BUCCAL
Status: DISCONTINUED | OUTPATIENT
Start: 2020-12-12 | End: 2020-12-13 | Stop reason: HOSPADM

## 2020-12-12 RX ORDER — ALBUTEROL SULFATE 90 UG/1
2 AEROSOL, METERED RESPIRATORY (INHALATION) EVERY 4 HOURS PRN
Status: DISCONTINUED | OUTPATIENT
Start: 2020-12-12 | End: 2020-12-13 | Stop reason: HOSPADM

## 2020-12-12 RX ORDER — ATORVASTATIN CALCIUM 40 MG/1
40 TABLET, FILM COATED ORAL DAILY
Status: DISCONTINUED | OUTPATIENT
Start: 2020-12-12 | End: 2020-12-13 | Stop reason: HOSPADM

## 2020-12-12 RX ORDER — BISACODYL 5 MG/1
5 TABLET, DELAYED RELEASE ORAL DAILY PRN
Status: DISCONTINUED | OUTPATIENT
Start: 2020-12-12 | End: 2020-12-13 | Stop reason: HOSPADM

## 2020-12-12 RX ORDER — FUROSEMIDE 10 MG/ML
40 INJECTION INTRAMUSCULAR; INTRAVENOUS EVERY 12 HOURS
Status: DISCONTINUED | OUTPATIENT
Start: 2020-12-12 | End: 2020-12-13 | Stop reason: HOSPADM

## 2020-12-12 RX ORDER — ACETAMINOPHEN 650 MG/1
650 SUPPOSITORY RECTAL EVERY 4 HOURS PRN
Status: DISCONTINUED | OUTPATIENT
Start: 2020-12-12 | End: 2020-12-13 | Stop reason: HOSPADM

## 2020-12-12 RX ORDER — POTASSIUM CHLORIDE 20 MEQ/1
40 TABLET, EXTENDED RELEASE ORAL AS NEEDED
Status: DISCONTINUED | OUTPATIENT
Start: 2020-12-12 | End: 2020-12-13 | Stop reason: HOSPADM

## 2020-12-12 RX ORDER — DEXTROSE MONOHYDRATE 25 G/50ML
25 INJECTION, SOLUTION INTRAVENOUS
Status: DISCONTINUED | OUTPATIENT
Start: 2020-12-12 | End: 2020-12-13 | Stop reason: HOSPADM

## 2020-12-12 RX ORDER — INSULIN GLARGINE 100 [IU]/ML
10 INJECTION, SOLUTION SUBCUTANEOUS NIGHTLY
Status: DISCONTINUED | OUTPATIENT
Start: 2020-12-12 | End: 2020-12-13 | Stop reason: HOSPADM

## 2020-12-12 RX ORDER — CLOPIDOGREL BISULFATE 75 MG/1
75 TABLET ORAL DAILY
Status: DISCONTINUED | OUTPATIENT
Start: 2020-12-12 | End: 2020-12-13 | Stop reason: HOSPADM

## 2020-12-12 RX ORDER — ASPIRIN 325 MG
325 TABLET ORAL DAILY
Status: DISCONTINUED | OUTPATIENT
Start: 2020-12-12 | End: 2020-12-13 | Stop reason: HOSPADM

## 2020-12-12 RX ORDER — INSULIN LISPRO 100 [IU]/ML
0-7 INJECTION, SOLUTION INTRAVENOUS; SUBCUTANEOUS
Status: DISCONTINUED | OUTPATIENT
Start: 2020-12-12 | End: 2020-12-13 | Stop reason: HOSPADM

## 2020-12-12 RX ORDER — POTASSIUM CHLORIDE 1.5 G/1.77G
40 POWDER, FOR SOLUTION ORAL AS NEEDED
Status: DISCONTINUED | OUTPATIENT
Start: 2020-12-12 | End: 2020-12-13 | Stop reason: HOSPADM

## 2020-12-12 RX ORDER — INSULIN LISPRO 100 [IU]/ML
0-7 INJECTION, SOLUTION INTRAVENOUS; SUBCUTANEOUS AS NEEDED
Status: DISCONTINUED | OUTPATIENT
Start: 2020-12-12 | End: 2020-12-13 | Stop reason: HOSPADM

## 2020-12-12 RX ORDER — MAGNESIUM SULFATE HEPTAHYDRATE 40 MG/ML
4 INJECTION, SOLUTION INTRAVENOUS AS NEEDED
Status: DISCONTINUED | OUTPATIENT
Start: 2020-12-12 | End: 2020-12-13 | Stop reason: HOSPADM

## 2020-12-12 RX ORDER — CARVEDILOL 3.12 MG/1
3.12 TABLET ORAL 2 TIMES DAILY WITH MEALS
Status: DISCONTINUED | OUTPATIENT
Start: 2020-12-12 | End: 2020-12-13 | Stop reason: HOSPADM

## 2020-12-12 RX ORDER — PREDNISONE 20 MG/1
40 TABLET ORAL DAILY
Status: DISCONTINUED | OUTPATIENT
Start: 2020-12-12 | End: 2020-12-13 | Stop reason: HOSPADM

## 2020-12-12 RX ORDER — SODIUM CHLORIDE 0.9 % (FLUSH) 0.9 %
10 SYRINGE (ML) INJECTION AS NEEDED
Status: DISCONTINUED | OUTPATIENT
Start: 2020-12-12 | End: 2020-12-13 | Stop reason: HOSPADM

## 2020-12-12 RX ORDER — SPIRONOLACTONE 25 MG/1
25 TABLET ORAL DAILY
Status: DISCONTINUED | OUTPATIENT
Start: 2020-12-12 | End: 2020-12-13 | Stop reason: HOSPADM

## 2020-12-12 RX ORDER — SODIUM CHLORIDE 0.9 % (FLUSH) 0.9 %
10 SYRINGE (ML) INJECTION EVERY 12 HOURS SCHEDULED
Status: DISCONTINUED | OUTPATIENT
Start: 2020-12-12 | End: 2020-12-13 | Stop reason: HOSPADM

## 2020-12-12 RX ORDER — DOXYCYCLINE 100 MG/1
100 TABLET ORAL 2 TIMES DAILY
Status: DISCONTINUED | OUTPATIENT
Start: 2020-12-12 | End: 2020-12-13 | Stop reason: HOSPADM

## 2020-12-12 RX ORDER — DULOXETIN HYDROCHLORIDE 30 MG/1
60 CAPSULE, DELAYED RELEASE ORAL DAILY
Status: DISCONTINUED | OUTPATIENT
Start: 2020-12-12 | End: 2020-12-13 | Stop reason: HOSPADM

## 2020-12-12 RX ORDER — ACETAMINOPHEN 160 MG/5ML
650 SOLUTION ORAL EVERY 4 HOURS PRN
Status: DISCONTINUED | OUTPATIENT
Start: 2020-12-12 | End: 2020-12-13 | Stop reason: HOSPADM

## 2020-12-12 RX ADMIN — CARVEDILOL 3.12 MG: 3.12 TABLET, FILM COATED ORAL at 18:32

## 2020-12-12 RX ADMIN — HEPARIN SODIUM 5000 UNITS: 5000 INJECTION INTRAVENOUS; SUBCUTANEOUS at 21:14

## 2020-12-12 RX ADMIN — INSULIN GLARGINE 10 UNITS: 100 INJECTION, SOLUTION SUBCUTANEOUS at 21:15

## 2020-12-12 RX ADMIN — PANTOPRAZOLE SODIUM 40 MG: 40 TABLET, DELAYED RELEASE ORAL at 21:14

## 2020-12-12 RX ADMIN — DULOXETINE HYDROCHLORIDE 60 MG: 30 CAPSULE, DELAYED RELEASE ORAL at 21:14

## 2020-12-12 RX ADMIN — DOXYCYCLINE 100 MG: 100 TABLET, FILM COATED ORAL at 21:14

## 2020-12-12 RX ADMIN — MAGNESIUM SULFATE HEPTAHYDRATE 2 G: 40 INJECTION, SOLUTION INTRAVENOUS at 23:01

## 2020-12-12 RX ADMIN — MAGNESIUM SULFATE HEPTAHYDRATE 2 G: 40 INJECTION, SOLUTION INTRAVENOUS at 21:55

## 2020-12-12 RX ADMIN — Medication 10 ML: at 21:14

## 2020-12-12 RX ADMIN — CLOPIDOGREL BISULFATE 75 MG: 75 TABLET ORAL at 21:14

## 2020-12-12 RX ADMIN — SPIRONOLACTONE 25 MG: 25 TABLET ORAL at 21:14

## 2020-12-12 RX ADMIN — ATORVASTATIN CALCIUM 40 MG: 40 TABLET, FILM COATED ORAL at 21:14

## 2020-12-12 RX ADMIN — PREDNISONE 40 MG: 20 TABLET ORAL at 18:31

## 2020-12-12 RX ADMIN — FUROSEMIDE 40 MG: 10 INJECTION, SOLUTION INTRAMUSCULAR; INTRAVENOUS at 18:31

## 2020-12-12 RX ADMIN — INSULIN LISPRO 5 UNITS: 100 INJECTION, SOLUTION INTRAVENOUS; SUBCUTANEOUS at 18:32

## 2020-12-12 NOTE — H&P
[facility] Medicine Services      Patient Name: Bobby Steele Jr.  : 1960  MRN: [id number]  Primary Care Physician: Yamile Agarwal  Date of admission: 2020    Patient Care Team:  Yamile Agarwal as PCP - General  Yamile Agarwal as PCP - Family Medicine  Juarez Wing MD as Consulting Physician (Cardiology)          Subjective   History Present Illness     Chief Complaint: SOA and cough      The patient is a 60 years old male with history of CAD s/p CABG, ischemic cardiomyopathy s/p AICD, DM, HLD, COPD and PVD s/p left femoropopliteal bypass.    The patient experienced acute episode of shortness of air in the morning of 20 therefore went to [facility] ED.  The patient thinks that he may have had SOMERS and orthopnea.  The patient was reported to have been requiring 3L oxygen in the ED and had received IVF bolus for low BP.     The patient admits to productive yellow cough but denies fevers, chills, sweats, chest pain, nausea, vomiting or abdominal pain.     Laboratory work from [facility] ED on 2020:  1.  BMP: Sodium 143, potassium 3.8, chloride 104, bicarb 26.7, anion gap 12.3, BUN 15, creatinine 1.05, glucose 107  2.  LFTs: Total bilirubin 0.4, AST 20, ALT 37, alk phos 47 and albumin 3.9.  3.  First set troponin 0 0.036, second set 0.1  4.  ABG: pH 7.38, PCO2 45.2, PaO2 87.8, on 34% FiO2  5.  CBC: WBC 10.6, hemoglobin 13.9, MCV 92.5, platelet count 204.      Review of Systems   All other systems reviewed and are negative.          Personal History     Past Medical History:   Past Medical History:   Diagnosis Date   • CAD (coronary artery disease)     S/P CABG   • Chronic systolic CHF (congestive heart failure) (CMS/HCC)    • COPD (chronic obstructive pulmonary disease) (CMS/HCC)    • DM2 (diabetes mellitus, type 2) (CMS/HCC)    • Dyslipidemia    • Encounter for monitoring antiplatelet therapy    • Hypertension    • Myocardial infarction (CMS/HCC)      inferior wall MI--03/13   • DEBI (obstructive sleep apnea)     noncompliant with CPAP   • Peripheral vascular disease (CMS/HCC)     S/P left fem-pop bypass   • Tobacco use        Surgical History:      Past Surgical History:   Procedure Laterality Date   • AMPUTATION FOOT / TOE      left   • APPENDECTOMY      at age 8 or 9   • BIVENTRICULAR ASSIST DEVICE/LEFT VENTRICULAR ASSIST DEVICE INSERTION N/A 5/13/2020    Procedure: Left Ventricular Assist Device;  Surgeon: Juarez Wing MD;  Location: [facility] CATH INVASIVE LOCATION;  Service: Cardiovascular;  Laterality: N/A;   • CARDIAC CATHETERIZATION      3-; [facility] 9-   • CARDIAC CATHETERIZATION Right 6/27/2019    Procedure: Cardiac Catheterization/with grafts groin access;  Surgeon: Juarez Wing MD;  Location: [facility] CATH INVASIVE LOCATION;  Service: Cardiovascular   • CARDIAC CATHETERIZATION N/A 5/8/2020    Procedure: Left Heart Cath with grafts;  Surgeon: Juarez Wing MD;  Location: [facility] CATH INVASIVE LOCATION;  Service: Cardiovascular;  Laterality: N/A;   • CARDIAC CATHETERIZATION N/A 5/13/2020    Procedure: Percutaneous Coronary Intervention, Impella backup;  Surgeon: Juarez Wing MD;  Location: [facility] CATH INVASIVE LOCATION;  Service: Cardiovascular;  Laterality: N/A;   • CARDIAC ELECTROPHYSIOLOGY PROCEDURE N/A 10/8/2019    Procedure: ICD SC new, ST.SHIV ;  Surgeon: Juarez Wing MD;  Location: [facility] CATH INVASIVE LOCATION;  Service: Cardiovascular   • CARDIAC SURGERY  2013   • COLONOSCOPY     • CORONARY ARTERY BYPASS GRAFT      x3, 3-18-13-LIMA to LAD, and reverse individual SVG to lateral marginal and to PDA   • FEMORAL POPLITEAL BYPASS Left 01/09/2019    [facility]/ Dr. Jero Hatch   • HAND SURGERY Left     crushed hand   • PACEMAKER IMPLANTATION     • TOE SURGERY      toe nail removal of big toe- age 8 or 9           Family History: family history includes Diabetes in his father and  mother; Heart disease in his father and paternal uncle; Hyperlipidemia in his brother; Hypertension in his mother; Pancreatic cancer in his sister; Stroke in his brother. Otherwise pertinent FHx was reviewed and unremarkable.     Social History:  reports that he has been smoking cigarettes. He has a 25.00 pack-year smoking history. He has never used smokeless tobacco. He reports that he does not drink alcohol or use drugs.      Medications:  Prior to Admission medications    Medication Sig Start Date End Date Taking? Authorizing Provider   albuterol (PROVENTIL) (2.5 MG/3ML) 0.083% nebulizer solution Take 2.5 mg by nebulization Every 6 (Six) Hours As Needed for Wheezing or Shortness of Air.    Shirley Eng MD   albuterol sulfate  (90 Base) MCG/ACT inhaler Inhale 2 puffs Every 4 (Four) Hours As Needed for Wheezing or Shortness of Air.    Shirley Eng MD   Alcohol Swabs (ALCOHOL WIPES) 70 % pads 1 each 4 (Four) Times a Day Before Meals & at Bedtime. 5/8/20   Claire Bustamante MD   aspirin 325 MG tablet Take 325 mg by mouth Daily.    Shirley Eng MD   atorvastatin (LIPITOR) 40 MG tablet Take 40 mg by mouth Daily.    Shirley Eng MD   carvedilol (COREG) 3.125 MG tablet Take 1 tablet by mouth 2 (Two) Times a Day With Meals. 9/17/20   Juarez Wing MD   clopidogrel (PLAVIX) 75 MG tablet Take 75 mg by mouth Daily.    Shirley Eng MD   DULoxetine (CYMBALTA) 60 MG capsule TAKE 1 CAPSULE BY MOUTH ONCE DAILY FOR 90 DAYS 11/7/20   Shirley Eng MD   fenofibrate (TRICOR) 145 MG tablet Take 1 tablet by mouth once daily 6/30/20   Juarez Wing MD   furosemide (LASIX) 40 MG tablet Take 40 mg by mouth 2 (Two) Times a Day.    Shirley Eng MD   glimepiride (AMARYL) 4 MG tablet Take 4 mg by mouth 2 (Two) Times a Day.    Shirley Eng MD   glucose blood (OneTouch Verio) test strip 1 each by Other route 4 (Four) Times a Day Before Meals  & at Bedtime. Use as instructed 5/8/20   Claire Bustamante MD   Insulin Glargine (LANTUS SOLOSTAR) 100 UNIT/ML injection pen Inject 10 Units under the skin into the appropriate area as directed Every Night. 5/8/20   Claire Bustamante MD   Insulin Pen Needle (PEN NEEDLES) 32G X 4 MM misc 1 each Daily. 5/8/20   Claire Bustamante MD   Lancets (ONETOUCH DELICA PLUS [id number]) misc 1 each 4 (Four) Times a Day Before Meals & at Bedtime. 5/8/20   Claire Bustamante MD   lisinopril (PRINIVIL,ZESTRIL) 2.5 MG tablet Take 1 tablet by mouth once daily 10/14/20   Saleem Block MD   nitroglycerin (NITROSTAT) 2 % ointment Place 0.5 inches on the skin as directed by provider Daily. 12/1/20   Juarez Wing MD   pantoprazole (PROTONIX) 40 MG EC tablet Take 40 mg by mouth Daily.    ProviderShirley MD   spironolactone (ALDACTONE) 25 MG tablet Take 25 mg by mouth Daily.    Provider, MD Shirley       Allergies:  No Known Allergies    Objective   Objective     Vital Signs        on   ;      There is no height or weight on file to calculate BMI.    Physical Exam  HENT:      Head: Normocephalic.      Nose: Nose normal.   Eyes:      Extraocular Movements: Extraocular movements intact.      Pupils: Pupils are equal, round, and reactive to light.   Pulmonary:      Effort: Pulmonary effort is normal.      Breath sounds: Examination of the left-lower field reveals rales. Rales present.   Abdominal:      General: Bowel sounds are normal.      Palpations: Abdomen is soft.   Musculoskeletal:      Comments: Left medial leg surgical scar.  Left foot first toe amputation.  Left foot in boot.   Lymphadenopathy:      Cervical: No cervical adenopathy.   Skin:     General: Skin is warm.   Neurological:      Mental Status: He is alert and oriented to person, place, and time.   Psychiatric:         Mood and Affect: Mood normal.         Speech: Speech normal.         Cognition and Memory: Cognition normal.          Results Review:  I have personally reviewed most recent lab results .    Results from last 7 days   Lab Units 12/12/20  1743   WBC 10*3/mm3 6.40   HEMOGLOBIN g/dL 13.0   HEMATOCRIT % 39.7   PLATELETS 10*3/mm3 166           Invalid input(s):  ALKPHOS  CrCl cannot be calculated (Patient's most recent lab result is older than the maximum 30 days allowed.).  Brief Urine Lab Results  (Last result in the past 365 days)      Color   Clarity   Blood   Leuk Est   Nitrite   Protein   CREAT   Urine HCG        05/06/20 0429 Yellow Clear Negative Negative Negative Negative               Microbiology Results (last 10 days)     ** No results found for the last 240 hours. **          ECG/EMG Results (most recent)     None          Results for orders placed during the hospital encounter of 05/05/20   Duplex Carotid Ultrasound CAR    Narrative · Proximal right internal carotid artery plaque without significant   stenosis.  · Proximal left internal carotid artery plaque without significant   stenosis.          Results for orders placed during the hospital encounter of 05/05/20   Adult Transthoracic Echo Complete W/ Cont if Necessary Per Protocol    Narrative Technically difficult study.  Mild LVE with severe apical hypokinesis and, moderate global hypokinesis   estimated LV ejection fraction of 35%   Normal RV size  Mild left atrial enlargement seen  Aortic valve, mitral valve, tricuspid valve appears structurally normal,   no significant regurgitation seen.  No pericardial effusion seen.  Proximal aorta appears normal in size.       No radiology results for the last 7 days      CrCl cannot be calculated (Patient's most recent lab result is older than the maximum 30 days allowed.).    Assessment/Plan   Assessment/Plan       Active Hospital Problems    Diagnosis  POA   • Acute hypoxemic respiratory failure (CMS/[facility]) [J96.01]  Yes      Resolved Hospital Problems   No resolved problems to display.     1.  Acute on chronic  systolic heart failure:  -Continue Lasix  -Ordered COVID-19 test  -Daily weights    2.  Elevated troponin:  -Continue to trend  -Denies chest pain  -last [facility] 5/11/20--> SHILPA to SVG to RCA and native proximal LCx  -Consult cardiology    3.  Bronchitis:  -Patient admits to productive yellow cough  -Continue bronchodilators and doxycycline    4.  CAD s/p CABG:  -Continue aspirin, Coreg, statin and lisinopril    5. Insulin-dependent diabetes mellitus:  -Continue ISS and hold oral medication  -Check A1c and lipid panel    6.  PVD s/p left femoropopliteal bypass and toe amputation October 2020:  -Work-up done at [facility]  -Continue antiplatelet therapy and statin  -Patient still smoking    7.  Hypertension:  -On lisinopril and Coreg at home    8.  Hyperlipidemia:  -Continue statin                  VTE Prophylaxis -   Mechanical Order History:      Ordered        12/12/20 1727  Place Sequential Compression Device  Once         12/12/20 1727  Maintain Sequential Compression Device  Continuous                 Pharmalogical Order History:      Ordered     Dose Route Frequency Stop    12/12/20 1727  heparin (porcine) 5000 UNIT/ML injection 5,000 Units      5,000 Units SC Every 8 Hours Scheduled --                CODE STATUS:    Code Status and Medical Interventions:   Ordered at: 12/12/20 1727     Code Status:    CPR     Medical Interventions (Level of Support Prior to Arrest):    Full       This patient has been examined wearing appropriate Personal Protective Equipment and discussed with nursing. 12/12/20      I discussed the patient's findings and my recommendations with patient.      Signature: Electronically signed by Dileep Alvarez DO, 12/12/20, 5:56 PM EST.    [facility] Hospitalist Team

## 2020-12-13 VITALS
SYSTOLIC BLOOD PRESSURE: 123 MMHG | DIASTOLIC BLOOD PRESSURE: 83 MMHG | WEIGHT: 178.13 LBS | TEMPERATURE: 98 F | OXYGEN SATURATION: 98 % | HEART RATE: 91 BPM | BODY MASS INDEX: 27.9 KG/M2 | RESPIRATION RATE: 20 BRPM

## 2020-12-13 PROBLEM — I50.23 ACUTE ON CHRONIC SYSTOLIC CHF (CONGESTIVE HEART FAILURE): Status: ACTIVE | Noted: 2020-12-13

## 2020-12-13 PROBLEM — J40 BRONCHITIS: Status: ACTIVE | Noted: 2020-12-13

## 2020-12-13 PROBLEM — J96.01 ACUTE HYPOXEMIC RESPIRATORY FAILURE (HCC): Status: RESOLVED | Noted: 2020-12-12 | Resolved: 2020-12-13

## 2020-12-13 PROBLEM — I50.23 ACUTE ON CHRONIC SYSTOLIC CHF (CONGESTIVE HEART FAILURE) (HCC): Status: RESOLVED | Noted: 2020-12-13 | Resolved: 2020-12-13

## 2020-12-13 LAB
ANION GAP SERPL CALCULATED.3IONS-SCNC: 11 MMOL/L (ref 5–15)
BASOPHILS # BLD AUTO: 0 10*3/MM3 (ref 0–0.2)
BASOPHILS NFR BLD AUTO: 0.2 % (ref 0–1.5)
BUN SERPL-MCNC: 22 MG/DL (ref 8–23)
BUN/CREAT SERPL: 15.9 (ref 7–25)
CALCIUM SPEC-SCNC: 9 MG/DL (ref 8.6–10.5)
CHLORIDE SERPL-SCNC: 99 MMOL/L (ref 98–107)
CHOLEST SERPL-MCNC: 134 MG/DL (ref 0–200)
CO2 SERPL-SCNC: 28 MMOL/L (ref 22–29)
CREAT SERPL-MCNC: 1.38 MG/DL (ref 0.76–1.27)
DEPRECATED RDW RBC AUTO: 44.2 FL (ref 37–54)
EOSINOPHIL # BLD AUTO: 0 10*3/MM3 (ref 0–0.4)
EOSINOPHIL NFR BLD AUTO: 0 % (ref 0.3–6.2)
ERYTHROCYTE [DISTWIDTH] IN BLOOD BY AUTOMATED COUNT: 13.9 % (ref 12.3–15.4)
GFR SERPL CREATININE-BSD FRML MDRD: 53 ML/MIN/1.73
GLUCOSE BLDC GLUCOMTR-MCNC: 220 MG/DL (ref 70–105)
GLUCOSE BLDC GLUCOMTR-MCNC: 245 MG/DL (ref 70–105)
GLUCOSE SERPL-MCNC: 231 MG/DL (ref 65–99)
HCT VFR BLD AUTO: 37.2 % (ref 37.5–51)
HDLC SERPL-MCNC: 33 MG/DL (ref 40–60)
HGB BLD-MCNC: 12.5 G/DL (ref 13–17.7)
LDLC SERPL CALC-MCNC: 74 MG/DL (ref 0–100)
LDLC/HDLC SERPL: 2.11 {RATIO}
LYMPHOCYTES # BLD AUTO: 0.6 10*3/MM3 (ref 0.7–3.1)
LYMPHOCYTES NFR BLD AUTO: 8.9 % (ref 19.6–45.3)
MAGNESIUM SERPL-MCNC: 2.2 MG/DL (ref 1.6–2.4)
MCH RBC QN AUTO: 30.7 PG (ref 26.6–33)
MCHC RBC AUTO-ENTMCNC: 33.5 G/DL (ref 31.5–35.7)
MCV RBC AUTO: 91.6 FL (ref 79–97)
MONOCYTES # BLD AUTO: 0.2 10*3/MM3 (ref 0.1–0.9)
MONOCYTES NFR BLD AUTO: 2.3 % (ref 5–12)
NEUTROPHILS NFR BLD AUTO: 5.7 10*3/MM3 (ref 1.7–7)
NEUTROPHILS NFR BLD AUTO: 88.6 % (ref 42.7–76)
NRBC BLD AUTO-RTO: 0.1 /100 WBC (ref 0–0.2)
PHOSPHATE SERPL-MCNC: 2.6 MG/DL (ref 2.5–4.5)
PLATELET # BLD AUTO: 150 10*3/MM3 (ref 140–450)
PMV BLD AUTO: 10.7 FL (ref 6–12)
POTASSIUM SERPL-SCNC: 4.1 MMOL/L (ref 3.5–5.2)
QT INTERVAL: 391 MS
RBC # BLD AUTO: 4.05 10*6/MM3 (ref 4.14–5.8)
SODIUM SERPL-SCNC: 138 MMOL/L (ref 136–145)
T4 FREE SERPL-MCNC: 1.23 NG/DL (ref 0.93–1.7)
TRIGL SERPL-MCNC: 157 MG/DL (ref 0–150)
TROPONIN T SERPL-MCNC: <0.01 NG/ML (ref 0–0.03)
TROPONIN T SERPL-MCNC: <0.01 NG/ML (ref 0–0.03)
TSH SERPL DL<=0.05 MIU/L-ACNC: 0.45 UIU/ML (ref 0.27–4.2)
VLDLC SERPL-MCNC: 27 MG/DL (ref 5–40)
WBC # BLD AUTO: 6.4 10*3/MM3 (ref 3.4–10.8)

## 2020-12-13 PROCEDURE — 99217 PR OBSERVATION CARE DISCHARGE MANAGEMENT: CPT | Performed by: HOSPITALIST

## 2020-12-13 PROCEDURE — 84439 ASSAY OF FREE THYROXINE: CPT | Performed by: HOSPITALIST

## 2020-12-13 PROCEDURE — 25010000002 MAGNESIUM SULFATE 2 GM/50ML SOLUTION: Performed by: HOSPITALIST

## 2020-12-13 PROCEDURE — 25010000002 FUROSEMIDE PER 20 MG: Performed by: HOSPITALIST

## 2020-12-13 PROCEDURE — 83735 ASSAY OF MAGNESIUM: CPT | Performed by: HOSPITALIST

## 2020-12-13 PROCEDURE — 93010 ELECTROCARDIOGRAM REPORT: CPT | Performed by: INTERNAL MEDICINE

## 2020-12-13 PROCEDURE — 96376 TX/PRO/DX INJ SAME DRUG ADON: CPT

## 2020-12-13 PROCEDURE — 63710000001 PREDNISONE PER 1 MG: Performed by: HOSPITALIST

## 2020-12-13 PROCEDURE — 84443 ASSAY THYROID STIM HORMONE: CPT | Performed by: HOSPITALIST

## 2020-12-13 PROCEDURE — 84484 ASSAY OF TROPONIN QUANT: CPT | Performed by: HOSPITALIST

## 2020-12-13 PROCEDURE — 90686 IIV4 VACC NO PRSV 0.5 ML IM: CPT | Performed by: HOSPITALIST

## 2020-12-13 PROCEDURE — G0378 HOSPITAL OBSERVATION PER HR: HCPCS

## 2020-12-13 PROCEDURE — 85025 COMPLETE CBC W/AUTO DIFF WBC: CPT | Performed by: HOSPITALIST

## 2020-12-13 PROCEDURE — 25010000002 HEPARIN (PORCINE) PER 1000 UNITS: Performed by: HOSPITALIST

## 2020-12-13 PROCEDURE — 82962 GLUCOSE BLOOD TEST: CPT

## 2020-12-13 PROCEDURE — 63710000001 INSULIN LISPRO (HUMAN) PER 5 UNITS: Performed by: HOSPITALIST

## 2020-12-13 PROCEDURE — 96372 THER/PROPH/DIAG INJ SC/IM: CPT

## 2020-12-13 PROCEDURE — 96366 THER/PROPH/DIAG IV INF ADDON: CPT

## 2020-12-13 PROCEDURE — 93005 ELECTROCARDIOGRAM TRACING: CPT | Performed by: HOSPITALIST

## 2020-12-13 PROCEDURE — 84100 ASSAY OF PHOSPHORUS: CPT | Performed by: HOSPITALIST

## 2020-12-13 PROCEDURE — 80048 BASIC METABOLIC PNL TOTAL CA: CPT | Performed by: HOSPITALIST

## 2020-12-13 PROCEDURE — 80061 LIPID PANEL: CPT | Performed by: HOSPITALIST

## 2020-12-13 PROCEDURE — G0008 ADMIN INFLUENZA VIRUS VAC: HCPCS | Performed by: HOSPITALIST

## 2020-12-13 PROCEDURE — 25010000002 INFLUENZA VAC SPLIT QUAD 0.5 ML SUSPENSION PREFILLED SYRINGE: Performed by: HOSPITALIST

## 2020-12-13 RX ORDER — PREDNISONE 20 MG/1
40 TABLET ORAL DAILY
Qty: 5 TABLET | Refills: 0 | Status: SHIPPED | OUTPATIENT
Start: 2020-12-14 | End: 2020-12-30 | Stop reason: HOSPADM

## 2020-12-13 RX ORDER — DOXYCYCLINE 100 MG/1
100 TABLET ORAL 2 TIMES DAILY
Qty: 8 TABLET | Refills: 0 | Status: SHIPPED | OUTPATIENT
Start: 2020-12-13 | End: 2020-12-17

## 2020-12-13 RX ORDER — METOLAZONE 5 MG/1
5 TABLET ORAL DAILY
Qty: 3 TABLET | Refills: 0 | Status: ON HOLD | OUTPATIENT
Start: 2020-12-13 | End: 2021-05-31

## 2020-12-13 RX ADMIN — DOXYCYCLINE 100 MG: 100 TABLET, FILM COATED ORAL at 09:32

## 2020-12-13 RX ADMIN — INSULIN LISPRO 2 UNITS: 100 INJECTION, SOLUTION INTRAVENOUS; SUBCUTANEOUS at 09:35

## 2020-12-13 RX ADMIN — FUROSEMIDE 40 MG: 10 INJECTION, SOLUTION INTRAMUSCULAR; INTRAVENOUS at 05:52

## 2020-12-13 RX ADMIN — HEPARIN SODIUM 5000 UNITS: 5000 INJECTION INTRAVENOUS; SUBCUTANEOUS at 05:52

## 2020-12-13 RX ADMIN — Medication 10 ML: at 09:35

## 2020-12-13 RX ADMIN — CARVEDILOL 3.12 MG: 3.12 TABLET, FILM COATED ORAL at 09:34

## 2020-12-13 RX ADMIN — MAGNESIUM SULFATE HEPTAHYDRATE 2 G: 40 INJECTION, SOLUTION INTRAVENOUS at 00:48

## 2020-12-13 RX ADMIN — HEPARIN SODIUM 5000 UNITS: 5000 INJECTION INTRAVENOUS; SUBCUTANEOUS at 12:14

## 2020-12-13 RX ADMIN — PREDNISONE 40 MG: 20 TABLET ORAL at 09:34

## 2020-12-13 RX ADMIN — INFLUENZA VIRUS VACCINE 0.5 ML: 15; 15; 15; 15 SUSPENSION INTRAMUSCULAR at 13:18

## 2020-12-13 RX ADMIN — INSULIN LISPRO 3 UNITS: 100 INJECTION, SOLUTION INTRAVENOUS; SUBCUTANEOUS at 12:14

## 2020-12-13 NOTE — DISCHARGE SUMMARY
[facility] Medicine Services  DISCHARGE SUMMARY        Prepared For PCP:  Yamile Agarwal    Patient Name: Bobby Steele Jr.  : 1960  MRN: [id number]      Date of Admission:   2020    Date of Discharge:  2020    Length of stay:  LOS: 1 day     Hospital Course     Presenting Problem:   Acute hypoxemic respiratory failure (CMS/[facility]) [J96.01]  Acute hypoxemic respiratory failure (CMS/[facility]) [J96.01]      Active Hospital Problems    Diagnosis  POA   • Bronchitis [J40]  Yes     Priority: High   • GERD without esophagitis [K21.9]  Yes     Priority: Medium   • Ischemic cardiomyopathy [I25.5]  Yes     Priority: Medium   • Tobacco dependence syndrome [F17.200]  Yes     Priority: Medium   • Benign essential HTN [I10]  Yes     Priority: Medium   • COPD (chronic obstructive pulmonary disease) (CMS/[facility]) [J44.9]  Yes     Priority: Medium   • Dyslipidemia [E78.5]  Yes   • CAD S/P percutaneous coronary angioplasty [I25.10, Z98.61]  Not Applicable   • DEBI (obstructive sleep apnea) [G47.33]  Yes   • Type 2 diabetes mellitus with hyperglycemia, with long-term current use of insulin (CMS/[facility]) [E11.65, Z79.4]  Not Applicable      Resolved Hospital Problems    Diagnosis Date Resolved POA   • **Acute hypoxemic respiratory failure (CMS/[facility]) [J96.01] 2020 Yes     Priority: High   • Acute on chronic systolic CHF (congestive heart failure) (CMS/[facility]) [I50.23] 2020 Yes     Priority: High           Hospital Course:    The patient was placed on observation for treatment of acute on chronic systolic heart failure.  Troponins were within normal limits when they were trended here during hospitalization.  He has not been requiring supplemental oxygen.  Symptoms of shortness of air resolved in the morning of 20.  Doxycycline and prednisone were given for bronchitis which may be contributing to some of his symptoms.  The patient was strongly advised to abstain from tobacco use.  He should  follow-up with his vascular surgeon at [place] regarding the left lower extremity bypass surgery which was done in October 2020.  He should also follow-up with his cardiologist in the office.      Problem list during hospitalization    1.  Acute on chronic systolic heart failure:  -Continue Lasix  -Ordered COVID-19 test  -Daily weights  -last [facility] 5/11/20--> SHILPA to SVG to RCA and native proximal LCx     2.  Bronchitis:  -Patient admits to productive yellow cough  -Continue bronchodilators and doxycycline     3.  CAD s/p CABG:  -Continue aspirin, Coreg, statin and lisinopril     4. Insulin-dependent diabetes mellitus:  -Continue ISS and hold oral medication  -Check A1c and lipid panel     5.  PVD s/p left femoropopliteal bypass and toe amputation October 2020:  -Work-up done at [facility]  -Continue antiplatelet therapy and statin  -Patient still smoking     6.  Hypertension:  -On lisinopril and Coreg at home     8.  Hyperlipidemia:  -Continue statin          Recommendation for Outpatient Providers:             Reasons For Change In Medications and Indications for New Medications:        Day of Discharge     HPI:     The patient is a 60 years old male with history of CAD s/p CABG, ischemic cardiomyopathy s/p AICD, DM, HLD, COPD and PVD s/p left femoropopliteal bypass.     The patient experienced acute episode of shortness of air in the morning of 12/12/20 therefore went to [facility] ED.  The patient thinks that he may have had SOMERS and orthopnea.  The patient was reported to have been requiring 3L oxygen in the ED and had received IVF bolus for low BP.      The patient admits to productive yellow cough but denies fevers, chills, sweats, chest pain, nausea, vomiting or abdominal pain.         Vital Signs:   Temp:  [97.3 °F (36.3 °C)-98 °F (36.7 °C)] 98 °F (36.7 °C)  Heart Rate:  [80-91] 91  Resp:  [16-22] 20  BP: (108-137)/(69-83) 123/83     Physical Exam:  Physical Exam  HENT:      Head: Normocephalic.      Nose:  Nose normal.   Eyes:      General: No scleral icterus.     Extraocular Movements: Extraocular movements intact.      Pupils: Pupils are equal, round, and reactive to light.   Neck:      Musculoskeletal: Normal range of motion.   Cardiovascular:      Rate and Rhythm: Normal rate and regular rhythm.   Pulmonary:      Effort: Pulmonary effort is normal.      Breath sounds: Normal breath sounds.   Abdominal:      General: Bowel sounds are normal.      Palpations: Abdomen is soft.   Musculoskeletal: Normal range of motion.   Skin:     General: Skin is warm.   Neurological:      General: No focal deficit present.      Mental Status: He is alert and oriented to person, place, and time.   Psychiatric:         Mood and Affect: Mood normal.         Pertinent  and/or Most Recent Results     Results from last 7 days   Lab Units 12/13/20  0018 12/12/20  1743   WBC 10*3/mm3 6.40 6.40   HEMOGLOBIN g/dL 12.5* 13.0   HEMATOCRIT % 37.2* 39.7   PLATELETS 10*3/mm3 150 166   SODIUM mmol/L 138 140   POTASSIUM mmol/L 4.1 4.2   CHLORIDE mmol/L 99 99   CO2 mmol/L 28.0 30.0*   BUN mg/dL 22 16   CREATININE mg/dL 1.38* 1.10   GLUCOSE mg/dL 231* 234*   CALCIUM mg/dL 9.0 9.2     Results from last 7 days   Lab Units 12/12/20  1743   BILIRUBIN mg/dL 0.3   ALK PHOS U/L 50   ALT (SGPT) U/L 28   AST (SGOT) U/L 22     Results from last 7 days   Lab Units 12/13/20  0018   CHOLESTEROL mg/dL 134   TRIGLYCERIDES mg/dL 157*   HDL CHOL mg/dL 33*     Results from last 7 days   Lab Units 12/13/20  0611 12/13/20  0018 12/12/20  2033 12/12/20  1743   TSH uIU/mL  --  0.447  --  0.623   PROBNP pg/mL  --   --   --  3,670.0*   TROPONIN T ng/mL <0.010 <0.010 <0.010 0.012   PROCALCITONIN ng/mL  --   --   --  0.14       Brief Urine Lab Results  (Last result in the past 365 days)      Color   Clarity   Blood   Leuk Est   Nitrite   Protein   CREAT   Urine HCG        05/06/20 0429 Yellow Clear Negative Negative Negative Negative               Microbiology Results  Abnormal     Procedure Component Value - Date/Time    Respiratory Panel PCR w/COVID-19(SARS-CoV-2) RUBY/CALEB/AJ/PAD/COR/MAD/GARY In-House, NP Swab in UTM/VTM, 3-4 HR TAT - Swab, Nasopharynx [[id number]]  (Normal) Collected: 12/12/20 1864    Lab Status: Final result Specimen: Swab from Nasopharynx Updated: 12/12/20 2557     ADENOVIRUS, PCR Not Detected     Coronavirus 229E Not Detected     Coronavirus HKU1 Not Detected     Coronavirus NL63 Not Detected     Coronavirus OC43 Not Detected     COVID19 Not Detected     Human Metapneumovirus Not Detected     Human Rhinovirus/Enterovirus Not Detected     Influenza A PCR Not Detected     Influenza B PCR Not Detected     Parainfluenza Virus 1 Not Detected     Parainfluenza Virus 2 Not Detected     Parainfluenza Virus 3 Not Detected     Parainfluenza Virus 4 Not Detected     RSV, PCR Not Detected     Bordetella pertussis pcr Not Detected     Bordetella parapertussis PCR Not Detected     Chlamydophila pneumoniae PCR Not Detected     Mycoplasma pneumo by PCR Not Detected    Narrative:      Fact sheet for providers: https://docs.[employer]/wp-content/uploads/KDT6090-6389-QK8.1-EUA-Provider-Fact-Sheet-3.pdf    Fact sheet for patients: https://docs.[employer]/wp-content/uploads/TTQ0100-7405-BE6.1-EUA-Patient-Fact-Sheet-1.pdf    Test performed by PCR.          Xr Chest 1 View    Result Date: 12/12/2020  Impression: No active cardiopulmonary disease.   Electronically Signed By-Irvin Rodas DO On:12/12/2020 6:07 PM This report was finalized on [id number] by  Irvin Rodas DO.      Results for orders placed during the hospital encounter of 05/05/20   Duplex Carotid Ultrasound CAR    Narrative · Proximal right internal carotid artery plaque without significant   stenosis.  · Proximal left internal carotid artery plaque without significant   stenosis.          Results for orders placed during the hospital encounter of 05/05/20   Duplex Carotid Ultrasound CAR    Narrative ·  Proximal right internal carotid artery plaque without significant   stenosis.  · Proximal left internal carotid artery plaque without significant   stenosis.          Results for orders placed during the hospital encounter of 05/05/20   Adult Transthoracic Echo Complete W/ Cont if Necessary Per Protocol    Narrative Technically difficult study.  Mild LVE with severe apical hypokinesis and, moderate global hypokinesis   estimated LV ejection fraction of 35%   Normal RV size  Mild left atrial enlargement seen  Aortic valve, mitral valve, tricuspid valve appears structurally normal,   no significant regurgitation seen.  No pericardial effusion seen.  Proximal aorta appears normal in size.               Test Results Pending at Discharge        Procedures Performed           Consults:   Consults     No orders found for last 30 day(s).            Discharge Details        Discharge Medications      New Medications      Instructions Start Date   doxycycline 100 MG tablet  Commonly known as: ADOXA   100 mg, Oral, 2 Times Daily      metOLazone 5 MG tablet  Commonly known as: ZAROXOLYN   5 mg, Oral, Daily      predniSONE 20 MG tablet  Commonly known as: DELTASONE   40 mg, Oral, Daily   Start Date: December 14, 2020        Continue These Medications      Instructions Start Date   albuterol sulfate  (90 Base) MCG/ACT inhaler  Commonly known as: PROVENTIL HFA;VENTOLIN HFA;PROAIR HFA   2 puffs, Inhalation, Every 4 Hours PRN      albuterol (2.5 MG/3ML) 0.083% nebulizer solution  Commonly known as: PROVENTIL   2.5 mg, Nebulization, Every 6 Hours PRN      Alcohol Wipes 70 % pads   1 each, Does not apply, 4 Times Daily Before Meals & Nightly      aspirin 325 MG tablet   325 mg, Oral, Daily      atorvastatin 40 MG tablet  Commonly known as: LIPITOR   40 mg, Oral, Daily      carvedilol 3.125 MG tablet  Commonly known as: COREG   3.125 mg, Oral, 2 Times Daily With Meals      clopidogrel 75 MG tablet  Commonly known as: PLAVIX   75  mg, Oral, Daily      DULoxetine 60 MG capsule  Commonly known as: CYMBALTA   TAKE 1 CAPSULE BY MOUTH ONCE DAILY FOR 90 DAYS      fenofibrate 145 MG tablet  Commonly known as: TRICOR   Take 1 tablet by mouth once daily      furosemide 40 MG tablet  Commonly known as: LASIX   40 mg, Oral, 2 Times Daily      glimepiride 4 MG tablet  Commonly known as: AMARYL   4 mg, Oral, 2 Times Daily      glucose blood test strip  Commonly known as: OneTouch Verio   1 each, Other, 4 Times Daily Before Meals & Nightly, Use as instructed      Insulin Glargine 100 UNIT/ML injection pen  Commonly known as: LANTUS SOLOSTAR   10 Units, Subcutaneous, Nightly      lisinopril 2.5 MG tablet  Commonly known as: PRINIVIL,ZESTRIL   Take 1 tablet by mouth once daily      nitroglycerin 2 % ointment  Commonly known as: NITROSTAT   0.5 inches, Transdermal, Daily      OneTouch Delica Plus [id number] misc   1 each, Does not apply, 4 Times Daily Before Meals & Nightly      pantoprazole 40 MG EC tablet  Commonly known as: PROTONIX   40 mg, Oral, Daily      Pen Needles 32G X 4 MM misc   1 each, Does not apply, Daily      spironolactone 25 MG tablet  Commonly known as: ALDACTONE   25 mg, Oral, Daily             No Known Allergies      Discharge Disposition:  Home or Self Care    Diet:  Hospital:  Diet Order   Procedures   • Diet Diabetic/Consistent Carbs, Cardiac; Healthy Heart; Diabetic - Consistent Carb         Discharge Activity: as tolerated        CODE STATUS:    Code Status and Medical Interventions:   Ordered at: 12/12/20 1727     Code Status:    CPR     Medical Interventions (Level of Support Prior to Arrest):    Full         Follow-up Appointments  Future Appointments   Date Time Provider Department Center   6/1/2021  9:30 AM Juarez Wing MD [facility] None       Additional Instructions for the Follow-ups that You Need to Schedule     Discharge Follow-up with PCP   As directed       Currently Documented PCP:    Yamile Agarwal    PCP  Phone Number:    [phone]     Follow Up Details: cardiologist                 Condition on Discharge:      Stable      This patient has been examined wearing appropriate Personal Protective Equipment and discussed with nursing. 12/13/20      Electronically signed by Dileep Alvarez DO, 12/13/20, 1:13 PM EST.      Time: I spent  15 minutes on this discharge activity which included face-to-face encounter with the patient/reviewing the data in the system/coordination of the care with the nursing staff as well as consultants/documentation/entering orders.

## 2020-12-13 NOTE — PROGRESS NOTES
[facility] Medicine Services Daily Progress Note      Hospitalist Team  LOS 1 days      Patient Care Team:  Yamile Agarwal as PCP - General  Yamile Agarwal as PCP - Family Medicine  Juarez Wing MD as Consulting Physician (Cardiology)    Patient Location: 2106/1      Subjective   Subjective     Chief Complaint / Subjective  No chief complaint on file.        Brief Synopsis of Hospital Course/HPI      The patient is a 60 years old male with history of CAD s/p CABG, ischemic cardiomyopathy s/p AICD, DM, HLD, COPD and PVD s/p left femoropopliteal bypass.     The patient experienced acute episode of shortness of air in the morning of 12/12/20 therefore went to [facility] ED.  The patient thinks that he may have had SOMERS and orthopnea.  The patient was reported to have been requiring 3L oxygen in the ED and had received IVF bolus for low BP.      The patient admits to productive yellow cough but denies fevers, chills, sweats, chest pain, nausea, vomiting or abdominal pain.     Laboratory work from [facility] ED on 12/12/2020:  1.  BMP: Sodium 143, potassium 3.8, chloride 104, bicarb 26.7, anion gap 12.3, BUN 15, creatinine 1.05, glucose 107  2.  LFTs: Total bilirubin 0.4, AST 20, ALT 37, alk phos 47 and albumin 3.9.  3.  First set troponin 0 0.036, second set 0.1  4.  ABG: pH 7.38, PCO2 45.2, PaO2 87.8, on 34% FiO2  5.  CBC: WBC 10.6, hemoglobin 13.9, MCV 92.5, platelet count 204.      Date::      12/13/20: feels better. Denies CP.    Review of Systems   All other systems reviewed and are negative.        Objective   Objective      Vital Signs  Temp:  [97.3 °F (36.3 °C)-97.7 °F (36.5 °C)] 97.3 °F (36.3 °C)  Heart Rate:  [80-91] 83  Resp:  [16-22] 16  BP: (108-137)/(69-76) 137/72  Oxygen Therapy  SpO2: 95 %  Device (Oxygen Therapy): room air  Flowsheet Rows      First Filed Value   Admission Height  --   Admission Weight  80.8 kg (178 lb 2.1 oz) Documented at 12/13/2020 0500         Intake & Output (last 3 days)       12/10 0701 - 12/11 0700 12/11 0701 - 12/12 0700 12/12 0701 - 12/13 0700 12/13 0701 - 12/14 0700    P.O.   240     I.V. (mL/kg)   150 (1.9)     Total Intake(mL/kg)   390 (4.8)     Urine (mL/kg/hr)   300 900 (2.4)    Total Output   300 900    Net   +90 -900                Lines, Drains & Airways    Active LDAs     Name:   Placement date:   Placement time:   Site:   Days:    Peripheral IV 12/12/20 Right;Upper Arm   12/12/20    --    Arm   1                  Physical Exam:    Physical Exam  HENT:      Head: Normocephalic and atraumatic.      Nose: Nose normal.   Eyes:      General: No scleral icterus.     Extraocular Movements: Extraocular movements intact.      Pupils: Pupils are equal, round, and reactive to light.   Neck:      Musculoskeletal: Normal range of motion.   Cardiovascular:      Rate and Rhythm: Normal rate and regular rhythm.   Pulmonary:      Effort: Pulmonary effort is normal.      Breath sounds: Normal breath sounds.   Abdominal:      General: Bowel sounds are normal.      Palpations: Abdomen is soft.   Musculoskeletal: Normal range of motion.   Skin:     General: Skin is warm and dry.   Neurological:      General: No focal deficit present.      Mental Status: He is alert.   Psychiatric:         Mood and Affect: Mood normal.              Wounds (last 24 hours)      LDA Wound     Row Name 12/13/20 0800 12/13/20 0320 12/12/20 2340       Wound 12/12/20 1935 Left anterior fifth toe Arterial Ulcer    Wound - Properties Group Placement Date: 12/12/20  -DC Placement Time: 1935  -DC Present on Hospital Admission: Y  -DC Side: Left  -DC Orientation: anterior  -DC Location: fifth toe  -DC Primary Wound Type: Arterial ulc  -DC, gangreneous left great toe amputated, non healing poor circ     Closure  LIZA  -JG  LIZA  -DC  LIZA  -DC    Periwound  intact  -JG  intact  -DC  intact  -DC    Periwound Temperature  warm  -JG  warm  -DC  warm  -DC    Periwound Skin Turgor  soft  -JG   soft  -DC  soft  -DC    Drainage Amount  none  -JG  none  -DC  none  -DC    Retired Wound - Properties Group Date first assessed: 12/12/20  -DC Time first assessed: 1935  -DC Present on Hospital Admission: Y  -DC Side: Left  -DC Location: fifth toe  -DC Primary Wound Type: Arterial ulc  -DC, gangreneous left great toe amputated, non healing poor circ     Row Name 12/12/20 1935             Wound 12/12/20 1935 Left anterior fifth toe Arterial Ulcer    Wound - Properties Group Placement Date: 12/12/20  -DC Placement Time: 1935  -DC Present on Hospital Admission: Y  -DC Side: Left  -DC Orientation: anterior  -DC Location: fifth toe  -DC Primary Wound Type: Arterial ulc  -DC, gangreneous left great toe amputated, non healing poor circ     Dressing Appearance  dry;intact  -DC      Closure  LIZA  -DC      Periwound  intact  -DC      Periwound Temperature  warm  -DC      Periwound Skin Turgor  soft  -DC      Drainage Amount  none  -DC      Retired Wound - Properties Group Date first assessed: 12/12/20  -DC Time first assessed: 1935  -DC Present on Hospital Admission: Y  -DC Side: Left  -DC Location: fifth toe  -DC Primary Wound Type: Arterial ulc  -DC, gangreneous left great toe amputated, non healing poor circ       User Key  (r) = Recorded By, (t) = Taken By, (c) = Cosigned By    Initials Name Provider Type    Mary Artis, RN Registered Nurse    Roberta Shukla RN Registered Nurse          Procedures:              Results Review:     I reviewed the patient's new clinical results.      Lab Results (last 24 hours)     Procedure Component Value Units Date/Time    POC Glucose Once [[id number]]  (Abnormal) Collected: 12/13/20 0730    Specimen: Blood Updated: 12/13/20 0731     Glucose 220 mg/dL      Comment: Serial Number: [id number]:  [id number]       Troponin [[id number]]  (Normal) Collected: 12/13/20 0611    Specimen: Blood Updated: 12/13/20 0655     Troponin T <0.010 ng/mL     Narrative:      Troponin T  Reference Range:  <= 0.03 ng/mL-   Negative for AMI  >0.03 ng/mL-     Abnormal for myocardial necrosis.  Clinicians would have to utilize clinical acumen, EKG, Troponin and serial changes to determine if it is an Acute Myocardial Infarction or myocardial injury due to an underlying chronic condition.       Results may be falsely decreased if patient taking Biotin.      Magnesium [[id number]]  (Normal) Collected: 12/13/20 0018    Specimen: Blood Updated: 12/13/20 0124     Magnesium 2.2 mg/dL     Troponin [[id number]]  (Normal) Collected: 12/13/20 0018    Specimen: Blood Updated: 12/13/20 0119     Troponin T <0.010 ng/mL     Narrative:      Troponin T Reference Range:  <= 0.03 ng/mL-   Negative for AMI  >0.03 ng/mL-     Abnormal for myocardial necrosis.  Clinicians would have to utilize clinical acumen, EKG, Troponin and serial changes to determine if it is an Acute Myocardial Infarction or myocardial injury due to an underlying chronic condition.       Results may be falsely decreased if patient taking Biotin.      Basic Metabolic Panel [[id number]]  (Abnormal) Collected: 12/13/20 0018    Specimen: Blood Updated: 12/13/20 0119     Glucose 231 mg/dL      BUN 22 mg/dL      Creatinine 1.38 mg/dL      Sodium 138 mmol/L      Potassium 4.1 mmol/L      Chloride 99 mmol/L      CO2 28.0 mmol/L      Calcium 9.0 mg/dL      eGFR Non African Amer 53 mL/min/1.73      BUN/Creatinine Ratio 15.9     Anion Gap 11.0 mmol/L     Narrative:      GFR Normal >60  Chronic Kidney Disease <60  Kidney Failure <15      Phosphorus [[id number]]  (Normal) Collected: 12/13/20 0018    Specimen: Blood Updated: 12/13/20 0119     Phosphorus 2.6 mg/dL     Lipid Panel [[id number]]  (Abnormal) Collected: 12/13/20 0018    Specimen: Blood Updated: 12/13/20 0119     Total Cholesterol 134 mg/dL      Triglycerides 157 mg/dL      HDL Cholesterol 33 mg/dL      LDL Cholesterol  74 mg/dL      VLDL Cholesterol 27 mg/dL      LDL/HDL Ratio 2.11    Narrative:       Cholesterol Reference Ranges  (U.S. Department of Health and Human Services ATP III Classifications)    Desirable          <200 mg/dL  Borderline High    200-239 mg/dL  High Risk          >240 mg/dL      Triglyceride Reference Ranges  (U.S. Department of Health and Human Services ATP III Classifications)    Normal           <150 mg/dL  Borderline High  150-199 mg/dL  High             200-499 mg/dL  Very High        >500 mg/dL    HDL Reference Ranges  (U.S. Department of Health and Human Services ATP III Classifcations)    Low     <40 mg/dl (major risk factor for CHD)  High    >60 mg/dl ('negative' risk factor for CHD)        LDL Reference Ranges  (U.S. Department of Health and Human Services ATP III Classifcations)    Optimal          <100 mg/dL  Near Optimal     100-129 mg/dL  Borderline High  130-159 mg/dL  High             160-189 mg/dL  Very High        >189 mg/dL    TSH [[id number]]  (Normal) Collected: 12/13/20 0018    Specimen: Blood Updated: 12/13/20 0117     TSH 0.447 uIU/mL     T4, Free [[id number]]  (Normal) Collected: 12/13/20 0018    Specimen: Blood Updated: 12/13/20 0117     Free T4 1.23 ng/dL     Narrative:      Results may be falsely increased if patient taking Biotin.      CBC Auto Differential [[id number]]  (Abnormal) Collected: 12/13/20 0018    Specimen: Blood Updated: 12/13/20 0048     WBC 6.40 10*3/mm3      RBC 4.05 10*6/mm3      Hemoglobin 12.5 g/dL      Hematocrit 37.2 %      MCV 91.6 fL      MCH 30.7 pg      MCHC 33.5 g/dL      RDW 13.9 %      RDW-SD 44.2 fl      MPV 10.7 fL      Platelets 150 10*3/mm3      Neutrophil % 88.6 %      Lymphocyte % 8.9 %      Monocyte % 2.3 %      Eosinophil % 0.0 %      Basophil % 0.2 %      Neutrophils, Absolute 5.70 10*3/mm3      Lymphocytes, Absolute 0.60 10*3/mm3      Monocytes, Absolute 0.20 10*3/mm3      Eosinophils, Absolute 0.00 10*3/mm3      Basophils, Absolute 0.00 10*3/mm3      nRBC 0.1 /100 WBC     Troponin [[id number]]  (Normal) Collected: 12/12/20 2033     Specimen: Blood Updated: 12/12/20 2122     Troponin T <0.010 ng/mL     Narrative:      Troponin T Reference Range:  <= 0.03 ng/mL-   Negative for AMI  >0.03 ng/mL-     Abnormal for myocardial necrosis.  Clinicians would have to utilize clinical acumen, EKG, Troponin and serial changes to determine if it is an Acute Myocardial Infarction or myocardial injury due to an underlying chronic condition.       Results may be falsely decreased if patient taking Biotin.      POC Glucose Once [[id number]]  (Abnormal) Collected: 12/12/20 2034    Specimen: Blood Updated: 12/12/20 2036     Glucose 160 mg/dL      Comment: Serial Number: [id number]:  [id number]       Troponin [[id number]]  (Normal) Collected: 12/12/20 1743    Specimen: Blood Updated: 12/12/20 1848     Troponin T 0.012 ng/mL     Narrative:      Troponin T Reference Range:  <= 0.03 ng/mL-   Negative for AMI  >0.03 ng/mL-     Abnormal for myocardial necrosis.  Clinicians would have to utilize clinical acumen, EKG, Troponin and serial changes to determine if it is an Acute Myocardial Infarction or myocardial injury due to an underlying chronic condition.       Results may be falsely decreased if patient taking Biotin.      Respiratory Panel PCR w/COVID-19(SARS-CoV-2) RUBY/CALEB/AJ/PAD/COR/MAD/GARY In-House, NP Swab in [facility], 3-4 HR TAT - Swab, Nasopharynx [[id number]]  (Normal) Collected: 12/12/20 1733    Specimen: Swab from Nasopharynx Updated: 12/12/20 1847     ADENOVIRUS, PCR Not Detected     Coronavirus 229E Not Detected     Coronavirus HKU1 Not Detected     Coronavirus NL63 Not Detected     Coronavirus OC43 Not Detected     COVID19 Not Detected     Human Metapneumovirus Not Detected     Human Rhinovirus/Enterovirus Not Detected     Influenza A PCR Not Detected     Influenza B PCR Not Detected     Parainfluenza Virus 1 Not Detected     Parainfluenza Virus 2 Not Detected     Parainfluenza Virus 3 Not Detected     Parainfluenza Virus 4 Not Detected     RSV, PCR  "Not Detected     Bordetella pertussis pcr Not Detected     Bordetella parapertussis PCR Not Detected     Chlamydophila pneumoniae PCR Not Detected     Mycoplasma pneumo by PCR Not Detected    Narrative:      Fact sheet for providers: https://docs.[employer]/wp-content/uploads/UOY7792-0925-YY6.1-EUA-Provider-Fact-Sheet-3.pdf    Fact sheet for patients: https://docs.[employer]/wp-content/uploads/OKU9751-9811-TF8.1-EUA-Patient-Fact-Sheet-1.pdf    Test performed by PCR.    BNP [[id number]]  (Abnormal) Collected: 12/12/20 1743    Specimen: Blood Updated: 12/12/20 1837     proBNP 3,670.0 pg/mL     Narrative:      Among patients with dyspnea, NT-proBNP is highly sensitive for the detection of acute congestive heart failure. In addition NT-proBNP of <300 pg/ml effectively rules out acute congestive heart failure with 99% negative predictive value.    Results may be falsely decreased if patient taking Biotin.      Procalcitonin [[id number]]  (Normal) Collected: 12/12/20 1743    Specimen: Blood Updated: 12/12/20 1837     Procalcitonin 0.14 ng/mL     Narrative:      As a Marker for Sepsis (Non-Neonates):   1. <0.5 ng/mL represents a low risk of severe sepsis and/or septic shock.  1. >2 ng/mL represents a high risk of severe sepsis and/or septic shock.    As a Marker for Lower Respiratory Tract Infections that require antibiotic therapy:  PCT on Admission     Antibiotic Therapy             6-12 Hrs later  > 0.5                Strongly Recommended            >0.25 - <0.5         Recommended  0.1 - 0.25           Discouraged                   Remeasure/reassess PCT  <0.1                 Strongly Discouraged          Remeasure/reassess PCT      As 28 day mortality risk marker: \"Change in Procalcitonin Result\" (> 80 % or <=80 %) if Day 0 (or Day 1) and Day 4 values are available. Refer to http://www.[employer]Apps-pct-calculator.com/   Change in PCT <=80 %   A decrease of PCT levels below or equal to 80 % defines a positive change in " PCT test result representing a higher risk for 28-day all-cause mortality of patients diagnosed with severe sepsis or septic shock.  Change in PCT > 80 %   A decrease of PCT levels of more than 80 % defines a negative change in PCT result representing a lower risk for 28-day all-cause mortality of patients diagnosed with severe sepsis or septic shock.                Results may be falsely decreased if patient taking Biotin.     TSH [[id number]]  (Normal) Collected: 12/12/20 1743    Specimen: Blood Updated: 12/12/20 1837     TSH 0.623 uIU/mL     Comprehensive Metabolic Panel [[id number]]  (Abnormal) Collected: 12/12/20 1743    Specimen: Blood Updated: 12/12/20 1830     Glucose 234 mg/dL      BUN 16 mg/dL      Creatinine 1.10 mg/dL      Sodium 140 mmol/L      Potassium 4.2 mmol/L      Chloride 99 mmol/L      CO2 30.0 mmol/L      Calcium 9.2 mg/dL      Total Protein 7.4 g/dL      Albumin 4.30 g/dL      ALT (SGPT) 28 U/L      AST (SGOT) 22 U/L      Alkaline Phosphatase 50 U/L      Total Bilirubin 0.3 mg/dL      eGFR Non African Amer 68 mL/min/1.73      Globulin 3.1 gm/dL      A/G Ratio 1.4 g/dL      BUN/Creatinine Ratio 14.5     Anion Gap 11.0 mmol/L     Narrative:      GFR Normal >60  Chronic Kidney Disease <60  Kidney Failure <15      Phosphorus [[id number]]  (Normal) Collected: 12/12/20 1743    Specimen: Blood Updated: 12/12/20 1830     Phosphorus 3.4 mg/dL     Magnesium [[id number]]  (Abnormal) Collected: 12/12/20 1743    Specimen: Blood Updated: 12/12/20 1830     Magnesium 1.2 mg/dL     POC Glucose Once [[id number]]  (Abnormal) Collected: 12/12/20 1821    Specimen: Blood Updated: 12/12/20 1822     Glucose 326 mg/dL      Comment: Serial Number: [id number]:  [id number]       CBC Auto Differential [[id number]]  (Abnormal) Collected: 12/12/20 1743    Specimen: Blood Updated: 12/12/20 1753     WBC 6.40 10*3/mm3      RBC 4.28 10*6/mm3      Hemoglobin 13.0 g/dL      Hematocrit 39.7 %      MCV 92.8 fL      MCH 30.4 pg       MCHC 32.8 g/dL      RDW 14.2 %      RDW-SD 45.5 fl      MPV 10.6 fL      Platelets 166 10*3/mm3      Neutrophil % 88.2 %      Lymphocyte % 9.0 %      Monocyte % 2.5 %      Eosinophil % 0.0 %      Basophil % 0.3 %      Neutrophils, Absolute 5.70 10*3/mm3      Lymphocytes, Absolute 0.60 10*3/mm3      Monocytes, Absolute 0.20 10*3/mm3      Eosinophils, Absolute 0.00 10*3/mm3      Basophils, Absolute 0.00 10*3/mm3      nRBC 0.0 /100 WBC         No results found for: HGBA1C            No results found for: LIPASE  Lab Results   Component Value Date    CHOL 134 12/13/2020    TRIG 157 (H) 12/13/2020    HDL 33 (L) 12/13/2020    LDL 74 12/13/2020       No results found for: INTRAOP, PREDX, FINALDX, COMDX    Microbiology Results (last 10 days)     Procedure Component Value - Date/Time    Respiratory Panel PCR w/COVID-19(SARS-CoV-2) RUBY/CALEB/AJ/PAD/COR/MAD/GARY In-House, NP Swab in UTM/VTM, 3-4 HR TAT - Swab, Nasopharynx [[id number]]  (Normal) Collected: 12/12/20 1733    Lab Status: Final result Specimen: Swab from Nasopharynx Updated: 12/12/20 1847     ADENOVIRUS, PCR Not Detected     Coronavirus 229E Not Detected     Coronavirus HKU1 Not Detected     Coronavirus NL63 Not Detected     Coronavirus OC43 Not Detected     COVID19 Not Detected     Human Metapneumovirus Not Detected     Human Rhinovirus/Enterovirus Not Detected     Influenza A PCR Not Detected     Influenza B PCR Not Detected     Parainfluenza Virus 1 Not Detected     Parainfluenza Virus 2 Not Detected     Parainfluenza Virus 3 Not Detected     Parainfluenza Virus 4 Not Detected     RSV, PCR Not Detected     Bordetella pertussis pcr Not Detected     Bordetella parapertussis PCR Not Detected     Chlamydophila pneumoniae PCR Not Detected     Mycoplasma pneumo by PCR Not Detected    Narrative:      Fact sheet for providers: https://docs.[employer]wp-content/uploads/SFS2201-6191-EK4.1-EUA-Provider-Fact-Sheet-3.pdf    Fact sheet for patients:  https://docs.[employer]/wp-content/uploads/DBG4556-5409-IU5.1-EUA-Patient-Fact-Sheet-1.pdf    Test performed by PCR.          ECG/EMG Results (most recent)     Procedure Component Value Units Date/Time    ECG 12 Lead [[id number]] Collected: 12/13/20 0600     Updated: 12/13/20 0602     QT Interval 391 ms     Narrative:      HEART RATE= 80  bpm  RR Interval= 744  ms  TN Interval= 136  ms  P Horizontal Axis= 38  deg  P Front Axis= 67  deg  QRSD Interval= 96  ms  QT Interval= 391  ms  QRS Axis= 12  deg  T Wave Axis= 201  deg  - ABNORMAL ECG -  Sinus rhythm  Probable LVH with secondary repol abnrm  Inferior infarct, old  Electronically Signed By:   Date and Time of Study: 2020-12-13 06:00:06          Results for orders placed during the hospital encounter of 05/05/20   Duplex Carotid Ultrasound CAR    Narrative · Proximal right internal carotid artery plaque without significant   stenosis.  · Proximal left internal carotid artery plaque without significant   stenosis.          Results for orders placed during the hospital encounter of 05/05/20   Adult Transthoracic Echo Complete W/ Cont if Necessary Per Protocol    Narrative Technically difficult study.  Mild LVE with severe apical hypokinesis and, moderate global hypokinesis   estimated LV ejection fraction of 35%   Normal RV size  Mild left atrial enlargement seen  Aortic valve, mitral valve, tricuspid valve appears structurally normal,   no significant regurgitation seen.  No pericardial effusion seen.  Proximal aorta appears normal in size.       Xr Chest 1 View    Result Date: 12/12/2020  No active cardiopulmonary disease.   Electronically Signed By-Irvin Rodas DO On:12/12/2020 6:07 PM This report was finalized on [id number] by  Irvin Rodas DO.          Xrays, labs reviewed personally by physician.    Medication Review:   I have reviewed the patient's current medication list      Scheduled Meds  aspirin, 325 mg, Oral, Daily  atorvastatin, 40 mg, Oral,  Daily  carvedilol, 3.125 mg, Oral, BID With Meals  clopidogrel, 75 mg, Oral, Daily  doxycycline, 100 mg, Oral, BID  DULoxetine, 60 mg, Oral, Daily  furosemide, 40 mg, Intravenous, Q12H  heparin (porcine), 5,000 Units, Subcutaneous, Q8H  insulin glargine, 10 Units, Subcutaneous, Nightly  insulin lispro, 0-7 Units, Subcutaneous, TID AC  pantoprazole, 40 mg, Oral, Daily  predniSONE, 40 mg, Oral, Daily  sodium chloride, 10 mL, Intravenous, Q12H  spironolactone, 25 mg, Oral, Daily        Meds Infusions       Meds PRN  •  acetaminophen **OR** acetaminophen **OR** acetaminophen  •  albuterol sulfate HFA  •  bisacodyl  •  bisacodyl  •  dextrose  •  dextrose  •  glucagon (human recombinant)  •  influenza vaccine  •  insulin lispro **AND** insulin lispro  •  magnesium sulfate **OR** magnesium sulfate **OR** magnesium sulfate  •  ondansetron  •  potassium & sodium phosphates **OR** potassium & sodium phosphates  •  potassium chloride  •  potassium chloride  •  sodium chloride        Assessment/Plan   Assessment/Plan     Active Hospital Problems    Diagnosis  POA   • Acute hypoxemic respiratory failure (CMS/[facility]) [J96.01]  Yes      Resolved Hospital Problems   No resolved problems to display.       MEDICAL DECISION MAKING COMPLEXITY BY PROBLEM:     1.  Acute on chronic systolic heart failure:  -Continue Lasix  -Ordered COVID-19 test  -Daily weights     2.  Elevated troponin:  -Continue to trend  -Denies chest pain  -last [facility] 5/11/20--> SHILPA to SVG to RCA and native proximal LCx  -Consult cardiology     3.  Bronchitis:  -Patient admits to productive yellow cough  -Continue bronchodilators and doxycycline     4.  CAD s/p CABG:  -Continue aspirin, Coreg, statin and lisinopril     5. Insulin-dependent diabetes mellitus:  -Continue ISS and hold oral medication  -Check A1c and lipid panel     6.  PVD s/p left femoropopliteal bypass and toe amputation October 2020:  -Work-up done at [facility]  -Continue antiplatelet therapy and  statin  -Patient still smoking     7.  Hypertension:  -On lisinopril and Coreg at home     8.  Hyperlipidemia:  -Continue statin       VTE Prophylaxis -   Mechanical Order History:      Ordered        12/12/20 1727  Place Sequential Compression Device  Once         12/12/20 1727  Maintain Sequential Compression Device  Continuous                 Pharmalogical Order History:      Ordered     Dose Route Frequency Stop    12/12/20 1727  heparin (porcine) 5000 UNIT/ML injection 5,000 Units      5,000 Units SC Every 8 Hours Scheduled --                  Code Status -   Code Status and Medical Interventions:   Ordered at: 12/12/20 1727     Code Status:    CPR     Medical Interventions (Level of Support Prior to Arrest):    Full       This patient has been examined wearing appropriate Personal Protective Equipment and discussed with nursing. 12/13/20        Discharge Planning  defer        Electronically signed by Dileep Alvarez DO, 12/13/20, 11:39 EST.  [facility] Hospitalist Team

## 2020-12-14 NOTE — PROGRESS NOTES
Case Management Discharge Note                Selected Continued Care - Discharged on 12/13/2020 Admission date: 12/12/2020 - Discharge disposition: Home or Self Care                 Final Discharge Disposition Code: 01 - home or self-care   no previous reaction/no previous family member reaction O-Z Flap Text: The defect edges were debeveled with a #15 scalpel blade.  Given the location of the defect, shape of the defect and the proximity to free margins an O-Z flap was deemed most appropriate.  Using a sterile surgical marker, an appropriate transposition flap was drawn incorporating the defect and placing the expected incisions within the relaxed skin tension lines where possible. The area thus outlined was incised deep to adipose tissue with a #15 scalpel blade.  The skin margins were undermined to an appropriate distance in all directions utilizing iris scissors.

## 2020-12-26 ENCOUNTER — APPOINTMENT (OUTPATIENT)
Dept: CARDIOLOGY | Facility: HOSPITAL | Age: 60
End: 2020-12-26

## 2020-12-26 ENCOUNTER — APPOINTMENT (OUTPATIENT)
Dept: GENERAL RADIOLOGY | Facility: HOSPITAL | Age: 60
End: 2020-12-26

## 2020-12-26 ENCOUNTER — HOSPITAL ENCOUNTER (INPATIENT)
Facility: HOSPITAL | Age: 60
LOS: 4 days | Discharge: HOME HEALTH | End: 2020-12-30
Attending: INTERNAL MEDICINE | Admitting: INTERNAL MEDICINE

## 2020-12-26 DIAGNOSIS — A41.9 SEPSIS, DUE TO UNSPECIFIED ORGANISM, UNSPECIFIED WHETHER ACUTE ORGAN DYSFUNCTION PRESENT (HCC): Primary | ICD-10-CM

## 2020-12-26 LAB
ALBUMIN SERPL-MCNC: 4.1 G/DL (ref 3.5–5.2)
ALBUMIN/GLOB SERPL: 1.6 G/DL
ALP SERPL-CCNC: 49 U/L (ref 39–117)
ALT SERPL W P-5'-P-CCNC: 42 U/L (ref 1–41)
ANION GAP SERPL CALCULATED.3IONS-SCNC: 7 MMOL/L (ref 5–15)
APTT PPP: 20.5 SECONDS (ref 61–76.5)
APTT PPP: 32.2 SECONDS (ref 61–76.5)
ARTERIAL PATENCY WRIST A: POSITIVE
AST SERPL-CCNC: 46 U/L (ref 1–40)
ATMOSPHERIC PRESS: ABNORMAL MM[HG]
B PARAPERT DNA SPEC QL NAA+PROBE: NOT DETECTED
B PERT DNA SPEC QL NAA+PROBE: NOT DETECTED
BACTERIA UR QL AUTO: ABNORMAL /HPF
BASE EXCESS BLDA CALC-SCNC: -0.3 MMOL/L (ref 0–3)
BASOPHILS # BLD AUTO: 0 10*3/MM3 (ref 0–0.2)
BASOPHILS NFR BLD AUTO: 0.1 % (ref 0–1.5)
BDY SITE: ABNORMAL
BH CV ECHO MEAS - % IVS THICK: 21.3 %
BH CV ECHO MEAS - % LVPW THICK: 38.7 %
BH CV ECHO MEAS - ACS: 2.1 CM
BH CV ECHO MEAS - AO MAX PG (FULL): 0.52 MMHG
BH CV ECHO MEAS - AO MAX PG: 4.5 MMHG
BH CV ECHO MEAS - AO MEAN PG (FULL): 0.36 MMHG
BH CV ECHO MEAS - AO MEAN PG: 2.5 MMHG
BH CV ECHO MEAS - AO ROOT AREA (BSA CORRECTED): 1.7
BH CV ECHO MEAS - AO ROOT AREA: 8.7 CM^2
BH CV ECHO MEAS - AO ROOT DIAM: 3.3 CM
BH CV ECHO MEAS - AO V2 MAX: 106 CM/SEC
BH CV ECHO MEAS - AO V2 MEAN: 75.9 CM/SEC
BH CV ECHO MEAS - AO V2 VTI: 15.5 CM
BH CV ECHO MEAS - AVA(I,A): 4.4 CM^2
BH CV ECHO MEAS - AVA(I,D): 4.4 CM^2
BH CV ECHO MEAS - AVA(V,A): 4 CM^2
BH CV ECHO MEAS - AVA(V,D): 4 CM^2
BH CV ECHO MEAS - BSA(HAYCOCK): 2 M^2
BH CV ECHO MEAS - BSA: 2 M^2
BH CV ECHO MEAS - BZI_BMI: 29.8 KILOGRAMS/M^2
BH CV ECHO MEAS - BZI_METRIC_HEIGHT: 170.2 CM
BH CV ECHO MEAS - BZI_METRIC_WEIGHT: 86.2 KG
BH CV ECHO MEAS - EDV(CUBED): 137.9 ML
BH CV ECHO MEAS - EDV(MOD-SP4): 95.4 ML
BH CV ECHO MEAS - EDV(TEICH): 127.6 ML
BH CV ECHO MEAS - EF(CUBED): 43.5 %
BH CV ECHO MEAS - EF(MOD-BP): 40 %
BH CV ECHO MEAS - EF(MOD-SP4): 39.9 %
BH CV ECHO MEAS - EF(TEICH): 36 %
BH CV ECHO MEAS - ESV(CUBED): 77.8 ML
BH CV ECHO MEAS - ESV(MOD-SP4): 57.4 ML
BH CV ECHO MEAS - ESV(TEICH): 81.7 ML
BH CV ECHO MEAS - FS: 17.4 %
BH CV ECHO MEAS - IVS/LVPW: 1.2
BH CV ECHO MEAS - IVSD: 1.3 CM
BH CV ECHO MEAS - IVSS: 1.6 CM
BH CV ECHO MEAS - LA DIMENSION(2D): 4.7 CM
BH CV ECHO MEAS - LV DIASTOLIC VOL/BSA (35-75): 48.2 ML/M^2
BH CV ECHO MEAS - LV MASS(C)D: 249.8 GRAMS
BH CV ECHO MEAS - LV MASS(C)DI: 126.3 GRAMS/M^2
BH CV ECHO MEAS - LV MASS(C)S: 272.9 GRAMS
BH CV ECHO MEAS - LV MASS(C)SI: 137.9 GRAMS/M^2
BH CV ECHO MEAS - LV MAX PG: 4 MMHG
BH CV ECHO MEAS - LV MEAN PG: 2.2 MMHG
BH CV ECHO MEAS - LV SYSTOLIC VOL/BSA (12-30): 29 ML/M^2
BH CV ECHO MEAS - LV V1 MAX: 99.6 CM/SEC
BH CV ECHO MEAS - LV V1 MEAN: 69.6 CM/SEC
BH CV ECHO MEAS - LV V1 VTI: 16.4 CM
BH CV ECHO MEAS - LVIDD: 5.2 CM
BH CV ECHO MEAS - LVIDS: 4.3 CM
BH CV ECHO MEAS - LVOT AREA: 4.2 CM^2
BH CV ECHO MEAS - LVOT DIAM: 2.3 CM
BH CV ECHO MEAS - LVPWD: 1.1 CM
BH CV ECHO MEAS - LVPWS: 1.5 CM
BH CV ECHO MEAS - MV A MAX VEL: 107.5 CM/SEC
BH CV ECHO MEAS - MV DEC SLOPE: 442 CM/SEC^2
BH CV ECHO MEAS - MV DEC TIME: 0.17 SEC
BH CV ECHO MEAS - MV E MAX VEL: 74.6 CM/SEC
BH CV ECHO MEAS - MV E/A: 0.69
BH CV ECHO MEAS - MV MAX PG: 4.1 MMHG
BH CV ECHO MEAS - MV MEAN PG: 1.6 MMHG
BH CV ECHO MEAS - MV V2 MAX: 101.1 CM/SEC
BH CV ECHO MEAS - MV V2 MEAN: 59.2 CM/SEC
BH CV ECHO MEAS - MV V2 VTI: 20.3 CM
BH CV ECHO MEAS - MVA(VTI): 3.4 CM^2
BH CV ECHO MEAS - PA ACC TIME: 0.08 SEC
BH CV ECHO MEAS - PA MAX PG (FULL): 0.77 MMHG
BH CV ECHO MEAS - PA MAX PG: 4.2 MMHG
BH CV ECHO MEAS - PA MEAN PG (FULL): 0.69 MMHG
BH CV ECHO MEAS - PA MEAN PG: 2.2 MMHG
BH CV ECHO MEAS - PA PR(ACCEL): 41.6 MMHG
BH CV ECHO MEAS - PA V2 MAX: 102.5 CM/SEC
BH CV ECHO MEAS - PA V2 MEAN: 69.4 CM/SEC
BH CV ECHO MEAS - PA V2 VTI: 17.1 CM
BH CV ECHO MEAS - RV MAX PG: 3.4 MMHG
BH CV ECHO MEAS - RV MEAN PG: 1.5 MMHG
BH CV ECHO MEAS - RV V1 MAX: 92.6 CM/SEC
BH CV ECHO MEAS - RV V1 MEAN: 54.5 CM/SEC
BH CV ECHO MEAS - RV V1 VTI: 12.8 CM
BH CV ECHO MEAS - RVDD: 3.1 CM
BH CV ECHO MEAS - SI(AO): 68 ML/M^2
BH CV ECHO MEAS - SI(CUBED): 30.3 ML/M^2
BH CV ECHO MEAS - SI(LVOT): 34.8 ML/M^2
BH CV ECHO MEAS - SI(MOD-SP4): 19.2 ML/M^2
BH CV ECHO MEAS - SI(TEICH): 23.2 ML/M^2
BH CV ECHO MEAS - SV(AO): 134.6 ML
BH CV ECHO MEAS - SV(CUBED): 60 ML
BH CV ECHO MEAS - SV(LVOT): 68.8 ML
BH CV ECHO MEAS - SV(MOD-SP4): 38 ML
BH CV ECHO MEAS - SV(TEICH): 45.9 ML
BILIRUB SERPL-MCNC: 0.3 MG/DL (ref 0–1.2)
BILIRUB UR QL STRIP: NEGATIVE
BUN SERPL-MCNC: 26 MG/DL (ref 8–23)
BUN/CREAT SERPL: 23.6 (ref 7–25)
C PNEUM DNA NPH QL NAA+NON-PROBE: NOT DETECTED
CA-I SERPL ISE-MCNC: 1.31 MMOL/L (ref 1.2–1.3)
CALCIUM SPEC-SCNC: 9.5 MG/DL (ref 8.6–10.5)
CHLORIDE SERPL-SCNC: 105 MMOL/L (ref 98–107)
CLARITY UR: CLEAR
CO2 BLDA-SCNC: 28.3 MMOL/L (ref 22–29)
CO2 SERPL-SCNC: 27 MMOL/L (ref 22–29)
COLOR UR: YELLOW
CREAT SERPL-MCNC: 1.1 MG/DL (ref 0.76–1.27)
D DIMER PPP FEU-MCNC: 1.16 MG/L (FEU) (ref 0–0.59)
D-LACTATE SERPL-SCNC: 1.2 MMOL/L (ref 0.5–2)
D-LACTATE SERPL-SCNC: 2.2 MMOL/L (ref 0.5–2)
DEPRECATED RDW RBC AUTO: 50.8 FL (ref 37–54)
EOSINOPHIL # BLD AUTO: 0 10*3/MM3 (ref 0–0.4)
EOSINOPHIL NFR BLD AUTO: 0.1 % (ref 0.3–6.2)
ERYTHROCYTE [DISTWIDTH] IN BLOOD BY AUTOMATED COUNT: 15.2 % (ref 12.3–15.4)
FLUAV SUBTYP SPEC NAA+PROBE: NOT DETECTED
FLUBV RNA ISLT QL NAA+PROBE: NOT DETECTED
GFR SERPL CREATININE-BSD FRML MDRD: 68 ML/MIN/1.73
GLOBULIN UR ELPH-MCNC: 2.6 GM/DL
GLUCOSE BLDC GLUCOMTR-MCNC: 102 MG/DL (ref 70–105)
GLUCOSE BLDC GLUCOMTR-MCNC: 183 MG/DL (ref 70–105)
GLUCOSE BLDC GLUCOMTR-MCNC: 243 MG/DL (ref 74–100)
GLUCOSE SERPL-MCNC: 231 MG/DL (ref 65–99)
GLUCOSE UR STRIP-MCNC: ABNORMAL MG/DL
HADV DNA SPEC NAA+PROBE: NOT DETECTED
HCO3 BLDA-SCNC: 26.7 MMOL/L (ref 21–28)
HCOV 229E RNA SPEC QL NAA+PROBE: NOT DETECTED
HCOV HKU1 RNA SPEC QL NAA+PROBE: NOT DETECTED
HCOV NL63 RNA SPEC QL NAA+PROBE: NOT DETECTED
HCOV OC43 RNA SPEC QL NAA+PROBE: NOT DETECTED
HCT VFR BLD AUTO: 41.2 % (ref 37.5–51)
HEMODILUTION: NO
HGB BLD-MCNC: 13.2 G/DL (ref 13–17.7)
HGB UR QL STRIP.AUTO: ABNORMAL
HMPV RNA NPH QL NAA+NON-PROBE: NOT DETECTED
HPIV1 RNA SPEC QL NAA+PROBE: NOT DETECTED
HPIV2 RNA SPEC QL NAA+PROBE: NOT DETECTED
HPIV3 RNA NPH QL NAA+PROBE: NOT DETECTED
HPIV4 P GENE NPH QL NAA+PROBE: NOT DETECTED
HYALINE CASTS UR QL AUTO: ABNORMAL /LPF
INHALED O2 CONCENTRATION: 50 %
INR PPP: 0.98 (ref 0.93–1.1)
KETONES UR QL STRIP: NEGATIVE
L PNEUMO1 AG UR QL IA: NEGATIVE
LACTATE HOLD SPECIMEN: NORMAL
LEUKOCYTE ESTERASE UR QL STRIP.AUTO: NEGATIVE
LYMPHOCYTES # BLD AUTO: 0.4 10*3/MM3 (ref 0.7–3.1)
LYMPHOCYTES NFR BLD AUTO: 3.2 % (ref 19.6–45.3)
M PNEUMO IGG SER IA-ACNC: NOT DETECTED
MAGNESIUM SERPL-MCNC: 1.6 MG/DL (ref 1.6–2.4)
MAXIMAL PREDICTED HEART RATE: 160 BPM
MCH RBC QN AUTO: 30.2 PG (ref 26.6–33)
MCHC RBC AUTO-ENTMCNC: 32.1 G/DL (ref 31.5–35.7)
MCV RBC AUTO: 94 FL (ref 79–97)
MODALITY: ABNORMAL
MONOCYTES # BLD AUTO: 0.2 10*3/MM3 (ref 0.1–0.9)
MONOCYTES NFR BLD AUTO: 1.5 % (ref 5–12)
MRSA DNA SPEC QL NAA+PROBE: NORMAL
NEUTROPHILS NFR BLD AUTO: 12.4 10*3/MM3 (ref 1.7–7)
NEUTROPHILS NFR BLD AUTO: 95.1 % (ref 42.7–76)
NITRITE UR QL STRIP: NEGATIVE
NRBC BLD AUTO-RTO: 0.1 /100 WBC (ref 0–0.2)
NT-PROBNP SERPL-MCNC: 2933 PG/ML (ref 0–900)
PCO2 BLDA: 52.6 MM HG (ref 35–48)
PEEP RESPIRATORY: 5 CM[H2O]
PH BLDA: 7.31 PH UNITS (ref 7.35–7.45)
PH UR STRIP.AUTO: 5.5 [PH] (ref 5–8)
PHOSPHATE SERPL-MCNC: 2.6 MG/DL (ref 2.5–4.5)
PLATELET # BLD AUTO: 116 10*3/MM3 (ref 140–450)
PMV BLD AUTO: 9.9 FL (ref 6–12)
PO2 BLDA: 108.4 MM HG (ref 83–108)
POTASSIUM SERPL-SCNC: 4.6 MMOL/L (ref 3.5–5.2)
PROCALCITONIN SERPL-MCNC: 0.29 NG/ML (ref 0–0.25)
PROT SERPL-MCNC: 6.7 G/DL (ref 6–8.5)
PROT UR QL STRIP: ABNORMAL
PROTHROMBIN TIME: 10.8 SECONDS (ref 9.6–11.7)
RBC # BLD AUTO: 4.39 10*6/MM3 (ref 4.14–5.8)
RBC # UR: ABNORMAL /HPF
REF LAB TEST METHOD: ABNORMAL
RHINOVIRUS RNA SPEC NAA+PROBE: NOT DETECTED
RSV RNA NPH QL NAA+NON-PROBE: NOT DETECTED
S PNEUM AG SPEC QL LA: NEGATIVE
SAO2 % BLDCOA: 97.6 % (ref 94–98)
SARS-COV-2 RNA NPH QL NAA+NON-PROBE: NOT DETECTED
SODIUM SERPL-SCNC: 139 MMOL/L (ref 136–145)
SP GR UR STRIP: 1.03 (ref 1–1.03)
SQUAMOUS #/AREA URNS HPF: ABNORMAL /HPF
STRESS TARGET HR: 136 BPM
T4 FREE SERPL-MCNC: 1.21 NG/DL (ref 0.93–1.7)
TROPONIN T SERPL-MCNC: 0.09 NG/ML (ref 0–0.03)
TROPONIN T SERPL-MCNC: 0.09 NG/ML (ref 0–0.03)
TSH SERPL DL<=0.05 MIU/L-ACNC: 0.73 UIU/ML (ref 0.27–4.2)
UROBILINOGEN UR QL STRIP: ABNORMAL
VENTILATOR MODE: ABNORMAL
VT ON VENT VENT: 600 ML
WBC # BLD AUTO: 13.1 10*3/MM3 (ref 3.4–10.8)
WBC UR QL AUTO: ABNORMAL /HPF

## 2020-12-26 PROCEDURE — 84439 ASSAY OF FREE THYROXINE: CPT | Performed by: NURSE PRACTITIONER

## 2020-12-26 PROCEDURE — 93005 ELECTROCARDIOGRAM TRACING: CPT | Performed by: NURSE PRACTITIONER

## 2020-12-26 PROCEDURE — 83605 ASSAY OF LACTIC ACID: CPT | Performed by: NURSE PRACTITIONER

## 2020-12-26 PROCEDURE — 85379 FIBRIN DEGRADATION QUANT: CPT | Performed by: NURSE PRACTITIONER

## 2020-12-26 PROCEDURE — 84100 ASSAY OF PHOSPHORUS: CPT | Performed by: NURSE PRACTITIONER

## 2020-12-26 PROCEDURE — 94002 VENT MGMT INPAT INIT DAY: CPT

## 2020-12-26 PROCEDURE — 84443 ASSAY THYROID STIM HORMONE: CPT | Performed by: NURSE PRACTITIONER

## 2020-12-26 PROCEDURE — 87899 AGENT NOS ASSAY W/OPTIC: CPT | Performed by: NURSE PRACTITIONER

## 2020-12-26 PROCEDURE — 93306 TTE W/DOPPLER COMPLETE: CPT | Performed by: INTERNAL MEDICINE

## 2020-12-26 PROCEDURE — 87077 CULTURE AEROBIC IDENTIFY: CPT | Performed by: NURSE PRACTITIONER

## 2020-12-26 PROCEDURE — 25010000002 CEFEPIME PER 500 MG: Performed by: NURSE PRACTITIONER

## 2020-12-26 PROCEDURE — 85730 THROMBOPLASTIN TIME PARTIAL: CPT | Performed by: NURSE PRACTITIONER

## 2020-12-26 PROCEDURE — 99222 1ST HOSP IP/OBS MODERATE 55: CPT | Performed by: INTERNAL MEDICINE

## 2020-12-26 PROCEDURE — 94760 N-INVAS EAR/PLS OXIMETRY 1: CPT

## 2020-12-26 PROCEDURE — 83735 ASSAY OF MAGNESIUM: CPT | Performed by: NURSE PRACTITIONER

## 2020-12-26 PROCEDURE — 85610 PROTHROMBIN TIME: CPT | Performed by: NURSE PRACTITIONER

## 2020-12-26 PROCEDURE — 82803 BLOOD GASES ANY COMBINATION: CPT

## 2020-12-26 PROCEDURE — 94799 UNLISTED PULMONARY SVC/PX: CPT

## 2020-12-26 PROCEDURE — 80053 COMPREHEN METABOLIC PANEL: CPT | Performed by: NURSE PRACTITIONER

## 2020-12-26 PROCEDURE — 82962 GLUCOSE BLOOD TEST: CPT

## 2020-12-26 PROCEDURE — 87205 SMEAR GRAM STAIN: CPT | Performed by: NURSE PRACTITIONER

## 2020-12-26 PROCEDURE — 71045 X-RAY EXAM CHEST 1 VIEW: CPT

## 2020-12-26 PROCEDURE — 81001 URINALYSIS AUTO W/SCOPE: CPT | Performed by: NURSE PRACTITIONER

## 2020-12-26 PROCEDURE — 85025 COMPLETE CBC W/AUTO DIFF WBC: CPT | Performed by: NURSE PRACTITIONER

## 2020-12-26 PROCEDURE — 93010 ELECTROCARDIOGRAM REPORT: CPT | Performed by: INTERNAL MEDICINE

## 2020-12-26 PROCEDURE — 84484 ASSAY OF TROPONIN QUANT: CPT | Performed by: NURSE PRACTITIONER

## 2020-12-26 PROCEDURE — 94640 AIRWAY INHALATION TREATMENT: CPT

## 2020-12-26 PROCEDURE — 74018 RADEX ABDOMEN 1 VIEW: CPT

## 2020-12-26 PROCEDURE — 87040 BLOOD CULTURE FOR BACTERIA: CPT | Performed by: NURSE PRACTITIONER

## 2020-12-26 PROCEDURE — 84145 PROCALCITONIN (PCT): CPT | Performed by: NURSE PRACTITIONER

## 2020-12-26 PROCEDURE — 25010000002 VANCOMYCIN 10 G RECONSTITUTED SOLUTION: Performed by: NURSE PRACTITIONER

## 2020-12-26 PROCEDURE — 83880 ASSAY OF NATRIURETIC PEPTIDE: CPT | Performed by: NURSE PRACTITIONER

## 2020-12-26 PROCEDURE — 93306 TTE W/DOPPLER COMPLETE: CPT

## 2020-12-26 PROCEDURE — 63710000001 INSULIN LISPRO (HUMAN) PER 5 UNITS: Performed by: NURSE PRACTITIONER

## 2020-12-26 PROCEDURE — 87186 SC STD MICRODIL/AGAR DIL: CPT | Performed by: NURSE PRACTITIONER

## 2020-12-26 PROCEDURE — 87641 MR-STAPH DNA AMP PROBE: CPT | Performed by: NURSE PRACTITIONER

## 2020-12-26 PROCEDURE — 25010000002 HEPARIN (PORCINE) 25000-0.45 UT/250ML-% SOLUTION: Performed by: NURSE PRACTITIONER

## 2020-12-26 PROCEDURE — 82330 ASSAY OF CALCIUM: CPT | Performed by: NURSE PRACTITIONER

## 2020-12-26 PROCEDURE — 85730 THROMBOPLASTIN TIME PARTIAL: CPT | Performed by: INTERNAL MEDICINE

## 2020-12-26 PROCEDURE — 25010000002 PROPOFOL 10 MG/ML EMULSION: Performed by: NURSE PRACTITIONER

## 2020-12-26 PROCEDURE — 36600 WITHDRAWAL OF ARTERIAL BLOOD: CPT

## 2020-12-26 PROCEDURE — 87070 CULTURE OTHR SPECIMN AEROBIC: CPT | Performed by: NURSE PRACTITIONER

## 2020-12-26 PROCEDURE — 0202U NFCT DS 22 TRGT SARS-COV-2: CPT | Performed by: NURSE PRACTITIONER

## 2020-12-26 RX ORDER — ONDANSETRON 4 MG/1
4 TABLET, FILM COATED ORAL EVERY 6 HOURS PRN
Status: DISCONTINUED | OUTPATIENT
Start: 2020-12-26 | End: 2020-12-30 | Stop reason: HOSPADM

## 2020-12-26 RX ORDER — ONDANSETRON 2 MG/ML
4 INJECTION INTRAMUSCULAR; INTRAVENOUS EVERY 6 HOURS PRN
Status: DISCONTINUED | OUTPATIENT
Start: 2020-12-26 | End: 2020-12-30 | Stop reason: HOSPADM

## 2020-12-26 RX ORDER — HEPARIN SODIUM 10000 [USP'U]/100ML
11 INJECTION, SOLUTION INTRAVENOUS
Status: DISCONTINUED | OUTPATIENT
Start: 2020-12-26 | End: 2020-12-29

## 2020-12-26 RX ORDER — SODIUM CHLORIDE 0.9 % (FLUSH) 0.9 %
3 SYRINGE (ML) INJECTION EVERY 12 HOURS SCHEDULED
Status: DISCONTINUED | OUTPATIENT
Start: 2020-12-26 | End: 2020-12-30 | Stop reason: HOSPADM

## 2020-12-26 RX ORDER — BUDESONIDE 0.5 MG/2ML
0.5 INHALANT ORAL
Status: DISCONTINUED | OUTPATIENT
Start: 2020-12-26 | End: 2020-12-30 | Stop reason: HOSPADM

## 2020-12-26 RX ORDER — DEXTROSE MONOHYDRATE 25 G/50ML
25 INJECTION, SOLUTION INTRAVENOUS
Status: DISCONTINUED | OUTPATIENT
Start: 2020-12-26 | End: 2020-12-30 | Stop reason: HOSPADM

## 2020-12-26 RX ORDER — NICOTINE POLACRILEX 4 MG
15 LOZENGE BUCCAL
Status: DISCONTINUED | OUTPATIENT
Start: 2020-12-26 | End: 2020-12-30 | Stop reason: HOSPADM

## 2020-12-26 RX ORDER — INSULIN LISPRO 100 [IU]/ML
0-7 INJECTION, SOLUTION INTRAVENOUS; SUBCUTANEOUS AS NEEDED
Status: DISCONTINUED | OUTPATIENT
Start: 2020-12-26 | End: 2020-12-28

## 2020-12-26 RX ORDER — IPRATROPIUM BROMIDE AND ALBUTEROL SULFATE 2.5; .5 MG/3ML; MG/3ML
3 SOLUTION RESPIRATORY (INHALATION)
Status: DISCONTINUED | OUTPATIENT
Start: 2020-12-26 | End: 2020-12-30 | Stop reason: HOSPADM

## 2020-12-26 RX ORDER — INSULIN LISPRO 100 [IU]/ML
0-7 INJECTION, SOLUTION INTRAVENOUS; SUBCUTANEOUS EVERY 6 HOURS SCHEDULED
Status: DISCONTINUED | OUTPATIENT
Start: 2020-12-26 | End: 2020-12-28

## 2020-12-26 RX ORDER — FAMOTIDINE 10 MG/ML
20 INJECTION, SOLUTION INTRAVENOUS EVERY 12 HOURS SCHEDULED
Status: DISCONTINUED | OUTPATIENT
Start: 2020-12-26 | End: 2020-12-29

## 2020-12-26 RX ORDER — VANCOMYCIN 1.75 GRAM/500 ML IN 0.9 % SODIUM CHLORIDE INTRAVENOUS
20 ONCE
Status: COMPLETED | OUTPATIENT
Start: 2020-12-26 | End: 2020-12-26

## 2020-12-26 RX ORDER — SODIUM CHLORIDE 0.9 % (FLUSH) 0.9 %
3-10 SYRINGE (ML) INJECTION AS NEEDED
Status: DISCONTINUED | OUTPATIENT
Start: 2020-12-26 | End: 2020-12-30 | Stop reason: HOSPADM

## 2020-12-26 RX ORDER — IPRATROPIUM BROMIDE AND ALBUTEROL SULFATE 2.5; .5 MG/3ML; MG/3ML
3 SOLUTION RESPIRATORY (INHALATION) EVERY 4 HOURS PRN
Status: DISCONTINUED | OUTPATIENT
Start: 2020-12-26 | End: 2020-12-30 | Stop reason: HOSPADM

## 2020-12-26 RX ORDER — ALUMINA, MAGNESIA, AND SIMETHICONE 2400; 2400; 240 MG/30ML; MG/30ML; MG/30ML
15 SUSPENSION ORAL EVERY 6 HOURS PRN
Status: DISCONTINUED | OUTPATIENT
Start: 2020-12-26 | End: 2020-12-30 | Stop reason: HOSPADM

## 2020-12-26 RX ADMIN — INSULIN LISPRO 2 UNITS: 100 INJECTION, SOLUTION INTRAVENOUS; SUBCUTANEOUS at 18:19

## 2020-12-26 RX ADMIN — CEFEPIME HYDROCHLORIDE 2 G: 2 INJECTION, POWDER, FOR SOLUTION INTRAVENOUS at 12:47

## 2020-12-26 RX ADMIN — Medication 3 ML: at 12:37

## 2020-12-26 RX ADMIN — FAMOTIDINE 20 MG: 10 INJECTION INTRAVENOUS at 12:21

## 2020-12-26 RX ADMIN — VANCOMYCIN HYDROCHLORIDE 1750 MG: 10 INJECTION, POWDER, LYOPHILIZED, FOR SOLUTION INTRAVENOUS at 12:44

## 2020-12-26 RX ADMIN — HEPARIN SODIUM 11 UNITS/KG/HR: 10000 INJECTION, SOLUTION INTRAVENOUS at 12:20

## 2020-12-26 RX ADMIN — CEFEPIME HYDROCHLORIDE 2 G: 2 INJECTION, POWDER, FOR SOLUTION INTRAVENOUS at 20:46

## 2020-12-26 RX ADMIN — INSULIN LISPRO 3 UNITS: 100 INJECTION, SOLUTION INTRAVENOUS; SUBCUTANEOUS at 12:48

## 2020-12-26 RX ADMIN — FAMOTIDINE 20 MG: 10 INJECTION INTRAVENOUS at 20:49

## 2020-12-26 RX ADMIN — Medication 3 ML: at 20:45

## 2020-12-26 RX ADMIN — Medication 3 ML: at 20:46

## 2020-12-26 RX ADMIN — PROPOFOL 20 MCG/KG/MIN: 10 INJECTION, EMULSION INTRAVENOUS at 15:33

## 2020-12-26 RX ADMIN — Medication 3 ML: at 12:21

## 2020-12-26 RX ADMIN — BUDESONIDE 0.5 MG: 0.5 INHALANT RESPIRATORY (INHALATION) at 18:57

## 2020-12-26 RX ADMIN — PROPOFOL 20 MCG/KG/MIN: 10 INJECTION, EMULSION INTRAVENOUS at 18:19

## 2020-12-26 RX ADMIN — IPRATROPIUM BROMIDE AND ALBUTEROL SULFATE 3 ML: 2.5; .5 SOLUTION RESPIRATORY (INHALATION) at 18:57

## 2020-12-26 RX ADMIN — IPRATROPIUM BROMIDE AND ALBUTEROL SULFATE 3 ML: 2.5; .5 SOLUTION RESPIRATORY (INHALATION) at 13:10

## 2020-12-27 ENCOUNTER — APPOINTMENT (OUTPATIENT)
Dept: GENERAL RADIOLOGY | Facility: HOSPITAL | Age: 60
End: 2020-12-27

## 2020-12-27 LAB
ANION GAP SERPL CALCULATED.3IONS-SCNC: 8 MMOL/L (ref 5–15)
APTT PPP: 57.2 SECONDS (ref 61–76.5)
APTT PPP: 59.1 SECONDS (ref 61–76.5)
APTT PPP: 59.9 SECONDS (ref 61–76.5)
APTT PPP: 61.7 SECONDS (ref 61–76.5)
ARTERIAL PATENCY WRIST A: POSITIVE
ARTERIAL PATENCY WRIST A: POSITIVE
ATMOSPHERIC PRESS: ABNORMAL MM[HG]
ATMOSPHERIC PRESS: ABNORMAL MM[HG]
BASE EXCESS BLDA CALC-SCNC: 0.3 MMOL/L (ref 0–3)
BASE EXCESS BLDA CALC-SCNC: 1 MMOL/L (ref 0–3)
BASOPHILS # BLD AUTO: 0.1 10*3/MM3 (ref 0–0.2)
BASOPHILS NFR BLD AUTO: 0.5 % (ref 0–1.5)
BDY SITE: ABNORMAL
BDY SITE: ABNORMAL
BUN SERPL-MCNC: 23 MG/DL (ref 8–23)
BUN/CREAT SERPL: 21.1 (ref 7–25)
CALCIUM SPEC-SCNC: 8.9 MG/DL (ref 8.6–10.5)
CHLORIDE SERPL-SCNC: 110 MMOL/L (ref 98–107)
CHOLEST SERPL-MCNC: 111 MG/DL (ref 0–200)
CO2 BLDA-SCNC: 27 MMOL/L (ref 22–29)
CO2 BLDA-SCNC: 27.1 MMOL/L (ref 22–29)
CO2 SERPL-SCNC: 23 MMOL/L (ref 22–29)
CREAT SERPL-MCNC: 1.09 MG/DL (ref 0.76–1.27)
DEPRECATED RDW RBC AUTO: 49.4 FL (ref 37–54)
EOSINOPHIL # BLD AUTO: 0 10*3/MM3 (ref 0–0.4)
EOSINOPHIL NFR BLD AUTO: 0 % (ref 0.3–6.2)
ERYTHROCYTE [DISTWIDTH] IN BLOOD BY AUTOMATED COUNT: 15.1 % (ref 12.3–15.4)
GFR SERPL CREATININE-BSD FRML MDRD: 69 ML/MIN/1.73
GLUCOSE BLDC GLUCOMTR-MCNC: 202 MG/DL (ref 70–105)
GLUCOSE BLDC GLUCOMTR-MCNC: 91 MG/DL (ref 70–105)
GLUCOSE SERPL-MCNC: 90 MG/DL (ref 65–99)
HCO3 BLDA-SCNC: 25.7 MMOL/L (ref 21–28)
HCO3 BLDA-SCNC: 25.8 MMOL/L (ref 21–28)
HCT VFR BLD AUTO: 36 % (ref 37.5–51)
HDLC SERPL-MCNC: 40 MG/DL (ref 40–60)
HEMODILUTION: NO
HEMODILUTION: NO
HGB BLD-MCNC: 11.7 G/DL (ref 13–17.7)
INHALED O2 CONCENTRATION: 40 %
INHALED O2 CONCENTRATION: 40 %
LDLC SERPL CALC-MCNC: 49 MG/DL (ref 0–100)
LDLC/HDLC SERPL: 1.16 {RATIO}
LYMPHOCYTES # BLD AUTO: 1.8 10*3/MM3 (ref 0.7–3.1)
LYMPHOCYTES NFR BLD AUTO: 14.5 % (ref 19.6–45.3)
MAGNESIUM SERPL-MCNC: 1.5 MG/DL (ref 1.6–2.4)
MCH RBC QN AUTO: 30.3 PG (ref 26.6–33)
MCHC RBC AUTO-ENTMCNC: 32.4 G/DL (ref 31.5–35.7)
MCV RBC AUTO: 93.5 FL (ref 79–97)
MODALITY: ABNORMAL
MODALITY: ABNORMAL
MONOCYTES # BLD AUTO: 1 10*3/MM3 (ref 0.1–0.9)
MONOCYTES NFR BLD AUTO: 7.7 % (ref 5–12)
NEUTROPHILS NFR BLD AUTO: 77.3 % (ref 42.7–76)
NEUTROPHILS NFR BLD AUTO: 9.6 10*3/MM3 (ref 1.7–7)
NRBC BLD AUTO-RTO: 0.1 /100 WBC (ref 0–0.2)
PCO2 BLDA: 40.5 MM HG (ref 35–48)
PCO2 BLDA: 43.8 MM HG (ref 35–48)
PEEP RESPIRATORY: 5 CM[H2O]
PEEP RESPIRATORY: 5 CM[H2O]
PH BLDA: 7.38 PH UNITS (ref 7.35–7.45)
PH BLDA: 7.41 PH UNITS (ref 7.35–7.45)
PHOSPHATE SERPL-MCNC: 3.7 MG/DL (ref 2.5–4.5)
PLATELET # BLD AUTO: 122 10*3/MM3 (ref 140–450)
PMV BLD AUTO: 10 FL (ref 6–12)
PO2 BLDA: 152.9 MM HG (ref 83–108)
PO2 BLDA: 154.4 MM HG (ref 83–108)
POTASSIUM SERPL-SCNC: 4.1 MMOL/L (ref 3.5–5.2)
PSV: 10 CMH2O
RBC # BLD AUTO: 3.85 10*6/MM3 (ref 4.14–5.8)
RESPIRATORY RATE: 24
SAO2 % BLDCOA: 99.3 % (ref 94–98)
SAO2 % BLDCOA: 99.4 % (ref 94–98)
SODIUM SERPL-SCNC: 141 MMOL/L (ref 136–145)
TRIGL SERPL-MCNC: 124 MG/DL (ref 0–150)
TROPONIN T SERPL-MCNC: 0.08 NG/ML (ref 0–0.03)
VENTILATOR MODE: ABNORMAL
VENTILATOR MODE: ABNORMAL
VLDLC SERPL-MCNC: 22 MG/DL (ref 5–40)
VT ON VENT VENT: 600 ML
WBC # BLD AUTO: 12.5 10*3/MM3 (ref 3.4–10.8)

## 2020-12-27 PROCEDURE — 82803 BLOOD GASES ANY COMBINATION: CPT

## 2020-12-27 PROCEDURE — 80061 LIPID PANEL: CPT | Performed by: NURSE PRACTITIONER

## 2020-12-27 PROCEDURE — 94799 UNLISTED PULMONARY SVC/PX: CPT

## 2020-12-27 PROCEDURE — 80048 BASIC METABOLIC PNL TOTAL CA: CPT | Performed by: NURSE PRACTITIONER

## 2020-12-27 PROCEDURE — 85025 COMPLETE CBC W/AUTO DIFF WBC: CPT | Performed by: NURSE PRACTITIONER

## 2020-12-27 PROCEDURE — 25010000002 CEFEPIME PER 500 MG: Performed by: NURSE PRACTITIONER

## 2020-12-27 PROCEDURE — 84484 ASSAY OF TROPONIN QUANT: CPT | Performed by: NURSE PRACTITIONER

## 2020-12-27 PROCEDURE — 71045 X-RAY EXAM CHEST 1 VIEW: CPT

## 2020-12-27 PROCEDURE — 94003 VENT MGMT INPAT SUBQ DAY: CPT

## 2020-12-27 PROCEDURE — 25010000002 PROPOFOL 10 MG/ML EMULSION: Performed by: NURSE PRACTITIONER

## 2020-12-27 PROCEDURE — 85730 THROMBOPLASTIN TIME PARTIAL: CPT | Performed by: INTERNAL MEDICINE

## 2020-12-27 PROCEDURE — 83735 ASSAY OF MAGNESIUM: CPT | Performed by: NURSE PRACTITIONER

## 2020-12-27 PROCEDURE — 85730 THROMBOPLASTIN TIME PARTIAL: CPT | Performed by: NURSE PRACTITIONER

## 2020-12-27 PROCEDURE — 25010000002 HEPARIN (PORCINE) 25000-0.45 UT/250ML-% SOLUTION: Performed by: NURSE PRACTITIONER

## 2020-12-27 PROCEDURE — 82962 GLUCOSE BLOOD TEST: CPT

## 2020-12-27 PROCEDURE — 99233 SBSQ HOSP IP/OBS HIGH 50: CPT | Performed by: INTERNAL MEDICINE

## 2020-12-27 PROCEDURE — 36600 WITHDRAWAL OF ARTERIAL BLOOD: CPT

## 2020-12-27 PROCEDURE — 25010000002 VANCOMYCIN 10 G RECONSTITUTED SOLUTION: Performed by: NURSE PRACTITIONER

## 2020-12-27 PROCEDURE — 84100 ASSAY OF PHOSPHORUS: CPT

## 2020-12-27 PROCEDURE — 25010000002 MAGNESIUM SULFATE 2 GM/50ML SOLUTION: Performed by: NURSE PRACTITIONER

## 2020-12-27 RX ORDER — MAGNESIUM SULFATE HEPTAHYDRATE 40 MG/ML
2 INJECTION, SOLUTION INTRAVENOUS ONCE
Status: COMPLETED | OUTPATIENT
Start: 2020-12-27 | End: 2020-12-27

## 2020-12-27 RX ADMIN — IPRATROPIUM BROMIDE AND ALBUTEROL SULFATE 3 ML: 2.5; .5 SOLUTION RESPIRATORY (INHALATION) at 11:08

## 2020-12-27 RX ADMIN — Medication 3 ML: at 08:00

## 2020-12-27 RX ADMIN — FAMOTIDINE 20 MG: 10 INJECTION INTRAVENOUS at 08:00

## 2020-12-27 RX ADMIN — HEPARIN SODIUM 15 UNITS/KG/HR: 10000 INJECTION, SOLUTION INTRAVENOUS at 04:03

## 2020-12-27 RX ADMIN — HEPARIN SODIUM 16 UNITS/KG/HR: 10000 INJECTION, SOLUTION INTRAVENOUS at 20:46

## 2020-12-27 RX ADMIN — SODIUM CHLORIDE 1250 MG: 9 INJECTION, SOLUTION INTRAVENOUS at 01:40

## 2020-12-27 RX ADMIN — IPRATROPIUM BROMIDE AND ALBUTEROL SULFATE 3 ML: 2.5; .5 SOLUTION RESPIRATORY (INHALATION) at 07:23

## 2020-12-27 RX ADMIN — PROPOFOL 20 MCG/KG/MIN: 10 INJECTION, EMULSION INTRAVENOUS at 02:01

## 2020-12-27 RX ADMIN — CEFEPIME HYDROCHLORIDE 2 G: 2 INJECTION, POWDER, FOR SOLUTION INTRAVENOUS at 04:08

## 2020-12-27 RX ADMIN — MAGNESIUM SULFATE HEPTAHYDRATE 2 G: 40 INJECTION, SOLUTION INTRAVENOUS at 11:05

## 2020-12-27 RX ADMIN — CEFEPIME HYDROCHLORIDE 2 G: 2 INJECTION, POWDER, FOR SOLUTION INTRAVENOUS at 12:50

## 2020-12-27 RX ADMIN — Medication 3 ML: at 22:13

## 2020-12-27 RX ADMIN — SODIUM CHLORIDE 1250 MG: 9 INJECTION, SOLUTION INTRAVENOUS at 12:50

## 2020-12-27 RX ADMIN — Medication 3 ML: at 22:12

## 2020-12-27 RX ADMIN — FAMOTIDINE 20 MG: 10 INJECTION INTRAVENOUS at 20:45

## 2020-12-27 RX ADMIN — CEFEPIME HYDROCHLORIDE 2 G: 2 INJECTION, POWDER, FOR SOLUTION INTRAVENOUS at 20:45

## 2020-12-27 RX ADMIN — BUDESONIDE 0.5 MG: 0.5 INHALANT RESPIRATORY (INHALATION) at 07:23

## 2020-12-27 RX ADMIN — BUDESONIDE 0.5 MG: 0.5 INHALANT RESPIRATORY (INHALATION) at 20:26

## 2020-12-27 RX ADMIN — IPRATROPIUM BROMIDE AND ALBUTEROL SULFATE 3 ML: 2.5; .5 SOLUTION RESPIRATORY (INHALATION) at 20:20

## 2020-12-28 ENCOUNTER — APPOINTMENT (OUTPATIENT)
Dept: CT IMAGING | Facility: HOSPITAL | Age: 60
End: 2020-12-28

## 2020-12-28 ENCOUNTER — APPOINTMENT (OUTPATIENT)
Dept: GENERAL RADIOLOGY | Facility: HOSPITAL | Age: 60
End: 2020-12-28

## 2020-12-28 ENCOUNTER — APPOINTMENT (OUTPATIENT)
Dept: CARDIOLOGY | Facility: HOSPITAL | Age: 60
End: 2020-12-28

## 2020-12-28 LAB
ANION GAP SERPL CALCULATED.3IONS-SCNC: 8 MMOL/L (ref 5–15)
APTT PPP: 55.8 SECONDS (ref 61–76.5)
APTT PPP: 59.4 SECONDS (ref 61–76.5)
APTT PPP: 69.1 SECONDS (ref 61–76.5)
BACTERIA SPEC AEROBE CULT: ABNORMAL
BACTERIA SPEC AEROBE CULT: ABNORMAL
BASOPHILS # BLD AUTO: 0.1 10*3/MM3 (ref 0–0.2)
BASOPHILS NFR BLD AUTO: 0.6 % (ref 0–1.5)
BH CV LOWER VASCULAR LEFT COMMON FEMORAL AUGMENT: NORMAL
BH CV LOWER VASCULAR LEFT COMMON FEMORAL COMPETENT: NORMAL
BH CV LOWER VASCULAR LEFT COMMON FEMORAL COMPRESS: NORMAL
BH CV LOWER VASCULAR LEFT COMMON FEMORAL PHASIC: NORMAL
BH CV LOWER VASCULAR LEFT COMMON FEMORAL SPONT: NORMAL
BH CV LOWER VASCULAR LEFT DISTAL FEMORAL COMPRESS: NORMAL
BH CV LOWER VASCULAR LEFT GASTRONEMIUS COMPRESS: NORMAL
BH CV LOWER VASCULAR LEFT GREATER SAPH BK COMPRESS: NORMAL
BH CV LOWER VASCULAR LEFT LESSER SAPH COMPRESS: NORMAL
BH CV LOWER VASCULAR LEFT MID FEMORAL AUGMENT: NORMAL
BH CV LOWER VASCULAR LEFT MID FEMORAL COMPETENT: NORMAL
BH CV LOWER VASCULAR LEFT MID FEMORAL COMPRESS: NORMAL
BH CV LOWER VASCULAR LEFT MID FEMORAL PHASIC: NORMAL
BH CV LOWER VASCULAR LEFT MID FEMORAL SPONT: NORMAL
BH CV LOWER VASCULAR LEFT PERONEAL COMPRESS: NORMAL
BH CV LOWER VASCULAR LEFT POPLITEAL AUGMENT: NORMAL
BH CV LOWER VASCULAR LEFT POPLITEAL COMPETENT: NORMAL
BH CV LOWER VASCULAR LEFT POPLITEAL COMPRESS: NORMAL
BH CV LOWER VASCULAR LEFT POPLITEAL PHASIC: NORMAL
BH CV LOWER VASCULAR LEFT POPLITEAL SPONT: NORMAL
BH CV LOWER VASCULAR LEFT POSTERIOR TIBIAL COMPRESS: NORMAL
BH CV LOWER VASCULAR LEFT PROXIMAL FEMORAL COMPRESS: NORMAL
BH CV LOWER VASCULAR LEFT SAPHENOFEMORAL JUNCTION COMPRESS: NORMAL
BH CV LOWER VASCULAR RIGHT COMMON FEMORAL AUGMENT: NORMAL
BH CV LOWER VASCULAR RIGHT COMMON FEMORAL COMPETENT: NORMAL
BH CV LOWER VASCULAR RIGHT COMMON FEMORAL COMPRESS: NORMAL
BH CV LOWER VASCULAR RIGHT COMMON FEMORAL PHASIC: NORMAL
BH CV LOWER VASCULAR RIGHT COMMON FEMORAL SPONT: NORMAL
BH CV LOWER VASCULAR RIGHT DISTAL FEMORAL COMPRESS: NORMAL
BH CV LOWER VASCULAR RIGHT GASTRONEMIUS COMPRESS: NORMAL
BH CV LOWER VASCULAR RIGHT GREATER SAPH BK COMPRESS: NORMAL
BH CV LOWER VASCULAR RIGHT LESSER SAPH COMPRESS: NORMAL
BH CV LOWER VASCULAR RIGHT MID FEMORAL AUGMENT: NORMAL
BH CV LOWER VASCULAR RIGHT MID FEMORAL COMPETENT: NORMAL
BH CV LOWER VASCULAR RIGHT MID FEMORAL COMPRESS: NORMAL
BH CV LOWER VASCULAR RIGHT MID FEMORAL PHASIC: NORMAL
BH CV LOWER VASCULAR RIGHT MID FEMORAL SPONT: NORMAL
BH CV LOWER VASCULAR RIGHT PERONEAL COMPRESS: NORMAL
BH CV LOWER VASCULAR RIGHT POPLITEAL AUGMENT: NORMAL
BH CV LOWER VASCULAR RIGHT POPLITEAL COMPETENT: NORMAL
BH CV LOWER VASCULAR RIGHT POPLITEAL COMPRESS: NORMAL
BH CV LOWER VASCULAR RIGHT POPLITEAL PHASIC: NORMAL
BH CV LOWER VASCULAR RIGHT POPLITEAL SPONT: NORMAL
BH CV LOWER VASCULAR RIGHT POSTERIOR TIBIAL COMPRESS: NORMAL
BH CV LOWER VASCULAR RIGHT PROXIMAL FEMORAL COMPRESS: NORMAL
BH CV LOWER VASCULAR RIGHT SAPHENOFEMORAL JUNCTION COMPRESS: NORMAL
BUN SERPL-MCNC: 19 MG/DL (ref 8–23)
BUN/CREAT SERPL: 16.4 (ref 7–25)
CALCIUM SPEC-SCNC: 8.9 MG/DL (ref 8.6–10.5)
CHLORIDE SERPL-SCNC: 108 MMOL/L (ref 98–107)
CO2 SERPL-SCNC: 27 MMOL/L (ref 22–29)
CREAT SERPL-MCNC: 1.16 MG/DL (ref 0.76–1.27)
DEPRECATED RDW RBC AUTO: 49.4 FL (ref 37–54)
EOSINOPHIL # BLD AUTO: 0.1 10*3/MM3 (ref 0–0.4)
EOSINOPHIL NFR BLD AUTO: 1.3 % (ref 0.3–6.2)
ERYTHROCYTE [DISTWIDTH] IN BLOOD BY AUTOMATED COUNT: 15 % (ref 12.3–15.4)
GFR SERPL CREATININE-BSD FRML MDRD: 64 ML/MIN/1.73
GLUCOSE BLDC GLUCOMTR-MCNC: 119 MG/DL (ref 70–105)
GLUCOSE BLDC GLUCOMTR-MCNC: 161 MG/DL (ref 70–105)
GLUCOSE BLDC GLUCOMTR-MCNC: 167 MG/DL (ref 70–105)
GLUCOSE BLDC GLUCOMTR-MCNC: 178 MG/DL (ref 70–105)
GLUCOSE SERPL-MCNC: 110 MG/DL (ref 65–99)
GRAM STN SPEC: ABNORMAL
GRAM STN SPEC: ABNORMAL
HCT VFR BLD AUTO: 36.8 % (ref 37.5–51)
HGB BLD-MCNC: 12 G/DL (ref 13–17.7)
LYMPHOCYTES # BLD AUTO: 2.2 10*3/MM3 (ref 0.7–3.1)
LYMPHOCYTES NFR BLD AUTO: 23.2 % (ref 19.6–45.3)
MAGNESIUM SERPL-MCNC: 2 MG/DL (ref 1.6–2.4)
MCH RBC QN AUTO: 30.5 PG (ref 26.6–33)
MCHC RBC AUTO-ENTMCNC: 32.7 G/DL (ref 31.5–35.7)
MCV RBC AUTO: 93.3 FL (ref 79–97)
MONOCYTES # BLD AUTO: 0.6 10*3/MM3 (ref 0.1–0.9)
MONOCYTES NFR BLD AUTO: 6 % (ref 5–12)
NEUTROPHILS NFR BLD AUTO: 6.7 10*3/MM3 (ref 1.7–7)
NEUTROPHILS NFR BLD AUTO: 68.9 % (ref 42.7–76)
NRBC BLD AUTO-RTO: 0 /100 WBC (ref 0–0.2)
PHOSPHATE SERPL-MCNC: 3.1 MG/DL (ref 2.5–4.5)
PLATELET # BLD AUTO: 118 10*3/MM3 (ref 140–450)
PMV BLD AUTO: 10.4 FL (ref 6–12)
POTASSIUM SERPL-SCNC: 4.3 MMOL/L (ref 3.5–5.2)
RBC # BLD AUTO: 3.95 10*6/MM3 (ref 4.14–5.8)
SODIUM SERPL-SCNC: 143 MMOL/L (ref 136–145)
VANCOMYCIN PEAK SERPL-MCNC: 24.4 MCG/ML (ref 20–40)
WBC # BLD AUTO: 9.7 10*3/MM3 (ref 3.4–10.8)

## 2020-12-28 PROCEDURE — 83735 ASSAY OF MAGNESIUM: CPT | Performed by: NURSE PRACTITIONER

## 2020-12-28 PROCEDURE — 80202 ASSAY OF VANCOMYCIN: CPT | Performed by: NURSE PRACTITIONER

## 2020-12-28 PROCEDURE — 94799 UNLISTED PULMONARY SVC/PX: CPT

## 2020-12-28 PROCEDURE — 97162 PT EVAL MOD COMPLEX 30 MIN: CPT

## 2020-12-28 PROCEDURE — 25010000002 VANCOMYCIN 10 G RECONSTITUTED SOLUTION: Performed by: NURSE PRACTITIONER

## 2020-12-28 PROCEDURE — 85730 THROMBOPLASTIN TIME PARTIAL: CPT | Performed by: INTERNAL MEDICINE

## 2020-12-28 PROCEDURE — 71275 CT ANGIOGRAPHY CHEST: CPT

## 2020-12-28 PROCEDURE — 82962 GLUCOSE BLOOD TEST: CPT

## 2020-12-28 PROCEDURE — 93970 EXTREMITY STUDY: CPT

## 2020-12-28 PROCEDURE — 71045 X-RAY EXAM CHEST 1 VIEW: CPT

## 2020-12-28 PROCEDURE — 99233 SBSQ HOSP IP/OBS HIGH 50: CPT | Performed by: INTERNAL MEDICINE

## 2020-12-28 PROCEDURE — 0 IOPAMIDOL PER 1 ML: Performed by: INTERNAL MEDICINE

## 2020-12-28 PROCEDURE — 85025 COMPLETE CBC W/AUTO DIFF WBC: CPT | Performed by: NURSE PRACTITIONER

## 2020-12-28 PROCEDURE — 25010000002 HEPARIN (PORCINE) 25000-0.45 UT/250ML-% SOLUTION: Performed by: NURSE PRACTITIONER

## 2020-12-28 PROCEDURE — 25010000002 CEFEPIME PER 500 MG: Performed by: NURSE PRACTITIONER

## 2020-12-28 PROCEDURE — 80048 BASIC METABOLIC PNL TOTAL CA: CPT | Performed by: NURSE PRACTITIONER

## 2020-12-28 PROCEDURE — 84100 ASSAY OF PHOSPHORUS: CPT

## 2020-12-28 PROCEDURE — 63710000001 INSULIN LISPRO (HUMAN) PER 5 UNITS: Performed by: INTERNAL MEDICINE

## 2020-12-28 RX ORDER — SULFAMETHOXAZOLE AND TRIMETHOPRIM 800; 160 MG/1; MG/1
2 TABLET ORAL EVERY 12 HOURS SCHEDULED
Status: DISCONTINUED | OUTPATIENT
Start: 2020-12-28 | End: 2020-12-30 | Stop reason: HOSPADM

## 2020-12-28 RX ORDER — INSULIN LISPRO 100 [IU]/ML
0-7 INJECTION, SOLUTION INTRAVENOUS; SUBCUTANEOUS
Status: DISCONTINUED | OUTPATIENT
Start: 2020-12-28 | End: 2020-12-30 | Stop reason: HOSPADM

## 2020-12-28 RX ORDER — INSULIN LISPRO 100 [IU]/ML
0-7 INJECTION, SOLUTION INTRAVENOUS; SUBCUTANEOUS AS NEEDED
Status: DISCONTINUED | OUTPATIENT
Start: 2020-12-28 | End: 2020-12-30 | Stop reason: HOSPADM

## 2020-12-28 RX ORDER — SULFAMETHOXAZOLE AND TRIMETHOPRIM 800; 160 MG/1; MG/1
1 TABLET ORAL EVERY 12 HOURS SCHEDULED
Status: DISCONTINUED | OUTPATIENT
Start: 2020-12-28 | End: 2020-12-28

## 2020-12-28 RX ADMIN — Medication 3 ML: at 20:05

## 2020-12-28 RX ADMIN — COLLAGENASE SANTYL 1 APPLICATION: 250 OINTMENT TOPICAL at 13:01

## 2020-12-28 RX ADMIN — IPRATROPIUM BROMIDE AND ALBUTEROL SULFATE 3 ML: 2.5; .5 SOLUTION RESPIRATORY (INHALATION) at 12:45

## 2020-12-28 RX ADMIN — IOPAMIDOL 100 ML: 755 INJECTION, SOLUTION INTRAVENOUS at 21:48

## 2020-12-28 RX ADMIN — INSULIN LISPRO 2 UNITS: 100 INJECTION, SOLUTION INTRAVENOUS; SUBCUTANEOUS at 11:34

## 2020-12-28 RX ADMIN — Medication 3 ML: at 08:10

## 2020-12-28 RX ADMIN — Medication 600 MG: at 11:34

## 2020-12-28 RX ADMIN — IPRATROPIUM BROMIDE AND ALBUTEROL SULFATE 3 ML: 2.5; .5 SOLUTION RESPIRATORY (INHALATION) at 07:51

## 2020-12-28 RX ADMIN — HEPARIN SODIUM 17 UNITS/KG/HR: 10000 INJECTION, SOLUTION INTRAVENOUS at 11:39

## 2020-12-28 RX ADMIN — Medication 600 MG: at 20:04

## 2020-12-28 RX ADMIN — INSULIN LISPRO 2 UNITS: 100 INJECTION, SOLUTION INTRAVENOUS; SUBCUTANEOUS at 20:10

## 2020-12-28 RX ADMIN — SODIUM CHLORIDE 1250 MG: 9 INJECTION, SOLUTION INTRAVENOUS at 02:00

## 2020-12-28 RX ADMIN — Medication: at 06:21

## 2020-12-28 RX ADMIN — CEFEPIME HYDROCHLORIDE 2 G: 2 INJECTION, POWDER, FOR SOLUTION INTRAVENOUS at 05:34

## 2020-12-28 RX ADMIN — BUDESONIDE 0.5 MG: 0.5 INHALANT RESPIRATORY (INHALATION) at 20:37

## 2020-12-28 RX ADMIN — Medication 3 ML: at 20:07

## 2020-12-28 RX ADMIN — FAMOTIDINE 20 MG: 10 INJECTION INTRAVENOUS at 20:04

## 2020-12-28 RX ADMIN — IPRATROPIUM BROMIDE AND ALBUTEROL SULFATE 3 ML: 2.5; .5 SOLUTION RESPIRATORY (INHALATION) at 20:37

## 2020-12-28 RX ADMIN — INSULIN LISPRO 2 UNITS: 100 INJECTION, SOLUTION INTRAVENOUS; SUBCUTANEOUS at 17:30

## 2020-12-28 RX ADMIN — FAMOTIDINE 20 MG: 10 INJECTION INTRAVENOUS at 08:10

## 2020-12-28 RX ADMIN — BUDESONIDE 0.5 MG: 0.5 INHALANT RESPIRATORY (INHALATION) at 07:52

## 2020-12-28 RX ADMIN — SULFAMETHOXAZOLE AND TRIMETHOPRIM 320 MG: 800; 160 TABLET ORAL at 20:04

## 2020-12-28 RX ADMIN — SULFAMETHOXAZOLE AND TRIMETHOPRIM 320 MG: 800; 160 TABLET ORAL at 13:00

## 2020-12-29 PROBLEM — J43.1 PANLOBULAR EMPHYSEMA: Chronic | Status: ACTIVE | Noted: 2019-09-10

## 2020-12-29 PROBLEM — I20.0 UNSTABLE ANGINA: Status: RESOLVED | Noted: 2020-05-05 | Resolved: 2020-12-29

## 2020-12-29 PROBLEM — E66.3 OVERWEIGHT (BMI 25.0-29.9): Chronic | Status: ACTIVE | Noted: 2019-06-26

## 2020-12-29 PROBLEM — S91.302A OPEN WOUND OF LEFT FOOT: Status: ACTIVE | Noted: 2020-12-29

## 2020-12-29 PROBLEM — J96.01 ACUTE HYPOXEMIC RESPIRATORY FAILURE (HCC): Status: RESOLVED | Noted: 2020-12-26 | Resolved: 2020-12-29

## 2020-12-29 PROBLEM — E11.9 TYPE 2 DIABETES MELLITUS WITHOUT COMPLICATION, WITH LONG-TERM CURRENT USE OF INSULIN: Chronic | Status: ACTIVE | Noted: 2020-05-05

## 2020-12-29 PROBLEM — A41.9 SEPSIS: Status: RESOLVED | Noted: 2020-12-26 | Resolved: 2020-12-29

## 2020-12-29 PROBLEM — J96.01 ACUTE HYPOXEMIC RESPIRATORY FAILURE (HCC): Status: RESOLVED | Noted: 2020-12-12 | Resolved: 2020-12-29

## 2020-12-29 PROBLEM — F32.A DEPRESSION: Chronic | Status: ACTIVE | Noted: 2020-12-29

## 2020-12-29 LAB
ANION GAP SERPL CALCULATED.3IONS-SCNC: 9 MMOL/L (ref 5–15)
BASOPHILS # BLD AUTO: 0 10*3/MM3 (ref 0–0.2)
BASOPHILS NFR BLD AUTO: 0.4 % (ref 0–1.5)
BUN SERPL-MCNC: 17 MG/DL (ref 8–23)
BUN/CREAT SERPL: 15.7 (ref 7–25)
CALCIUM SPEC-SCNC: 9.2 MG/DL (ref 8.6–10.5)
CHLORIDE SERPL-SCNC: 104 MMOL/L (ref 98–107)
CO2 SERPL-SCNC: 26 MMOL/L (ref 22–29)
CREAT SERPL-MCNC: 1.08 MG/DL (ref 0.76–1.27)
DEPRECATED RDW RBC AUTO: 47.3 FL (ref 37–54)
EOSINOPHIL # BLD AUTO: 0.1 10*3/MM3 (ref 0–0.4)
EOSINOPHIL NFR BLD AUTO: 1.3 % (ref 0.3–6.2)
ERYTHROCYTE [DISTWIDTH] IN BLOOD BY AUTOMATED COUNT: 14.7 % (ref 12.3–15.4)
GFR SERPL CREATININE-BSD FRML MDRD: 70 ML/MIN/1.73
GLUCOSE BLDC GLUCOMTR-MCNC: 117 MG/DL (ref 70–105)
GLUCOSE BLDC GLUCOMTR-MCNC: 126 MG/DL (ref 70–105)
GLUCOSE BLDC GLUCOMTR-MCNC: 144 MG/DL (ref 70–105)
GLUCOSE BLDC GLUCOMTR-MCNC: 145 MG/DL (ref 70–105)
GLUCOSE BLDC GLUCOMTR-MCNC: 146 MG/DL (ref 70–105)
GLUCOSE BLDC GLUCOMTR-MCNC: 160 MG/DL (ref 70–105)
GLUCOSE SERPL-MCNC: 111 MG/DL (ref 65–99)
HCT VFR BLD AUTO: 35.9 % (ref 37.5–51)
HGB BLD-MCNC: 11.9 G/DL (ref 13–17.7)
LYMPHOCYTES # BLD AUTO: 1.4 10*3/MM3 (ref 0.7–3.1)
LYMPHOCYTES NFR BLD AUTO: 16.9 % (ref 19.6–45.3)
MAGNESIUM SERPL-MCNC: 1.9 MG/DL (ref 1.6–2.4)
MCH RBC QN AUTO: 30.7 PG (ref 26.6–33)
MCHC RBC AUTO-ENTMCNC: 33.2 G/DL (ref 31.5–35.7)
MCV RBC AUTO: 92.6 FL (ref 79–97)
MONOCYTES # BLD AUTO: 0.6 10*3/MM3 (ref 0.1–0.9)
MONOCYTES NFR BLD AUTO: 6.8 % (ref 5–12)
NEUTROPHILS NFR BLD AUTO: 6.2 10*3/MM3 (ref 1.7–7)
NEUTROPHILS NFR BLD AUTO: 74.6 % (ref 42.7–76)
NRBC BLD AUTO-RTO: 0 /100 WBC (ref 0–0.2)
PHOSPHATE SERPL-MCNC: 3.2 MG/DL (ref 2.5–4.5)
PLATELET # BLD AUTO: 115 10*3/MM3 (ref 140–450)
PMV BLD AUTO: 10.3 FL (ref 6–12)
POTASSIUM SERPL-SCNC: 4.2 MMOL/L (ref 3.5–5.2)
RBC # BLD AUTO: 3.87 10*6/MM3 (ref 4.14–5.8)
SODIUM SERPL-SCNC: 139 MMOL/L (ref 136–145)
WBC # BLD AUTO: 8.3 10*3/MM3 (ref 3.4–10.8)

## 2020-12-29 PROCEDURE — 99223 1ST HOSP IP/OBS HIGH 75: CPT | Performed by: INTERNAL MEDICINE

## 2020-12-29 PROCEDURE — 82962 GLUCOSE BLOOD TEST: CPT

## 2020-12-29 PROCEDURE — 94799 UNLISTED PULMONARY SVC/PX: CPT

## 2020-12-29 PROCEDURE — 63710000001 INSULIN GLARGINE PER 5 UNITS: Performed by: NURSE PRACTITIONER

## 2020-12-29 PROCEDURE — 99232 SBSQ HOSP IP/OBS MODERATE 35: CPT | Performed by: INTERNAL MEDICINE

## 2020-12-29 PROCEDURE — 84100 ASSAY OF PHOSPHORUS: CPT

## 2020-12-29 PROCEDURE — 83735 ASSAY OF MAGNESIUM: CPT | Performed by: NURSE PRACTITIONER

## 2020-12-29 PROCEDURE — 80048 BASIC METABOLIC PNL TOTAL CA: CPT | Performed by: NURSE PRACTITIONER

## 2020-12-29 PROCEDURE — 63710000001 INSULIN LISPRO (HUMAN) PER 5 UNITS: Performed by: INTERNAL MEDICINE

## 2020-12-29 PROCEDURE — 85025 COMPLETE CBC W/AUTO DIFF WBC: CPT | Performed by: NURSE PRACTITIONER

## 2020-12-29 RX ORDER — CARVEDILOL 3.12 MG/1
3.12 TABLET ORAL 2 TIMES DAILY WITH MEALS
Status: DISCONTINUED | OUTPATIENT
Start: 2020-12-29 | End: 2020-12-30 | Stop reason: HOSPADM

## 2020-12-29 RX ORDER — ALBUTEROL SULFATE 90 UG/1
2 AEROSOL, METERED RESPIRATORY (INHALATION) EVERY 4 HOURS PRN
Status: DISCONTINUED | OUTPATIENT
Start: 2020-12-29 | End: 2020-12-30 | Stop reason: HOSPADM

## 2020-12-29 RX ORDER — SPIRONOLACTONE 25 MG/1
25 TABLET ORAL DAILY
Status: DISCONTINUED | OUTPATIENT
Start: 2020-12-29 | End: 2020-12-30 | Stop reason: HOSPADM

## 2020-12-29 RX ORDER — FUROSEMIDE 40 MG/1
40 TABLET ORAL
Status: DISCONTINUED | OUTPATIENT
Start: 2020-12-29 | End: 2020-12-30 | Stop reason: HOSPADM

## 2020-12-29 RX ORDER — ATORVASTATIN CALCIUM 40 MG/1
40 TABLET, FILM COATED ORAL NIGHTLY
Status: DISCONTINUED | OUTPATIENT
Start: 2020-12-29 | End: 2020-12-30 | Stop reason: HOSPADM

## 2020-12-29 RX ORDER — DULOXETIN HYDROCHLORIDE 30 MG/1
60 CAPSULE, DELAYED RELEASE ORAL DAILY
Status: DISCONTINUED | OUTPATIENT
Start: 2020-12-29 | End: 2020-12-30 | Stop reason: HOSPADM

## 2020-12-29 RX ORDER — CLOPIDOGREL BISULFATE 75 MG/1
75 TABLET ORAL DAILY
Status: DISCONTINUED | OUTPATIENT
Start: 2020-12-29 | End: 2020-12-30 | Stop reason: HOSPADM

## 2020-12-29 RX ORDER — ASPIRIN 325 MG
325 TABLET ORAL DAILY
Status: DISCONTINUED | OUTPATIENT
Start: 2020-12-29 | End: 2020-12-30 | Stop reason: HOSPADM

## 2020-12-29 RX ORDER — METOLAZONE 5 MG/1
5 TABLET ORAL DAILY
Status: DISCONTINUED | OUTPATIENT
Start: 2020-12-29 | End: 2020-12-30 | Stop reason: HOSPADM

## 2020-12-29 RX ORDER — PANTOPRAZOLE SODIUM 40 MG/1
40 TABLET, DELAYED RELEASE ORAL DAILY
Status: DISCONTINUED | OUTPATIENT
Start: 2020-12-30 | End: 2020-12-30 | Stop reason: HOSPADM

## 2020-12-29 RX ORDER — LISINOPRIL 5 MG/1
2.5 TABLET ORAL DAILY
Status: DISCONTINUED | OUTPATIENT
Start: 2020-12-29 | End: 2020-12-30 | Stop reason: HOSPADM

## 2020-12-29 RX ORDER — INSULIN GLARGINE 100 [IU]/ML
10 INJECTION, SOLUTION SUBCUTANEOUS NIGHTLY
Status: DISCONTINUED | OUTPATIENT
Start: 2020-12-29 | End: 2020-12-30 | Stop reason: HOSPADM

## 2020-12-29 RX ADMIN — CARVEDILOL 3.12 MG: 3.12 TABLET, FILM COATED ORAL at 18:22

## 2020-12-29 RX ADMIN — Medication 600 MG: at 08:29

## 2020-12-29 RX ADMIN — SULFAMETHOXAZOLE AND TRIMETHOPRIM 320 MG: 800; 160 TABLET ORAL at 08:34

## 2020-12-29 RX ADMIN — Medication 3 ML: at 20:37

## 2020-12-29 RX ADMIN — INSULIN GLARGINE 10 UNITS: 100 INJECTION, SOLUTION SUBCUTANEOUS at 20:36

## 2020-12-29 RX ADMIN — CLOPIDOGREL BISULFATE 75 MG: 75 TABLET ORAL at 14:24

## 2020-12-29 RX ADMIN — DULOXETINE HYDROCHLORIDE 60 MG: 30 CAPSULE, DELAYED RELEASE ORAL at 14:24

## 2020-12-29 RX ADMIN — METOLAZONE 5 MG: 5 TABLET ORAL at 16:09

## 2020-12-29 RX ADMIN — COLLAGENASE SANTYL: 250 OINTMENT TOPICAL at 08:32

## 2020-12-29 RX ADMIN — INSULIN LISPRO 2 UNITS: 100 INJECTION, SOLUTION INTRAVENOUS; SUBCUTANEOUS at 20:42

## 2020-12-29 RX ADMIN — Medication 3 ML: at 08:28

## 2020-12-29 RX ADMIN — BUDESONIDE 0.5 MG: 0.5 INHALANT RESPIRATORY (INHALATION) at 18:09

## 2020-12-29 RX ADMIN — FUROSEMIDE 40 MG: 40 TABLET ORAL at 18:22

## 2020-12-29 RX ADMIN — IPRATROPIUM BROMIDE AND ALBUTEROL SULFATE 3 ML: 2.5; .5 SOLUTION RESPIRATORY (INHALATION) at 07:25

## 2020-12-29 RX ADMIN — BUDESONIDE 0.5 MG: 0.5 INHALANT RESPIRATORY (INHALATION) at 07:25

## 2020-12-29 RX ADMIN — FAMOTIDINE 20 MG: 10 INJECTION INTRAVENOUS at 08:29

## 2020-12-29 RX ADMIN — ATORVASTATIN CALCIUM 40 MG: 40 TABLET, FILM COATED ORAL at 20:37

## 2020-12-29 RX ADMIN — ASPIRIN 325 MG ORAL TABLET 325 MG: 325 PILL ORAL at 14:24

## 2020-12-29 RX ADMIN — SULFAMETHOXAZOLE AND TRIMETHOPRIM 320 MG: 800; 160 TABLET ORAL at 20:37

## 2020-12-29 RX ADMIN — SPIRONOLACTONE 25 MG: 25 TABLET ORAL at 16:09

## 2020-12-29 RX ADMIN — Medication 600 MG: at 20:37

## 2020-12-29 RX ADMIN — LISINOPRIL 2.5 MG: 5 TABLET ORAL at 14:25

## 2020-12-29 RX ADMIN — IPRATROPIUM BROMIDE AND ALBUTEROL SULFATE 3 ML: 2.5; .5 SOLUTION RESPIRATORY (INHALATION) at 18:09

## 2020-12-30 VITALS
RESPIRATION RATE: 16 BRPM | DIASTOLIC BLOOD PRESSURE: 78 MMHG | TEMPERATURE: 98.2 F | HEIGHT: 67 IN | BODY MASS INDEX: 29.24 KG/M2 | HEART RATE: 87 BPM | SYSTOLIC BLOOD PRESSURE: 135 MMHG | WEIGHT: 186.3 LBS | OXYGEN SATURATION: 92 %

## 2020-12-30 LAB
ANION GAP SERPL CALCULATED.3IONS-SCNC: 11 MMOL/L (ref 5–15)
BASOPHILS # BLD AUTO: 0 10*3/MM3 (ref 0–0.2)
BASOPHILS NFR BLD AUTO: 0.5 % (ref 0–1.5)
BUN SERPL-MCNC: 20 MG/DL (ref 8–23)
BUN/CREAT SERPL: 15.7 (ref 7–25)
CALCIUM SPEC-SCNC: 10.3 MG/DL (ref 8.6–10.5)
CHLORIDE SERPL-SCNC: 96 MMOL/L (ref 98–107)
CO2 SERPL-SCNC: 28 MMOL/L (ref 22–29)
CREAT SERPL-MCNC: 1.27 MG/DL (ref 0.76–1.27)
DEPRECATED RDW RBC AUTO: 48.6 FL (ref 37–54)
EOSINOPHIL # BLD AUTO: 0.2 10*3/MM3 (ref 0–0.4)
EOSINOPHIL NFR BLD AUTO: 1.9 % (ref 0.3–6.2)
ERYTHROCYTE [DISTWIDTH] IN BLOOD BY AUTOMATED COUNT: 15.1 % (ref 12.3–15.4)
GFR SERPL CREATININE-BSD FRML MDRD: 58 ML/MIN/1.73
GLUCOSE BLDC GLUCOMTR-MCNC: 122 MG/DL (ref 70–105)
GLUCOSE SERPL-MCNC: 99 MG/DL (ref 65–99)
HCT VFR BLD AUTO: 38 % (ref 37.5–51)
HGB BLD-MCNC: 13 G/DL (ref 13–17.7)
LYMPHOCYTES # BLD AUTO: 1.4 10*3/MM3 (ref 0.7–3.1)
LYMPHOCYTES NFR BLD AUTO: 16.1 % (ref 19.6–45.3)
MAGNESIUM SERPL-MCNC: 1.9 MG/DL (ref 1.6–2.4)
MCH RBC QN AUTO: 31.3 PG (ref 26.6–33)
MCHC RBC AUTO-ENTMCNC: 34.1 G/DL (ref 31.5–35.7)
MCV RBC AUTO: 92 FL (ref 79–97)
MONOCYTES # BLD AUTO: 0.7 10*3/MM3 (ref 0.1–0.9)
MONOCYTES NFR BLD AUTO: 8 % (ref 5–12)
NEUTROPHILS NFR BLD AUTO: 6.5 10*3/MM3 (ref 1.7–7)
NEUTROPHILS NFR BLD AUTO: 73.5 % (ref 42.7–76)
NRBC BLD AUTO-RTO: 0.1 /100 WBC (ref 0–0.2)
PHOSPHATE SERPL-MCNC: 3.5 MG/DL (ref 2.5–4.5)
PLATELET # BLD AUTO: 130 10*3/MM3 (ref 140–450)
PMV BLD AUTO: 10.5 FL (ref 6–12)
POTASSIUM SERPL-SCNC: 4.9 MMOL/L (ref 3.5–5.2)
RBC # BLD AUTO: 4.14 10*6/MM3 (ref 4.14–5.8)
SODIUM SERPL-SCNC: 135 MMOL/L (ref 136–145)
WBC # BLD AUTO: 8.9 10*3/MM3 (ref 3.4–10.8)

## 2020-12-30 PROCEDURE — 85025 COMPLETE CBC W/AUTO DIFF WBC: CPT | Performed by: NURSE PRACTITIONER

## 2020-12-30 PROCEDURE — 99239 HOSP IP/OBS DSCHRG MGMT >30: CPT | Performed by: INTERNAL MEDICINE

## 2020-12-30 PROCEDURE — 94760 N-INVAS EAR/PLS OXIMETRY 1: CPT

## 2020-12-30 PROCEDURE — 94799 UNLISTED PULMONARY SVC/PX: CPT

## 2020-12-30 PROCEDURE — 82962 GLUCOSE BLOOD TEST: CPT

## 2020-12-30 PROCEDURE — 99232 SBSQ HOSP IP/OBS MODERATE 35: CPT | Performed by: INTERNAL MEDICINE

## 2020-12-30 PROCEDURE — 84100 ASSAY OF PHOSPHORUS: CPT

## 2020-12-30 PROCEDURE — 83735 ASSAY OF MAGNESIUM: CPT | Performed by: NURSE PRACTITIONER

## 2020-12-30 PROCEDURE — 80048 BASIC METABOLIC PNL TOTAL CA: CPT | Performed by: NURSE PRACTITIONER

## 2020-12-30 RX ORDER — COLLAGENASE SANTYL 250 [ARB'U]/G
OINTMENT TOPICAL
Qty: 30 G | Refills: 0 | Status: ON HOLD | OUTPATIENT
Start: 2020-12-31 | End: 2022-04-07

## 2020-12-30 RX ORDER — SULFAMETHOXAZOLE AND TRIMETHOPRIM 800; 160 MG/1; MG/1
2 TABLET ORAL EVERY 12 HOURS SCHEDULED
Qty: 46 TABLET | Refills: 0 | Status: SHIPPED | OUTPATIENT
Start: 2020-12-30 | End: 2021-01-11

## 2020-12-30 RX ADMIN — ASPIRIN 325 MG ORAL TABLET 325 MG: 325 PILL ORAL at 09:11

## 2020-12-30 RX ADMIN — FUROSEMIDE 40 MG: 40 TABLET ORAL at 09:12

## 2020-12-30 RX ADMIN — LISINOPRIL 2.5 MG: 5 TABLET ORAL at 09:11

## 2020-12-30 RX ADMIN — METOLAZONE 5 MG: 5 TABLET ORAL at 09:12

## 2020-12-30 RX ADMIN — CLOPIDOGREL BISULFATE 75 MG: 75 TABLET ORAL at 09:11

## 2020-12-30 RX ADMIN — COLLAGENASE SANTYL: 250 OINTMENT TOPICAL at 09:14

## 2020-12-30 RX ADMIN — SPIRONOLACTONE 25 MG: 25 TABLET ORAL at 09:12

## 2020-12-30 RX ADMIN — PANTOPRAZOLE SODIUM 40 MG: 40 TABLET, DELAYED RELEASE ORAL at 09:11

## 2020-12-30 RX ADMIN — IPRATROPIUM BROMIDE AND ALBUTEROL SULFATE 3 ML: 2.5; .5 SOLUTION RESPIRATORY (INHALATION) at 07:45

## 2020-12-30 RX ADMIN — DULOXETINE HYDROCHLORIDE 60 MG: 30 CAPSULE, DELAYED RELEASE ORAL at 09:11

## 2020-12-30 RX ADMIN — Medication 10 ML: at 09:12

## 2020-12-30 RX ADMIN — CARVEDILOL 3.12 MG: 3.12 TABLET, FILM COATED ORAL at 09:12

## 2020-12-30 RX ADMIN — SULFAMETHOXAZOLE AND TRIMETHOPRIM 320 MG: 800; 160 TABLET ORAL at 09:23

## 2020-12-30 RX ADMIN — BUDESONIDE 0.5 MG: 0.5 INHALANT RESPIRATORY (INHALATION) at 07:46

## 2020-12-31 ENCOUNTER — READMISSION MANAGEMENT (OUTPATIENT)
Dept: CALL CENTER | Facility: HOSPITAL | Age: 60
End: 2020-12-31

## 2020-12-31 LAB
BACTERIA SPEC AEROBE CULT: NORMAL
BACTERIA SPEC AEROBE CULT: NORMAL

## 2020-12-31 NOTE — OUTREACH NOTE
Prep Survey      Responses   [facility]?  [facility]   Is LACE score < 7 ?  No   Emergency Room discharge w/ pulse ox?  No   Eligibility  Readm Mgmt   Discharge diagnosis  Open left foot wound with infection, chronic CHF, acute respiratory failure   Does the patient have one of the following disease processes/diagnoses(primary or secondary)?  Other   Does the patient have Home health ordered?  Yes   What is the Home health agency?   [facility]    Is there a DME ordered?  No   Comments regarding appointments  Needs f/u scheduled   Medication alerts for this patient  continue aspirin and plavix   Prep survey completed?  Yes          Leny Rendon RN

## 2021-01-05 ENCOUNTER — READMISSION MANAGEMENT (OUTPATIENT)
Dept: CALL CENTER | Facility: HOSPITAL | Age: 61
End: 2021-01-05

## 2021-01-05 NOTE — OUTREACH NOTE
Medical Week 1 Survey      Responses   [facility] [place] patient discharged from?  [facility]   Does the patient have one of the following disease processes/diagnoses(primary or secondary)?  Other   Week 1 attempt successful?  Yes   Call start time  1154   Call end time  1157   Meds reviewed with patient/caregiver?  Yes   Is the patient having any side effects they believe may be caused by any medication additions or changes?  No   Does the patient have all medications ordered at discharge?  Yes   Is the patient taking all medications as directed (includes completed medication regime)?  Yes   Does the patient have a primary care provider?   Yes   Does the patient have an appointment with their PCP within 7 days of discharge?  Greater than 7 days   What is preventing the patient from scheduling follow up appointments within 7 days of discharge?  Earlier appointment not available   Nursing Interventions  Verified appointment date/time/provider   What is the Home health agency?   [facility]    Has home health visited the patient within 72 hours of discharge?  Call prior to 72 hours   Did the patient receive a copy of their discharge instructions?  Yes   Nursing interventions  Reviewed instructions with patient   What is the patient's perception of their health status since discharge?  Improving   Is the patient/caregiver able to teach back signs and symptoms related to disease process for when to call PCP?  Yes   Is the patient/caregiver able to teach back signs and symptoms related to disease process for when to call 911?  Yes   Is the patient/caregiver able to teach back the hierarchy of who to call/visit for symptoms/problems? PCP, Specialist, Home health nurse, Urgent Care, ED, 911  Yes   If the patient is a current smoker, are they able to teach back resources for cessation?  Smoking cessation classes, Smoking cessation medications, Smoking cessation support groups   Week 1 call  completed?  Yes          Krysten Nathan LPN

## 2021-01-11 LAB — QT INTERVAL: 350 MS

## 2021-01-13 ENCOUNTER — READMISSION MANAGEMENT (OUTPATIENT)
Dept: CALL CENTER | Facility: HOSPITAL | Age: 61
End: 2021-01-13

## 2021-01-13 NOTE — OUTREACH NOTE
Medical Week 2 Survey      Responses   [facility] patient discharged from?  [facility]   Does the patient have one of the following disease processes/diagnoses(primary or secondary)?  Other   Week 2 attempt successful?  Yes   Call start time  1658   Discharge diagnosis  Open left foot wound with infection, chronic CHF, acute respiratory failure   Call end time  1703   Medication alerts for this patient  continue aspirin and plavix   Meds reviewed with patient/caregiver?  Yes   Is the patient having any side effects they believe may be caused by any medication additions or changes?  No   Does the patient have all medications ordered at discharge?  Yes   Is the patient taking all medications as directed (includes completed medication regime)?  Yes   Comments regarding appointments  He was seen by the PCP.   Does the patient have a primary care provider?   Yes   Does the patient have an appointment with their PCP within 7 days of discharge?  Greater than 7 days   What is preventing the patient from scheduling follow up appointments within 7 days of discharge?  Earlier appointment not available   Did the patient receive a copy of their discharge instructions?  Yes   Nursing interventions  Reviewed instructions with patient   What is the patient's perception of their health status since discharge?  Worsening [The Surgeon is to see him tommorrow the foot it is oozing, swollen and draining yellow brown fluid.  He is more confused. the  nurse knows. ]   Is the patient/caregiver able to teach back signs and symptoms related to disease process for when to call PCP?  Yes   Is the patient/caregiver able to teach back signs and symptoms related to disease process for when to call 911?  Yes   Is the patient/caregiver able to teach back the hierarchy of who to call/visit for symptoms/problems? PCP, Specialist, Home health nurse, Urgent Care, ED, 911  Yes   If the patient is a current smoker, are they able to teach back  resources for cessation?  Smoking cessation classes, [id number]   Additional teach back comments  He is confused, HH and the wife think he has an infection in the foot. He has an apt tommorrow. No fever.    Week 2 Call Completed?  Yes          Aranza Browning RN

## 2021-01-19 ENCOUNTER — READMISSION MANAGEMENT (OUTPATIENT)
Dept: CALL CENTER | Facility: HOSPITAL | Age: 61
End: 2021-01-19

## 2021-01-19 NOTE — OUTREACH NOTE
Medical Week 3 Survey      Responses   [facility] patient discharged from?  [facility]   Does the patient have one of the following disease processes/diagnoses(primary or secondary)?  Other   Week 3 attempt successful?  No   Unsuccessful attempts  Attempt 1          Krysten Nathan LPN

## 2021-01-21 ENCOUNTER — READMISSION MANAGEMENT (OUTPATIENT)
Dept: CALL CENTER | Facility: HOSPITAL | Age: 61
End: 2021-01-21

## 2021-01-21 NOTE — OUTREACH NOTE
Medical Week 3 Survey      Responses   [facility] patient discharged from?  [facility]   Does the patient have one of the following disease processes/diagnoses(primary or secondary)?  Other   Week 3 attempt successful?  Yes   Call start time  1543   Call end time  1547   Discharge diagnosis  Open left foot wound with infection, chronic CHF, acute respiratory failure   Medication alerts for this patient  continue aspirin and plavix   Meds reviewed with patient/caregiver?  Yes   Is the patient having any side effects they believe may be caused by any medication additions or changes?  No   Does the patient have all medications ordered at discharge?  Yes   Is the patient taking all medications as directed (includes completed medication regime)?  Yes   Does the patient have a primary care provider?   Yes   Comments regarding PCP  pcp 2 days ago surgeon on monday   Does the patient have an appointment with their PCP within 7 days of discharge?  Greater than 7 days   Has the patient kept scheduled appointments due by today?  Yes   What is the Home health agency?   [facility]    Has home health visited the patient within 72 hours of discharge?  Yes   Psychosocial issues?  No   Did the patient receive a copy of their discharge instructions?  Yes   Nursing interventions  Reviewed instructions with patient   What is the patient's perception of their health status since discharge?  Same   Is the patient/caregiver able to teach back signs and symptoms related to disease process for when to call PCP?  Yes   Is the patient/caregiver able to teach back signs and symptoms related to disease process for when to call 911?  Yes   Is the patient/caregiver able to teach back the hierarchy of who to call/visit for symptoms/problems? PCP, Specialist, Home health nurse, Urgent Care, ED, 911  Yes   If the patient is a current smoker, are they able to teach back resources for cessation?  Smoking cessation  support groups   Week 3 Call Completed?  Yes   Graduated/Revoked comments  pt doing ok but stated his foot is still swollen and draining. Sees surgeon on monday and HH coming in.          Dolly Copeland RN

## 2021-01-29 ENCOUNTER — READMISSION MANAGEMENT (OUTPATIENT)
Dept: CALL CENTER | Facility: HOSPITAL | Age: 61
End: 2021-01-29

## 2021-01-29 NOTE — OUTREACH NOTE
Medical Week 4 Survey      Responses   [facility] patient discharged from?  [facility]   Does the patient have one of the following disease processes/diagnoses(primary or secondary)?  Other   Week 4 attempt successful?  No          Krysten Nathan LPN

## 2021-02-19 ENCOUNTER — TELEPHONE (OUTPATIENT)
Dept: CARDIOLOGY | Facility: CLINIC | Age: 61
End: 2021-02-19

## 2021-02-19 NOTE — TELEPHONE ENCOUNTER
Advised patient to schedule close follow-up week after discharge on a Friday afternoon with nurse practitioner Aranza issa

## 2021-02-19 NOTE — TELEPHONE ENCOUNTER
PCP, Dr. Agarwal, is admitting patient to [facility]. She is asking if Dr. Wing would be going to see him.

## 2021-03-05 ENCOUNTER — OFFICE VISIT (OUTPATIENT)
Dept: CARDIOLOGY | Facility: CLINIC | Age: 61
End: 2021-03-05

## 2021-03-05 VITALS
SYSTOLIC BLOOD PRESSURE: 105 MMHG | DIASTOLIC BLOOD PRESSURE: 70 MMHG | BODY MASS INDEX: 28.72 KG/M2 | OXYGEN SATURATION: 99 % | HEART RATE: 80 BPM | WEIGHT: 183 LBS | HEIGHT: 67 IN | TEMPERATURE: 97.1 F

## 2021-03-05 DIAGNOSIS — F17.200 TOBACCO DEPENDENCE SYNDROME: Chronic | ICD-10-CM

## 2021-03-05 DIAGNOSIS — I25.10 CORONARY ARTERY DISEASE WITH HX OF MYOCARDIAL INFARCT W/O HX OF CABG: Chronic | ICD-10-CM

## 2021-03-05 DIAGNOSIS — Z95.810 PRESENCE OF AUTOMATIC CARDIOVERTER/DEFIBRILLATOR (AICD): Chronic | ICD-10-CM

## 2021-03-05 DIAGNOSIS — I25.5 ISCHEMIC CARDIOMYOPATHY: Chronic | ICD-10-CM

## 2021-03-05 DIAGNOSIS — I25.2 CORONARY ARTERY DISEASE WITH HX OF MYOCARDIAL INFARCT W/O HX OF CABG: Chronic | ICD-10-CM

## 2021-03-05 DIAGNOSIS — I50.22 CHRONIC SYSTOLIC CONGESTIVE HEART FAILURE (HCC): ICD-10-CM

## 2021-03-05 DIAGNOSIS — I10 BENIGN ESSENTIAL HTN: Chronic | ICD-10-CM

## 2021-03-05 DIAGNOSIS — E78.2 MULTIPLE-TYPE HYPERLIPIDEMIA: Chronic | ICD-10-CM

## 2021-03-05 DIAGNOSIS — I50.20 HEART FAILURE WITH REDUCED EJECTION FRACTION (HCC): Primary | ICD-10-CM

## 2021-03-05 PROCEDURE — 99214 OFFICE O/P EST MOD 30 MIN: CPT | Performed by: NURSE PRACTITIONER

## 2021-03-05 RX ORDER — ISOSORBIDE DINITRATE 30 MG/1
30 TABLET ORAL DAILY
Status: ON HOLD | COMMUNITY
End: 2021-05-31

## 2021-03-05 RX ORDER — BUMETANIDE 2 MG/1
2 TABLET ORAL 3 TIMES DAILY
COMMUNITY
End: 2021-07-06 | Stop reason: SDUPTHER

## 2021-03-05 NOTE — PROGRESS NOTES
"    Subjective:     Encounter Date:03/05/2021      Patient ID: Bobby Steele Jr. is a 61 y.o. male.    Chief Complaint :  Follow up for HFrEF, ischemic cardiomyopathy  Discharged from hospital 3/2/21        HPI    This is a 61-year-old white male with past medical history of     # NSTEMI  5/12/19, SHILPA to SVG to RCA and proximal LAD leading into a small diagonal, cath 5/11/2020 underwent SHILPA to SVG to RCA and native proximal LCx with Impella assistance  # CAD status post CABG X3 V  # Ischemic cardiomyopathy with EF of 35%, status post ICD 10/8/2019  # COPD, continue tobacco abuse  # Hypertension   # Hyperlipidemia  # Diabetes with hemoglobin A1c of 7.4 on 6/3/19  #Positive family for premature heart disease with father MI 53    PAD status post left great toe amputation       Who presents here today for a follow up after hospitalization at [facility].  Patient reports he was there for \"fluid removal\".  He was given IV diuretics.  Patient currently denies any chest pain, no worsening shortness of breath or edema.  He states he feels good today.  Labs reviewed from Hospitalization in December 2020 for Sepsis at that time BUN/creatitine was 20/1.27.         Patient  underwent cardiac cath 6/27/2019 which revealed patent stent to SVG to RCA patent stent in proximal LAD and small vessel disease and diagonal.   Patient has chronic dyspnea on exertion relieved with rest.  Patient is status post ICD placement 10/8/2019 .  Patient had repeat catheter 11/20/2018 SHILPA to SVG to RCA and native proximal LCx with Impella back    Last echocardiogram 12/26/2020  · Left ventricular ejection fraction appears to be 36 - 40%.  · The right ventricular cavity is mildly dilated.      Patient's arterial blood pressure is 105/70, heart rate 80, O2 sat of 99% on room       ASSESSMENT:   chronic CHF, ischemic cardiomyopathy 35%, status post ICD 10/8/2019   History of NSTEMI 5/12/19  CAD status post CABG  COPD, tobacco abuse - 5 " cigarettes per day  Hypertension, controlled- borderline hypotension    Dyslipidemia  type 2 diabetes  CKD stable       MDM         Plan:       Patient stable on current medical therapy for heart failure  NYHA Class II-III   Continue coreg 3.25mg BID  Lisinopril 2.5mg daily   Bumex 2mg TID   Aldactone 25mg PO daily     Patient's blood pressure is borderline, educated patient to monitor blood pressure closely \  Educated patient on daily weights--- if increase in 2-3 lbs in 2 days call the office and we will adjust medications as needed  Discussed low sodium diet     Awaiting records from [facility] to review labs from there.       Follow up with me again in about 1 month with BMP, BNP  (patient states he is getting labs in 2 weeks at [facility])     If blood pressure stable/improved and renal function stable I would like to switch patient to Entresto at next visit         Past Medical History:  Past Medical History:   Diagnosis Date   • CAD (coronary artery disease)     S/P CABG   • Chronic systolic CHF (congestive heart failure) (CMS/[facility])    • COPD (chronic obstructive pulmonary disease) (CMS/[facility])    • DM2 (diabetes mellitus, type 2) (CMS/[facility])    • Dyslipidemia    • Encounter for monitoring antiplatelet therapy    • Hypertension    • Myocardial infarction (CMS/[facility])     inferior wall MI--03/13   • DEBI (obstructive sleep apnea)     noncompliant with CPAP   • Peripheral vascular disease (CMS/[facility])     S/P left fem-pop bypass   • Tobacco use      Past Surgical History:  Past Surgical History:   Procedure Laterality Date   • AMPUTATION FOOT / TOE      left   • APPENDECTOMY      at age 8 or 9   • BIVENTRICULAR ASSIST DEVICE/LEFT VENTRICULAR ASSIST DEVICE INSERTION N/A 5/13/2020    Procedure: Left Ventricular Assist Device;  Surgeon: Juarez Wing MD;  Location: [facility] CATH INVASIVE LOCATION;  Service: Cardiovascular;  Laterality: N/A;   • CARDIAC CATHETERIZATION      3-; [facility] 9-   •  CARDIAC CATHETERIZATION Right 6/27/2019    Procedure: Cardiac Catheterization/with grafts groin access;  Surgeon: Juarez Wing MD;  Location: [facility] CATH INVASIVE LOCATION;  Service: Cardiovascular   • CARDIAC CATHETERIZATION N/A 5/8/2020    Procedure: Left Heart Cath with grafts;  Surgeon: Juarez Wing MD;  Location: [facility] CATH INVASIVE LOCATION;  Service: Cardiovascular;  Laterality: N/A;   • CARDIAC CATHETERIZATION N/A 5/13/2020    Procedure: Percutaneous Coronary Intervention, Impella backup;  Surgeon: Juarez Wing MD;  Location: [facility] CATH INVASIVE LOCATION;  Service: Cardiovascular;  Laterality: N/A;   • CARDIAC ELECTROPHYSIOLOGY PROCEDURE N/A 10/8/2019    Procedure: ICD SC new, ST.SHIV ;  Surgeon: Juarez Wing MD;  Location: [facility] CATH INVASIVE LOCATION;  Service: Cardiovascular   • CARDIAC SURGERY  2013   • COLONOSCOPY     • CORONARY ARTERY BYPASS GRAFT      x3, 3-18-13-LIMA to LAD, and reverse individual SVG to lateral marginal and to PDA   • FEMORAL POPLITEAL BYPASS Left 01/09/2019    [facility]/ Dr. Jero Hatch   • HAND SURGERY Left     crushed hand   • PACEMAKER IMPLANTATION     • TOE SURGERY      toe nail removal of big toe- age 8 or 9      Allergies:  No Known Allergies  Home Meds:  Current Meds:     Current Outpatient Medications:   •  albuterol (PROVENTIL) (2.5 MG/3ML) 0.083% nebulizer solution, Take 2.5 mg by nebulization Every 6 (Six) Hours As Needed for Wheezing or Shortness of Air., Disp: , Rfl:   •  albuterol sulfate  (90 Base) MCG/ACT inhaler, Inhale 2 puffs Every 4 (Four) Hours As Needed for Wheezing or Shortness of Air., Disp: , Rfl:   •  Alcohol Swabs (ALCOHOL WIPES) 70 % pads, 1 each 4 (Four) Times a Day Before Meals & at Bedtime., Disp: 200 each, Rfl: 0  •  aspirin (aspirin) 81 MG EC tablet, Take 81 mg by mouth Daily., Disp: , Rfl:   •  atorvastatin (LIPITOR) 40 MG tablet, Take 40 mg by mouth Daily., Disp: , Rfl:   •  bumetanide  (BUMEX) 2 MG tablet, Take 2 mg by mouth 3 (Three) Times a Day., Disp: , Rfl:   •  carvedilol (COREG) 3.125 MG tablet, Take 1 tablet by mouth 2 (Two) Times a Day With Meals., Disp: 180 tablet, Rfl: 1  •  clopidogrel (PLAVIX) 75 MG tablet, Take 75 mg by mouth Daily., Disp: , Rfl:   •  collagenase (Santyl) 250 UNIT/GM ointment, Apply  topically to the appropriate area as directed Daily., Disp: 30 g, Rfl: 0  •  DULoxetine (CYMBALTA) 60 MG capsule, TAKE 1 CAPSULE BY MOUTH ONCE DAILY FOR 90 DAYS, Disp: , Rfl:   •  fenofibrate (TRICOR) 145 MG tablet, Take 1 tablet by mouth once daily, Disp: 90 tablet, Rfl: 2  •  glimepiride (AMARYL) 4 MG tablet, Take 4 mg by mouth 2 (Two) Times a Day., Disp: , Rfl:   •  glucose blood (OneTouch Verio) test strip, 1 each by Other route 4 (Four) Times a Day Before Meals & at Bedtime. Use as instructed, Disp: 200 each, Rfl: 0  •  Insulin Glargine (LANTUS SOLOSTAR) 100 UNIT/ML injection pen, Inject 10 Units under the skin into the appropriate area as directed Every Night., Disp: 3 mL, Rfl: 2  •  Insulin Pen Needle (PEN NEEDLES) 32G X 4 MM misc, 1 each Daily., Disp: 100 each, Rfl: 0  •  isosorbide dinitrate (ISORDIL) 30 MG tablet, Take 30 mg by mouth Daily., Disp: , Rfl:   •  Lancets (ONETOUCH DELICA PLUS [id number]) misc, 1 each 4 (Four) Times a Day Before Meals & at Bedtime., Disp: 200 each, Rfl: 0  •  nitroglycerin (NITROSTAT) 2 % ointment, Place 0.5 inches on the skin as directed by provider Daily., Disp: 30 g, Rfl: 5  •  pantoprazole (PROTONIX) 40 MG EC tablet, Take 40 mg by mouth Daily., Disp: , Rfl:   •  spironolactone (ALDACTONE) 25 MG tablet, Take 25 mg by mouth Daily., Disp: , Rfl:   •  furosemide (LASIX) 40 MG tablet, Take 40 mg by mouth 2 (Two) Times a Day., Disp: , Rfl:   •  lisinopril (PRINIVIL,ZESTRIL) 2.5 MG tablet, Take 1 tablet by mouth once daily, Disp: 90 tablet, Rfl: 0  •  metOLazone (ZAROXOLYN) 5 MG tablet, Take 1 tablet by mouth Daily., Disp: 3 tablet, Rfl: 0  Social  "History:   Social History     Tobacco Use   • Smoking status: Current Every Day Smoker     Packs/day: 1.00     Years: 25.00     Pack years: 25.00     Types: Cigarettes   • Smokeless tobacco: Never Used   • Tobacco comment: currently smokes 5 cigarettes per day   Substance Use Topics   • Alcohol use: No     Frequency: Never      Family History:  Family History   Problem Relation Age of Onset   • Hypertension Mother    • Diabetes Mother    • Heart disease Father         had CABG and re-do/  while recovering-had MI age 40s   • Diabetes Father    • Pancreatic cancer Sister    • Hyperlipidemia Brother    • Stroke Brother    • Heart disease Paternal Uncle         The following portions of the patient's history were reviewed and updated as appropriate: allergies, current medications, past family history, past medical history, past social history, past surgical history and problem list.      Review of Systems   Constitution: Positive for malaise/fatigue. Negative for chills, fever and weight gain.   HENT: Negative for nosebleeds.    Eyes: Negative for visual disturbance.   Cardiovascular: Positive for dyspnea on exertion. Negative for chest pain, leg swelling, near-syncope, palpitations and syncope.   Respiratory: Positive for shortness of breath.         Chronic    Endocrine: Negative for cold intolerance.   Hematologic/Lymphatic: Negative for bleeding problem.   Skin: Negative for rash.   Musculoskeletal: Negative for back pain.   Gastrointestinal: Negative for nausea and vomiting.   Genitourinary: Negative for nocturia.   Neurological: Negative for dizziness, light-headedness, numbness and weakness.   Psychiatric/Behavioral: Negative for altered mental status.   Allergic/Immunologic: Negative for hives.     All other systems are negative    Procedures       Objective:     Physical Exam  /70   Pulse 80   Temp 97.1 °F (36.2 °C)   Ht 170.2 cm (67\")   Wt 83 kg (183 lb)   SpO2 99%   BMI 28.66 kg/m²   General:  " Appears in no acute distress  Eyes: Sclera is anicteric,  conjunctiva is clear   HEENT:  No JVD. Thyroid not visibly enlarged. No mucosal pallor or cyanosis  Respiratory: Respirations regular and unlabored at rest.  Clear to auscultation  Cardiovascular: S1,S2 Regular rate and rhythm. No murmur, rub or gallop auscultated. No pretibial pitting edema  Gastrointestinal: Abdomen nondistended, soft  Musculoskeletal:  No abnormal movements  Extremities: No digital clubbing or cyanosis- boot on left foot secondary to left great toe amputation back in October   Skin: Color pink. Skin warm and dry to touch. No rashes  No xanthoma  Neuro: Alert and awake, no lateralizing deficits appreciated    Lab Reviewed:             Electronically signed by JOY Ashton, 03/05/21, 3:22 PM PEGGY.

## 2021-03-11 PROBLEM — I50.22 CHRONIC SYSTOLIC CONGESTIVE HEART FAILURE: Status: ACTIVE | Noted: 2019-06-26

## 2021-03-26 ENCOUNTER — OFFICE VISIT (OUTPATIENT)
Dept: CARDIOLOGY | Facility: CLINIC | Age: 61
End: 2021-03-26

## 2021-03-26 VITALS
OXYGEN SATURATION: 96 % | WEIGHT: 185 LBS | DIASTOLIC BLOOD PRESSURE: 65 MMHG | SYSTOLIC BLOOD PRESSURE: 96 MMHG | HEART RATE: 90 BPM | TEMPERATURE: 96.8 F | HEIGHT: 67 IN | BODY MASS INDEX: 29.03 KG/M2

## 2021-03-26 DIAGNOSIS — J43.1 PANLOBULAR EMPHYSEMA (HCC): Chronic | ICD-10-CM

## 2021-03-26 DIAGNOSIS — F17.200 TOBACCO DEPENDENCE SYNDROME: Chronic | ICD-10-CM

## 2021-03-26 DIAGNOSIS — I25.2 CORONARY ARTERY DISEASE WITH HX OF MYOCARDIAL INFARCT W/O HX OF CABG: Chronic | ICD-10-CM

## 2021-03-26 DIAGNOSIS — I50.22 CHRONIC SYSTOLIC CONGESTIVE HEART FAILURE (HCC): ICD-10-CM

## 2021-03-26 DIAGNOSIS — I25.10 CORONARY ARTERY DISEASE WITH HX OF MYOCARDIAL INFARCT W/O HX OF CABG: Chronic | ICD-10-CM

## 2021-03-26 DIAGNOSIS — Z95.810 PRESENCE OF AUTOMATIC CARDIOVERTER/DEFIBRILLATOR (AICD): Chronic | ICD-10-CM

## 2021-03-26 DIAGNOSIS — I25.5 ISCHEMIC CARDIOMYOPATHY: Primary | ICD-10-CM

## 2021-03-26 PROCEDURE — 99213 OFFICE O/P EST LOW 20 MIN: CPT | Performed by: NURSE PRACTITIONER

## 2021-03-26 NOTE — PROGRESS NOTES
Subjective:     Encounter Date:03/26/2021      Patient ID: Bobby Steele Jr. is a 61 y.o. male.    Chief Complaint :  History of Present Illness    This is a 61-year-old white male with past medical history of     # NSTEMI  5/12/19, SHILPA to SVG to RCA and proximal LAD leading into a small diagonal, cath 5/11/2020 underwent SHILPA to SVG to RCA and native proximal LCx with Impella assistance  # CAD status post CABG X3 V  # Ischemic cardiomyopathy with EF of 35%, status post ICD 10/8/2019  # COPD, continue tobacco abuse  # Hypertension   # Hyperlipidemia  # Diabetes with hemoglobin A1c of 7.4 on 6/3/19  #Positive family for premature heart disease with father MI 53    PAD status post left great toe amputation      Who presents today for 1 month follow-up from last visit.  Patient reports cough wheezing and congestion, that have been ongoing since the current weather change.  He reports recent stress with stressors and has increased his tobacco use back to a pack a day.  He denies any chest pain or palpitations.  He does report that he was at the emergency room at [facility] on March 14 due to shortness of breath and they wanted to admit him however he refused-he reported to them that he had a follow-up appointment with us and when continue from there.  Patient reports ankle edema in the last couple of days but only in his left ankle that he remains in a boot from previous surgery.  On examination patient has trace edema to the left ankle rhonchi and wheezing noted in the bases that clears some with cough.  Recent labs from 3/23/2021 reviewed proBNP was 970, creatinine 1.47 BUN 39.  Globin A1c 7.4 potassium 4.4.   Reviewed medications with patient he reported he is taking Bumex and Lasix plus spironolactone and uncertain if he is taking metolazone or not.       Chart Review:   Patient  underwent cardiac cath 6/27/2019 which revealed patent stent to SVG to RCA patent stent in proximal LAD and small vessel disease  and diagonal.   Patient has chronic dyspnea on exertion relieved with rest.  Patient is status post ICD placement 10/8/2019 .  Patient had repeat catheter 11/20/2018 SHILPA to SVG to RCA and native proximal LCx with Impella back     Last echocardiogram 12/26/2020  · Left ventricular ejection fraction appears to be 36 - 40%.  · The right ventricular cavity is mildly dilated.    Assessment:         Cough, congestion shortness of breath  chronic CHF, ischemic cardiomyopathy 35%, status post ICD 10/8/2019   History of NSTEMI 5/12/19  CAD status post CABG  COPD, tobacco abuse - 5 cigarettes per day  Hypertension, controlled- borderline hypotension    Dyslipidemia  type 2 diabetes  CKD stable         MDM    No diagnosis found.       Plan:       Continue Bumex 2 mg 3 times a day, spironolactone 25 mg daily, patient is uncertain if he is taking metolazone  Stop Lasix as you are already on the same type of loop diuretic  Continue low-dose carvedilol and lisinopril  Blood pressure today was 96/65    Labs reviewed creatine 1.47 was hoping to switch patient to ARNI however since he is hypotensive I will not at this time.    Patient reports his weight is decreasing does report some lower extremity edema left ankle only     He has cough and shortness of breath- COPD, recommended trying breathing treatments 3-4 times a day if no improvement with breathing treatments follow up with Dr. Agarwal PCP   If gaining weight call me for further instructions/assistance.     Continue to monitor daily weight first thing in the morning  Continue/discussed salt reduction patient reports he has been eating a lot of soup this last week  Discussed fluid intake patient reports that he has switched from sodas to green tea and is not drinking as much.     Discussed complete smoking cessation as patient at picked up smoking again up to a pack a day  Discussed how this has likely flared up his COPD as well.     Follow-up with Dr. Wing as  scheduled            Past Medical History:  Past Medical History:   Diagnosis Date   • CAD (coronary artery disease)     S/P CABG   • Chronic systolic CHF (congestive heart failure) (CMS/[facility])    • COPD (chronic obstructive pulmonary disease) (CMS/[facility])    • DM2 (diabetes mellitus, type 2) (CMS/[facility])    • Dyslipidemia    • Encounter for monitoring antiplatelet therapy    • Hypertension    • Myocardial infarction (CMS/[facility])     inferior wall MI--03/13   • DEBI (obstructive sleep apnea)     noncompliant with CPAP   • Peripheral vascular disease (CMS/[facility])     S/P left fem-pop bypass   • Tobacco use      Past Surgical History:  Past Surgical History:   Procedure Laterality Date   • AMPUTATION FOOT / TOE      left   • APPENDECTOMY      at age 8 or 9   • BIVENTRICULAR ASSIST DEVICE/LEFT VENTRICULAR ASSIST DEVICE INSERTION N/A 5/13/2020    Procedure: Left Ventricular Assist Device;  Surgeon: Juarez Wing MD;  Location:  [facility] CATH INVASIVE LOCATION;  Service: Cardiovascular;  Laterality: N/A;   • CARDIAC CATHETERIZATION      3-; [facility] 9-   • CARDIAC CATHETERIZATION Right 6/27/2019    Procedure: Cardiac Catheterization/with grafts groin access;  Surgeon: Juarez Wing MD;  Location:  [facility] CATH INVASIVE LOCATION;  Service: Cardiovascular   • CARDIAC CATHETERIZATION N/A 5/8/2020    Procedure: Left Heart Cath with grafts;  Surgeon: Juarez Wing MD;  Location: [facility] CATH INVASIVE LOCATION;  Service: Cardiovascular;  Laterality: N/A;   • CARDIAC CATHETERIZATION N/A 5/13/2020    Procedure: Percutaneous Coronary Intervention, Impella backup;  Surgeon: Juarez Wing MD;  Location:  [facility] CATH INVASIVE LOCATION;  Service: Cardiovascular;  Laterality: N/A;   • CARDIAC ELECTROPHYSIOLOGY PROCEDURE N/A 10/8/2019    Procedure: ICD SC new, ST.SHIV ;  Surgeon: Juarez Wing MD;  Location:  [facility] CATH INVASIVE LOCATION;  Service: Cardiovascular   • CARDIAC SURGERY  2013    • COLONOSCOPY     • CORONARY ARTERY BYPASS GRAFT      x3, 3-18-13-LIMA to LAD, and reverse individual SVG to lateral marginal and to PDA   • FEMORAL POPLITEAL BYPASS Left 01/09/2019    [facility]/ Dr. Jero Hatch   • HAND SURGERY Left     crushed hand   • PACEMAKER IMPLANTATION     • TOE SURGERY      toe nail removal of big toe- age 8 or 9      Allergies:  No Known Allergies  Home Meds:  Current Meds:     Current Outpatient Medications:   •  albuterol (PROVENTIL) (2.5 MG/3ML) 0.083% nebulizer solution, Take 2.5 mg by nebulization Every 6 (Six) Hours As Needed for Wheezing or Shortness of Air., Disp: , Rfl:   •  albuterol sulfate  (90 Base) MCG/ACT inhaler, Inhale 2 puffs Every 4 (Four) Hours As Needed for Wheezing or Shortness of Air., Disp: , Rfl:   •  Alcohol Swabs (ALCOHOL WIPES) 70 % pads, 1 each 4 (Four) Times a Day Before Meals & at Bedtime., Disp: 200 each, Rfl: 0  •  aspirin (aspirin) 81 MG EC tablet, Take 81 mg by mouth Daily., Disp: , Rfl:   •  atorvastatin (LIPITOR) 40 MG tablet, Take 40 mg by mouth Daily., Disp: , Rfl:   •  bumetanide (BUMEX) 2 MG tablet, Take 2 mg by mouth 3 (Three) Times a Day., Disp: , Rfl:   •  carvedilol (COREG) 3.125 MG tablet, Take 1 tablet by mouth 2 (Two) Times a Day With Meals., Disp: 180 tablet, Rfl: 1  •  clopidogrel (PLAVIX) 75 MG tablet, Take 75 mg by mouth Daily., Disp: , Rfl:   •  collagenase (Santyl) 250 UNIT/GM ointment, Apply  topically to the appropriate area as directed Daily., Disp: 30 g, Rfl: 0  •  DULoxetine (CYMBALTA) 60 MG capsule, TAKE 1 CAPSULE BY MOUTH ONCE DAILY FOR 90 DAYS, Disp: , Rfl:   •  fenofibrate (TRICOR) 145 MG tablet, Take 1 tablet by mouth once daily, Disp: 90 tablet, Rfl: 2  •  furosemide (LASIX) 40 MG tablet, Take 40 mg by mouth 2 (Two) Times a Day., Disp: , Rfl:   •  glimepiride (AMARYL) 4 MG tablet, Take 4 mg by mouth 2 (Two) Times a Day., Disp: , Rfl:   •  glucose blood (OneTouch Verio) test strip, 1 each by Other route 4 (Four) Times a  Day Before Meals & at Bedtime. Use as instructed, Disp: 200 each, Rfl: 0  •  Insulin Glargine (LANTUS SOLOSTAR) 100 UNIT/ML injection pen, Inject 10 Units under the skin into the appropriate area as directed Every Night., Disp: 3 mL, Rfl: 2  •  Insulin Pen Needle (PEN NEEDLES) 32G X 4 MM misc, 1 each Daily., Disp: 100 each, Rfl: 0  •  isosorbide dinitrate (ISORDIL) 30 MG tablet, Take 30 mg by mouth Daily., Disp: , Rfl:   •  Lancets (ONETOUCH DELICA PLUS [id number]) misc, 1 each 4 (Four) Times a Day Before Meals & at Bedtime., Disp: 200 each, Rfl: 0  •  lisinopril (PRINIVIL,ZESTRIL) 2.5 MG tablet, Take 1 tablet by mouth once daily, Disp: 90 tablet, Rfl: 0  •  metOLazone (ZAROXOLYN) 5 MG tablet, Take 1 tablet by mouth Daily., Disp: 3 tablet, Rfl: 0  •  nitroglycerin (NITROSTAT) 2 % ointment, Place 0.5 inches on the skin as directed by provider Daily., Disp: 30 g, Rfl: 5  •  pantoprazole (PROTONIX) 40 MG EC tablet, Take 40 mg by mouth Daily., Disp: , Rfl:   •  spironolactone (ALDACTONE) 25 MG tablet, Take 25 mg by mouth Daily., Disp: , Rfl:   Social History:   Social History     Tobacco Use   • Smoking status: Current Every Day Smoker     Packs/day: 1.00     Years: 25.00     Pack years: 25.00     Types: Cigarettes   • Smokeless tobacco: Never Used   Substance Use Topics   • Alcohol use: No      Family History:  Family History   Problem Relation Age of Onset   • Hypertension Mother    • Diabetes Mother    • Heart disease Father         had CABG and re-do/  while recovering-had MI age 40s   • Diabetes Father    • Pancreatic cancer Sister    • Hyperlipidemia Brother    • Stroke Brother    • Heart disease Paternal Uncle         The following portions of the patient's history were reviewed and updated as appropriate: allergies, current medications, past family history, past medical history, past social history, past surgical history and problem list.      Review of Systems   Constitutional: Negative for malaise/fatigue.  "  Cardiovascular: Positive for leg swelling. Negative for chest pain, palpitations and syncope.   Respiratory: Positive for shortness of breath.    Skin: Negative for rash.   Gastrointestinal: Negative for nausea and vomiting.   Neurological: Negative for dizziness, light-headedness and numbness.     All other systems are negative    Procedures       Objective:     Physical Exam  BP 96/65   Pulse 90   Temp 96.8 °F (36 °C)   Ht 170.2 cm (67\")   Wt 83.9 kg (185 lb)   SpO2 96%   BMI 28.98 kg/m²   General:  Appears in no acute distress; raspy cough noted   Eyes: Sclera is anicteric,  conjunctiva is clear   HEENT:  No JVD. Thyroid not visibly enlarged. No mucosal pallor or cyanosis  Respiratory: Respirations regular and unlabored at rest. Scattered rhonchi, few wheezes that clear with cough   Cardiovascular: S1,S2 Regular rate and rhythm. No murmur, rub or gallop auscultated. No pretibial pitting edema  Gastrointestinal: Abdomen distended, but soft  Musculoskeletal:  No abnormal movements  Extremities: No digital clubbing or cyanosis; left boot in walking boot- trace ankle edema to left ankle  Skin: Color pink. Skin warm and dry to touch. No rashes  No xanthoma  Neuro: Alert and awake, no lateralizing deficits appreciated    Lab Reviewed:         lAbs reviewed from [facility]    Electronically signed by JOY Ashton, 03/26/21, 3:33 PM EDT.       "

## 2021-03-26 NOTE — PATIENT INSTRUCTIONS
Continue Bumex 2 mg 3 times a day  DO  NOT TAKE LASIX ( furosemide with Bumex)    Continue weighing self daily  Call if weight is increasing   Watch Salt intake closely       Breathing treatments three to four times per day for COPD  Call Dr. Agarwal if no improvement         Steps to Quit Smoking  Smoking tobacco is the leading cause of preventable death. It can affect almost every organ in the body. Smoking puts you and people around you at risk for many serious, long-lasting (chronic) diseases. Quitting smoking can be hard, but it is one of the best things that you can do for your health. It is never too late to quit.  How do I get ready to quit?  When you decide to quit smoking, make a plan to help you succeed. Before you quit:  · Pick a date to quit. Set a date within the next 2 weeks to give you time to prepare.  · Write down the reasons why you are quitting. Keep this list in places where you will see it often.  · Tell your family, friends, and co-workers that you are quitting. Their support is important.  · Talk with your doctor about the choices that may help you quit.  · Find out if your health insurance will pay for these treatments.  · Know the people, places, things, and activities that make you want to smoke (triggers). Avoid them.  What first steps can I take to quit smoking?  · Throw away all cigarettes at home, at work, and in your car.  · Throw away the things that you use when you smoke, such as ashtrays and lighters.  · Clean your car. Make sure to empty the ashtray.  · Clean your home, including curtains and carpets.  What can I do to help me quit smoking?  Talk with your doctor about taking medicines and seeing a counselor at the same time. You are more likely to succeed when you do both.  · If you are pregnant or breastfeeding, talk with your doctor about counseling or other ways to quit smoking. Do not take medicine to help you quit smoking unless your doctor tells you to do so.  To quit  smoking:  Quit right away  · Quit smoking totally, instead of slowly cutting back on how much you smoke over a period of time.  · Go to counseling. You are more likely to quit if you go to counseling sessions regularly.  Take medicine  You may take medicines to help you quit. Some medicines need a prescription, and some you can buy over-the-counter. Some medicines may contain a drug called nicotine to replace the nicotine in cigarettes. Medicines may:  · Help you to stop having the desire to smoke (cravings).  · Help to stop the problems that come when you stop smoking (withdrawal symptoms).  Your doctor may ask you to use:  · Nicotine patches, gum, or lozenges.  · Nicotine inhalers or sprays.  · Non-nicotine medicine that is taken by mouth.  Find resources  Find resources and other ways to help you quit smoking and remain smoke-free after you quit. These resources are most helpful when you use them often. They include:  · Online chats with a counselor.  · Phone quitlines.  · Printed self-help materials.  · Support groups or group counseling.  · Text messaging programs.  · Mobile phone apps. Use apps on your mobile phone or tablet that can help you stick to your quit plan. There are many free apps for mobile phones and tablets as well as websites. Examples include Quit Guide from the CDC and smokefree.gov    What things can I do to make it easier to quit?    · Talk to your family and friends. Ask them to support and encourage you.  · Call a phone quitline (4-423-QUITNOW), reach out to support groups, or work with a counselor.  · Ask people who smoke to not smoke around you.  · Avoid places that make you want to smoke, such as:  ? Bars.  ? Parties.  ? Smoke-break areas at work.  · Spend time with people who do not smoke.  · Lower the stress in your life. Stress can make you want to smoke. Try these things to help your stress:  ? Getting regular exercise.  ? Doing deep-breathing exercises.  ? Doing  yoga.  ? Meditating.  ? Doing a body scan. To do this, close your eyes, focus on one area of your body at a time from head to toe. Notice which parts of your body are tense. Try to relax the muscles in those areas.  How will I feel when I quit smoking?  Day 1 to 3 weeks  Within the first 24 hours, you may start to have some problems that come from quitting tobacco. These problems are very bad 2-3 days after you quit, but they do not often last for more than 2-3 weeks. You may get these symptoms:  · Mood swings.  · Feeling restless, nervous, angry, or annoyed.  · Trouble concentrating.  · Dizziness.  · Strong desire for high-sugar foods and nicotine.  · Weight gain.  · Trouble pooping (constipation).  · Feeling like you may vomit (nausea).  · Coughing or a sore throat.  · Changes in how the medicines that you take for other issues work in your body.  · Depression.  · Trouble sleeping (insomnia).  Week 3 and afterward  After the first 2-3 weeks of quitting, you may start to notice more positive results, such as:  · Better sense of smell and taste.  · Less coughing and sore throat.  · Slower heart rate.  · Lower blood pressure.  · Clearer skin.  · Better breathing.  · Fewer sick days.  Quitting smoking can be hard. Do not give up if you fail the first time. Some people need to try a few times before they succeed. Do your best to stick to your quit plan, and talk with your doctor if you have any questions or concerns.  Summary  · Smoking tobacco is the leading cause of preventable death. Quitting smoking can be hard, but it is one of the best things that you can do for your health.  · When you decide to quit smoking, make a plan to help you succeed.  · Quit smoking right away, not slowly over a period of time.  · When you start quitting, seek help from your doctor, family, or friends.  This information is not intended to replace advice given to you by your health care provider. Make sure you discuss any questions you  have with your health care provider.  Document Revised: 09/11/2020 Document Reviewed: 03/07/2020  [employer]vier Patient Education © 2021 [employer]

## 2021-04-22 DIAGNOSIS — E78.2 MIXED HYPERLIPIDEMIA: ICD-10-CM

## 2021-04-22 RX ORDER — FENOFIBRATE 145 MG/1
TABLET, COATED ORAL
Qty: 90 TABLET | Refills: 3 | Status: SHIPPED | OUTPATIENT
Start: 2021-04-22 | End: 2022-06-10

## 2021-05-31 ENCOUNTER — TELEPHONE (OUTPATIENT)
Dept: CARDIOLOGY | Facility: CLINIC | Age: 61
End: 2021-05-31

## 2021-05-31 ENCOUNTER — HOSPITAL ENCOUNTER (OUTPATIENT)
Facility: HOSPITAL | Age: 61
Discharge: HOME | End: 2021-06-02
Attending: EMERGENCY MEDICINE | Admitting: INTERNAL MEDICINE

## 2021-05-31 ENCOUNTER — APPOINTMENT (OUTPATIENT)
Dept: CARDIOLOGY | Facility: HOSPITAL | Age: 61
End: 2021-05-31

## 2021-05-31 DIAGNOSIS — I25.10 CORONARY ARTERY DISEASE WITH HX OF MYOCARDIAL INFARCT W/O HX OF CABG: ICD-10-CM

## 2021-05-31 DIAGNOSIS — Z95.810 PRESENCE OF AUTOMATIC CARDIOVERTER/DEFIBRILLATOR (AICD): ICD-10-CM

## 2021-05-31 DIAGNOSIS — I25.2 CORONARY ARTERY DISEASE WITH HX OF MYOCARDIAL INFARCT W/O HX OF CABG: ICD-10-CM

## 2021-05-31 DIAGNOSIS — R94.39 ABNORMAL NUCLEAR STRESS TEST: ICD-10-CM

## 2021-05-31 DIAGNOSIS — I20.0 UNSTABLE ANGINA PECTORIS (HCC): Primary | ICD-10-CM

## 2021-05-31 DIAGNOSIS — F17.200 TOBACCO DEPENDENCE SYNDROME: ICD-10-CM

## 2021-05-31 DIAGNOSIS — I73.9 PERIPHERAL VASCULAR DISEASE (HCC): ICD-10-CM

## 2021-05-31 DIAGNOSIS — I10 BENIGN ESSENTIAL HTN: ICD-10-CM

## 2021-05-31 DIAGNOSIS — J43.1 PANLOBULAR EMPHYSEMA (HCC): ICD-10-CM

## 2021-05-31 DIAGNOSIS — E78.2 MULTIPLE-TYPE HYPERLIPIDEMIA: ICD-10-CM

## 2021-05-31 DIAGNOSIS — R77.8 ELEVATED TROPONIN: ICD-10-CM

## 2021-05-31 DIAGNOSIS — I21.4 NON-ST ELEVATION MI (NSTEMI) (HCC): ICD-10-CM

## 2021-05-31 DIAGNOSIS — I25.5 ISCHEMIC CARDIOMYOPATHY: ICD-10-CM

## 2021-05-31 DIAGNOSIS — I50.23 ACUTE ON CHRONIC SYSTOLIC CHF (CONGESTIVE HEART FAILURE) (HCC): ICD-10-CM

## 2021-05-31 PROBLEM — R07.9 CHEST PAIN: Status: ACTIVE | Noted: 2021-05-31

## 2021-05-31 PROBLEM — R79.89 ELEVATED TROPONIN: Status: ACTIVE | Noted: 2021-05-31

## 2021-05-31 PROBLEM — S91.302A OPEN WOUND OF LEFT FOOT: Chronic | Status: ACTIVE | Noted: 2021-05-31

## 2021-05-31 LAB
ANION GAP SERPL CALCULATED.3IONS-SCNC: 11 MMOL/L (ref 5–15)
BASOPHILS # BLD AUTO: 0.1 10*3/MM3 (ref 0–0.2)
BASOPHILS NFR BLD AUTO: 0.8 % (ref 0–1.5)
BH CV LOWER VASCULAR LEFT COMMON FEMORAL AUGMENT: NORMAL
BH CV LOWER VASCULAR LEFT COMMON FEMORAL COMPETENT: NORMAL
BH CV LOWER VASCULAR LEFT COMMON FEMORAL COMPRESS: NORMAL
BH CV LOWER VASCULAR LEFT COMMON FEMORAL PHASIC: NORMAL
BH CV LOWER VASCULAR LEFT COMMON FEMORAL SPONT: NORMAL
BH CV LOWER VASCULAR LEFT DISTAL FEMORAL COMPRESS: NORMAL
BH CV LOWER VASCULAR LEFT GASTRONEMIUS COMPRESS: NORMAL
BH CV LOWER VASCULAR LEFT GREATER SAPH BK COMPRESS: NORMAL
BH CV LOWER VASCULAR LEFT LESSER SAPH COMPRESS: NORMAL
BH CV LOWER VASCULAR LEFT MID FEMORAL AUGMENT: NORMAL
BH CV LOWER VASCULAR LEFT MID FEMORAL COMPETENT: NORMAL
BH CV LOWER VASCULAR LEFT MID FEMORAL COMPRESS: NORMAL
BH CV LOWER VASCULAR LEFT MID FEMORAL PHASIC: NORMAL
BH CV LOWER VASCULAR LEFT MID FEMORAL SPONT: NORMAL
BH CV LOWER VASCULAR LEFT PERONEAL COMPRESS: NORMAL
BH CV LOWER VASCULAR LEFT POPLITEAL AUGMENT: NORMAL
BH CV LOWER VASCULAR LEFT POPLITEAL COMPETENT: NORMAL
BH CV LOWER VASCULAR LEFT POPLITEAL COMPRESS: NORMAL
BH CV LOWER VASCULAR LEFT POPLITEAL PHASIC: NORMAL
BH CV LOWER VASCULAR LEFT POPLITEAL SPONT: NORMAL
BH CV LOWER VASCULAR LEFT POSTERIOR TIBIAL COMPRESS: NORMAL
BH CV LOWER VASCULAR LEFT PROXIMAL FEMORAL COMPRESS: NORMAL
BH CV LOWER VASCULAR LEFT SAPHENOFEMORAL JUNCTION COMPRESS: NORMAL
BH CV LOWER VASCULAR RIGHT COMMON FEMORAL AUGMENT: NORMAL
BH CV LOWER VASCULAR RIGHT COMMON FEMORAL COMPETENT: NORMAL
BH CV LOWER VASCULAR RIGHT COMMON FEMORAL COMPRESS: NORMAL
BH CV LOWER VASCULAR RIGHT COMMON FEMORAL PHASIC: NORMAL
BH CV LOWER VASCULAR RIGHT COMMON FEMORAL SPONT: NORMAL
BH CV LOWER VASCULAR RIGHT DISTAL FEMORAL COMPRESS: NORMAL
BH CV LOWER VASCULAR RIGHT GASTRONEMIUS COMPRESS: NORMAL
BH CV LOWER VASCULAR RIGHT GREATER SAPH BK COMPRESS: NORMAL
BH CV LOWER VASCULAR RIGHT LESSER SAPH COMPRESS: NORMAL
BH CV LOWER VASCULAR RIGHT MID FEMORAL AUGMENT: NORMAL
BH CV LOWER VASCULAR RIGHT MID FEMORAL COMPETENT: NORMAL
BH CV LOWER VASCULAR RIGHT MID FEMORAL COMPRESS: NORMAL
BH CV LOWER VASCULAR RIGHT MID FEMORAL PHASIC: NORMAL
BH CV LOWER VASCULAR RIGHT MID FEMORAL SPONT: NORMAL
BH CV LOWER VASCULAR RIGHT PERONEAL COMPRESS: NORMAL
BH CV LOWER VASCULAR RIGHT POPLITEAL AUGMENT: NORMAL
BH CV LOWER VASCULAR RIGHT POPLITEAL COMPETENT: NORMAL
BH CV LOWER VASCULAR RIGHT POPLITEAL COMPRESS: NORMAL
BH CV LOWER VASCULAR RIGHT POPLITEAL PHASIC: NORMAL
BH CV LOWER VASCULAR RIGHT POPLITEAL SPONT: NORMAL
BH CV LOWER VASCULAR RIGHT POSTERIOR TIBIAL COMPRESS: NORMAL
BH CV LOWER VASCULAR RIGHT PROXIMAL FEMORAL COMPRESS: NORMAL
BH CV LOWER VASCULAR RIGHT SAPHENOFEMORAL JUNCTION COMPRESS: NORMAL
BUN SERPL-MCNC: 26 MG/DL (ref 8–23)
BUN/CREAT SERPL: 18.2 (ref 7–25)
CALCIUM SPEC-SCNC: 9 MG/DL (ref 8.6–10.5)
CHLORIDE SERPL-SCNC: 104 MMOL/L (ref 98–107)
CO2 SERPL-SCNC: 26 MMOL/L (ref 22–29)
CREAT SERPL-MCNC: 1.43 MG/DL (ref 0.76–1.27)
DEPRECATED RDW RBC AUTO: 44.6 FL (ref 37–54)
EOSINOPHIL # BLD AUTO: 0.1 10*3/MM3 (ref 0–0.4)
EOSINOPHIL NFR BLD AUTO: 1.4 % (ref 0.3–6.2)
ERYTHROCYTE [DISTWIDTH] IN BLOOD BY AUTOMATED COUNT: 14 % (ref 12.3–15.4)
GFR SERPL CREATININE-BSD FRML MDRD: 50 ML/MIN/1.73
GLUCOSE BLDC GLUCOMTR-MCNC: 153 MG/DL (ref 70–105)
GLUCOSE BLDC GLUCOMTR-MCNC: 266 MG/DL (ref 70–105)
GLUCOSE SERPL-MCNC: 193 MG/DL (ref 65–99)
HCT VFR BLD AUTO: 42 % (ref 37.5–51)
HGB BLD-MCNC: 14.2 G/DL (ref 13–17.7)
HOLD SPECIMEN: NORMAL
LYMPHOCYTES # BLD AUTO: 2.5 10*3/MM3 (ref 0.7–3.1)
LYMPHOCYTES NFR BLD AUTO: 25.7 % (ref 19.6–45.3)
MAXIMAL PREDICTED HEART RATE: 159 BPM
MCH RBC QN AUTO: 31 PG (ref 26.6–33)
MCHC RBC AUTO-ENTMCNC: 33.8 G/DL (ref 31.5–35.7)
MCV RBC AUTO: 91.8 FL (ref 79–97)
MONOCYTES # BLD AUTO: 0.5 10*3/MM3 (ref 0.1–0.9)
MONOCYTES NFR BLD AUTO: 5.5 % (ref 5–12)
NEUTROPHILS NFR BLD AUTO: 6.5 10*3/MM3 (ref 1.7–7)
NEUTROPHILS NFR BLD AUTO: 66.6 % (ref 42.7–76)
NRBC BLD AUTO-RTO: 0.1 /100 WBC (ref 0–0.2)
PLATELET # BLD AUTO: 158 10*3/MM3 (ref 140–450)
PMV BLD AUTO: 10.4 FL (ref 6–12)
POTASSIUM SERPL-SCNC: 3.8 MMOL/L (ref 3.5–5.2)
RBC # BLD AUTO: 4.58 10*6/MM3 (ref 4.14–5.8)
SODIUM SERPL-SCNC: 141 MMOL/L (ref 136–145)
STRESS TARGET HR: 135 BPM
TROPONIN T SERPL-MCNC: 0.01 NG/ML (ref 0–0.03)
TROPONIN T SERPL-MCNC: 0.01 NG/ML (ref 0–0.03)
WBC # BLD AUTO: 9.8 10*3/MM3 (ref 3.4–10.8)

## 2021-05-31 PROCEDURE — 80048 BASIC METABOLIC PNL TOTAL CA: CPT | Performed by: INTERNAL MEDICINE

## 2021-05-31 PROCEDURE — C9803 HOPD COVID-19 SPEC COLLECT: HCPCS

## 2021-05-31 PROCEDURE — 82962 GLUCOSE BLOOD TEST: CPT

## 2021-05-31 PROCEDURE — 93005 ELECTROCARDIOGRAM TRACING: CPT | Performed by: NURSE PRACTITIONER

## 2021-05-31 PROCEDURE — 96372 THER/PROPH/DIAG INJ SC/IM: CPT

## 2021-05-31 PROCEDURE — 96374 THER/PROPH/DIAG INJ IV PUSH: CPT

## 2021-05-31 PROCEDURE — 63710000001 INSULIN LISPRO (HUMAN) PER 5 UNITS: Performed by: NURSE PRACTITIONER

## 2021-05-31 PROCEDURE — 84484 ASSAY OF TROPONIN QUANT: CPT | Performed by: NURSE PRACTITIONER

## 2021-05-31 PROCEDURE — 83036 HEMOGLOBIN GLYCOSYLATED A1C: CPT | Performed by: NURSE PRACTITIONER

## 2021-05-31 PROCEDURE — 93970 EXTREMITY STUDY: CPT

## 2021-05-31 PROCEDURE — 25010000002 ENOXAPARIN PER 10 MG: Performed by: NURSE PRACTITIONER

## 2021-05-31 PROCEDURE — 85025 COMPLETE CBC W/AUTO DIFF WBC: CPT | Performed by: INTERNAL MEDICINE

## 2021-05-31 PROCEDURE — 99220 PR INITIAL OBSERVATION CARE/DAY 70 MINUTES: CPT | Performed by: NURSE PRACTITIONER

## 2021-05-31 PROCEDURE — 63710000001 INSULIN GLARGINE PER 5 UNITS: Performed by: NURSE PRACTITIONER

## 2021-05-31 PROCEDURE — U0004 COV-19 TEST NON-CDC HGH THRU: HCPCS | Performed by: NURSE PRACTITIONER

## 2021-05-31 PROCEDURE — 25010000002 FUROSEMIDE PER 20 MG: Performed by: NURSE PRACTITIONER

## 2021-05-31 PROCEDURE — G0378 HOSPITAL OBSERVATION PER HR: HCPCS

## 2021-05-31 PROCEDURE — 99215 OFFICE O/P EST HI 40 MIN: CPT | Performed by: INTERNAL MEDICINE

## 2021-05-31 RX ORDER — FUROSEMIDE 10 MG/ML
40 INJECTION INTRAMUSCULAR; INTRAVENOUS EVERY 8 HOURS
Status: DISCONTINUED | OUTPATIENT
Start: 2021-05-31 | End: 2021-06-01

## 2021-05-31 RX ORDER — CLOPIDOGREL BISULFATE 75 MG/1
75 TABLET ORAL DAILY
Status: DISCONTINUED | OUTPATIENT
Start: 2021-06-01 | End: 2021-06-02 | Stop reason: HOSPADM

## 2021-05-31 RX ORDER — NITROGLYCERIN 0.4 MG/1
0.4 TABLET SUBLINGUAL
Status: DISCONTINUED | OUTPATIENT
Start: 2021-05-31 | End: 2021-06-02 | Stop reason: HOSPADM

## 2021-05-31 RX ORDER — CARVEDILOL 3.12 MG/1
3.12 TABLET ORAL 2 TIMES DAILY WITH MEALS
Status: DISCONTINUED | OUTPATIENT
Start: 2021-05-31 | End: 2021-06-02 | Stop reason: HOSPADM

## 2021-05-31 RX ORDER — SPIRONOLACTONE 25 MG/1
25 TABLET ORAL DAILY
Status: DISCONTINUED | OUTPATIENT
Start: 2021-06-01 | End: 2021-06-02 | Stop reason: HOSPADM

## 2021-05-31 RX ORDER — ACETAMINOPHEN 325 MG/1
650 TABLET ORAL EVERY 4 HOURS PRN
Status: DISCONTINUED | OUTPATIENT
Start: 2021-05-31 | End: 2021-06-02 | Stop reason: HOSPADM

## 2021-05-31 RX ORDER — ISOSORBIDE MONONITRATE 30 MG/1
30 TABLET, EXTENDED RELEASE ORAL DAILY
COMMUNITY

## 2021-05-31 RX ORDER — ISOSORBIDE MONONITRATE 30 MG/1
30 TABLET, EXTENDED RELEASE ORAL DAILY
Status: DISCONTINUED | OUTPATIENT
Start: 2021-06-01 | End: 2021-06-02 | Stop reason: HOSPADM

## 2021-05-31 RX ORDER — ONDANSETRON 4 MG/1
4 TABLET, FILM COATED ORAL EVERY 6 HOURS PRN
Status: DISCONTINUED | OUTPATIENT
Start: 2021-05-31 | End: 2021-06-02 | Stop reason: HOSPADM

## 2021-05-31 RX ORDER — SODIUM CHLORIDE 0.9 % (FLUSH) 0.9 %
10 SYRINGE (ML) INJECTION EVERY 12 HOURS SCHEDULED
Status: DISCONTINUED | OUTPATIENT
Start: 2021-05-31 | End: 2021-06-02 | Stop reason: HOSPADM

## 2021-05-31 RX ORDER — DULOXETIN HYDROCHLORIDE 30 MG/1
60 CAPSULE, DELAYED RELEASE ORAL DAILY
Status: DISCONTINUED | OUTPATIENT
Start: 2021-06-01 | End: 2021-06-02 | Stop reason: HOSPADM

## 2021-05-31 RX ORDER — SODIUM CHLORIDE 0.9 % (FLUSH) 0.9 %
10 SYRINGE (ML) INJECTION AS NEEDED
Status: DISCONTINUED | OUTPATIENT
Start: 2021-05-31 | End: 2021-06-02 | Stop reason: HOSPADM

## 2021-05-31 RX ORDER — ONDANSETRON 2 MG/ML
4 INJECTION INTRAMUSCULAR; INTRAVENOUS EVERY 6 HOURS PRN
Status: DISCONTINUED | OUTPATIENT
Start: 2021-05-31 | End: 2021-06-02 | Stop reason: HOSPADM

## 2021-05-31 RX ORDER — INSULIN LISPRO 100 [IU]/ML
0-14 INJECTION, SOLUTION INTRAVENOUS; SUBCUTANEOUS
Status: DISCONTINUED | OUTPATIENT
Start: 2021-05-31 | End: 2021-06-02 | Stop reason: HOSPADM

## 2021-05-31 RX ORDER — ASPIRIN 81 MG/1
81 TABLET ORAL DAILY
Status: DISCONTINUED | OUTPATIENT
Start: 2021-06-01 | End: 2021-06-02 | Stop reason: HOSPADM

## 2021-05-31 RX ORDER — ATORVASTATIN CALCIUM 40 MG/1
40 TABLET, FILM COATED ORAL DAILY
Status: DISCONTINUED | OUTPATIENT
Start: 2021-06-01 | End: 2021-06-02 | Stop reason: HOSPADM

## 2021-05-31 RX ORDER — NICOTINE POLACRILEX 4 MG
15 LOZENGE BUCCAL
Status: DISCONTINUED | OUTPATIENT
Start: 2021-05-31 | End: 2021-06-02 | Stop reason: HOSPADM

## 2021-05-31 RX ORDER — ACETAMINOPHEN 160 MG/5ML
650 SOLUTION ORAL EVERY 4 HOURS PRN
Status: DISCONTINUED | OUTPATIENT
Start: 2021-05-31 | End: 2021-06-02 | Stop reason: HOSPADM

## 2021-05-31 RX ORDER — INSULIN LISPRO 100 [IU]/ML
0-14 INJECTION, SOLUTION INTRAVENOUS; SUBCUTANEOUS AS NEEDED
Status: DISCONTINUED | OUTPATIENT
Start: 2021-05-31 | End: 2021-06-02 | Stop reason: HOSPADM

## 2021-05-31 RX ORDER — ALUMINA, MAGNESIA, AND SIMETHICONE 2400; 2400; 240 MG/30ML; MG/30ML; MG/30ML
15 SUSPENSION ORAL EVERY 6 HOURS PRN
Status: DISCONTINUED | OUTPATIENT
Start: 2021-05-31 | End: 2021-06-02 | Stop reason: HOSPADM

## 2021-05-31 RX ORDER — ACETAMINOPHEN 650 MG/1
650 SUPPOSITORY RECTAL EVERY 4 HOURS PRN
Status: DISCONTINUED | OUTPATIENT
Start: 2021-05-31 | End: 2021-06-02 | Stop reason: HOSPADM

## 2021-05-31 RX ORDER — IPRATROPIUM BROMIDE AND ALBUTEROL SULFATE 2.5; .5 MG/3ML; MG/3ML
3 SOLUTION RESPIRATORY (INHALATION) EVERY 4 HOURS PRN
Status: DISCONTINUED | OUTPATIENT
Start: 2021-05-31 | End: 2021-06-02 | Stop reason: HOSPADM

## 2021-05-31 RX ORDER — INSULIN GLARGINE 100 [IU]/ML
40 INJECTION, SOLUTION SUBCUTANEOUS NIGHTLY
Status: DISCONTINUED | OUTPATIENT
Start: 2021-05-31 | End: 2021-06-01

## 2021-05-31 RX ORDER — DEXTROSE MONOHYDRATE 25 G/50ML
25 INJECTION, SOLUTION INTRAVENOUS
Status: DISCONTINUED | OUTPATIENT
Start: 2021-05-31 | End: 2021-06-02 | Stop reason: HOSPADM

## 2021-05-31 RX ORDER — CHOLECALCIFEROL (VITAMIN D3) 125 MCG
5 CAPSULE ORAL NIGHTLY PRN
Status: DISCONTINUED | OUTPATIENT
Start: 2021-05-31 | End: 2021-06-02 | Stop reason: HOSPADM

## 2021-05-31 RX ADMIN — Medication 10 ML: at 21:42

## 2021-05-31 RX ADMIN — INSULIN LISPRO 3 UNITS: 100 INJECTION, SOLUTION INTRAVENOUS; SUBCUTANEOUS at 21:37

## 2021-05-31 RX ADMIN — CARVEDILOL 3.12 MG: 3.12 TABLET, FILM COATED ORAL at 17:57

## 2021-05-31 RX ADMIN — INSULIN GLARGINE 40 UNITS: 100 INJECTION, SOLUTION SUBCUTANEOUS at 21:37

## 2021-05-31 RX ADMIN — ENOXAPARIN SODIUM 40 MG: 40 INJECTION SUBCUTANEOUS at 21:38

## 2021-05-31 RX ADMIN — INSULIN LISPRO 8 UNITS: 100 INJECTION, SOLUTION INTRAVENOUS; SUBCUTANEOUS at 17:11

## 2021-05-31 RX ADMIN — FUROSEMIDE 40 MG: 10 INJECTION, SOLUTION INTRAMUSCULAR; INTRAVENOUS at 21:38

## 2021-06-01 ENCOUNTER — APPOINTMENT (OUTPATIENT)
Dept: NUCLEAR MEDICINE | Facility: HOSPITAL | Age: 61
End: 2021-06-01

## 2021-06-01 PROBLEM — I20.0 UNSTABLE ANGINA PECTORIS (HCC): Status: ACTIVE | Noted: 2021-05-31

## 2021-06-01 PROBLEM — R94.39 ABNORMAL NUCLEAR STRESS TEST: Status: ACTIVE | Noted: 2021-05-31

## 2021-06-01 PROBLEM — L97.522 NON-PRESSURE CHRONIC ULCER OF OTHER PART OF LEFT FOOT WITH FAT LAYER EXPOSED (HCC): Status: ACTIVE | Noted: 2021-06-01

## 2021-06-01 LAB
ANION GAP SERPL CALCULATED.3IONS-SCNC: 14 MMOL/L (ref 5–15)
BASOPHILS # BLD AUTO: 0.1 10*3/MM3 (ref 0–0.2)
BASOPHILS NFR BLD AUTO: 0.9 % (ref 0–1.5)
BH CV REST NUCLEAR ISOTOPE DOSE: 7.5 MCI
BH CV STRESS BP STAGE 1: NORMAL
BH CV STRESS BP STAGE 2: NORMAL
BH CV STRESS BP STAGE 3: NORMAL
BH CV STRESS BP STAGE 4: NORMAL
BH CV STRESS COMMENTS STAGE 1: NORMAL
BH CV STRESS COMMENTS STAGE 2: NORMAL
BH CV STRESS DOSE REGADENOSON STAGE 1: 0.4
BH CV STRESS DURATION MIN STAGE 1: 0
BH CV STRESS DURATION MIN STAGE 2: 4
BH CV STRESS DURATION SEC STAGE 1: 10
BH CV STRESS DURATION SEC STAGE 2: 0
BH CV STRESS HR STAGE 1: 83
BH CV STRESS HR STAGE 2: 101
BH CV STRESS HR STAGE 3: 89
BH CV STRESS HR STAGE 4: 102
BH CV STRESS NUCLEAR ISOTOPE DOSE: 20.2 MCI
BH CV STRESS PROTOCOL 1: NORMAL
BH CV STRESS RECOVERY BP: NORMAL MMHG
BH CV STRESS RECOVERY HR: 81 BPM
BH CV STRESS STAGE 1: 1
BH CV STRESS STAGE 2: 2
BH CV STRESS STAGE 3: 3
BH CV STRESS STAGE 4: 4
BUN SERPL-MCNC: 27 MG/DL (ref 8–23)
BUN/CREAT SERPL: 19.7 (ref 7–25)
CALCIUM SPEC-SCNC: 8.8 MG/DL (ref 8.6–10.5)
CHLORIDE SERPL-SCNC: 102 MMOL/L (ref 98–107)
CO2 SERPL-SCNC: 25 MMOL/L (ref 22–29)
CREAT SERPL-MCNC: 1.37 MG/DL (ref 0.76–1.27)
DEPRECATED RDW RBC AUTO: 44.2 FL (ref 37–54)
EOSINOPHIL # BLD AUTO: 0.2 10*3/MM3 (ref 0–0.4)
EOSINOPHIL NFR BLD AUTO: 2.5 % (ref 0.3–6.2)
ERYTHROCYTE [DISTWIDTH] IN BLOOD BY AUTOMATED COUNT: 13.9 % (ref 12.3–15.4)
GFR SERPL CREATININE-BSD FRML MDRD: 53 ML/MIN/1.73
GLUCOSE BLDC GLUCOMTR-MCNC: 100 MG/DL (ref 70–105)
GLUCOSE BLDC GLUCOMTR-MCNC: 134 MG/DL (ref 70–105)
GLUCOSE BLDC GLUCOMTR-MCNC: 161 MG/DL (ref 70–105)
GLUCOSE BLDC GLUCOMTR-MCNC: 168 MG/DL (ref 70–105)
GLUCOSE SERPL-MCNC: 89 MG/DL (ref 65–99)
HBA1C MFR BLD: 9.3 % (ref 3.5–5.6)
HCT VFR BLD AUTO: 39.4 % (ref 37.5–51)
HGB BLD-MCNC: 13.7 G/DL (ref 13–17.7)
LYMPHOCYTES # BLD AUTO: 2.6 10*3/MM3 (ref 0.7–3.1)
LYMPHOCYTES NFR BLD AUTO: 31.7 % (ref 19.6–45.3)
MAXIMAL PREDICTED HEART RATE: 159 BPM
MCH RBC QN AUTO: 31.7 PG (ref 26.6–33)
MCHC RBC AUTO-ENTMCNC: 34.9 G/DL (ref 31.5–35.7)
MCV RBC AUTO: 90.7 FL (ref 79–97)
MONOCYTES # BLD AUTO: 0.5 10*3/MM3 (ref 0.1–0.9)
MONOCYTES NFR BLD AUTO: 5.8 % (ref 5–12)
NEUTROPHILS NFR BLD AUTO: 4.8 10*3/MM3 (ref 1.7–7)
NEUTROPHILS NFR BLD AUTO: 59.1 % (ref 42.7–76)
NRBC BLD AUTO-RTO: 0.1 /100 WBC (ref 0–0.2)
NT-PROBNP SERPL-MCNC: 1481 PG/ML (ref 0–900)
PERCENT MAX PREDICTED HR: 64.15 %
PLATELET # BLD AUTO: 145 10*3/MM3 (ref 140–450)
PMV BLD AUTO: 10.5 FL (ref 6–12)
POTASSIUM SERPL-SCNC: 3.5 MMOL/L (ref 3.5–5.2)
RBC # BLD AUTO: 4.34 10*6/MM3 (ref 4.14–5.8)
SARS-COV-2 ORF1AB RESP QL NAA+PROBE: NOT DETECTED
SARS-COV-2 RNA PNL SPEC NAA+PROBE: NOT DETECTED
SODIUM SERPL-SCNC: 141 MMOL/L (ref 136–145)
STRESS BASELINE BP: NORMAL MMHG
STRESS BASELINE HR: 76 BPM
STRESS PERCENT HR: 75 %
STRESS POST PEAK BP: NORMAL MMHG
STRESS POST PEAK HR: 102 BPM
STRESS TARGET HR: 135 BPM
WBC # BLD AUTO: 8 10*3/MM3 (ref 3.4–10.8)

## 2021-06-01 PROCEDURE — A9270 NON-COVERED ITEM OR SERVICE: HCPCS | Performed by: INTERNAL MEDICINE

## 2021-06-01 PROCEDURE — G0378 HOSPITAL OBSERVATION PER HR: HCPCS

## 2021-06-01 PROCEDURE — 63710000001 ACETYLCYSTEINE 600 MG CAPSULE: Performed by: INTERNAL MEDICINE

## 2021-06-01 PROCEDURE — 25010000002 FUROSEMIDE PER 20 MG: Performed by: NURSE PRACTITIONER

## 2021-06-01 PROCEDURE — 0 TECHNETIUM SESTAMIBI: Performed by: INTERNAL MEDICINE

## 2021-06-01 PROCEDURE — 93017 CV STRESS TEST TRACING ONLY: CPT

## 2021-06-01 PROCEDURE — 63710000001 INSULIN GLARGINE PER 5 UNITS: Performed by: INTERNAL MEDICINE

## 2021-06-01 PROCEDURE — 93459 L HRT ART/GRFT ANGIO: CPT | Performed by: INTERNAL MEDICINE

## 2021-06-01 PROCEDURE — 25010000002 MIDAZOLAM PER 1 MG: Performed by: INTERNAL MEDICINE

## 2021-06-01 PROCEDURE — 93016 CV STRESS TEST SUPVJ ONLY: CPT | Performed by: NURSE PRACTITIONER

## 2021-06-01 PROCEDURE — 87635 SARS-COV-2 COVID-19 AMP PRB: CPT | Performed by: INTERNAL MEDICINE

## 2021-06-01 PROCEDURE — 25010000002 REGADENOSON 0.4 MG/5ML SOLUTION: Performed by: INTERNAL MEDICINE

## 2021-06-01 PROCEDURE — 80048 BASIC METABOLIC PNL TOTAL CA: CPT | Performed by: NURSE PRACTITIONER

## 2021-06-01 PROCEDURE — 99225 PR SBSQ OBSERVATION CARE/DAY 25 MINUTES: CPT | Performed by: INTERNAL MEDICINE

## 2021-06-01 PROCEDURE — A9500 TC99M SESTAMIBI: HCPCS | Performed by: INTERNAL MEDICINE

## 2021-06-01 PROCEDURE — 0 IOPAMIDOL PER 1 ML: Performed by: INTERNAL MEDICINE

## 2021-06-01 PROCEDURE — 99213 OFFICE O/P EST LOW 20 MIN: CPT | Performed by: NURSE PRACTITIONER

## 2021-06-01 PROCEDURE — 85025 COMPLETE CBC W/AUTO DIFF WBC: CPT | Performed by: NURSE PRACTITIONER

## 2021-06-01 PROCEDURE — 83880 ASSAY OF NATRIURETIC PEPTIDE: CPT | Performed by: NURSE PRACTITIONER

## 2021-06-01 PROCEDURE — 78452 HT MUSCLE IMAGE SPECT MULT: CPT

## 2021-06-01 PROCEDURE — 25010000002 FENTANYL CITRATE (PF) 100 MCG/2ML SOLUTION: Performed by: INTERNAL MEDICINE

## 2021-06-01 PROCEDURE — 99214 OFFICE O/P EST MOD 30 MIN: CPT | Performed by: INTERNAL MEDICINE

## 2021-06-01 PROCEDURE — C1769 GUIDE WIRE: HCPCS | Performed by: INTERNAL MEDICINE

## 2021-06-01 PROCEDURE — 93018 CV STRESS TEST I&R ONLY: CPT | Performed by: INTERNAL MEDICINE

## 2021-06-01 PROCEDURE — 78452 HT MUSCLE IMAGE SPECT MULT: CPT | Performed by: INTERNAL MEDICINE

## 2021-06-01 PROCEDURE — 96376 TX/PRO/DX INJ SAME DRUG ADON: CPT

## 2021-06-01 PROCEDURE — 63710000001 INSULIN LISPRO (HUMAN) PER 5 UNITS: Performed by: INTERNAL MEDICINE

## 2021-06-01 PROCEDURE — C1894 INTRO/SHEATH, NON-LASER: HCPCS | Performed by: INTERNAL MEDICINE

## 2021-06-01 PROCEDURE — 82962 GLUCOSE BLOOD TEST: CPT

## 2021-06-01 PROCEDURE — 99152 MOD SED SAME PHYS/QHP 5/>YRS: CPT | Performed by: INTERNAL MEDICINE

## 2021-06-01 RX ORDER — FENTANYL CITRATE 50 UG/ML
INJECTION, SOLUTION INTRAMUSCULAR; INTRAVENOUS AS NEEDED
Status: DISCONTINUED | OUTPATIENT
Start: 2021-06-01 | End: 2021-06-01 | Stop reason: HOSPADM

## 2021-06-01 RX ORDER — ACETAMINOPHEN 325 MG/1
650 TABLET ORAL EVERY 4 HOURS PRN
Status: DISCONTINUED | OUTPATIENT
Start: 2021-06-01 | End: 2021-06-02 | Stop reason: HOSPADM

## 2021-06-01 RX ORDER — LIDOCAINE HYDROCHLORIDE 20 MG/ML
INJECTION, SOLUTION INFILTRATION; PERINEURAL AS NEEDED
Status: DISCONTINUED | OUTPATIENT
Start: 2021-06-01 | End: 2021-06-01 | Stop reason: HOSPADM

## 2021-06-01 RX ORDER — SODIUM CHLORIDE 9 MG/ML
100 INJECTION, SOLUTION INTRAVENOUS CONTINUOUS
Status: DISCONTINUED | OUTPATIENT
Start: 2021-06-01 | End: 2021-06-02

## 2021-06-01 RX ORDER — SODIUM CHLORIDE 9 MG/ML
100 INJECTION, SOLUTION INTRAVENOUS CONTINUOUS
Status: DISPENSED | OUTPATIENT
Start: 2021-06-01 | End: 2021-06-01

## 2021-06-01 RX ORDER — BUMETANIDE 1 MG/1
2 TABLET ORAL 3 TIMES DAILY
Status: DISCONTINUED | OUTPATIENT
Start: 2021-06-02 | End: 2021-06-02 | Stop reason: HOSPADM

## 2021-06-01 RX ORDER — MIDAZOLAM HYDROCHLORIDE 1 MG/ML
INJECTION INTRAMUSCULAR; INTRAVENOUS AS NEEDED
Status: DISCONTINUED | OUTPATIENT
Start: 2021-06-01 | End: 2021-06-01 | Stop reason: HOSPADM

## 2021-06-01 RX ORDER — BUMETANIDE 1 MG/1
2 TABLET ORAL 3 TIMES DAILY
Status: DISCONTINUED | OUTPATIENT
Start: 2021-06-01 | End: 2021-06-01

## 2021-06-01 RX ORDER — DIPHENHYDRAMINE HYDROCHLORIDE 50 MG/ML
50 INJECTION INTRAMUSCULAR; INTRAVENOUS ONCE
Status: CANCELLED | OUTPATIENT
Start: 2021-06-01 | End: 2021-06-01

## 2021-06-01 RX ORDER — INSULIN GLARGINE 100 [IU]/ML
30 INJECTION, SOLUTION SUBCUTANEOUS NIGHTLY
Status: DISCONTINUED | OUTPATIENT
Start: 2021-06-01 | End: 2021-06-02 | Stop reason: HOSPADM

## 2021-06-01 RX ADMIN — TECHNETIUM TC 99M SESTAMIBI 1 DOSE: 1 INJECTION INTRAVENOUS at 10:17

## 2021-06-01 RX ADMIN — ISOSORBIDE MONONITRATE 30 MG: 30 TABLET, EXTENDED RELEASE ORAL at 08:50

## 2021-06-01 RX ADMIN — BUMETANIDE 2 MG: 1 TABLET ORAL at 13:19

## 2021-06-01 RX ADMIN — SODIUM CHLORIDE 100 ML/HR: 9 INJECTION, SOLUTION INTRAVENOUS at 15:41

## 2021-06-01 RX ADMIN — CLOPIDOGREL BISULFATE 75 MG: 75 TABLET ORAL at 13:19

## 2021-06-01 RX ADMIN — Medication 10 ML: at 22:16

## 2021-06-01 RX ADMIN — TECHNETIUM TC 99M SESTAMIBI 1 DOSE: 1 INJECTION INTRAVENOUS at 11:15

## 2021-06-01 RX ADMIN — Medication 10 ML: at 08:49

## 2021-06-01 RX ADMIN — CARVEDILOL 3.12 MG: 3.12 TABLET, FILM COATED ORAL at 08:50

## 2021-06-01 RX ADMIN — ASPIRIN 81 MG: 81 TABLET, COATED ORAL at 13:19

## 2021-06-01 RX ADMIN — INSULIN GLARGINE 30 UNITS: 100 INJECTION, SOLUTION SUBCUTANEOUS at 22:15

## 2021-06-01 RX ADMIN — REGADENOSON 0.4 MG: 0.08 INJECTION, SOLUTION INTRAVENOUS at 11:15

## 2021-06-01 RX ADMIN — SPIRONOLACTONE 25 MG: 25 TABLET ORAL at 08:50

## 2021-06-01 RX ADMIN — FUROSEMIDE 40 MG: 10 INJECTION, SOLUTION INTRAMUSCULAR; INTRAVENOUS at 04:10

## 2021-06-01 RX ADMIN — Medication 600 MG: at 22:15

## 2021-06-01 RX ADMIN — ATORVASTATIN CALCIUM 40 MG: 40 TABLET, FILM COATED ORAL at 08:50

## 2021-06-01 RX ADMIN — DULOXETINE 60 MG: 30 CAPSULE, DELAYED RELEASE ORAL at 08:50

## 2021-06-01 RX ADMIN — SODIUM CHLORIDE 100 ML/HR: 9 INJECTION, SOLUTION INTRAVENOUS at 19:05

## 2021-06-01 RX ADMIN — INSULIN LISPRO 3 UNITS: 100 INJECTION, SOLUTION INTRAVENOUS; SUBCUTANEOUS at 22:15

## 2021-06-01 NOTE — PROGRESS NOTES
"Wound Initial Evaluation   [facility]NY     Patient Name: Bobby Steele Jr.  : 1960  MRN: [id number]  Today's Date: 2021 Room Number: 304/1      Admit Date: 2021  Attending: Antoine Salinas DO    Consult Requested By: Dr Salinas    Reason For Consult: Diabetic ulcer surgical left foot    Chief Complaint: 61-year-old male: Who presented to the hospital with complaints of chest pain and shortness of breath.  Consult received to assess nonhealing diabetic ulcer to the left foot.  Per patient he is seen as an outpatient wound care center at [facility].  He is unsure of the current treatment plan but states it is some type of \"powder \"that is applied by his family or home health.  Patient reports he has had the wound since last November where he underwent a partial amputation of the foot    Visit Dx:  No diagnosis found.  Patient Active Problem List   Diagnosis   • Chronic systolic congestive heart failure (CMS/HCC)   • Elevated troponin   • Peripheral vascular disease (CMS/HCC)   • Multiple-type hyperlipidemia   • Tobacco dependence syndrome   • Benign essential HTN   • Overweight (BMI 25.0-29.9)   • Ischemic cardiomyopathy   • Coronary artery disease with hx of myocardial infarct w/o hx of CABG   • Panlobular emphysema (CMS/HCC)   • Presence of automatic cardioverter/defibrillator (AICD)   • Paresthesias   • GERD without esophagitis   • DEBI (obstructive sleep apnea)   • Type 2 diabetes mellitus with hyperglycemia, with long-term current use of insulin (CMS/HCC)   • Non-ST elevation MI (NSTEMI) (CMS/HCC)   • Acute on chronic systolic CHF (congestive heart failure) (CMS/HCC)   • Bronchitis   • Open wound of left foot with acute infection status post great toe amputation   • Depression   • Chest pain   • Open wound of left foot   • Non-pressure chronic ulcer of other part of left foot with fat layer exposed (CMS/HCC)       History:   Past Medical History:   Diagnosis Date   • CAD (coronary artery " disease)     S/P CABG   • Chest pain 05/31/2021   • Chronic systolic CHF (congestive heart failure) (CMS/HCC)    • COPD (chronic obstructive pulmonary disease) (CMS/HCC)    • DM2 (diabetes mellitus, type 2) (CMS/HCC)    • Dyslipidemia    • Encounter for monitoring antiplatelet therapy    • Hypertension    • Myocardial infarction (CMS/HCC)     inferior wall MI--03/13   • DEBI (obstructive sleep apnea)     noncompliant with CPAP   • Peripheral vascular disease (CMS/HCC)     S/P left fem-pop bypass   • Tobacco use      Past Surgical History:   Procedure Laterality Date   • AMPUTATION FOOT / TOE      left   • APPENDECTOMY      at age 8 or 9   • BIVENTRICULAR ASSIST DEVICE/LEFT VENTRICULAR ASSIST DEVICE INSERTION N/A 5/13/2020    Procedure: Left Ventricular Assist Device;  Surgeon: Juarez Wing MD;  Location:  [facility] CATH INVASIVE LOCATION;  Service: Cardiovascular;  Laterality: N/A;   • CARDIAC CATHETERIZATION      3-; [facility] 9-   • CARDIAC CATHETERIZATION Right 6/27/2019    Procedure: Cardiac Catheterization/with grafts groin access;  Surgeon: Juarez Wing MD;  Location:  [facility] CATH INVASIVE LOCATION;  Service: Cardiovascular   • CARDIAC CATHETERIZATION N/A 5/8/2020    Procedure: Left Heart Cath with grafts;  Surgeon: Juarez Wing MD;  Location: [facility] CATH INVASIVE LOCATION;  Service: Cardiovascular;  Laterality: N/A;   • CARDIAC CATHETERIZATION N/A 5/13/2020    Procedure: Percutaneous Coronary Intervention, Impella backup;  Surgeon: Juarez Wing MD;  Location: [facility] CATH INVASIVE LOCATION;  Service: Cardiovascular;  Laterality: N/A;   • CARDIAC ELECTROPHYSIOLOGY PROCEDURE N/A 10/8/2019    Procedure: ICD SC new, ST.SHIV ;  Surgeon: Juarez Wing MD;  Location:  [facility] CATH INVASIVE LOCATION;  Service: Cardiovascular   • CARDIAC SURGERY  2013   • COLONOSCOPY     • CORONARY ARTERY BYPASS GRAFT      x3, 3-18-13-LIMA to LAD, and reverse individual  SVG to lateral marginal and to PDA   • FEMORAL POPLITEAL BYPASS Left 01/09/2019    [facility]/ Dr. Jero Hatch   • HAND SURGERY Left     crushed hand   • PACEMAKER IMPLANTATION     • TOE SURGERY      toe nail removal of big toe- age 8 or 9     Social History     Socioeconomic History   • Marital status:      Spouse name: Not on file   • Number of children: Not on file   • Years of education: Not on file   • Highest education level: Not on file   Tobacco Use   • Smoking status: Current Every Day Smoker     Packs/day: 2.00     Years: 25.00     Pack years: 50.00     Types: Cigarettes   • Smokeless tobacco: Never Used   Vaping Use   • Vaping Use: Never used   Substance and Sexual Activity   • Alcohol use: No   • Drug use: No   • Sexual activity: Defer       Allergies:  No Known Allergies    Medications:    Current Facility-Administered Medications:   •  acetaminophen (TYLENOL) tablet 650 mg, 650 mg, Oral, Q4H PRN **OR** acetaminophen (TYLENOL) 160 MG/5ML solution 650 mg, 650 mg, Oral, Q4H PRN **OR** acetaminophen (TYLENOL) suppository 650 mg, 650 mg, Rectal, Q4H PRN, Paul, Philomena, APRN  •  aluminum-magnesium hydroxide-simethicone (MAALOX MAX) 400-400-40 MG/5ML suspension 15 mL, 15 mL, Oral, Q6H PRN, Paul, Philomena, APRN  •  aspirin EC tablet 81 mg, 81 mg, Oral, Daily, Paul, Philomena, APRN, Stopped at 06/01/21 0849  •  atorvastatin (LIPITOR) tablet 40 mg, 40 mg, Oral, Daily, Paul, Philomena, APRN, 40 mg at 06/01/21 0850  •  bumetanide (BUMEX) tablet 2 mg, 2 mg, Oral, TID, Antoine Salinas, DO  •  carvedilol (COREG) tablet 3.125 mg, 3.125 mg, Oral, BID With Meals, Paul, Philomena, APRN, 3.125 mg at 06/01/21 0850  •  clopidogrel (PLAVIX) tablet 75 mg, 75 mg, Oral, Daily, Paul, Philomena, APRN, Stopped at 06/01/21 0849  •  dextrose (D50W) 25 g/ 50mL Intravenous Solution 25 g, 25 g, Intravenous, Q15 Min PRN, Paul, Philomena, APRN  •  dextrose (GLUTOSE) oral gel 15 g, 15 g, Oral, Q15 Min PRN, Paul, Philomena, APRN  •  DULoxetine  (CYMBALTA) DR capsule 60 mg, 60 mg, Oral, Daily, Paul, Philomean, APRN, 60 mg at 06/01/21 0850  •  enoxaparin (LOVENOX) syringe 40 mg, 40 mg, Subcutaneous, Q24H, Paul, Philomena, APRN, 40 mg at 05/31/21 2138  •  glucagon (human recombinant) (GLUCAGEN DIAGNOSTIC) injection 1 mg, 1 mg, Subcutaneous, PRN, Paul, Philomena, APRN  •  insulin glargine (LANTUS, SEMGLEE) injection 40 Units, 40 Units, Subcutaneous, Nightly, Paul, Philomena, APRN, 40 Units at 05/31/21 2137  •  insulin lispro (ADMELOG) injection 0-14 Units, 0-14 Units, Subcutaneous, 4x Daily With Meals & Nightly, 3 Units at 05/31/21 2137 **AND** insulin lispro (ADMELOG) injection 0-14 Units, 0-14 Units, Subcutaneous, PRN, Paul, Philomena, APRN  •  ipratropium-albuterol (DUO-NEB) nebulizer solution 3 mL, 3 mL, Nebulization, Q4H PRN, Paul, Philomena, APRN  •  isosorbide mononitrate (IMDUR) 24 hr tablet 30 mg, 30 mg, Oral, Daily, Paul, Philomena, APRN, 30 mg at 06/01/21 0850  •  melatonin tablet 5 mg, 5 mg, Oral, Nightly PRN, Paul, Philomena, APRN  •  nitroglycerin (NITROSTAT) SL tablet 0.4 mg, 0.4 mg, Sublingual, Q5 Min PRN, Paul, Philomena, APRN  •  ondansetron (ZOFRAN) tablet 4 mg, 4 mg, Oral, Q6H PRN **OR** ondansetron (ZOFRAN) injection 4 mg, 4 mg, Intravenous, Q6H PRN, Paul, Philomena, APRN  •  sodium chloride 0.9 % flush 10 mL, 10 mL, Intravenous, Q12H, Paul, Philomena, APRN, 10 mL at 06/01/21 0849  •  sodium chloride 0.9 % flush 10 mL, 10 mL, Intravenous, PRN, Philomena Paul, APRN  •  spironolactone (ALDACTONE) tablet 25 mg, 25 mg, Oral, Daily, PaulPhilomena gay, APRN, 25 mg at 06/01/21 0850    Results Review:  Lab Results (last 48 hours)     Procedure Component Value Units Date/Time    Hemoglobin A1c [[id number]]  (Abnormal) Collected: 05/31/21 1523    Specimen: Blood Updated: 06/01/21 0956     Hemoglobin A1C 9.3 %     Narrative:      Hemoglobin A1C Reference Range:    <5.7 %        Normal  5.7-6.4 %     Increased risk for diabetes  > 6.4 %        Diabetes       These guidelines  have been recommended by the American Diabetic Association for Hgb A1c.      The following 2010 guidelines have been recommended by the American Diabetes Association for Hemoglobin A1c.    HBA1c 5.7-6.4% Increased risk for future diabetes (pre-diabetes)  HBA1c     >6.4% Diabetes      COVID PRE-OP / PRE-PROCEDURE SCREENING ORDER (NO ISOLATION) - Swab, Nasopharynx [[id number]]  (Normal) Collected: 06/01/21 0844    Specimen: Swab from Nasopharynx Updated: 06/01/21 0929    Narrative:      The following orders were created for panel order COVID PRE-OP / PRE-PROCEDURE SCREENING ORDER (NO ISOLATION) - Swab, Nasopharynx.  Procedure                               Abnormality         Status                     ---------                               -----------         ------                     COVID-19,CEPHEID,COR/[facility]...[[id number]]  Normal              Final result                 Please view results for these tests on the individual orders.    COVID-19,CEPHEID,COR/[facility]/PAD IN-HOUSE(OR EMERGENT/ADD-ON),NP SWAB IN TRANSPORT MEDIA 3-4 HR TAT, RT-PCR - Swab, Nasopharynx [[id number]]  (Normal) Collected: 06/01/21 0844    Specimen: Swab from Nasopharynx Updated: 06/01/21 0929     COVID19 Not Detected    Narrative:      Fact sheet for providers: https://www.fda.gov/media/756658/download     Fact sheet for patients: https://www.fda.gov/media/438684/download  Fact sheet for providers: https://www.fda.gov/media/176642/download    Fact sheet for patients: https://www.fda.gov/media/913827/download    Test performed by PCR.    POC Glucose Once [[id number]]  (Normal) Collected: 06/01/21 0755    Specimen: Blood Updated: 06/01/21 0756     Glucose 100 mg/dL      Comment: Serial Number: [id number]:  [id number]       Basic Metabolic Panel [[id number]]  (Abnormal) Collected: 06/01/21 0233    Specimen: Blood Updated: 06/01/21 0405     Glucose 89 mg/dL      BUN 27 mg/dL      Creatinine 1.37 mg/dL      Sodium 141 mmol/L      Potassium 3.5 mmol/L       Chloride 102 mmol/L      CO2 25.0 mmol/L      Calcium 8.8 mg/dL      eGFR Non African Amer 53 mL/min/1.73      BUN/Creatinine Ratio 19.7     Anion Gap 14.0 mmol/L     Narrative:      GFR Normal >60  Chronic Kidney Disease <60  Kidney Failure <15      BNP [[id number]]  (Abnormal) Collected: 06/01/21 0233    Specimen: Blood Updated: 06/01/21 0353     proBNP 1,481.0 pg/mL     Narrative:      Among patients with dyspnea, NT-proBNP is highly sensitive for the detection of acute congestive heart failure. In addition NT-proBNP of <300 pg/ml effectively rules out acute congestive heart failure with 99% negative predictive value.    Results may be falsely decreased if patient taking Biotin.      CBC Auto Differential [[id number]]  (Normal) Collected: 06/01/21 0233    Specimen: Blood Updated: 06/01/21 0332     WBC 8.00 10*3/mm3      RBC 4.34 10*6/mm3      Hemoglobin 13.7 g/dL      Hematocrit 39.4 %      MCV 90.7 fL      MCH 31.7 pg      MCHC 34.9 g/dL      RDW 13.9 %      RDW-SD 44.2 fl      MPV 10.5 fL      Platelets 145 10*3/mm3      Neutrophil % 59.1 %      Lymphocyte % 31.7 %      Monocyte % 5.8 %      Eosinophil % 2.5 %      Basophil % 0.9 %      Neutrophils, Absolute 4.80 10*3/mm3      Lymphocytes, Absolute 2.60 10*3/mm3      Monocytes, Absolute 0.50 10*3/mm3      Eosinophils, Absolute 0.20 10*3/mm3      Basophils, Absolute 0.10 10*3/mm3      nRBC 0.1 /100 WBC     POC Glucose Once [[id number]]  (Abnormal) Collected: 05/31/21 2127    Specimen: Blood Updated: 05/31/21 2128     Glucose 153 mg/dL      Comment: Serial Number: [id number]:  [id number]       Troponin [[id number]]  (Normal) Collected: 05/31/21 2003    Specimen: Blood Updated: 05/31/21 2111     Troponin T 0.011 ng/mL     Narrative:      Troponin T Reference Range:  <= 0.03 ng/mL-   Negative for AMI  >0.03 ng/mL-     Abnormal for myocardial necrosis.  Clinicians would have to utilize clinical acumen, EKG, Troponin and serial changes to determine if it is an  Acute Myocardial Infarction or myocardial injury due to an underlying chronic condition.       Results may be falsely decreased if patient taking Biotin.      Basic Metabolic Panel [[id number]]  (Abnormal) Collected: 05/31/21 1856    Specimen: Blood Updated: 05/31/21 1952     Glucose 193 mg/dL      BUN 26 mg/dL      Creatinine 1.43 mg/dL      Sodium 141 mmol/L      Potassium 3.8 mmol/L      Chloride 104 mmol/L      CO2 26.0 mmol/L      Calcium 9.0 mg/dL      eGFR Non African Amer 50 mL/min/1.73      BUN/Creatinine Ratio 18.2     Anion Gap 11.0 mmol/L     Narrative:      GFR Normal >60  Chronic Kidney Disease <60  Kidney Failure <15      CBC & Differential [[id number]]  (Normal) Collected: 05/31/21 1523    Specimen: Blood Updated: 05/31/21 1803    Narrative:      The following orders were created for panel order CBC & Differential.  Procedure                               Abnormality         Status                     ---------                               -----------         ------                     CBC Auto Differential[[id number]]        Normal              Final result                 Please view results for these tests on the individual orders.    CBC Auto Differential [[id number]]  (Normal) Collected: 05/31/21 1523    Specimen: Blood Updated: 05/31/21 1803     WBC 9.80 10*3/mm3      RBC 4.58 10*6/mm3      Hemoglobin 14.2 g/dL      Hematocrit 42.0 %      MCV 91.8 fL      MCH 31.0 pg      MCHC 33.8 g/dL      RDW 14.0 %      RDW-SD 44.6 fl      MPV 10.4 fL      Platelets 158 10*3/mm3      Neutrophil % 66.6 %      Lymphocyte % 25.7 %      Monocyte % 5.5 %      Eosinophil % 1.4 %      Basophil % 0.8 %      Neutrophils, Absolute 6.50 10*3/mm3      Lymphocytes, Absolute 2.50 10*3/mm3      Monocytes, Absolute 0.50 10*3/mm3      Eosinophils, Absolute 0.10 10*3/mm3      Basophils, Absolute 0.10 10*3/mm3      nRBC 0.1 /100 WBC     COVID PRE-OP / PRE-PROCEDURE SCREENING ORDER (NO ISOLATION) - Swab, Nasopharynx [[id number]]  Collected: 05/31/21 1538    Specimen: Swab from Nasopharynx Updated: 05/31/21 1645    Narrative:      The following orders were created for panel order COVID PRE-OP / PRE-PROCEDURE SCREENING ORDER (NO ISOLATION) - Swab, Nasopharynx.  Procedure                               Abnormality         Status                     ---------                               -----------         ------                     COVID-19,APTIMA PANTHER,...[[id number]]                      In process                   Please view results for these tests on the individual orders.    COVID-19,APTIMA PANTHER,[facility] IN-HOUSE, NP/OP SWAB IN UTM/VTM/SALINE TRANSPORT MEDIA,24 HR TAT - Swab, Nasopharynx [[id number]] Collected: 05/31/21 1538    Specimen: Swab from Nasopharynx Updated: 05/31/21 1645    POC Glucose Once [[id number]]  (Abnormal) Collected: 05/31/21 1633    Specimen: Blood Updated: 05/31/21 1633     Glucose 266 mg/dL      Comment: Serial Number: [id number]:  [id number]       Extra Tubes [[id number]] Collected: 05/31/21 1523    Specimen: Blood, Venous Line Updated: 05/31/21 1630    Narrative:      The following orders were created for panel order Extra Tubes.  Procedure                               Abnormality         Status                     ---------                               -----------         ------                     Gold Top - SST[[id number]]                                   Final result                 Please view results for these tests on the individual orders.    Gold Top - SST [[id number]] Collected: 05/31/21 1523    Specimen: Blood Updated: 05/31/21 1630     Extra Tube Hold for add-ons.     Comment: Auto resulted.       Troponin [[id number]]  (Normal) Collected: 05/31/21 1429    Specimen: Blood Updated: 05/31/21 1501     Troponin T 0.015 ng/mL     Narrative:      Troponin T Reference Range:  <= 0.03 ng/mL-   Negative for AMI  >0.03 ng/mL-     Abnormal for myocardial necrosis.  Clinicians would have to utilize clinical  acumen, EKG, Troponin and serial changes to determine if it is an Acute Myocardial Infarction or myocardial injury due to an underlying chronic condition.       Results may be falsely decreased if patient taking Biotin.          Imaging Results (Last 72 Hours)     ** No results found for the last 72 hours. **          Review of Systems:  Review of Systems    Physical Assessment:  Wound 12/12/20 1935 Left anterior fifth toe Arterial Ulcer (Active)                                                                 Diabetic ulcer left foot.  Partial amputation of the left foot involving digits continues with the one area that is open it is full-thickness in nature the base of the wound is very dull pink but clean surrounding skin slightly callused and slightly macerated.  There is a small to moderate amount of serosanguineous exudate noted on the old dressing.  Approximate size of the wound 0.4 cm x 1 cm with a depth of point 4 cm.  No overt symptoms of infection are noted.    Final diagnosis  Nonpressure ulcer left foot fat layer exposed    Recommendation and Plan  Overall the wound does appear to be stable and healing.  Will recommend treating with Opticyl silver to help control the exudate and maceration to the periwound skin follow-up with outpatient wound care upon discharge    JOY Dooley   6/1/2021   11:47 EDT

## 2021-06-01 NOTE — PLAN OF CARE
Goal Outcome Evaluation:        Outcome Summary: Patient had abnormal myoview this AM. Currently having heart cath. Patient pleasant and cooperative.

## 2021-06-01 NOTE — PROGRESS NOTES
"      [facility] Daily Progress Note      Hospitalist Team  LOS 0 days      Patient Care Team:  Yamile Agarwal MD as PCP - General  Yamile Agarwal MD as PCP - Family Medicine  Juarez Wing MD as Consulting Physician (Cardiology)  Aranza Ramirez APRN as Nurse Practitioner (Nurse Practitioner)  Bhavin Hummel MD as Consulting Physician (Nephrology)    Patient Location: [facility]/1      Subjective   Subjective     Chief Complaint / Subjective  No chief complaint on file.  follow up chest pain      Brief Synopsis of Hospital Course/HPI        Date::    6/1/21: no chest pain since admit. No LE edema and no shortness of breath       Review of Systems   Cardiovascular: Negative for chest pain.   Respiratory: Positive for shortness of breath.          Objective   Objective      Vital Signs  Temp:  [97.5 °F (36.4 °C)-97.7 °F (36.5 °C)] 97.6 °F (36.4 °C)  Heart Rate:  [74-78] 78  Resp:  [15-16] 15  BP: ()/(65-81) 99/65  Oxygen Therapy  SpO2: 95 %  Pulse Oximetry Type: Intermittent  Device (Oxygen Therapy): room air  Flowsheet Rows      First Filed Value   Admission Height  170.2 cm (67\") Documented at 05/31/2021 1403   Admission Weight  85.5 kg (188 lb 6.4 oz) Documented at 05/31/2021 1403        Intake & Output (last 3 days)       05/29 0701 - 05/30 0700 05/30 0701 - 05/31 0700 05/31 0701 - 06/01 0700 06/01 0701 - 06/02 0700    P.O.    0    Total Intake(mL/kg)    0 (0)    Net    0                Lines, Drains & Airways    Active LDAs     Name:   Placement date:   Placement time:   Site:   Days:    Peripheral IV 05/31/21 1551 Anterior;Distal;Left;Upper Arm   05/31/21    1551    Arm   1                  Physical Exam:    Physical Exam  Vitals reviewed.   HENT:      Head: Normocephalic.   Cardiovascular:      Rate and Rhythm: Normal rate.   Pulmonary:      Effort: Pulmonary effort is normal. No respiratory distress.   Abdominal:      General: There is no distension.     "  Palpations: Abdomen is soft.   Musculoskeletal:      Right lower leg: No edema.      Left lower leg: No edema.   Skin:     Comments: Nonpressure ulcer left foot    Neurological:      Mental Status: He is alert.              Wounds (last 24 hours)      LDA Wound     Row Name 06/01/21 0701 05/31/21 1918 05/31/21 1750       Wound 12/12/20 1935 Left anterior fifth toe Arterial Ulcer    Wound - Properties Group Placement Date: 12/12/20  -DC Placement Time: 1935  -DC Present on Hospital Admission: Y  -DC Side: Left  -DC Orientation: anterior  -DC Location: fifth toe  -DC Primary Wound Type: Arterial ulc  -DC, gangreneous left great toe amputated, non healing poor circ     Dressing Appearance  dry;intact  -TP  dry;intact  -CM  intact;moist drainage  -AH    Closure  None  -TP  --  None  -AH    Base  dressing in place, unable to visualize  -TP  dressing in place, unable to visualize  -CM  white;blanchable  -AH    Periwound  --  --  intact;moist  -AH    Periwound Temperature  --  --  warm  -AH    Periwound Skin Turgor  --  --  soft  -AH    Drainage Characteristics/Odor  --  --  yellow  -AH    Drainage Amount  --  --  small  -AH    Care, Wound  --  --  cleansed with;sterile normal saline  -AH    Dressing Care  dressing changed  -TP  --  dressing changed;gauze, wet-to-moist  -AH    Retired Wound - Properties Group Date first assessed: 12/12/20  -DC Time first assessed: 1935  -DC Present on Hospital Admission: Y  -DC Side: Left  -DC Location: fifth toe  -DC Primary Wound Type: Arterial ulc  -DC, gangreneous left great toe amputated, non healing poor circ     Row Name 05/31/21 1744 05/31/21 1737          Wound 12/12/20 1935 Left anterior fifth toe Arterial Ulcer    Wound - Properties Group Placement Date: 12/12/20  -DC Placement Time: 1935  -DC Present on Hospital Admission: Y  -DC Side: Left  -DC Orientation: anterior  -DC Location: fifth toe  -DC Primary Wound Type: Arterial ulc  -DC, gangreneous left great toe amputated, non  healing poor circ     Wound Image  Images linked: 1  -  Images linked: 1  -     Retired Wound - Properties Group Date first assessed: 12/12/20  -DC Time first assessed: 1935  -DC Present on Hospital Admission: Y  -DC Side: Left  -DC Location: fifth toe  -DC Primary Wound Type: Arterial ulc  -DC, gangreneous left great toe amputated, non healing poor circ       User Key  (r) = Recorded By, (t) = Taken By, (c) = Cosigned By    Initials Name Provider Type    Irvin Hlaey, RN Registered Nurse    Mary Artis, RN Registered Nurse    Marly Juarez LPN Licensed Nurse    Bennett Martinez LPN Licensed Nurse          Procedures:    Procedure(s):  Left Heart Cath, possible pci          Results Review:     I reviewed the patient's new clinical results.      Lab Results (last 24 hours)     Procedure Component Value Units Date/Time    COVID PRE-OP / PRE-PROCEDURE SCREENING ORDER (NO ISOLATION) - Swab, Nasopharynx [[id number]]  (Normal) Collected: 05/31/21 1538    Specimen: Swab from Nasopharynx Updated: 06/01/21 1431    Narrative:      The following orders were created for panel order COVID PRE-OP / PRE-PROCEDURE SCREENING ORDER (NO ISOLATION) - Swab, Nasopharynx.  Procedure                               Abnormality         Status                     ---------                               -----------         ------                     COVID-19,APTIMA PANTHER,...[[id number]]  Normal              Final result                 Please view results for these tests on the individual orders.    COVID-19,APTIMA PANTHERRUBY IN-HOUSE, NP/OP SWAB IN UTM/VTM/SALINE TRANSPORT MEDIA,24 HR TAT - Swab, Nasopharynx [[id number]]  (Normal) Collected: 05/31/21 1538    Specimen: Swab from Nasopharynx Updated: 06/01/21 1431     COVID19 Not Detected    Narrative:      Fact sheet for providers: https://www.fda.gov/media/518467/download     Fact sheet for patients: https://www.fda.gov/media/508175/download    Test performed by RT  PCR.    POC Glucose Once [[id number]]  (Abnormal) Collected: 06/01/21 1158    Specimen: Blood Updated: 06/01/21 1158     Glucose 161 mg/dL      Comment: Serial Number: [id number]:  [id number]       Hemoglobin A1c [[id number]]  (Abnormal) Collected: 05/31/21 1523    Specimen: Blood Updated: 06/01/21 0956     Hemoglobin A1C 9.3 %     Narrative:      Hemoglobin A1C Reference Range:    <5.7 %        Normal  5.7-6.4 %     Increased risk for diabetes  > 6.4 %        Diabetes       These guidelines have been recommended by the American Diabetic Association for Hgb A1c.      The following 2010 guidelines have been recommended by the American Diabetes Association for Hemoglobin A1c.    HBA1c 5.7-6.4% Increased risk for future diabetes (pre-diabetes)  HBA1c     >6.4% Diabetes      COVID PRE-OP / PRE-PROCEDURE SCREENING ORDER (NO ISOLATION) - Swab, Nasopharynx [[id number]]  (Normal) Collected: 06/01/21 0844    Specimen: Swab from Nasopharynx Updated: 06/01/21 0929    Narrative:      The following orders were created for panel order COVID PRE-OP / PRE-PROCEDURE SCREENING ORDER (NO ISOLATION) - Swab, Nasopharynx.  Procedure                               Abnormality         Status                     ---------                               -----------         ------                     COVID-19,CEPHEID,COR/AJ...[[id number]]  Normal              Final result                 Please view results for these tests on the individual orders.    COVID-19,CEPHEID,COR/AJ/PAD IN-HOUSE(OR EMERGENT/ADD-ON),NP SWAB IN TRANSPORT MEDIA 3-4 HR TAT, RT-PCR - Swab, Nasopharynx [[id number]]  (Normal) Collected: 06/01/21 0844    Specimen: Swab from Nasopharynx Updated: 06/01/21 0929     COVID19 Not Detected    Narrative:      Fact sheet for providers: https://www.fda.gov/media/913858/download     Fact sheet for patients: https://www.fda.gov/media/803417/download  Fact sheet for providers: https://www.fda.gov/media/082122/download    Fact sheet for  patients: https://www.[employer].gov/media/993178/download    Test performed by PCR.    POC Glucose Once [[id number]]  (Normal) Collected: 06/01/21 0755    Specimen: Blood Updated: 06/01/21 0756     Glucose 100 mg/dL      Comment: Serial Number: [id number]:  [id number]       Basic Metabolic Panel [[id number]]  (Abnormal) Collected: 06/01/21 0233    Specimen: Blood Updated: 06/01/21 0405     Glucose 89 mg/dL      BUN 27 mg/dL      Creatinine 1.37 mg/dL      Sodium 141 mmol/L      Potassium 3.5 mmol/L      Chloride 102 mmol/L      CO2 25.0 mmol/L      Calcium 8.8 mg/dL      eGFR Non African Amer 53 mL/min/1.73      BUN/Creatinine Ratio 19.7     Anion Gap 14.0 mmol/L     Narrative:      GFR Normal >60  Chronic Kidney Disease <60  Kidney Failure <15      BNP [[id number]]  (Abnormal) Collected: 06/01/21 0233    Specimen: Blood Updated: 06/01/21 0353     proBNP 1,481.0 pg/mL     Narrative:      Among patients with dyspnea, NT-proBNP is highly sensitive for the detection of acute congestive heart failure. In addition NT-proBNP of <300 pg/ml effectively rules out acute congestive heart failure with 99% negative predictive value.    Results may be falsely decreased if patient taking Biotin.      CBC Auto Differential [[id number]]  (Normal) Collected: 06/01/21 0233    Specimen: Blood Updated: 06/01/21 0332     WBC 8.00 10*3/mm3      RBC 4.34 10*6/mm3      Hemoglobin 13.7 g/dL      Hematocrit 39.4 %      MCV 90.7 fL      MCH 31.7 pg      MCHC 34.9 g/dL      RDW 13.9 %      RDW-SD 44.2 fl      MPV 10.5 fL      Platelets 145 10*3/mm3      Neutrophil % 59.1 %      Lymphocyte % 31.7 %      Monocyte % 5.8 %      Eosinophil % 2.5 %      Basophil % 0.9 %      Neutrophils, Absolute 4.80 10*3/mm3      Lymphocytes, Absolute 2.60 10*3/mm3      Monocytes, Absolute 0.50 10*3/mm3      Eosinophils, Absolute 0.20 10*3/mm3      Basophils, Absolute 0.10 10*3/mm3      nRBC 0.1 /100 WBC     POC Glucose Once [[id number]]  (Abnormal) Collected: 05/31/21  2127    Specimen: Blood Updated: 05/31/21 2128     Glucose 153 mg/dL      Comment: Serial Number: [id number]:  [id number]       Troponin [[id number]]  (Normal) Collected: 05/31/21 2003    Specimen: Blood Updated: 05/31/21 2111     Troponin T 0.011 ng/mL     Narrative:      Troponin T Reference Range:  <= 0.03 ng/mL-   Negative for AMI  >0.03 ng/mL-     Abnormal for myocardial necrosis.  Clinicians would have to utilize clinical acumen, EKG, Troponin and serial changes to determine if it is an Acute Myocardial Infarction or myocardial injury due to an underlying chronic condition.       Results may be falsely decreased if patient taking Biotin.          Hemoglobin A1C   Date Value Ref Range Status   05/31/2021 9.3 (H) 3.5 - 5.6 % Final               No results found for: LIPASE  Lab Results   Component Value Date    CHOL 111 12/27/2020    TRIG 124 12/27/2020    HDL 40 12/27/2020    LDL 49 12/27/2020       No results found for: INTRAOP, PREDX, FINALDX, COMDX    Microbiology Results (last 10 days)     Procedure Component Value - Date/Time    COVID PRE-OP / PRE-PROCEDURE SCREENING ORDER (NO ISOLATION) - Swab, Nasopharynx [[id number]]  (Normal) Collected: 06/01/21 0844    Lab Status: Final result Specimen: Swab from Nasopharynx Updated: 06/01/21 0929    Narrative:      The following orders were created for panel order COVID PRE-OP / PRE-PROCEDURE SCREENING ORDER (NO ISOLATION) - Swab, Nasopharynx.  Procedure                               Abnormality         Status                     ---------                               -----------         ------                     COVID-19,CEPHEID,COR/AJ...[[id number]]  Normal              Final result                 Please view results for these tests on the individual orders.    COVID-19,CEPHEID,COR/AJ/PAD IN-HOUSE(OR EMERGENT/ADD-ON),NP SWAB IN TRANSPORT MEDIA 3-4 HR TAT, RT-PCR - Swab, Nasopharynx [[id number]]  (Normal) Collected: 06/01/21 0844    Lab Status: Final result  Specimen: Swab from Nasopharynx Updated: 06/01/21 0929     COVID19 Not Detected    Narrative:      Fact sheet for providers: https://www.fda.gov/media/585287/download     Fact sheet for patients: https://www.fda.gov/media/376054/download  Fact sheet for providers: https://www.fda.gov/media/878634/download    Fact sheet for patients: https://www.fda.gov/media/730612/download    Test performed by PCR.    COVID PRE-OP / PRE-PROCEDURE SCREENING ORDER (NO ISOLATION) - Swab, Nasopharynx [[id number]]  (Normal) Collected: 05/31/21 1538    Lab Status: Final result Specimen: Swab from Nasopharynx Updated: 06/01/21 1431    Narrative:      The following orders were created for panel order COVID PRE-OP / PRE-PROCEDURE SCREENING ORDER (NO ISOLATION) - Swab, Nasopharynx.  Procedure                               Abnormality         Status                     ---------                               -----------         ------                     COVID-19,APTIMA PANTHER,...[[id number]]  Normal              Final result                 Please view results for these tests on the individual orders.    COVID-19,APTIMA PANTHER,RUBY IN-HOUSE, NP/OP SWAB IN UTM/VTM/SALINE TRANSPORT MEDIA,24 HR TAT - Swab, Nasopharynx [[id number]]  (Normal) Collected: 05/31/21 1538    Lab Status: Final result Specimen: Swab from Nasopharynx Updated: 06/01/21 1431     COVID19 Not Detected    Narrative:      Fact sheet for providers: https://www.fda.gov/media/475244/download     Fact sheet for patients: https://www.fda.gov/media/980111/download    Test performed by RT PCR.          ECG/EMG Results (most recent)     Procedure Component Value Units Date/Time    ECG 12 Lead [[id number]] Collected: 05/31/21 1432     Updated: 05/31/21 1433     QT Interval 378 ms     Narrative:      HEART RATE= 75  bpm  RR Interval= 800  ms  NC Interval= 164  ms  P Horizontal Axis=   deg  P Front Axis= 45  deg  QRSD Interval= 96  ms  QT Interval= 378  ms  QRS Axis= 75  deg  T Wave Axis=  154  deg  - ABNORMAL ECG -  Sinus rhythm  Nonspecific repol abnormality, diffuse leads  Electronically Signed By:   Date and Time of Study: 2021-05-31 14:32:09          Results for orders placed during the hospital encounter of 05/31/21    Duplex Venous Lower Extremity - Bilateral CAR    Interpretation Summary  · Normal bilateral lower extremity venous duplex scan. Bilateral greater saphenous veins previously harvested above knee. Left leg bypass noted patent with normal triphasic arterial waveforms.      Results for orders placed during the hospital encounter of 12/26/20    Adult Transthoracic Echo Complete W/ Cont if Necessary Per Protocol    Interpretation Summary  · Left ventricular ejection fraction appears to be 36 - 40%.  · The right ventricular cavity is mildly dilated.  · The following left ventricular wall segments are hypokinetic: mid anterior, apical anterior, basal anterolateral, mid anterolateral, apical lateral, basal inferolateral, mid inferolateral, apical inferior, mid inferior, apical septal, basal inferoseptal, mid inferoseptal, apex hypokinetic, mid anteroseptal, basal anterior, basal inferior and basal inferoseptal.  · No pericardial effusion noted      No radiology results for the last 7 days        Xrays, labs reviewed personally by physician.    Medication Review:   I have reviewed the patient's current medication list      Scheduled Meds  Acetylcysteine, 600 mg, Oral, BID  aspirin, 81 mg, Oral, Daily  atorvastatin, 40 mg, Oral, Daily  bumetanide, 2 mg, Oral, TID  carvedilol, 3.125 mg, Oral, BID With Meals  clopidogrel, 75 mg, Oral, Daily  DULoxetine, 60 mg, Oral, Daily  enoxaparin, 40 mg, Subcutaneous, Q24H  insulin glargine, 40 Units, Subcutaneous, Nightly  insulin lispro, 0-14 Units, Subcutaneous, 4x Daily With Meals & Nightly  isosorbide mononitrate, 30 mg, Oral, Daily  sodium chloride, 10 mL, Intravenous, Q12H  spironolactone, 25 mg, Oral, Daily        Meds Infusions  sodium chloride,  100 mL/hr, Last Rate: 100 mL/hr (06/01/21 1541)        Meds PRN  •  acetaminophen **OR** acetaminophen **OR** acetaminophen  •  aluminum-magnesium hydroxide-simethicone  •  dextrose  •  dextrose  •  glucagon (human recombinant)  •  insulin lispro **AND** insulin lispro  •  ipratropium-albuterol  •  melatonin  •  nitroglycerin  •  ondansetron **OR** ondansetron  •  sodium chloride        Assessment/Plan   Assessment/Plan     Active Hospital Problems    Diagnosis  POA   • **Chest pain [R07.9]  Yes   • Non-pressure chronic ulcer of other part of left foot with fat layer exposed (CMS/[facility]) [L97.522]  Yes   • Elevated troponin [R77.8]  Yes   • Acute on chronic systolic CHF (congestive heart failure) (CMS/HCC) [I50.23]  Yes   • Open wound of left foot [S91.302A]  Yes   • Unstable angina pectoris (CMS/HCC) [I20.0]  Unknown   • Abnormal nuclear stress test [R94.39]  Unknown   • Depression [F32.9]  Yes   • Non-ST elevation MI (NSTEMI) (CMS/HCC) [I21.4]  Unknown   • GERD without esophagitis [K21.9]  Yes   • DEBI (obstructive sleep apnea) [G47.33]  Yes   • Type 2 diabetes mellitus with hyperglycemia, with long-term current use of insulin (CMS/[facility]) [E11.65, Z79.4]  Not Applicable   • Presence of automatic cardioverter/defibrillator (AICD) [Z95.810]  Yes   • Ischemic cardiomyopathy [I25.5]  Yes   • Coronary artery disease with hx of myocardial infarct w/o hx of CABG [I25.10, I25.2]  Not Applicable   • Panlobular emphysema (CMS/[facility]) [J43.1]  Yes   • Tobacco dependence syndrome [F17.200]  Yes   • Benign essential HTN [I10]  Yes   • Overweight (BMI 25.0-29.9) [E66.3]  Yes   • Peripheral vascular disease (CMS/[facility]) [I73.9]  Yes   • Multiple-type hyperlipidemia [E78.2]  Yes      Resolved Hospital Problems   No resolved problems to display.       MEDICAL DECISION MAKING COMPLEXITY BY PROBLEM:     Chest pain  -troponin elevated at [facility] facility but normal here   - dimer was elevated but LE doppler negative and WELL score low  - stress test  6/1/21: abnormal  - cardiology plans [facility] now     Acute on chronic systolic CHF (congestive heart failure) secondary to Ischemic cardiomyopathy,  AICD in situ  -[facility] 3,164 at [facility], given Lasix 40 mg IV  -12/26/20 2D echo:  EF 36-40%  -resume home TID bumex   -continue home spironolactone, carvedilol, and Imdur     Open wound of left foot, chronic (POA)  -wound care following  -no current signs of infection     Coronary artery disease with hx of myocardial infarct w/o hx of CABG / Multiple-type hyperlipidemia / Benign essential HTN, chronic  -continue aspirin, Plavix, Imdur, statin, and carvedilol  -monitor BP     Panlobular emphysema, Tobacco dependence syndrome  -stable without exacerbation  -PRN DuoNebs  -encourage tobacco cessation      Peripheral vascular disease   -continue aspirin, Plavix, and statin     Overweight (BMI 25.0-29.9)  -encourage lifestyle modifications      GERD without esophagitis  -continue PPI     DEBI (obstructive sleep apnea)  -noncompliant with CPAP     Type 2 diabetes mellitus with hyperglycemia   -A1c 9.3  -SSI, continue home Lantus  -BG stable adjust prn      Depression  -continue Cymbalta     VTE Prophylaxis -   Mechanical Order History:     None      Pharmalogical Order History:      Ordered     Dose Route Frequency Stop    05/31/21 1436  enoxaparin (LOVENOX) syringe 40 mg      40 mg SC Every 24 Hours Scheduled --                  Code Status -   Code Status and Medical Interventions:   Ordered at: 05/31/21 1436     Code Status:    CPR     Medical Interventions (Level of Support Prior to Arrest):    Full       This patient has been examined wearing appropriate Personal Protective Equipment . 06/01/21        Discharge Planning  Awaiting [facility] per cardiology         Electronically signed by Antoine Salinas DO, 06/01/21, 16:01 EDT.  [facility] Hospitalist Team

## 2021-06-01 NOTE — CONSULTS
Patient does not qualify for rehab program due to no recent qualifying diagnosis such as: recent MI, coronary intervention, OHS, or EF less than 35%.

## 2021-06-01 NOTE — CONSULTS
Diabetes Education    Patient Name:  Bobby Steele Jr.  YOB: 1960  MRN: [id number]  Admit Date:  5/31/2021    Consult received due to bs >200. Adm bs 266. A1c this adm 9.3%. Per home med list pt taking glimepiride 4 mg bid and Lantus 10 units hs. In hospital pt receiving Lantus 30 hs and Mod/High sliding scale. Pt off floor in cath lab. Will attempt follow up on different day.       Electronically signed by:  Lianna Oates RN  06/01/21 17:04 EDT

## 2021-06-01 NOTE — CONSULTS
NEPHROLOGY CONSULTATION-----[facility]    [facility]  [phone]  Bhavin Hummel MD    Patient Care Team:  Yamile Agarwal MD as PCP - General  Yamile Agarwal MD as PCP - Family Medicine  Juarez Wing MD as Consulting Physician (Cardiology)  Aranza Ramirez APRN as Nurse Practitioner (Nurse Practitioner)  Bhavin Hummel MD as Consulting Physician (Nephrology)    CC/REASON FOR CONSULTATION: Elevated creatinine, needs heart cath  PHYSICIAN REQUESTING CONSULTATION: Dr. Wing    History of Present Illness  61 y.o. male with a history of CAD, HTN, HLD, HFrEF, ischemic cardiomyopathy, ICD placement, Type 2 DM, PVD, complaining of chest pain and shortness of breath.   His cr is 1.4. Appears to have CKD with previous cr 1.1-1.2.  No dysuria gross hematuria.  No vomiting or diarrhea.  No chronic use of NSAIDs.    Review of Systems   As noted above otherwise 10 systems were reviewed and were negative.    Past Medical History:   Diagnosis Date   • CAD (coronary artery disease)     S/P CABG   • Chest pain 05/31/2021   • Chronic systolic CHF (congestive heart failure) (CMS/[facility])    • COPD (chronic obstructive pulmonary disease) (CMS/[facility])    • DM2 (diabetes mellitus, type 2) (CMS/[facility])    • Dyslipidemia    • Encounter for monitoring antiplatelet therapy    • Hypertension    • Myocardial infarction (CMS/[facility])     inferior wall MI--03/13   • DEBI (obstructive sleep apnea)     noncompliant with CPAP   • Peripheral vascular disease (CMS/[facility])     S/P left fem-pop bypass   • Tobacco use        Past Surgical History:   Procedure Laterality Date   • AMPUTATION FOOT / TOE      left   • APPENDECTOMY      at age 8 or 9   • BIVENTRICULAR ASSIST DEVICE/LEFT VENTRICULAR ASSIST DEVICE INSERTION N/A 5/13/2020    Procedure: Left Ventricular Assist Device;  Surgeon: Juarez Wing MD;  Location: [facility] INVASIVE LOCATION;  Service: Cardiovascular;  Laterality:  N/A;   • CARDIAC CATHETERIZATION      3-; [facility] 2014   • CARDIAC CATHETERIZATION Right 2019    Procedure: Cardiac Catheterization/with grafts groin access;  Surgeon: Juarez Wing MD;  Location:  [facility] CATH INVASIVE LOCATION;  Service: Cardiovascular   • CARDIAC CATHETERIZATION N/A 2020    Procedure: Left Heart Cath with grafts;  Surgeon: Juarez Wing MD;  Location:  [facility] CATH INVASIVE LOCATION;  Service: Cardiovascular;  Laterality: N/A;   • CARDIAC CATHETERIZATION N/A 2020    Procedure: Percutaneous Coronary Intervention, Impella backup;  Surgeon: Juarez Wing MD;  Location:  [facility] CATH INVASIVE LOCATION;  Service: Cardiovascular;  Laterality: N/A;   • CARDIAC ELECTROPHYSIOLOGY PROCEDURE N/A 10/8/2019    Procedure: ICD SC new, ST.SHIV ;  Surgeon: Juarez Wing MD;  Location: [facility] CATH INVASIVE LOCATION;  Service: Cardiovascular   • CARDIAC SURGERY     • COLONOSCOPY     • CORONARY ARTERY BYPASS GRAFT      x3, 3-18-13-LIMA to LAD, and reverse individual SVG to lateral marginal and to PDA   • FEMORAL POPLITEAL BYPASS Left 2019    [facility]/ Dr. Jero Hatch   • HAND SURGERY Left     crushed hand   • PACEMAKER IMPLANTATION     • TOE SURGERY      toe nail removal of big toe- age 8 or 9       Family History   Problem Relation Age of Onset   • Hypertension Mother    • Diabetes Mother    • Heart disease Father         had CABG and re-do/  while recovering-had MI age 40s   • Diabetes Father    • Pancreatic cancer Sister    • Hyperlipidemia Brother    • Stroke Brother    • Heart disease Paternal Uncle        Social History     Tobacco Use   • Smoking status: Current Every Day Smoker     Packs/day: 2.00     Years: 25.00     Pack years: 50.00     Types: Cigarettes   • Smokeless tobacco: Never Used   Vaping Use   • Vaping Use: Never used   Substance Use Topics   • Alcohol use: No   • Drug use: No       Home Meds:   Medications Prior to  Admission   Medication Sig Dispense Refill Last Dose   • albuterol (PROVENTIL) (2.5 MG/3ML) 0.083% nebulizer solution Take 2.5 mg by nebulization Every 6 (Six) Hours As Needed for Wheezing or Shortness of Air.   5/30/2021 at Unknown time   • albuterol sulfate  (90 Base) MCG/ACT inhaler Inhale 2 puffs Every 4 (Four) Hours As Needed for Wheezing or Shortness of Air.   5/30/2021 at Unknown time   • aspirin (aspirin) 81 MG EC tablet Take 81 mg by mouth Daily.   5/31/2021 at Unknown time   • atorvastatin (LIPITOR) 40 MG tablet Take 40 mg by mouth Daily.   5/31/2021 at Unknown time   • bumetanide (BUMEX) 2 MG tablet Take 2 mg by mouth 3 (Three) Times a Day.   5/31/2021 at Unknown time   • carvedilol (COREG) 3.125 MG tablet Take 1 tablet by mouth 2 (Two) Times a Day With Meals. 180 tablet 1 5/31/2021 at Unknown time   • clopidogrel (PLAVIX) 75 MG tablet Take 75 mg by mouth Daily.   5/31/2021 at Unknown time   • DULoxetine (CYMBALTA) 60 MG capsule TAKE 1 CAPSULE BY MOUTH ONCE DAILY FOR 90 DAYS   5/31/2021 at Unknown time   • fenofibrate (TRICOR) 145 MG tablet Take 1 tablet by mouth once daily 90 tablet 3 5/31/2021 at Unknown time   • glimepiride (AMARYL) 4 MG tablet Take 4 mg by mouth 2 (Two) Times a Day.   5/31/2021 at Unknown time   • Insulin Glargine (LANTUS SOLOSTAR) 100 UNIT/ML injection pen Inject 10 Units under the skin into the appropriate area as directed Every Night. (Patient taking differently: Inject 40 Units under the skin into the appropriate area as directed Every Night.) 3 mL 2 5/30/2021 at Unknown time   • isosorbide mononitrate (IMDUR) 30 MG 24 hr tablet Take 30 mg by mouth Daily.   5/31/2021 at Unknown time   • spironolactone (ALDACTONE) 25 MG tablet Take 25 mg by mouth Daily.   5/31/2021 at Unknown time   • collagenase (Santyl) 250 UNIT/GM ointment Apply  topically to the appropriate area as directed Daily. 30 g 0    • nitroglycerin (NITROSTAT) 2 % ointment Place 0.5 inches on the skin as directed  by provider Daily. 30 g 5        Scheduled Meds:  aspirin, 81 mg, Oral, Daily  atorvastatin, 40 mg, Oral, Daily  bumetanide, 2 mg, Oral, TID  carvedilol, 3.125 mg, Oral, BID With Meals  clopidogrel, 75 mg, Oral, Daily  DULoxetine, 60 mg, Oral, Daily  enoxaparin, 40 mg, Subcutaneous, Q24H  insulin glargine, 40 Units, Subcutaneous, Nightly  insulin lispro, 0-14 Units, Subcutaneous, 4x Daily With Meals & Nightly  isosorbide mononitrate, 30 mg, Oral, Daily  sodium chloride, 10 mL, Intravenous, Q12H  spironolactone, 25 mg, Oral, Daily        Continuous Infusions:       PRN Meds:  •  acetaminophen **OR** acetaminophen **OR** acetaminophen  •  aluminum-magnesium hydroxide-simethicone  •  dextrose  •  dextrose  •  glucagon (human recombinant)  •  insulin lispro **AND** insulin lispro  •  ipratropium-albuterol  •  melatonin  •  nitroglycerin  •  ondansetron **OR** ondansetron  •  sodium chloride    Allergies:  Patient has no known allergies.    OBJECTIVE    Vital Signs  Temp:  [97.5 °F (36.4 °C)-97.7 °F (36.5 °C)] 97.6 °F (36.4 °C)  Heart Rate:  [74-78] 78  Resp:  [15-16] 15  BP: ()/(65-81) 99/65    No intake/output data recorded.  No intake/output data recorded.    Physical Exam:  General Appearance: alert, appears stated age and cooperative  Head: normocephalic, without obvious abnormality and atraumatic  Eyes: conjunctivae and sclerae normal and no icterus  Neck: supple and no JVD  Lungs: clear to auscultation and respirations regular  Heart: regular rhythm & normal rate and normal S1, S2  Chest Wall: no abnormalities observed  Abdomen: normal bowel sounds and soft non-tender  Extremities: moves extremities well, no edema, no cyanosis  Skin: no bleeding, bruising or rash  Neurologic: Alert, and oriented. No focal deficits    Results Review:    I reviewed the patient's new clinical results.    WBC WBC   Date Value Ref Range Status   06/01/2021 8.00 3.40 - 10.80 10*3/mm3 Final   05/31/2021 9.80 3.40 - 10.80 10*3/mm3  Final      HGB Hemoglobin   Date Value Ref Range Status   06/01/2021 13.7 13.0 - 17.7 g/dL Final   05/31/2021 14.2 13.0 - 17.7 g/dL Final      HCT Hematocrit   Date Value Ref Range Status   06/01/2021 39.4 37.5 - 51.0 % Final   05/31/2021 42.0 37.5 - 51.0 % Final      Platlets No results found for: LABPLAT   MCV MCV   Date Value Ref Range Status   06/01/2021 90.7 79.0 - 97.0 fL Final   05/31/2021 91.8 79.0 - 97.0 fL Final          Sodium Sodium   Date Value Ref Range Status   06/01/2021 141 136 - 145 mmol/L Final   05/31/2021 141 136 - 145 mmol/L Final      Potassium Potassium   Date Value Ref Range Status   06/01/2021 3.5 3.5 - 5.2 mmol/L Final   05/31/2021 3.8 3.5 - 5.2 mmol/L Final      Chloride Chloride   Date Value Ref Range Status   06/01/2021 102 98 - 107 mmol/L Final   05/31/2021 104 98 - 107 mmol/L Final      CO2 CO2   Date Value Ref Range Status   06/01/2021 25.0 22.0 - 29.0 mmol/L Final   05/31/2021 26.0 22.0 - 29.0 mmol/L Final      BUN BUN   Date Value Ref Range Status   06/01/2021 27 (H) 8 - 23 mg/dL Final   05/31/2021 26 (H) 8 - 23 mg/dL Final      Creatinine Creatinine   Date Value Ref Range Status   06/01/2021 1.37 (H) 0.76 - 1.27 mg/dL Final   05/31/2021 1.43 (H) 0.76 - 1.27 mg/dL Final      Calcium Calcium   Date Value Ref Range Status   06/01/2021 8.8 8.6 - 10.5 mg/dL Final   05/31/2021 9.0 8.6 - 10.5 mg/dL Final      PO4 No results found for: CAPO4   Albumin No results found for: ALBUMIN   Magnesium No results found for: MG   Uric Acid No results found for: URICACID       Imaging Results (Last 72 Hours)     ** No results found for the last 72 hours. **            Results for orders placed during the hospital encounter of 12/26/20    SCANNED - IMAGING      XR Abdomen KUB    Narrative  DATE OF EXAM:  12/26/2020 3:15 PM    PROCEDURE:  XR ABDOMEN KUB-    INDICATIONS:  Nasogastric tube placement.    COMPARISON:  05/25/2019.    TECHNIQUE:  Single radiographic view of the abdomen was  obtained.      FINDINGS:  The nasogastric tube tip terminates in the fundus of the stomach. Both  flanks were excluded from the field-of-view. There are no dilated loops  of bowel to suggest an obstructive process. There is no pneumatosis or  free air. The lung bases are clear. There are degenerative changes of  the thoracolumbar spine. There are faint scattered vascular  calcifications.    Impression  The nasogastric tube tip terminates in the fundus of the stomach.    Electronically Signed By-Cezar Blankenship MD On:12/26/2020 4:22 PM  This report was finalized on [id number] by  Cezar Blankenship MD.      XR Chest 1 View    Narrative  DATE OF EXAM:  12/28/2020 6:05 AM    PROCEDURE:  XR CHEST 1 VW-    INDICATIONS:  DYSPNEA, ON EXERTION    COMPARISON:  AP portable chest 12/27/2020.    TECHNIQUE:  Single radiographic view of the chest was obtained.    FINDINGS:  Ill-defined perihilar mid to lower lung zone linear densities are  present are seen within both lungs, greatest in the mid to lower lung  zones, new since one day prior. Heart size is normal and enlarged but  stable with signs of prior median sternotomy and CABG. Pacemaker device  appears unchanged in position. ET tube and NG tube have been removed  since the prior examination. No pleural effusion or pneumothorax is  identified.    Impression  1. Development of ill-defined linear densities in the perihilar regions  may reflect changes of mild subsegmental atelectasis. No dense  consolidations are identified.  2. Interval removal of the ET tube and the NG tube since prior  examination.    Electronically Signed By-Clau Barreto MD On:12/28/2020 8:07 AM  This report was finalized on [id number] by  Clau Barreto MD.        Results for orders placed during the hospital encounter of 05/31/21    Duplex Venous Lower Extremity - Bilateral CAR    Interpretation Summary  · Normal bilateral lower extremity venous duplex scan. Bilateral greater saphenous veins previously  harvested above knee. Left leg bypass noted patent with normal triphasic arterial waveforms.      ASSESSMENT / PLAN      Chest pain    Elevated troponin    Peripheral vascular disease (CMS/HCC)    Multiple-type hyperlipidemia    Tobacco dependence syndrome    Benign essential HTN    Overweight (BMI 25.0-29.9)    Ischemic cardiomyopathy    Coronary artery disease with hx of myocardial infarct w/o hx of CABG    Panlobular emphysema (CMS/HCC)    Presence of automatic cardioverter/defibrillator (AICD)    GERD without esophagitis    DEBI (obstructive sleep apnea)    Type 2 diabetes mellitus with hyperglycemia, with long-term current use of insulin (CMS/HCC)    Non-ST elevation MI (NSTEMI) (CMS/HCC)    Acute on chronic systolic CHF (congestive heart failure) (CMS/HCC)    Depression    Open wound of left foot    Non-pressure chronic ulcer of other part of left foot with fat layer exposed (CMS/HCC)    Unstable angina pectoris (CMS/HCC)    Abnormal nuclear stress test    · CKD3-most likely related to hypertensive nephrosclerosis.  Will check urine analysis and renal ultrasound.  Further work-up as deemed necessary  · HTN  · CAD  · Ischemic cardiomyopathy      OK for cath  Hydrate with NS  Check UA renal ultrasound        I discussed the patients findings and my recommendations with patient and consulting provider    Will follow along closely. Thank you for allowing us to see this patient in renal consultation.    [facility]  [phone]  MD Bhavin Dailey MD  06/01/21  14:19 EDT

## 2021-06-01 NOTE — PROGRESS NOTES
Cardiology Progress Note    Patient Identification:  Name: Bobby Steele Jr.  Age: 61 y.o.  Sex: male  :  1960  MRN: [id number]                 Follow Up / Chief Complaint: Unstable angina CAD, CABG, PCI    Interval History: Patient underwent Lexiscan Cardiolite 2021 which is high risk abnormal.       Subjective: Patient seen and examined, chart reviewed.  Labs reviewed.  Patient denies any chest pain or shortness of breath at rest today.      Objective: proBNP is elevated at 1481, potassium is 3.5, BUN/creatinine are 27/1.37    History of Present Illness:       This is a 61-year-old white male with past medical history of     # NSTEMI  19, SHILPA to SVG to RCA and proximal LAD leading into a small diagonal, cath 2020 underwent SHILPA to SVG to RCA and native proximal LCx with Impella assistance  # CAD status post CABG X3 V  # Ischemic cardiomyopathy with EF of 35%, status post ICD 10/8/2019  # COPD, continue tobacco abuse  # Hypertension   # Hyperlipidemia  # Diabetes with hemoglobin A1c of 10 on 2020  #Gangrene of left foot  #Positive family for premature heart disease with father MI 53     Presented with complaint of chest pain.  Patient was transferred from [facility] emergency room where he presented because of recurrent substernal chest pain.  Patient was recently in [facility] was observed for 23 hours and discharged home presented again with recurrent substernal chest pain had elevated troponin therefore [facility] emergency room call me to arrange for transfer through hospitalist here.  Work-up here revealed : Troponin is [facility] is elevated.  Here it is 0.015.  EKG done 2021 reviewed by me shows sinus rhythm with rate of 75 bpm with early repolarization        Patient had stress test 2020 which was abnormal with old inferior wall MI with cole-infarct and ischemia and EF of 42%.  Cardiac cath 2020 underwent drug-eluting stent SVG to RCA native proximal  LCx with Impella backup.Patient is status post ICD placement 10/8/2019       ASSESSMENT:   #Current prolonged chest pain consistent with unstable angina  #Elevated troponin  #  chronic CHF, ischemic cardiomyopathy 35%, status post ICD 10/8/2019  # CAD status post CABG, PCI  # COPD, tobacco abuse   # hypertension ,  #Dyslipidemia  #Hyperglycemia, and type 2 diabetes  #  CKD  # PAD, toe gangrene     Recommendations:     Telemetry is revealing sinus rhythm  Serial troponins trending down  We will continue medical management with aspirin, atorvastatin, SSRI, carvedilol, clopidogrel and Aldactone as tolerated to help with CHF, CAD, hypertension, dyslipidemia  Patient would benefit from diabetes control, A1c 8 5/31/2021 is elevated at 9.3.  Counseled on smoking cessation.  Patient underwent Lexiscan Cardiolite 6/1/2021 which is revealing high risk abnormal ischemia in lateral wall and inferolateral wall with depressed EF.  Will proceed with cardiac cath risk-benefit alternatives explained.             Past Medical History:  Past Medical History:   Diagnosis Date   • CAD (coronary artery disease)     S/P CABG   • Chest pain 05/31/2021   • Chronic systolic CHF (congestive heart failure) (CMS/[facility])    • COPD (chronic obstructive pulmonary disease) (CMS/[facility])    • DM2 (diabetes mellitus, type 2) (CMS/[facility])    • Dyslipidemia    • Encounter for monitoring antiplatelet therapy    • Hypertension    • Myocardial infarction (CMS/[facility])     inferior wall MI--03/13   • DEBI (obstructive sleep apnea)     noncompliant with CPAP   • Peripheral vascular disease (CMS/[facility])     S/P left fem-pop bypass   • Tobacco use      Past Surgical History:  Past Surgical History:   Procedure Laterality Date   • AMPUTATION FOOT / TOE      left   • APPENDECTOMY      at age 8 or 9   • BIVENTRICULAR ASSIST DEVICE/LEFT VENTRICULAR ASSIST DEVICE INSERTION N/A 5/13/2020    Procedure: Left Ventricular Assist Device;  Surgeon: Juarez Wing MD;  Location:   [facility] CATH INVASIVE LOCATION;  Service: Cardiovascular;  Laterality: N/A;   • CARDIAC CATHETERIZATION      3-; [facility] 2014   • CARDIAC CATHETERIZATION Right 2019    Procedure: Cardiac Catheterization/with grafts groin access;  Surgeon: Juarez Wing MD;  Location: [facility] CATH INVASIVE LOCATION;  Service: Cardiovascular   • CARDIAC CATHETERIZATION N/A 2020    Procedure: Left Heart Cath with grafts;  Surgeon: Juarez Wing MD;  Location: [facility] CATH INVASIVE LOCATION;  Service: Cardiovascular;  Laterality: N/A;   • CARDIAC CATHETERIZATION N/A 2020    Procedure: Percutaneous Coronary Intervention, Impella backup;  Surgeon: Juarez Wing MD;  Location: [facility] CATH INVASIVE LOCATION;  Service: Cardiovascular;  Laterality: N/A;   • CARDIAC ELECTROPHYSIOLOGY PROCEDURE N/A 10/8/2019    Procedure: ICD SC new, ST.SHIV ;  Surgeon: Juarez Wing MD;  Location: [facility] CATH INVASIVE LOCATION;  Service: Cardiovascular   • CARDIAC SURGERY     • COLONOSCOPY     • CORONARY ARTERY BYPASS GRAFT      x3, 3-18-13-LIMA to LAD, and reverse individual SVG to lateral marginal and to PDA   • FEMORAL POPLITEAL BYPASS Left 2019    [facility]/ Dr. Jero Hatch   • HAND SURGERY Left     crushed hand   • PACEMAKER IMPLANTATION     • TOE SURGERY      toe nail removal of big toe- age 8 or 9        Social History:   Social History     Tobacco Use   • Smoking status: Current Every Day Smoker     Packs/day: 2.00     Years: 25.00     Pack years: 50.00     Types: Cigarettes   • Smokeless tobacco: Never Used   Substance Use Topics   • Alcohol use: No      Family History:  Family History   Problem Relation Age of Onset   • Hypertension Mother    • Diabetes Mother    • Heart disease Father         had CABG and re-do/  while recovering-had MI age 40s   • Diabetes Father    • Pancreatic cancer Sister    • Hyperlipidemia Brother    • Stroke Brother    • Heart disease Paternal  "Uncle           Allergies:  No Known Allergies  Scheduled Meds:  aspirin, 81 mg, Daily  atorvastatin, 40 mg, Daily  carvedilol, 3.125 mg, BID With Meals  clopidogrel, 75 mg, Daily  DULoxetine, 60 mg, Daily  enoxaparin, 40 mg, Q24H  furosemide, 40 mg, Q8H  insulin glargine, 40 Units, Nightly  insulin lispro, 0-14 Units, 4x Daily With Meals & Nightly  isosorbide mononitrate, 30 mg, Daily  sodium chloride, 10 mL, Q12H  spironolactone, 25 mg, Daily          Review of Systems:   ROS    Constitution: Negative for chills and fever.   Cardiovascular: Negative for chest pain and palpitations.   Respiratory: Negative for cough and hemoptysis.    Gastrointestinal: Negative for nausea.        Constitutional:  Temp:  [97.5 °F (36.4 °C)-97.7 °F (36.5 °C)] 97.7 °F (36.5 °C)  Heart Rate:  [74-83] 74  Resp:  [16-17] 16  BP: (102-125)/(68-81) 125/81    Physical Exam   /81   Pulse 74   Temp 97.7 °F (36.5 °C) (Oral)   Resp 16   Ht 170.2 cm (67\")   Wt 85 kg (187 lb 4.8 oz)   SpO2 100%   BMI 29.34 kg/m²   General:  Appears in no acute distress  Eyes: Sclera is anicteric,  conjunctiva is clear   HEENT:  No JVD. Thyroid not visibly enlarged. No mucosal pallor or cyanosis  Respiratory: Respirations regular and unlabored at rest.  Bilaterally good breath sounds, with good air entry in all fields. No crackles, rubs or wheezes auscultated  Cardiovascular: S1,S2 Regular rate and rhythm. No murmur, rub or gallop auscultated.  . No pretibial pitting edema  Gastrointestinal: Abdomen nondistended, soft  Musculoskeletal:  No abnormal movements  Extremities: No digital clubbing or cyanosis  Skin: Color pink. Skin warm and dry to touch. No rashes  No xanthoma  Neuro: Alert and awake, no lateralizing deficits appreciated    INTAKE AND OUTPUT:  No intake or output data in the 24 hours ending 06/01/21 0924    Cardiographics  Telemetry: Sinus rhythm    ECG:   ECG 12 Lead   Preliminary Result   HEART RATE= 75  bpm   RR Interval= 800  ms   AL " "Interval= 164  ms   P Horizontal Axis=   deg   P Front Axis= 45  deg   QRSD Interval= 96  ms   QT Interval= 378  ms   QRS Axis= 75  deg   T Wave Axis= 154  deg   - ABNORMAL ECG -   Sinus rhythm   Nonspecific repol abnormality, diffuse leads   Electronically Signed By:    Date and Time of Study: 2021-05-31 14:32:09        I have personally reviewed EKG    Echocardiogram: Results for orders placed during the hospital encounter of 12/26/20    Adult Transthoracic Echo Complete W/ Cont if Necessary Per Protocol    Interpretation Summary  · Left ventricular ejection fraction appears to be 36 - 40%.  · The right ventricular cavity is mildly dilated.  · The following left ventricular wall segments are hypokinetic: mid anterior, apical anterior, basal anterolateral, mid anterolateral, apical lateral, basal inferolateral, mid inferolateral, apical inferior, mid inferior, apical septal, basal inferoseptal, mid inferoseptal, apex hypokinetic, mid anteroseptal, basal anterior, basal inferior and basal inferoseptal.  · No pericardial effusion noted      Lab Review   I have reviewed the labs  Results from last 7 days   Lab Units 05/31/21 2003 05/31/21  1429   TROPONIN T ng/mL 0.011 0.015         Results from last 7 days   Lab Units 06/01/21  0233   SODIUM mmol/L 141   POTASSIUM mmol/L 3.5   BUN mg/dL 27*   CREATININE mg/dL 1.37*   CALCIUM mg/dL 8.8         Results from last 7 days   Lab Units 06/01/21  0233 05/31/21  1523   WBC 10*3/mm3 8.00 9.80   HEMOGLOBIN g/dL 13.7 14.2   HEMATOCRIT % 39.4 42.0   PLATELETS 10*3/mm3 145 158           RADIOLOGY:  Imaging Results (Last 24 Hours)     ** No results found for the last 24 hours. **                )6/1/2021  Juarez Wing MD      EMR Dragon/Transcription:   \"Dictated utilizing Dragon dictation\".   "

## 2021-06-01 NOTE — PLAN OF CARE
Goal Outcome Evaluation:         Patient is alert and orientated x4.  NPO diet due to test 6/1/21.  No complaints of chest pain this shift. Will continue to monitor.

## 2021-06-01 NOTE — CASE MANAGEMENT/SOCIAL WORK
Continued Stay Note  JO-ANN Ortiz     Patient Name: Bobby Steele Jr.  MRN: [id number]  Today's Date: 6/1/2021    Admit Date: 5/31/2021    Discharge Plan     Row Name 06/01/21 1545       Plan    Plan  Needs screened    Plan Comments  Attempted to meet with patient at bedside, off unit. Needs screened. Has used [facility] in the past. DC barriers: Cardiology, myoview, IV lasix        Phone communication or documentation only - no physical contact with patient or family.      Selena Kamara RN

## 2021-06-02 ENCOUNTER — APPOINTMENT (OUTPATIENT)
Dept: ULTRASOUND IMAGING | Facility: HOSPITAL | Age: 61
End: 2021-06-02

## 2021-06-02 VITALS
DIASTOLIC BLOOD PRESSURE: 74 MMHG | SYSTOLIC BLOOD PRESSURE: 128 MMHG | TEMPERATURE: 98.1 F | OXYGEN SATURATION: 95 % | RESPIRATION RATE: 18 BRPM | HEIGHT: 68 IN | BODY MASS INDEX: 28.39 KG/M2 | HEART RATE: 79 BPM | WEIGHT: 187.3 LBS

## 2021-06-02 PROBLEM — I21.4 NON-ST ELEVATION MI (NSTEMI) (HCC): Status: RESOLVED | Noted: 2020-05-11 | Resolved: 2021-06-02

## 2021-06-02 PROBLEM — R77.8 ELEVATED TROPONIN: Status: RESOLVED | Noted: 2021-05-31 | Resolved: 2021-06-02

## 2021-06-02 PROBLEM — I20.0 UNSTABLE ANGINA PECTORIS: Status: RESOLVED | Noted: 2021-05-31 | Resolved: 2021-06-02

## 2021-06-02 PROBLEM — R79.89 ELEVATED TROPONIN: Status: RESOLVED | Noted: 2021-05-31 | Resolved: 2021-06-02

## 2021-06-02 LAB
ALBUMIN SERPL-MCNC: 3.8 G/DL (ref 3.5–5.2)
ANION GAP SERPL CALCULATED.3IONS-SCNC: 12 MMOL/L (ref 5–15)
BUN SERPL-MCNC: 27 MG/DL (ref 8–23)
BUN/CREAT SERPL: 21.8 (ref 7–25)
CALCIUM SPEC-SCNC: 9 MG/DL (ref 8.6–10.5)
CHLORIDE SERPL-SCNC: 97 MMOL/L (ref 98–107)
CO2 SERPL-SCNC: 29 MMOL/L (ref 22–29)
CREAT SERPL-MCNC: 1.24 MG/DL (ref 0.76–1.27)
DEPRECATED RDW RBC AUTO: 44.2 FL (ref 37–54)
ERYTHROCYTE [DISTWIDTH] IN BLOOD BY AUTOMATED COUNT: 14 % (ref 12.3–15.4)
GFR SERPL CREATININE-BSD FRML MDRD: 59 ML/MIN/1.73
GLUCOSE BLDC GLUCOMTR-MCNC: 110 MG/DL (ref 70–105)
GLUCOSE SERPL-MCNC: 148 MG/DL (ref 65–99)
HCT VFR BLD AUTO: 40.7 % (ref 37.5–51)
HGB BLD-MCNC: 14.1 G/DL (ref 13–17.7)
MCH RBC QN AUTO: 31.1 PG (ref 26.6–33)
MCHC RBC AUTO-ENTMCNC: 34.7 G/DL (ref 31.5–35.7)
MCV RBC AUTO: 89.6 FL (ref 79–97)
PHOSPHATE SERPL-MCNC: 4.3 MG/DL (ref 2.5–4.5)
PLATELET # BLD AUTO: 152 10*3/MM3 (ref 140–450)
PMV BLD AUTO: 9.8 FL (ref 6–12)
POTASSIUM SERPL-SCNC: 3.4 MMOL/L (ref 3.5–5.2)
RBC # BLD AUTO: 4.54 10*6/MM3 (ref 4.14–5.8)
SODIUM SERPL-SCNC: 138 MMOL/L (ref 136–145)
WBC # BLD AUTO: 8.7 10*3/MM3 (ref 3.4–10.8)

## 2021-06-02 PROCEDURE — 63710000001 DULOXETINE 30 MG CAPSULE DELAYED-RELEASE PARTICLES: Performed by: INTERNAL MEDICINE

## 2021-06-02 PROCEDURE — A9270 NON-COVERED ITEM OR SERVICE: HCPCS | Performed by: INTERNAL MEDICINE

## 2021-06-02 PROCEDURE — 99213 OFFICE O/P EST LOW 20 MIN: CPT | Performed by: INTERNAL MEDICINE

## 2021-06-02 PROCEDURE — 63710000001 ATORVASTATIN 40 MG TABLET: Performed by: INTERNAL MEDICINE

## 2021-06-02 PROCEDURE — 63710000001 BUMETANIDE 1 MG TABLET: Performed by: INTERNAL MEDICINE

## 2021-06-02 PROCEDURE — 82962 GLUCOSE BLOOD TEST: CPT

## 2021-06-02 PROCEDURE — 76775 US EXAM ABDO BACK WALL LIM: CPT

## 2021-06-02 PROCEDURE — 63710000001 POTASSIUM CHLORIDE 20 MEQ TABLET CONTROLLED-RELEASE: Performed by: INTERNAL MEDICINE

## 2021-06-02 PROCEDURE — 63710000001 ASPIRIN 81 MG TABLET DELAYED-RELEASE: Performed by: INTERNAL MEDICINE

## 2021-06-02 PROCEDURE — 63710000001 SPIRONOLACTONE 25 MG TABLET: Performed by: INTERNAL MEDICINE

## 2021-06-02 PROCEDURE — 85027 COMPLETE CBC AUTOMATED: CPT | Performed by: INTERNAL MEDICINE

## 2021-06-02 PROCEDURE — 63710000001 ISOSORBIDE MONONITRATE 30 MG TABLET SUSTAINED-RELEASE 24 HOUR: Performed by: INTERNAL MEDICINE

## 2021-06-02 PROCEDURE — 63710000001 CLOPIDOGREL 75 MG TABLET: Performed by: INTERNAL MEDICINE

## 2021-06-02 PROCEDURE — 63710000001 ACETYLCYSTEINE 600 MG CAPSULE: Performed by: INTERNAL MEDICINE

## 2021-06-02 PROCEDURE — G0378 HOSPITAL OBSERVATION PER HR: HCPCS

## 2021-06-02 PROCEDURE — 80069 RENAL FUNCTION PANEL: CPT | Performed by: INTERNAL MEDICINE

## 2021-06-02 PROCEDURE — 99217 PR OBSERVATION CARE DISCHARGE MANAGEMENT: CPT | Performed by: INTERNAL MEDICINE

## 2021-06-02 PROCEDURE — 63710000001 CARVEDILOL 3.125 MG TABLET: Performed by: INTERNAL MEDICINE

## 2021-06-02 RX ORDER — POTASSIUM CHLORIDE 20 MEQ/1
20 TABLET, EXTENDED RELEASE ORAL DAILY
Status: DISCONTINUED | OUTPATIENT
Start: 2021-06-02 | End: 2021-06-02 | Stop reason: HOSPADM

## 2021-06-02 RX ADMIN — SPIRONOLACTONE 25 MG: 25 TABLET ORAL at 08:07

## 2021-06-02 RX ADMIN — POTASSIUM CHLORIDE 20 MEQ: 1500 TABLET, EXTENDED RELEASE ORAL at 08:10

## 2021-06-02 RX ADMIN — ASPIRIN 81 MG: 81 TABLET, COATED ORAL at 08:07

## 2021-06-02 RX ADMIN — BUMETANIDE 2 MG: 1 TABLET ORAL at 08:07

## 2021-06-02 RX ADMIN — CLOPIDOGREL BISULFATE 75 MG: 75 TABLET ORAL at 08:07

## 2021-06-02 RX ADMIN — ISOSORBIDE MONONITRATE 30 MG: 30 TABLET, EXTENDED RELEASE ORAL at 08:07

## 2021-06-02 RX ADMIN — Medication 600 MG: at 08:07

## 2021-06-02 RX ADMIN — ATORVASTATIN CALCIUM 40 MG: 40 TABLET, FILM COATED ORAL at 08:08

## 2021-06-02 RX ADMIN — DULOXETINE 60 MG: 30 CAPSULE, DELAYED RELEASE ORAL at 08:07

## 2021-06-02 RX ADMIN — Medication 10 ML: at 08:07

## 2021-06-02 RX ADMIN — CARVEDILOL 3.12 MG: 3.12 TABLET, FILM COATED ORAL at 08:07

## 2021-06-02 NOTE — CASE MANAGEMENT/SOCIAL WORK
Case Management Discharge Note      Final Note: Discharged home prior to CM assessment         Selected Continued Care - Discharged on 6/2/2021 Admission date: 5/31/2021 - Discharge disposition: Home or Self Care                 Final Discharge Disposition Code: 01 - home or self-care

## 2021-06-02 NOTE — PLAN OF CARE
Goal Outcome Evaluation:         Patient up and moving after heart cath.  No adverse effects from anesthesia.  Will continue to monitor.

## 2021-06-02 NOTE — PROGRESS NOTES
Cardiology Progress Note    Patient Identification:  Name: Bobby Steele Jr.  Age: 61 y.o.  Sex: male  :  1960  MRN: [id number]                 Follow Up / Chief Complaint: Unstable angina CAD, CABG, PCI    Interval History: Patient underwent Lexiscan Cardiolite 2021 which is high risk abnormal.       Subjective: Patient seen and examined, chart reviewed.  Labs reviewed.  Patient denies any chest pain or shortness of breath at rest today.      Objective: proBNP is elevated at 1481, potassium is 3.4, BUN/creatinine are 27/1.24    History of Present Illness:       This is a 61-year-old white male with past medical history of     # NSTEMI  19, SHILPA to SVG to RCA and proximal LAD leading into a small diagonal, cath 2020 underwent SHILPA to SVG to RCA and native proximal LCx with Impella assistance  # CAD status post CABG X3 V  # Ischemic cardiomyopathy with EF of 35%, status post ICD 10/8/2019  # COPD, continue tobacco abuse  # Hypertension   # Hyperlipidemia  # Diabetes with hemoglobin A1c of 10 on 2020  #Gangrene of left foot  #Positive family for premature heart disease with father MI 53     Presented with complaint of chest pain.  Patient was transferred from [facility] emergency room where he presented because of recurrent substernal chest pain.  Patient was recently in [facility] was observed for 23 hours and discharged home presented again with recurrent substernal chest pain had elevated troponin therefore [facility] emergency room call me to arrange for transfer through hospitalist here.  Work-up here revealed : Troponin is [facility] is elevated.  Here it is 0.015.  EKG done 2021 reviewed by me shows sinus rhythm with rate of 75 bpm with early repolarization        Patient had stress test 2020 which was abnormal with old inferior wall MI with cole-infarct and ischemia and EF of 42%.  Cardiac cath 2020 underwent drug-eluting stent SVG to RCA native proximal  LCx with Impella backup.Patient is status post ICD placement 10/8/2019       ASSESSMENT:   #Current prolonged chest pain consistent with unstable angina  #Elevated troponin  #  chronic CHF, ischemic cardiomyopathy 35%, status post ICD 10/8/2019  # CAD status post CABG, PCI  # COPD, tobacco abuse   # hypertension ,  #Dyslipidemia  #Hyperglycemia, and type 2 diabetes  #  CKD  # PAD, toe gangrene     Recommendations:     Telemetry is revealing sinus rhythm  Serial troponins trending down  We will continue medical management with aspirin, atorvastatin, SSRI, carvedilol, clopidogrel and Aldactone as tolerated to help with CHF, CAD, hypertension, dyslipidemia  Patient would benefit from diabetes control, A1c 8 5/31/2021 is elevated at 9.3.  Counseled on smoking cessation.  Patient underwent Lexiscan Cardiolite 6/1/2021 which is revealing high risk abnormal ischemia in lateral wall and inferolateral wall with depressed EF.  Will proceed with cardiac cath risk-benefit alternatives explained.  Patient underwent cardiac cath 6/1/2021 which revealed severe disease in mid LCx which is in the loop of a bend and heavily calcified high risk for complications therefore was not intervened upon.  Patient also has severe disease going to the diagonal.  Replete electrolytes and monitor electrolytes          Past Medical History:  Past Medical History:   Diagnosis Date   • CAD (coronary artery disease)     S/P CABG   • Chest pain 05/31/2021   • Chronic systolic CHF (congestive heart failure) (CMS/[facility])    • COPD (chronic obstructive pulmonary disease) (CMS/[facility])    • DM2 (diabetes mellitus, type 2) (CMS/[facility])    • Dyslipidemia    • Encounter for monitoring antiplatelet therapy    • Hypertension    • Myocardial infarction (CMS/[facility])     inferior wall MI--03/13   • DEBI (obstructive sleep apnea)     noncompliant with CPAP   • Peripheral vascular disease (CMS/[facility])     S/P left fem-pop bypass   • Tobacco use      Past Surgical History:  Past  Surgical History:   Procedure Laterality Date   • AMPUTATION FOOT / TOE      left   • APPENDECTOMY      at age 8 or 9   • BIVENTRICULAR ASSIST DEVICE/LEFT VENTRICULAR ASSIST DEVICE INSERTION N/A 5/13/2020    Procedure: Left Ventricular Assist Device;  Surgeon: Juarez Wing MD;  Location: [facility] CATH INVASIVE LOCATION;  Service: Cardiovascular;  Laterality: N/A;   • CARDIAC CATHETERIZATION      3-; [facility] 9-   • CARDIAC CATHETERIZATION Right 6/27/2019    Procedure: Cardiac Catheterization/with grafts groin access;  Surgeon: Juarez Wing MD;  Location: [facility] CATH INVASIVE LOCATION;  Service: Cardiovascular   • CARDIAC CATHETERIZATION N/A 5/8/2020    Procedure: Left Heart Cath with grafts;  Surgeon: Juarez Wing MD;  Location: [facility] CATH INVASIVE LOCATION;  Service: Cardiovascular;  Laterality: N/A;   • CARDIAC CATHETERIZATION N/A 5/13/2020    Procedure: Percutaneous Coronary Intervention, Impella backup;  Surgeon: Juarez Wing MD;  Location: [facility] CATH INVASIVE LOCATION;  Service: Cardiovascular;  Laterality: N/A;   • CARDIAC ELECTROPHYSIOLOGY PROCEDURE N/A 10/8/2019    Procedure: ICD SC new, ST.SHIV ;  Surgeon: Juarez Wing MD;  Location: [facility] CATH INVASIVE LOCATION;  Service: Cardiovascular   • CARDIAC SURGERY  2013   • COLONOSCOPY     • CORONARY ARTERY BYPASS GRAFT      x3, 3-18-13-LIMA to LAD, and reverse individual SVG to lateral marginal and to PDA   • FEMORAL POPLITEAL BYPASS Left 01/09/2019    [facility]/ Dr. Jero Hatch   • HAND SURGERY Left     crushed hand   • PACEMAKER IMPLANTATION     • TOE SURGERY      toe nail removal of big toe- age 8 or 9        Social History:   Social History     Tobacco Use   • Smoking status: Current Every Day Smoker     Packs/day: 2.00     Years: 25.00     Pack years: 50.00     Types: Cigarettes   • Smokeless tobacco: Never Used   Substance Use Topics   • Alcohol use: No      Family History:  Family  "History   Problem Relation Age of Onset   • Hypertension Mother    • Diabetes Mother    • Heart disease Father         had CABG and re-do/  while recovering-had MI age 40s   • Diabetes Father    • Pancreatic cancer Sister    • Hyperlipidemia Brother    • Stroke Brother    • Heart disease Paternal Uncle           Allergies:  No Known Allergies  Scheduled Meds:  Acetylcysteine, 600 mg, BID  aspirin, 81 mg, Daily  atorvastatin, 40 mg, Daily  bumetanide, 2 mg, TID  carvedilol, 3.125 mg, BID With Meals  clopidogrel, 75 mg, Daily  DULoxetine, 60 mg, Daily  enoxaparin, 40 mg, Q24H  insulin glargine, 30 Units, Nightly  insulin lispro, 0-14 Units, 4x Daily With Meals & Nightly  isosorbide mononitrate, 30 mg, Daily  potassium chloride, 20 mEq, Daily  sodium chloride, 10 mL, Q12H  spironolactone, 25 mg, Daily          Review of Systems:   ROS    Constitution: Negative for chills and fever.   Cardiovascular: Negative for chest pain and palpitations.   Respiratory: Negative for cough and hemoptysis.    Gastrointestinal: Negative for nausea.        Constitutional:  Temp:  [97.6 °F (36.4 °C)-98.1 °F (36.7 °C)] 98.1 °F (36.7 °C)  Heart Rate:  [67-90] 79  Resp:  [15-18] 18  BP: ()/(65-82) 128/74    Physical Exam   /74   Pulse 79   Temp 98.1 °F (36.7 °C) (Oral)   Resp 18   Ht 172.7 cm (68\")   Wt 85 kg (187 lb 4.8 oz)   SpO2 95%   BMI 28.48 kg/m²   General:  Appears in no acute distress  Eyes: Sclera is anicteric,  conjunctiva is clear   HEENT:  No JVD. Thyroid not visibly enlarged. No mucosal pallor or cyanosis  Respiratory: Respirations regular and unlabored at rest.  Bilaterally good breath sounds, with good air entry in all fields. No crackles, rubs or wheezes auscultated  Cardiovascular: S1,S2 Regular rate and rhythm. No murmur, rub or gallop auscultated.  . No pretibial pitting edema  Gastrointestinal: Abdomen nondistended, soft  Musculoskeletal:  No abnormal movements  Extremities: No digital clubbing or " cyanosis  Skin: Color pink. Skin warm and dry to touch. No rashes  No xanthoma  Neuro: Alert and awake, no lateralizing deficits appreciated    INTAKE AND OUTPUT:    Intake/Output Summary (Last 24 hours) at 6/2/2021 1002  Last data filed at 6/2/2021 0934  Gross per 24 hour   Intake 1240 ml   Output 500 ml   Net 740 ml       Cardiographics  Telemetry: Sinus rhythm    ECG:   ECG 12 Lead   Preliminary Result   HEART RATE= 75  bpm   RR Interval= 800  ms   IN Interval= 164  ms   P Horizontal Axis=   deg   P Front Axis= 45  deg   QRSD Interval= 96  ms   QT Interval= 378  ms   QRS Axis= 75  deg   T Wave Axis= 154  deg   - ABNORMAL ECG -   Sinus rhythm   Nonspecific repol abnormality, diffuse leads   Electronically Signed By:    Date and Time of Study: 2021-05-31 14:32:09        I have personally reviewed EKG    Echocardiogram: Results for orders placed during the hospital encounter of 12/26/20    Adult Transthoracic Echo Complete W/ Cont if Necessary Per Protocol    Interpretation Summary  · Left ventricular ejection fraction appears to be 36 - 40%.  · The right ventricular cavity is mildly dilated.  · The following left ventricular wall segments are hypokinetic: mid anterior, apical anterior, basal anterolateral, mid anterolateral, apical lateral, basal inferolateral, mid inferolateral, apical inferior, mid inferior, apical septal, basal inferoseptal, mid inferoseptal, apex hypokinetic, mid anteroseptal, basal anterior, basal inferior and basal inferoseptal.  · No pericardial effusion noted      Lab Review   I have reviewed the labs  Results from last 7 days   Lab Units 05/31/21 2003 05/31/21  1429   TROPONIN T ng/mL 0.011 0.015         Results from last 7 days   Lab Units 06/02/21  0524   SODIUM mmol/L 138   POTASSIUM mmol/L 3.4*   BUN mg/dL 27*   CREATININE mg/dL 1.24   CALCIUM mg/dL 9.0         Results from last 7 days   Lab Units 06/02/21  0524 06/01/21  0233 05/31/21  1523   WBC 10*3/mm3 8.70 8.00 9.80   HEMOGLOBIN  "g/dL 14.1 13.7 14.2   HEMATOCRIT % 40.7 39.4 42.0   PLATELETS 10*3/mm3 152 145 158           RADIOLOGY:  Imaging Results (Last 24 Hours)     Procedure Component Value Units Date/Time    US Renal Bilateral [[id number]] Collected: 06/02/21 0548     Updated: 06/02/21 0550    Narrative:      EXAMINATION: COMPLETE RENAL RETROPERITONEAL  ULTRASOUND    DATE OF EXAMINATION: 6/2/2021.     INDICATION: Acute renal failure.     COMPARISON: None available.     FINDINGS:     Right Kidney: The right kidney measures 11.6 x 6.1 x 3.9 cm.   The renal cortical echotexture is normal. There is no hydronephrosis or mass.     Left Kidney: The left kidney measures 11.8 x 6.1 x 4.7 cm.  The renal cortical echotexture is normal. There is no hydronephrosis or mass.     Bladder:  No wall thickening.      Impression:      1. No renal stones or hydronephrosis.       Electronically signed by:  Garry Fierro D.O.    6/2/2021 3:49 AM                )6/2/2021  Juarez Wing MD      [facility] Dragon/Transcription:   \"Dictated utilizing Dragon dictation\".   "

## 2021-06-02 NOTE — CONSULTS
"Diabetes Education  Assessment/Teaching    Patient Name:  Bobby Steele Jr.  YOB: 1960  MRN: [id number]  Admit Date:  5/31/2021      Assessment Date:  6/2/2021    Most Recent Value   General Information    Referral From:  A1c, Blood glucose, MD order [MD consult for blood glucose >200, admission blood sugar 266, and on 5/31/2021 A1c was 9.3%.]   Height  172.7 cm (68\")   Height Method  Stated   Weight  85 kg (187 lb 4.8 oz)   Weight Method  Bed scale   Pregnancy Assessment   Diabetes History   What type of diabetes do you have?  Type 2   Current DM knowledge  fair   Do you test your blood sugar at home?  yes   Frequency of checks  5-6X a week   Meter type  Freestyle 1-2 years old   Who performs the test?  patient   Typical readings  200s   Have you had low blood sugar? (<70mg/dl)  no   Have you had high blood sugar? (>140mg/dl)  yes   How often do you have high blood sugar?  frequently   When was your last high blood sugar?  Admission blood sugar 266   Education Preferences   What areas of diabetes would you like to learn about?  avoiding high blood sugar, diabetes complications, medications for diabetes, testing my blood sugar at home   Nutrition Information   Assessment Topics   Taking Medication - Assessment  Needs education   Problem Solving - Assessment  Needs education   Reducing Risk - Assessment  Needs education   Monitoring - Assessment  Needs education   DM Goals   Taking Medication - Goal  Today   Problem Solving - Goal  Today   Reducing Risk - Goal  Today   Monitoring - Goal  Today            Most Recent Value   DM Education Needs   Meter  Has own   Meter Type  Freestyle   Frequency of Testing  -- [5-6 X a week]   Medication  Oral, Insulin, Pen [Patient stated that he takes Glimepiride 4 mg BID, and Lantus 40 units at bedtime when blood sugar >250 and Lantus 10 units when blood sugar <250 at home.  Patient does not take insulin on days he doesn't check his blood sugar.]   Problem Solving  " Hyperglycemia, Signs, Symptoms, Treatment   Reducing Risks  A1C testing [On 5/31/2021 A1c was 9.3%.]   Discharge Plan  Home   Motivation  Moderate   Teaching Method  Discussion, Handouts   Patient Response  Verbalized understanding            Other Comments:  A1c info sheet given with discussion on A1c target and healthy blood sugar range. Patient stated that he has been under a lot of stress and that his wife just left him. Discussed with patient the importance of taking his insulin at the same time each day even if he is not checking his blood sugar. Patient stated he would start taking his insulin daily. Patient stated he drinks a lot of green tea and sometimes it is sweetened and he drinks about 16 ounces of milk a day.  Asked patient if he could drink unsweetened green tea to decrease sugar intake and he stated he could.  Also discussed 1 cup of milk being a serving of carbohydrate. Patient stated he is able to afford meds and supplies for his diabetes. Patient has no further questions or concerns related to diabetes at this time.        Electronically signed by:  Love Goodman RN  06/02/21 10:37 EDT

## 2021-06-02 NOTE — PROGRESS NOTES
"NEPHROLOGY PROGRESS NOTE------[facility]    [facility]  [phone]  Bhavin Hummel MD      Patient Care Team:  Yamile Agarwal MD as PCP - General  Yamile Agarwal MD as PCP - Family Medicine  Juarez Wing MD as Consulting Physician (Cardiology)  Aranza Ramirez APRN as Nurse Practitioner (Nurse Practitioner)  Bhavin Hummel MD as Consulting Physician (Nephrology)      Provider:  Bhavin Hummel MD  Patient Name: Bobby Steele Jr.  :  1960    SUBJECTIVE:  Follow-up CKD  No chest pain shortness of breath    Medication:  Acetylcysteine, 600 mg, Oral, BID  aspirin, 81 mg, Oral, Daily  atorvastatin, 40 mg, Oral, Daily  bumetanide, 2 mg, Oral, TID  carvedilol, 3.125 mg, Oral, BID With Meals  clopidogrel, 75 mg, Oral, Daily  DULoxetine, 60 mg, Oral, Daily  enoxaparin, 40 mg, Subcutaneous, Q24H  insulin glargine, 30 Units, Subcutaneous, Nightly  insulin lispro, 0-14 Units, Subcutaneous, 4x Daily With Meals & Nightly  isosorbide mononitrate, 30 mg, Oral, Daily  sodium chloride, 10 mL, Intravenous, Q12H  spironolactone, 25 mg, Oral, Daily           OBJECTIVE    Vital Sign Min/Max for last 24 hours  Temp  Min: 97.6 °F (36.4 °C)  Max: 97.9 °F (36.6 °C)   BP  Min: 99/65  Max: 133/82   Pulse  Min: 67  Max: 90   Resp  Min: 15  Max: 16   SpO2  Min: 89 %  Max: 96 %   No data recorded   No data recorded     Flowsheet Rows        First Filed Value   Admission Height  170.2 cm (67\") Documented at 2021 1403   Admission Weight  85.5 kg (188 lb 6.4 oz) Documented at 2021 1403            No intake/output data recorded.  I/O last 3 completed shifts:  In: 1000 [I.V.:1000]  Out: 500 [Urine:500]    Physical Exam:  General Appearance: alert, appears stated age and cooperative  Head: normocephalic, without obvious abnormality and atraumatic  Eyes: conjunctivae and sclerae normal and no icterus  Neck: supple and no JVD  Lungs: clear to auscultation and " respirations regular  Heart: regular rhythm & normal rate and normal S1, S2  Chest: Wall no abnormalities observed  Abdomen: normal bowel sounds and soft non-tender  Extremities: moves extremities well, no edema, no cyanosis and no redness  Skin: no bleeding, bruising or rash, turgor normal, color normal and no lesions noted  Neurologic: Alert, and oriented. No focal deficits    Labs:    WBC WBC   Date Value Ref Range Status   06/02/2021 8.70 3.40 - 10.80 10*3/mm3 Final   06/01/2021 8.00 3.40 - 10.80 10*3/mm3 Final   05/31/2021 9.80 3.40 - 10.80 10*3/mm3 Final      HGB Hemoglobin   Date Value Ref Range Status   06/02/2021 14.1 13.0 - 17.7 g/dL Final   06/01/2021 13.7 13.0 - 17.7 g/dL Final   05/31/2021 14.2 13.0 - 17.7 g/dL Final      HCT Hematocrit   Date Value Ref Range Status   06/02/2021 40.7 37.5 - 51.0 % Final   06/01/2021 39.4 37.5 - 51.0 % Final   05/31/2021 42.0 37.5 - 51.0 % Final      Platlets No results found for: LABPLAT   MCV MCV   Date Value Ref Range Status   06/02/2021 89.6 79.0 - 97.0 fL Final   06/01/2021 90.7 79.0 - 97.0 fL Final   05/31/2021 91.8 79.0 - 97.0 fL Final          Sodium Sodium   Date Value Ref Range Status   06/02/2021 138 136 - 145 mmol/L Final   06/01/2021 141 136 - 145 mmol/L Final   05/31/2021 141 136 - 145 mmol/L Final      Potassium Potassium   Date Value Ref Range Status   06/02/2021 3.4 (L) 3.5 - 5.2 mmol/L Final   06/01/2021 3.5 3.5 - 5.2 mmol/L Final   05/31/2021 3.8 3.5 - 5.2 mmol/L Final      Chloride Chloride   Date Value Ref Range Status   06/02/2021 97 (L) 98 - 107 mmol/L Final   06/01/2021 102 98 - 107 mmol/L Final   05/31/2021 104 98 - 107 mmol/L Final      CO2 CO2   Date Value Ref Range Status   06/02/2021 29.0 22.0 - 29.0 mmol/L Final   06/01/2021 25.0 22.0 - 29.0 mmol/L Final   05/31/2021 26.0 22.0 - 29.0 mmol/L Final      BUN BUN   Date Value Ref Range Status   06/02/2021 27 (H) 8 - 23 mg/dL Final   06/01/2021 27 (H) 8 - 23 mg/dL Final   05/31/2021 26 (H) 8 - 23  mg/dL Final      Creatinine Creatinine   Date Value Ref Range Status   06/02/2021 1.24 0.76 - 1.27 mg/dL Final   06/01/2021 1.37 (H) 0.76 - 1.27 mg/dL Final   05/31/2021 1.43 (H) 0.76 - 1.27 mg/dL Final      Calcium Calcium   Date Value Ref Range Status   06/02/2021 9.0 8.6 - 10.5 mg/dL Final   06/01/2021 8.8 8.6 - 10.5 mg/dL Final   05/31/2021 9.0 8.6 - 10.5 mg/dL Final      PO4 No components found for: PO4   Albumin Albumin   Date Value Ref Range Status   06/02/2021 3.80 3.50 - 5.20 g/dL Final      Magnesium No results found for: MG   Uric Acid No components found for: URIC ACID     Imaging Results (Last 72 Hours)       Procedure Component Value Units Date/Time    US Renal Bilateral [[id number]] Collected: 06/02/21 0548     Updated: 06/02/21 0550    Narrative:      EXAMINATION: COMPLETE RENAL RETROPERITONEAL  ULTRASOUND    DATE OF EXAMINATION: 6/2/2021.     INDICATION: Acute renal failure.     COMPARISON: None available.     FINDINGS:     Right Kidney: The right kidney measures 11.6 x 6.1 x 3.9 cm.   The renal cortical echotexture is normal. There is no hydronephrosis or mass.     Left Kidney: The left kidney measures 11.8 x 6.1 x 4.7 cm.  The renal cortical echotexture is normal. There is no hydronephrosis or mass.     Bladder:  No wall thickening.      Impression:      1. No renal stones or hydronephrosis.       Electronically signed by:  Garry Fierro D.O.    6/2/2021 3:49 AM            Results for orders placed during the hospital encounter of 12/26/20    SCANNED - IMAGING      XR Abdomen KUB    Narrative  DATE OF EXAM:  12/26/2020 3:15 PM    PROCEDURE:  XR ABDOMEN KUB-    INDICATIONS:  Nasogastric tube placement.    COMPARISON:  05/25/2019.    TECHNIQUE:  Single radiographic view of the abdomen was obtained.      FINDINGS:  The nasogastric tube tip terminates in the fundus of the stomach. Both  flanks were excluded from the field-of-view. There are no dilated loops  of bowel to suggest an obstructive  process. There is no pneumatosis or  free air. The lung bases are clear. There are degenerative changes of  the thoracolumbar spine. There are faint scattered vascular  calcifications.    Impression  The nasogastric tube tip terminates in the fundus of the stomach.    Electronically Signed By-Cezar Blankenship MD On:12/26/2020 4:22 PM  This report was finalized on [id number] by  Cezar Blankenship MD.      XR Chest 1 View    Narrative  DATE OF EXAM:  12/28/2020 6:05 AM    PROCEDURE:  XR CHEST 1 VW-    INDICATIONS:  DYSPNEA, ON EXERTION    COMPARISON:  AP portable chest 12/27/2020.    TECHNIQUE:  Single radiographic view of the chest was obtained.    FINDINGS:  Ill-defined perihilar mid to lower lung zone linear densities are  present are seen within both lungs, greatest in the mid to lower lung  zones, new since one day prior. Heart size is normal and enlarged but  stable with signs of prior median sternotomy and CABG. Pacemaker device  appears unchanged in position. ET tube and NG tube have been removed  since the prior examination. No pleural effusion or pneumothorax is  identified.    Impression  1. Development of ill-defined linear densities in the perihilar regions  may reflect changes of mild subsegmental atelectasis. No dense  consolidations are identified.  2. Interval removal of the ET tube and the NG tube since prior  examination.    Electronically Signed By-Clau Barreto MD On:12/28/2020 8:07 AM  This report was finalized on [id number] by  Clau Barreto MD.      Results for orders placed during the hospital encounter of 05/31/21    Duplex Venous Lower Extremity - Bilateral CAR    Interpretation Summary  · Normal bilateral lower extremity venous duplex scan. Bilateral greater saphenous veins previously harvested above knee. Left leg bypass noted patent with normal triphasic arterial waveforms.        ASSESSMENT / PLAN      Chest pain    Elevated troponin    Peripheral vascular disease (CMS/HCC)     Multiple-type hyperlipidemia    Tobacco dependence syndrome    Benign essential HTN    Overweight (BMI 25.0-29.9)    Ischemic cardiomyopathy    Coronary artery disease with hx of myocardial infarct w/o hx of CABG    Panlobular emphysema (CMS/HCC)    Presence of automatic cardioverter/defibrillator (AICD)    GERD without esophagitis    DEBI (obstructive sleep apnea)    Type 2 diabetes mellitus with hyperglycemia, with long-term current use of insulin (CMS/HCC)    Non-ST elevation MI (NSTEMI) (CMS/HCC)    Acute on chronic systolic CHF (congestive heart failure) (CMS/HCC)    Depression    Open wound of left foot    Non-pressure chronic ulcer of other part of left foot with fat layer exposed (CMS/HCC)    Unstable angina pectoris (CMS/HCC)    Abnormal nuclear stress test    CKD3-most likely related to hypertensive nephrosclerosis.  Renal US normal  HTN  CAD--s/p cath, med treatment adised  Ischemic cardiomyopathy     Cr stable  Replace K  Resume bumex        Bhavin Hummel MD  [facility]  [phone]  06/02/21  07:19 EDT

## 2021-06-02 NOTE — CONSULTS
"Heart Failure Program  Nurse Navigator  Discharge Planning    Patient Name:Bobby Steele Jr.  :1960  Cardiologist: Maciej  Current Admission Date: 2021   Previous Admission:    Admission frequency: 1  admissions in 6 months    Heart Failure history per record: yes    Symptoms on admission:   Chest pain, SOA, leg edema SOMERS, non-healing foot wound     Admission Weight:  Flowsheet Rows      First Filed Value   Admission Height  170.2 cm (67\") Documented at 2021 1403   Admission Weight  85.5 kg (188 lb 6.4 oz) Documented at 2021 1403            Current Home Medications:  Prior to Admission medications    Medication Sig Start Date End Date Taking? Authorizing Provider   albuterol (PROVENTIL) (2.5 MG/3ML) 0.083% nebulizer solution Take 2.5 mg by nebulization Every 6 (Six) Hours As Needed for Wheezing or Shortness of Air.   Yes ProviderShirley MD   albuterol sulfate  (90 Base) MCG/ACT inhaler Inhale 2 puffs Every 4 (Four) Hours As Needed for Wheezing or Shortness of Air.   Yes ProviderShirley MD   aspirin (aspirin) 81 MG EC tablet Take 81 mg by mouth Daily.   Yes ProviderShirley MD   atorvastatin (LIPITOR) 40 MG tablet Take 40 mg by mouth Daily.   Yes ProviderShirley MD   bumetanide (BUMEX) 2 MG tablet Take 2 mg by mouth 3 (Three) Times a Day.   Yes ProviderShirley MD   carvedilol (COREG) 3.125 MG tablet Take 1 tablet by mouth 2 (Two) Times a Day With Meals. 20  Yes Juarez Wing MD   clopidogrel (PLAVIX) 75 MG tablet Take 75 mg by mouth Daily.   Yes ProviderShirley MD   DULoxetine (CYMBALTA) 60 MG capsule TAKE 1 CAPSULE BY MOUTH ONCE DAILY FOR 90 DAYS 20  Yes Shirley Eng MD   fenofibrate (TRICOR) 145 MG tablet Take 1 tablet by mouth once daily 21  Yes Juarez Wing MD   glimepiride (AMARYL) 4 MG tablet Take 4 mg by mouth 2 (Two) Times a Day.   Yes Shirley Eng MD   Insulin Glargine (LANTUS " "SOLOSTAR) 100 UNIT/ML injection pen Inject 10 Units under the skin into the appropriate area as directed Every Night.  Patient taking differently: Inject 40 Units under the skin into the appropriate area as directed Every Night. 5/8/20  Yes Claire Bustamante MD   isosorbide mononitrate (IMDUR) 30 MG 24 hr tablet Take 30 mg by mouth Daily.   Yes Provider, MD Shirley   spironolactone (ALDACTONE) 25 MG tablet Take 25 mg by mouth Daily.   Yes Provider, MD Shirley   collagenase (Santyl) 250 UNIT/GM ointment Apply  topically to the appropriate area as directed Daily. 12/31/20   Linda Vigil DO   nitroglycerin (NITROSTAT) 2 % ointment Place 0.5 inches on the skin as directed by provider Daily. 12/1/20   Juarez Wing MD       Social history:   Patient lives alone his daughter and daughter in law assist him when needed, he reports that his wife has left him and since then he has stopped follow his fluid restriction and taking care of himself but he still follows a low sodium diet     Smoking status: 2 PPD reports he has no plan to quit \"this is the only thing I have to look forward too\"     Diagnostics Testing:  proBNP level:  3164    Echocardiogram:Results for orders placed during the hospital encounter of 12/26/20    Adult Transthoracic Echo Complete W/ Cont if Necessary Per Protocol    Interpretation Summary  · Left ventricular ejection fraction appears to be 36 - 40%.  · The right ventricular cavity is mildly dilated.  · The following left ventricular wall segments are hypokinetic: mid anterior, apical anterior, basal anterolateral, mid anterolateral, apical lateral, basal inferolateral, mid inferolateral, apical inferior, mid inferior, apical septal, basal inferoseptal, mid inferoseptal, apex hypokinetic, mid anteroseptal, basal anterior, basal inferior and basal inferoseptal.  · No pericardial effusion noted        Patient Assessment:   Patient was sitting in the chair waiting a heart cath " "later today that he reports he \"hate to have\" he was showing no signs of SOA or distress, he appears depressed and talks about family issues really weighting on him      Current O2:  RA   Home O2:     Education provided to patient:  yes- Heart Failure disease education  yes -Symptom identification/management  yes -Daily Weights  yes- Diet education  yes- Fluid restriction (if ordered)  yes- Activity education  yes- Medication education  yes- Smoking cessation  yes- Follow-up Appointments  yes-Provided information on how to access [facility] My HF Guide/Heart Failure Interactive workbook    Acceptance of learning: Patient was willing to listen and answer questions     Heart Failure education interactive teaching session time: 45 minutes    Identified needs/barriers:   Fluid intake   Smoking cessation     Intervention:   Ask for diallo to see patient due to his family issues that are causing him stress           "

## 2021-06-02 NOTE — DISCHARGE SUMMARY
Date of Admission: 5/31/2021    Date of Discharge:  6/2/2021    Length of stay:  LOS: 0 days     Presenting Problem:   Chest pain [R07.9]      Active Diagnosis During Hospital Stay/Discharge Diagnoses/Course by Diagnoses:    Chest pain  -troponin elevated at [facility] facility but normal here   - dimer was mildly elevated at [facility] but LE doppler negative and WELLs score low, thus will defer CT with contrast given low probability of PE and to avoid unnecessary contrast with CKD  - stress test 6/1/21: abnormal  - [facility] now 6/2/21: see results below  - per cardiology continue medical management     Acute on chronic systolic CHF (congestive heart failure) secondary to Ischemic cardiomyopathy,  AICD in situ  -BNP 3,164 at [facility], given Lasix 40 mg IV  -12/26/20 2D echo:  EF 36-40%  -home TID bumex   -continue home spironolactone, carvedilol, and Imdur     Open wound of left foot, chronic (POA)  -continue wound care as outpt  -no current signs of infection     Coronary artery disease with hx of myocardial infarct w/o hx of CABG / Multiple-type hyperlipidemia / Benign essential HTN, chronic  -continue aspirin, Plavix, Imdur, statin, and carvedilol  -monitor BP     Panlobular emphysema, Tobacco dependence syndrome  -stable without exacerbation     CKD IIIa  -Cr stable 1.2 even after [facility]  -follow up BMP outpt    Peripheral vascular disease   -continue aspirin, Plavix, and statin     Overweight (BMI 25.0-29.9)  -encourage lifestyle modifications      GERD without esophagitis  -continue PPI     DEBI (obstructive sleep apnea)  -noncompliant with CPAP     Type 2 diabetes mellitus with hyperglycemia   -A1c 9.3  -SSI, continue home Lantus  -BG stable adjust prn      Depression  -continue Cymbalta        Active Hospital Problems    Diagnosis  POA   • **Chest pain [R07.9]  Yes   • Non-pressure chronic ulcer of other part of left foot with fat layer exposed (CMS/HCC) [L97.522]  Yes   • Acute on chronic systolic CHF (congestive heart failure)  "(CMS/[facility]) [I50.23]  Yes   • Open wound of left foot [S91.302A]  Yes   • Abnormal nuclear stress test [R94.39]  Unknown   • Depression [F32.9]  Yes   • GERD without esophagitis [K21.9]  Yes   • DEBI (obstructive sleep apnea) [G47.33]  Yes   • Type 2 diabetes mellitus with hyperglycemia, with long-term current use of insulin (CMS/[facility]) [E11.65, Z79.4]  Not Applicable   • Presence of automatic cardioverter/defibrillator (AICD) [Z95.810]  Yes   • Ischemic cardiomyopathy [I25.5]  Yes   • Coronary artery disease with hx of myocardial infarct w/o hx of CABG [I25.10, I25.2]  Not Applicable   • Panlobular emphysema (CMS/[facility]) [J43.1]  Yes   • Tobacco dependence syndrome [F17.200]  Yes   • Benign essential HTN [I10]  Yes   • Overweight (BMI 25.0-29.9) [E66.3]  Yes   • Peripheral vascular disease (CMS/[facility]) [I73.9]  Yes   • Multiple-type hyperlipidemia [E78.2]  Yes      Resolved Hospital Problems    Diagnosis Date Resolved POA   • Elevated troponin [R77.8] 06/02/2021 Yes   • Unstable angina pectoris (CMS/[facility]) [I20.0] 06/02/2021 Unknown   • Non-ST elevation MI (NSTEMI) (CMS/[facility]) [I21.4] 06/02/2021 No         Hospital Course  Patient is a 61 y.o. male presented with chest pain from outside facility. Was admitted and seen by cardiology here. Had stress test that was abormal and underwent LHC but there was no intervention indicated. After this did well was chest pain free and was felt stable to d/c home with close outpt cardiology follow up.         Procedures Performed:    Procedure(s):  Left Heart Cath, possible pci    1. \"Severe triple-vessel disease with patent LIMA to LAD, SVG to PDA, occluded SVG to diagonal, patent left main  2. Severe disease in mid LCx in a tortuous segment of the vessel, best suited for medical management  Severe disease in LAD going into diagonal which is unchanged from previous cath\"  -------------------       Consults:   Consults     Date and Time Order Name Status Description    5/31/2021  2:16 PM " Inpatient Cardiology Consult Completed           Pertinent Test Results:     Results from last 7 days   Lab Units 06/02/21  0524 06/01/21  0233 05/31/21  1523   WBC 10*3/mm3 8.70 8.00 9.80   HEMOGLOBIN g/dL 14.1 13.7 14.2   HEMATOCRIT % 40.7 39.4 42.0   PLATELETS 10*3/mm3 152 145 158     Results from last 7 days   Lab Units 06/02/21  0524 06/01/21 0233 05/31/21  1856   SODIUM mmol/L 138 141 141   POTASSIUM mmol/L 3.4* 3.5 3.8   CHLORIDE mmol/L 97* 102 104   CO2 mmol/L 29.0 25.0 26.0   BUN mg/dL 27* 27* 26*   CREATININE mg/dL 1.24 1.37* 1.43*   CALCIUM mg/dL 9.0 8.8 9.0   GLUCOSE mg/dL 148* 89 193*       Microbiology Results (last 10 days)     Procedure Component Value - Date/Time    COVID PRE-OP / PRE-PROCEDURE SCREENING ORDER (NO ISOLATION) - Swab, Nasopharynx [[id number]]  (Normal) Collected: 06/01/21 0844    Lab Status: Final result Specimen: Swab from Nasopharynx Updated: 06/01/21 0929    Narrative:      The following orders were created for panel order COVID PRE-OP / PRE-PROCEDURE SCREENING ORDER (NO ISOLATION) - Swab, Nasopharynx.  Procedure                               Abnormality         Status                     ---------                               -----------         ------                     COVID-19,CEPHEID,COR/AJ...[[id number]]  Normal              Final result                 Please view results for these tests on the individual orders.    COVID-19,CEPHEID,COR/AJ/PAD IN-HOUSE(OR EMERGENT/ADD-ON),NP SWAB IN TRANSPORT MEDIA 3-4 HR TAT, RT-PCR - Swab, Nasopharynx [[id number]]  (Normal) Collected: 06/01/21 0844    Lab Status: Final result Specimen: Swab from Nasopharynx Updated: 06/01/21 0929     COVID19 Not Detected    Narrative:      Fact sheet for providers: https://www.fda.gov/media/630555/download     Fact sheet for patients: https://www.fda.gov/media/046919/download  Fact sheet for providers: https://www.fda.gov/media/724448/download    Fact sheet for patients:  https://www.fda.gov/media/707801/download    Test performed by PCR.    COVID PRE-OP / PRE-PROCEDURE SCREENING ORDER (NO ISOLATION) - Swab, Nasopharynx [[id number]]  (Normal) Collected: 05/31/21 1538    Lab Status: Final result Specimen: Swab from Nasopharynx Updated: 06/01/21 1431    Narrative:      The following orders were created for panel order COVID PRE-OP / PRE-PROCEDURE SCREENING ORDER (NO ISOLATION) - Swab, Nasopharynx.  Procedure                               Abnormality         Status                     ---------                               -----------         ------                     COVID-19,APTIMA PANTHER,...[[id number]]  Normal              Final result                 Please view results for these tests on the individual orders.    COVID-19,APTIMA PANTHER,RUBY IN-HOUSE, NP/OP SWAB IN UTM/VTM/SALINE TRANSPORT MEDIA,24 HR TAT - Swab, Nasopharynx [[id number]]  (Normal) Collected: 05/31/21 1538    Lab Status: Final result Specimen: Swab from Nasopharynx Updated: 06/01/21 1431     COVID19 Not Detected    Narrative:      Fact sheet for providers: https://www.fda.gov/media/859871/download     Fact sheet for patients: https://www.fda.gov/media/731714/download    Test performed by RT PCR.          Results for orders placed during the hospital encounter of 12/26/20    Adult Transthoracic Echo Complete W/ Cont if Necessary Per Protocol    Interpretation Summary  · Left ventricular ejection fraction appears to be 36 - 40%.  · The right ventricular cavity is mildly dilated.  · The following left ventricular wall segments are hypokinetic: mid anterior, apical anterior, basal anterolateral, mid anterolateral, apical lateral, basal inferolateral, mid inferolateral, apical inferior, mid inferior, apical septal, basal inferoseptal, mid inferoseptal, apex hypokinetic, mid anteroseptal, basal anterior, basal inferior and basal inferoseptal.  · No pericardial effusion noted      Imaging Results (All)     Procedure  Component Value Units Date/Time    US Renal Bilateral [[id number]] Collected: 06/02/21 0548     Updated: 06/02/21 0550    Narrative:      EXAMINATION: COMPLETE RENAL RETROPERITONEAL  ULTRASOUND    DATE OF EXAMINATION: 6/2/2021.     INDICATION: Acute renal failure.     COMPARISON: None available.     FINDINGS:     Right Kidney: The right kidney measures 11.6 x 6.1 x 3.9 cm.   The renal cortical echotexture is normal. There is no hydronephrosis or mass.     Left Kidney: The left kidney measures 11.8 x 6.1 x 4.7 cm.  The renal cortical echotexture is normal. There is no hydronephrosis or mass.     Bladder:  No wall thickening.      Impression:      1. No renal stones or hydronephrosis.       Electronically signed by:  Garry Fierro D.O.    6/2/2021 3:49 AM            Condition on Discharge:  stable    Vital Signs  Temp:  [97.6 °F (36.4 °C)-98.1 °F (36.7 °C)] 98.1 °F (36.7 °C)  Heart Rate:  [67-90] 79  Resp:  [15-18] 18  BP: ()/(65-82) 128/74    Physical Exam:  Physical Exam  Cardiovascular:      Rate and Rhythm: Normal rate.   Pulmonary:      Effort: Pulmonary effort is normal.   Abdominal:      Palpations: Abdomen is soft.   Skin:     Comments: Groin site to the right stable no bruising or swelling   Neurological:      Mental Status: He is alert.         Discharge Disposition  Home or Self Care    Discharge Medications     Discharge Medications      Changes to Medications      Instructions Start Date   Insulin Glargine 100 UNIT/ML injection pen  Commonly known as: LANLESLYE SOLKONRAD  What changed: how much to take   10 Units, Subcutaneous, Nightly         Continue These Medications      Instructions Start Date   albuterol sulfate  (90 Base) MCG/ACT inhaler  Commonly known as: PROVENTIL HFA;VENTOLIN HFA;PROAIR HFA   2 puffs, Inhalation, Every 4 Hours PRN      albuterol (2.5 MG/3ML) 0.083% nebulizer solution  Commonly known as: PROVENTIL   2.5 mg, Nebulization, Every 6 Hours PRN      aspirin 81 MG EC tablet    81 mg, Oral, Daily      atorvastatin 40 MG tablet  Commonly known as: LIPITOR   40 mg, Oral, Daily      bumetanide 2 MG tablet  Commonly known as: BUMEX   2 mg, Oral, 3 Times Daily      carvedilol 3.125 MG tablet  Commonly known as: COREG   3.125 mg, Oral, 2 Times Daily With Meals      clopidogrel 75 MG tablet  Commonly known as: PLAVIX   75 mg, Oral, Daily      DULoxetine 60 MG capsule  Commonly known as: CYMBALTA   TAKE 1 CAPSULE BY MOUTH ONCE DAILY FOR 90 DAYS      fenofibrate 145 MG tablet  Commonly known as: TRICOR   Take 1 tablet by mouth once daily      glimepiride 4 MG tablet  Commonly known as: AMARYL   4 mg, Oral, 2 Times Daily      isosorbide mononitrate 30 MG 24 hr tablet  Commonly known as: IMDUR   30 mg, Oral, Daily      nitroglycerin 2 % ointment  Commonly known as: NITROSTAT   0.5 inches, Transdermal, Daily      Santyl 250 UNIT/GM ointment  Generic drug: collagenase   Topical, Every 24 Hours Scheduled      spironolactone 25 MG tablet  Commonly known as: ALDACTONE   25 mg, Oral, Daily             Discharge Diet:   Diet Instructions     Diet: Cardiac      Discharge Diet: Cardiac          Activity at Discharge:   Activity Instructions     Gradually Increase Activity Until at Pre-Hospitalization Level            Follow-up Appointments  Future Appointments   Date Time Provider Department Center   7/6/2021 11:20 AM Juarez Wing MD [facility]     Additional Instructions for the Follow-ups that You Need to Schedule     Discharge Follow-up with PCP   As directed       Currently Documented PCP:    Yamile Agarwal MD    PCP Phone Number:    [phone]     Follow Up Details: one week         Discharge Follow-up with Specified Provider: cardiology; 2 Weeks   As directed      To: cardiology    Follow Up: 2 Weeks               Test Results Pending at Discharge       Risk for Readmission (LACE) Score: 8 (6/2/2021  6:01 AM)      This patient has been examined wearing appropriate Personal  Protective Equipment . 06/02/21      Electronically signed by Antoine Salinas DO, 06/02/21, 08:44 EDT.

## 2021-06-02 NOTE — CONSULTS
"Heart Failure Program  Nurse Navigator  Discharge Planning    Patient Name:Bobby Steele Jr.  :1960  Cardiologist: Maciej  Current Admission Date: 2021   Previous Admission:   Admission frequency: 1  admissions in 6 months    Heart Failure history per record: New diagnosis     Symptoms on admission: Worsening SOA transferred from an OSH for possible CABG workup was discovered to be in fluid overload       Admission Weight:  Flowsheet Rows      First Filed Value   Admission Height  170.2 cm (67\") Documented at 2021 1403   Admission Weight  85.5 kg (188 lb 6.4 oz) Documented at 2021 1403            Current Home Medications:  Prior to Admission medications    Medication Sig Start Date End Date Taking? Authorizing Provider   albuterol (PROVENTIL) (2.5 MG/3ML) 0.083% nebulizer solution Take 2.5 mg by nebulization Every 6 (Six) Hours As Needed for Wheezing or Shortness of Air.   Yes Shirley Eng MD   albuterol sulfate  (90 Base) MCG/ACT inhaler Inhale 2 puffs Every 4 (Four) Hours As Needed for Wheezing or Shortness of Air.   Yes ProviderShirley MD   aspirin (aspirin) 81 MG EC tablet Take 81 mg by mouth Daily.   Yes ProviderShirley MD   atorvastatin (LIPITOR) 40 MG tablet Take 40 mg by mouth Daily.   Yes Shirley Eng MD   bumetanide (BUMEX) 2 MG tablet Take 2 mg by mouth 3 (Three) Times a Day.   Yes Shirley Eng MD   carvedilol (COREG) 3.125 MG tablet Take 1 tablet by mouth 2 (Two) Times a Day With Meals. 20  Yes Juarez Wing MD   clopidogrel (PLAVIX) 75 MG tablet Take 75 mg by mouth Daily.   Yes Shirley Eng MD   DULoxetine (CYMBALTA) 60 MG capsule TAKE 1 CAPSULE BY MOUTH ONCE DAILY FOR 90 DAYS 20  Yes Shirley Eng MD   fenofibrate (TRICOR) 145 MG tablet Take 1 tablet by mouth once daily 21  Yes Juarez Wing MD   glimepiride (AMARYL) 4 MG tablet Take 4 mg by mouth 2 (Two) Times a Day.   Yes " Provider, MD Shirley   Insulin Glargine (LANTUS SOLOSTAR) 100 UNIT/ML injection pen Inject 10 Units under the skin into the appropriate area as directed Every Night.  Patient taking differently: Inject 40 Units under the skin into the appropriate area as directed Every Night. 5/8/20  Yes Claire Bustamante MD   isosorbide mononitrate (IMDUR) 30 MG 24 hr tablet Take 30 mg by mouth Daily.   Yes ProviderShirley MD   spironolactone (ALDACTONE) 25 MG tablet Take 25 mg by mouth Daily.   Yes ProviderShirley MD   collagenase (Santyl) 250 UNIT/GM ointment Apply  topically to the appropriate area as directed Daily. 12/31/20   Linda Vigil DO   nitroglycerin (NITROSTAT) 2 % ointment Place 0.5 inches on the skin as directed by provider Daily. 12/1/20   Juarez Wing MD       Social history:   Patient lives with his wife he reports he walks his dog 30 blocks daily and has not had any trouble with the walks, his daughter is also very involved with his care     Smoking status: [id number]    Diagnostics Testing:  proBNP level:  2073    Echocardiogram:Results for orders placed during the hospital encounter of 12/26/20    Adult Transthoracic Echo Complete W/ Cont if Necessary Per Protocol    Interpretation Summary  · Left ventricular ejection fraction appears to be 36 - 40%.  · The right ventricular cavity is mildly dilated.  · The following left ventricular wall segments are hypokinetic: mid anterior, apical anterior, basal anterolateral, mid anterolateral, apical lateral, basal inferolateral, mid inferolateral, apical inferior, mid inferior, apical septal, basal inferoseptal, mid inferoseptal, apex hypokinetic, mid anteroseptal, basal anterior, basal inferior and basal inferoseptal.  · No pericardial effusion noted        Patient Assessment:   Patient was resting in bed no SOA with conversation but he was very drowsy so most if the conversation was with his wife,     Current O2:  2L [place]   Home O2:      Education provided to patient:  yes- Heart Failure disease education  yes -Symptom identification/management  yes -Daily Weights  yes- Diet education  yes- Fluid restriction (if ordered)  yes- Activity education  yes- Medication education  na- Smoking cessation  yes- Follow-up Appointments  yes-Provided information on how to access [facility] My HF Guide/Heart Failure Interactive workbook    Acceptance of learning: Patient and family were accepting and involved in the learning provided     Heart Failure education interactive teaching session time: 60 minutes    Identified needs/barriers:   New diagnosis   Patient to have a CABG this admission     Intervention:   RN

## 2021-06-03 ENCOUNTER — READMISSION MANAGEMENT (OUTPATIENT)
Dept: CALL CENTER | Facility: HOSPITAL | Age: 61
End: 2021-06-03

## 2021-06-03 NOTE — OUTREACH NOTE
Prep Survey      Responses   [facility] facility patient discharged from?  [facility]   Is LACE score < 7 ?  No   Emergency Room discharge w/ pulse ox?  No   Eligibility  Readm Mgmt   Discharge diagnosis  CHF   Does the patient have one of the following disease processes/diagnoses(primary or secondary)?  CHF   Does the patient have Home health ordered?  No   Is there a DME ordered?  No   Comments regarding appointments  call for apmt   Prep survey completed?  Yes          Ely Quiros RN

## 2021-06-04 ENCOUNTER — TELEPHONE (OUTPATIENT)
Dept: CARDIOLOGY | Facility: CLINIC | Age: 61
End: 2021-06-04

## 2021-06-04 LAB — QT INTERVAL: 378 MS

## 2021-06-04 NOTE — TELEPHONE ENCOUNTER
Recent hospital stay. Pt states that you were going to send in a new medication for him. Please advise.

## 2021-06-08 ENCOUNTER — READMISSION MANAGEMENT (OUTPATIENT)
Dept: CALL CENTER | Facility: HOSPITAL | Age: 61
End: 2021-06-08

## 2021-06-08 NOTE — OUTREACH NOTE
CHF Week 1 Survey      Responses   [facility] patient discharged from?  [facility]   Does the patient have one of the following disease processes/diagnoses(primary or secondary)?  CHF   CHF Week 1 attempt successful?  Yes   Call start time  1357   Call end time  1359   Discharge diagnosis  CHF   Meds reviewed with patient/caregiver?  Yes   Does the patient have all medications ordered at discharge?  N/A   Does the patient have a primary care provider?   Yes   Does the patient have an appointment with their PCP within 7 days of discharge?  Greater than 7 days   What is preventing the patient from scheduling follow up appointments within 7 days of discharge?  Earlier appointment not available   Has the patient kept scheduled appointments due by today?  N/A   Has home health visited the patient within 72 hours of discharge?  N/A   Pulse Ox monitoring  None   Psychosocial issues?  No   Did the patient receive a copy of their discharge instructions?  Yes   Nursing interventions  Reviewed instructions with patient   What is the patient's perception of their health status since discharge?  Improving   Nursing interventions  Nurse provided patient education   Is the patient weighing daily?  No   Does the patient have scales?  Yes   Daily weight interventions  Education provided on importance of daily weight   Is the patient able to teach back Heart Failure diet management?  Yes   Is the patient able to teach back Heart Failure Zones?  Yes   Is the patient able to teach back signs and symptoms of worsening condition? (i.e. weight gain, shortness of air, etc.)  Yes   If the patient is a current smoker, are they able to teach back resources for cessation?  Smoking cessation support groups   Is the patient/caregiver able to teach back the hierarchy of who to call/visit for symptoms/problems? PCP, Specialist, Home health nurse, Urgent Care, ED, 911  Yes    CHF Week 1 call completed?  Yes          Krysten Nathan LPN

## 2021-06-16 ENCOUNTER — READMISSION MANAGEMENT (OUTPATIENT)
Dept: CALL CENTER | Facility: HOSPITAL | Age: 61
End: 2021-06-16

## 2021-06-16 NOTE — OUTREACH NOTE
CHF Week 2 Survey      Responses   [facility], [place], operated by [facility] patient discharged from?  [facility]   Does the patient have one of the following disease processes/diagnoses(primary or secondary)?  CHF   Week 2 attempt successful?  No   Unsuccessful attempts  Attempt 1          Jessi Dye LPN

## 2021-07-06 ENCOUNTER — OFFICE VISIT (OUTPATIENT)
Dept: CARDIOLOGY | Facility: CLINIC | Age: 61
End: 2021-07-06

## 2021-07-06 VITALS
HEIGHT: 68 IN | WEIGHT: 193.6 LBS | BODY MASS INDEX: 29.34 KG/M2 | SYSTOLIC BLOOD PRESSURE: 93 MMHG | HEART RATE: 82 BPM | DIASTOLIC BLOOD PRESSURE: 60 MMHG | OXYGEN SATURATION: 97 %

## 2021-07-06 DIAGNOSIS — I25.810 CORONARY ARTERY DISEASE INVOLVING CORONARY BYPASS GRAFT OF NATIVE HEART WITHOUT ANGINA PECTORIS: ICD-10-CM

## 2021-07-06 DIAGNOSIS — E78.5 DYSLIPIDEMIA: ICD-10-CM

## 2021-07-06 DIAGNOSIS — J43.1 PANLOBULAR EMPHYSEMA (HCC): ICD-10-CM

## 2021-07-06 DIAGNOSIS — I10 BENIGN ESSENTIAL HTN: ICD-10-CM

## 2021-07-06 DIAGNOSIS — I25.5 ISCHEMIC CARDIOMYOPATHY: ICD-10-CM

## 2021-07-06 DIAGNOSIS — Z95.810 PRESENCE OF AUTOMATIC CARDIOVERTER/DEFIBRILLATOR (AICD): ICD-10-CM

## 2021-07-06 DIAGNOSIS — I50.22 CHRONIC HFREF (HEART FAILURE WITH REDUCED EJECTION FRACTION) (HCC): Primary | ICD-10-CM

## 2021-07-06 PROCEDURE — 99214 OFFICE O/P EST MOD 30 MIN: CPT | Performed by: INTERNAL MEDICINE

## 2021-07-06 RX ORDER — SACUBITRIL AND VALSARTAN 24; 26 MG/1; MG/1
1 TABLET, FILM COATED ORAL 2 TIMES DAILY
Qty: 60 TABLET | Refills: 11 | Status: SHIPPED | OUTPATIENT
Start: 2021-07-06 | End: 2021-08-03 | Stop reason: SDUPTHER

## 2021-07-06 RX ORDER — PREDNISONE 20 MG/1
20 TABLET ORAL 2 TIMES DAILY
COMMUNITY
Start: 2021-07-02 | End: 2021-11-02 | Stop reason: ALTCHOICE

## 2021-07-06 RX ORDER — BUMETANIDE 2 MG/1
2 TABLET ORAL DAILY
Qty: 90 TABLET | Refills: 3 | Status: ON HOLD | OUTPATIENT
Start: 2021-07-06 | End: 2022-04-07

## 2021-07-06 RX ORDER — AZITHROMYCIN 250 MG/1
TABLET, FILM COATED ORAL SEE ADMIN INSTRUCTIONS
COMMUNITY
Start: 2021-07-02 | End: 2021-08-03

## 2021-07-06 RX ORDER — AMPICILLIN 500 MG/1
CAPSULE ORAL
COMMUNITY
Start: 2021-05-03 | End: 2021-11-02 | Stop reason: ALTCHOICE

## 2021-07-06 NOTE — PROGRESS NOTES
Subjective:     Encounter Date:07/06/2021      Patient ID: Bobby Steele Jr. is a 61 y.o. male.    Chief Complaint : Complaining of dyspnea on exertion.  Here for follow-up for HFrEF, CAD, CABG, PCI, dyslipidemia, hypertension, diabetes, PAD.  History of Present Illness       This is a 61-year-old white male with past medical history of     # NSTEMI  5/12/19, SHILPA to SVG to RCA and proximal LAD leading into a small diagonal, cath 5/11/2020 underwent SHILPA to SVG to RCA and native proximal LCx with Impella assistance.  Cath 6/1/2021 revealed patent LIMA to LAD, SVG to PDA, occluded SVG to diagonal, patent stent in left main, severe disease in tortuous segment in mid LCx and LAD going to diagonal, unchanged from previous cath.  # CAD status post CABG X3 V  # Ischemic cardiomyopathy with EF of 35%, status post ICD 10/8/2019  # COPD, continue tobacco abuse  # Hypertension   # Hyperlipidemia  # Diabetes with hemoglobin A1c of 10 on 5/2/2020  #Gangrene of left foot  #Positive family for premature heart disease with father MI 53     Here for hospital follow-up..  Patient was recently in the hospital, where he presented because of recurrent substernal chest pain.  Patient  underwent cardiac cath 6/1/2021 which revealed severe triple-vessel disease with patent LIMA to LAD, SVG to PDA, occluded SVG to diagonal, patent left main, severe disease in proximal LAD leading to diagonal, severe disease in mid LCx which is in a tortuous segment.  Patient denies any chest pain.  Has dyspnea on exertion relieved with rest..  EKG done 5/31/2021 reviewed/interpreted by me shows sinus rhythm with rate of 75 bpm with early repolarization.  Labs from 12/27/2020 reveal cholesterol 111 triglycerides 124 HDL 40 LDL 49.  Labs from 5/31/2021 reveal hemoglobin A1c of 9.3.  Labs from 6/1/2021 and 6/2/2021 reveal proBNP 1481, CMP with a potassium of 3.4, BUN 27, creatinine 1.24, GFR 59.         ASSESSMENT:   #Hypokalemia  #  chronic CHF, ischemic  cardiomyopathy 35%, status post ICD 10/8/2019  # CAD status post CABG, PCI  # COPD, tobacco abuse   # hypertension ,  #Dyslipidemia  #Hyperglycemia, and type 2 diabetes  #  CKD  # PAD, toe gangrene     Recommendations:     We will continue medical management with aspirin, atorvastatin, SSRI, carvedilol, clopidogrel and Aldactone as tolerated to help with CHF, CAD, hypertension, dyslipidemia  Patient's arterial blood pressure is 93/60  Will decrease Bumex to 2 mg daily from 3 times daily and add Entresto 24 mg twice a day.  Patient would benefit from diabetes control, A1c 8 5/31/2021 is elevated at 9.3.  Counseled on smoking cessation.  Patient underwent Lexiscan Cardiolite 6/1/2021 which is revealing high risk abnormal ischemia in lateral wall and inferolateral wall with depressed EF.  Patient underwent cardiac cath 6/1/2021 which revealed severe disease in mid LCx which is in the loop of a bend and heavily calcified high risk for complications therefore was not intervened upon.  Patient also has severe disease going to the diagonal.  Replete electrolytes and monitor electrolytes  Reviewed CHF care with patient.  Follow-up with PMD for diabetes.  Discussed about COVID-19 vaccination.  Patient took both doses.  Since patient has hypokalemia and diuretics are being changed we will check BMP in 2 weeks.  And follow-up closely in a month.     Procedures see HPI for EKG and stress test and cath results    The following portions of the patient's history were reviewed and updated as appropriate: allergies, current medications, past family history, past medical history, past social history, past surgical history and problem list.    Assessment:         [facility]     Diagnosis Plan   1. Chronic HFrEF (heart failure with reduced ejection fraction) (CMS/[facility])  Basic Metabolic Panel   2. Ischemic cardiomyopathy  Basic Metabolic Panel   3. Coronary artery disease involving coronary bypass graft of native heart without angina pectoris   Basic Metabolic Panel   4. Panlobular emphysema (CMS/[facility])  Basic Metabolic Panel   5. Presence of automatic cardioverter/defibrillator (AICD)  Basic Metabolic Panel   6. Benign essential HTN  Basic Metabolic Panel   7. Dyslipidemia  Basic Metabolic Panel          Plan:               Past Medical History:  Past Medical History:   Diagnosis Date   • CAD (coronary artery disease)     S/P CABG   • Chest pain 05/31/2021   • Chronic systolic CHF (congestive heart failure) (CMS/HCC)    • COPD (chronic obstructive pulmonary disease) (CMS/HCC)    • DM2 (diabetes mellitus, type 2) (CMS/HCC)    • Dyslipidemia    • Encounter for monitoring antiplatelet therapy    • Hypertension    • Myocardial infarction (CMS/HCC)     inferior wall MI--03/13   • DEBI (obstructive sleep apnea)     noncompliant with CPAP   • Peripheral vascular disease (CMS/HCC)     S/P left fem-pop bypass   • Tobacco use      Past Surgical History:  Past Surgical History:   Procedure Laterality Date   • AMPUTATION FOOT / TOE      left   • APPENDECTOMY      at age 8 or 9   • BIVENTRICULAR ASSIST DEVICE/LEFT VENTRICULAR ASSIST DEVICE INSERTION N/A 5/13/2020    Procedure: Left Ventricular Assist Device;  Surgeon: Juarez Wing MD;  Location: [facility] CATH INVASIVE LOCATION;  Service: Cardiovascular;  Laterality: N/A;   • CARDIAC CATHETERIZATION      3-; [facility] 9-   • CARDIAC CATHETERIZATION Right 6/27/2019    Procedure: Cardiac Catheterization/with grafts groin access;  Surgeon: Juarez Wing MD;  Location: [facility] CATH INVASIVE LOCATION;  Service: Cardiovascular   • CARDIAC CATHETERIZATION N/A 5/8/2020    Procedure: Left Heart Cath with grafts;  Surgeon: Juarez Wing MD;  Location: [facility] CATH INVASIVE LOCATION;  Service: Cardiovascular;  Laterality: N/A;   • CARDIAC CATHETERIZATION N/A 5/13/2020    Procedure: Percutaneous Coronary Intervention, Impella backup;  Surgeon: Juarez Wing MD;  Location: [facility]  CATH INVASIVE LOCATION;  Service: Cardiovascular;  Laterality: N/A;   • CARDIAC CATHETERIZATION N/A 6/1/2021    Procedure: Left Heart Cath, possible pci;  Surgeon: Juarez Wing MD;  Location: [facility] CATH INVASIVE LOCATION;  Service: Cardiovascular;  Laterality: N/A;   • CARDIAC ELECTROPHYSIOLOGY PROCEDURE N/A 10/8/2019    Procedure: ICD SC new, ST.SHIV ;  Surgeon: Juarez Wing MD;  Location: [facility] CATH INVASIVE LOCATION;  Service: Cardiovascular   • CARDIAC SURGERY  2013   • COLONOSCOPY     • CORONARY ARTERY BYPASS GRAFT      x3, 3-18-13-LIMA to LAD, and reverse individual SVG to lateral marginal and to PDA   • FEMORAL POPLITEAL BYPASS Left 01/09/2019    [facility]/ Dr. Jero Hatch   • HAND SURGERY Left     crushed hand   • PACEMAKER IMPLANTATION     • TOE SURGERY      toe nail removal of big toe- age 8 or 9      Allergies:  No Known Allergies  Home Meds:  Current Meds:     Current Outpatient Medications:   •  albuterol (PROVENTIL) (2.5 MG/3ML) 0.083% nebulizer solution, Take 2.5 mg by nebulization Every 6 (Six) Hours As Needed for Wheezing or Shortness of Air., Disp: , Rfl:   •  albuterol sulfate  (90 Base) MCG/ACT inhaler, Inhale 2 puffs Every 4 (Four) Hours As Needed for Wheezing or Shortness of Air., Disp: , Rfl:   •  ampicillin (PRINCIPEN) 500 MG capsule, TAKE 1 CAPSULE BY MOUTH THREE TIMES DAILY FOR 10 DAYS, Disp: , Rfl:   •  aspirin (aspirin) 81 MG EC tablet, Take 81 mg by mouth Daily., Disp: , Rfl:   •  atorvastatin (LIPITOR) 40 MG tablet, Take 40 mg by mouth Daily., Disp: , Rfl:   •  azithromycin (ZITHROMAX) 250 MG tablet, Take  by mouth See Admin Instructions. Take 2 tablets by mouth on day 1 then take 1 tablet by mouth once daily on days 2-5, Disp: , Rfl:   •  bumetanide (BUMEX) 2 MG tablet, Take 1 tablet by mouth Daily., Disp: 90 tablet, Rfl: 3  •  carvedilol (COREG) 3.125 MG tablet, Take 1 tablet by mouth 2 (Two) Times a Day With Meals., Disp: 180 tablet, Rfl: 1  •   clopidogrel (PLAVIX) 75 MG tablet, Take 75 mg by mouth Daily., Disp: , Rfl:   •  collagenase (Santyl) 250 UNIT/GM ointment, Apply  topically to the appropriate area as directed Daily., Disp: 30 g, Rfl: 0  •  DULoxetine (CYMBALTA) 60 MG capsule, TAKE 1 CAPSULE BY MOUTH ONCE DAILY FOR 90 DAYS, Disp: , Rfl:   •  fenofibrate (TRICOR) 145 MG tablet, Take 1 tablet by mouth once daily, Disp: 90 tablet, Rfl: 3  •  glimepiride (AMARYL) 4 MG tablet, Take 4 mg by mouth 2 (Two) Times a Day., Disp: , Rfl:   •  Insulin Glargine (LANTUS SOLOSTAR) 100 UNIT/ML injection pen, Inject 10 Units under the skin into the appropriate area as directed Every Night. (Patient taking differently: Inject 40 Units under the skin into the appropriate area as directed Every Night.), Disp: 3 mL, Rfl: 2  •  isosorbide mononitrate (IMDUR) 30 MG 24 hr tablet, Take 30 mg by mouth Daily., Disp: , Rfl:   •  nitroglycerin (NITROSTAT) 2 % ointment, Place 0.5 inches on the skin as directed by provider Daily., Disp: 30 g, Rfl: 5  •  predniSONE (DELTASONE) 20 MG tablet, Take 20 mg by mouth 2 (Two) Times a Day., Disp: , Rfl:   •  spironolactone (ALDACTONE) 25 MG tablet, Take 25 mg by mouth Daily., Disp: , Rfl:   •  sacubitril-valsartan (Entresto) 24-26 MG tablet, Take 1 tablet by mouth 2 (Two) Times a Day., Disp: 60 tablet, Rfl: 11  Social History:   Social History     Tobacco Use   • Smoking status: Current Every Day Smoker     Packs/day: 2.00     Years: 25.00     Pack years: 50.00     Types: Cigarettes   • Smokeless tobacco: Never Used   Substance Use Topics   • Alcohol use: No      Family History:  Family History   Problem Relation Age of Onset   • Hypertension Mother    • Diabetes Mother    • Heart disease Father         had CABG and re-do/  while recovering-had MI age 40s   • Diabetes Father    • Pancreatic cancer Sister    • Hyperlipidemia Brother    • Stroke Brother    • Heart disease Paternal Uncle               Review of Systems   Constitutional:  "Negative for fever and malaise/fatigue.   HENT: Negative for congestion and hearing loss.    Eyes: Negative for double vision and visual disturbance.   Cardiovascular: Positive for chest pain (COPD) and leg swelling. Negative for claudication, dyspnea on exertion and syncope.   Respiratory: Positive for shortness of breath (COPD). Negative for cough.    Endocrine: Negative for cold intolerance.   Skin: Negative for color change and rash.   Musculoskeletal: Negative for arthritis and joint pain.   Gastrointestinal: Negative for abdominal pain and heartburn.   Genitourinary: Negative for hematuria.   Neurological: Negative for excessive daytime sleepiness and dizziness.   Psychiatric/Behavioral: Negative for depression. The patient is not nervous/anxious.    All other systems reviewed and are negative.    All other systems are negative         Objective:     Physical Exam  BP 93/60 (BP Location: Left arm, Patient Position: Sitting, Cuff Size: Adult)   Pulse 82   Ht 172.7 cm (68\")   Wt 87.8 kg (193 lb 9.6 oz)   SpO2 97%   BMI 29.44 kg/m²   General:  Appears in no acute distress  Eyes: Sclera is anicteric,  conjunctiva is clear   HEENT:  No JVD.  No carotid bruits  Respiratory: Respirations regular and unlabored at rest.  Clear to auscultation  Cardiovascular: S1,S2 Regular rate and rhythm. No murmur, rub or gallop auscultated.   Gastrointestinal: Abdomen nondistended, soft  Extremities: No digital clubbing or cyanosis, no edema  Skin: Color pink. Skin warm and dry to touch. No rashes  No xanthoma  Neuro: Alert and awake, no lateralizing deficits appreciated    Lab Reviewed:                  "

## 2021-07-26 ENCOUNTER — TELEPHONE (OUTPATIENT)
Dept: CARDIOLOGY | Facility: CLINIC | Age: 61
End: 2021-07-26

## 2021-08-03 ENCOUNTER — OFFICE VISIT (OUTPATIENT)
Dept: CARDIOLOGY | Facility: CLINIC | Age: 61
End: 2021-08-03

## 2021-08-03 VITALS
SYSTOLIC BLOOD PRESSURE: 116 MMHG | BODY MASS INDEX: 29.4 KG/M2 | WEIGHT: 194 LBS | OXYGEN SATURATION: 97 % | HEIGHT: 68 IN | HEART RATE: 86 BPM | DIASTOLIC BLOOD PRESSURE: 71 MMHG

## 2021-08-03 DIAGNOSIS — J43.1 PANLOBULAR EMPHYSEMA (HCC): ICD-10-CM

## 2021-08-03 DIAGNOSIS — I50.22 CHRONIC HFREF (HEART FAILURE WITH REDUCED EJECTION FRACTION) (HCC): ICD-10-CM

## 2021-08-03 DIAGNOSIS — I25.708 CORONARY ARTERY DISEASE OF BYPASS GRAFT OF NATIVE HEART WITH STABLE ANGINA PECTORIS (HCC): ICD-10-CM

## 2021-08-03 DIAGNOSIS — I25.5 ISCHEMIC CARDIOMYOPATHY: ICD-10-CM

## 2021-08-03 DIAGNOSIS — N18.32 STAGE 3B CHRONIC KIDNEY DISEASE (HCC): ICD-10-CM

## 2021-08-03 DIAGNOSIS — I20.8 STABLE ANGINA PECTORIS (HCC): Primary | ICD-10-CM

## 2021-08-03 DIAGNOSIS — I10 BENIGN ESSENTIAL HTN: ICD-10-CM

## 2021-08-03 DIAGNOSIS — E78.5 DYSLIPIDEMIA: ICD-10-CM

## 2021-08-03 DIAGNOSIS — Z95.810 PRESENCE OF AUTOMATIC CARDIOVERTER/DEFIBRILLATOR (AICD): ICD-10-CM

## 2021-08-03 PROCEDURE — 99214 OFFICE O/P EST MOD 30 MIN: CPT | Performed by: INTERNAL MEDICINE

## 2021-08-03 RX ORDER — SACUBITRIL AND VALSARTAN 24; 26 MG/1; MG/1
0.5 TABLET, FILM COATED ORAL 2 TIMES DAILY
Qty: 60 TABLET | Refills: 11 | Status: SHIPPED | OUTPATIENT
Start: 2021-08-03 | End: 2021-11-02 | Stop reason: SINTOL

## 2021-08-03 NOTE — PROGRESS NOTES
Subjective:     Encounter Date:08/03/2021      Patient ID: Bobby Steele Jr. is a 61 y.o. male.    Chief Complaint : Follow-up for HFrEF, CAD, CABG, PCI, dyslipidemia, hypertension, diabetes, PAD  History of Present Illness         This is a 61-year-old white male with past medical history of     # NSTEMI  5/12/19, SHILPA to SVG to RCA and proximal LAD leading into a small diagonal, cath 5/11/2020 underwent SHILPA to SVG to RCA and native proximal LCx with Impella assistance.  Cath 6/1/2021 revealed patent LIMA to LAD, SVG to PDA, occluded SVG to diagonal, patent stent in left main, severe disease in tortuous segment in mid LCx and LAD going to diagonal, unchanged from previous cath.  # CAD status post CABG X3 V  # Ischemic cardiomyopathy with EF of 35%, status post ICD 10/8/2019  # COPD, continue tobacco abuse  # Hypertension   # Hyperlipidemia  # Diabetes with hemoglobin A1c of 10 on 5/2/2020  #Gangrene of left foot  #Positive family for premature heart disease with father MI 53     Here for hospital follow-up.. Patient noncompliant with Entresto because of cost.  Has not seen nephrology since hospital consult advised him to see nephrology. Patient recently was admitted and another hospital. Is complaining of shortness of breath and cough. Has chest pain when he has fits of cough which is like a pressure. Patient continues to smoke in spite of counseling. Patient is complaining of edema    Patient was recently in the hospital, where he presented because of recurrent substernal chest pain.  Patient  underwent cardiac cath 6/1/2021 which revealed severe triple-vessel disease with patent LIMA to LAD, SVG to PDA, occluded SVG to diagonal, patent left main, severe disease in proximal LAD leading to diagonal, severe disease in mid LCx which is in a tortuous segment.  Patient denies any chest pain.  Has dyspnea on exertion relieved with rest..  EKG done 5/31/2021 reviewed/interpreted by me shows sinus rhythm with rate of  75 bpm with early repolarization.  Labs from 12/27/2020 reveal cholesterol 111 triglycerides 124 HDL 40 LDL 49.  Labs from 5/31/2021 reveal hemoglobin A1c of 9.3.  Labs from 6/1/2021 and 6/2/2021 reveal proBNP 1481, CMP with a potassium of 3.4, BUN 27, creatinine 1.24, GFR 59. Labs from 7/28/2021 revealed BUN 34 creatinine 1.71 GFR 41, glucose 399.         ASSESSMENT:   #CKD  #  chronic CHF, ischemic cardiomyopathy 35%, status post ICD 10/8/2019  # CAD status post CABG, PCI  # COPD, tobacco abuse   # hypertension ,  #Dyslipidemia  #Hyperglycemia, and type 2 diabetes  #  CKD  # PAD, toe gangrene     Recommendations:     Patient was seen by nephrology has worsening renal function will make a reappointment with nephrology.  We will continue medical management with aspirin, atorvastatin, SSRI, carvedilol, clopidogrel and Aldactone as tolerated to help with CHF, CAD, hypertension, dyslipidemia  Patient's arterial blood pressure is 116/71, heart rate 86, O2 sat of 97% on room air.  Patient's blood pressure is marginal to add Aldactone to his medical regimen at the current time  Patient would benefit from diabetes control, A1c 8 5/31/2021 is elevated at 9.3.  Counseled on smoking cessation.  Patient underwent Lexiscan Cardiolite 6/1/2021 which is revealing high risk abnormal ischemia in lateral wall and inferolateral wall with depressed EF.  Patient underwent cardiac cath 6/1/2021 which revealed severe disease in mid LCx which is in the loop of a bend and heavily calcified high risk for complications therefore was not intervened upon.  Patient also has severe disease going to the diagonal.  Replete electrolytes and monitor electrolytes  Reviewed CHF care with patient.  Follow-up with PMD for diabetes.  Will get records from [facility] where he was admitted recently with        Procedures EKG from 5/31/2021 reviewed/interpreted by me reveals sinus rhythm with rate of 75 bpm with no acute ST-T changes    The following  portions of the patient's history were reviewed and updated as appropriate: allergies, current medications, past family history, past medical history, past social history, past surgical history and problem list.    Assessment:         [facility]     Diagnosis Plan   1. Stable angina pectoris (CMS/[facility])     2. Chronic HFrEF (heart failure with reduced ejection fraction) (CMS/[facility])     3. Ischemic cardiomyopathy     4. Coronary artery disease of bypass graft of native heart with stable angina pectoris (CMS/[facility])     5. Panlobular emphysema (CMS/[facility])     6. Presence of automatic cardioverter/defibrillator (AICD)     7. Benign essential HTN     8. Dyslipidemia     9. Stage 3b chronic kidney disease (CMS/[facility])  Ambulatory Referral to Nephrology          Plan:               Past Medical History:  Past Medical History:   Diagnosis Date   • CAD (coronary artery disease)     S/P CABG   • Chest pain 05/31/2021   • Chronic systolic CHF (congestive heart failure) (CMS/[facility])    • COPD (chronic obstructive pulmonary disease) (CMS/[facility])    • DM2 (diabetes mellitus, type 2) (CMS/HCC)    • Dyslipidemia    • Encounter for monitoring antiplatelet therapy    • Hypertension    • Myocardial infarction (CMS/HCC)     inferior wall MI--03/13   • DEBI (obstructive sleep apnea)     noncompliant with CPAP   • Peripheral vascular disease (CMS/[facility])     S/P left fem-pop bypass   • Tobacco use      Past Surgical History:  Past Surgical History:   Procedure Laterality Date   • AMPUTATION FOOT / TOE      left   • APPENDECTOMY      at age 8 or 9   • BIVENTRICULAR ASSIST DEVICE/LEFT VENTRICULAR ASSIST DEVICE INSERTION N/A 5/13/2020    Procedure: Left Ventricular Assist Device;  Surgeon: Juarez Wing MD;  Location:  INVASIVE LOCATION;  Service: Cardiovascular;  Laterality: N/A;   • CARDIAC CATHETERIZATION      3-; [facility] 9-   • CARDIAC CATHETERIZATION Right 6/27/2019    Procedure: Cardiac Catheterization/with grafts groin access;   Surgeon: Juarez Wing MD;  Location: [facility] CATH INVASIVE LOCATION;  Service: Cardiovascular   • CARDIAC CATHETERIZATION N/A 5/8/2020    Procedure: Left Heart Cath with grafts;  Surgeon: Juarez Wing MD;  Location: [facility] CATH INVASIVE LOCATION;  Service: Cardiovascular;  Laterality: N/A;   • CARDIAC CATHETERIZATION N/A 5/13/2020    Procedure: Percutaneous Coronary Intervention, Impella backup;  Surgeon: Juarez Wing MD;  Location: [facility] CATH INVASIVE LOCATION;  Service: Cardiovascular;  Laterality: N/A;   • CARDIAC CATHETERIZATION N/A 6/1/2021    Procedure: Left Heart Cath, possible pci;  Surgeon: Juarez Wing MD;  Location: [facility] CATH INVASIVE LOCATION;  Service: Cardiovascular;  Laterality: N/A;   • CARDIAC ELECTROPHYSIOLOGY PROCEDURE N/A 10/8/2019    Procedure: ICD SC new, ST.SHIV ;  Surgeon: Juarez Wing MD;  Location: [facility] CATH INVASIVE LOCATION;  Service: Cardiovascular   • CARDIAC SURGERY  2013   • COLONOSCOPY     • CORONARY ARTERY BYPASS GRAFT      x3, 3-18-13-LIMA to LAD, and reverse individual SVG to lateral marginal and to PDA   • FEMORAL POPLITEAL BYPASS Left 01/09/2019    [facility]/ Dr. Jero Hatch   • HAND SURGERY Left     crushed hand   • PACEMAKER IMPLANTATION     • TOE SURGERY      toe nail removal of big toe- age 8 or 9      Allergies:  No Known Allergies  Home Meds:  Current Meds:     Current Outpatient Medications:   •  albuterol (PROVENTIL) (2.5 MG/3ML) 0.083% nebulizer solution, Take 2.5 mg by nebulization Every 6 (Six) Hours As Needed for Wheezing or Shortness of Air., Disp: , Rfl:   •  albuterol sulfate  (90 Base) MCG/ACT inhaler, Inhale 2 puffs Every 4 (Four) Hours As Needed for Wheezing or Shortness of Air., Disp: , Rfl:   •  ampicillin (PRINCIPEN) 500 MG capsule, TAKE 1 CAPSULE BY MOUTH THREE TIMES DAILY FOR 10 DAYS, Disp: , Rfl:   •  aspirin (aspirin) 81 MG EC tablet, Take 81 mg by mouth Daily., Disp: , Rfl:   •   atorvastatin (LIPITOR) 40 MG tablet, Take 40 mg by mouth Daily., Disp: , Rfl:   •  bumetanide (BUMEX) 2 MG tablet, Take 1 tablet by mouth Daily., Disp: 90 tablet, Rfl: 3  •  carvedilol (COREG) 3.125 MG tablet, Take 1 tablet by mouth 2 (Two) Times a Day With Meals., Disp: 180 tablet, Rfl: 1  •  clopidogrel (PLAVIX) 75 MG tablet, Take 75 mg by mouth Daily., Disp: , Rfl:   •  collagenase (Santyl) 250 UNIT/GM ointment, Apply  topically to the appropriate area as directed Daily., Disp: 30 g, Rfl: 0  •  DULoxetine (CYMBALTA) 60 MG capsule, TAKE 1 CAPSULE BY MOUTH ONCE DAILY FOR 90 DAYS, Disp: , Rfl:   •  fenofibrate (TRICOR) 145 MG tablet, Take 1 tablet by mouth once daily, Disp: 90 tablet, Rfl: 3  •  glimepiride (AMARYL) 4 MG tablet, Take 4 mg by mouth 2 (Two) Times a Day., Disp: , Rfl:   •  Insulin Glargine (LANTUS SOLOSTAR) 100 UNIT/ML injection pen, Inject 10 Units under the skin into the appropriate area as directed Every Night. (Patient taking differently: Inject 40 Units under the skin into the appropriate area as directed Every Night.), Disp: 3 mL, Rfl: 2  •  isosorbide mononitrate (IMDUR) 30 MG 24 hr tablet, Take 30 mg by mouth Daily., Disp: , Rfl:   •  nitroglycerin (NITROSTAT) 2 % ointment, Place 0.5 inches on the skin as directed by provider Daily., Disp: 30 g, Rfl: 5  •  predniSONE (DELTASONE) 20 MG tablet, Take 20 mg by mouth 2 (Two) Times a Day., Disp: , Rfl:   •  sacubitril-valsartan (Entresto) 24-26 MG tablet, Take 0.5 tablets by mouth 2 (Two) Times a Day., Disp: 60 tablet, Rfl: 11  •  spironolactone (ALDACTONE) 25 MG tablet, Take 25 mg by mouth Daily., Disp: , Rfl:   Social History:   Social History     Tobacco Use   • Smoking status: Current Every Day Smoker     Packs/day: 2.00     Years: 25.00     Pack years: 50.00     Types: Cigarettes   • Smokeless tobacco: Never Used   Substance Use Topics   • Alcohol use: No      Family History:  Family History   Problem Relation Age of Onset   • Hypertension  "Mother    • Diabetes Mother    • Heart disease Father         had CABG and re-do/  while recovering-had MI age 40s   • Diabetes Father    • Pancreatic cancer Sister    • Hyperlipidemia Brother    • Stroke Brother    • Heart disease Paternal Uncle               Review of Systems   Constitutional: Positive for malaise/fatigue.   Cardiovascular: Positive for chest pain. Negative for leg swelling and palpitations.   Respiratory: Positive for shortness of breath.    Skin: Negative for rash.   Neurological: Positive for numbness. Negative for dizziness and light-headedness.     All other systems are negative         Objective:     Physical Exam  /71   Pulse 86   Ht 172.7 cm (68\")   Wt 88 kg (194 lb)   SpO2 97%   BMI 29.50 kg/m²   General:  Appears in no acute distress  Eyes: Sclera is anicteric,  conjunctiva is clear   HEENT:  No JVD.  No carotid bruits  Respiratory: Respirations regular and unlabored at rest.  Clear to auscultation  Cardiovascular: S1,S2 Regular rate and rhythm. 2/6 holosystolic murmur, no   rub or gallop auscultated.   Gastrointestinal: Abdomen nondistended, soft  Extremities: No digital clubbing or cyanosis, no edema  Skin: Color pink. Skin warm and dry to touch. No rashes  No xanthoma  Neuro: Alert and awake, no lateralizing deficits appreciated    Lab Reviewed:                  "

## 2021-08-26 PROCEDURE — 93296 REM INTERROG EVL PM/IDS: CPT | Performed by: INTERNAL MEDICINE

## 2021-08-26 PROCEDURE — 93295 DEV INTERROG REMOTE 1/2/MLT: CPT | Performed by: INTERNAL MEDICINE

## 2021-11-02 ENCOUNTER — OFFICE VISIT (OUTPATIENT)
Dept: CARDIOLOGY | Facility: CLINIC | Age: 61
End: 2021-11-02

## 2021-11-02 VITALS
OXYGEN SATURATION: 96 % | SYSTOLIC BLOOD PRESSURE: 110 MMHG | BODY MASS INDEX: 29.55 KG/M2 | WEIGHT: 195 LBS | DIASTOLIC BLOOD PRESSURE: 74 MMHG | HEART RATE: 82 BPM | HEIGHT: 68 IN

## 2021-11-02 DIAGNOSIS — I10 BENIGN ESSENTIAL HTN: ICD-10-CM

## 2021-11-02 DIAGNOSIS — I50.22 CHRONIC HFREF (HEART FAILURE WITH REDUCED EJECTION FRACTION) (HCC): Primary | ICD-10-CM

## 2021-11-02 DIAGNOSIS — I25.708 CORONARY ARTERY DISEASE OF BYPASS GRAFT OF NATIVE HEART WITH STABLE ANGINA PECTORIS (HCC): ICD-10-CM

## 2021-11-02 DIAGNOSIS — N18.32 STAGE 3B CHRONIC KIDNEY DISEASE (HCC): ICD-10-CM

## 2021-11-02 DIAGNOSIS — E78.5 DYSLIPIDEMIA: ICD-10-CM

## 2021-11-02 DIAGNOSIS — I25.5 ISCHEMIC CARDIOMYOPATHY: ICD-10-CM

## 2021-11-02 DIAGNOSIS — Z95.810 PRESENCE OF AUTOMATIC CARDIOVERTER/DEFIBRILLATOR (AICD): ICD-10-CM

## 2021-11-02 DIAGNOSIS — J43.1 PANLOBULAR EMPHYSEMA (HCC): ICD-10-CM

## 2021-11-02 PROCEDURE — 99214 OFFICE O/P EST MOD 30 MIN: CPT | Performed by: INTERNAL MEDICINE

## 2021-11-02 PROCEDURE — 93000 ELECTROCARDIOGRAM COMPLETE: CPT | Performed by: INTERNAL MEDICINE

## 2021-11-02 NOTE — PLAN OF CARE
Addended by: ELLA COBOS on: 11/2/2021 11:20 AM     Modules accepted: Orders     Goal Outcome Evaluation:        New admit to pcu last pm for CHF. Pt states breathing is much improved. Cont with iv lasix. Pt noted to have witnessed DEBI. Cont to monitor

## 2021-11-02 NOTE — PROGRESS NOTES
Subjective:     Encounter Date:11/02/2021      Patient ID: Bobby Steele Jr. is a 61 y.o. male.    Chief Complaint : Follow-up for HFrEF, cardiomyopathy, CAD, CABG, PCI, dyslipidemia hypertension diabetes PAD CKD  History of Present Illness         This is a 61-year-old white male with past medical history of     # NSTEMI  5/12/19, SHILPA to SVG to RCA and proximal LAD leading into a small diagonal, cath 5/11/2020 underwent SHILPA to SVG to RCA and native proximal LCx with Impella assistance.  Cath 6/1/2021 revealed patent LIMA to LAD, SVG to PDA, occluded SVG to diagonal, patent stent in left main, severe disease in tortuous segment in mid LCx and LAD going to diagonal, unchanged from previous cath.  # CAD status post CABG X3 V  # Ischemic cardiomyopathy with EF of 35%, status post ICD 10/8/2019  # COPD, continue tobacco abuse  # Hypertension   # Hyperlipidemia  # Diabetes with hemoglobin A1c of 10 on 5/2/2020  #Gangrene of left foot  #Positive family for premature heart disease with father MI 53     Here for  follow-up.. Patient had an episode of low blood pressure and syncope apparently his blood pressure got down to 84/57.  PMD stopped Entresto and decrease diuretics.  Patient is awaiting an MRI.    Patient was recently in the hospital, where he presented because of recurrent substernal chest pain.  Patient  underwent cardiac cath 6/1/2021 which revealed severe triple-vessel disease with patent LIMA to LAD, SVG to PDA, occluded SVG to diagonal, patent left main, severe disease in proximal LAD leading to diagonal, severe disease in mid LCx which is in a tortuous segment.  Patient denies any chest pain.    Labs from 12/27/2020 reveal cholesterol 111 triglycerides 124 HDL 40 LDL 49.  Labs from 5/31/2021 reveal hemoglobin A1c of 9.3.  Labs from 6/1/2021 and 6/2/2021 reveal proBNP 1481, CMP with a potassium of 3.4, BUN 27, creatinine 1.24, GFR 59. Labs from 7/28/2021 revealed BUN 34 creatinine 1.71 GFR 41, glucose  399.         ASSESSMENT:   #Syncope, hypotension  #CKD  #  chronic CHF, ischemic cardiomyopathy 35%, status post ICD 10/8/2019  # CAD status post CABG, PCI  # COPD, tobacco abuse   # hypertension ,  #Dyslipidemia  #Hyperglycemia, and type 2 diabetes  #  CKD  # PAD, toe gangrene     Recommendations:     Reviewed EKG results with patient.  We will discontinue Entresto for hypertension  We will continue medical management with aspirin, atorvastatin, SSRI, carvedilol, clopidogrel and Aldactone as tolerated to help with CHF, CAD, hypertension, dyslipidemia  Patient would benefit from diabetes control, A1c 8 5/31/2021 is elevated at 9.3.  Counseled on smoking cessation.  Patient underwent Lexiscan Cardiolite 6/1/2021 which is revealing high risk abnormal ischemia in lateral wall and inferolateral wall with depressed EF.  Patient underwent cardiac cath 6/1/2021 which revealed severe disease in mid LCx which is in the loop of a bend and heavily calcified high risk for complications therefore was not intervened upon.  Patient also has severe disease going to the diagonal.  Reviewed CHF care with patient.  Follow-up with PMD for diabetes.  Counseled on smoking cessation.  We will follow-up closely and restart ACE inhibitor's or Entresto based on patient's pressure and renal function.  Discussed about COVID-19 vaccination.  Patient already took both doses.        ECG 12 Lead    Date/Time: 11/2/2021 11:28 AM  Performed by: Juarez Wing MD  Authorized by: Juarez Wing MD   Comparison: compared with previous ECG from 5/31/2021  Comparison to previous ECG: EKG done today reviewed/interpreted by me reveals sinus rhythm with rate of 81 bpm with nonspecific ST-T changes.  No new change compared to EKG from 5/31/2021              The following portions of the patient's history were reviewed and updated as appropriate: allergies, current medications, past family history, past medical history, past social  history, past surgical history and problem list.    Assessment:         [facility]     Diagnosis Plan   1. Chronic HFrEF (heart failure with reduced ejection fraction) ([facility])  ECG 12 Lead   2. Ischemic cardiomyopathy  ECG 12 Lead   3. Coronary artery disease of bypass graft of native heart with stable angina pectoris ([facility])  ECG 12 Lead   4. Presence of automatic cardioverter/defibrillator (AICD)  ECG 12 Lead   5. Benign essential HTN  ECG 12 Lead   6. Dyslipidemia  ECG 12 Lead   7. Panlobular emphysema ([facility])  ECG 12 Lead   8. Stage 3b chronic kidney disease ([facility])  ECG 12 Lead          Plan:               Past Medical History:  Past Medical History:   Diagnosis Date   • CAD (coronary artery disease)     S/P CABG   • Chest pain 05/31/2021   • Chronic systolic CHF (congestive heart failure) ([facility])    • COPD (chronic obstructive pulmonary disease) ([facility])    • DM2 (diabetes mellitus, type 2) ([facility])    • Dyslipidemia    • Encounter for monitoring antiplatelet therapy    • Hypertension    • Myocardial infarction ([facility])     inferior wall MI--03/13   • DEBI (obstructive sleep apnea)     noncompliant with CPAP   • Peripheral vascular disease ([facility])     S/P left fem-pop bypass   • Tobacco use      Past Surgical History:  Past Surgical History:   Procedure Laterality Date   • AMPUTATION FOOT / TOE      left   • APPENDECTOMY      at age 8 or 9   • BIVENTRICULAR ASSIST DEVICE/LEFT VENTRICULAR ASSIST DEVICE INSERTION N/A 5/13/2020    Procedure: Left Ventricular Assist Device;  Surgeon: Juarez Wing MD;  Location: [facility] CATH INVASIVE LOCATION;  Service: Cardiovascular;  Laterality: N/A;   • CARDIAC CATHETERIZATION      3-; [facility] 9-   • CARDIAC CATHETERIZATION Right 6/27/2019    Procedure: Cardiac Catheterization/with grafts groin access;  Surgeon: Juarez Wing MD;  Location: [facility] CATH INVASIVE LOCATION;  Service: Cardiovascular   • CARDIAC CATHETERIZATION N/A 5/8/2020    Procedure: Left Heart Cath with  grafts;  Surgeon: Juarez Wing MD;  Location: [facility] CATH INVASIVE LOCATION;  Service: Cardiovascular;  Laterality: N/A;   • CARDIAC CATHETERIZATION N/A 5/13/2020    Procedure: Percutaneous Coronary Intervention, Impella backup;  Surgeon: Juarez Wing MD;  Location: [facility] CATH INVASIVE LOCATION;  Service: Cardiovascular;  Laterality: N/A;   • CARDIAC CATHETERIZATION N/A 6/1/2021    Procedure: Left Heart Cath, possible pci;  Surgeon: Juarez Wing MD;  Location: [facility] CATH INVASIVE LOCATION;  Service: Cardiovascular;  Laterality: N/A;   • CARDIAC ELECTROPHYSIOLOGY PROCEDURE N/A 10/8/2019    Procedure: ICD SC new, ST.SHIV ;  Surgeon: Juarez Wing MD;  Location: [facility] CATH INVASIVE LOCATION;  Service: Cardiovascular   • CARDIAC SURGERY  2013   • COLONOSCOPY     • CORONARY ARTERY BYPASS GRAFT      x3, 3-18-13-LIMA to LAD, and reverse individual SVG to lateral marginal and to PDA   • FEMORAL POPLITEAL BYPASS Left 01/09/2019    [facility]/ Dr. Jero Hatch   • HAND SURGERY Left     crushed hand   • PACEMAKER IMPLANTATION     • TOE SURGERY      toe nail removal of big toe- age 8 or 9      Allergies:  No Known Allergies  Home Meds:  Current Meds:     Current Outpatient Medications:   •  albuterol (PROVENTIL) (2.5 MG/3ML) 0.083% nebulizer solution, Take 2.5 mg by nebulization Every 6 (Six) Hours As Needed for Wheezing or Shortness of Air., Disp: , Rfl:   •  albuterol sulfate  (90 Base) MCG/ACT inhaler, Inhale 2 puffs Every 4 (Four) Hours As Needed for Wheezing or Shortness of Air., Disp: , Rfl:   •  aspirin (aspirin) 81 MG EC tablet, Take 81 mg by mouth Daily., Disp: , Rfl:   •  atorvastatin (LIPITOR) 40 MG tablet, Take 40 mg by mouth Daily., Disp: , Rfl:   •  bumetanide (BUMEX) 2 MG tablet, Take 1 tablet by mouth Daily., Disp: 90 tablet, Rfl: 3  •  carvedilol (COREG) 3.125 MG tablet, Take 1 tablet by mouth 2 (Two) Times a Day With Meals., Disp: 180 tablet, Rfl: 1  •   clopidogrel (PLAVIX) 75 MG tablet, Take 75 mg by mouth Daily., Disp: , Rfl:   •  collagenase (Santyl) 250 UNIT/GM ointment, Apply  topically to the appropriate area as directed Daily., Disp: 30 g, Rfl: 0  •  DULoxetine (CYMBALTA) 60 MG capsule, TAKE 1 CAPSULE BY MOUTH ONCE DAILY FOR 90 DAYS, Disp: , Rfl:   •  fenofibrate (TRICOR) 145 MG tablet, Take 1 tablet by mouth once daily, Disp: 90 tablet, Rfl: 3  •  glimepiride (AMARYL) 4 MG tablet, Take 4 mg by mouth 2 (Two) Times a Day., Disp: , Rfl:   •  Insulin Glargine (LANTUS SOLOSTAR) 100 UNIT/ML injection pen, Inject 10 Units under the skin into the appropriate area as directed Every Night. (Patient taking differently: Inject 40 Units under the skin into the appropriate area as directed Every Night.), Disp: 3 mL, Rfl: 2  •  isosorbide mononitrate (IMDUR) 30 MG 24 hr tablet, Take 30 mg by mouth Daily., Disp: , Rfl:   •  nitroglycerin (NITROSTAT) 2 % ointment, Place 0.5 inches on the skin as directed by provider Daily., Disp: 30 g, Rfl: 5  •  spironolactone (ALDACTONE) 25 MG tablet, Take 25 mg by mouth Daily., Disp: , Rfl:   Social History:   Social History     Tobacco Use   • Smoking status: Current Every Day Smoker     Packs/day: 2.00     Years: 25.00     Pack years: 50.00     Types: Cigarettes   • Smokeless tobacco: Never Used   Substance Use Topics   • Alcohol use: No      Family History:  Family History   Problem Relation Age of Onset   • Hypertension Mother    • Diabetes Mother    • Heart disease Father         had CABG and re-do/  while recovering-had MI age 40s   • Diabetes Father    • Pancreatic cancer Sister    • Hyperlipidemia Brother    • Stroke Brother    • Heart disease Paternal Uncle               Review of Systems   Constitutional: Negative for malaise/fatigue.   Cardiovascular: Positive for leg swelling. Negative for chest pain and palpitations.   Respiratory: Positive for shortness of breath.    Skin: Negative for rash.   Neurological: Negative for  "dizziness, light-headedness and numbness.     All other systems are negative         Objective:     Physical Exam  /74   Pulse 82   Ht 172.7 cm (68\")   Wt 88.5 kg (195 lb)   SpO2 96%   BMI 29.65 kg/m²   General:  Appears in no acute distress  Eyes: Sclera is anicteric,  conjunctiva is clear   HEENT:  No JVD.  No carotid bruits  Respiratory: Respirations regular and unlabored at rest.  Clear to auscultation  Cardiovascular: S1,S2 Regular rate and rhythm. No murmur, rub or gallop auscultated.   Extremities: No digital clubbing or cyanosis, no edema  Skin: Color pink. Skin warm and dry to touch. No rashes  No xanthoma  Neuro: Alert and awake, no lateralizing deficits appreciated    Lab Reviewed:                  "

## 2021-11-25 PROCEDURE — 93296 REM INTERROG EVL PM/IDS: CPT | Performed by: INTERNAL MEDICINE

## 2021-11-25 PROCEDURE — 93295 DEV INTERROG REMOTE 1/2/MLT: CPT | Performed by: INTERNAL MEDICINE

## 2022-02-01 ENCOUNTER — OFFICE VISIT (OUTPATIENT)
Dept: CARDIOLOGY | Facility: CLINIC | Age: 62
End: 2022-02-01

## 2022-02-01 VITALS
OXYGEN SATURATION: 98 % | HEIGHT: 68 IN | HEART RATE: 85 BPM | BODY MASS INDEX: 29.55 KG/M2 | WEIGHT: 195 LBS | DIASTOLIC BLOOD PRESSURE: 80 MMHG | SYSTOLIC BLOOD PRESSURE: 121 MMHG

## 2022-02-01 DIAGNOSIS — N18.32 STAGE 3B CHRONIC KIDNEY DISEASE: ICD-10-CM

## 2022-02-01 DIAGNOSIS — I25.5 ISCHEMIC CARDIOMYOPATHY: ICD-10-CM

## 2022-02-01 DIAGNOSIS — I25.708 CORONARY ARTERY DISEASE OF BYPASS GRAFT OF NATIVE HEART WITH STABLE ANGINA PECTORIS: ICD-10-CM

## 2022-02-01 DIAGNOSIS — E78.5 DYSLIPIDEMIA: ICD-10-CM

## 2022-02-01 DIAGNOSIS — I10 BENIGN ESSENTIAL HTN: ICD-10-CM

## 2022-02-01 DIAGNOSIS — J43.1 PANLOBULAR EMPHYSEMA: ICD-10-CM

## 2022-02-01 DIAGNOSIS — Z95.810 PRESENCE OF AUTOMATIC CARDIOVERTER/DEFIBRILLATOR (AICD): ICD-10-CM

## 2022-02-01 DIAGNOSIS — I50.22 CHRONIC HFREF (HEART FAILURE WITH REDUCED EJECTION FRACTION): Primary | ICD-10-CM

## 2022-02-01 PROCEDURE — 99214 OFFICE O/P EST MOD 30 MIN: CPT | Performed by: INTERNAL MEDICINE

## 2022-02-01 NOTE — PROGRESS NOTES
Subjective:     Encounter Date:02/01/2022      Patient ID: Bobby Steele Jr. is a 61 y.o. male.    Chief Complaint : Follow-up for HFrEF, ischemic cardiomyopathy, CAD, CABG, PCI, dyslipidemia, hypertension, diabetes, CKD.  History of Present Illness      Mr. Bobby Steele Jr. has PMH of       # NSTEMI  5/12/19, SHILPA to SVG to RCA and proximal LAD leading into a small diagonal, cath 5/11/2020 underwent SHILPA to SVG to RCA and native proximal LCx with Impella assistance.  Cath 6/1/2021 revealed patent LIMA to LAD, SVG to PDA, occluded SVG to diagonal, patent stent in left main, severe disease in tortuous segment in mid LCx and LAD going to diagonal, unchanged from previous cath.  # CAD status post CABG X3 V  # Ischemic cardiomyopathy with EF of 35%, status post ICD 10/8/2019  # COPD, continue tobacco abuse  # Hypertension   # Hyperlipidemia  # Diabetes with hemoglobin A1c of 10 on 5/2/2020  #.  CKD 3 and 4  #Gangrene of left foot  #Positive family for premature heart disease with father MI 53     Here for  follow-up.  Patient said he has shortness of breath and chest pain was recently in [facility] had a stress test which was normal and was told it was because of his COPD.  Patient denies any chest pain currently.  Has chronic shortness of breath which he thinks is due to COPD.  Patient says is not seeing any nephrologist.    Patient was recently in the hospital, where he presented because of recurrent substernal chest pain.  Patient  underwent cardiac cath 6/1/2021 which revealed severe triple-vessel disease with patent LIMA to LAD, SVG to PDA, occluded SVG to diagonal, patent left main, severe disease in proximal LAD leading to diagonal, severe disease in mid LCx which is in a tortuous segment.      Labs from 12/27/2020 reveal cholesterol 111 triglycerides 124 HDL 40 LDL 49.  Labs from 5/31/2021 reveal hemoglobin A1c of 9.3.  Labs from 6/1/2021 and 6/2/2021 reveal proBNP 1481, CMP with a potassium of  3.4, BUN 27, creatinine 1.24, GFR 59. Labs from 7/28/2021 revealed BUN 34 creatinine 1.71 GFR 41, glucose 399.         ASSESSMENT:     #CKD  #  chronic CHF, ischemic cardiomyopathy 35%, status post ICD 10/8/2019  # CAD status post CABG, PCI  # COPD, tobacco abuse   # hypertension ,  #Dyslipidemia  #Hyperglycemia, and type 2 diabetes  #  CKD  # PAD, toe gangrene     Recommendations:     Patient is off Entresto and ACE inhibitor's due to CKD.  We will send him to nephrology.  We will continue medical management with aspirin, atorvastatin, carvedilol, clopidogrel and Aldactone as tolerated to help with CHF, CAD, hypertension, dyslipidemia  Patient would benefit from diabetes control, A1c 8 5/31/2021 is elevated at 9.3.  Counseled on smoking cessation.  Patient underwent Lexiscan Cardiolite 6/1/2021 which is revealing high risk abnormal ischemia in lateral wall and inferolateral wall with depressed EF.  Patient underwent cardiac cath 6/1/2021 which revealed severe disease in mid LCx which is in the loop of a bend and heavily calcified high risk for complications therefore was not intervened upon.  Patient also has severe disease going to the diagonal.  Reviewed CHF care with patient.  Follow-up with PMD for diabetes.  Counseled on smoking cessation.  We will follow-up closely and restart ARB's or Entresto based on patient's pressure and renal function.  We will follow-up in heart failure clinic and start him on SGL 2 inhibitors.  Discussed about COVID-19 vaccination.  Patient already took.        Procedures EKG done on 11/2/2021 reviewed/interpreted by me reveals sinus rhythm with rate of 81 bpm with nonspecific ST-T changes    The following portions of the patient's history were reviewed and updated as appropriate: allergies, current medications, past family history, past medical history, past social history, past surgical history and problem list.    Assessment:         [facility]     Diagnosis Plan   1. Chronic HFrEF (heart  failure with reduced ejection fraction) ([facility])  Ambulatory Referral to Nephrology   2. Coronary artery disease of bypass graft of native heart with stable angina pectoris ([facility])  Ambulatory Referral to Nephrology   3. Benign essential HTN  Ambulatory Referral to Nephrology   4. Stage 3b chronic kidney disease ([facility])  Ambulatory Referral to Nephrology   5. Dyslipidemia  Ambulatory Referral to Nephrology   6. Panlobular emphysema ([facility])  Ambulatory Referral to Nephrology   7. Ischemic cardiomyopathy     8. Presence of automatic cardioverter/defibrillator (AICD)            Plan:               Past Medical History:  Past Medical History:   Diagnosis Date   • CAD (coronary artery disease)     S/P CABG   • Chest pain 05/31/2021   • Chronic systolic CHF (congestive heart failure) ([facility])    • COPD (chronic obstructive pulmonary disease) ([facility])    • DM2 (diabetes mellitus, type 2) ([facility])    • Dyslipidemia    • Encounter for monitoring antiplatelet therapy    • Hypertension    • Myocardial infarction ([facility])     inferior wall MI--03/13   • DEBI (obstructive sleep apnea)     noncompliant with CPAP   • Peripheral vascular disease ([facility])     S/P left fem-pop bypass   • Tobacco use      Past Surgical History:  Past Surgical History:   Procedure Laterality Date   • AMPUTATION FOOT / TOE      left   • APPENDECTOMY      at age 8 or 9   • BIVENTRICULAR ASSIST DEVICE/LEFT VENTRICULAR ASSIST DEVICE INSERTION N/A 5/13/2020    Procedure: Left Ventricular Assist Device;  Surgeon: Juarez Wing MD;  Location:  [facility] CATH INVASIVE LOCATION;  Service: Cardiovascular;  Laterality: N/A;   • CARDIAC CATHETERIZATION      3-; [facility] 9-   • CARDIAC CATHETERIZATION Right 6/27/2019    Procedure: Cardiac Catheterization/with grafts groin access;  Surgeon: Juarez Wing MD;  Location:  [facility] CATH INVASIVE LOCATION;  Service: Cardiovascular   • CARDIAC CATHETERIZATION N/A 5/8/2020    Procedure: Left Heart Cath with grafts;   Surgeon: Juarez Wing MD;  Location: [facility] CATH INVASIVE LOCATION;  Service: Cardiovascular;  Laterality: N/A;   • CARDIAC CATHETERIZATION N/A 5/13/2020    Procedure: Percutaneous Coronary Intervention, Impella backup;  Surgeon: Juarez Wing MD;  Location: [facility] CATH INVASIVE LOCATION;  Service: Cardiovascular;  Laterality: N/A;   • CARDIAC CATHETERIZATION N/A 6/1/2021    Procedure: Left Heart Cath, possible pci;  Surgeon: Juarez Wing MD;  Location: [facility] CATH INVASIVE LOCATION;  Service: Cardiovascular;  Laterality: N/A;   • CARDIAC ELECTROPHYSIOLOGY PROCEDURE N/A 10/8/2019    Procedure: ICD SC new, ST.SHIV ;  Surgeon: Juarez Wing MD;  Location: [facility] CATH INVASIVE LOCATION;  Service: Cardiovascular   • CARDIAC SURGERY  2013   • COLONOSCOPY     • CORONARY ARTERY BYPASS GRAFT      x3, 3-18-13-LIMA to LAD, and reverse individual SVG to lateral marginal and to PDA   • FEMORAL POPLITEAL BYPASS Left 01/09/2019    [facility]/ Dr. Jero Hatch   • HAND SURGERY Left     crushed hand   • PACEMAKER IMPLANTATION     • TOE SURGERY      toe nail removal of big toe- age 8 or 9      Allergies:  No Known Allergies  Home Meds:  Current Meds:     Current Outpatient Medications:   •  albuterol (PROVENTIL) (2.5 MG/3ML) 0.083% nebulizer solution, Take 2.5 mg by nebulization Every 6 (Six) Hours As Needed for Wheezing or Shortness of Air., Disp: , Rfl:   •  albuterol sulfate  (90 Base) MCG/ACT inhaler, Inhale 2 puffs Every 4 (Four) Hours As Needed for Wheezing or Shortness of Air., Disp: , Rfl:   •  aspirin (aspirin) 81 MG EC tablet, Take 81 mg by mouth Daily., Disp: , Rfl:   •  atorvastatin (LIPITOR) 40 MG tablet, Take 40 mg by mouth Daily., Disp: , Rfl:   •  bumetanide (BUMEX) 2 MG tablet, Take 1 tablet by mouth Daily., Disp: 90 tablet, Rfl: 3  •  carvedilol (COREG) 3.125 MG tablet, Take 1 tablet by mouth 2 (Two) Times a Day With Meals., Disp: 180 tablet, Rfl: 1  •   clopidogrel (PLAVIX) 75 MG tablet, Take 75 mg by mouth Daily., Disp: , Rfl:   •  collagenase (Santyl) 250 UNIT/GM ointment, Apply  topically to the appropriate area as directed Daily., Disp: 30 g, Rfl: 0  •  DULoxetine (CYMBALTA) 60 MG capsule, TAKE 1 CAPSULE BY MOUTH ONCE DAILY FOR 90 DAYS, Disp: , Rfl:   •  fenofibrate (TRICOR) 145 MG tablet, Take 1 tablet by mouth once daily, Disp: 90 tablet, Rfl: 3  •  glimepiride (AMARYL) 4 MG tablet, Take 4 mg by mouth 2 (Two) Times a Day., Disp: , Rfl:   •  Insulin Glargine (LANTUS SOLOSTAR) 100 UNIT/ML injection pen, Inject 10 Units under the skin into the appropriate area as directed Every Night. (Patient taking differently: Inject 40 Units under the skin into the appropriate area as directed Every Night.), Disp: 3 mL, Rfl: 2  •  isosorbide mononitrate (IMDUR) 30 MG 24 hr tablet, Take 30 mg by mouth Daily., Disp: , Rfl:   •  nitroglycerin (NITROSTAT) 2 % ointment, Place 0.5 inches on the skin as directed by provider Daily., Disp: 30 g, Rfl: 5  •  spironolactone (ALDACTONE) 25 MG tablet, Take 25 mg by mouth Daily., Disp: , Rfl:   Social History:   Social History     Tobacco Use   • Smoking status: Current Every Day Smoker     Packs/day: 2.00     Years: 25.00     Pack years: 50.00     Types: Cigarettes   • Smokeless tobacco: Never Used   Substance Use Topics   • Alcohol use: No      Family History:  Family History   Problem Relation Age of Onset   • Hypertension Mother    • Diabetes Mother    • Heart disease Father         had CABG and re-do/  while recovering-had MI age 40s   • Diabetes Father    • Pancreatic cancer Sister    • Hyperlipidemia Brother    • Stroke Brother    • Heart disease Paternal Uncle               Review of Systems   Constitutional: Negative for malaise/fatigue.   Cardiovascular: Negative for chest pain, leg swelling and palpitations.   Respiratory: Positive for shortness of breath.    Skin: Negative for rash.   Neurological: Negative for dizziness,  "light-headedness and numbness.     All other systems are negative         Objective:     Physical Exam  /80   Pulse 85   Ht 172.7 cm (68\")   Wt 88.5 kg (195 lb)   SpO2 98%   BMI 29.65 kg/m²   General:  Appears in no acute distress  Eyes: Sclera is anicteric,  conjunctiva is clear   HEENT:  No JVD.  No carotid bruits  Respiratory: Respirations regular and unlabored at rest.  Clear to auscultation  Cardiovascular: S1,S2 Regular rate and rhythm. No murmur, rub or gallop auscultated.   Extremities: No digital clubbing or cyanosis, no edema  Skin: Color pink. Skin warm and dry to touch. No rashes  No xanthoma  Neuro: Alert and awake, no lateralizing deficits appreciated    Lab Reviewed:         Juarez Wing MD  2/1/2022 11:46 EST      Much of the above report is an electronic transcription/translation of the spoken language to printed text using Dragon Software. As such, the subtleties and finesse of the spoken language may permit erroneous, or at times, nonsensical words or phrases to be inadvertently transcribed; thus changes may be made at a later date to rectify these errors.         "

## 2022-02-02 ENCOUNTER — TELEPHONE (OUTPATIENT)
Dept: CARDIOLOGY | Facility: CLINIC | Age: 62
End: 2022-02-02

## 2022-02-02 NOTE — TELEPHONE ENCOUNTER
Patient has appointment to see Dr. Hummel, on 2/16/22 at 3:30 pm. Dr. Hummel would like a BMP done before he sees the patient, can you please place an order for BMP.

## 2022-02-04 DIAGNOSIS — I50.22 CHRONIC HFREF (HEART FAILURE WITH REDUCED EJECTION FRACTION): Primary | ICD-10-CM

## 2022-02-04 DIAGNOSIS — N18.32 STAGE 3B CHRONIC KIDNEY DISEASE: ICD-10-CM

## 2022-02-15 NOTE — INTERVAL H&P NOTE
H&P reviewed. The patient was examined and there are no changes to the H&P.         "---------------------  From: Lisa Ku LPN (Phone Messages Pool ([id number]_[facility]))   To: [facility] Message Pool ([id number]_[place] [facility] [place]);     Sent: 4/5/2019 12:59:14 PM CDT  Subject: General Message       Phone Message Template      PCP:   ARM      Time of Call:  1221       Person Calling:  mom  Phone number:  [phone] LDM    Returned call at: _    Note:  Mom called and left message for ARM that patient is going better on Zoloft  with mood but does feel that needs an increase still having \"down\" days.   Please advise.     Last office visit and reason:  03/22/19, anxiety---------------------  From: Agnieszka Amaral CMA ([facility] Message Pool ([id number]_[facility]))   To: Lisa Jaffe MD;     Sent: 4/5/2019 1:00:18 PM CDT  Subject: FW: General Message---------------------  From: Lisa Jaffe MD   To: Phone Messages Pool ([id number]_[place] [facility] [place]);     Sent: 4/5/2019 2:13:08 PM CDT  Subject: RE: General Message     They should increase to 2 tabs daily.Notified mom to increase to 2 tablets daily and to follow up  in two weeks.  "

## 2022-02-24 PROCEDURE — 93296 REM INTERROG EVL PM/IDS: CPT | Performed by: INTERNAL MEDICINE

## 2022-02-24 PROCEDURE — 93295 DEV INTERROG REMOTE 1/2/MLT: CPT | Performed by: INTERNAL MEDICINE

## 2022-04-07 ENCOUNTER — APPOINTMENT (OUTPATIENT)
Dept: GENERAL RADIOLOGY | Facility: HOSPITAL | Age: 62
End: 2022-04-07

## 2022-04-07 ENCOUNTER — APPOINTMENT (OUTPATIENT)
Dept: CARDIOLOGY | Facility: HOSPITAL | Age: 62
End: 2022-04-07

## 2022-04-07 ENCOUNTER — HOSPITAL ENCOUNTER (OUTPATIENT)
Facility: HOSPITAL | Age: 62
Setting detail: OBSERVATION
LOS: 1 days | Discharge: HOME | End: 2022-04-08
Attending: INTERNAL MEDICINE | Admitting: INTERNAL MEDICINE

## 2022-04-07 PROBLEM — R07.9 CHEST PAIN IN ADULT: Status: ACTIVE | Noted: 2022-04-07

## 2022-04-07 LAB
ANION GAP SERPL CALCULATED.3IONS-SCNC: 11 MMOL/L (ref 5–15)
BASOPHILS # BLD AUTO: 0.1 10*3/MM3 (ref 0–0.2)
BASOPHILS NFR BLD AUTO: 0.7 % (ref 0–1.5)
BH CV ECHO MEAS - ACS: 2.08 CM
BH CV ECHO MEAS - AO ROOT DIAM: 3.8 CM
BH CV ECHO MEAS - EDV(CUBED): 83 ML
BH CV ECHO MEAS - EDV(MOD-SP4): 94.3 ML
BH CV ECHO MEAS - EF(MOD-BP): 39 %
BH CV ECHO MEAS - EF(MOD-SP4): 38.9 %
BH CV ECHO MEAS - ESV(CUBED): 67.3 ML
BH CV ECHO MEAS - ESV(MOD-SP4): 57.6 ML
BH CV ECHO MEAS - FS: 6.7 %
BH CV ECHO MEAS - IVS/LVPW: 1.01 CM
BH CV ECHO MEAS - IVSD: 1.43 CM
BH CV ECHO MEAS - LA DIMENSION(2D): 4.5 CM
BH CV ECHO MEAS - LV DIASTOLIC VOL/BSA (35-75): 46.9 CM2
BH CV ECHO MEAS - LV MASS(C)D: 244.3 GRAMS
BH CV ECHO MEAS - LV SYSTOLIC VOL/BSA (12-30): 28.6 CM2
BH CV ECHO MEAS - LVIDD: 4.4 CM
BH CV ECHO MEAS - LVIDS: 4.1 CM
BH CV ECHO MEAS - LVOT AREA: 3.8 CM2
BH CV ECHO MEAS - LVOT DIAM: 2.19 CM
BH CV ECHO MEAS - LVPWD: 1.42 CM
BH CV ECHO MEAS - MV A MAX VEL: 81.9 CM/SEC
BH CV ECHO MEAS - MV DEC SLOPE: 271.4 CM/SEC2
BH CV ECHO MEAS - MV DEC TIME: 0.24 MSEC
BH CV ECHO MEAS - MV E MAX VEL: 63.8 CM/SEC
BH CV ECHO MEAS - MV E/A: 0.78
BH CV ECHO MEAS - MV MAX PG: 2.8 MMHG
BH CV ECHO MEAS - MV MEAN PG: 1.37 MMHG
BH CV ECHO MEAS - MV V2 VTI: 22.7 CM
BH CV ECHO MEAS - PA ACC TIME: 0.04 SEC
BH CV ECHO MEAS - PA PR(ACCEL): 60.3 MMHG
BH CV ECHO MEAS - PA V2 MAX: 93.9 CM/SEC
BH CV ECHO MEAS - PI END-D VEL: 111.5 CM/SEC
BH CV ECHO MEAS - RAP SYSTOLE: 3 MMHG
BH CV ECHO MEAS - RV MAX PG: 1.65 MMHG
BH CV ECHO MEAS - RV V1 MAX: 64.2 CM/SEC
BH CV ECHO MEAS - RV V1 VTI: 9.3 CM
BH CV ECHO MEAS - RVDD: 3.2 CM
BH CV ECHO MEAS - RVSP: 21.8 MMHG
BH CV ECHO MEAS - SI(MOD-SP4): 18.2 ML/M2
BH CV ECHO MEAS - SV(MOD-SP4): 36.7 ML
BH CV ECHO MEAS - TR MAX PG: 18.8 MMHG
BH CV ECHO MEAS - TR MAX VEL: 216.6 CM/SEC
BUN SERPL-MCNC: 30 MG/DL (ref 8–23)
BUN/CREAT SERPL: 21.3 (ref 7–25)
CALCIUM SPEC-SCNC: 9.3 MG/DL (ref 8.6–10.5)
CHLORIDE SERPL-SCNC: 100 MMOL/L (ref 98–107)
CO2 SERPL-SCNC: 29 MMOL/L (ref 22–29)
CREAT SERPL-MCNC: 1.41 MG/DL (ref 0.76–1.27)
DEPRECATED RDW RBC AUTO: 43.3 FL (ref 37–54)
EGFRCR SERPLBLD CKD-EPI 2021: 56.3 ML/MIN/1.73
EOSINOPHIL # BLD AUTO: 0.2 10*3/MM3 (ref 0–0.4)
EOSINOPHIL NFR BLD AUTO: 1.9 % (ref 0.3–6.2)
ERYTHROCYTE [DISTWIDTH] IN BLOOD BY AUTOMATED COUNT: 13.7 % (ref 12.3–15.4)
GLUCOSE BLDC GLUCOMTR-MCNC: 201 MG/DL (ref 70–105)
GLUCOSE BLDC GLUCOMTR-MCNC: 233 MG/DL (ref 70–105)
GLUCOSE BLDC GLUCOMTR-MCNC: 285 MG/DL (ref 70–105)
GLUCOSE BLDC GLUCOMTR-MCNC: 358 MG/DL (ref 70–105)
GLUCOSE SERPL-MCNC: 198 MG/DL (ref 65–99)
HBA1C MFR BLD: 11.3 % (ref 3.5–5.6)
HCT VFR BLD AUTO: 44.4 % (ref 37.5–51)
HGB BLD-MCNC: 15.8 G/DL (ref 13–17.7)
LYMPHOCYTES # BLD AUTO: 3.1 10*3/MM3 (ref 0.7–3.1)
LYMPHOCYTES NFR BLD AUTO: 30.9 % (ref 19.6–45.3)
MAGNESIUM SERPL-MCNC: 2.2 MG/DL (ref 1.6–2.4)
MAXIMAL PREDICTED HEART RATE: 158 BPM
MCH RBC QN AUTO: 32.2 PG (ref 26.6–33)
MCHC RBC AUTO-ENTMCNC: 35.5 G/DL (ref 31.5–35.7)
MCV RBC AUTO: 90.4 FL (ref 79–97)
MONOCYTES # BLD AUTO: 0.5 10*3/MM3 (ref 0.1–0.9)
MONOCYTES NFR BLD AUTO: 5 % (ref 5–12)
NEUTROPHILS NFR BLD AUTO: 6.3 10*3/MM3 (ref 1.7–7)
NEUTROPHILS NFR BLD AUTO: 61.5 % (ref 42.7–76)
NRBC BLD AUTO-RTO: 0.2 /100 WBC (ref 0–0.2)
NT-PROBNP SERPL-MCNC: 392.7 PG/ML (ref 0–900)
PLATELET # BLD AUTO: 126 10*3/MM3 (ref 140–450)
PMV BLD AUTO: 10.8 FL (ref 6–12)
POTASSIUM SERPL-SCNC: 4.3 MMOL/L (ref 3.5–5.2)
RBC # BLD AUTO: 4.91 10*6/MM3 (ref 4.14–5.8)
SODIUM SERPL-SCNC: 140 MMOL/L (ref 136–145)
STRESS TARGET HR: 134 BPM
TROPONIN T SERPL-MCNC: 0.01 NG/ML (ref 0–0.03)
WBC NRBC COR # BLD: 10.2 10*3/MM3 (ref 3.4–10.8)

## 2022-04-07 PROCEDURE — 83735 ASSAY OF MAGNESIUM: CPT | Performed by: INTERNAL MEDICINE

## 2022-04-07 PROCEDURE — 93306 TTE W/DOPPLER COMPLETE: CPT | Performed by: INTERNAL MEDICINE

## 2022-04-07 PROCEDURE — 96372 THER/PROPH/DIAG INJ SC/IM: CPT

## 2022-04-07 PROCEDURE — 83880 ASSAY OF NATRIURETIC PEPTIDE: CPT | Performed by: NURSE PRACTITIONER

## 2022-04-07 PROCEDURE — 83036 HEMOGLOBIN GLYCOSYLATED A1C: CPT | Performed by: INTERNAL MEDICINE

## 2022-04-07 PROCEDURE — 84484 ASSAY OF TROPONIN QUANT: CPT | Performed by: INTERNAL MEDICINE

## 2022-04-07 PROCEDURE — 63710000001 INSULIN GLARGINE PER 5 UNITS: Performed by: INTERNAL MEDICINE

## 2022-04-07 PROCEDURE — 25010000002 ENOXAPARIN PER 10 MG: Performed by: INTERNAL MEDICINE

## 2022-04-07 PROCEDURE — 63710000001 INSULIN LISPRO (HUMAN) PER 5 UNITS: Performed by: INTERNAL MEDICINE

## 2022-04-07 PROCEDURE — 85025 COMPLETE CBC W/AUTO DIFF WBC: CPT | Performed by: INTERNAL MEDICINE

## 2022-04-07 PROCEDURE — G0378 HOSPITAL OBSERVATION PER HR: HCPCS

## 2022-04-07 PROCEDURE — 82962 GLUCOSE BLOOD TEST: CPT

## 2022-04-07 PROCEDURE — 71045 X-RAY EXAM CHEST 1 VIEW: CPT

## 2022-04-07 PROCEDURE — 94760 N-INVAS EAR/PLS OXIMETRY 1: CPT

## 2022-04-07 PROCEDURE — 99219 PR INITIAL OBSERVATION CARE/DAY 50 MINUTES: CPT | Performed by: INTERNAL MEDICINE

## 2022-04-07 PROCEDURE — 94799 UNLISTED PULMONARY SVC/PX: CPT

## 2022-04-07 PROCEDURE — 93306 TTE W/DOPPLER COMPLETE: CPT

## 2022-04-07 PROCEDURE — 99214 OFFICE O/P EST MOD 30 MIN: CPT | Performed by: INTERNAL MEDICINE

## 2022-04-07 PROCEDURE — 80048 BASIC METABOLIC PNL TOTAL CA: CPT | Performed by: INTERNAL MEDICINE

## 2022-04-07 PROCEDURE — 94640 AIRWAY INHALATION TREATMENT: CPT

## 2022-04-07 PROCEDURE — 93005 ELECTROCARDIOGRAM TRACING: CPT | Performed by: INTERNAL MEDICINE

## 2022-04-07 RX ORDER — NITROGLYCERIN 0.4 MG/1
0.4 TABLET SUBLINGUAL
Status: DISCONTINUED | OUTPATIENT
Start: 2022-04-07 | End: 2022-04-08 | Stop reason: HOSPADM

## 2022-04-07 RX ORDER — INSULIN LISPRO 100 [IU]/ML
0-14 INJECTION, SOLUTION INTRAVENOUS; SUBCUTANEOUS EVERY 6 HOURS SCHEDULED
Status: DISCONTINUED | OUTPATIENT
Start: 2022-04-07 | End: 2022-04-08 | Stop reason: HOSPADM

## 2022-04-07 RX ORDER — KETOROLAC TROMETHAMINE 15 MG/ML
15 INJECTION, SOLUTION INTRAMUSCULAR; INTRAVENOUS EVERY 6 HOURS PRN
Status: DISCONTINUED | OUTPATIENT
Start: 2022-04-07 | End: 2022-04-08 | Stop reason: HOSPADM

## 2022-04-07 RX ORDER — ONDANSETRON 2 MG/ML
4 INJECTION INTRAMUSCULAR; INTRAVENOUS EVERY 6 HOURS PRN
Status: DISCONTINUED | OUTPATIENT
Start: 2022-04-07 | End: 2022-04-08 | Stop reason: HOSPADM

## 2022-04-07 RX ORDER — ACETAMINOPHEN 650 MG/1
650 SUPPOSITORY RECTAL EVERY 4 HOURS PRN
Status: DISCONTINUED | OUTPATIENT
Start: 2022-04-07 | End: 2022-04-08 | Stop reason: HOSPADM

## 2022-04-07 RX ORDER — BUDESONIDE AND FORMOTEROL FUMARATE DIHYDRATE 160; 4.5 UG/1; UG/1
2 AEROSOL RESPIRATORY (INHALATION)
Status: DISCONTINUED | OUTPATIENT
Start: 2022-04-07 | End: 2022-04-07

## 2022-04-07 RX ORDER — ASPIRIN 81 MG/1
81 TABLET ORAL DAILY
Status: DISCONTINUED | OUTPATIENT
Start: 2022-04-07 | End: 2022-04-08 | Stop reason: HOSPADM

## 2022-04-07 RX ORDER — CHOLECALCIFEROL (VITAMIN D3) 125 MCG
5 CAPSULE ORAL NIGHTLY PRN
Status: DISCONTINUED | OUTPATIENT
Start: 2022-04-07 | End: 2022-04-08 | Stop reason: HOSPADM

## 2022-04-07 RX ORDER — ACETAMINOPHEN 325 MG/1
650 TABLET ORAL EVERY 4 HOURS PRN
Status: DISCONTINUED | OUTPATIENT
Start: 2022-04-07 | End: 2022-04-08 | Stop reason: HOSPADM

## 2022-04-07 RX ORDER — ALUMINA, MAGNESIA, AND SIMETHICONE 2400; 2400; 240 MG/30ML; MG/30ML; MG/30ML
15 SUSPENSION ORAL EVERY 6 HOURS PRN
Status: DISCONTINUED | OUTPATIENT
Start: 2022-04-07 | End: 2022-04-08 | Stop reason: HOSPADM

## 2022-04-07 RX ORDER — PANTOPRAZOLE SODIUM 40 MG/1
40 TABLET, DELAYED RELEASE ORAL EVERY MORNING
Status: DISCONTINUED | OUTPATIENT
Start: 2022-04-07 | End: 2022-04-08 | Stop reason: HOSPADM

## 2022-04-07 RX ORDER — OLANZAPINE 10 MG/2ML
1 INJECTION, POWDER, LYOPHILIZED, FOR SOLUTION INTRAMUSCULAR AS NEEDED
Status: DISCONTINUED | OUTPATIENT
Start: 2022-04-07 | End: 2022-04-08 | Stop reason: HOSPADM

## 2022-04-07 RX ORDER — IPRATROPIUM BROMIDE AND ALBUTEROL SULFATE 2.5; .5 MG/3ML; MG/3ML
3 SOLUTION RESPIRATORY (INHALATION) EVERY 4 HOURS PRN
Status: DISCONTINUED | OUTPATIENT
Start: 2022-04-07 | End: 2022-04-08 | Stop reason: HOSPADM

## 2022-04-07 RX ORDER — INSULIN GLARGINE 100 [IU]/ML
40 INJECTION, SOLUTION SUBCUTANEOUS NIGHTLY
COMMUNITY

## 2022-04-07 RX ORDER — INSULIN LISPRO 100 [IU]/ML
0-14 INJECTION, SOLUTION INTRAVENOUS; SUBCUTANEOUS AS NEEDED
Status: DISCONTINUED | OUTPATIENT
Start: 2022-04-07 | End: 2022-04-08 | Stop reason: HOSPADM

## 2022-04-07 RX ORDER — PANTOPRAZOLE SODIUM 40 MG/1
40 TABLET, DELAYED RELEASE ORAL DAILY
COMMUNITY

## 2022-04-07 RX ORDER — GUAIFENESIN 600 MG/1
600 TABLET, EXTENDED RELEASE ORAL EVERY 12 HOURS SCHEDULED
Status: DISCONTINUED | OUTPATIENT
Start: 2022-04-07 | End: 2022-04-08 | Stop reason: HOSPADM

## 2022-04-07 RX ORDER — CARVEDILOL 3.12 MG/1
3.12 TABLET ORAL 2 TIMES DAILY WITH MEALS
Status: DISCONTINUED | OUTPATIENT
Start: 2022-04-07 | End: 2022-04-08 | Stop reason: HOSPADM

## 2022-04-07 RX ORDER — SODIUM CHLORIDE 0.9 % (FLUSH) 0.9 %
10 SYRINGE (ML) INJECTION EVERY 12 HOURS SCHEDULED
Status: DISCONTINUED | OUTPATIENT
Start: 2022-04-07 | End: 2022-04-08 | Stop reason: HOSPADM

## 2022-04-07 RX ORDER — SODIUM CHLORIDE 0.9 % (FLUSH) 0.9 %
10 SYRINGE (ML) INJECTION AS NEEDED
Status: DISCONTINUED | OUTPATIENT
Start: 2022-04-07 | End: 2022-04-08 | Stop reason: HOSPADM

## 2022-04-07 RX ORDER — CLOPIDOGREL BISULFATE 75 MG/1
75 TABLET ORAL DAILY
Status: DISCONTINUED | OUTPATIENT
Start: 2022-04-07 | End: 2022-04-08 | Stop reason: HOSPADM

## 2022-04-07 RX ORDER — ISOSORBIDE MONONITRATE 30 MG/1
30 TABLET, EXTENDED RELEASE ORAL
Status: DISCONTINUED | OUTPATIENT
Start: 2022-04-07 | End: 2022-04-08 | Stop reason: HOSPADM

## 2022-04-07 RX ORDER — ACETAMINOPHEN 160 MG/5ML
650 SOLUTION ORAL EVERY 4 HOURS PRN
Status: DISCONTINUED | OUTPATIENT
Start: 2022-04-07 | End: 2022-04-08 | Stop reason: HOSPADM

## 2022-04-07 RX ORDER — IPRATROPIUM BROMIDE AND ALBUTEROL SULFATE 2.5; .5 MG/3ML; MG/3ML
3 SOLUTION RESPIRATORY (INHALATION)
Status: DISCONTINUED | OUTPATIENT
Start: 2022-04-07 | End: 2022-04-07

## 2022-04-07 RX ORDER — DEXTROSE MONOHYDRATE 25 G/50ML
25 INJECTION, SOLUTION INTRAVENOUS
Status: DISCONTINUED | OUTPATIENT
Start: 2022-04-07 | End: 2022-04-08 | Stop reason: HOSPADM

## 2022-04-07 RX ORDER — FUROSEMIDE 40 MG/1
40 TABLET ORAL 2 TIMES DAILY
COMMUNITY

## 2022-04-07 RX ORDER — NITROGLYCERIN 0.4 MG/1
0.4 TABLET SUBLINGUAL
COMMUNITY

## 2022-04-07 RX ORDER — ATORVASTATIN CALCIUM 40 MG/1
40 TABLET, FILM COATED ORAL NIGHTLY
Status: DISCONTINUED | OUTPATIENT
Start: 2022-04-07 | End: 2022-04-08 | Stop reason: HOSPADM

## 2022-04-07 RX ORDER — ONDANSETRON 4 MG/1
4 TABLET, FILM COATED ORAL EVERY 6 HOURS PRN
Status: DISCONTINUED | OUTPATIENT
Start: 2022-04-07 | End: 2022-04-08 | Stop reason: HOSPADM

## 2022-04-07 RX ORDER — NICOTINE POLACRILEX 4 MG
15 LOZENGE BUCCAL
Status: DISCONTINUED | OUTPATIENT
Start: 2022-04-07 | End: 2022-04-08 | Stop reason: HOSPADM

## 2022-04-07 RX ADMIN — GUAIFENESIN 600 MG: 600 TABLET, EXTENDED RELEASE ORAL at 11:30

## 2022-04-07 RX ADMIN — CARVEDILOL 3.12 MG: 3.12 TABLET, FILM COATED ORAL at 11:30

## 2022-04-07 RX ADMIN — INSULIN LISPRO 5 UNITS: 100 INJECTION, SOLUTION INTRAVENOUS; SUBCUTANEOUS at 12:21

## 2022-04-07 RX ADMIN — ASPIRIN 81 MG: 81 TABLET, COATED ORAL at 11:30

## 2022-04-07 RX ADMIN — IPRATROPIUM BROMIDE AND ALBUTEROL SULFATE 3 ML: .5; 3 SOLUTION RESPIRATORY (INHALATION) at 11:41

## 2022-04-07 RX ADMIN — GUAIFENESIN 600 MG: 600 TABLET, EXTENDED RELEASE ORAL at 22:35

## 2022-04-07 RX ADMIN — Medication 10 ML: at 22:35

## 2022-04-07 RX ADMIN — CLOPIDOGREL BISULFATE 75 MG: 75 TABLET ORAL at 11:30

## 2022-04-07 RX ADMIN — INSULIN GLARGINE 10 UNITS: 100 INJECTION, SOLUTION SUBCUTANEOUS at 09:50

## 2022-04-07 RX ADMIN — PANTOPRAZOLE SODIUM 40 MG: 40 TABLET, DELAYED RELEASE ORAL at 11:30

## 2022-04-07 RX ADMIN — ENOXAPARIN SODIUM 40 MG: 40 INJECTION SUBCUTANEOUS at 16:04

## 2022-04-07 RX ADMIN — CARVEDILOL 3.12 MG: 3.12 TABLET, FILM COATED ORAL at 17:34

## 2022-04-07 RX ADMIN — IPRATROPIUM BROMIDE AND ALBUTEROL SULFATE 3 ML: .5; 3 SOLUTION RESPIRATORY (INHALATION) at 14:39

## 2022-04-07 RX ADMIN — Medication 10 ML: at 12:23

## 2022-04-07 RX ADMIN — INSULIN LISPRO 12 UNITS: 100 INJECTION, SOLUTION INTRAVENOUS; SUBCUTANEOUS at 18:02

## 2022-04-07 RX ADMIN — ISOSORBIDE MONONITRATE 30 MG: 30 TABLET, EXTENDED RELEASE ORAL at 11:30

## 2022-04-07 RX ADMIN — ATORVASTATIN CALCIUM 40 MG: 40 TABLET, FILM COATED ORAL at 22:35

## 2022-04-07 NOTE — H&P
[facility]      Patient Name: Bobby Steele Jr.  : 1960  MRN: [id number]  Primary Care Physician:  Yamile Agarwal MD  Date of admission: 2022      Subjective      Chief Complaint: Chest pain    History of Present Illness: Bobby Steele Jr. is a 62 y.o. male who presented to [facility] on 2022 has known coronary artery disease he had CABG he has had multiple stents he had a positive stress in  of last year but no intervention was done on heart catheterization.  The patient has congestive heart failure, COPD, type 2 diabetes insulin long-term use, hyperlipidemia tobacco dependence anxiety depression and essential hypertension.  He has been visiting the [facility] several times over the past few days with chest pain but he continues to leave prior to a thorough work-up.  This time Mr. Steele agreed to be transferred to [facility] to see his cardiologist Dr. Abreu.  Dr. Abreu graciously accepted this patient and requested the hospitalist to admit.  At the other facility he had a positive high sensitive troponins of 49 but here on arrival his troponin was 0.012.  He is currently chest pain-free we will treat the underlying medical conditions and have consulted cardiology.      ROS chest pain, chronic cough and congestion, denies fever, chills, changes in sputum production, peripheral edema, changes in bowel or bladder, headache unusual joint pain in the rest the 15 essential review of systems have been reviewed and are negative    Personal History     Past Medical History:   Diagnosis Date   • CAD (coronary artery disease)     S/P CABG   • Chest pain 2021   • Chronic systolic CHF (congestive heart failure) ([facility])    • COPD (chronic obstructive pulmonary disease) ([facility])    • DM2 (diabetes mellitus, type 2) ([facility])    • Dyslipidemia    • Encounter for monitoring antiplatelet therapy    • Hypertension    • Myocardial  infarction (HCC)     inferior wall MI--03/13   • DEBI (obstructive sleep apnea)     noncompliant with CPAP   • Peripheral vascular disease (HCC)     S/P left fem-pop bypass   • Tobacco use        Past Surgical History:   Procedure Laterality Date   • AMPUTATION FOOT / TOE      left   • APPENDECTOMY      at age 8 or 9   • BIVENTRICULAR ASSIST DEVICE/LEFT VENTRICULAR ASSIST DEVICE INSERTION N/A 5/13/2020    Procedure: Left Ventricular Assist Device;  Surgeon: Juarez Wing MD;  Location: [facility] CATH INVASIVE LOCATION;  Service: Cardiovascular;  Laterality: N/A;   • CARDIAC CATHETERIZATION      3-; [facility] 9-   • CARDIAC CATHETERIZATION Right 6/27/2019    Procedure: Cardiac Catheterization/with grafts groin access;  Surgeon: Juarez Wing MD;  Location: [facility] CATH INVASIVE LOCATION;  Service: Cardiovascular   • CARDIAC CATHETERIZATION N/A 5/8/2020    Procedure: Left Heart Cath with grafts;  Surgeon: Juarez Wing MD;  Location: [facility] CATH INVASIVE LOCATION;  Service: Cardiovascular;  Laterality: N/A;   • CARDIAC CATHETERIZATION N/A 5/13/2020    Procedure: Percutaneous Coronary Intervention, Impella backup;  Surgeon: Juarez Wing MD;  Location: [facility] CATH INVASIVE LOCATION;  Service: Cardiovascular;  Laterality: N/A;   • CARDIAC CATHETERIZATION N/A 6/1/2021    Procedure: Left Heart Cath, possible pci;  Surgeon: Juarez Wing MD;  Location: [facility] CATH INVASIVE LOCATION;  Service: Cardiovascular;  Laterality: N/A;   • CARDIAC ELECTROPHYSIOLOGY PROCEDURE N/A 10/8/2019    Procedure: ICD SC new, ST.SHIV ;  Surgeon: Juarez Wing MD;  Location: [facility] CATH INVASIVE LOCATION;  Service: Cardiovascular   • CARDIAC SURGERY  2013   • COLONOSCOPY     • CORONARY ARTERY BYPASS GRAFT      x3, 3-18-13-LIMA to LAD, and reverse individual SVG to lateral marginal and to PDA   • FEMORAL POPLITEAL BYPASS Left 01/09/2019    [facility]/ Dr. Jero Hatch   • HAND  SURGERY Left     crushed hand   • PACEMAKER IMPLANTATION     • TOE SURGERY      toe nail removal of big toe- age 8 or 9       Family History: family history includes Diabetes in his father and mother; Heart disease in his father and paternal uncle; Hyperlipidemia in his brother; Hypertension in his mother; Pancreatic cancer in his sister; Stroke in his brother. Otherwise pertinent FHx was reviewed and not pertinent to current issue.    Social History:  reports that he has been smoking cigarettes. He has a 50.00 pack-year smoking history. He has never used smokeless tobacco. He reports that he does not drink alcohol and does not use drugs.    Home Medications:  Prior to Admission Medications     Prescriptions Last Dose Informant Patient Reported? Taking?    albuterol (PROVENTIL) (2.5 MG/3ML) 0.083% nebulizer solution   Yes No    Take 2.5 mg by nebulization Every 6 (Six) Hours As Needed for Wheezing or Shortness of Air.    albuterol sulfate  (90 Base) MCG/ACT inhaler   Yes No    Inhale 2 puffs Every 4 (Four) Hours As Needed for Wheezing or Shortness of Air.    aspirin (aspirin) 81 MG EC tablet   Yes No    Take 81 mg by mouth Daily.    atorvastatin (LIPITOR) 40 MG tablet   Yes No    Take 40 mg by mouth Daily.    bumetanide (BUMEX) 2 MG tablet   No No    Take 1 tablet by mouth Daily.    carvedilol (COREG) 3.125 MG tablet   No No    Take 1 tablet by mouth 2 (Two) Times a Day With Meals.    clopidogrel (PLAVIX) 75 MG tablet   Yes No    Take 75 mg by mouth Daily.    collagenase (Santyl) 250 UNIT/GM ointment   No No    Apply  topically to the appropriate area as directed Daily.    DULoxetine (CYMBALTA) 60 MG capsule   Yes No    TAKE 1 CAPSULE BY MOUTH ONCE DAILY FOR 90 DAYS    fenofibrate (TRICOR) 145 MG tablet   No No    Take 1 tablet by mouth once daily    glimepiride (AMARYL) 4 MG tablet   Yes No    Take 4 mg by mouth 2 (Two) Times a Day.    Insulin Glargine (LANTUS SOLOSTAR) 100 UNIT/ML injection pen   No No     Inject 10 Units under the skin into the appropriate area as directed Every Night.    Patient taking differently:  Inject 40 Units under the skin into the appropriate area as directed Every Night.    isosorbide mononitrate (IMDUR) 30 MG 24 hr tablet   Yes No    Take 30 mg by mouth Daily.    nitroglycerin (NITROSTAT) 2 % ointment   No No    Place 0.5 inches on the skin as directed by provider Daily.    spironolactone (ALDACTONE) 25 MG tablet   Yes No    Take 25 mg by mouth Daily.            Allergies:  No Known Allergies    Objective      Vitals:   Temp:  [97.4 °F (36.3 °C)] 97.4 °F (36.3 °C)  Heart Rate:  [84] 84  Resp:  [19] 19  BP: (116)/(72) 116/72    Physical Exam   General no apparent distress  Oropharynx membranes moist no erythema  Neck no thyromegaly lymphadenopathy  Pulmonary faint expiratory wheeze but good air movement no accessory muscle use  Cardiac regular rate and rhythm could not appreciate any murmur no peripheral edema  Abdomen positive bowel sounds no guarding or rebound no hepatosplenomegaly no distention  Extremities symmetric no cyanosis no edema able to move all extremities  Derm multiple scabs that do not appear infected and scratches in various stages of healing he says is from taking care of his dogs  Psych patient is alert and oriented x4 appropriate mood and affect  Neuro cranial nerves II through XII intact    Result Review    Result Review:  I have personally reviewed the results from the time of this admission to 4/7/2022 05:32 EDT and agree with these findings:  [x]  Laboratory  []  Microbiology  [x]  Radiology  []  EKG/Telemetry   [x]  Cardiology/Vascular   []  Pathology  [x]  Old records  []  Other:  Most notable findings include:   Troponin on admission 0.012  June 1, 2021 stress test was positive catheterization was performed multivessel disease no stents placed medical management      Assessment/Plan        Active Hospital Problems:  Active Hospital Problems    Diagnosis    •  **Chest pain    • Elevated troponin    • Type 2 diabetes mellitus with hyperglycemia, with long-term current use of insulin (HCC)    • Ischemic cardiomyopathy    • Panlobular emphysema (HCC)    • Chronic systolic congestive heart failure (HCC)    • Tobacco dependence syndrome    • Benign essential HTN    • Multiple-type hyperlipidemia      Plan:   1.  Chest pain with known coronary artery disease and elevated troponins continue the patient on aspirin Plavix Coreg atorvastatin monitor blood pressure and treat diabetes and other medical conditions.  And have consulted cardiology Dr. Wing have ordered an echocardiogram  2.  Chronic congestive heart failure with reduced ejection fraction appears to be compensated at this time cardiology consulted holding the Bumex for now because appears to be compensated but will restart patient is n.p.o. at this time pending cardiology evaluation  3.  Type 2 diabetes on chronic insulin therapy I have placed him on one quarter of his basal insulin and half correction insulin patient is n.p.o. adjust medication as patient starts taking food by mouth  4.  Hyperlipidemia continue atorvastatin 40  5.  COPD appears to be close to baseline placed on Symbicort Mucinex and DuoNeb's  6.  Anxiety and depression we will wait till med reconciliation before starting these medications  7.  Tobacco abuse counseled him on the importance of cessation he did not seem to be very interested  8.  Hypertension continued all of his home medications except for spironolactone and Bumex if he does get a heart catheterization do not want to over diurese the patient and increase the odds of having acute kidney injury      DVT prophylaxis:  Medical DVT prophylaxis orders are present.    CODE STATUS:    Level Of Support Discussed With: Patient  Code Status (Patient has no pulse and is not breathing): CPR (Attempt to Resuscitate)  Medical Interventions (Patient has pulse or is breathing): Full  Support    Admission Status:  I believe this patient meets inpatient status.    I discussed the patient's findings and my recommendations with patient.    This patient has been examined wearing appropriate Personal Protective Equipment and discussed with . 04/07/22      Signature:

## 2022-04-07 NOTE — CONSULTS
Cardiology Consult Note    Patient Identification:  Name: Bobby Steele Jr.  Age: 62 y.o.  Sex: male  :  1960  MRN: [id number]             Requesting Physician :  Dr. Vides Hospitalist     Reason for Consultation / Chief Complaint : patient co-management   Chest pain        Was directly admitted during the night for chest pain.  Patient states he has had chest pain, shortness of breath, orthopnea and dyspnea on exertion for months and has been to see his PCP who increased his diuretics then nephrology would decrease them.  Today patient is chest pain free however continues to have shortness of breath. No edema noted. On examination he is undergoing an echocardiogram.    Labs here showed negative troponin, A1C is 11.3, CBC unremarkable except plts 126, bmp shows elevated glucose, bun 30 creatinine 1.14  EKG showed sinus rhythm with PVCs; CXR unremarkable     Will check pro BNP   Continue beta blocker for Heart failure--- cannot titrate up due to borderline blood pressure   NO ACE/ARB due to CKD and blood pressure   On aspirin, Plavix statin and nitrates for CAD  Patient needs aggressive diabetes control    Reviewed cardiac catheterization with Dr. Wing --- continue medical therapy      Further recommendations per Dr. Wing.   Electronically signed by JOY Ashton, 22, 12:55 PM EDT.    Cardiology attending addendum :    I have personally performed a face-to-face diagnostic evaluation, physical exam and reviewed data on this patient.  I have reviewed documentation done by me and nurse practitioner Aranza Ramirez and corrected as needed.  And agree with the different components of documentation.Greater than 50% of the time spent in the care of this patient was provided by attending consultant/me.      History of Present Illness:      Mr. Bobby Steele Jr. has PMH of       # NSTEMI  19, SHILPA to SVG to RCA and proximal LAD leading into a small diagonal, cath 2020 underwent SHILPA to SVG  to RCA and native proximal LCx with Impella assistance.  Cath 6/1/2021 revealed patent LIMA to LAD, SVG to PDA, occluded SVG to diagonal, patent stent in left main, severe disease in tortuous segment in mid LCx and LAD going to diagonal, unchanged from previous cath.  # CAD status post CABG X3 V  # Ischemic cardiomyopathy with EF of 35%, status post ICD 10/8/2019  # COPD, continue tobacco abuse  # Hypertension   # Hyperlipidemia  # Diabetes with hemoglobin A1c of 10 on 5/2/2020  #.  CKD 3 and 4  #Gangrene of left foot  #Positive family for premature heart disease with father MI 53       Was directly admitted 4/6/2022 through the emergency room with complaint of substernal chest pain shortness of breath orthopnea dyspnea on exertion for many months, was recently seen by PCP who increased diuretics, nephrologist decreased his diuretics.  Patient is currently chest pain-free.  Work-up here for 6/20/2022 and 4/7/2022 revealed normal troponin, normal proBNP of 392.  Chest x-ray 4/7/2022 limited study no acute process.  EKG done 4/7/2022 reviewed/interpreted by me reveals sinus rhythm with rate of 74 bpm with multiple PVCs     Review of records reveal patient had chest pain and admission June 2021, patient  underwent cardiac cath 6/1/2021 which revealed severe triple-vessel disease with patent LIMA to LAD, SVG to PDA, occluded SVG to diagonal, patent left main, severe disease in proximal LAD leading to diagonal, severe disease in mid LCx which is in a tortuous segment.              ASSESSMENT:      #.Chest pain  #  chronic CHF, ischemic cardiomyopathy 35%, status post ICD 10/8/2019  # CAD status post CABG, PCI  # COPD, tobacco abuse   # hypertension ,  #Dyslipidemia  #Hyperglycemia, and type 2 diabetes  #  CKD  # PAD, toe gangrene     Recommendations:      Patient had multiple admissions to [facility] with chest pain in the recent past had elevated troponins.  We will admit to telemetry  Will get serial cardiac  enzymes  Continue medical management with aspirin, carvedilol, Plavix, atorvastatin as tolerated  We will check an echocardiogram.  Patient's BNP is normal appears to be well compensated with CHF at the current time.  We will schedule a stress test in a.m. to evaluate and guide ischemia therapy.  Patient is off Entresto and ACE inhibitor's due to CKD.  Follow-up with nephrology  We will continue medical management with aspirin, atorvastatin, carvedilol, clopidogrel and Aldactone as tolerated to help with CHF, CAD, hypertension, dyslipidemia  Patient would benefit from diabetes control, has A1c is over 7.  Counseled on smoking cessation.  Patient underwent Lexiscan Cardiolite 6/1/2021 which is revealing high risk abnormal ischemia in lateral wall and inferolateral wall with depressed EF.  Patient underwent cardiac cath 6/1/2021 which revealed severe disease in mid LCx which is in the loop of a bend and heavily calcified high risk for complications therefore was not intervened upon.  Patient also has severe disease going to the diagonal.  Reviewed CHF care with patient.  Follow-up with PMD for diabetes.  Counseled on smoking cessation.                 No diagnosis found.           Past Medical History:  Past Medical History:   Diagnosis Date   • CAD (coronary artery disease)     S/P CABG   • Chest pain 05/31/2021   • Chronic systolic CHF (congestive heart failure) ([facility])    • COPD (chronic obstructive pulmonary disease) ([facility])    • DM2 (diabetes mellitus, type 2) ([facility])    • Dyslipidemia    • Encounter for monitoring antiplatelet therapy    • Hypertension    • Myocardial infarction (HCC)     inferior wall MI--03/13   • DEBI (obstructive sleep apnea)     noncompliant with CPAP   • Peripheral vascular disease ([facility])     S/P left fem-pop bypass   • Tobacco use      Past Surgical History:  Past Surgical History:   Procedure Laterality Date   • AMPUTATION FOOT / TOE      left   • APPENDECTOMY      at age 8 or 9   •  BIVENTRICULAR ASSIST DEVICE/LEFT VENTRICULAR ASSIST DEVICE INSERTION N/A 5/13/2020    Procedure: Left Ventricular Assist Device;  Surgeon: Juarez Wing MD;  Location: [facility] CATH INVASIVE LOCATION;  Service: Cardiovascular;  Laterality: N/A;   • CARDIAC CATHETERIZATION      3-; [facility] 9-   • CARDIAC CATHETERIZATION Right 6/27/2019    Procedure: Cardiac Catheterization/with grafts groin access;  Surgeon: Juarez Wing MD;  Location: [facility] CATH INVASIVE LOCATION;  Service: Cardiovascular   • CARDIAC CATHETERIZATION N/A 5/8/2020    Procedure: Left Heart Cath with grafts;  Surgeon: Juarez Wing MD;  Location: [facility] CATH INVASIVE LOCATION;  Service: Cardiovascular;  Laterality: N/A;   • CARDIAC CATHETERIZATION N/A 5/13/2020    Procedure: Percutaneous Coronary Intervention, Impella backup;  Surgeon: Juarez Wing MD;  Location: [facility] CATH INVASIVE LOCATION;  Service: Cardiovascular;  Laterality: N/A;   • CARDIAC CATHETERIZATION N/A 6/1/2021    Procedure: Left Heart Cath, possible pci;  Surgeon: Juarez Wing MD;  Location: [facility] CATH INVASIVE LOCATION;  Service: Cardiovascular;  Laterality: N/A;   • CARDIAC ELECTROPHYSIOLOGY PROCEDURE N/A 10/8/2019    Procedure: ICD SC new, ST.SHIV ;  Surgeon: Juarez Wing MD;  Location: [facility] CATH INVASIVE LOCATION;  Service: Cardiovascular   • CARDIAC SURGERY  2013   • COLONOSCOPY     • CORONARY ARTERY BYPASS GRAFT      x3, 3-18-13-LIMA to LAD, and reverse individual SVG to lateral marginal and to PDA   • FEMORAL POPLITEAL BYPASS Left 01/09/2019    [facility]/ Dr. Jero Hatch   • HAND SURGERY Left     crushed hand   • PACEMAKER IMPLANTATION     • TOE SURGERY      toe nail removal of big toe- age 8 or 9      Allergies:  No Known Allergies  Home Meds:  Medications Prior to Admission   Medication Sig Dispense Refill Last Dose   • albuterol (PROVENTIL) (2.5 MG/3ML) 0.083% nebulizer solution Take 2.5 mg by  nebulization Every 6 (Six) Hours As Needed for Wheezing or Shortness of Air.      • aspirin 81 MG EC tablet Take 81 mg by mouth Daily.      • atorvastatin (LIPITOR) 40 MG tablet Take 40 mg by mouth Daily.      • carvedilol (COREG) 3.125 MG tablet Take 1 tablet by mouth 2 (Two) Times a Day With Meals. 180 tablet 1    • clopidogrel (PLAVIX) 75 MG tablet Take 75 mg by mouth Daily.      • fenofibrate (TRICOR) 145 MG tablet Take 1 tablet by mouth once daily 90 tablet 3    • furosemide (LASIX) 40 MG tablet Take 40 mg by mouth 2 (Two) Times a Day.      • glimepiride (AMARYL) 4 MG tablet Take 4 mg by mouth 2 (Two) Times a Day.      • insulin glargine (LANTUS, SEMGLEE) 100 UNIT/ML injection Inject 40 Units under the skin into the appropriate area as directed Every Night.      • isosorbide mononitrate (IMDUR) 30 MG 24 hr tablet Take 30 mg by mouth Daily.      • nitroglycerin (NITROSTAT) 0.4 MG SL tablet Place 0.4 mg under the tongue Every 5 (Five) Minutes As Needed for Chest Pain. Take no more than 3 doses in 15 minutes.      • pantoprazole (PROTONIX) 40 MG EC tablet Take 40 mg by mouth Daily.      • spironolactone (ALDACTONE) 25 MG tablet Take 25 mg by mouth Daily.        Current Meds:     Current Facility-Administered Medications:   •  acetaminophen (TYLENOL) tablet 650 mg, 650 mg, Oral, Q4H PRN **OR** acetaminophen (TYLENOL) 160 MG/5ML solution 650 mg, 650 mg, Oral, Q4H PRN **OR** acetaminophen (TYLENOL) suppository 650 mg, 650 mg, Rectal, Q4H PRN, Ethan Vides MD  •  aluminum-magnesium hydroxide-simethicone (MAALOX MAX) 400-400-40 MG/5ML suspension 15 mL, 15 mL, Oral, Q6H PRN, Ethan Vides MD  •  aspirin EC tablet 81 mg, 81 mg, Oral, Daily, Ethan Vides MD, 81 mg at 04/07/22 1130  •  atorvastatin (LIPITOR) tablet 40 mg, 40 mg, Oral, Nightly, Ethan Vides MD  •  carvedilol (COREG) tablet 3.125 mg, 3.125 mg, Oral, BID With Meals, Ethan Vides MD, 3.125 mg at 04/07/22 1734  •  clopidogrel (PLAVIX) tablet  75 mg, 75 mg, Oral, Daily, Ethan Vides MD, 75 mg at 04/07/22 1130  •  dextrose (D50W) (25 g/50 mL) IV injection 25 g, 25 g, Intravenous, Q15 Min PRN, Ethan Vides MD  •  dextrose (GLUTOSE) oral gel 15 g, 15 g, Oral, Q15 Min PRN, Ethan Vides MD  •  enoxaparin (LOVENOX) syringe 40 mg, 40 mg, Subcutaneous, Q24H, Ethan Vides MD, 40 mg at 04/07/22 1604  •  glucagon (human recombinant) (GLUCAGEN DIAGNOSTIC) 1 mg in sterile water (preservative free) 1 mL injection, 1 mg, Subcutaneous, PRN, Ethan Vides MD  •  guaiFENesin (MUCINEX) 12 hr tablet 600 mg, 600 mg, Oral, Q12H, Ethan Vides MD, 600 mg at 04/07/22 1130  •  insulin glargine (LANTUS, SEMGLEE) injection 10 Units, 10 Units, Subcutaneous, Daily With Breakfast, Ethan Vides MD, 10 Units at 04/07/22 0950  •  insulin lispro (ADMELOG) injection 0-14 Units, 0-14 Units, Subcutaneous, Q6H, 12 Units at 04/07/22 1802 **AND** insulin lispro (ADMELOG) injection 0-14 Units, 0-14 Units, Subcutaneous, PRN, Ethan Vides MD  •  ipratropium-albuterol (DUO-NEB) nebulizer solution 3 mL, 3 mL, Nebulization, Q4H PRN, Martinez Connelly MD  •  isosorbide mononitrate (IMDUR) 24 hr tablet 30 mg, 30 mg, Oral, Q24H, Ethan Vides MD, 30 mg at 04/07/22 1130  •  ketorolac (TORADOL) injection 15 mg, 15 mg, Intravenous, Q6H PRN, Ethan Vides MD  •  melatonin tablet 5 mg, 5 mg, Oral, Nightly PRN, Ethan Vides MD  •  nitroglycerin (NITROSTAT) SL tablet 0.4 mg, 0.4 mg, Sublingual, Q5 Min PRN, Ethan Vides MD  •  ondansetron (ZOFRAN) tablet 4 mg, 4 mg, Oral, Q6H PRN **OR** ondansetron (ZOFRAN) injection 4 mg, 4 mg, Intravenous, Q6H PRN, Ethan Vides MD  •  pantoprazole (PROTONIX) EC tablet 40 mg, 40 mg, Oral, QAM, Ethan Vides MD, 40 mg at 04/07/22 1130  •  sodium chloride 0.9 % flush 10 mL, 10 mL, Intravenous, Q12H, Ethan Vides MD, 10 mL at 04/07/22 1223  •  sodium chloride 0.9 % flush 10 mL, 10 mL, Intravenous, PRN, Ethan Vides  "MD  Social History:   Social History     Tobacco Use   • Smoking status: Current Every Day Smoker     Packs/day: 2.00     Years: 25.00     Pack years: 50.00     Types: Cigarettes   • Smokeless tobacco: Never Used   Substance Use Topics   • Alcohol use: No      Family History:  Family History   Problem Relation Age of Onset   • Hypertension Mother    • Diabetes Mother    • Heart disease Father         had CABG and re-do/  while recovering-had MI age 40s   • Diabetes Father    • Pancreatic cancer Sister    • Hyperlipidemia Brother    • Stroke Brother    • Heart disease Paternal Uncle         Review of Systems : Review of Systems   Constitutional: Positive for malaise/fatigue. Negative for fever.   Cardiovascular: Positive for chest pain (pressure), dyspnea on exertion and orthopnea. Negative for irregular heartbeat, leg swelling, palpitations and syncope.   Respiratory: Positive for shortness of breath.    Gastrointestinal: Positive for bloating. Negative for abdominal pain, nausea and vomiting.   Neurological: Negative for focal weakness and light-headedness.   All other systems reviewed and are negative.           Constitutional:  Temp:  [97.4 °F (36.3 °C)] 97.4 °F (36.3 °C)  Heart Rate:  [74-84] 80  Resp:  [16-19] 16  BP: (103-116)/(68-72) 103/68    Physical Exam   /68   Pulse 80   Temp 97.4 °F (36.3 °C) (Oral)   Resp 16   Ht 172.7 cm (68\")   Wt 87.5 kg (193 lb)   SpO2 96%   BMI 29.35 kg/m²     Physical Exam   /68   Pulse 80   Temp 97.4 °F (36.3 °C) (Oral)   Resp 16   Ht 172.7 cm (68\")   Wt 87.5 kg (193 lb)   SpO2 96%   BMI 29.35 kg/m²   General:  Appears in no acute distress  Eyes: Sclera is anicteric,  conjunctiva is clear   HEENT:  No JVD. Thyroid not visibly enlarged. No mucosal pallor or cyanosis  Respiratory: Respirations regular and unlabored at rest.  Bilaterally good breath sounds, with good air entry in all fields. No crackles, rubs or wheezes auscultated  Cardiovascular: " S1,S2 Regular rate and rhythm. No murmur, rub or gallop auscultated.  . No pretibial pitting edema  Gastrointestinal: Abdomen nondistended, soft  Musculoskeletal:  No abnormal movements  Extremities: No digital clubbing or cyanosis  Skin: Color pink. Skin warm and dry to touch. No rashes  No xanthoma  Neuro: Alert and awake, no lateralizing deficits appreciated        Cardiographics  ECG: EKG tracing was  personally reviewed/interpreted by me  ECG 12 Lead   Preliminary Result   HEART RATE= 74  bpm   RR Interval= 772  ms   NJ Interval= 92  ms   P Horizontal Axis=   deg   P Front Axis= 0  deg   QRSD Interval= 98  ms   QT Interval= 385  ms   QRS Axis= 84  deg   T Wave Axis= 269  deg   - ABNORMAL ECG -   Sinus rhythm   Multiple ventricular premature complexes   Nonspecific repol abnormality, diffuse leads   When compared with ECG of 31-May-2021 14:32:09,   No significant change   Electronically Signed By:    Date and Time of Study: 2022-04-07 02:40:33      SCANNED - TELEMETRY     Final Result             Telemetry: sinus rhythm     Echocardiogram:   Results for orders placed during the hospital encounter of 12/26/20    Adult Transthoracic Echo Complete W/ Cont if Necessary Per Protocol    Interpretation Summary  · Left ventricular ejection fraction appears to be 36 - 40%.  · The right ventricular cavity is mildly dilated.  · The following left ventricular wall segments are hypokinetic: mid anterior, apical anterior, basal anterolateral, mid anterolateral, apical lateral, basal inferolateral, mid inferolateral, apical inferior, mid inferior, apical septal, basal inferoseptal, mid inferoseptal, apex hypokinetic, mid anteroseptal, basal anterior, basal inferior and basal inferoseptal.  · No pericardial effusion noted      Imaging  Chest X-ray:   Imaging Results (Last 24 Hours)     Procedure Component Value Units Date/Time    XR Chest 1 View [[id number]] Collected: 04/07/22 0937     Updated: 04/07/22 0940    Narrative:       DATE OF EXAM:  4/7/2022 9:16 AM     PROCEDURE:  XR CHEST 1 VW-     INDICATIONS:  shortness of breath     COMPARISON:  12/28/2020     TECHNIQUE:   Single radiographic AP view of the chest was obtained.     FINDINGS:  Lordotic positioning. Incomplete inspiration. Status post coronary  bypass. Left subclavian AICD noted. Thoracic silhouette appears enlarged  when accounting for projection. Lungs grossly clear        Impression:      Limited study demonstrating no definite acute process     Electronically Signed By-Humberto Villaseñor On:4/7/2022 9:38 AM  This report was finalized on [id number] by  Humberto Villaseñor, .          Lab Review: I have reviewed the labs  Results from last 7 days   Lab Units 04/07/22  0243   TROPONIN T ng/mL 0.012     Results from last 7 days   Lab Units 04/07/22  0243   MAGNESIUM mg/dL 2.2     Results from last 7 days   Lab Units 04/07/22  0805   SODIUM mmol/L 140   POTASSIUM mmol/L 4.3   BUN mg/dL 30*   CREATININE mg/dL 1.41*   CALCIUM mg/dL 9.3     @[id number]@  Results from last 7 days   Lab Units 04/07/22  0551   WBC 10*3/mm3 10.20   HEMOGLOBIN g/dL 15.8   HEMATOCRIT % 44.4   PLATELETS 10*3/mm3 126*                 Juarez Wing MD  4/7/2022, 18:15 EDT    Electronically signed by Juarez Wing MD, 04/07/22, 6:21 PM EDT.    EMR Dragon/Transcription:   Dictated utilizing Dragon dictation  Much of the above report is an electronic transcription/translation of the spoken language to printed text using Dragon Software. As such, the subtleties and finesse of the spoken language may permit erroneous, or at times, nonsensical words or phrases to be inadvertently transcribed; thus changes may be made at a later date to rectify these errors.

## 2022-04-07 NOTE — PLAN OF CARE
Problem: Adult Inpatient Plan of Care  Goal: Plan of Care Review  Outcome: Ongoing, Progressing  Goal: Patient-Specific Goal (Individualized)  Outcome: Ongoing, Progressing  Goal: Absence of Hospital-Acquired Illness or Injury  Outcome: Ongoing, Progressing  Intervention: Identify and Manage Fall Risk  Recent Flowsheet Documentation  Taken 4/7/2022 1350 by Leslie Nettles RN  Safety Promotion/Fall Prevention: safety round/check completed  Taken 4/7/2022 1130 by Leslie Nettles RN  Safety Promotion/Fall Prevention: safety round/check completed  Taken 4/7/2022 0956 by Leslie Nettles RN  Safety Promotion/Fall Prevention: safety round/check completed  Taken 4/7/2022 0915 by Leslie Nettles RN  Safety Promotion/Fall Prevention: safety round/check completed  Taken 4/7/2022 0710 by Leslie Nettles RN  Safety Promotion/Fall Prevention: safety round/check completed  Intervention: Prevent Skin Injury  Recent Flowsheet Documentation  Taken 4/7/2022 0710 by Leslie Nettles RN  Skin Protection: adhesive use limited  Intervention: Prevent and Manage VTE (Venous Thromboembolism) Risk  Recent Flowsheet Documentation  Taken 4/7/2022 0710 by Leslie Nettles RN  VTE Prevention/Management:   compression stockings off   sequential compression devices off  Range of Motion: active ROM (range of motion) encouraged  Intervention: Prevent Infection  Recent Flowsheet Documentation  Taken 4/7/2022 0710 by Leslie Nettles RN  Infection Prevention:   cohorting utilized   rest/sleep promoted  Goal: Optimal Comfort and Wellbeing  Outcome: Ongoing, Progressing  Intervention: Monitor Pain and Promote Comfort  Recent Flowsheet Documentation  Taken 4/7/2022 0710 by Leslie Nettles RN  Pain Management Interventions:   see MAR   position adjusted  Intervention: Provide Person-Centered Care  Recent Flowsheet Documentation  Taken 4/7/2022 0710 by Leslie Nettles RN  Trust Relationship/Rapport:   care explained   questions  encouraged   questions answered  Goal: Readiness for Transition of Care  Outcome: Ongoing, Progressing     Problem: Fall Injury Risk  Goal: Absence of Fall and Fall-Related Injury  Outcome: Ongoing, Progressing  Intervention: Identify and Manage Contributors  Recent Flowsheet Documentation  Taken 4/7/2022 0710 by Leslie Nettles RN  Medication Review/Management: medications reviewed  Intervention: Promote Injury-Free Environment  Recent Flowsheet Documentation  Taken 4/7/2022 1350 by Leslie Nettles RN  Safety Promotion/Fall Prevention: safety round/check completed  Taken 4/7/2022 1130 by Leslie Nettles RN  Safety Promotion/Fall Prevention: safety round/check completed  Taken 4/7/2022 0956 by Leslie Nettles RN  Safety Promotion/Fall Prevention: safety round/check completed  Taken 4/7/2022 0915 by Leslie Nettlse RN  Safety Promotion/Fall Prevention: safety round/check completed  Taken 4/7/2022 0710 by Leslie Nettles RN  Safety Promotion/Fall Prevention: safety round/check completed   Goal Outcome Evaluation:                 Cardiology consulted today. CXR and Echocardiogram done, pending results. No c/o cp or discomfort this shift. Will continue to observe.

## 2022-04-07 NOTE — PLAN OF CARE
Goal Outcome Evaluation:               Patient resting abed, no distress noted. Patient reports no chest pain at this time. Will continue to monitor

## 2022-04-07 NOTE — NURSING NOTE
Patient arrived on unit via stretcher. EMS stated that patient had Nitro paste on but they took it off as his blood pressure was dropping in route from [facility] to here. Patient is alert and orientated. He is currently denying chest pain but stated it just stopped a little bit ago. I ordered the EKG and Troponin STAT as per orders from Dr. Vides as I spoke with him after I received report about patient from [facility]. Will continue to monitor

## 2022-04-07 NOTE — CASE MANAGEMENT/SOCIAL WORK
Discharge Planning Assessment   Angel     Patient Name: Bobby Steele Jr.  MRN: [id number]  Today's Date: 4/7/2022    Admit Date: 4/7/2022     Discharge Needs Assessment     Row Name 04/07/22 1354       Living Environment    People in Home spouse    Name(s) of People in Home Alexandra Jeronimonda    Current Living Arrangements home    Primary Care Provided by self    Provides Primary Care For no one    Family Caregiver if Needed spouse    Family Caregiver Names wife Suzie    Quality of Family Relationships supportive;helpful    Able to Return to Prior Arrangements yes       Resource/Environmental Concerns    Resource/Environmental Concerns none    Transportation Concerns none       Transition Planning    Patient/Family Anticipates Transition to home with family    Patient/Family Anticipated Services at Transition none    Transportation Anticipated family or friend will provide       Discharge Needs Assessment    Readmission Within the Last 30 Days no previous admission in last 30 days    Equipment Currently Used at Home walker, rolling;cane, straight;nebulizer    Concerns to be Addressed discharge planning    Anticipated Changes Related to Illness none    Equipment Needed After Discharge none               Discharge Plan     Row Name 04/07/22 0592       Plan    Plan Anticipate Routine Home    Patient/Family in Agreement with Plan yes    Plan Comments Met with Patient at bedside, Lives at home with wife. Family will provide transportation at discharge. PCP and Pharmacy verified able to afford medications. Has all home DME that he needs. Denies any further needs. d/c barriers: Cardiology following                     Demographic Summary     Row Name 04/07/22 1921       General Information    Admission Type observation    Arrived From emergency department;[facility]    Required Notices Provided Observation Status Notice    Referral Source admission list    Reason for Consult discharge planning    Preferred  Language English               Functional Status     Row Name 04/07/22 1353       Functional Status    Usual Activity Tolerance moderate    Current Activity Tolerance moderate       Functional Status, IADL    Medications independent    Meal Preparation independent    Housekeeping independent    Laundry independent    Shopping independent       Mental Status    General Appearance WDL WDL       Mental Status Summary    Recent Changes in Mental Status/Cognitive Functioning no changes              Met with patient at bedside wearing mask and goggles, Spent less than 15 minutes in room at greater than 6 feet distance.              Sarah Walters RN

## 2022-04-08 ENCOUNTER — APPOINTMENT (OUTPATIENT)
Dept: NUCLEAR MEDICINE | Facility: HOSPITAL | Age: 62
End: 2022-04-08

## 2022-04-08 ENCOUNTER — READMISSION MANAGEMENT (OUTPATIENT)
Dept: CALL CENTER | Facility: HOSPITAL | Age: 62
End: 2022-04-08

## 2022-04-08 VITALS
HEIGHT: 68 IN | DIASTOLIC BLOOD PRESSURE: 77 MMHG | SYSTOLIC BLOOD PRESSURE: 101 MMHG | RESPIRATION RATE: 16 BRPM | HEART RATE: 79 BPM | TEMPERATURE: 97.6 F | OXYGEN SATURATION: 96 % | BODY MASS INDEX: 28.66 KG/M2 | WEIGHT: 189.1 LBS

## 2022-04-08 PROBLEM — I10 PRIMARY HYPERTENSION: Status: ACTIVE | Noted: 2019-06-26

## 2022-04-08 LAB
BH CV REST NUCLEAR ISOTOPE DOSE: 7.9 MCI
BH CV STRESS BP STAGE 1: NORMAL
BH CV STRESS BP STAGE 2: NORMAL
BH CV STRESS COMMENTS STAGE 1: NORMAL
BH CV STRESS COMMENTS STAGE 2: NORMAL
BH CV STRESS DOSE REGADENOSON STAGE 1: 0.4
BH CV STRESS DURATION MIN STAGE 1: 0
BH CV STRESS DURATION MIN STAGE 2: 4
BH CV STRESS DURATION SEC STAGE 1: 10
BH CV STRESS DURATION SEC STAGE 2: 0
BH CV STRESS HR STAGE 1: 74
BH CV STRESS HR STAGE 2: 85
BH CV STRESS NUCLEAR ISOTOPE DOSE: 21.9 MCI
BH CV STRESS PROTOCOL 1: NORMAL
BH CV STRESS RECOVERY BP: NORMAL MMHG
BH CV STRESS RECOVERY HR: 85 BPM
BH CV STRESS STAGE 1: 1
BH CV STRESS STAGE 2: 2
GLUCOSE BLDC GLUCOMTR-MCNC: 133 MG/DL (ref 70–105)
GLUCOSE BLDC GLUCOMTR-MCNC: 226 MG/DL (ref 70–105)
MAXIMAL PREDICTED HEART RATE: 158 BPM
PERCENT MAX PREDICTED HR: 58.86 %
STRESS BASELINE BP: NORMAL MMHG
STRESS BASELINE HR: 74 BPM
STRESS PERCENT HR: 69 %
STRESS POST PEAK BP: NORMAL MMHG
STRESS POST PEAK HR: 93 BPM
STRESS TARGET HR: 134 BPM

## 2022-04-08 PROCEDURE — 99217 PR OBSERVATION CARE DISCHARGE MANAGEMENT: CPT | Performed by: INTERNAL MEDICINE

## 2022-04-08 PROCEDURE — 93017 CV STRESS TEST TRACING ONLY: CPT

## 2022-04-08 PROCEDURE — G0378 HOSPITAL OBSERVATION PER HR: HCPCS

## 2022-04-08 PROCEDURE — 25010000002 REGADENOSON 0.4 MG/5ML SOLUTION: Performed by: INTERNAL MEDICINE

## 2022-04-08 PROCEDURE — 82962 GLUCOSE BLOOD TEST: CPT

## 2022-04-08 PROCEDURE — 0 TECHNETIUM TETROFOSMIN KIT: Performed by: INTERNAL MEDICINE

## 2022-04-08 PROCEDURE — 63710000001 INSULIN LISPRO (HUMAN) PER 5 UNITS: Performed by: INTERNAL MEDICINE

## 2022-04-08 PROCEDURE — A9502 TC99M TETROFOSMIN: HCPCS | Performed by: INTERNAL MEDICINE

## 2022-04-08 PROCEDURE — 78452 HT MUSCLE IMAGE SPECT MULT: CPT | Performed by: INTERNAL MEDICINE

## 2022-04-08 PROCEDURE — 63710000001 INSULIN GLARGINE PER 5 UNITS: Performed by: INTERNAL MEDICINE

## 2022-04-08 PROCEDURE — 99214 OFFICE O/P EST MOD 30 MIN: CPT | Performed by: INTERNAL MEDICINE

## 2022-04-08 PROCEDURE — 93018 CV STRESS TEST I&R ONLY: CPT | Performed by: INTERNAL MEDICINE

## 2022-04-08 PROCEDURE — 78452 HT MUSCLE IMAGE SPECT MULT: CPT

## 2022-04-08 RX ORDER — LISINOPRIL 2.5 MG/1
2.5 TABLET ORAL DAILY
Qty: 30 TABLET | Refills: 2 | Status: SHIPPED | OUTPATIENT
Start: 2022-04-08 | End: 2022-04-08 | Stop reason: SDUPTHER

## 2022-04-08 RX ORDER — LISINOPRIL 2.5 MG/1
2.5 TABLET ORAL DAILY
Qty: 30 TABLET | Refills: 2 | Status: SHIPPED | OUTPATIENT
Start: 2022-04-08 | End: 2022-04-13 | Stop reason: ALTCHOICE

## 2022-04-08 RX ADMIN — TETROFOSMIN 1 DOSE: 1.38 INJECTION, POWDER, LYOPHILIZED, FOR SOLUTION INTRAVENOUS at 10:36

## 2022-04-08 RX ADMIN — INSULIN GLARGINE 10 UNITS: 100 INJECTION, SOLUTION SUBCUTANEOUS at 11:58

## 2022-04-08 RX ADMIN — ASPIRIN 81 MG: 81 TABLET, COATED ORAL at 12:00

## 2022-04-08 RX ADMIN — INSULIN LISPRO 8 UNITS: 100 INJECTION, SOLUTION INTRAVENOUS; SUBCUTANEOUS at 00:46

## 2022-04-08 RX ADMIN — REGADENOSON 0.4 MG: 0.08 INJECTION, SOLUTION INTRAVENOUS at 10:36

## 2022-04-08 RX ADMIN — Medication 10 ML: at 12:01

## 2022-04-08 RX ADMIN — CLOPIDOGREL BISULFATE 75 MG: 75 TABLET ORAL at 12:00

## 2022-04-08 RX ADMIN — INSULIN LISPRO 5 UNITS: 100 INJECTION, SOLUTION INTRAVENOUS; SUBCUTANEOUS at 11:58

## 2022-04-08 RX ADMIN — TETROFOSMIN 1 DOSE: 1.38 INJECTION, POWDER, LYOPHILIZED, FOR SOLUTION INTRAVENOUS at 08:20

## 2022-04-08 RX ADMIN — ISOSORBIDE MONONITRATE 30 MG: 30 TABLET, EXTENDED RELEASE ORAL at 12:00

## 2022-04-08 NOTE — CONSULTS
"Diabetes Education  Assessment/Teaching    Patient Name:  Bobby Steele Jr.  YOB: 1960  MRN: [id number]  Admit Date:  4/7/2022      Assessment Date:  4/8/2022  Flowsheet Row Most Recent Value   General Information     Referral From: A1c  [On 4/7/2022 A1c was 11.3%.]   Height 172.7 cm (68\")   Height Method Stated   Weight 85.8 kg (189 lb 1.6 oz)   Weight Method Bed scale   Pregnancy Assessment    Diabetes History    What type of diabetes do you have? Type 2   Current DM knowledge fair   Have you had diabetes education/teaching in the past? yes   When and where was your diabetes education? Inpatient hospitalization at [facility] on 6/2/2021   Do you test your blood sugar at home? yes   Frequency of checks 2X a day   Meter type Freestyle about 2 years old   Who performs the test? patient   Typical readings 300-500   Have you had low blood sugar? (<70mg/dl) no   Have you had high blood sugar? (>140mg/dl) yes   How often do you have high blood sugar? frequently   When was your last high blood sugar? Admission blood sugar 201   Education Preferences    What areas of diabetes would you like to learn about? avoiding high blood sugar, diabetes complications   Nutrition Information    Assessment Topics    Problem Solving - Assessment Needs education   Reducing Risk - Assessment Needs education   DM Goals    Problem Solving - Goal Today   Reducing Risk - Goal Today          Flowsheet Row Most Recent Value   DM Education Needs    Meter Has own   Meter Type Freestyle   Frequency of Testing 2 times a day   Medication Oral, Insulin, Pen  [At home patient stated that he takes Lantus 40 units BID and Glimepiride 4 mg BID.]   Problem Solving Hyperglycemia, Signs, Symptoms, Treatment   Reducing Risks A1C testing  [On 4/7/2022 A1c was 11.3%.]   Discharge Plan Home   Motivation Moderate   Teaching Method Discussion, Handouts   Patient Response Verbalized understanding            Other Comments:  A1c info sheet given with " discussion on A1c target and healthy blood sugar range. Patient stated that he drinks black coffee, 2-3 glasses of milk, and up until 2 weeks ago 2-4 liters of regular soda a day. Patient stated when he started having the chest pain about 2 weeks ago he started changing his diet. Patient stated he is now drinking Sprite Zero, cutting down on the amount of bread he eats, and carries a case of water in his truck. Patient stated he gets exercise by chasing his 6 dogs around. This past  patient stated his dog of 17 years . Allowed patient time to vent about the loss of his dog and show pictures of his dog. Patient has no further questions or concerns related to diabetes at this time.        Electronically signed by:  Love Goodman RN  22 15:34 EDT

## 2022-04-08 NOTE — PLAN OF CARE
Problem: Adult Inpatient Plan of Care  Goal: Plan of Care Review  Outcome: Met  Goal: Patient-Specific Goal (Individualized)  Outcome: Met  Goal: Absence of Hospital-Acquired Illness or Injury  Outcome: Met  Intervention: Identify and Manage Fall Risk  Recent Flowsheet Documentation  Taken 4/8/2022 1506 by Leslie Nettles RN  Safety Promotion/Fall Prevention: safety round/check completed  Taken 4/8/2022 1348 by Leslie Nettles RN  Safety Promotion/Fall Prevention: safety round/check completed  Taken 4/8/2022 1145 by Leslie Nettles RN  Safety Promotion/Fall Prevention: safety round/check completed  Taken 4/8/2022 0915 by Leslie Nettles RN  Safety Promotion/Fall Prevention: safety round/check completed  Taken 4/8/2022 0710 by Leslie Nettles RN  Safety Promotion/Fall Prevention: safety round/check completed  Intervention: Prevent Skin Injury  Recent Flowsheet Documentation  Taken 4/8/2022 0710 by Leslie Nettles RN  Skin Protection: adhesive use limited  Intervention: Prevent and Manage VTE (Venous Thromboembolism) Risk  Recent Flowsheet Documentation  Taken 4/8/2022 0710 by Leslie Nettles RN  VTE Prevention/Management:   compression stockings off   sequential compression devices off  Range of Motion: active ROM (range of motion) encouraged  Intervention: Prevent Infection  Recent Flowsheet Documentation  Taken 4/8/2022 0710 by Leslie Nettles RN  Infection Prevention: rest/sleep promoted  Goal: Optimal Comfort and Wellbeing  Outcome: Met  Intervention: Monitor Pain and Promote Comfort  Recent Flowsheet Documentation  Taken 4/8/2022 0710 by Leslie Nettles RN  Pain Management Interventions: position adjusted  Intervention: Provide Person-Centered Care  Recent Flowsheet Documentation  Taken 4/8/2022 0710 by Leslie Nettles RN  Trust Relationship/Rapport:   care explained   choices provided   questions answered   questions encouraged  Goal: Readiness for Transition of Care  Outcome:  Met     Problem: Fall Injury Risk  Goal: Absence of Fall and Fall-Related Injury  Outcome: Met  Intervention: Identify and Manage Contributors  Recent Flowsheet Documentation  Taken 4/8/2022 0710 by Leslie Nettles RN  Medication Review/Management: medications reviewed  Intervention: Promote Injury-Free Environment  Recent Flowsheet Documentation  Taken 4/8/2022 1506 by Leslie Nettles RN  Safety Promotion/Fall Prevention: safety round/check completed  Taken 4/8/2022 1348 by Leslie Nettles RN  Safety Promotion/Fall Prevention: safety round/check completed  Taken 4/8/2022 1145 by Leslie Nettles RN  Safety Promotion/Fall Prevention: safety round/check completed  Taken 4/8/2022 0915 by Leslie Nettles RN  Safety Promotion/Fall Prevention: safety round/check completed  Taken 4/8/2022 0710 by Leslie Nettles RN  Safety Promotion/Fall Prevention: safety round/check completed   Goal Outcome Evaluation:                 Patient NPO this morning for a stress test. Patient will follow up with cardiologist as outpatient. Patient has discharge orders in. IV removed. Teaching completed.

## 2022-04-08 NOTE — CASE MANAGEMENT/SOCIAL WORK
Continued Stay Note  JO-ANN Ortiz     Patient Name: Bobby Steele Jr.  MRN: [id number]  Today's Date: 4/8/2022    Admit Date: 4/7/2022     Discharge Plan     Row Name 04/08/22 0859       Plan    Plan Comments d/c barrier: Cardiology following, myoview today              Phone communication or documentation only - no physical contact with patient or family.      Sarah Walters RN

## 2022-04-08 NOTE — PROGRESS NOTES
Cardiology Progress Note    Patient Identification:  Name: Bobby Steele Jr.  Age: 62 y.o.  Sex: male  :  1960  MRN: [id number]                 Follow Up / Chief Complaint: Chest pain, CAD, CHF, cardiomyopathy    Interval History: Patient presented with recurrent chest pain.  Underwent nuclear stress test today.  Results pending.       NP NOTE:  Patient up in chair. No distress noted.  He denies any chest pain or shortness of breath at rest today.  He wants to eat.  Awaiting stress test results.  Patient states that even if it is abnormal he DOES NOT want a heart cath.    On physical exam- Lungs clear but diminished. Heart regular. No edema noted.    Continue current medications and follow up in one week with me.   Electronically signed by JOY Ashton, 22, 1:19 PM EDT.    Cardiology attending addendum :    I have personally performed a face-to-face diagnostic evaluation, physical exam and reviewed data on this patient.  I have reviewed documentation done by me and nurse practitioner Aranza Ramirez and corrected as needed.  And agree with the different components of documentation.Greater than 50% of the time spent in the care of this patient was provided by attending consultant/me.           Subjective: Patient seen and examined; chart and labs reviewed; Discussed with patient and bedside nurse      Objective: serial troponin negative   2022: glucose elevated; Echocardiogram unchanged EF 30%    History of Present Illness:       Mr. Bobby Steele Jr. has PMH of        # NSTEMI  19, SHILPA to SVG to RCA and proximal LAD leading into a small diagonal, cath 2020 underwent SHILPA to SVG to RCA and native proximal LCx with Impella assistance.  Cath 2021 revealed patent LIMA to LAD, SVG to PDA, occluded SVG to diagonal, patent stent in left main, severe disease in tortuous segment in mid LCx and LAD going to diagonal, unchanged from previous cath.  # CAD status post CABG X3 V  # Ischemic  cardiomyopathy with EF of 35%, status post ICD 10/8/2019  # COPD, continue tobacco abuse  # Hypertension   # Hyperlipidemia  # Diabetes with hemoglobin A1c of 10 on 5/2/2020  #.  CKD 3 and 4  #Gangrene of left foot  #Positive family for premature heart disease with father MI 53        Was directly admitted 4/6/2022 through the emergency room with complaint of substernal chest pain shortness of breath orthopnea dyspnea on exertion for many months, was recently seen by PCP who increased diuretics, nephrologist decreased his diuretics.  Patient is currently chest pain-free.  Work-up here for 6/20/2022 and 4/7/2022 revealed normal troponin, normal proBNP of 392.  Chest x-ray 4/7/2022 limited study no acute process.  EKG done 4/7/2022 reviewed/interpreted by me reveals sinus rhythm with rate of 74 bpm with multiple PVCs     Review of records reveal patient had chest pain and admission June 2021, patient  underwent cardiac cath 6/1/2021 which revealed severe triple-vessel disease with patent LIMA to LAD, SVG to PDA, occluded SVG to diagonal, patent left main, severe disease in proximal LAD leading to diagonal, severe disease in mid LCx which is in a tortuous segment.      Assessment:  #.Chest pain  #  chronic CHF, ischemic cardiomyopathy 35%, status post ICD 10/8/2019  # CAD status post CABG, PCI  # COPD, tobacco abuse   # hypertension ,  #Dyslipidemia  #Hyperglycemia, and type 2 diabetes  #  CKD  # PAD, toe gangrene      Plan:  Patient had multiple admissions to [facility] with chest pain in the recent past had elevated troponins.  Telemetry is revealing sinus rhythm  Will get serial cardiac enzymes are negative for MI.  Continue medical management with aspirin, carvedilol, Plavix, atorvastatin as tolerated  Echocardiogram is revealing severe LV dysfunction.  Patient CHF is well compensated proBNP is improved to 392.  We will schedule a stress test in a.m. to evaluate and guide ischemia therapy.  Patient underwent  Lexiscan Cardiolite which revealed large inferior wall MI LV dysfunction and small lateral ischemia.  This is similar to what he had before.  Discussed about cath versus medical management.  Patient does not want to undergo cardiac cath.  We will continue medical management and follow-up next week in office.  Patient is off Entresto and ACE inhibitor's due to CKD.  Follow-up with nephrology  We will continue medical management with aspirin, atorvastatin, carvedilol, clopidogrel and Aldactone as tolerated to help with CHF, CAD, hypertension, dyslipidemia  We will follow-up next week in heart failure clinic in my office with me  Patient would benefit from diabetes control, has A1c is over 7.  Counseled on smoking cessation.  Patient underwent Lexiscan Cardiolite 6/1/2021 which is revealing high risk abnormal ischemia in lateral wall and inferolateral wall with depressed EF.  Patient underwent cardiac cath 6/1/2021 which revealed severe disease in mid LCx which is in the loop of a bend and heavily calcified high risk for complications therefore was not intervened upon.  Patient also has severe disease going to the diagonal.  Reviewed CHF care with patient.  Follow-up with PMD for diabetes.  Counseled on smoking cessation.        Past Medical History:  Past Medical History:   Diagnosis Date   • CAD (coronary artery disease)     S/P CABG   • Chest pain 05/31/2021   • Chronic systolic CHF (congestive heart failure) ([facility])    • COPD (chronic obstructive pulmonary disease) ([facility])    • DM2 (diabetes mellitus, type 2) ([facility])    • Dyslipidemia    • Encounter for monitoring antiplatelet therapy    • Hypertension    • Myocardial infarction ([facility])     inferior wall MI--03/13   • DEBI (obstructive sleep apnea)     noncompliant with CPAP   • Peripheral vascular disease ([facility])     S/P left fem-pop bypass   • Tobacco use      Past Surgical History:  Past Surgical History:   Procedure Laterality Date   • AMPUTATION FOOT / TOE      left   •  APPENDECTOMY      at age 8 or 9   • BIVENTRICULAR ASSIST DEVICE/LEFT VENTRICULAR ASSIST DEVICE INSERTION N/A 5/13/2020    Procedure: Left Ventricular Assist Device;  Surgeon: Juarez Wing MD;  Location: [facility] CATH INVASIVE LOCATION;  Service: Cardiovascular;  Laterality: N/A;   • CARDIAC CATHETERIZATION      3-; [facility] 9-   • CARDIAC CATHETERIZATION Right 6/27/2019    Procedure: Cardiac Catheterization/with grafts groin access;  Surgeon: Juarez Wing MD;  Location: [facility] CATH INVASIVE LOCATION;  Service: Cardiovascular   • CARDIAC CATHETERIZATION N/A 5/8/2020    Procedure: Left Heart Cath with grafts;  Surgeon: Juarez Wing MD;  Location: [facility] CATH INVASIVE LOCATION;  Service: Cardiovascular;  Laterality: N/A;   • CARDIAC CATHETERIZATION N/A 5/13/2020    Procedure: Percutaneous Coronary Intervention, Impella backup;  Surgeon: Juarez Wing MD;  Location: [facility] CATH INVASIVE LOCATION;  Service: Cardiovascular;  Laterality: N/A;   • CARDIAC CATHETERIZATION N/A 6/1/2021    Procedure: Left Heart Cath, possible pci;  Surgeon: Juarez Wing MD;  Location: [facility] CATH INVASIVE LOCATION;  Service: Cardiovascular;  Laterality: N/A;   • CARDIAC ELECTROPHYSIOLOGY PROCEDURE N/A 10/8/2019    Procedure: ICD SC new, ST.SHIV ;  Surgeon: Juarez Wing MD;  Location: [facility] CATH INVASIVE LOCATION;  Service: Cardiovascular   • CARDIAC SURGERY  2013   • COLONOSCOPY     • CORONARY ARTERY BYPASS GRAFT      x3, 3-18-13-LIMA to LAD, and reverse individual SVG to lateral marginal and to PDA   • FEMORAL POPLITEAL BYPASS Left 01/09/2019    [facility]/ Dr. Jero Hatch   • HAND SURGERY Left     crushed hand   • PACEMAKER IMPLANTATION     • TOE SURGERY      toe nail removal of big toe- age 8 or 9        Social History:   Social History     Tobacco Use   • Smoking status: Current Every Day Smoker     Packs/day: 2.00     Years: 25.00     Pack years: 50.00      "Types: Cigarettes   • Smokeless tobacco: Never Used   Substance Use Topics   • Alcohol use: No      Family History:  Family History   Problem Relation Age of Onset   • Hypertension Mother    • Diabetes Mother    • Heart disease Father         had CABG and re-do/  while recovering-had MI age 40s   • Diabetes Father    • Pancreatic cancer Sister    • Hyperlipidemia Brother    • Stroke Brother    • Heart disease Paternal Uncle           Allergies:  No Known Allergies  Scheduled Meds:  aspirin, 81 mg, Daily  atorvastatin, 40 mg, Nightly  carvedilol, 3.125 mg, BID With Meals  clopidogrel, 75 mg, Daily  enoxaparin, 40 mg, Q24H  guaiFENesin, 600 mg, Q12H  insulin glargine, 10 Units, Daily With Breakfast  insulin lispro, 0-14 Units, Q6H  isosorbide mononitrate, 30 mg, Q24H  pantoprazole, 40 mg, QAM  sodium chloride, 10 mL, Q12H          Review of Systems:   Review of Systems   Constitutional: Positive for malaise/fatigue.   Cardiovascular: Positive for dyspnea on exertion. Negative for chest pain.   Respiratory: Negative for cough and shortness of breath.    Gastrointestinal: Negative for abdominal pain, nausea and vomiting.   All other systems reviewed and are negative.        Constitutional:  Temp:  [97.6 °F (36.4 °C)-98.6 °F (37 °C)] 97.6 °F (36.4 °C)  Heart Rate:  [74-83] 83  Resp:  [16-18] 16  BP: (101-120)/(64-80) 105/64    Physical Exam   /64 (BP Location: Right arm)   Pulse 83   Temp 97.6 °F (36.4 °C)   Resp 16   Ht 172.7 cm (68\")   Wt 85.8 kg (189 lb 1.6 oz)   SpO2 92%   BMI 28.75 kg/m²   General:  Appears in no acute distress  Eyes: Sclera is anicteric,  conjunctiva is clear   HEENT:  No JVD. Thyroid not visibly enlarged. No mucosal pallor or cyanosis  Respiratory: Respirations regular and unlabored at rest.  Bilaterally good breath sounds, with good air entry in all fields. No crackles, rubs or wheezes auscultated  Cardiovascular: S1,S2 Regular rate and rhythm. No murmur, rub or gallop " auscultated.  . No pretibial pitting edema  Gastrointestinal: Abdomen nondistended, soft  Musculoskeletal:  No abnormal movements  Extremities: No digital clubbing or cyanosis  Skin: Color pink. Skin warm and dry to touch. No rashes  No xanthoma  Neuro: Alert and awake, no lateralizing deficits appreciated    INTAKE AND OUTPUT:    Intake/Output Summary (Last 24 hours) at 4/8/2022 0858  Last data filed at 4/7/2022 1350  Gross per 24 hour   Intake 480 ml   Output --   Net 480 ml       Cardiographics  Telemetry: sinus     ECG:   ECG 12 Lead   Preliminary Result   HEART RATE= 74  bpm   RR Interval= 772  ms   IL Interval= 92  ms   P Horizontal Axis=   deg   P Front Axis= 0  deg   QRSD Interval= 98  ms   QT Interval= 385  ms   QRS Axis= 84  deg   T Wave Axis= 269  deg   - ABNORMAL ECG -   Sinus rhythm   Multiple ventricular premature complexes   Nonspecific repol abnormality, diffuse leads   When compared with ECG of 31-May-2021 14:32:09,   No significant change   Electronically Signed By:    Date and Time of Study: 2022-04-07 02:40:33      SCANNED - TELEMETRY     Final Result           I have personally reviewed EKG    Echocardiogram: Results for orders placed during the hospital encounter of 04/07/22    Adult Transthoracic Echo Complete W/ Cont if Necessary Per Protocol    Interpretation Summary  LVE with severe global left ventricular hypokinesis, estimated LV ejection fraction of 30%  Normal RV size.  Pacer lead seen.  Moderate left atrial enlargement  Aortic valve, mitral valve, tricuspid valve appears structurally normal, he is to mild mitral and tricuspid regurgitation seen.  Calculated RV systolic pressure of 21 mmHg  No pericardial effusion seen.  Proximal aorta appears normal in size.      Lab Review   I have reviewed the labs  Results from last 7 days   Lab Units 04/07/22  0243   TROPONIN T ng/mL 0.012     Results from last 7 days   Lab Units 04/07/22  0243   MAGNESIUM mg/dL 2.2     Results from last 7 days  "  Lab Units 04/07/22  0805   SODIUM mmol/L 140   POTASSIUM mmol/L 4.3   BUN mg/dL 30*   CREATININE mg/dL 1.41*   CALCIUM mg/dL 9.3         Results from last 7 days   Lab Units 04/07/22  0551   WBC 10*3/mm3 10.20   HEMOGLOBIN g/dL 15.8   HEMATOCRIT % 44.4   PLATELETS 10*3/mm3 126*           RADIOLOGY:  Imaging Results (Last 24 Hours)     Procedure Component Value Units Date/Time    XR Chest 1 View [[id number]] Collected: 04/07/22 0937     Updated: 04/07/22 0940    Narrative:      DATE OF EXAM:  4/7/2022 9:16 AM     PROCEDURE:  XR CHEST 1 VW-     INDICATIONS:  shortness of breath     COMPARISON:  12/28/2020     TECHNIQUE:   Single radiographic AP view of the chest was obtained.     FINDINGS:  Lordotic positioning. Incomplete inspiration. Status post coronary  bypass. Left subclavian AICD noted. Thoracic silhouette appears enlarged  when accounting for projection. Lungs grossly clear        Impression:      Limited study demonstrating no definite acute process     Electronically Signed By-Humberto Villaseñor On:4/7/2022 9:38 AM  This report was finalized on [id number] by  Humberto Villaseñor, .                )4/8/2022  Juarez Wing MD    Electronically signed by Juarez Wing MD, 04/08/22, 3:39 PM EDT.  EMR Dragon/Transcription:   \"Dictated utilizing Dragon dictation\".   "

## 2022-04-08 NOTE — DISCHARGE SUMMARY
[facility] Medicine Services  DISCHARGE SUMMARY    Patient Name: Bobby Steele Jr.  : 1960  MRN: [id number]    Date of Admission: 2022  Discharge Diagnosis: Chest pain in adult.  Date of Discharge: 2022  Primary Care Physician: Yamile Agarwal MD      Presenting Problem:   Chest pain [R07.9]  Chest pain in adult [R07.9]    Active and Resolved Hospital Problems:  Active Hospital Problems    Diagnosis POA   • **Chest pain in adult [R07.9] Yes     Priority: High   • Elevated troponin [R77.8] Yes     Priority: Medium   • Depression [F32.A] Yes   • GERD without esophagitis [K21.9] Yes   • DEBI (obstructive sleep apnea) [G47.33] Yes   • Type 2 diabetes mellitus with hyperglycemia, with long-term current use of insulin (HCC) [E11.65, Z79.4] Not Applicable   • Presence of automatic cardioverter/defibrillator (AICD) [Z95.810] Yes   • Ischemic cardiomyopathy [I25.5] Yes   • Panlobular emphysema (HCC) [J43.1] Yes   • Chronic systolic congestive heart failure (HCC) [I50.22] Yes   • Tobacco use disorder [F17.200] Yes   • Primary hypertension [I10] Yes   • Peripheral vascular disease (HCC) [I73.9] Yes   • Mixed hyperlipidemia [E78.2] Yes      Resolved Hospital Problems   No resolved problems to display.         Hospital Course   From previous note and with minor updates.  Hospital Course:  Patient is a 62 y.o. male who presented to [facility] on 2022 has known coronary artery disease he had CABG he has had multiple stents he had a positive stress in  of last year but no intervention was done on heart catheterization.  The patient has congestive heart failure, COPD, type 2 diabetes insulin long-term use, hyperlipidemia tobacco dependence anxiety depression and essential hypertension.  He has been visiting the [facility] several times over the past few days with chest pain but he continues to leave prior to a thorough work-up.  This time Mr. Steele agreed  to be transferred to [facility] to see his cardiologist Dr. Cook. Dr. Cook graciously accepted this patient and requested the hospitalist to admit.  At the other facility he had a positive high sensitive troponins of 49 but here on arrival his troponin was 0.012.  He is currently chest pain-free we will treat the underlying medical conditions and have consulted cardiology.  Patient was placed on ACS protocol.  Cardiology consult was completed.  Cardiac stress test was completed for risk stratification and he did not show acute myocardial ischemic event or targets for revascularization.  Patient was recommended to follow-up with his cardiologist as outpatient.  Coronary artery disease was treated with aspirin.  Hyperlipidemia was treated with Lipitor.  Hypertension was treated with Coreg.  Coronary artery disease was also treated with Plavix.  Diabetes mellitus was treated with insulin therapy.  GERD was treated with Protonix.  Appropriate patient's home medications were resumed in the hospital for other chronic medical conditions.  Patient reported complete resolution of his symptoms after over 24 hours in the hospital and requested to be discharged home.  Patient was advised to take his medications as prescribed.  Discharge medications are as per medication reconciliation list.  Patient was advised to follow-up with his primary care physician within 3 to 5 days of discharge.  Patient was advised to follow-up with his cardiologist within 14 days of discharge.  Patient was advised to return to the emergency department if he experiences any recurrence of his symptoms.  Patient and family agreed with the plan and patient was discharged in stable condition.    DISCHARGE Follow Up Recommendations for labs and diagnostics:     Patient was advised to follow-up with his primary care physician who will review his current medications.    Patient was advised to follow-up with his cardiologist who will  reassess his cardiac function.      Reasons For Change In Medications and Indications for New Medications:      Day of Discharge     Vital Signs:  Temp:  [97.6 °F (36.4 °C)-98.6 °F (37 °C)] 97.6 °F (36.4 °C)  Heart Rate:  [74-83] 79  Resp:  [16-18] 16  BP: (101-120)/(64-80) 101/77    Physical Exam:  Physical Exam  Vitals and nursing note reviewed.   Constitutional:       General: He is not in acute distress.  HENT:      Head: Normocephalic.      Nose: Nose normal.      Mouth/Throat:      Mouth: Mucous membranes are dry.      Pharynx: Oropharynx is clear.   Eyes:      Extraocular Movements: Extraocular movements intact.      Conjunctiva/sclera: Conjunctivae normal.      Pupils: Pupils are equal, round, and reactive to light.   Cardiovascular:      Pulses: Normal pulses.      Heart sounds: No murmur heard.    No friction rub. No gallop.      Comments: S1 and S2 present.  No tachycardia.  Pulmonary:      Breath sounds: No stridor. No wheezing or rales.   Chest:      Chest wall: No tenderness.   Abdominal:      General: Bowel sounds are normal. There is no distension.      Palpations: Abdomen is soft.      Tenderness: There is no abdominal tenderness. There is no right CVA tenderness or guarding.   Musculoskeletal:         General: No swelling, tenderness, deformity or signs of injury.      Cervical back: Normal range of motion. No rigidity.      Right lower leg: No edema.      Left lower leg: No edema.   Skin:     General: Skin is warm and dry.      Capillary Refill: Capillary refill takes less than 2 seconds.      Coloration: Skin is not jaundiced.      Findings: No bruising, erythema, lesion or rash.   Neurological:      Comments: No facial asymmetry noted.  Gait and station not tested.   Psychiatric:      Comments: No agitation.              Pertinent  and/or Most Recent Results     LAB RESULTS:      Lab 04/07/22  0551   WBC 10.20   HEMOGLOBIN 15.8   HEMATOCRIT 44.4   PLATELETS 126*   NEUTROS ABS 6.30   LYMPHS ABS  3.10   MONOS ABS 0.50   EOS ABS 0.20   MCV 90.4         Lab 04/07/22  0805 04/07/22  0551 04/07/22  0243   SODIUM 140  --   --    POTASSIUM 4.3  --   --    CHLORIDE 100  --   --    CO2 29.0  --   --    ANION GAP 11.0  --   --    BUN 30*  --   --    CREATININE 1.41*  --   --    EGFR 56.3*  --   --    GLUCOSE 198*  --   --    CALCIUM 9.3  --   --    MAGNESIUM  --   --  2.2   HEMOGLOBIN A1C  --  11.3*  --              Lab 04/07/22  0805 04/07/22  0243   PROBNP 392.7  --    TROPONIN T  --  0.012                 Brief Urine Lab Results     None        Microbiology Results (last 10 days)     ** No results found for the last 240 hours. **          XR Chest 1 View    Result Date: 4/7/2022  Impression: Limited study demonstrating no definite acute process  Electronically Signed By-Humberto Villaseñor On:4/7/2022 9:38 AM This report was finalized on [id number] by  Humberto Villaseñor, .      Results for orders placed during the hospital encounter of 05/31/21    Duplex Venous Lower Extremity - Bilateral CAR    Interpretation Summary  · Normal bilateral lower extremity venous duplex scan. Bilateral greater saphenous veins previously harvested above knee. Left leg bypass noted patent with normal triphasic arterial waveforms.      Results for orders placed during the hospital encounter of 05/31/21    Duplex Venous Lower Extremity - Bilateral CAR    Interpretation Summary  · Normal bilateral lower extremity venous duplex scan. Bilateral greater saphenous veins previously harvested above knee. Left leg bypass noted patent with normal triphasic arterial waveforms.      Results for orders placed during the hospital encounter of 04/07/22    Adult Transthoracic Echo Complete W/ Cont if Necessary Per Protocol    Interpretation Summary  LVE with severe global left ventricular hypokinesis, estimated LV ejection fraction of 30%  Normal RV size.  Pacer lead seen.  Moderate left atrial enlargement  Aortic valve, mitral valve, tricuspid valve  appears structurally normal, he is to mild mitral and tricuspid regurgitation seen.  Calculated RV systolic pressure of 21 mmHg  No pericardial effusion seen.  Proximal aorta appears normal in size.      Labs Pending at Discharge:      Procedures Performed           Consults:   Consults     Date and Time Order Name Status Description    4/7/2022  5:23 AM Inpatient Cardiology Consult Completed             Discharge Details        Discharge Medications      New Medications      Instructions Start Date   lisinopril 2.5 MG tablet  Commonly known as: Zestril   2.5 mg, Oral, Daily         Continue These Medications      Instructions Start Date   albuterol (2.5 MG/3ML) 0.083% nebulizer solution  Commonly known as: PROVENTIL   2.5 mg, Nebulization, Every 6 Hours PRN      aspirin 81 MG EC tablet   81 mg, Oral, Daily      atorvastatin 40 MG tablet  Commonly known as: LIPITOR   40 mg, Oral, Daily      carvedilol 3.125 MG tablet  Commonly known as: COREG   3.125 mg, Oral, 2 Times Daily With Meals      clopidogrel 75 MG tablet  Commonly known as: PLAVIX   75 mg, Oral, Daily      fenofibrate 145 MG tablet  Commonly known as: TRICOR   Take 1 tablet by mouth once daily      furosemide 40 MG tablet  Commonly known as: LASIX   40 mg, Oral, 2 Times Daily      glimepiride 4 MG tablet  Commonly known as: AMARYL   4 mg, Oral, 2 Times Daily      insulin glargine 100 UNIT/ML injection  Commonly known as: LANTUS, SEMGLEE   40 Units, Subcutaneous, Nightly      isosorbide mononitrate 30 MG 24 hr tablet  Commonly known as: IMDUR   30 mg, Oral, Daily      nitroglycerin 0.4 MG SL tablet  Commonly known as: NITROSTAT   0.4 mg, Sublingual, Every 5 Minutes PRN, Take no more than 3 doses in 15 minutes.      pantoprazole 40 MG EC tablet  Commonly known as: PROTONIX   40 mg, Oral, Daily      spironolactone 25 MG tablet  Commonly known as: ALDACTONE   25 mg, Oral, Daily             No Known Allergies      Discharge Disposition: Stable.  Home or Self  Care    Diet:  Hospital:  Diet Order   Procedures   • Diet Cardiac; Healthy Heart         Discharge Activity: As tolerated.        CODE STATUS:  Code Status and Medical Interventions:   Ordered at: 04/07/22 0520     Level Of Support Discussed With:    Patient     Code Status (Patient has no pulse and is not breathing):    CPR (Attempt to Resuscitate)     Medical Interventions (Patient has pulse or is breathing):    Full Support         Future Appointments   Date Time Provider Department Center   8/3/2022  1:30 PM Aranza Ramirez APRN [facility]   2/7/2023 11:20 AM Juarez Wing MD [facility]           Time spent on Discharge including face to face service:  40 minutes    This patient has been examined wearing appropriate Personal Protective Equipment and discussed with hospital infection control department, [facility] department, infectious disease specialist and pulmonologist. 04/08/22      Signature: Electronically signed by Martinez Connelly MD, FACP, 04/08/22, 3:06 PM EDT.

## 2022-04-09 NOTE — OUTREACH NOTE
Prep Survey    Flowsheet Row Responses   [facility] patient discharged from? [facility]   Is LACE score < 7 ? No   Emergency Room discharge w/ pulse ox? No   Eligibility Readm Mgmt   Discharge diagnosis  Chest pain    Does the patient have one of the following disease processes/diagnoses(primary or secondary)? Other   Does the patient have Home health ordered? No   Is there a DME ordered? No   Prep survey completed? Yes          RAF OLVERA - Registered Nurse

## 2022-04-11 LAB — QT INTERVAL: 385 MS

## 2022-04-12 ENCOUNTER — READMISSION MANAGEMENT (OUTPATIENT)
Dept: CALL CENTER | Facility: HOSPITAL | Age: 62
End: 2022-04-12

## 2022-04-12 NOTE — OUTREACH NOTE
Medical Week 1 Survey    Flowsheet Row Responses   [facility] patient discharged from? [place]   Does the patient have one of the following disease processes/diagnoses(primary or secondary)? Other   Week 1 attempt successful? Yes   Call start time 1549   Call end time 1552   Meds reviewed with patient/caregiver? Yes   Does the patient have all medications ordered at discharge? No   What is keeping the patient from filling the prescriptions? Lost script/didn't receive  [PATIENT STATES HE DID NOT GET HIS LISINOPRIL BEFORE HE LEFT THE HOSPITAL, BUT HE IS GOING TO A CARDIOLOGY APPOINTMENT TOMORROW AND HE WILL STOP AT [facility] TO PICK IT UP. ]   Nursing Interventions No intervention needed   Is the patient taking all medications as directed (includes completed medication regime)? N/A   Does the patient have a primary care provider?  Yes   Has the patient kept scheduled appointments due by today? N/A   Has home health visited the patient within 72 hours of discharge? N/A   Psychosocial issues? No   Did the patient receive a copy of their discharge instructions? Yes   Nursing interventions Reviewed instructions with patient   What is the patient's perception of their health status since discharge? Improving   Is the patient/caregiver able to teach back signs and symptoms related to disease process for when to call PCP? Yes   Is the patient/caregiver able to teach back signs and symptoms related to disease process for when to call 911? Yes   Is the patient/caregiver able to teach back the hierarchy of who to call/visit for symptoms/problems? PCP, Specialist, Home health nurse, Urgent Care, ED, 911 Yes   If the patient is a current smoker, are they able to teach back resources for cessation? Smoking cessation support groups   Week 1 call completed? Yes          BEAU BAJWA - Licensed Nurse

## 2022-04-13 ENCOUNTER — OFFICE VISIT (OUTPATIENT)
Dept: CARDIOLOGY | Facility: CLINIC | Age: 62
End: 2022-04-13

## 2022-04-13 VITALS
OXYGEN SATURATION: 96 % | WEIGHT: 194 LBS | BODY MASS INDEX: 29.4 KG/M2 | SYSTOLIC BLOOD PRESSURE: 92 MMHG | DIASTOLIC BLOOD PRESSURE: 63 MMHG | HEART RATE: 77 BPM | HEIGHT: 68 IN

## 2022-04-13 DIAGNOSIS — Z95.810 PRESENCE OF AUTOMATIC CARDIOVERTER/DEFIBRILLATOR (AICD): Chronic | ICD-10-CM

## 2022-04-13 DIAGNOSIS — I25.2 CORONARY ARTERY DISEASE WITH HX OF MYOCARDIAL INFARCT W/O HX OF CABG: Chronic | ICD-10-CM

## 2022-04-13 DIAGNOSIS — E78.2 MIXED HYPERLIPIDEMIA: ICD-10-CM

## 2022-04-13 DIAGNOSIS — I50.22 CHRONIC SYSTOLIC HEART FAILURE: Primary | ICD-10-CM

## 2022-04-13 DIAGNOSIS — I25.10 ATHEROSCLEROSIS OF NATIVE CORONARY ARTERY OF NATIVE HEART WITHOUT ANGINA PECTORIS: ICD-10-CM

## 2022-04-13 DIAGNOSIS — I50.22 CHRONIC SYSTOLIC CONGESTIVE HEART FAILURE: Chronic | ICD-10-CM

## 2022-04-13 DIAGNOSIS — I25.5 ISCHEMIC CARDIOMYOPATHY: Chronic | ICD-10-CM

## 2022-04-13 DIAGNOSIS — F17.200 TOBACCO USE DISORDER: ICD-10-CM

## 2022-04-13 DIAGNOSIS — I25.10 CORONARY ARTERY DISEASE WITH HX OF MYOCARDIAL INFARCT W/O HX OF CABG: Chronic | ICD-10-CM

## 2022-04-13 PROCEDURE — 99214 OFFICE O/P EST MOD 30 MIN: CPT | Performed by: NURSE PRACTITIONER

## 2022-04-13 RX ORDER — BLOOD SUGAR DIAGNOSTIC
STRIP MISCELLANEOUS
COMMUNITY
Start: 2022-04-12

## 2022-04-13 RX ORDER — LISINOPRIL 2.5 MG/1
2.5 TABLET ORAL DAILY
Qty: 30 TABLET | Refills: 2 | Status: CANCELLED | OUTPATIENT
Start: 2022-04-13 | End: 2022-07-12

## 2022-04-13 NOTE — PROGRESS NOTES
Subjective:     Encounter Date:04/13/2022      Patient ID: Bobby Steele Jr. is a 62 y.o. male.    Chief Complaint : 1 week hospital Follow-up for acute on chronic HFrEF, ischemic cardiomyopathy, CAD, CABG, PCI, dyslipidemia, hypertension, diabetes, CKD.       Mr. Bobby Steele Jr. has PMH of       # NSTEMI  5/12/19, SHILPA to SVG to RCA and proximal LAD leading into a small diagonal, cath 5/11/2020 underwent SHILPA to SVG to RCA and native proximal LCx with Impella assistance.  Cath 6/1/2021 revealed patent LIMA to LAD, SVG to PDA, occluded SVG to diagonal, patent stent in left main, severe disease in tortuous segment in mid LCx and LAD going to diagonal, unchanged from previous cath.  # CAD status post CABG X3 V  # Ischemic cardiomyopathy with EF of 35%, status post ICD 10/8/2019  # COPD, continue tobacco abuse  # Hypertension   # Hyperlipidemia  # Diabetes with hemoglobin A1c of 10 on 5/2/2020  #.  CKD 3 and 4  #Gangrene of left foot  #Positive family for premature heart disease with father MI 53     Is here for a one week hospital follow up in my heart failure clinic.  Patient was directly admitted from [facility] to [facility] for chest pain and shortness of breath.  Patient noted to have mild exacerbation of CHF.  He was given IV diuretics with improvement. He underwent pharmacological stress myoview that showed abnormal perfusion, large inferior wall defect consistent with scar and MI, small to moderate reversible lateral defect consistent with ischemia with severe LV dysfunction.  LVEF of 27%.  Cardiac catheterization was discussed with patient by myself and Dr. Wing.  He did not want to proceed with catheterization.  Patient was supposed to discharge home on lisinopril however he did not get the medication for some reason.  He does continue to have intermittent chest pain, shortness of breath and palpitations however does not currently have any symptoms during examination.  Today patient's  blood pressure is 92/63 HR 77.      BMP 4/7/2022- creatinine 1.41  eGFR 56      Procedures   Stress myoview 4/8/2022  1.abnormal perfusion, large inferior wall defect consistent with scar and MI, small to moderate reversible lateral defect consistent with ischemia  2.  Severe LV dysfunction.  LVEF of 27%.    Echocardiogram 4/7/2022  LVE with severe global left ventricular hypokinesis, estimated LV ejection fraction of 30%  Normal RV size.  Pacer lead seen.  Moderate left atrial enlargement  Aortic valve, mitral valve, tricuspid valve appears structurally normal, he is to mild mitral and tricuspid regurgitation seen.  Calculated RV systolic pressure of 21 mmHg  No pericardial effusion seen.  Proximal aorta appears normal in size.    cardiac cath 6/1/2021 which revealed severe triple-vessel disease with patent LIMA to LAD, SVG to PDA, occluded SVG to diagonal, patent left main, severe disease in proximal LAD leading to diagonal, severe disease in mid LCx which is in a tortuous segment.      The following portions of the patient's history were reviewed and updated as appropriate: allergies, current medications, past family history, past medical history, past social history, past surgical history and problem list.    Assessment:         [facility]     Diagnosis Plan   1. Chronic systolic heart failure (HCC)  BNP    Basic Metabolic Panel   2. Atherosclerosis of native coronary artery of native heart without angina pectoris   BNP   3. Chronic systolic congestive heart failure (HCC)     4. Ischemic cardiomyopathy     5. Coronary artery disease with hx of myocardial infarct w/o hx of CABG     6. Presence of automatic cardioverter/defibrillator (AICD)     7. Mixed hyperlipidemia     8. Tobacco use disorder            Plan:         Discussed recent hospitalization   Discussed recent echocardiogram and stress myoview   Patient was supposed to start lisinopril 2.5mg daily however did not get that prescription when leaving    Blood  pressure is low today 92/63  Patient is not symptomatic     Continue carvedilol, lasix, imdur and aldactone   Monitor blood pressure closely at home     Discussed in detail regarding daily weights, low sodium diet and fluid intake  Patient is eating a lot of chili, lunch meat- ham, and chips.    Patient also need tight glycemic control---A1C 11.3 (4/7/2022)    Currently not on ACE/ARB/ARNI due to hypotension and CKD   Discussed smoking cessation      Follow up with me in one month with bnp and bmp prior to appointment   We will discussed possibly adding entresto back +/- jardiance or fargixa       Past Medical History:  Past Medical History:   Diagnosis Date   • CAD (coronary artery disease)     S/P CABG   • Chest pain 05/31/2021   • Chronic systolic CHF (congestive heart failure) ([facility])    • COPD (chronic obstructive pulmonary disease) ([facility])    • DM2 (diabetes mellitus, type 2) ([facility])    • Dyslipidemia    • Encounter for monitoring antiplatelet therapy    • Hypertension    • Myocardial infarction ([facility])     inferior wall MI--03/13   • DEBI (obstructive sleep apnea)     noncompliant with CPAP   • Peripheral vascular disease ([facility])     S/P left fem-pop bypass   • Tobacco use      Past Surgical History:  Past Surgical History:   Procedure Laterality Date   • AMPUTATION FOOT / TOE      left   • APPENDECTOMY      at age 8 or 9   • BIVENTRICULAR ASSIST DEVICE/LEFT VENTRICULAR ASSIST DEVICE INSERTION N/A 5/13/2020    Procedure: Left Ventricular Assist Device;  Surgeon: Juarez Wing MD;  Location: [facility] CATH INVASIVE LOCATION;  Service: Cardiovascular;  Laterality: N/A;   • CARDIAC CATHETERIZATION      3-; [facility] 9-   • CARDIAC CATHETERIZATION Right 6/27/2019    Procedure: Cardiac Catheterization/with grafts groin access;  Surgeon: Juarez Wing MD;  Location:  [facility] CATH INVASIVE LOCATION;  Service: Cardiovascular   • CARDIAC CATHETERIZATION N/A 5/8/2020    Procedure: Left Heart Cath with  grafts;  Surgeon: Juarez Wing MD;  Location: [facility] CATH INVASIVE LOCATION;  Service: Cardiovascular;  Laterality: N/A;   • CARDIAC CATHETERIZATION N/A 5/13/2020    Procedure: Percutaneous Coronary Intervention, Impella backup;  Surgeon: Juarez Wing MD;  Location: [facility] CATH INVASIVE LOCATION;  Service: Cardiovascular;  Laterality: N/A;   • CARDIAC CATHETERIZATION N/A 6/1/2021    Procedure: Left Heart Cath, possible pci;  Surgeon: Juarez Wing MD;  Location: [facility] CATH INVASIVE LOCATION;  Service: Cardiovascular;  Laterality: N/A;   • CARDIAC ELECTROPHYSIOLOGY PROCEDURE N/A 10/8/2019    Procedure: ICD SC new, ST.SHIV ;  Surgeon: Juarez Wing MD;  Location: [facility] CATH INVASIVE LOCATION;  Service: Cardiovascular   • CARDIAC SURGERY  2013   • COLONOSCOPY     • CORONARY ARTERY BYPASS GRAFT      x3, 3-18-13-LIMA to LAD, and reverse individual SVG to lateral marginal and to PDA   • FEMORAL POPLITEAL BYPASS Left 01/09/2019    [facility]/ Dr. Jero Hatch   • HAND SURGERY Left     crushed hand   • PACEMAKER IMPLANTATION     • TOE SURGERY      toe nail removal of big toe- age 8 or 9      Allergies:  No Known Allergies  Home Meds:  Current Meds:     Current Outpatient Medications:   •  Accu-Chek Marley Plus test strip, , Disp: , Rfl:   •  albuterol (PROVENTIL) (2.5 MG/3ML) 0.083% nebulizer solution, Take 2.5 mg by nebulization Every 6 (Six) Hours As Needed for Wheezing or Shortness of Air., Disp: , Rfl:   •  aspirin 81 MG EC tablet, Take 81 mg by mouth Daily., Disp: , Rfl:   •  atorvastatin (LIPITOR) 40 MG tablet, Take 40 mg by mouth Daily., Disp: , Rfl:   •  carvedilol (COREG) 3.125 MG tablet, Take 1 tablet by mouth 2 (Two) Times a Day With Meals., Disp: 180 tablet, Rfl: 1  •  clopidogrel (PLAVIX) 75 MG tablet, Take 75 mg by mouth Daily., Disp: , Rfl:   •  fenofibrate (TRICOR) 145 MG tablet, Take 1 tablet by mouth once daily, Disp: 90 tablet, Rfl: 3  •  furosemide (LASIX) 40  "MG tablet, Take 40 mg by mouth 2 (Two) Times a Day., Disp: , Rfl:   •  glimepiride (AMARYL) 4 MG tablet, Take 4 mg by mouth 2 (Two) Times a Day., Disp: , Rfl:   •  insulin glargine (LANTUS, SEMGLEE) 100 UNIT/ML injection, Inject 40 Units under the skin into the appropriate area as directed Every Night., Disp: , Rfl:   •  isosorbide mononitrate (IMDUR) 30 MG 24 hr tablet, Take 30 mg by mouth Daily., Disp: , Rfl:   •  nitroglycerin (NITROSTAT) 0.4 MG SL tablet, Place 0.4 mg under the tongue Every 5 (Five) Minutes As Needed for Chest Pain. Take no more than 3 doses in 15 minutes., Disp: , Rfl:   •  pantoprazole (PROTONIX) 40 MG EC tablet, Take 40 mg by mouth Daily., Disp: , Rfl:   •  spironolactone (ALDACTONE) 25 MG tablet, Take 25 mg by mouth Daily., Disp: , Rfl:       Social History:   Social History     Tobacco Use   • Smoking status: Current Every Day Smoker     Packs/day: 1.50     Years: 25.00     Pack years: 37.50     Types: Cigarettes   • Smokeless tobacco: Never Used   Substance Use Topics   • Alcohol use: No      Family History:  Family History   Problem Relation Age of Onset   • Hypertension Mother    • Diabetes Mother    • Heart disease Father         had CABG and re-do/  while recovering-had MI age 40s   • Diabetes Father    • Pancreatic cancer Sister    • Hyperlipidemia Brother    • Stroke Brother    • Heart disease Paternal Uncle               Review of Systems   Constitutional: Negative for malaise/fatigue.   Cardiovascular: Negative for chest pain, leg swelling and palpitations.   Respiratory: Positive for shortness of breath.    Skin: Negative for rash.   Neurological: Negative for dizziness, light-headedness and numbness.     All other systems are negative         Objective:     Physical Exam  BP 92/63 (BP Location: Left arm, Patient Position: Sitting, Cuff Size: Adult)   Pulse 77   Ht 172.7 cm (68\")   Wt 88 kg (194 lb)   SpO2 96%   BMI 29.50 kg/m²   General:  Appears in no acute " distress  Eyes: Sclera is anicteric,  conjunctiva is clear   HEENT:  No JVD.   Respiratory: Respirations regular and unlabored at rest.  Clear to auscultation diminished   Cardiovascular: S1,S2 Regular rate and rhythm. No murmur, rub or gallop auscultated.   Extremities: No digital clubbing or cyanosis, no edema  Skin: Color pink. Skin warm and dry to touch. No rashes  No xanthoma  Neuro: Alert and awake, no lateralizing deficits appreciated    Lab Reviewed:       Electronically signed by JOY Ashton, 04/18/22, 3:15 PM EDT.    JOY Ashton  4/13/2022 16:25 EDT      Much of the above report is an electronic transcription/translation of the spoken language to printed text using Dragon Software. As such, the subtleties and finesse of the spoken language may permit erroneous, or at times, nonsensical words or phrases to be inadvertently transcribed; thus changes may be made at a later date to rectify these errors.

## 2022-04-13 NOTE — PATIENT INSTRUCTIONS
- Daily weight:  same time every day, same clothing   - Low Salt (sodium) diet   - Watch fluid intake        Heart Failure Action Plan  A heart failure action plan helps you understand what to do when you have symptoms of heart failure. Your action plan is a color-coded plan that lists the symptoms to watch for and indicates what actions to take.  If you have symptoms in the red zone, you need medical care right away.  If you have symptoms in the yellow zone, you are having problems.  If you have symptoms in the green zone, you are doing well.  Follow the plan that was created by you and your health care provider. Review your plan each time you visit your health care provider.  Red zone  These signs and symptoms mean you should get medical help right away:  You have trouble breathing when resting.  You have a dry cough that is getting worse.  You have swelling or pain in your legs or abdomen that is getting worse.  You suddenly gain more than 2-3 lb (0.9-1.4 kg) in 24 hours, or more than 5 lb (2.3 kg) in a week. This amount may be more or less depending on your condition.  You have trouble staying awake or you feel confused.  You have chest pain.  You do not have an appetite.  You pass out.  You have worsening sadness or depression.  If you have any of these symptoms, call your local emergency services (911 in the [place] right away. Do not drive yourself to the hospital.  Yellow zone  These signs and symptoms mean your condition may be getting worse and you should make some changes:  You have trouble breathing when you are active, or you need to sleep with your head raised on extra pillows to help you breathe.  You have swelling in your legs or abdomen.  You gain 2-3 lb (0.9-1.4 kg) in 24 hours, or 5 lb (2.3 kg) in a week. This amount may be more or less depending on your condition.  You get tired easily.  You have trouble sleeping.  You have a dry cough.  If you have any of these symptoms:  Contact your health care  provider within the next day.  Your health care provider may adjust your medicines.  Green zone  These signs mean you are doing well and can continue what you are doing:  You do not have shortness of breath.  You have very little swelling or no new swelling.  Your weight is stable (no gain or loss).  You have a normal activity level.  You do not have chest pain or any other new symptoms.  Follow these instructions at home:  Take over-the-counter and prescription medicines only as told by your health care provider.  Weigh yourself daily. Your target weight is __________ lb (__________ kg).  Call your health care provider if you gain more than __________ lb (__________ kg) in 24 hours, or more than __________ lb (__________ kg) in a week.  Health care provider name: _____________________________________________________  Health care provider phone number: _____________________________________________________  Eat a heart-healthy diet. Work with a diet and nutrition specialist (dietitian) to create an eating plan that is best for you.  Keep all follow-up visits. This is important.  Where to find more information  American Heart Association: www.heart.org  Summary  A heart failure action plan helps you understand what to do when you have symptoms of heart failure.  Follow the action plan that was created by you and your health care provider.  Get help right away if you have any symptoms in the red zone.  This information is not intended to replace advice given to you by your health care provider. Make sure you discuss any questions you have with your health care provider.  Document Revised: 08/02/2021 Document Reviewed: 08/02/2021  Elsevier Patient Education © 2021 Elsevier Inc.              Steps to Quit Smoking  Smoking tobacco is the leading cause of preventable death. It can affect almost every organ in the body. Smoking puts you and people around you at risk for many serious, long-lasting (chronic) diseases.  Quitting smoking can be hard, but it is one of the best things that you can do for your health. It is never too late to quit.  How do I get ready to quit?  When you decide to quit smoking, make a plan to help you succeed. Before you quit:  Pick a date to quit. Set a date within the next 2 weeks to give you time to prepare.  Write down the reasons why you are quitting. Keep this list in places where you will see it often.  Tell your family, friends, and co-workers that you are quitting. Their support is important.  Talk with your doctor about the choices that may help you quit.  Find out if your health insurance will pay for these treatments.  Know the people, places, things, and activities that make you want to smoke (triggers). Avoid them.  What first steps can I take to quit smoking?  Throw away all cigarettes at home, at work, and in your car.  Throw away the things that you use when you smoke, such as ashtrays and lighters.  Clean your car. Make sure to empty the ashtray.  Clean your home, including curtains and carpets.  What can I do to help me quit smoking?  Talk with your doctor about taking medicines and seeing a counselor at the same time. You are more likely to succeed when you do both.  If you are pregnant or breastfeeding, talk with your doctor about counseling or other ways to quit smoking. Do not take medicine to help you quit smoking unless your doctor tells you to do so.  To quit smoking:  Quit right away  Quit smoking totally, instead of slowly cutting back on how much you smoke over a period of time.  Go to counseling. You are more likely to quit if you go to counseling sessions regularly.  Take medicine  You may take medicines to help you quit. Some medicines need a prescription, and some you can buy over-the-counter. Some medicines may contain a drug called nicotine to replace the nicotine in cigarettes. Medicines may:  Help you to stop having the desire to smoke (cravings).  Help to stop the  problems that come when you stop smoking (withdrawal symptoms).  Your doctor may ask you to use:  Nicotine patches, gum, or lozenges.  Nicotine inhalers or sprays.  Non-nicotine medicine that is taken by mouth.  Find resources  Find resources and other ways to help you quit smoking and remain smoke-free after you quit. These resources are most helpful when you use them often. They include:  Online chats with a counselor.  Phone quitlines.  Printed self-help materials.  Support groups or group counseling.  Text messaging programs.  Mobile phone apps. Use apps on your mobile phone or tablet that can help you stick to your quit plan. There are many free apps for mobile phones and tablets as well as websites. Examples include Quit Guide from the CDC and smokefree.gov    What things can I do to make it easier to quit?    Talk to your family and friends. Ask them to support and encourage you.  Call a phone quitline (7-493-QUITNOW), reach out to support groups, or work with a counselor.  Ask people who smoke to not smoke around you.  Avoid places that make you want to smoke, such as:  Bars.  Parties.  Smoke-break areas at work.  Spend time with people who do not smoke.  Lower the stress in your life. Stress can make you want to smoke. Try these things to help your stress:  Getting regular exercise.  Doing deep-breathing exercises.  Doing yoga.  Meditating.  Doing a body scan. To do this, close your eyes, focus on one area of your body at a time from head to toe. Notice which parts of your body are tense. Try to relax the muscles in those areas.  How will I feel when I quit smoking?  Day 1 to 3 weeks  Within the first 24 hours, you may start to have some problems that come from quitting tobacco. These problems are very bad 2-3 days after you quit, but they do not often last for more than 2-3 weeks. You may get these symptoms:  Mood swings.  Feeling restless, nervous, angry, or annoyed.  Trouble  concentrating.  Dizziness.  Strong desire for high-sugar foods and nicotine.  Weight gain.  Trouble pooping (constipation).  Feeling like you may vomit (nausea).  Coughing or a sore throat.  Changes in how the medicines that you take for other issues work in your body.  Depression.  Trouble sleeping (insomnia).  Week 3 and afterward  After the first 2-3 weeks of quitting, you may start to notice more positive results, such as:  Better sense of smell and taste.  Less coughing and sore throat.  Slower heart rate.  Lower blood pressure.  Clearer skin.  Better breathing.  Fewer sick days.  Quitting smoking can be hard. Do not give up if you fail the first time. Some people need to try a few times before they succeed. Do your best to stick to your quit plan, and talk with your doctor if you have any questions or concerns.  Summary  Smoking tobacco is the leading cause of preventable death. Quitting smoking can be hard, but it is one of the best things that you can do for your health.  When you decide to quit smoking, make a plan to help you succeed.  Quit smoking right away, not slowly over a period of time.  When you start quitting, seek help from your doctor, family, or friends.  This information is not intended to replace advice given to you by your health care provider. Make sure you discuss any questions you have with your health care provider.  Document Revised: 09/11/2020 Document Reviewed: 03/07/2020  [employer] Patient Education © 2021 Elsevier Inc.

## 2022-06-02 ENCOUNTER — TELEPHONE (OUTPATIENT)
Dept: CARDIOLOGY | Facility: CLINIC | Age: 62
End: 2022-06-02

## 2022-06-02 NOTE — TELEPHONE ENCOUNTER
CALLED PT TO [facility] TO GET LABS DONE AND PT STATED WILL GO TO [facility] TOMORROW 6/3. FAXED LAB ORDERS -[phone].

## 2022-06-07 RX ORDER — DAPAGLIFLOZIN 10 MG/1
10 TABLET, FILM COATED ORAL DAILY
Qty: 30 TABLET | Refills: 3 | Status: SHIPPED | OUTPATIENT
Start: 2022-06-07 | End: 2022-10-06

## 2022-06-10 DIAGNOSIS — E78.2 MIXED HYPERLIPIDEMIA: ICD-10-CM

## 2022-06-10 RX ORDER — FENOFIBRATE 145 MG/1
TABLET, COATED ORAL
Qty: 90 TABLET | Refills: 3 | Status: SHIPPED | OUTPATIENT
Start: 2022-06-10

## 2022-06-10 NOTE — TELEPHONE ENCOUNTER
Rx Refill Note  Requested Prescriptions     Pending Prescriptions Disp Refills   • fenofibrate (TRICOR) 145 MG tablet [Pharmacy Med Name: Fenofibrate 145 MG Oral Tablet] 90 tablet 0     Sig: Take 1 tablet by mouth once daily      Last office visit with prescribing clinician: 2/1/2022      Next office visit with prescribing clinician: 2/7/2023            Dana Mcginnis MA  06/10/22, 14:36 EDT

## 2022-09-21 ENCOUNTER — OFFICE VISIT (OUTPATIENT)
Dept: CARDIOLOGY | Facility: CLINIC | Age: 62
End: 2022-09-21

## 2022-09-21 VITALS
OXYGEN SATURATION: 97 % | SYSTOLIC BLOOD PRESSURE: 114 MMHG | BODY MASS INDEX: 29.66 KG/M2 | HEIGHT: 67 IN | DIASTOLIC BLOOD PRESSURE: 74 MMHG | WEIGHT: 189 LBS | HEART RATE: 84 BPM

## 2022-09-21 DIAGNOSIS — I25.2 CORONARY ARTERY DISEASE WITH HX OF MYOCARDIAL INFARCT W/O HX OF CABG: Chronic | ICD-10-CM

## 2022-09-21 DIAGNOSIS — I25.5 ISCHEMIC CARDIOMYOPATHY: Chronic | ICD-10-CM

## 2022-09-21 DIAGNOSIS — F17.200 TOBACCO USE DISORDER: ICD-10-CM

## 2022-09-21 DIAGNOSIS — Z95.810 PRESENCE OF AUTOMATIC CARDIOVERTER/DEFIBRILLATOR (AICD): Chronic | ICD-10-CM

## 2022-09-21 DIAGNOSIS — I73.9 PERIPHERAL VASCULAR DISEASE: Chronic | ICD-10-CM

## 2022-09-21 DIAGNOSIS — I50.22 CHRONIC SYSTOLIC CONGESTIVE HEART FAILURE: Primary | Chronic | ICD-10-CM

## 2022-09-21 DIAGNOSIS — I25.10 CORONARY ARTERY DISEASE WITH HX OF MYOCARDIAL INFARCT W/O HX OF CABG: Chronic | ICD-10-CM

## 2022-09-21 DIAGNOSIS — I10 PRIMARY HYPERTENSION: ICD-10-CM

## 2022-09-21 PROBLEM — N18.9 CHRONIC RENAL INSUFFICIENCY: Status: ACTIVE | Noted: 2022-09-21

## 2022-09-21 PROBLEM — R07.9 CHEST PAIN IN ADULT: Status: ACTIVE | Noted: 2022-09-21

## 2022-09-21 PROBLEM — E11.21 TYPE 2 DIABETES MELLITUS WITH NEPHROPATHY: Status: ACTIVE | Noted: 2022-09-21

## 2022-09-21 PROBLEM — I50.9 CONGESTIVE HEART FAILURE (CHF): Status: ACTIVE | Noted: 2022-09-21

## 2022-09-21 PROBLEM — R79.89 ELEVATED TROPONIN I LEVEL: Status: ACTIVE | Noted: 2022-09-21

## 2022-09-21 PROBLEM — R77.8 ELEVATED TROPONIN I LEVEL: Status: ACTIVE | Noted: 2022-09-21

## 2022-09-21 PROBLEM — J44.1 COPD EXACERBATION (HCC): Status: ACTIVE | Noted: 2022-09-21

## 2022-09-21 PROBLEM — R20.2 PARESTHESIA: Status: ACTIVE | Noted: 2022-09-21

## 2022-09-21 PROBLEM — I20.0 UNSTABLE ANGINA (HCC): Status: ACTIVE | Noted: 2022-09-21

## 2022-09-21 PROCEDURE — 99214 OFFICE O/P EST MOD 30 MIN: CPT | Performed by: NURSE PRACTITIONER

## 2022-09-21 RX ORDER — TRAMADOL HYDROCHLORIDE 50 MG/1
TABLET ORAL
COMMUNITY
Start: 2022-08-02

## 2022-09-21 RX ORDER — INSULIN GLARGINE 100 [IU]/ML
INJECTION, SOLUTION SUBCUTANEOUS
COMMUNITY
Start: 2022-09-13

## 2022-09-21 RX ORDER — SULFAMETHOXAZOLE AND TRIMETHOPRIM 800; 160 MG/1; MG/1
TABLET ORAL
COMMUNITY
Start: 2022-06-28 | End: 2022-09-26

## 2022-09-21 RX ORDER — LANOLIN ALCOHOL/MO/W.PET/CERES
1 CREAM (GRAM) TOPICAL 2 TIMES DAILY
COMMUNITY
Start: 2022-09-11

## 2022-09-21 NOTE — PROGRESS NOTES
Subjective:     Encounter Date:09/21/2022      Patient ID: Bobby Steele Jr. is a 62 y.o. male.    Chief Complaint : 6 month follow up for chronic HFrEF, ischemic cardiomyopathy, s/p ICD     History of Present Illness      Mr. Bobby Steele Jr. has PMH of        # NSTEMI  5/12/19, SHILPA to SVG to RCA and proximal LAD leading into a small diagonal, cath 5/11/2020 underwent SHILPA to SVG to RCA and native proximal LCx with Impella assistance.  Cath 6/1/2021 revealed patent LIMA to LAD, SVG to PDA, occluded SVG to diagonal, patent stent in left main, severe disease in tortuous segment in mid LCx and LAD going to diagonal, unchanged from previous cath.  # CAD status post CABG X3 V  # Ischemic cardiomyopathy with EF of 35%, status post ICD 10/8/2019  # COPD, continue tobacco abuse  # Hypertension   # Hyperlipidemia  # Diabetes with hemoglobin A1c of 10 on 5/2/2020  #.  CKD 3 and 4  #Gangrene of left foot  #Positive family for premature heart disease with father MI 53    Is here for 6 month follow up.  Patient denies any chest pain, shortness of breath, edema or palpitations.  He does report that he is tired.  He states that he and wife are going through a divorce and his nebulizer is packed away.  Patient states he continues to smoke about 1 ppd       Blood pressure today is 114/74 HR 84 oxygen 97% on room air.       Patient was hospitalized in April with mild CHF exacerbation.   Stress test at that time showed abnormal perfusion, large inferior wall defect consistent with scar and MI, small to moderate reversible lateral defect consistent with ischemia with severe LV dysfunction.  LVEF of 27%.  Patient declined cardiac cath            BMP 4/7/2022- creatinine 1.41  eGFR 56  Labs from [facility] 6/3/2022 show glucose 279, potassium 4.6, bun 28, creatinine 1.49      Procedures   Stress myoview 4/8/2022  1.abnormal perfusion, large inferior wall defect consistent with scar and MI, small to moderate reversible  lateral defect consistent with ischemia  2.  Severe LV dysfunction.  LVEF of 27%.     Echocardiogram 4/7/2022  LVE with severe global left ventricular hypokinesis, estimated LV ejection fraction of 30%  Normal RV size.  Pacer lead seen.  Moderate left atrial enlargement  Aortic valve, mitral valve, tricuspid valve appears structurally normal, he is to mild mitral and tricuspid regurgitation seen.  Calculated RV systolic pressure of 21 mmHg  No pericardial effusion seen.  Proximal aorta appears normal in size.     cardiac cath 6/1/2021 which revealed severe triple-vessel disease with patent LIMA to LAD, SVG to PDA, occluded SVG to diagonal, patent left main, severe disease in proximal LAD leading to diagonal, severe disease in mid LCx which is in a tortuous segment.       Procedures    Copied text in this portion of the note has been reviewed and is accurate as of 9/26/2022  The following portions of the patient's history were reviewed and updated as appropriate: allergies, current medications, past family history, past medical history, past social history, past surgical history and problem list.    Assessment:         MDM     Diagnosis Plan   1. Chronic systolic congestive heart failure (HCC)     2. Ischemic cardiomyopathy     3. Presence of automatic cardioverter/defibrillator (AICD)     4. Peripheral vascular disease (HCC)     5. Coronary artery disease with hx of myocardial infarct w/o hx of CABG     6. Primary hypertension     7. Tobacco use disorder            Plan:         Discussed heart failure with patient   Patient is currently NYHA class II-III     Continue carvedilol 3.125mg BID; Spironolactone 25mg daily, farxiga 10mg daily  And lasix 40mg BID   Patient not on ACE/ARB/ARNI do to renal dysfunction and moderately low blood pressure       Continue aspirin, plavix, statin + tricor and imdur for CAD     Advised complete smoking cessation--- patient smokes 1ppd (down from 4ppd several years ago)    Advised  patient check daily weights, watch sodium intake   Patient unable to check daily weights as he does not have a scale at this time.     Advised patient that if he starts becoming short of breath, having increasing edema to call the office and see me.         Past Medical History:  Past Medical History:   Diagnosis Date   • CAD (coronary artery disease)     S/P CABG   • Chest pain 05/31/2021   • Chronic systolic CHF (congestive heart failure) ([facility])    • COPD (chronic obstructive pulmonary disease) ([facility])    • DM2 (diabetes mellitus, type 2) ([facility])    • Dyslipidemia    • Encounter for monitoring antiplatelet therapy    • Hypertension    • Myocardial infarction ([facility])     inferior wall MI--03/13   • DEBI (obstructive sleep apnea)     noncompliant with CPAP   • Peripheral vascular disease ([facility])     S/P left fem-pop bypass   • Tobacco use      Past Surgical History:  Past Surgical History:   Procedure Laterality Date   • AMPUTATION FOOT / TOE      left   • APPENDECTOMY      at age 8 or 9   • BIVENTRICULAR ASSIST DEVICE/LEFT VENTRICULAR ASSIST DEVICE INSERTION N/A 5/13/2020    Procedure: Left Ventricular Assist Device;  Surgeon: Juarez Wing MD;  Location: [facility] CATH INVASIVE LOCATION;  Service: Cardiovascular;  Laterality: N/A;   • CARDIAC CATHETERIZATION      3-; [facility] 9-   • CARDIAC CATHETERIZATION Right 6/27/2019    Procedure: Cardiac Catheterization/with grafts groin access;  Surgeon: Juarez Wing MD;  Location: [facility] CATH INVASIVE LOCATION;  Service: Cardiovascular   • CARDIAC CATHETERIZATION N/A 5/8/2020    Procedure: Left Heart Cath with grafts;  Surgeon: Juarez Wing MD;  Location: [facility] CATH INVASIVE LOCATION;  Service: Cardiovascular;  Laterality: N/A;   • CARDIAC CATHETERIZATION N/A 5/13/2020    Procedure: Percutaneous Coronary Intervention, Impella backup;  Surgeon: Juarez Wing MD;  Location: [facility] CATH INVASIVE LOCATION;  Service:  Cardiovascular;  Laterality: N/A;   • CARDIAC CATHETERIZATION N/A 6/1/2021    Procedure: Left Heart Cath, possible pci;  Surgeon: Juarez Wing MD;  Location: [facility] CATH INVASIVE LOCATION;  Service: Cardiovascular;  Laterality: N/A;   • CARDIAC ELECTROPHYSIOLOGY PROCEDURE N/A 10/8/2019    Procedure: ICD SC new, ST.SHIV ;  Surgeon: Juarez Wing MD;  Location: [facility] CATH INVASIVE LOCATION;  Service: Cardiovascular   • CARDIAC SURGERY  2013   • COLONOSCOPY     • CORONARY ARTERY BYPASS GRAFT      x3, 3-18-13-LIMA to LAD, and reverse individual SVG to lateral marginal and to PDA   • FEMORAL POPLITEAL BYPASS Left 01/09/2019    [facility]/ Dr. Jero Hatch   • HAND SURGERY Left     crushed hand   • PACEMAKER IMPLANTATION     • TOE SURGERY      toe nail removal of big toe- age 8 or 9      Allergies:  No Known Allergies  Home Meds:  Current Meds:     Current Outpatient Medications:   •  Accu-Chek Marley Plus test strip, , Disp: , Rfl:   •  albuterol (PROVENTIL) (2.5 MG/3ML) 0.083% nebulizer solution, Take 2.5 mg by nebulization Every 6 (Six) Hours As Needed for Wheezing or Shortness of Air., Disp: , Rfl:   •  aspirin 81 MG EC tablet, Take 81 mg by mouth Daily., Disp: , Rfl:   •  atorvastatin (LIPITOR) 40 MG tablet, Take 40 mg by mouth Daily., Disp: , Rfl:   •  carvedilol (COREG) 3.125 MG tablet, Take 1 tablet by mouth 2 (Two) Times a Day With Meals., Disp: 180 tablet, Rfl: 1  •  clopidogrel (PLAVIX) 75 MG tablet, Take 75 mg by mouth Daily., Disp: , Rfl:   •  dapagliflozin Propanediol (Farxiga) 10 MG tablet, Take 10 mg by mouth Daily., Disp: 30 tablet, Rfl: 3  •  fenofibrate (TRICOR) 145 MG tablet, Take 1 tablet by mouth once daily, Disp: 90 tablet, Rfl: 3  •  furosemide (LASIX) 40 MG tablet, Take 40 mg by mouth 2 (Two) Times a Day., Disp: , Rfl:   •  glimepiride (AMARYL) 4 MG tablet, Take 4 mg by mouth 2 (Two) Times a Day., Disp: , Rfl:   •  insulin glargine (LANTUS, SEMGLEE) 100 UNIT/ML injection,  Inject 40 Units under the skin into the appropriate area as directed Every Night., Disp: , Rfl:   •  isosorbide mononitrate (IMDUR) 30 MG 24 hr tablet, Take 30 mg by mouth Daily., Disp: , Rfl:   •  Lantus SoloStar 100 UNIT/ML injection pen, , Disp: , Rfl:   •  Magnesium Oxide 400 (240 Mg) MG tablet, Take 1 tablet by mouth 2 (Two) Times a Day., Disp: , Rfl:   •  nitroglycerin (NITROSTAT) 0.4 MG SL tablet, Place 0.4 mg under the tongue Every 5 (Five) Minutes As Needed for Chest Pain. Take no more than 3 doses in 15 minutes., Disp: , Rfl:   •  pantoprazole (PROTONIX) 40 MG EC tablet, Take 40 mg by mouth Daily., Disp: , Rfl:   •  spironolactone (ALDACTONE) 25 MG tablet, Take 25 mg by mouth Daily., Disp: , Rfl:   •  traMADol (ULTRAM) 50 MG tablet, TAKE 1 TABLET BY MOUTH 4 TIMES DAILY AS NEEDED FOR MODERATE PAIN (4-7), Disp: , Rfl:   Social History:   Social History     Tobacco Use   • Smoking status: Current Every Day Smoker     Packs/day: 1.50     Years: 25.00     Pack years: 37.50     Types: Cigarettes   • Smokeless tobacco: Never Used   Substance Use Topics   • Alcohol use: No      Family History:  Family History   Problem Relation Age of Onset   • Hypertension Mother    • Diabetes Mother    • Heart disease Father         had CABG and re-do/  while recovering-had MI age 40s   • Diabetes Father    • Pancreatic cancer Sister    • Hyperlipidemia Brother    • Stroke Brother    • Heart disease Paternal Uncle               Review of Systems   Constitutional: Positive for malaise/fatigue. Negative for fever.   Cardiovascular: Negative for chest pain, dyspnea on exertion and palpitations.   Respiratory: Negative for cough and shortness of breath.    Skin: Negative for rash.   Gastrointestinal: Negative for abdominal pain, nausea and vomiting.   Neurological: Negative for focal weakness and headaches.   All other systems reviewed and are negative.    All other systems are negative         Objective:     Physical Exam  BP  "114/74   Pulse 84   Ht 170.2 cm (67\")   Wt 85.7 kg (189 lb)   SpO2 97%   BMI 29.60 kg/m²   General:  Appears in no acute distress  Eyes: Sclera is anicteric,  conjunctiva is clear   HEENT:  No JVD.    Respiratory: Respirations regular and unlabored at rest. Bilateral anterior upper lobe expiratory wheezing noted   Cardiovascular: S1,S2 Regular rate and rhythm. No murmur, rub or gallop auscultated.   Extremities: No digital clubbing or cyanosis, trace edema bilateral lower extremity edema,  Non pitting   Skin: Color pink. Skin warm and dry to touch. No rashes  No xanthoma  Neuro: Alert and awake.    Lab Reviewed:         JOY Ashton  9/26/2022 13:27 EDT  Electronically signed by JOY Ashton, 09/26/22, 1:34 PM EDT.      EMR Dragon/Transcription:   \"Dictated utilizing Dragon dictation\".        "

## 2022-10-06 RX ORDER — DAPAGLIFLOZIN 10 MG/1
TABLET, FILM COATED ORAL
Qty: 90 TABLET | Refills: 0 | Status: SHIPPED | OUTPATIENT
Start: 2022-10-06

## 2022-10-06 NOTE — TELEPHONE ENCOUNTER
Rx Refill Note  Requested Prescriptions     Pending Prescriptions Disp Refills   • Farxiga 10 MG tablet [Pharmacy Med Name: Farxiga 10 MG Oral Tablet] 90 tablet 0     Sig: Take 1 tablet by mouth once daily      Last office visit with prescribing clinician: 9/21/2022      Next office visit with prescribing clinician: ailyn/esteban garvin 12/19/22            Lou Mendieta MA  10/06/22, 12:10 EDT

## 2022-10-24 ENCOUNTER — TELEPHONE (OUTPATIENT)
Dept: CARDIOLOGY | Facility: CLINIC | Age: 62
End: 2022-10-24

## 2022-12-19 ENCOUNTER — OFFICE VISIT (OUTPATIENT)
Dept: CARDIOLOGY | Facility: CLINIC | Age: 62
End: 2022-12-19

## 2022-12-19 ENCOUNTER — CLINICAL SUPPORT NO REQUIREMENTS (OUTPATIENT)
Dept: CARDIOLOGY | Facility: CLINIC | Age: 62
End: 2022-12-19

## 2022-12-19 VITALS
HEART RATE: 87 BPM | BODY MASS INDEX: 29.98 KG/M2 | HEIGHT: 67 IN | SYSTOLIC BLOOD PRESSURE: 111 MMHG | WEIGHT: 191 LBS | DIASTOLIC BLOOD PRESSURE: 74 MMHG | OXYGEN SATURATION: 95 %

## 2022-12-19 DIAGNOSIS — E78.5 DYSLIPIDEMIA: ICD-10-CM

## 2022-12-19 DIAGNOSIS — I25.708 CORONARY ARTERY DISEASE OF BYPASS GRAFT OF NATIVE HEART WITH STABLE ANGINA PECTORIS: Primary | ICD-10-CM

## 2022-12-19 DIAGNOSIS — I25.5 ISCHEMIC CARDIOMYOPATHY: Chronic | ICD-10-CM

## 2022-12-19 DIAGNOSIS — I50.22 CHRONIC SYSTOLIC CONGESTIVE HEART FAILURE: Primary | Chronic | ICD-10-CM

## 2022-12-19 DIAGNOSIS — I50.22 CHRONIC HFREF (HEART FAILURE WITH REDUCED EJECTION FRACTION): ICD-10-CM

## 2022-12-19 DIAGNOSIS — F17.200 TOBACCO USE DISORDER: ICD-10-CM

## 2022-12-19 DIAGNOSIS — I10 ESSENTIAL HYPERTENSION: ICD-10-CM

## 2022-12-19 DIAGNOSIS — I25.5 ISCHEMIC CARDIOMYOPATHY: ICD-10-CM

## 2022-12-19 DIAGNOSIS — Z95.810 PRESENCE OF AUTOMATIC CARDIOVERTER/DEFIBRILLATOR (AICD): Chronic | ICD-10-CM

## 2022-12-19 DIAGNOSIS — I73.9 PERIPHERAL VASCULAR DISEASE: ICD-10-CM

## 2022-12-19 PROCEDURE — 93000 ELECTROCARDIOGRAM COMPLETE: CPT | Performed by: INTERNAL MEDICINE

## 2022-12-19 PROCEDURE — 93282 PRGRMG EVAL IMPLANTABLE DFB: CPT | Performed by: INTERNAL MEDICINE

## 2022-12-19 PROCEDURE — 99214 OFFICE O/P EST MOD 30 MIN: CPT | Performed by: INTERNAL MEDICINE

## 2022-12-19 NOTE — PROGRESS NOTES
Subjective:     Encounter Date:12/19/2022      Patient ID: Bobby Steele Jr. is a 62 y.o. male.    Chief Complaint :Follow-up for HFrEF, ischemic cardiomyopathy, CAD, CABG, PCI, dyslipidemia, hypertension, diabetes, CKD.    History of Present Illness        Mr. Bobby Steele Jr. has PMH of       # NSTEMI  5/12/19, SHILPA to SVG to RCA and proximal LAD leading into a small diagonal, cath 5/11/2020 underwent SHILPA to SVG to RCA and native proximal LCx with Impella assistance.  Cath 6/1/2021 revealed patent LIMA to LAD, SVG to PDA, occluded SVG to diagonal, patent stent in left main, severe disease in tortuous segment in mid LCx and LAD going to diagonal, unchanged from previous cath.  # CAD status post CABG X3 V  # Ischemic cardiomyopathy with EF of 35%, status post ICD 10/8/2019  # COPD, continue tobacco abuse  # Hypertension   # Hyperlipidemia  # Diabetes with hemoglobin A1c of 10 on 5/2/2020  #.  CKD 3 and 4  #Gangrene of left foot  #Positive family for premature heart disease with father MI 53     Here for  follow-up.  Patient patient is complaining of chest pain and shortness of breath dyspnea on exertion relieved with rest.  Continues to smoke in spite of counseling.    Patient's arterial blood pressure is 111/74, heart rate 87, O2 sat of 95% on room air.    Patient was recently in the hospital, where he presented because of recurrent substernal chest pain.  Patient  underwent cardiac cath 6/1/2021 which revealed severe triple-vessel disease with patent LIMA to LAD, SVG to PDA, occluded SVG to diagonal, patent left main, severe disease in proximal LAD leading to diagonal, severe disease in mid LCx which is in a tortuous segment.      Labs from 12/27/2020 reveal cholesterol 111 triglycerides 124 HDL 40 LDL 49.  Labs from 5/31/2021 reveal hemoglobin A1c of 9.3.  Labs from 6/1/2021 and 6/2/2021 reveal proBNP 1481, CMP with a potassium of 3.4, BUN 27, creatinine 1.24, GFR 59. Labs from 7/28/2021 revealed BUN 34  creatinine 1.71 GFR 41, glucose 399.         ASSESSMENT:     #Chronic stable angina  #  chronic CHF, ischemic cardiomyopathy 35%, status post ICD 10/8/2019  # CAD status post CABG, PCI  # COPD, tobacco abuse   # hypertension ,  #Dyslipidemia  #Hyperglycemia, and type 2 diabetes  #  CKD  # PAD, toe gangrene     Recommendations:     Patient is complaining of chest pain on maximal medical management we will schedule a Lexiscan Cardiolite to guide therapy.  Patient is off Entresto and ACE inhibitor's due to CKD.  Follow-up with nephrology for CKD.  We will continue medical management with aspirin, atorvastatin, carvedilol, clopidogrel and Aldactone as tolerated to help with CHF, CAD, hypertension, dyslipidemia  Patient would benefit from diabetes control, A1c 8 5/31/2021 is elevated at 9.3.  Counseled on smoking cessation.  Patient underwent Lexiscan Cardiolite 6/1/2021 which is revealing high risk abnormal ischemia in lateral wall and inferolateral wall with depressed EF.  Patient underwent cardiac cath 6/1/2021 which revealed severe disease in mid LCx which is in the loop of a bend and heavily calcified high risk for complications therefore was not intervened upon.  Patient also has severe disease going to the diagonal.  Reviewed CHF care with patient.  Follow-up with PMD for diabetes.                ECG 12 Lead    Date/Time: 12/19/2022 3:24 PM  Performed by: Juarez Wing MD  Authorized by: Juarez Wing MD   Comparison: compared with previous ECG from 11/2/2021  Comparison to previous ECG: EKG done today reviewed/interpreted by me reveals sinus rhythm with rate of 80 bpm, no new change compared EKG from 11/2/2021              Copied text in this portion of the note has been reviewed and is accurate as of 12/19/2022  The following portions of the patient's history were reviewed and updated as appropriate: allergies, current medications, past family history, past medical history, past social  history, past surgical history and problem list.    Assessment:         [facility]     Diagnosis Plan   1. Coronary artery disease of bypass graft of native heart with stable angina pectoris ([facility])  Stress Test With Myocardial Perfusion One Day      2. Chronic HFrEF (heart failure with reduced ejection fraction) ([facility])  Stress Test With Myocardial Perfusion One Day      3. Ischemic cardiomyopathy  Stress Test With Myocardial Perfusion One Day      4. Peripheral vascular disease ([facility])  Stress Test With Myocardial Perfusion One Day      5. Tobacco use disorder  Stress Test With Myocardial Perfusion One Day      6. Essential hypertension  Stress Test With Myocardial Perfusion One Day      7. Dyslipidemia  Stress Test With Myocardial Perfusion One Day             Plan:               Past Medical History:  Past Medical History:   Diagnosis Date   • CAD (coronary artery disease)     S/P CABG   • Chest pain 05/31/2021   • Chronic systolic CHF (congestive heart failure) ([facility])    • COPD (chronic obstructive pulmonary disease) ([facility])    • DM2 (diabetes mellitus, type 2) ([facility])    • Dyslipidemia    • Encounter for monitoring antiplatelet therapy    • Hypertension    • Myocardial infarction ([facility])     inferior wall MI--03/13   • DEBI (obstructive sleep apnea)     noncompliant with CPAP   • Peripheral vascular disease ([facility])     S/P left fem-pop bypass   • Tobacco use      Past Surgical History:  Past Surgical History:   Procedure Laterality Date   • AMPUTATION FOOT / TOE      left   • APPENDECTOMY      at age 8 or 9   • BIVENTRICULAR ASSIST DEVICE/LEFT VENTRICULAR ASSIST DEVICE INSERTION N/A 5/13/2020    Procedure: Left Ventricular Assist Device;  Surgeon: Juarez Wing MD;  Location: [facility] CATH INVASIVE LOCATION;  Service: Cardiovascular;  Laterality: N/A;   • CARDIAC CATHETERIZATION      3-; [facility] 9-   • CARDIAC CATHETERIZATION Right 6/27/2019    Procedure: Cardiac Catheterization/with grafts groin access;   Surgeon: Juarez Wing MD;  Location: [facility] CATH INVASIVE LOCATION;  Service: Cardiovascular   • CARDIAC CATHETERIZATION N/A 5/8/2020    Procedure: Left Heart Cath with grafts;  Surgeon: Juarez Wing MD;  Location: [facility] CATH INVASIVE LOCATION;  Service: Cardiovascular;  Laterality: N/A;   • CARDIAC CATHETERIZATION N/A 5/13/2020    Procedure: Percutaneous Coronary Intervention, Impella backup;  Surgeon: Juarez Wing MD;  Location: [facility] CATH INVASIVE LOCATION;  Service: Cardiovascular;  Laterality: N/A;   • CARDIAC CATHETERIZATION N/A 6/1/2021    Procedure: Left Heart Cath, possible pci;  Surgeon: Juarez Wing MD;  Location: [facility] CATH INVASIVE LOCATION;  Service: Cardiovascular;  Laterality: N/A;   • CARDIAC ELECTROPHYSIOLOGY PROCEDURE N/A 10/8/2019    Procedure: ICD SC new, ST.SHIV ;  Surgeon: Juarez Wing MD;  Location: [facility] CATH INVASIVE LOCATION;  Service: Cardiovascular   • CARDIAC SURGERY  2013   • COLONOSCOPY     • CORONARY ARTERY BYPASS GRAFT      x3, 3-18-13-LIMA to LAD, and reverse individual SVG to lateral marginal and to PDA   • FEMORAL POPLITEAL BYPASS Left 01/09/2019    [facility]/ Dr. Jero Hatch   • HAND SURGERY Left     crushed hand   • PACEMAKER IMPLANTATION     • TOE SURGERY      toe nail removal of big toe- age 8 or 9      Allergies:  No Known Allergies  Home Meds:  Current Meds:     Current Outpatient Medications:   •  Accu-Chek Marley Plus test strip, , Disp: , Rfl:   •  albuterol (PROVENTIL) (2.5 MG/3ML) 0.083% nebulizer solution, Take 2.5 mg by nebulization Every 6 (Six) Hours As Needed for Wheezing or Shortness of Air., Disp: , Rfl:   •  aspirin 81 MG EC tablet, Take 81 mg by mouth Daily., Disp: , Rfl:   •  atorvastatin (LIPITOR) 40 MG tablet, Take 40 mg by mouth Daily., Disp: , Rfl:   •  carvedilol (COREG) 3.125 MG tablet, Take 1 tablet by mouth 2 (Two) Times a Day With Meals., Disp: 180 tablet, Rfl: 1  •  clopidogrel  (PLAVIX) 75 MG tablet, Take 75 mg by mouth Daily., Disp: , Rfl:   •  Farxiga 10 MG tablet, Take 1 tablet by mouth once daily, Disp: 90 tablet, Rfl: 0  •  fenofibrate (TRICOR) 145 MG tablet, Take 1 tablet by mouth once daily, Disp: 90 tablet, Rfl: 3  •  furosemide (LASIX) 40 MG tablet, Take 40 mg by mouth 2 (Two) Times a Day., Disp: , Rfl:   •  glimepiride (AMARYL) 4 MG tablet, Take 4 mg by mouth 2 (Two) Times a Day., Disp: , Rfl:   •  insulin glargine (LANTUS, SEMGLEE) 100 UNIT/ML injection, Inject 40 Units under the skin into the appropriate area as directed Every Night., Disp: , Rfl:   •  isosorbide mononitrate (IMDUR) 30 MG 24 hr tablet, Take 30 mg by mouth Daily., Disp: , Rfl:   •  Lantus SoloStar 100 UNIT/ML injection pen, , Disp: , Rfl:   •  Magnesium Oxide 400 (240 Mg) MG tablet, Take 1 tablet by mouth 2 (Two) Times a Day., Disp: , Rfl:   •  nitroglycerin (NITROSTAT) 0.4 MG SL tablet, Place 0.4 mg under the tongue Every 5 (Five) Minutes As Needed for Chest Pain. Take no more than 3 doses in 15 minutes., Disp: , Rfl:   •  pantoprazole (PROTONIX) 40 MG EC tablet, Take 40 mg by mouth Daily., Disp: , Rfl:   •  spironolactone (ALDACTONE) 25 MG tablet, Take 25 mg by mouth Daily., Disp: , Rfl:   •  traMADol (ULTRAM) 50 MG tablet, TAKE 1 TABLET BY MOUTH 4 TIMES DAILY AS NEEDED FOR MODERATE PAIN (4-7), Disp: , Rfl:   Social History:   Social History     Tobacco Use   • Smoking status: Every Day     Packs/day: 1.50     Years: 25.00     Pack years: 37.50     Types: Cigarettes   • Smokeless tobacco: Never   Substance Use Topics   • Alcohol use: No      Family History:  Family History   Problem Relation Age of Onset   • Hypertension Mother    • Diabetes Mother    • Heart disease Father         had CABG and re-do/  while recovering-had MI age 40s   • Diabetes Father    • Pancreatic cancer Sister    • Hyperlipidemia Brother    • Stroke Brother    • Heart disease Paternal Uncle               Review of Systems  "  Constitutional: Negative for malaise/fatigue.   Cardiovascular: Positive for chest pain. Negative for leg swelling and palpitations.   Respiratory: Positive for shortness of breath.    Skin: Negative for rash.   Neurological: Negative for dizziness, light-headedness and numbness.     All other systems are negative         Objective:     Physical Exam  /74   Pulse 87   Ht 170.2 cm (67\")   Wt 86.6 kg (191 lb)   SpO2 95%   BMI 29.91 kg/m²   General:  Appears in no acute distress  Eyes: Sclera is anicteric,  conjunctiva is clear   HEENT:  No JVD.  No carotid bruits  Respiratory: Respirations regular and unlabored at rest.  Clear to auscultation  Cardiovascular: S1,S2 Regular rate and rhythm. No murmur, rub or gallop auscultated.   Extremities: No digital clubbing or cyanosis, no edema  Skin: Color pink. Skin warm and dry to touch. No rashes  No xanthoma  Neuro: Alert and awake.    Lab Reviewed:         Juarez Wing MD  12/19/2022 15:31 EST      [facility] Dragon/Transcription:   \"Dictated utilizing Dragon dictation\".        "

## 2023-01-18 PROCEDURE — 93295 DEV INTERROG REMOTE 1/2/MLT: CPT | Performed by: INTERNAL MEDICINE

## 2023-01-18 PROCEDURE — 93296 REM INTERROG EVL PM/IDS: CPT | Performed by: INTERNAL MEDICINE

## 2023-01-20 ENCOUNTER — TELEPHONE (OUTPATIENT)
Dept: CARDIOLOGY | Facility: CLINIC | Age: 63
End: 2023-01-20
Payer: MEDICARE

## 2023-01-20 NOTE — TELEPHONE ENCOUNTER
DR. ELIZABETH BOBO  LEFT BELOW KNEE AMPUTATION  SURGERY TBD  PHONE [phone]  [phone]    PLACED ON DR. LUCIA DESIVAN.

## 2023-01-23 ENCOUNTER — OFFICE VISIT (OUTPATIENT)
Dept: CARDIOLOGY | Facility: CLINIC | Age: 63
End: 2023-01-23
Payer: MEDICARE

## 2023-01-23 VITALS
SYSTOLIC BLOOD PRESSURE: 121 MMHG | HEART RATE: 85 BPM | DIASTOLIC BLOOD PRESSURE: 75 MMHG | OXYGEN SATURATION: 97 % | BODY MASS INDEX: 29.19 KG/M2 | WEIGHT: 186 LBS | HEIGHT: 67 IN

## 2023-01-23 DIAGNOSIS — Z01.810 PREOPERATIVE CARDIOVASCULAR EXAMINATION: Primary | ICD-10-CM

## 2023-01-23 DIAGNOSIS — I96 GANGRENOUS TOE: ICD-10-CM

## 2023-01-23 DIAGNOSIS — I50.22 CHRONIC HFREF (HEART FAILURE WITH REDUCED EJECTION FRACTION): ICD-10-CM

## 2023-01-23 DIAGNOSIS — I25.5 ISCHEMIC CARDIOMYOPATHY: ICD-10-CM

## 2023-01-23 DIAGNOSIS — E78.5 DYSLIPIDEMIA: ICD-10-CM

## 2023-01-23 DIAGNOSIS — I73.9 PAD (PERIPHERAL ARTERY DISEASE): ICD-10-CM

## 2023-01-23 DIAGNOSIS — F17.200 TOBACCO USE DISORDER: ICD-10-CM

## 2023-01-23 DIAGNOSIS — Z95.810 PRESENCE OF AUTOMATIC CARDIOVERTER/DEFIBRILLATOR (AICD): ICD-10-CM

## 2023-01-23 DIAGNOSIS — I25.708 CORONARY ARTERY DISEASE OF BYPASS GRAFT OF NATIVE HEART WITH STABLE ANGINA PECTORIS: ICD-10-CM

## 2023-01-23 PROCEDURE — 99214 OFFICE O/P EST MOD 30 MIN: CPT | Performed by: INTERNAL MEDICINE

## 2023-01-23 RX ORDER — SULFAMETHOXAZOLE AND TRIMETHOPRIM 800; 160 MG/1; MG/1
TABLET ORAL
COMMUNITY
Start: 2023-01-12

## 2023-01-23 RX ORDER — HYDROCODONE BITARTRATE AND ACETAMINOPHEN 5; 325 MG/1; MG/1
TABLET ORAL
COMMUNITY
Start: 2023-01-10

## 2023-01-23 NOTE — PROGRESS NOTES
Subjective:     Encounter Date:01/23/2023      Patient ID: Bobby Steele Jr. is a 62 y.o. male.    Chief Complaint :Follow-up for HFrEF, ischemic cardiomyopathy, CAD, CABG, PCI, dyslipidemia, hypertension, diabetes, CKD.      History of Present Illness        Mr. Bobby Steele Jr. has PMH of       # NSTEMI  5/12/19, SHILPA to SVG to RCA and proximal LAD leading into a small diagonal, cath 5/11/2020 underwent SHILPA to SVG to RCA and native proximal LCx with Impella assistance.  Cath 6/1/2021 revealed patent LIMA to LAD, SVG to PDA, occluded SVG to diagonal, patent stent in left main, severe disease in tortuous segment in mid LCx and LAD going to diagonal, unchanged from previous cath.  # CAD status post CABG X3 V  #Chronic HFrEF due to systolic dysfunction from ischemic cardiomyopathy with EF of 35%, status post ICD 10/8/2019  # COPD, continue tobacco abuse  # Hypertension   # Hyperlipidemia  # Diabetes with hemoglobin A1c of 10 on 5/2/2020  #.  CKD 3 and 4  #Gangrene of left foot  #Positive family for premature heart disease with father MI 53     Here for  follow-up.  Patient is complaining of dyspnea on exertion.  Denies any chest pain.  Is preop for amputation due to gangrene and PAD.    Continues to smoke in spite of counseling.  Activity level is limited due to foot pain.    Patient's arterial blood pressure is 121/74, heart rate 85, O2 sat of 97% on room air.    Patient was recently in the hospital, where he presented because of recurrent substernal chest pain.  Patient  underwent cardiac cath 6/1/2021 which revealed severe triple-vessel disease with patent LIMA to LAD, SVG to PDA, occluded SVG to diagonal, patent left main, severe disease in proximal LAD leading to diagonal, severe disease in mid LCx which is in a tortuous segment.      Labs from 12/27/2020 reveal cholesterol 111 triglycerides 124 HDL 40 LDL 49.  Labs from 5/31/2021 reveal hemoglobin A1c of 9.3.  Labs from 6/1/2021 and 6/2/2021 reveal proBNP  1481, CMP with a potassium of 3.4, BUN 27, creatinine 1.24, GFR 59. Labs from 7/28/2021 revealed BUN 34 creatinine 1.71 GFR 41, glucose 399.  Labs from 6/3/2022 reveal BMP with a creatinine 1.49, GFR 48, NT proBNP elevated at 707         ASSESSMENT:     #Preoperative cardiovascular evaluation for PAD/amputation  #Chronic stable angina  #  chronic CHF, ischemic cardiomyopathy 35%, status post ICD 10/8/2019  # CAD status post CABG, PCI  # COPD, tobacco abuse   # hypertension ,  #Dyslipidemia  #Hyperglycemia, and type 2 diabetes  #  CKD  # PAD, toe gangrene     Recommendations:     Reviewed EKG results with patient next line we will schedule for stress test patient cannot walk due to.  We will do Lexiscan Cardiolite.  We will follow-up after test and will stratify patient for PAD surgery.  Patient is holding his Plavix for PAD surgery at the current time  Patient was put on Eliquis for PAD  Patient is off Entresto and ACE inhibitor's due to CKD.  Follow-up with nephrology for CKD.  We will continue medical management with aspirin, atorvastatin, carvedilol, clopidogrel and Aldactone as tolerated to help with CHF, CAD, hypertension, dyslipidemia.  Since patient is on Eliquis his aspirin has been stopped.  His Plavix is on hold currently for PAD surgery possible amputation.  Patient would benefit from diabetes control, A1c 8 5/31/2021 is elevated at 9.3.  A1c 4/7/2022 was 11.3  Counseled on smoking cessation.  Patient underwent Lexiscan Cardiolite 6/1/2021 which is revealing high risk abnormal ischemia in lateral wall and inferolateral wall with depressed EF.  Patient underwent cardiac cath 6/1/2021 which revealed severe disease in mid LCx which is in the loop of a bend and heavily calcified high risk for complications therefore was not intervened upon.  Patient also has severe disease going to the diagonal.  Reviewed CHF care with patient.  Follow-up with PMD for diabetes.        Procedures     EKG done 12/19/2022  reviewed/interpreted by me reveals sinus rhythm with rate of 80 beats per    Stress myoview 4/8/2022  1.abnormal perfusion, large inferior wall defect consistent with scar and MI, small to moderate reversible lateral defect consistent with ischemia  2.  Severe LV dysfunction.  LVEF of 27%.     Echocardiogram 4/7/2022  LVE with severe global left ventricular hypokinesis, estimated LV ejection fraction of 30%  Normal RV size.  Pacer lead seen.  Moderate left atrial enlargement  Aortic valve, mitral valve, tricuspid valve appears structurally normal, he is to mild mitral and tricuspid regurgitation seen.  Calculated RV systolic pressure of 21 mmHg  No pericardial effusion seen.  Proximal aorta appears normal in size.     cardiac cath 6/1/2021 which revealed severe triple-vessel disease with patent LIMA to LAD, SVG to PDA, occluded SVG to diagonal, patent left main, severe disease in proximal LAD leading to diagonal, severe disease in mid LCx which is in a tortuous segment.   Copied text in this portion of the note has been reviewed and is accurate as of 1/23/2023  The following portions of the patient's history were reviewed and updated as appropriate: allergies, current medications, past family history, past medical history, past social history, past surgical history and problem list.    Assessment:         [facility]     Diagnosis Plan   1. Preoperative cardiovascular examination  Stress Test With Myocardial Perfusion One Day      2. Gangrenous toe ([facility])  Stress Test With Myocardial Perfusion One Day      3. PAD (peripheral artery disease) ([facility])  Stress Test With Myocardial Perfusion One Day      4. Chronic HFrEF (heart failure with reduced ejection fraction) ([facility])  Stress Test With Myocardial Perfusion One Day      5. Ischemic cardiomyopathy  Stress Test With Myocardial Perfusion One Day      6. Coronary artery disease of bypass graft of native heart with stable angina pectoris ([facility])  Stress Test With Myocardial Perfusion  One Day      7. Dyslipidemia  Stress Test With Myocardial Perfusion One Day      8. Tobacco use disorder  Stress Test With Myocardial Perfusion One Day      9. Presence of automatic cardioverter/defibrillator (AICD)  Stress Test With Myocardial Perfusion One Day             Plan:               Past Medical History:  Past Medical History:   Diagnosis Date   • CAD (coronary artery disease)     S/P CABG   • Chest pain 05/31/2021   • Chronic systolic CHF (congestive heart failure) ([facility])    • COPD (chronic obstructive pulmonary disease) ([facility])    • DM2 (diabetes mellitus, type 2) ([facility])    • Dyslipidemia    • Encounter for monitoring antiplatelet therapy    • Hypertension    • Myocardial infarction ([facility])     inferior wall MI--03/13   • DEBI (obstructive sleep apnea)     noncompliant with CPAP   • Peripheral vascular disease ([facility])     S/P left fem-pop bypass   • Tobacco use      Past Surgical History:  Past Surgical History:   Procedure Laterality Date   • AMPUTATION FOOT / TOE      left   • APPENDECTOMY      at age 8 or 9   • BIVENTRICULAR ASSIST DEVICE/LEFT VENTRICULAR ASSIST DEVICE INSERTION N/A 5/13/2020    Procedure: Left Ventricular Assist Device;  Surgeon: Juarez Wing MD;  Location: [facility] CATH INVASIVE LOCATION;  Service: Cardiovascular;  Laterality: N/A;   • CARDIAC CATHETERIZATION      3-; [facility] 9-   • CARDIAC CATHETERIZATION Right 6/27/2019    Procedure: Cardiac Catheterization/with grafts groin access;  Surgeon: Juarez Wing MD;  Location:  [facility] CATH INVASIVE LOCATION;  Service: Cardiovascular   • CARDIAC CATHETERIZATION N/A 5/8/2020    Procedure: Left Heart Cath with grafts;  Surgeon: Juarez Wing MD;  Location:  [facility] CATH INVASIVE LOCATION;  Service: Cardiovascular;  Laterality: N/A;   • CARDIAC CATHETERIZATION N/A 5/13/2020    Procedure: Percutaneous Coronary Intervention, Impella backup;  Surgeon: Juarez Wing MD;  Location: [facility] CATH  INVASIVE LOCATION;  Service: Cardiovascular;  Laterality: N/A;   • CARDIAC CATHETERIZATION N/A 6/1/2021    Procedure: Left Heart Cath, possible pci;  Surgeon: Juarez Wing MD;  Location: [facility] CATH INVASIVE LOCATION;  Service: Cardiovascular;  Laterality: N/A;   • CARDIAC ELECTROPHYSIOLOGY PROCEDURE N/A 10/8/2019    Procedure: ICD SC new, ST.SHIV ;  Surgeon: Juarez Wing MD;  Location: [facility] CATH INVASIVE LOCATION;  Service: Cardiovascular   • CARDIAC SURGERY  2013   • COLONOSCOPY     • CORONARY ARTERY BYPASS GRAFT      x3, 3-18-13-LIMA to LAD, and reverse individual SVG to lateral marginal and to PDA   • FEMORAL POPLITEAL BYPASS Left 01/09/2019    [facility]/ Dr. Jero Hatch   • HAND SURGERY Left     crushed hand   • PACEMAKER IMPLANTATION     • TOE SURGERY      toe nail removal of big toe- age 8 or 9      Allergies:  No Known Allergies  Home Meds:  Current Meds:     Current Outpatient Medications:   •  albuterol (PROVENTIL) (2.5 MG/3ML) 0.083% nebulizer solution, Take 2.5 mg by nebulization Every 6 (Six) Hours As Needed for Wheezing or Shortness of Air., Disp: , Rfl:   •  apixaban (ELIQUIS) 5 MG tablet tablet, Take 5 mg by mouth 2 (Two) Times a Day., Disp: , Rfl:   •  atorvastatin (LIPITOR) 40 MG tablet, Take 40 mg by mouth Daily., Disp: , Rfl:   •  carvedilol (COREG) 3.125 MG tablet, Take 1 tablet by mouth 2 (Two) Times a Day With Meals., Disp: 180 tablet, Rfl: 1  •  Farxiga 10 MG tablet, Take 1 tablet by mouth once daily, Disp: 90 tablet, Rfl: 0  •  fenofibrate (TRICOR) 145 MG tablet, Take 1 tablet by mouth once daily, Disp: 90 tablet, Rfl: 3  •  furosemide (LASIX) 40 MG tablet, Take 40 mg by mouth 2 (Two) Times a Day., Disp: , Rfl:   •  glimepiride (AMARYL) 4 MG tablet, Take 4 mg by mouth 2 (Two) Times a Day., Disp: , Rfl:   •  insulin glargine (LANTUS, SEMGLEE) 100 UNIT/ML injection, Inject 40 Units under the skin into the appropriate area as directed Every Night., Disp: , Rfl:   •   Magnesium Oxide 400 (240 Mg) MG tablet, Take 1 tablet by mouth 2 (Two) Times a Day., Disp: , Rfl:   •  pantoprazole (PROTONIX) 40 MG EC tablet, Take 40 mg by mouth Daily., Disp: , Rfl:   •  Accu-Chek Marley Plus test strip, , Disp: , Rfl:   •  clopidogrel (PLAVIX) 75 MG tablet, Take 75 mg by mouth Daily., Disp: , Rfl:   •  HYDROcodone-acetaminophen (NORCO) 5-325 MG per tablet, , Disp: , Rfl:   •  isosorbide mononitrate (IMDUR) 30 MG 24 hr tablet, Take 30 mg by mouth Daily., Disp: , Rfl:   •  Lantus SoloStar 100 UNIT/ML injection pen, , Disp: , Rfl:   •  nitroglycerin (NITROSTAT) 0.4 MG SL tablet, Place 0.4 mg under the tongue Every 5 (Five) Minutes As Needed for Chest Pain. Take no more than 3 doses in 15 minutes., Disp: , Rfl:   •  spironolactone (ALDACTONE) 25 MG tablet, Take 25 mg by mouth Daily., Disp: , Rfl:   •  sulfamethoxazole-trimethoprim (BACTRIM DS,SEPTRA DS) 800-160 MG per tablet, , Disp: , Rfl:   •  traMADol (ULTRAM) 50 MG tablet, TAKE 1 TABLET BY MOUTH 4 TIMES DAILY AS NEEDED FOR MODERATE PAIN (4-7), Disp: , Rfl:   Social History:   Social History     Tobacco Use   • Smoking status: Every Day     Packs/day: 1.50     Years: 25.00     Pack years: 37.50     Types: Cigarettes   • Smokeless tobacco: Never   Substance Use Topics   • Alcohol use: No      Family History:  Family History   Problem Relation Age of Onset   • Hypertension Mother    • Diabetes Mother    • Heart disease Father         had CABG and re-do/  while recovering-had MI age 40s   • Diabetes Father    • Pancreatic cancer Sister    • Hyperlipidemia Brother    • Stroke Brother    • Heart disease Paternal Uncle               Review of Systems   Constitutional: Negative for malaise/fatigue.   Cardiovascular: Negative for chest pain, leg swelling and palpitations.   Respiratory: Positive for shortness of breath.    Skin: Negative for rash.   Neurological: Negative for dizziness, light-headedness and numbness.     All other systems are  "negative         Objective:     Physical Exam  /75   Pulse 85   Ht 170.2 cm (67\")   Wt 84.4 kg (186 lb)   SpO2 97%   BMI 29.13 kg/m²   General:  Appears in no acute distress  Eyes: Sclera is anicteric,  conjunctiva is clear   HEENT:  No JVD.  No carotid bruits  Respiratory: Respirations regular and unlabored at rest.  Clear to auscultation  Cardiovascular: S1,S2 Regular rate and rhythm. No murmur, rub or gallop auscultated.   Extremities: Toe gangrene seen  Skin: Black discoloration of toes seen.  Neuro: Alert and awake.    Lab Reviewed:         Juarez Wing MD  1/23/2023 12:46 EST      EMR Dragon/Transcription:   \"Dictated utilizing Dragon dictation\".        "

## 2023-01-25 ENCOUNTER — OFFICE VISIT (OUTPATIENT)
Dept: CARDIOLOGY | Facility: CLINIC | Age: 63
End: 2023-01-25
Payer: MEDICARE

## 2023-01-25 ENCOUNTER — HOSPITAL ENCOUNTER (OUTPATIENT)
Dept: CARDIOLOGY | Facility: HOSPITAL | Age: 63
Discharge: HOME | End: 2023-01-25
Payer: MEDICARE

## 2023-01-25 VITALS
HEIGHT: 67 IN | HEART RATE: 60 BPM | SYSTOLIC BLOOD PRESSURE: 110 MMHG | DIASTOLIC BLOOD PRESSURE: 70 MMHG | WEIGHT: 186 LBS | BODY MASS INDEX: 29.19 KG/M2

## 2023-01-25 DIAGNOSIS — I50.22 CHRONIC HFREF (HEART FAILURE WITH REDUCED EJECTION FRACTION): ICD-10-CM

## 2023-01-25 DIAGNOSIS — E78.5 DYSLIPIDEMIA: ICD-10-CM

## 2023-01-25 DIAGNOSIS — I25.708 CORONARY ARTERY DISEASE OF BYPASS GRAFT OF NATIVE HEART WITH STABLE ANGINA PECTORIS: ICD-10-CM

## 2023-01-25 DIAGNOSIS — Z01.810 PREOPERATIVE CARDIOVASCULAR EXAMINATION: Primary | ICD-10-CM

## 2023-01-25 DIAGNOSIS — I73.9 PERIPHERAL VASCULAR DISEASE: ICD-10-CM

## 2023-01-25 DIAGNOSIS — I10 ESSENTIAL HYPERTENSION: ICD-10-CM

## 2023-01-25 DIAGNOSIS — Z95.810 PRESENCE OF AUTOMATIC CARDIOVERTER/DEFIBRILLATOR (AICD): ICD-10-CM

## 2023-01-25 DIAGNOSIS — I96 GANGRENOUS TOE: ICD-10-CM

## 2023-01-25 DIAGNOSIS — F17.200 TOBACCO USE DISORDER: ICD-10-CM

## 2023-01-25 DIAGNOSIS — I25.5 ISCHEMIC CARDIOMYOPATHY: ICD-10-CM

## 2023-01-25 LAB
BH CV REST NUCLEAR ISOTOPE DOSE: 10.2 MCI
BH CV STRESS BP STAGE 1: NORMAL
BH CV STRESS COMMENTS STAGE 1: NORMAL
BH CV STRESS DOSE REGADENOSON STAGE 1: 0.4
BH CV STRESS DURATION MIN STAGE 1: 0
BH CV STRESS DURATION SEC STAGE 1: 10
BH CV STRESS HR STAGE 1: 76
BH CV STRESS NUCLEAR ISOTOPE DOSE: 32.1 MCI
BH CV STRESS PROTOCOL 1: NORMAL
BH CV STRESS RECOVERY BP: NORMAL MMHG
BH CV STRESS RECOVERY HR: 85 BPM
BH CV STRESS STAGE 1: 1
MAXIMAL PREDICTED HEART RATE: 158 BPM
STRESS BASELINE BP: NORMAL MMHG
STRESS BASELINE HR: 76 BPM
STRESS TARGET HR: 134 BPM

## 2023-01-25 PROCEDURE — 93018 CV STRESS TEST I&R ONLY: CPT | Performed by: INTERNAL MEDICINE

## 2023-01-25 PROCEDURE — 78452 HT MUSCLE IMAGE SPECT MULT: CPT

## 2023-01-25 PROCEDURE — 25010000002 REGADENOSON 0.4 MG/5ML SOLUTION: Performed by: INTERNAL MEDICINE

## 2023-01-25 PROCEDURE — 93016 CV STRESS TEST SUPVJ ONLY: CPT

## 2023-01-25 PROCEDURE — 99214 OFFICE O/P EST MOD 30 MIN: CPT | Performed by: INTERNAL MEDICINE

## 2023-01-25 PROCEDURE — 93017 CV STRESS TEST TRACING ONLY: CPT

## 2023-01-25 PROCEDURE — 78452 HT MUSCLE IMAGE SPECT MULT: CPT | Performed by: INTERNAL MEDICINE

## 2023-01-25 PROCEDURE — A9500 TC99M SESTAMIBI: HCPCS | Performed by: INTERNAL MEDICINE

## 2023-01-25 PROCEDURE — 0 TECHNETIUM SESTAMIBI: Performed by: INTERNAL MEDICINE

## 2023-01-25 RX ADMIN — REGADENOSON 0.4 MG: 0.08 INJECTION, SOLUTION INTRAVENOUS at 10:27

## 2023-01-25 RX ADMIN — TECHNETIUM TC 99M SESTAMIBI 1 DOSE: 1 INJECTION INTRAVENOUS at 08:14

## 2023-01-25 RX ADMIN — TECHNETIUM TC 99M SESTAMIBI 1 DOSE: 1 INJECTION INTRAVENOUS at 10:27

## 2023-02-03 ENCOUNTER — TELEPHONE (OUTPATIENT)
Dept: CARDIOLOGY | Facility: CLINIC | Age: 63
End: 2023-02-03
Payer: MEDICARE

## 2023-02-03 NOTE — TELEPHONE ENCOUNTER
Hub staff attempted to follow warm transfer process and was unsuccessful     Caller: SLOAN FROM PRE ADMISSION TESTING AT [facility].       Patient is needing: PATIENT IS SCHEDULED FOR SURGERY ON 2/7/23, WE SAW THE PATIENT ON 1/23 PRIOR TO HIS STRESS TEST ON 1/25 AND FAXED THE CLEARANCE FORM THEY ARE NEEDING AN UPDATED CLEARANCE FOR AFTER THE STRESS TEST ON 1/25 SINCE IT WAS ABN IN ORDER TO PROCEED WITH THE PATIENT'S SCHEDULED SURGERY.     PLEASE FAX BACK UPDATED CLEARANCE FORM REGARDING THE ABN STRESS TEST AS SOON AS POSSIBLE [phone].      THANK YOU.

## 2023-02-06 ENCOUNTER — TELEPHONE (OUTPATIENT)
Dept: CARDIOLOGY | Facility: CLINIC | Age: 63
End: 2023-02-06
Payer: MEDICARE

## 2023-02-06 NOTE — TELEPHONE ENCOUNTER
This is a dup phone note. Placed on Dr. Maciej bolton and called Irvin and ion waiting for him to come into office to get it signed off on. She verbally understood.

## 2023-02-06 NOTE — TELEPHONE ENCOUNTER
Caller: SLOAN CHAMPAGNE    Relationship: [facility]    Best call back number: [phone]  FAX: [phone]        CARDIAC CLEARANCE WAS OBTAINED 1.20.23, BUT HAD AN ABNORMAL STRESS TEST ON 1.25.23. PATIENT IS SET TO HAVE HIS LEG AMPUTATED 2.7.23 AND THEY NEED THE CLEARANCE ASAP.

## 2023-02-06 NOTE — TELEPHONE ENCOUNTER
It has been placed on Dr. Wing desk. Called and spoke lisa Bryan and ion that clearance is on his desk and will be in office this afternoon and will get it signed and faxed back as soon as we can. She verbally understood.

## 2023-04-07 DIAGNOSIS — E78.2 MIXED HYPERLIPIDEMIA: ICD-10-CM

## 2023-04-07 RX ORDER — FENOFIBRATE 145 MG/1
TABLET, COATED ORAL
Qty: 90 TABLET | Refills: 0 | Status: SHIPPED | OUTPATIENT
Start: 2023-04-07

## 2023-04-07 NOTE — TELEPHONE ENCOUNTER
Rx Refill Note  Requested Prescriptions     Pending Prescriptions Disp Refills   • fenofibrate (TRICOR) 145 MG tablet [Pharmacy Med Name: Fenofibrate 145 MG Oral Tablet] 90 tablet 0     Sig: Take 1 tablet by mouth once daily      Last office visit with prescribing clinician: 1/25/2023   Last telemedicine visit with prescribing clinician: Visit date not found   Next office visit with prescribing clinician: 7/3/2023                         Would you like a call back once the refill request has been completed: [] Yes [] No    If the office needs to give you a call back, can they leave a voicemail: [] Yes [] No    Alysha Ramirez MA  04/07/23, 13:19 EDT

## 2023-04-19 PROCEDURE — 93295 DEV INTERROG REMOTE 1/2/MLT: CPT | Performed by: INTERNAL MEDICINE

## 2023-04-19 PROCEDURE — 93296 REM INTERROG EVL PM/IDS: CPT | Performed by: INTERNAL MEDICINE

## 2023-10-10 NOTE — H&P
[facility] Medicine Services      Patient Name: Bobby Steele Jr.  : 1960  MRN: [id number]  Primary Care Physician: Yamile Agarwal  Date of admission: 2020    Patient Care Team:  Yamile Agarwal as PCP - General  Yamile Agarwal as PCP - Family Medicine  Yamile Agarwal as PCP - Claims Attributed  Juarez Wing MD as Consulting Physician (Cardiology)          Subjective   History Present Illness     Chief Complaint:   Chief Complaint   Patient presents with   • Shortness of Breath     This is a 59-year-old  male with a history of ischemic cardiomyopathy status post AICD, CAD s/p CABG, DM, HTN, HLD, PVD s/p left LE femoral bypass, COPD, and continued tobacco dependence.  The patient claims to have had dry cough and shortness of breath for about a week and had presented to the ED at [facility] about a week ago and was placed on prednisone.  In the morning of 2020, the patient claims to have woken up with shortness of air that was refractory to his bronchodilators therefore went to [facility] ED.  The patient left AGAINST MEDICAL ADVICE from the ED and presented at [facility] ED after being directed there by his cardiologist.  He admits dry cough that is worse with laying supine and better when he sleeps on his side or sits up.  In addition, the patient did feel left-sided chest pressure intermittently with episodes of coughing.  Last C was in 2019.  The patient denies recent fevers, chills, sweats, nausea, vomiting or abdominal pain.    Review of Systems   All other systems reviewed and are negative.          Personal History     Past Medical History:   Past Medical History:   Diagnosis Date   • CAD (coronary artery disease)     S/P CABG   • Chronic systolic CHF (congestive heart failure) (CMS/[facility])    • COPD (chronic obstructive pulmonary disease) (CMS/[facility])    • DM2 (diabetes mellitus, type 2) (CMS/[facility])    • Dyslipidemia    •  Encounter for monitoring antiplatelet therapy    • Hypertension    • Myocardial infarction (CMS/[facility])     inferior wall MI--03/13   • DEBI (obstructive sleep apnea)     c-pap machine at    • Peripheral vascular disease (CMS/[facility])     S/P left fem-pop bypass   • Tobacco use        Surgical History:      Past Surgical History:   Procedure Laterality Date   • APPENDECTOMY      at age 8 or 9   • CARDIAC CATHETERIZATION      3-; [facility] 9-   • CARDIAC CATHETERIZATION Right 6/27/2019    Procedure: Cardiac Catheterization/with grafts groin access;  Surgeon: Juarez Wing MD;  Location: [facility] CATH INVASIVE LOCATION;  Service: Cardiovascular   • CARDIAC ELECTROPHYSIOLOGY PROCEDURE N/A 10/8/2019    Procedure: ICD SC new, ST.SHIV ;  Surgeon: Juarez Wing MD;  Location: [facility] CATH INVASIVE LOCATION;  Service: Cardiovascular   • COLONOSCOPY     • CORONARY ARTERY BYPASS GRAFT      x3, 3-18-13-LIMA to LAD, and reverse individual SVG to lateral marginal and to PDA   • FEMORAL POPLITEAL BYPASS Left 01/09/2019    [facility]/ Dr. Jero Hatch   • HAND SURGERY Left     crushed hand   • TOE SURGERY      toe nail removal of big toe- age 8 or 9           Family History: family history includes Diabetes in his father and mother; Heart disease in his father and paternal uncle; Hyperlipidemia in his brother; Hypertension in his mother; Pancreatic cancer in his sister; Stroke in his brother. Otherwise pertinent FHx was reviewed and unremarkable.     Social History:  reports that he has been smoking cigarettes. He has a 25.00 pack-year smoking history. He has never used smokeless tobacco. He reports that he does not drink alcohol or use drugs.      Medications:  Prior to Admission medications    Medication Sig Start Date End Date Taking? Authorizing Provider   albuterol (PROVENTIL) (2.5 MG/3ML) 0.083% nebulizer solution Take 2.5 mg by nebulization Every 6 (Six) Hours As Needed for Wheezing or Shortness of Air.    The patient is a 37y Male complaining of testicular pain. Yes Shirley Eng MD   aspirin 325 MG tablet Take 325 mg by mouth Daily.   Yes Shirley Eng MD   atorvastatin (LIPITOR) 40 MG tablet Take 40 mg by mouth Daily.   Yes Shirley Eng MD   carvedilol (COREG) 3.125 MG tablet Take 1 tablet by mouth 3 (Three) Times a Day.  Patient taking differently: Take 3.125 mg by mouth Every 12 (Twelve) Hours. 9/19/19  Yes Juarez Wing MD   clopidogrel (PLAVIX) 75 MG tablet Take 75 mg by mouth Daily.   Yes Shirley Eng MD   fenofibrate (TRICOR) 145 MG tablet Take 1 tablet by mouth Daily. 7/2/19  Yes Juarez Wing MD   furosemide (LASIX) 40 MG tablet Take 1 tablet by mouth 2 (Two) Times a Day. 7/2/19  Yes Juarez Wing MD   glimepiride (AMARYL) 4 MG tablet Take 4 mg by mouth 2 (Two) Times a Day.   Yes Shirley Eng MD   isosorbide mononitrate (IMDUR) 30 MG 24 hr tablet TAKE 1 TABLET BY MOUTH ONCE DAILY 12/24/19  Yes Juarez Wing MD   lisinopril (PRINIVIL,ZESTRIL) 2.5 MG tablet TAKE 1 TABLET BY MOUTH ONCE DAILY 12/23/19  Yes Juarez Wing MD   metFORMIN (GLUCOPHAGE) 500 MG tablet Take 500 mg by mouth 2 (Two) Times a Day With Meals.   Yes Shirley Eng MD   pantoprazole (PROTONIX) 40 MG EC tablet Take 40 mg by mouth Daily.   Yes Shirley Eng MD   predniSONE (DELTASONE) 10 MG tablet Take 10 mg by mouth Daily.   Yes Shirley Eng MD   spironolactone (ALDACTONE) 25 MG tablet Take 1 tablet by mouth Daily. 9/10/19  Yes Juarez Wing MD   temazepam (RESTORIL) 15 MG capsule Take 15 mg by mouth At Night As Needed for Sleep.   Yes Shirley Eng MD   albuterol sulfate  (90 Base) MCG/ACT inhaler Inhale 2 puffs Every 4 (Four) Hours As Needed for Wheezing or Shortness of Air.    Shirley Eng MD   benzonatate (TESSALON) 200 MG capsule Take 200 mg by mouth 3 (Three) Times a Day As Needed for Cough.    Velasuqez MD Shirley   lactulose  (CHRONULAC) 10 GM/15ML solution Take 20 g by mouth 2 (Two) Times a Day As Needed (constipation).    ProviderShirley MD   nitroglycerin (NITROSTAT) 0.4 MG SL tablet Take no more than 3 doses in 15 minutes. 12/3/19   Juarez Wing MD   budesonide-formoterol (SYMBICORT) 160-4.5 MCG/ACT inhaler Inhale 2 puffs 2 (Two) Times a Day.  5/1/20  Shirley Eng MD   ipratropium-albuterol (DUO-NEB) 0.5-2.5 mg/3 ml nebulizer Take 3 mL by nebulization Every 4 (Four) Hours As Needed for Wheezing.  5/1/20  Shirley Eng MD       Allergies:  No Known Allergies    Objective   Objective     Vital Signs  Temp:  [97.9 °F (36.6 °C)] 97.9 °F (36.6 °C)  Heart Rate:  [] 100  Resp:  [20] 20  BP: (115-128)/(52-82) 115/52  SpO2:  [96 %-97 %] 97 %  on   ;   Device (Oxygen Therapy): room air  Body mass index is 29.46 kg/m².    Physical Exam   Constitutional: He is oriented to person, place, and time. He appears well-developed and well-nourished.   HENT:   Head: Normocephalic and atraumatic.   Eyes: Pupils are equal, round, and reactive to light. EOM are normal.   Neck: Normal range of motion. Neck supple.   Cardiovascular: Normal rate and regular rhythm.   Pulmonary/Chest: Effort normal. He has wheezes in the right upper field. He has rales in the left lower field.   Abdominal: Bowel sounds are normal. There is no tenderness.   Musculoskeletal:   Moves all extremities equally   Lymphadenopathy:     He has no cervical adenopathy.   Neurological: He is alert and oriented to person, place, and time. No cranial nerve deficit or sensory deficit.   Skin: Skin is warm and dry. No rash noted.   Psychiatric: He has a normal mood and affect. His speech is normal and behavior is normal.       Results Review:  I have personally reviewed most recent lab results.      Results from last 7 days   Lab Units 05/01/20  1247   WBC 10*3/mm3 14.10*   HEMOGLOBIN g/dL 16.2   HEMATOCRIT % 46.7   PLATELETS 10*3/mm3 191   INR  1.05      Results from last 7 days   Lab Units 05/01/20  1247   SODIUM mmol/L 138   POTASSIUM mmol/L 3.5   CHLORIDE mmol/L 95*   CO2 mmol/L 27.0   BUN mg/dL 28*   CREATININE mg/dL 1.26   GLUCOSE mg/dL 343*   CALCIUM mg/dL 9.2   TROPONIN T ng/mL 0.131*   PROBNP pg/mL 1,115.0*     Estimated Creatinine Clearance: 67.2 mL/min (by C-G formula based on SCr of 1.26 mg/dL).  Brief Urine Lab Results     None          Microbiology Results (last 10 days)     ** No results found for the last 240 hours. **          ECG/EMG Results (most recent)     Procedure Component Value Units Date/Time    ECG 12 Lead [[id number]] Collected:  05/01/20 1245     Updated:  05/01/20 1246    Narrative:       HEART RATE= 99  bpm  RR Interval= 608  ms  ME Interval= 152  ms  P Horizontal Axis= 2  deg  P Front Axis= 46  deg  QRSD Interval= 103  ms  QT Interval= 347  ms  QRS Axis= 84  deg  T Wave Axis= -78  deg  - ABNORMAL ECG -  Sinus rhythm  Probable left atrial enlargement  Repol abnrm suggests ischemia, diffuse leads  Electronically Signed By:   Date and Time of Study: 2020-05-01 12:45:05               Results for orders placed in visit on 08/22/19   Adult Transthoracic Echo Limited W/ Cont if Necessary Per Protocol    Narrative · The left ventricular cavity is moderately dilated.  · Estimated EF = 35%.  · Left atrial cavity size is moderately dilated.  · Mild mitral valve regurgitation is present     Technically difficult study due to due to patient body habitus    Not all left ventricular walls are well visualized, inferior posterior   wall is best sidra segment.  Left ventricular enlargement seen,   septal dyskinesis and anteroapical hypokinesis seen.  Estimated LV   ejection fraction of 35% .  Concentric LVH seen   Normal RV size  Moderate left atrial enlargement seen   Aortic valve, mitral valve, tricuspid valve appears structurally normal,   mild MR seen  Proximal aorta appears normal in size  No significant pericardial effusion seen          Xr Chest 1 View    Result Date: 5/1/2020  No acute cardiopulmonary abnormality is identified.  Electronically Signed By-Nadiya Rodriguez On:5/1/2020 2:25 PM This report was finalized on [id number] by  Nadiya Rodriguez, .        Estimated Creatinine Clearance: 67.2 mL/min (by C-G formula based on SCr of 1.26 mg/dL).    Assessment/Plan   Assessment/Plan       Active Hospital Problems    Diagnosis  POA   • **Dyspnea [R06.00]  Yes     Priority: High   • Chest pain due to myocardial ischemia [I25.9]  Yes     Priority: Medium      Resolved Hospital Problems   No resolved problems to display.       Chest pain:  -Cycle cardiac enzymes every 6 hours  -Continue aspirin, and nitroglycerin PRN  -Cardiology consulted    COPD exacerbation:  -Continue bronchodilators, doxycycline and Solu-Medrol  -Counseled the patient on tobacco cessation    Ischemic cardiomyopathy s/p AICD  -Continue IV Lasix and Aldactone        Diabetes mellitus complicated with diabetic neuropathy  -Continue ISS  -Hold metformin and glimepiride during hospitalization     CAD, h/o CABG x3 (2013):  -Last [facility] June 2019  -On aspirin, Coreg, Lipitor, Plavix, Imdur, lisinopril at home    Essential hypertension:  -Continue Coreg and Aldactone    Hyperlipidemia  -Continue statin     PVD s/p Left femoral bypass (2/2019)  - continue aspirin, Plavix, and statin        VTE Prophylaxis -   Mechanical Order History:     None      Pharmalogical Order History:     Ordered     Dose Route Frequency Stop    Signed and Held  heparin (porcine) 5000 UNIT/ML injection 5,000 Units      5,000 Units SC Every 8 Hours Scheduled --          CODE STATUS:    Code Status and Medical Interventions:   Ordered at: 05/01/20 1631     Level Of Support Discussed With:    Patient     Code Status:    CPR     Medical Interventions (Level of Support Prior to Arrest):    Full       I discussed the patient's findings and my recommendations with patient.        Electronically signed by Dileep rubi  DO Kim, 05/01/20, 4:35 PM.  [facility] Hospitalist Team

## 2023-10-27 ENCOUNTER — OFFICE VISIT (OUTPATIENT)
Dept: CARDIOLOGY | Facility: CLINIC | Age: 63
End: 2023-10-27
Payer: MEDICARE

## 2023-10-27 VITALS
HEART RATE: 80 BPM | WEIGHT: 179 LBS | BODY MASS INDEX: 28.09 KG/M2 | DIASTOLIC BLOOD PRESSURE: 64 MMHG | OXYGEN SATURATION: 98 % | SYSTOLIC BLOOD PRESSURE: 100 MMHG | HEIGHT: 67 IN

## 2023-10-27 DIAGNOSIS — I50.22 CHRONIC SYSTOLIC CONGESTIVE HEART FAILURE: Chronic | ICD-10-CM

## 2023-10-27 DIAGNOSIS — I73.9 PERIPHERAL VASCULAR DISEASE: Chronic | ICD-10-CM

## 2023-10-27 DIAGNOSIS — N18.30 CHRONIC RENAL IMPAIRMENT, STAGE 3 (MODERATE), UNSPECIFIED WHETHER STAGE 3A OR 3B CKD: ICD-10-CM

## 2023-10-27 DIAGNOSIS — Z95.810 PRESENCE OF AUTOMATIC CARDIOVERTER/DEFIBRILLATOR (AICD): Chronic | ICD-10-CM

## 2023-10-27 DIAGNOSIS — E11.21 TYPE 2 DIABETES MELLITUS WITH NEPHROPATHY: ICD-10-CM

## 2023-10-27 DIAGNOSIS — I25.10 CORONARY ARTERY DISEASE WITH HX OF MYOCARDIAL INFARCT W/O HX OF CABG: Chronic | ICD-10-CM

## 2023-10-27 DIAGNOSIS — I25.2 CORONARY ARTERY DISEASE WITH HX OF MYOCARDIAL INFARCT W/O HX OF CABG: Chronic | ICD-10-CM

## 2023-10-27 DIAGNOSIS — I25.5 ISCHEMIC CARDIOMYOPATHY: Primary | Chronic | ICD-10-CM

## 2023-10-27 DIAGNOSIS — F17.200 TOBACCO USE DISORDER: ICD-10-CM

## 2023-10-27 PROCEDURE — 1159F MED LIST DOCD IN RCRD: CPT | Performed by: NURSE PRACTITIONER

## 2023-10-27 PROCEDURE — 3078F DIAST BP <80 MM HG: CPT | Performed by: NURSE PRACTITIONER

## 2023-10-27 PROCEDURE — 3074F SYST BP LT 130 MM HG: CPT | Performed by: NURSE PRACTITIONER

## 2023-10-27 PROCEDURE — 99214 OFFICE O/P EST MOD 30 MIN: CPT | Performed by: NURSE PRACTITIONER

## 2023-10-27 PROCEDURE — 1160F RVW MEDS BY RX/DR IN RCRD: CPT | Performed by: NURSE PRACTITIONER

## 2023-10-27 NOTE — PROGRESS NOTES
Subjective:     Encounter Date:10/27/2023      Patient ID: Bobby Steele Jr. is a 63 y.o. male.    Chief Complaint : 3 month follow up for chronic HFrEF, ischemic cardiomyopathy, s/p ICD     History of Present Illness      Mr. Bobby Steele Jr. has PMH of        # NSTEMI  5/12/19, SHILPA to SVG to RCA and proximal LAD leading into a small diagonal, cath 5/11/2020 underwent SHILPA to SVG to RCA and native proximal LCx with Impella assistance.  Cath 6/1/2021 revealed patent LIMA to LAD, SVG to PDA, occluded SVG to diagonal, patent stent in left main, severe disease in tortuous segment in mid LCx and LAD going to diagonal, unchanged from previous cath.  # CAD status post CABG X3 V  # Ischemic cardiomyopathy with EF of 35%, status post ICD 10/8/2019  # COPD, continue tobacco abuse  # Hypertension   # Hyperlipidemia  # Diabetes with hemoglobin A1c of 10 on 5/2/2020  #.  CKD 3-4 (Dr. Hummel)  #Gangrene of left foot  status post AKA (?2-3/2023)  #Positive family for premature heart disease with father MI 53    Is here for 3 month follow up.  Patient denies any chest pain.  He denies report chronic shortness of breath that is stable (COPD).  He denies dizziness, but does report weakness at times.  He wants to know if it is okay to start midodrine 2.5mg BID (from nephrology).  Patient states he continues to have phantom limb pain. He is currently wheelchair bond and waiting on his prosthetic leg to start PT.     Blood pressure today is 100/64 HR 80 oxygen 98% on room air.          Labs      [facility] 4/7/2022- creatinine 1.41  eGFR 56  Labs from [facility] 6/3/2022 show glucose 279, potassium 4.6, bun 28, creatinine 1.49        Procedures    Stress myoview 4/8/2022  1.abnormal perfusion, large inferior wall defect consistent with scar and MI, small to moderate reversible lateral defect consistent with ischemia  2.  Severe LV dysfunction.  LVEF of 27%.    Stress myoview 1/25/2023  1.  Lexiscan Cardiolite with abnormal  perfusion, large inferior wall MI/scar with small cole-infarct ischemia.  Small area of anterolateral ischemia.  2.  Moderate to severe LV dysfunction.  LVEF of 37%.     Echocardiogram 4/7/2022  LVE with severe global left ventricular hypokinesis, estimated LV ejection fraction of 30%  Normal RV size.  Pacer lead seen.  Moderate left atrial enlargement  Aortic valve, mitral valve, tricuspid valve appears structurally normal, he is to mild mitral and tricuspid regurgitation seen.  Calculated RV systolic pressure of 21 mmHg  No pericardial effusion seen.  Proximal aorta appears normal in size.     cardiac cath 6/1/2021 which revealed severe triple-vessel disease with patent LIMA to LAD, SVG to PDA, occluded SVG to diagonal, patent left main, severe disease in proximal LAD leading to diagonal, severe disease in mid LCx which is in a tortuous segment.           Copied text in this portion of the note has been reviewed and is accurate as of 10/30/2023  The following portions of the patient's history were reviewed and updated as appropriate: allergies, current medications, past family history, past medical history, past social history, past surgical history and problem list.    Assessment:         [facility]       Diagnosis Plan   1. Ischemic cardiomyopathy        2. Chronic systolic congestive heart failure        3. Coronary artery disease with hx of myocardial infarct w/o hx of CABG        4. Presence of automatic cardioverter/defibrillator (AICD)        5. Peripheral vascular disease        6. Tobacco use disorder        7. Type 2 diabetes mellitus with nephropathy        8. Chronic renal impairment, stage 3 (moderate), unspecified whether stage 3a or 3b CKD                 Plan:       Chart reviewed  Labs reviewed    Discussed heart failure with patient   Patient is currently NYHA class II-III     Continue carvedilol 3.125mg BID; Spironolactone 25mg daily, farxiga 10mg daily  lasix 40mg BID   Patient not on ACE/ARB/ARNI do  to renal dysfunction and moderately low blood pressure   Patient reports nephrology wanted to start him on midodrine 2 and half milligrams twice daily however he did not start it as he wanted to approval.   Advised that this is okay to help keep his blood pressure up with all the medications that he is taking    Continue Eliquis, plavix, statin + tricor and imdur for CAD     Advised complete smoking cessation--- patient smokes 1ppd (down from 4ppd several years ago)        Advised patient that if he starts becoming short of breath, having increasing edema to call the office and see me.     Advised patient to have labs done with Dr. Hummel or PCP and send them to us to review.   He states he will be seeing Dr. Hummel in the next few weeks.     Past Medical History:  Past Medical History:   Diagnosis Date    CAD (coronary artery disease)     S/P CABG    Chest pain 05/31/2021    Chronic systolic CHF (congestive heart failure)     COPD (chronic obstructive pulmonary disease)     DM2 (diabetes mellitus, type 2)     Dyslipidemia     Encounter for monitoring antiplatelet therapy     Hypertension     Myocardial infarction     inferior wall MI--03/13    Non-pressure chronic ulcer of other part of left foot with fat layer exposed     DEBI (obstructive sleep apnea)     noncompliant with CPAP    Peripheral vascular disease     S/P left fem-pop bypass    Tobacco use      Past Surgical History:  Past Surgical History:   Procedure Laterality Date    ABOVE KNEE LEG AMPUTATION Left     AMPUTATION FOOT / TOE      left    APPENDECTOMY      at age 8 or 9    BIVENTRICULAR ASSIST DEVICE/LEFT VENTRICULAR ASSIST DEVICE INSERTION N/A 05/13/2020    Procedure: Left Ventricular Assist Device;  Surgeon: Juarez Wing MD;  Location: [facility] INVASIVE LOCATION;  Service: Cardiovascular;  Laterality: N/A;    CARDIAC CATHETERIZATION      3-; [facility] 9-    CARDIAC CATHETERIZATION Right 06/27/2019    Procedure: Cardiac  Catheterization/with grafts groin access;  Surgeon: Juarez Wing MD;  Location: [facility] CATH INVASIVE LOCATION;  Service: Cardiovascular    CARDIAC CATHETERIZATION N/A 05/08/2020    Procedure: Left Heart Cath with grafts;  Surgeon: Juarez Wing MD;  Location: [facility] CATH INVASIVE LOCATION;  Service: Cardiovascular;  Laterality: N/A;    CARDIAC CATHETERIZATION N/A 05/13/2020    Procedure: Percutaneous Coronary Intervention, Impella backup;  Surgeon: Juarez Wing MD;  Location: [facility] CATH INVASIVE LOCATION;  Service: Cardiovascular;  Laterality: N/A;    CARDIAC CATHETERIZATION N/A 06/01/2021    Procedure: Left Heart Cath, possible pci;  Surgeon: Juarez Wing MD;  Location: [facility] CATH INVASIVE LOCATION;  Service: Cardiovascular;  Laterality: N/A;    CARDIAC ELECTROPHYSIOLOGY PROCEDURE N/A 10/08/2019    Procedure: ICD SC new, ST.SHIV ;  Surgeon: Juarez Wing MD;  Location: [facility] CATH INVASIVE LOCATION;  Service: Cardiovascular    CARDIAC SURGERY  2013    COLONOSCOPY      CORONARY ARTERY BYPASS GRAFT      x3, 3-18-13-LIMA to LAD, and reverse individual SVG to lateral marginal and to PDA    FEMORAL POPLITEAL BYPASS Left 01/09/2019    [facility]/ Dr. Jero Hatch    HAND SURGERY Left     crushed hand    PACEMAKER IMPLANTATION      TOE SURGERY      toe nail removal of big toe- age 8 or 9      Allergies:  No Known Allergies  Home Meds:  Current Meds:     Current Outpatient Medications:     Accu-Chek Marley Plus test strip, , Disp: , Rfl:     albuterol (PROVENTIL) (2.5 MG/3ML) 0.083% nebulizer solution, Take 2.5 mg by nebulization Every 6 (Six) Hours As Needed for Wheezing or Shortness of Air., Disp: , Rfl:     apixaban (ELIQUIS) 5 MG tablet tablet, Take 1 tablet by mouth 2 (Two) Times a Day., Disp: , Rfl:     atorvastatin (LIPITOR) 40 MG tablet, Take 1 tablet by mouth Daily., Disp: , Rfl:     carvedilol (COREG) 3.125 MG tablet, Take 1 tablet by mouth 2 (Two) Times  a Day With Meals., Disp: 180 tablet, Rfl: 1    clopidogrel (PLAVIX) 75 MG tablet, Take 1 tablet by mouth Daily., Disp: , Rfl:     Farxiga 10 MG tablet, Take 1 tablet by mouth once daily, Disp: 90 tablet, Rfl: 0    fenofibrate (TRICOR) 145 MG tablet, Take 1 tablet by mouth once daily, Disp: 90 tablet, Rfl: 0    furosemide (LASIX) 40 MG tablet, Take 1 tablet by mouth 2 (Two) Times a Day., Disp: , Rfl:     gabapentin (NEURONTIN) 300 MG capsule, , Disp: , Rfl:     glimepiride (AMARYL) 4 MG tablet, Take 1 tablet by mouth 2 (Two) Times a Day., Disp: , Rfl:     insulin glargine (LANTUS, SEMGLEE) 100 UNIT/ML injection, Inject 40 Units under the skin into the appropriate area as directed Every Night., Disp: , Rfl:     Lantus SoloStar 100 UNIT/ML injection pen, , Disp: , Rfl:     Magnesium Oxide 400 (240 Mg) MG tablet, Take 1 tablet by mouth 2 (Two) Times a Day., Disp: , Rfl:     pantoprazole (PROTONIX) 40 MG EC tablet, Take 1 tablet by mouth Daily., Disp: , Rfl:     spironolactone (ALDACTONE) 25 MG tablet, Take 1 tablet by mouth Daily., Disp: , Rfl:   Social History:   Social History     Tobacco Use    Smoking status: Every Day     Packs/day: 1.50     Years: 25.00     Additional pack years: 0.00     Total pack years: 37.50     Types: Cigarettes    Smokeless tobacco: Never   Substance Use Topics    Alcohol use: No      Family History:  Family History   Problem Relation Age of Onset    Hypertension Mother     Diabetes Mother     Heart disease Father         had CABG and re-do/  while recovering-had MI age 40s    Diabetes Father     Pancreatic cancer Sister     Hyperlipidemia Brother     Stroke Brother     Heart disease Paternal Uncle               Review of Systems   Constitutional: Positive for malaise/fatigue. Negative for fever.   Cardiovascular:  Negative for chest pain, dyspnea on exertion, leg swelling and palpitations.   Respiratory:  Positive for shortness of breath. Negative for cough.    Skin:  Negative for rash.  "  Gastrointestinal:  Negative for abdominal pain, nausea and vomiting.   Neurological:  Positive for dizziness and light-headedness. Negative for focal weakness, headaches and numbness.   All other systems reviewed and are negative.    All other systems are negative         Objective:     Physical Exam  /64   Pulse 80   Ht 170.2 cm (67.01\")   Wt 81.2 kg (179 lb)   SpO2 98%   BMI 28.03 kg/m²   General:  Appears in no acute distress in wheelchair   Eyes: Sclera is anicteric,  conjunctiva is clear   HEENT:  No JVD.    Respiratory: Respirations regular and unlabored at rest. Bilateral anterior upper lobe expiratory wheezing noted   Cardiovascular: S1,S2 Regular rate and rhythm. No murmur, rub or gallop auscultated.   Extremities: No digital clubbing or cyanosis, trace edema to right lower extremity   Skin: Color pink. PAD/stasis changes noted.  Left AKA noted   Neuro: Alert and awake.    Lab Reviewed:         JOY Ashton  10/30/2023 13:45 EDT  Electronically signed by JOY Ashton, 10/30/23, 1:45 PM EDT.        EMR Dragon/Transcription:   \"Dictated utilizing Dragon dictation\".        "

## 2023-10-30 PROBLEM — S91.302A OPEN WOUND OF LEFT FOOT: Status: RESOLVED | Noted: 2020-12-29 | Resolved: 2023-10-30

## 2023-10-30 PROBLEM — L97.522 NON-PRESSURE CHRONIC ULCER OF OTHER PART OF LEFT FOOT WITH FAT LAYER EXPOSED: Status: RESOLVED | Noted: 2021-06-01 | Resolved: 2023-10-30

## 2024-03-22 ENCOUNTER — TELEPHONE (OUTPATIENT)
Dept: CARDIOLOGY | Facility: CLINIC | Age: 64
End: 2024-03-22
Payer: MEDICARE

## 2024-03-22 NOTE — TELEPHONE ENCOUNTER
FACILITY: [facility]  DR: Maryjane Castro  PHONE: [phone]  FAX: [phone]  PROCEDURE: Right Forefoot Amputation  SCHEDULED: TBD  MEDS TO HOLD: Eliquis 3 day hold & Plavix 5 day hold

## 2024-03-26 ENCOUNTER — OFFICE VISIT (OUTPATIENT)
Dept: CARDIOLOGY | Facility: CLINIC | Age: 64
End: 2024-03-26
Payer: MEDICARE

## 2024-03-26 ENCOUNTER — CLINICAL SUPPORT NO REQUIREMENTS (OUTPATIENT)
Dept: CARDIOLOGY | Facility: CLINIC | Age: 64
End: 2024-03-26
Payer: MEDICARE

## 2024-03-26 VITALS
BODY MASS INDEX: 27.78 KG/M2 | WEIGHT: 177 LBS | OXYGEN SATURATION: 95 % | SYSTOLIC BLOOD PRESSURE: 110 MMHG | HEART RATE: 86 BPM | DIASTOLIC BLOOD PRESSURE: 72 MMHG | HEIGHT: 67 IN

## 2024-03-26 DIAGNOSIS — I50.22 CHRONIC SYSTOLIC CONGESTIVE HEART FAILURE: Primary | Chronic | ICD-10-CM

## 2024-03-26 DIAGNOSIS — E11.21 TYPE 2 DIABETES MELLITUS WITH NEPHROPATHY: ICD-10-CM

## 2024-03-26 DIAGNOSIS — I73.9 PERIPHERAL VASCULAR DISEASE: ICD-10-CM

## 2024-03-26 DIAGNOSIS — I50.22 CHRONIC SYSTOLIC CONGESTIVE HEART FAILURE: ICD-10-CM

## 2024-03-26 DIAGNOSIS — I10 ESSENTIAL HYPERTENSION: ICD-10-CM

## 2024-03-26 DIAGNOSIS — R06.09 DYSPNEA ON EXERTION: Primary | ICD-10-CM

## 2024-03-26 DIAGNOSIS — N18.30 CHRONIC RENAL IMPAIRMENT, STAGE 3 (MODERATE), UNSPECIFIED WHETHER STAGE 3A OR 3B CKD: ICD-10-CM

## 2024-03-26 DIAGNOSIS — I25.5 ISCHEMIC CARDIOMYOPATHY: ICD-10-CM

## 2024-03-26 DIAGNOSIS — I25.2 CORONARY ARTERY DISEASE WITH HX OF MYOCARDIAL INFARCT W/O HX OF CABG: ICD-10-CM

## 2024-03-26 DIAGNOSIS — E78.5 DYSLIPIDEMIA: ICD-10-CM

## 2024-03-26 DIAGNOSIS — F17.200 TOBACCO USE DISORDER: ICD-10-CM

## 2024-03-26 DIAGNOSIS — I25.5 ISCHEMIC CARDIOMYOPATHY: Chronic | ICD-10-CM

## 2024-03-26 DIAGNOSIS — Z95.810 PRESENCE OF AUTOMATIC CARDIOVERTER/DEFIBRILLATOR (AICD): Chronic | ICD-10-CM

## 2024-03-26 DIAGNOSIS — I25.10 CORONARY ARTERY DISEASE WITH HX OF MYOCARDIAL INFARCT W/O HX OF CABG: ICD-10-CM

## 2024-03-26 DIAGNOSIS — Z95.810 PRESENCE OF AUTOMATIC CARDIOVERTER/DEFIBRILLATOR (AICD): ICD-10-CM

## 2024-03-26 NOTE — PROGRESS NOTES
Subjective:     Encounter Date:03/26/2024      Patient ID: Bobby Steele Jr. is a 64 y.o. male.    Chief Complaint and history of present illness:     Follow-up for HFrEF, ischemic cardiomyopathy, CAD, CABG, PCI, dyslipidemia, hypertension, diabetes, CKD.     History of Present Illness  :      Mr. Bobby Steele Jr. has PMH of        # NSTEMI  5/12/19, SHILPA to SVG to RCA and proximal LAD leading into a small diagonal, cath 5/11/2020 underwent SHILPA to SVG to RCA and native proximal LCx with Impella assistance.  Cath 6/1/2021 revealed patent LIMA to LAD, SVG to PDA, occluded SVG to diagonal, patent stent in left main, severe disease in tortuous segment in mid LCx and LAD going to diagonal, unchanged from previous cath.  # CAD status post CABG X3 V  #Chronic HFrEF due to systolic dysfunction from ischemic cardiomyopathy with EF of 35%, status post ICD 10/8/2019  # COPD, continue tobacco abuse  # Hypertension   # Hyperlipidemia  # Diabetes with hemoglobin A1c of 10 on 5/2/2020  #.  CKD 3 and 4  # PAD, gangrene of left foot, amputation  #Positive family for premature heart disease with father MI 53     Here for  follow-up.  Patient is complaining of dyspnea on exertion.  Denies any chest pain.  Is preop for right toe amputation due to gangrene and PAD. Continues to smoke in spite of counseling.  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     Patient's arterial blood pressure is 110/72, heart rate 86 bpm, O2 sat of 95% on room air.  Patient continues to smoke in spite of counseling.     Patient was recently in the hospital, where he presented because of recurrent substernal chest pain.  Patient  underwent cardiac cath 6/1/2021 which revealed severe triple-vessel disease with patent LIMA to LAD, SVG to PDA, occluded SVG to diagonal, patent left main, severe disease in proximal LAD leading to diagonal, severe disease in mid LCx which is in a  tortuous segment.       Labs from 12/27/2020 reveal cholesterol 111 triglycerides 124 HDL 40 LDL 49.  Labs from 5/31/2021 reveal hemoglobin A1c of 9.3.  Labs from 6/1/2021 and 6/2/2021 reveal proBNP 1481, CMP with a potassium of 3.4, BUN 27, creatinine 1.24, GFR 59. Labs from 7/28/2021 revealed BUN 34 creatinine 1.71 GFR 41, glucose 399.  Labs from 6/3/2022 reveal BMP with a creatinine 1.49, GFR 48, NT proBNP elevated at 707.  Labs from 6/3/2022 reveal BMP with a creatinine of 1.49 and glucose of 279 and NT proBNP of 707.         ASSESSMENT:      #Dyspnea on exertion#  chronic CHF, ischemic cardiomyopathy 35%, status post ICD 10/8/2019  # CAD status post CABG, PCI  # COPD, tobacco abuse   # hypertension ,  #Dyslipidemia  #Hyperglycemia, and type 2 diabetes  #  CKD  # PAD, toe gangrene     Recommendations:     Patient is off Entresto and ACE inhibitor's, ARB, Arni, due to CKD.  Advised follow-up with nephrology.  We will continue medical management with Eliquis, clopidogrel,, atorvastatin, carvedilol, Farxiga and Aldactone as tolerated to help with CHF, CAD, hypertension, dyslipidemia.  Since patient is on Eliquis and Plavix his aspirin has been stopped.    Patient would benefit from diabetes control, A1c 8 5/31/2021 is elevated at 9.3.  A1c 4/7/2022 was 11.3  Counseled on smoking cessation.  Patient underwent cardiac cath 6/1/2021 which revealed severe disease in mid LCx which is in the loop of a bend and heavily calcified high risk for complications therefore was not intervened upon.  Patient also has severe disease going to the diagonal.  Basal rate for medical management.  Reviewed CHF care with patient.  Follow-up with PMD for diabetes.  Will check repeat echo and labs and follow-up  Patient's device check is revealing normal function we will follow-up in device clinic.     Procedures:     Stress myoview 4/8/2022  1.abnormal perfusion, large inferior wall defect consistent with scar and MI, small to moderate  reversible lateral defect consistent with ischemia  2.  Severe LV dysfunction.  LVEF of 27%.     Echocardiogram 4/7/2022  LVE with severe global left ventricular hypokinesis, estimated LV ejection fraction of 30%  Normal RV size.  Pacer lead seen.  Moderate left atrial enlargement  Aortic valve, mitral valve, tricuspid valve appears structurally normal, he is to mild mitral and tricuspid regurgitation seen.  Calculated RV systolic pressure of 21 mmHg  No pericardial effusion seen.  Proximal aorta appears normal in size.    Cardiac cath 6/1/2021 which revealed severe triple-vessel disease with patent LIMA to LAD, SVG to PDA, occluded SVG to diagonal, patent left main, severe disease in proximal LAD leading to diagonal, severe disease in mid LCx which is in a tortuous segment.     Lexiscan Cardiolite 1/25/2023 which revealed small anterolateral ischemia EF of 37% and old inferior wall MI         Procedures  EKG 7/3/2023 reveals sinus rhythm with nonspecific ST-T changes    Copied text in this portion of the note has been reviewed and is accurate as of 3/26/2024  The following portions of the patient's history were reviewed and updated as appropriate: allergies, current medications, past family history, past medical history, past social history, past surgical history and problem list.    Assessment:         [facility]       Diagnosis Plan   1. Dyspnea on exertion  Adult Transthoracic Echo Complete W/ Cont if Necessary Per Protocol    BNP    Comprehensive Metabolic Panel    Lipid Panel    Hemoglobin A1c      2. Chronic systolic congestive heart failure  Adult Transthoracic Echo Complete W/ Cont if Necessary Per Protocol    BNP    Comprehensive Metabolic Panel    Lipid Panel    Hemoglobin A1c      3. Ischemic cardiomyopathy  Adult Transthoracic Echo Complete W/ Cont if Necessary Per Protocol    BNP    Comprehensive Metabolic Panel    Lipid Panel    Hemoglobin A1c      4. Presence of automatic cardioverter/defibrillator (AICD)   Adult Transthoracic Echo Complete W/ Cont if Necessary Per Protocol    BNP    Comprehensive Metabolic Panel    Lipid Panel    Hemoglobin A1c      5. Coronary artery disease with hx of myocardial infarct w/o hx of CABG  Adult Transthoracic Echo Complete W/ Cont if Necessary Per Protocol    BNP    Comprehensive Metabolic Panel    Lipid Panel    Hemoglobin A1c      6. Peripheral vascular disease  Adult Transthoracic Echo Complete W/ Cont if Necessary Per Protocol    BNP    Comprehensive Metabolic Panel    Lipid Panel    Hemoglobin A1c      7. Tobacco use disorder  Adult Transthoracic Echo Complete W/ Cont if Necessary Per Protocol    BNP    Comprehensive Metabolic Panel    Lipid Panel    Hemoglobin A1c      8. Type 2 diabetes mellitus with nephropathy  Adult Transthoracic Echo Complete W/ Cont if Necessary Per Protocol    BNP    Comprehensive Metabolic Panel    Lipid Panel    Hemoglobin A1c      9. Chronic renal impairment, stage 3 (moderate), unspecified whether stage 3a or 3b CKD  Adult Transthoracic Echo Complete W/ Cont if Necessary Per Protocol    BNP    Comprehensive Metabolic Panel    Lipid Panel    Hemoglobin A1c      10. Dyslipidemia  Adult Transthoracic Echo Complete W/ Cont if Necessary Per Protocol    BNP    Comprehensive Metabolic Panel    Lipid Panel    Hemoglobin A1c      11. Essential hypertension  Adult Transthoracic Echo Complete W/ Cont if Necessary Per Protocol    BNP    Comprehensive Metabolic Panel    Lipid Panel    Hemoglobin A1c             Plan:               Past Medical History:  Past Medical History:   Diagnosis Date    CAD (coronary artery disease)     S/P CABG    Chest pain 05/31/2021    Chronic systolic CHF (congestive heart failure)     COPD (chronic obstructive pulmonary disease)     DM2 (diabetes mellitus, type 2)     Dyslipidemia     Encounter for monitoring antiplatelet therapy     Hypertension     Myocardial infarction     inferior wall MI--03/13    Non-pressure chronic ulcer of  other part of left foot with fat layer exposed     DEBI (obstructive sleep apnea)     noncompliant with CPAP    Peripheral vascular disease     S/P left fem-pop bypass    Tobacco use      Past Surgical History:  Past Surgical History:   Procedure Laterality Date    ABOVE KNEE LEG AMPUTATION Left     AMPUTATION FOOT / TOE      left    APPENDECTOMY      at age 8 or 9    BIVENTRICULAR ASSIST DEVICE/LEFT VENTRICULAR ASSIST DEVICE INSERTION N/A 05/13/2020    Procedure: Left Ventricular Assist Device;  Surgeon: Juarez Wing MD;  Location: [facility] CATH INVASIVE LOCATION;  Service: Cardiovascular;  Laterality: N/A;    CARDIAC CATHETERIZATION      3-; [facility] 9-    CARDIAC CATHETERIZATION Right 06/27/2019    Procedure: Cardiac Catheterization/with grafts groin access;  Surgeon: Juarez Wing MD;  Location: [facility] CATH INVASIVE LOCATION;  Service: Cardiovascular    CARDIAC CATHETERIZATION N/A 05/08/2020    Procedure: Left Heart Cath with grafts;  Surgeon: Juarez Wing MD;  Location: [facility] CATH INVASIVE LOCATION;  Service: Cardiovascular;  Laterality: N/A;    CARDIAC CATHETERIZATION N/A 05/13/2020    Procedure: Percutaneous Coronary Intervention, Impella backup;  Surgeon: Juarez Wing MD;  Location: [facility] CATH INVASIVE LOCATION;  Service: Cardiovascular;  Laterality: N/A;    CARDIAC CATHETERIZATION N/A 06/01/2021    Procedure: Left Heart Cath, possible pci;  Surgeon: Juarez Wing MD;  Location: [facility] CATH INVASIVE LOCATION;  Service: Cardiovascular;  Laterality: N/A;    CARDIAC ELECTROPHYSIOLOGY PROCEDURE N/A 10/08/2019    Procedure: ICD SC new, ST.SHIV ;  Surgeon: Juarez Wing MD;  Location: [facility] CATH INVASIVE LOCATION;  Service: Cardiovascular    CARDIAC SURGERY  2013    COLONOSCOPY      CORONARY ARTERY BYPASS GRAFT      x3, 3-18-13-LIMA to LAD, and reverse individual SVG to lateral marginal and to PDA    FEMORAL POPLITEAL BYPASS Left  01/09/2019    [facility]/ Dr. Jero Hatch    HAND SURGERY Left     crushed hand    PACEMAKER IMPLANTATION      TOE SURGERY      toe nail removal of big toe- age 8 or 9      Allergies:  No Known Allergies  Home Meds:  Current Meds:     Current Outpatient Medications:     albuterol (PROVENTIL) (2.5 MG/3ML) 0.083% nebulizer solution, Take 2.5 mg by nebulization Every 6 (Six) Hours As Needed for Wheezing or Shortness of Air., Disp: , Rfl:     apixaban (ELIQUIS) 5 MG tablet tablet, Take 1 tablet by mouth 2 (Two) Times a Day., Disp: , Rfl:     gabapentin (NEURONTIN) 300 MG capsule, , Disp: , Rfl:     Accu-Chek Marley Plus test strip, , Disp: , Rfl:     atorvastatin (LIPITOR) 40 MG tablet, Take 1 tablet by mouth Daily., Disp: , Rfl:     carvedilol (COREG) 3.125 MG tablet, Take 1 tablet by mouth 2 (Two) Times a Day With Meals., Disp: 180 tablet, Rfl: 1    clopidogrel (PLAVIX) 75 MG tablet, Take 1 tablet by mouth Daily., Disp: , Rfl:     Farxiga 10 MG tablet, Take 1 tablet by mouth once daily, Disp: 90 tablet, Rfl: 0    fenofibrate (TRICOR) 145 MG tablet, Take 1 tablet by mouth once daily, Disp: 90 tablet, Rfl: 0    furosemide (LASIX) 40 MG tablet, Take 1 tablet by mouth 2 (Two) Times a Day., Disp: , Rfl:     glimepiride (AMARYL) 4 MG tablet, Take 1 tablet by mouth 2 (Two) Times a Day., Disp: , Rfl:     insulin glargine (LANTUS, SEMGLEE) 100 UNIT/ML injection, Inject 40 Units under the skin into the appropriate area as directed Every Night., Disp: , Rfl:     Lantus SoloStar 100 UNIT/ML injection pen, , Disp: , Rfl:     Magnesium Oxide 400 (240 Mg) MG tablet, Take 1 tablet by mouth 2 (Two) Times a Day., Disp: , Rfl:     pantoprazole (PROTONIX) 40 MG EC tablet, Take 1 tablet by mouth Daily., Disp: , Rfl:     spironolactone (ALDACTONE) 25 MG tablet, Take 1 tablet by mouth Daily., Disp: , Rfl:   Social History:   Social History     Tobacco Use    Smoking status: Every Day     Current packs/day: 1.50     Average packs/day: 1.5  "packs/day for 25.0 years (37.5 ttl pk-yrs)     Types: Cigarettes    Smokeless tobacco: Never   Substance Use Topics    Alcohol use: No      Family History:  Family History   Problem Relation Age of Onset    Hypertension Mother     Diabetes Mother     Heart disease Father         had CABG and re-do/  while recovering-had MI age 40s    Diabetes Father     Pancreatic cancer Sister     Hyperlipidemia Brother     Stroke Brother     Heart disease Paternal Uncle               Review of Systems   Constitutional: Negative for malaise/fatigue.   Cardiovascular:  Negative for chest pain, leg swelling and palpitations.   Respiratory:  Positive for shortness of breath.    Skin:  Negative for rash.   Neurological:  Negative for dizziness, light-headedness and numbness.     All other systems are negative         Objective:     Physical Exam  /72   Pulse 86   Ht 170.2 cm (67\")   Wt 80.3 kg (177 lb)   SpO2 95%   BMI 27.72 kg/m²   General:  Appears in no acute distress  Eyes: Sclera is anicteric,  conjunctiva is clear   HEENT:  No JVD.  No carotid bruits  Respiratory: Respirations regular and unlabored at rest.  Clear to auscultation  Cardiovascular: S1,S2 Regular rate and rhythm. .   Extremities: Left AKA seen.  Skin: Color pink. Skin warm and dry to touch. No rashes  No xanthoma  Neuro: Alert and awake.  Wheelchair-bound    Lab Reviewed:         Juarez Wing MD  3/26/2024 10:37 EDT      EMR Dragon/Transcription:   \"Dictated utilizing Dragon dictation\".        "

## 2024-04-03 ENCOUNTER — HOSPITAL ENCOUNTER (OUTPATIENT)
Dept: CARDIOLOGY | Facility: HOSPITAL | Age: 64
Discharge: HOME | End: 2024-04-03
Admitting: INTERNAL MEDICINE
Payer: MEDICARE

## 2024-04-03 VITALS
SYSTOLIC BLOOD PRESSURE: 111 MMHG | WEIGHT: 177 LBS | HEART RATE: 78 BPM | BODY MASS INDEX: 27.78 KG/M2 | HEIGHT: 67 IN | DIASTOLIC BLOOD PRESSURE: 68 MMHG

## 2024-04-03 DIAGNOSIS — N18.30 CHRONIC RENAL IMPAIRMENT, STAGE 3 (MODERATE), UNSPECIFIED WHETHER STAGE 3A OR 3B CKD: ICD-10-CM

## 2024-04-03 DIAGNOSIS — Z95.810 PRESENCE OF AUTOMATIC CARDIOVERTER/DEFIBRILLATOR (AICD): ICD-10-CM

## 2024-04-03 DIAGNOSIS — I10 ESSENTIAL HYPERTENSION: ICD-10-CM

## 2024-04-03 DIAGNOSIS — E11.21 TYPE 2 DIABETES MELLITUS WITH NEPHROPATHY: ICD-10-CM

## 2024-04-03 DIAGNOSIS — I25.5 ISCHEMIC CARDIOMYOPATHY: ICD-10-CM

## 2024-04-03 DIAGNOSIS — I50.22 CHRONIC SYSTOLIC CONGESTIVE HEART FAILURE: ICD-10-CM

## 2024-04-03 DIAGNOSIS — I25.10 CORONARY ARTERY DISEASE WITH HX OF MYOCARDIAL INFARCT W/O HX OF CABG: ICD-10-CM

## 2024-04-03 DIAGNOSIS — I73.9 PERIPHERAL VASCULAR DISEASE: ICD-10-CM

## 2024-04-03 DIAGNOSIS — R06.09 DYSPNEA ON EXERTION: ICD-10-CM

## 2024-04-03 DIAGNOSIS — F17.200 TOBACCO USE DISORDER: ICD-10-CM

## 2024-04-03 DIAGNOSIS — E78.5 DYSLIPIDEMIA: ICD-10-CM

## 2024-04-03 DIAGNOSIS — I25.2 CORONARY ARTERY DISEASE WITH HX OF MYOCARDIAL INFARCT W/O HX OF CABG: ICD-10-CM

## 2024-04-03 LAB
BH CV ECHO LEFT VENTRICLE GLOBAL LONGITUDINAL STRAIN: 10.6 %
BH CV ECHO MEAS - ACS: 1.72 CM
BH CV ECHO MEAS - AO MAX PG: 7.5 MMHG
BH CV ECHO MEAS - AO MEAN PG: 3.5 MMHG
BH CV ECHO MEAS - AO ROOT DIAM: 4 CM
BH CV ECHO MEAS - AO V2 MAX: 136.6 CM/SEC
BH CV ECHO MEAS - AO V2 VTI: 19.4 CM
BH CV ECHO MEAS - AVA(I,D): 2.9 CM2
BH CV ECHO MEAS - EDV(CUBED): 176.4 ML
BH CV ECHO MEAS - EDV(MOD-SP2): 124.1 ML
BH CV ECHO MEAS - EDV(MOD-SP4): 117.3 ML
BH CV ECHO MEAS - EF(MOD-BP): 41 %
BH CV ECHO MEAS - EF(MOD-SP2): 38.7 %
BH CV ECHO MEAS - EF(MOD-SP4): 44.3 %
BH CV ECHO MEAS - ESV(CUBED): 85.6 ML
BH CV ECHO MEAS - ESV(MOD-SP2): 76.1 ML
BH CV ECHO MEAS - ESV(MOD-SP4): 65.3 ML
BH CV ECHO MEAS - FS: 21.4 %
BH CV ECHO MEAS - IVS/LVPW: 1.06 CM
BH CV ECHO MEAS - IVSD: 1.31 CM
BH CV ECHO MEAS - LA DIMENSION: 3.9 CM
BH CV ECHO MEAS - LV DIASTOLIC VOL/BSA (35-75): 61.1 CM2
BH CV ECHO MEAS - LV MASS(C)D: 304 GRAMS
BH CV ECHO MEAS - LV MAX PG: 3.2 MMHG
BH CV ECHO MEAS - LV MEAN PG: 1.37 MMHG
BH CV ECHO MEAS - LV SYSTOLIC VOL/BSA (12-30): 34 CM2
BH CV ECHO MEAS - LV V1 MAX: 90 CM/SEC
BH CV ECHO MEAS - LV V1 VTI: 14.4 CM
BH CV ECHO MEAS - LVIDD: 5.6 CM
BH CV ECHO MEAS - LVIDS: 4.4 CM
BH CV ECHO MEAS - LVOT AREA: 3.9 CM2
BH CV ECHO MEAS - LVOT DIAM: 2.22 CM
BH CV ECHO MEAS - LVPWD: 1.23 CM
BH CV ECHO MEAS - MR MAX PG: 32 MMHG
BH CV ECHO MEAS - MR MAX VEL: 282.9 CM/SEC
BH CV ECHO MEAS - MV A MAX VEL: 84 CM/SEC
BH CV ECHO MEAS - MV DEC SLOPE: 448.7 CM/SEC2
BH CV ECHO MEAS - MV DEC TIME: 0.16 SEC
BH CV ECHO MEAS - MV E MAX VEL: 70.7 CM/SEC
BH CV ECHO MEAS - MV E/A: 0.84
BH CV ECHO MEAS - MV MAX PG: 3 MMHG
BH CV ECHO MEAS - MV MEAN PG: 1.32 MMHG
BH CV ECHO MEAS - MV V2 VTI: 18.6 CM
BH CV ECHO MEAS - MVA(VTI): 3 CM2
BH CV ECHO MEAS - PA ACC TIME: 0.05 SEC
BH CV ECHO MEAS - PA V2 MAX: 92.2 CM/SEC
BH CV ECHO MEAS - PULM A REVS DUR: 0.12 SEC
BH CV ECHO MEAS - PULM A REVS VEL: 28.6 CM/SEC
BH CV ECHO MEAS - PULM DIAS VEL: 31 CM/SEC
BH CV ECHO MEAS - PULM S/D: 1.29
BH CV ECHO MEAS - PULM SYS VEL: 40.1 CM/SEC
BH CV ECHO MEAS - QP/QS: 1.53
BH CV ECHO MEAS - RAP SYSTOLE: 3 MMHG
BH CV ECHO MEAS - RV MAX PG: 3.3 MMHG
BH CV ECHO MEAS - RV V1 MAX: 91.2 CM/SEC
BH CV ECHO MEAS - RV V1 VTI: 14.1 CM
BH CV ECHO MEAS - RVDD: 3.3 CM
BH CV ECHO MEAS - RVOT DIAM: 2.8 CM
BH CV ECHO MEAS - RVSP: 19 MMHG
BH CV ECHO MEAS - SI(MOD-SP2): 25 ML/M2
BH CV ECHO MEAS - SI(MOD-SP4): 27.1 ML/M2
BH CV ECHO MEAS - SV(LVOT): 55.8 ML
BH CV ECHO MEAS - SV(MOD-SP2): 48 ML
BH CV ECHO MEAS - SV(MOD-SP4): 52 ML
BH CV ECHO MEAS - SV(RVOT): 85.5 ML
BH CV ECHO MEAS - TR MAX PG: 15.9 MMHG
BH CV ECHO MEAS - TR MAX VEL: 195.7 CM/SEC

## 2024-04-03 PROCEDURE — 93306 TTE W/DOPPLER COMPLETE: CPT

## 2024-04-03 PROCEDURE — 93356 MYOCRD STRAIN IMG SPCKL TRCK: CPT

## 2024-04-05 ENCOUNTER — TELEPHONE (OUTPATIENT)
Dept: CARDIOLOGY | Facility: CLINIC | Age: 64
End: 2024-04-05
Payer: MEDICARE

## 2024-04-05 NOTE — TELEPHONE ENCOUNTER
Patient had echo on 4/3. Does he need to make a follow up appt? No future appts made and he has a device. Please advise.

## 2024-04-05 NOTE — TELEPHONE ENCOUNTER
Office Visit with Juarez Wing MD (03/26/2024)     Adult Transthoracic Echo Complete W/ Cont if Necessary Per Protocol (04/03/2024 16:14)

## 2024-04-07 NOTE — PROGRESS NOTES
Subjective:     Encounter Date:04/08/2024      Patient ID: Bobby Steele Jr. is a 64 y.o. male.    Chief Complaint and history of present illness:     Follow-up for HFrEF, ischemic cardiomyopathy, CAD, CABG, PCI, dyslipidemia, hypertension, diabetes, CKD.     History of Present Illness  :      Mr. Bobby Steele Jr. has PMH of        # NSTEMI  5/12/19, SHILPA to SVG to RCA and proximal LAD leading into a small diagonal, cath 5/11/2020 underwent SHILPA to SVG to RCA and native proximal LCx with Impella assistance.  Cath 6/1/2021 revealed patent LIMA to LAD, SVG to PDA, occluded SVG to diagonal, patent stent in left main, severe disease in tortuous segment in mid LCx and LAD going to diagonal, unchanged from previous cath.  # CAD status post CABG X3 V  #Chronic HFrEF due to systolic dysfunction from ischemic cardiomyopathy with EF of 35%, status post ICD 10/8/2019  # COPD, continue tobacco abuse  # Hypertension   # Hyperlipidemia  # Diabetes with hemoglobin A1c of 10 on 5/2/2020  #.  CKD 3 and 4  # PAD, gangrene of left foot, amputation  #Positive family for premature heart disease with father MI 53     Here for  follow-up.  Patient is complaining of dyspnea on exertion.  Denies any chest pain.  Is preop for right toe amputation due to gangrene and PAD. Continues to smoke in spite of counseling.  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  Patient had new EKG changes compared to last year.     Patient's arterial blood pressure is 98/62, heart rate 84 bpm, O2 sat of 95% on room air.  Patient continues to smoke in spite of counseling.     Patient was recently in the hospital, where he presented because of recurrent substernal chest pain.  Patient  underwent cardiac cath 6/1/2021 which revealed severe triple-vessel disease with patent LIMA to LAD, SVG to PDA, occluded SVG to diagonal, patent left main, severe disease in proximal LAD leading to  diagonal, severe disease in mid LCx which is in a tortuous segment.       Labs from 12/27/2020 reveal cholesterol 111 triglycerides 124 HDL 40 LDL 49.  Labs from 5/31/2021 reveal hemoglobin A1c of 9.3.  Labs from 6/1/2021 and 6/2/2021 reveal proBNP 1481, CMP with a potassium of 3.4, BUN 27, creatinine 1.24, GFR 59. Labs from 7/28/2021 revealed BUN 34 creatinine 1.71 GFR 41, glucose 399.  Labs from 6/3/2022 reveal BMP with a creatinine 1.49, GFR 48, NT proBNP elevated at 707.  Labs from 6/3/2022 reveal BMP with a creatinine of 1.49 and glucose of 279 and NT proBNP of 707.         ASSESSMENT:      #Dyspnea on exertion, possible anginal equivalent, abnormal EKG  #Preop clearance for right leg amputation  #  chronic CHF, ischemic cardiomyopathy 35%, status post ICD 10/8/2019  # CAD status post CABG, PCI  # COPD, tobacco abuse   # hypertension ,  #Dyslipidemia  #Hyperglycemia, and type 2 diabetes  #  CKD  # PAD, toe gangrene     Recommendations:     Reviewed EKG results with patient.  Patient has developed new EKG changes.  Dyspnea is worse than before.  Will cancel amputation and schedule him for stress test.  Patient cannot walk due to amputation will do Lexiscan Cardiolite.  Patient is off Entresto and ACE inhibitor's, ARB, Arni, due to CKD.  Advised follow-up with nephrology.  We will continue medical management with Eliquis, clopidogrel,, atorvastatin, carvedilol, Farxiga and Aldactone as tolerated to help with CHF, CAD, hypertension, dyslipidemia.  Since patient is on Eliquis and Plavix his aspirin has been stopped.  Patient does not know his medication list.  Advised him to bring his medications.  Will check labs and follow-up after stress test and consider further evaluation treatment.  Patient would benefit from diabetes control, A1c 8 5/31/2021 is elevated at 9.3.  A1c 4/7/2022 was 11.3  Counseled on smoking cessation.  Patient underwent cardiac cath 6/1/2021 which revealed severe disease in mid LCx which is in  the loop of a bend and heavily calcified high risk for complications therefore was not intervened upon.  Patient also has severe disease going to the diagonal.  Basal rate for medical management.  Reviewed CHF care with patient.  Follow-up with PMD for diabetes.  Will check repeat echo and labs and follow-up  Patient's device check is revealing normal function we will follow-up in device clinic.     Procedures:     Stress myoview 4/8/2022  1.abnormal perfusion, large inferior wall defect consistent with scar and MI, small to moderate reversible lateral defect consistent with ischemia  2.  Severe LV dysfunction.  LVEF of 27%.     Echocardiogram 4/7/2022  LVE with severe global left ventricular hypokinesis, estimated LV ejection fraction of 30%  Normal RV size.  Pacer lead seen.  Moderate left atrial enlargement  Aortic valve, mitral valve, tricuspid valve appears structurally normal, he is to mild mitral and tricuspid regurgitation seen.  Calculated RV systolic pressure of 21 mmHg  No pericardial effusion seen.  Proximal aorta appears normal in size.     Cardiac cath 6/1/2021 which revealed severe triple-vessel disease with patent LIMA to LAD, SVG to PDA, occluded SVG to diagonal, patent left main, severe disease in proximal LAD leading to diagonal, severe disease in mid LCx which is in a tortuous segment.     Lexiscan Cardiolite 1/25/2023 which revealed small anterolateral ischemia EF of 37% and old inferior wall MI     Echocardiogram 3/26/2024 reveals EF of 35 to 40% with diastolic dysfunction.           ECG 12 Lead    Date/Time: 4/8/2024 11:12 AM  Performed by: Juarez Wing MD    Authorized by: Juarez Wing MD  Comparison: compared with previous ECG from 7/3/2023  Comparison to previous ECG: EKG done today reviewed/elevated by me reveals sinus rhythm with rate of 85 bpm with downsloping T ST segment depression and T wave inversion in inferolateral leads new compared EKG from  7/3/2023          Copied text in this portion of the note has been reviewed and is accurate as of 4/8/2024  The following portions of the patient's history were reviewed and updated as appropriate: allergies, current medications, past family history, past medical history, past social history, past surgical history and problem list.    Assessment:         [facility]       Diagnosis Plan   1. Preoperative cardiovascular examination  Stress Test With Myocardial Perfusion One Day    ECG 12 Lead    Comprehensive Metabolic Panel    BNP    Lipid Panel    Hemoglobin A1c      2. Dyspnea on exertion  Stress Test With Myocardial Perfusion One Day    ECG 12 Lead    Comprehensive Metabolic Panel    BNP    Lipid Panel    Hemoglobin A1c      3. Coronary artery disease of bypass graft of native heart with stable angina pectoris  Stress Test With Myocardial Perfusion One Day    ECG 12 Lead    Comprehensive Metabolic Panel    BNP    Lipid Panel    Hemoglobin A1c      4. Abnormal EKG  Stress Test With Myocardial Perfusion One Day    ECG 12 Lead    Comprehensive Metabolic Panel    BNP    Lipid Panel    Hemoglobin A1c      5. Essential hypertension  Stress Test With Myocardial Perfusion One Day    ECG 12 Lead    Comprehensive Metabolic Panel    BNP    Lipid Panel    Hemoglobin A1c      6. Dyslipidemia  Stress Test With Myocardial Perfusion One Day    ECG 12 Lead    Comprehensive Metabolic Panel    BNP    Lipid Panel    Hemoglobin A1c      7. Presence of automatic cardioverter/defibrillator (AICD)  Stress Test With Myocardial Perfusion One Day    ECG 12 Lead    Comprehensive Metabolic Panel    BNP    Lipid Panel    Hemoglobin A1c      8. Chronic HFrEF (heart failure with reduced ejection fraction)  Stress Test With Myocardial Perfusion One Day    ECG 12 Lead    Comprehensive Metabolic Panel    BNP    Lipid Panel    Hemoglobin A1c      9. Ischemic cardiomyopathy  Stress Test With Myocardial Perfusion One Day    ECG 12 Lead    Comprehensive  Metabolic Panel    BNP    Lipid Panel    Hemoglobin A1c      10. Tobacco use disorder  Stress Test With Myocardial Perfusion One Day    ECG 12 Lead    Comprehensive Metabolic Panel    BNP    Lipid Panel    Hemoglobin A1c      11. Chronic renal impairment, stage 3 (moderate), unspecified whether stage 3a or 3b CKD  Stress Test With Myocardial Perfusion One Day    ECG 12 Lead    Comprehensive Metabolic Panel    BNP    Lipid Panel    Hemoglobin A1c      12. Type 2 diabetes mellitus with nephropathy  Stress Test With Myocardial Perfusion One Day    ECG 12 Lead    Comprehensive Metabolic Panel    BNP    Lipid Panel    Hemoglobin A1c      13. Peripheral vascular disease  Stress Test With Myocardial Perfusion One Day    ECG 12 Lead    Comprehensive Metabolic Panel    BNP    Lipid Panel    Hemoglobin A1c             Plan:               Past Medical History:  Past Medical History:   Diagnosis Date    CAD (coronary artery disease)     S/P CABG    Chest pain 05/31/2021    Chronic systolic CHF (congestive heart failure)     COPD (chronic obstructive pulmonary disease)     DM2 (diabetes mellitus, type 2)     Dyslipidemia     Encounter for monitoring antiplatelet therapy     Hypertension     Myocardial infarction     inferior wall MI--03/13    Non-pressure chronic ulcer of other part of left foot with fat layer exposed     DEBI (obstructive sleep apnea)     noncompliant with CPAP    Peripheral vascular disease     S/P left fem-pop bypass    Tobacco use      Past Surgical History:  Past Surgical History:   Procedure Laterality Date    ABOVE KNEE LEG AMPUTATION Left     AMPUTATION FOOT / TOE      left    APPENDECTOMY      at age 8 or 9    BIVENTRICULAR ASSIST DEVICE/LEFT VENTRICULAR ASSIST DEVICE INSERTION N/A 05/13/2020    Procedure: Left Ventricular Assist Device;  Surgeon: Juarez Wing MD;  Location: [facility] CATH INVASIVE LOCATION;  Service: Cardiovascular;  Laterality: N/A;    CARDIAC CATHETERIZATION      3-;  [facility] 9-    CARDIAC CATHETERIZATION Right 06/27/2019    Procedure: Cardiac Catheterization/with grafts groin access;  Surgeon: Juarez Wing MD;  Location: [facility] CATH INVASIVE LOCATION;  Service: Cardiovascular    CARDIAC CATHETERIZATION N/A 05/08/2020    Procedure: Left Heart Cath with grafts;  Surgeon: Juarez Wing MD;  Location: [facility] CATH INVASIVE LOCATION;  Service: Cardiovascular;  Laterality: N/A;    CARDIAC CATHETERIZATION N/A 05/13/2020    Procedure: Percutaneous Coronary Intervention, Impella backup;  Surgeon: Juarez Wing MD;  Location: [facility] CATH INVASIVE LOCATION;  Service: Cardiovascular;  Laterality: N/A;    CARDIAC CATHETERIZATION N/A 06/01/2021    Procedure: Left Heart Cath, possible pci;  Surgeon: Juarez Wing MD;  Location: [facility] CATH INVASIVE LOCATION;  Service: Cardiovascular;  Laterality: N/A;    CARDIAC ELECTROPHYSIOLOGY PROCEDURE N/A 10/08/2019    Procedure: ICD SC new, ST.SHIV ;  Surgeon: Juarez Wing MD;  Location: [facility] CATH INVASIVE LOCATION;  Service: Cardiovascular    CARDIAC SURGERY  2013    COLONOSCOPY      CORONARY ARTERY BYPASS GRAFT      x3, 3-18-13-LIMA to LAD, and reverse individual SVG to lateral marginal and to PDA    FEMORAL POPLITEAL BYPASS Left 01/09/2019    [facility]/ Dr. Jero Hatch    HAND SURGERY Left     crushed hand    PACEMAKER IMPLANTATION      TOE SURGERY      toe nail removal of big toe- age 8 or 9      Allergies:  No Known Allergies  Home Meds:  Current Meds:     Current Outpatient Medications:     Accu-Chek Marley Plus test strip, , Disp: , Rfl:     albuterol (PROVENTIL) (2.5 MG/3ML) 0.083% nebulizer solution, Take 2.5 mg by nebulization Every 6 (Six) Hours As Needed for Wheezing or Shortness of Air., Disp: , Rfl:     apixaban (ELIQUIS) 5 MG tablet tablet, Take 1 tablet by mouth 2 (Two) Times a Day., Disp: , Rfl:     atorvastatin (LIPITOR) 40 MG tablet, Take 1 tablet by mouth Daily., Disp: ,  Rfl:     carvedilol (COREG) 3.125 MG tablet, Take 1 tablet by mouth 2 (Two) Times a Day With Meals., Disp: 180 tablet, Rfl: 1    DULoxetine (CYMBALTA) 30 MG capsule, , Disp: , Rfl:     fenofibrate 160 MG tablet, Take 1 tablet by mouth Every Evening., Disp: , Rfl:     furosemide (LASIX) 40 MG tablet, Take 1 tablet by mouth 2 (Two) Times a Day., Disp: , Rfl:     gabapentin (NEURONTIN) 300 MG capsule, , Disp: , Rfl:     glimepiride (AMARYL) 4 MG tablet, Take 1 tablet by mouth 2 (Two) Times a Day., Disp: , Rfl:     HYDROcodone-acetaminophen (NORCO) 5-325 MG per tablet, Take 1 tablet every 8 hours by oral route as needed., Disp: , Rfl:     insulin glargine (LANTUS, SEMGLEE) 100 UNIT/ML injection, Inject 40 Units under the skin into the appropriate area as directed Every Night., Disp: , Rfl:     Janumet  MG per tablet, Take 1 tablet by mouth 2 (Two) Times a Day., Disp: , Rfl:     Lantus SoloStar 100 UNIT/ML injection pen, , Disp: , Rfl:     Magnesium Oxide 400 (240 Mg) MG tablet, Take 1 tablet by mouth 2 (Two) Times a Day., Disp: , Rfl:     nitroglycerin (NITROSTAT) 0.4 MG SL tablet, DISSOLVE ONE TABLET UNDER THE TONGUE AS NEEDED FOR CHEST PAIN, Disp: , Rfl:     clopidogrel (PLAVIX) 75 MG tablet, Take 1 tablet by mouth Daily. (Patient not taking: Reported on 4/8/2024), Disp: , Rfl:     Farxiga 10 MG tablet, Take 1 tablet by mouth once daily (Patient not taking: Reported on 4/8/2024), Disp: 90 tablet, Rfl: 0    isosorbide mononitrate (IMDUR) 30 MG 24 hr tablet, Take 1 tablet by mouth Daily. (Patient not taking: Reported on 4/8/2024), Disp: , Rfl:     isosorbide mononitrate (IMDUR) 60 MG 24 hr tablet, , Disp: , Rfl:     lisinopril (PRINIVIL,ZESTRIL) 10 MG tablet, Take 1 tablet by mouth Daily. (Patient not taking: Reported on 4/8/2024), Disp: , Rfl:     lisinopril (PRINIVIL,ZESTRIL) 2.5 MG tablet, Take 1 tablet by mouth Daily. (Patient not taking: Reported on 4/8/2024), Disp: , Rfl:     lisinopril (PRINIVIL,ZESTRIL) 20  "MG tablet, , Disp: , Rfl:     lisinopril (PRINIVIL,ZESTRIL) 5 MG tablet, Take 1 tablet by mouth Daily. (Patient not taking: Reported on 2024), Disp: , Rfl:     metFORMIN (GLUCOPHAGE) 1000 MG tablet, , Disp: , Rfl:     metOLazone (ZAROXOLYN) 5 MG tablet, Take 1 tablet by mouth Daily. (Patient not taking: Reported on 2024), Disp: , Rfl:     metoprolol tartrate (LOPRESSOR) 25 MG tablet, Take 1 tablet twice a day by oral route for 90 days. (Patient not taking: Reported on 2024), Disp: , Rfl:     metoprolol tartrate (LOPRESSOR) 50 MG tablet, , Disp: , Rfl:     midodrine (PROAMATINE) 2.5 MG tablet, , Disp: , Rfl:     sacubitril-valsartan (Entresto) 24-26 MG tablet, TAKE 1 2 (ONE HALF) TABLET BY MOUTH TWICE DAILY (Patient not taking: Reported on 2024), Disp: , Rfl:   Social History:   Social History     Tobacco Use    Smoking status: Every Day     Current packs/day: 1.50     Average packs/day: 1.5 packs/day for 25.0 years (37.5 ttl pk-yrs)     Types: Cigarettes    Smokeless tobacco: Never   Substance Use Topics    Alcohol use: No      Family History:  Family History   Problem Relation Age of Onset    Hypertension Mother     Diabetes Mother     Heart disease Father         had CABG and re-do/  while recovering-had MI age 40s    Diabetes Father     Pancreatic cancer Sister     Hyperlipidemia Brother     Stroke Brother     Heart disease Paternal Uncle               Review of Systems   Cardiovascular:  Negative for chest pain, leg swelling and palpitations.   Respiratory:  Positive for shortness of breath.    Neurological:  Positive for numbness. Negative for dizziness.     All other systems are negative         Objective:     Physical Exam  BP 98/62 (BP Location: Right arm, Patient Position: Sitting, Cuff Size: Large Adult)   Pulse 84   Ht 170.2 cm (67\")   Wt 80.3 kg (177 lb)   SpO2 95%   BMI 27.72 kg/m²   General:  Appears in no acute distress  Eyes: Sclera is anicteric,  conjunctiva is clear " "  HEENT:  No JVD.  No carotid bruits  Respiratory: Respirations regular and unlabored at rest.  Clear to auscultation  Cardiovascular: S1,S2 Regular rate and rhythm. .   Extremities: No digital clubbing or cyanosis, no edema  Skin: Color pink. Skin warm and dry to touch. No rashes  No xanthoma  Neuro: Alert and awake.    Lab Reviewed:         Juarez Wing MD  4/8/2024 11:15 EDT      EMR Dragon/Transcription:   \"Dictated utilizing Dragon dictation\".        "

## 2024-04-08 ENCOUNTER — OFFICE VISIT (OUTPATIENT)
Dept: CARDIOLOGY | Facility: CLINIC | Age: 64
End: 2024-04-08
Payer: MEDICARE

## 2024-04-08 VITALS
HEIGHT: 67 IN | BODY MASS INDEX: 27.78 KG/M2 | WEIGHT: 177 LBS | DIASTOLIC BLOOD PRESSURE: 62 MMHG | OXYGEN SATURATION: 95 % | HEART RATE: 84 BPM | SYSTOLIC BLOOD PRESSURE: 98 MMHG

## 2024-04-08 DIAGNOSIS — E11.21 TYPE 2 DIABETES MELLITUS WITH NEPHROPATHY: ICD-10-CM

## 2024-04-08 DIAGNOSIS — I10 ESSENTIAL HYPERTENSION: ICD-10-CM

## 2024-04-08 DIAGNOSIS — I73.9 PERIPHERAL VASCULAR DISEASE: ICD-10-CM

## 2024-04-08 DIAGNOSIS — R94.31 ABNORMAL EKG: ICD-10-CM

## 2024-04-08 DIAGNOSIS — E78.5 DYSLIPIDEMIA: ICD-10-CM

## 2024-04-08 DIAGNOSIS — Z01.810 PREOPERATIVE CARDIOVASCULAR EXAMINATION: Primary | ICD-10-CM

## 2024-04-08 DIAGNOSIS — I50.22 CHRONIC HFREF (HEART FAILURE WITH REDUCED EJECTION FRACTION): ICD-10-CM

## 2024-04-08 DIAGNOSIS — Z95.810 PRESENCE OF AUTOMATIC CARDIOVERTER/DEFIBRILLATOR (AICD): ICD-10-CM

## 2024-04-08 DIAGNOSIS — I25.708 CORONARY ARTERY DISEASE OF BYPASS GRAFT OF NATIVE HEART WITH STABLE ANGINA PECTORIS: ICD-10-CM

## 2024-04-08 DIAGNOSIS — N18.30 CHRONIC RENAL IMPAIRMENT, STAGE 3 (MODERATE), UNSPECIFIED WHETHER STAGE 3A OR 3B CKD: ICD-10-CM

## 2024-04-08 DIAGNOSIS — I25.5 ISCHEMIC CARDIOMYOPATHY: ICD-10-CM

## 2024-04-08 DIAGNOSIS — R06.09 DYSPNEA ON EXERTION: ICD-10-CM

## 2024-04-08 DIAGNOSIS — F17.200 TOBACCO USE DISORDER: ICD-10-CM

## 2024-04-08 RX ORDER — SULFAMETHOXAZOLE AND TRIMETHOPRIM 800; 160 MG/1; MG/1
TABLET ORAL
COMMUNITY
End: 2024-04-08

## 2024-04-08 RX ORDER — LISINOPRIL 2.5 MG/1
1 TABLET ORAL DAILY
COMMUNITY

## 2024-04-08 RX ORDER — DULOXETIN HYDROCHLORIDE 30 MG/1
CAPSULE, DELAYED RELEASE ORAL
COMMUNITY
Start: 2024-02-09

## 2024-04-08 RX ORDER — ATORVASTATIN CALCIUM 20 MG/1
TABLET, FILM COATED ORAL
COMMUNITY
End: 2024-04-08

## 2024-04-08 RX ORDER — SACUBITRIL AND VALSARTAN 24; 26 MG/1; MG/1
TABLET, FILM COATED ORAL
COMMUNITY

## 2024-04-08 RX ORDER — METOLAZONE 5 MG/1
1 TABLET ORAL DAILY
COMMUNITY

## 2024-04-08 RX ORDER — FUROSEMIDE 20 MG/1
TABLET ORAL
COMMUNITY
End: 2024-04-08

## 2024-04-08 RX ORDER — HYDROCODONE BITARTRATE AND ACETAMINOPHEN 10; 325 MG/1; MG/1
TABLET ORAL
COMMUNITY
End: 2024-04-08

## 2024-04-08 RX ORDER — HYDROCODONE BITARTRATE AND ACETAMINOPHEN 5; 325 MG/1; MG/1
TABLET ORAL
COMMUNITY

## 2024-04-08 RX ORDER — LISINOPRIL 5 MG/1
1 TABLET ORAL DAILY
COMMUNITY

## 2024-04-08 RX ORDER — OXYCODONE HYDROCHLORIDE AND ACETAMINOPHEN 5; 325 MG/1; MG/1
TABLET ORAL
COMMUNITY
End: 2024-04-08

## 2024-04-08 RX ORDER — ISOSORBIDE MONONITRATE 60 MG/1
TABLET, EXTENDED RELEASE ORAL
COMMUNITY

## 2024-04-08 RX ORDER — FENOFIBRATE 160 MG/1
1 TABLET ORAL EVERY EVENING
COMMUNITY

## 2024-04-08 RX ORDER — TEMAZEPAM 15 MG/1
1 CAPSULE ORAL NIGHTLY PRN
COMMUNITY
End: 2024-04-08

## 2024-04-08 RX ORDER — LISINOPRIL 20 MG/1
TABLET ORAL
COMMUNITY

## 2024-04-08 RX ORDER — ISOSORBIDE MONONITRATE 30 MG/1
1 TABLET, EXTENDED RELEASE ORAL DAILY
COMMUNITY

## 2024-04-08 RX ORDER — METOPROLOL TARTRATE 50 MG/1
TABLET, FILM COATED ORAL
COMMUNITY

## 2024-04-08 RX ORDER — HYDROCODONE BITARTRATE AND ACETAMINOPHEN 7.5; 325 MG/1; MG/1
1 TABLET ORAL 2 TIMES DAILY PRN
COMMUNITY
End: 2024-04-08

## 2024-04-08 RX ORDER — LISINOPRIL 10 MG/1
1 TABLET ORAL DAILY
COMMUNITY

## 2024-04-08 RX ORDER — MIDODRINE HYDROCHLORIDE 2.5 MG/1
TABLET ORAL
COMMUNITY

## 2024-04-08 RX ORDER — NITROGLYCERIN 0.4 MG/1
TABLET SUBLINGUAL
COMMUNITY

## 2024-04-08 RX ORDER — TRAMADOL HYDROCHLORIDE 50 MG/1
TABLET ORAL
COMMUNITY
End: 2024-04-08

## 2024-04-08 RX ORDER — SITAGLIPTIN AND METFORMIN HYDROCHLORIDE 500; 50 MG/1; MG/1
1 TABLET, FILM COATED ORAL 2 TIMES DAILY
COMMUNITY

## 2024-04-08 RX ORDER — OXYCODONE AND ACETAMINOPHEN 10; 325 MG/1; MG/1
TABLET ORAL
COMMUNITY
End: 2024-04-08

## 2024-05-06 ENCOUNTER — HOSPITAL ENCOUNTER (OUTPATIENT)
Dept: CARDIOLOGY | Facility: HOSPITAL | Age: 64
Discharge: HOME | End: 2024-05-06
Payer: MEDICARE

## 2024-05-06 ENCOUNTER — OFFICE VISIT (OUTPATIENT)
Dept: CARDIOLOGY | Facility: CLINIC | Age: 64
End: 2024-05-06
Payer: MEDICARE

## 2024-05-06 VITALS
WEIGHT: 177 LBS | SYSTOLIC BLOOD PRESSURE: 116 MMHG | HEIGHT: 67 IN | HEART RATE: 75 BPM | BODY MASS INDEX: 27.78 KG/M2 | DIASTOLIC BLOOD PRESSURE: 72 MMHG

## 2024-05-06 DIAGNOSIS — I25.708 CORONARY ARTERY DISEASE OF BYPASS GRAFT OF NATIVE HEART WITH STABLE ANGINA PECTORIS: ICD-10-CM

## 2024-05-06 DIAGNOSIS — I10 ESSENTIAL HYPERTENSION: ICD-10-CM

## 2024-05-06 DIAGNOSIS — E78.5 DYSLIPIDEMIA: ICD-10-CM

## 2024-05-06 DIAGNOSIS — R94.31 ABNORMAL EKG: ICD-10-CM

## 2024-05-06 DIAGNOSIS — I50.22 CHRONIC HFREF (HEART FAILURE WITH REDUCED EJECTION FRACTION): ICD-10-CM

## 2024-05-06 DIAGNOSIS — E11.21 TYPE 2 DIABETES MELLITUS WITH NEPHROPATHY: ICD-10-CM

## 2024-05-06 DIAGNOSIS — F17.200 TOBACCO USE DISORDER: ICD-10-CM

## 2024-05-06 DIAGNOSIS — R06.09 DYSPNEA ON EXERTION: ICD-10-CM

## 2024-05-06 DIAGNOSIS — N18.30 CHRONIC RENAL IMPAIRMENT, STAGE 3 (MODERATE), UNSPECIFIED WHETHER STAGE 3A OR 3B CKD: ICD-10-CM

## 2024-05-06 DIAGNOSIS — I25.5 ISCHEMIC CARDIOMYOPATHY: ICD-10-CM

## 2024-05-06 DIAGNOSIS — I73.9 PERIPHERAL VASCULAR DISEASE: ICD-10-CM

## 2024-05-06 DIAGNOSIS — Z01.810 PREOPERATIVE CARDIOVASCULAR EXAMINATION: ICD-10-CM

## 2024-05-06 DIAGNOSIS — Z95.810 PRESENCE OF AUTOMATIC CARDIOVERTER/DEFIBRILLATOR (AICD): ICD-10-CM

## 2024-05-06 DIAGNOSIS — Z01.810 PREOPERATIVE CARDIOVASCULAR EXAMINATION: Primary | ICD-10-CM

## 2024-05-06 LAB
BH CV REST NUCLEAR ISOTOPE DOSE: 10.5 MCI
BH CV STRESS BP STAGE 1: NORMAL
BH CV STRESS COMMENTS STAGE 1: NORMAL
BH CV STRESS DOSE REGADENOSON STAGE 1: 0.4
BH CV STRESS DURATION MIN STAGE 1: 0
BH CV STRESS DURATION SEC STAGE 1: 10
BH CV STRESS HR STAGE 1: 75
BH CV STRESS NUCLEAR ISOTOPE DOSE: 32.6 MCI
BH CV STRESS PROTOCOL 1: NORMAL
BH CV STRESS RECOVERY BP: NORMAL MMHG
BH CV STRESS RECOVERY HR: 80 BPM
BH CV STRESS STAGE 1: 1
LV EF NUC BP: 48 %
MAXIMAL PREDICTED HEART RATE: 156 BPM
STRESS BASELINE BP: NORMAL MMHG
STRESS BASELINE HR: 75 BPM
STRESS TARGET HR: 133 BPM

## 2024-05-06 PROCEDURE — 0 TECHNETIUM SESTAMIBI: Performed by: INTERNAL MEDICINE

## 2024-05-06 PROCEDURE — 99214 OFFICE O/P EST MOD 30 MIN: CPT | Performed by: INTERNAL MEDICINE

## 2024-05-06 PROCEDURE — 93018 CV STRESS TEST I&R ONLY: CPT | Performed by: INTERNAL MEDICINE

## 2024-05-06 PROCEDURE — 25010000002 REGADENOSON 0.4 MG/5ML SOLUTION: Performed by: INTERNAL MEDICINE

## 2024-05-06 PROCEDURE — 3074F SYST BP LT 130 MM HG: CPT | Performed by: INTERNAL MEDICINE

## 2024-05-06 PROCEDURE — 78452 HT MUSCLE IMAGE SPECT MULT: CPT | Performed by: INTERNAL MEDICINE

## 2024-05-06 PROCEDURE — 1160F RVW MEDS BY RX/DR IN RCRD: CPT | Performed by: INTERNAL MEDICINE

## 2024-05-06 PROCEDURE — 93017 CV STRESS TEST TRACING ONLY: CPT

## 2024-05-06 PROCEDURE — A9500 TC99M SESTAMIBI: HCPCS | Performed by: INTERNAL MEDICINE

## 2024-05-06 PROCEDURE — 78452 HT MUSCLE IMAGE SPECT MULT: CPT

## 2024-05-06 PROCEDURE — 1159F MED LIST DOCD IN RCRD: CPT | Performed by: INTERNAL MEDICINE

## 2024-05-06 PROCEDURE — 3078F DIAST BP <80 MM HG: CPT | Performed by: INTERNAL MEDICINE

## 2024-05-06 RX ORDER — HYDROCODONE BITARTRATE AND ACETAMINOPHEN 7.5; 325 MG/1; MG/1
1 TABLET ORAL EVERY 12 HOURS SCHEDULED
COMMUNITY
Start: 2024-04-23

## 2024-05-06 RX ORDER — BUPROPION HYDROCHLORIDE 150 MG/1
150 TABLET ORAL EVERY EVENING
COMMUNITY
Start: 2024-04-23

## 2024-05-06 RX ORDER — PANTOPRAZOLE SODIUM 40 MG/1
40 TABLET, DELAYED RELEASE ORAL DAILY
COMMUNITY

## 2024-05-06 RX ORDER — REGADENOSON 0.08 MG/ML
0.4 INJECTION, SOLUTION INTRAVENOUS
Status: COMPLETED | OUTPATIENT
Start: 2024-05-06 | End: 2024-05-06

## 2024-05-06 RX ADMIN — TECHNETIUM TC 99M SESTAMIBI 1 DOSE: 1 INJECTION INTRAVENOUS at 12:17

## 2024-05-06 RX ADMIN — REGADENOSON 0.4 MG: 0.08 INJECTION, SOLUTION INTRAVENOUS at 14:08

## 2024-05-06 RX ADMIN — TECHNETIUM TC 99M SESTAMIBI 1 DOSE: 1 INJECTION INTRAVENOUS at 14:08

## 2024-06-19 ENCOUNTER — TRANSCRIBE ORDERS (OUTPATIENT)
Dept: HOME HEALTH SERVICES | Facility: HOME HEALTHCARE | Age: 64
End: 2024-06-19
Payer: MEDICARE

## 2024-06-19 ENCOUNTER — HOME HEALTH ADMISSION (OUTPATIENT)
Dept: HOME HEALTH SERVICES | Facility: HOME HEALTHCARE | Age: 64
End: 2024-06-19
Payer: MEDICARE

## 2024-06-19 DIAGNOSIS — M86.9 OSTEOMYELITIS OF ANKLE AND FOOT: Primary | ICD-10-CM

## 2024-07-01 ENCOUNTER — TELEPHONE (OUTPATIENT)
Dept: CARDIOLOGY | Facility: CLINIC | Age: 64
End: 2024-07-01
Payer: MEDICARE

## 2024-07-01 NOTE — TELEPHONE ENCOUNTER
FACILITY: [facility]  : [place]  PHONE: [phone]  FAX: [phone]  PROCEDURE: Right Above knee amputation  SCHEDULED: 07/10/24   MEDS TO HOLD: Eliquis

## 2024-09-10 PROCEDURE — 93296 REM INTERROG EVL PM/IDS: CPT | Performed by: INTERNAL MEDICINE

## 2024-09-10 PROCEDURE — 93295 DEV INTERROG REMOTE 1/2/MLT: CPT | Performed by: INTERNAL MEDICINE

## 2024-12-04 ENCOUNTER — OFFICE VISIT (OUTPATIENT)
Dept: CARDIOLOGY | Facility: CLINIC | Age: 64
End: 2024-12-04
Payer: MEDICARE

## 2024-12-04 ENCOUNTER — CLINICAL SUPPORT NO REQUIREMENTS (OUTPATIENT)
Dept: CARDIOLOGY | Facility: CLINIC | Age: 64
End: 2024-12-04
Payer: MEDICARE

## 2024-12-04 VITALS
HEIGHT: 67 IN | HEART RATE: 78 BPM | SYSTOLIC BLOOD PRESSURE: 87 MMHG | BODY MASS INDEX: 27.78 KG/M2 | DIASTOLIC BLOOD PRESSURE: 57 MMHG | OXYGEN SATURATION: 94 % | WEIGHT: 177 LBS

## 2024-12-04 DIAGNOSIS — I50.22 CHRONIC HFREF (HEART FAILURE WITH REDUCED EJECTION FRACTION): Primary | ICD-10-CM

## 2024-12-04 DIAGNOSIS — I25.5 ISCHEMIC CARDIOMYOPATHY: Chronic | ICD-10-CM

## 2024-12-04 DIAGNOSIS — Z95.810 PRESENCE OF AUTOMATIC CARDIOVERTER/DEFIBRILLATOR (AICD): ICD-10-CM

## 2024-12-04 DIAGNOSIS — I50.22 CHRONIC SYSTOLIC CONGESTIVE HEART FAILURE: Primary | Chronic | ICD-10-CM

## 2024-12-04 DIAGNOSIS — I25.708 CORONARY ARTERY DISEASE OF BYPASS GRAFT OF NATIVE HEART WITH STABLE ANGINA PECTORIS: ICD-10-CM

## 2024-12-04 DIAGNOSIS — I25.5 ISCHEMIC CARDIOMYOPATHY: ICD-10-CM

## 2024-12-04 DIAGNOSIS — I95.89 CHRONIC HYPOTENSION: ICD-10-CM

## 2024-12-04 DIAGNOSIS — E78.5 DYSLIPIDEMIA: ICD-10-CM

## 2024-12-04 DIAGNOSIS — Z95.810 PRESENCE OF AUTOMATIC CARDIOVERTER/DEFIBRILLATOR (AICD): Chronic | ICD-10-CM

## 2024-12-04 PROCEDURE — 93000 ELECTROCARDIOGRAM COMPLETE: CPT | Performed by: INTERNAL MEDICINE

## 2024-12-04 PROCEDURE — 1160F RVW MEDS BY RX/DR IN RCRD: CPT | Performed by: INTERNAL MEDICINE

## 2024-12-04 PROCEDURE — 99214 OFFICE O/P EST MOD 30 MIN: CPT | Performed by: INTERNAL MEDICINE

## 2024-12-04 PROCEDURE — 1159F MED LIST DOCD IN RCRD: CPT | Performed by: INTERNAL MEDICINE

## 2024-12-04 PROCEDURE — 3078F DIAST BP <80 MM HG: CPT | Performed by: INTERNAL MEDICINE

## 2024-12-04 PROCEDURE — 3074F SYST BP LT 130 MM HG: CPT | Performed by: INTERNAL MEDICINE

## 2024-12-04 RX ORDER — SEMAGLUTIDE 1.34 MG/ML
INJECTION, SOLUTION SUBCUTANEOUS WEEKLY
COMMUNITY

## 2024-12-04 RX ORDER — LANOLIN ALCOHOL/MO/W.PET/CERES
1000 CREAM (GRAM) TOPICAL DAILY
COMMUNITY

## 2024-12-04 NOTE — PROGRESS NOTES
Subjective:     Encounter Date:12/04/2024      Patient ID: Bobby Steele Jr. is a 64 y.o. male.    Chief Complaint and history of present illness:     Follow-up for chronic hypotension, HFrEF, ischemic cardiomyopathy, CAD, CABG, PCI, dyslipidemia, hypertension, diabetes, CKD.     History of Present Illness  :      Mr. Bobby Steele Jr. has PMH of        # NSTEMI  5/12/19, SHILPA to SVG to RCA and proximal LAD leading into a small diagonal, cath 5/11/2020 underwent SHILPA to SVG to RCA and native proximal LCx with Impella assistance.  Cath 6/1/2021 revealed patent LIMA to LAD, SVG to PDA, occluded SVG to diagonal, patent stent in left main, severe disease in tortuous segment in mid LCx and LAD going to diagonal, unchanged from previous cath.  # CAD status post CABG X3 V  #Chronic HFrEF due to systolic dysfunction from ischemic cardiomyopathy with EF of 35%, status post ICD 10/8/2019  # COPD, continue tobacco abuse  # Hypertension   # Hyperlipidemia  # Diabetes with hemoglobin A1c of 10 on 5/2/2020  #.  CKD 3 and 4  # PAD, gangrene of left foot, amputation  #Positive family for premature heart disease with father MI 53     Here for  follow-up.  Patient is bilateral amputee in a wheelchair.  Continues to smoke in spite of counseling.  Denies any chest pain or shortness of breath.     Patient's arterial blood pressure is 87/57, heart rate 78 bpm, O2 sat of 94% on room air..      Labs from 12/27/2020 reveal cholesterol 111 triglycerides 124 HDL 40 LDL 49.  Labs from 5/31/2021 reveal hemoglobin A1c of 9.3.  Labs from 6/1/2021 and 6/2/2021 reveal proBNP 1481, CMP with a potassium of 3.4, BUN 27, creatinine 1.24, GFR 59. Labs from 7/28/2021 revealed BUN 34 creatinine 1.71 GFR 41, glucose 399.  Labs from 6/3/2022 reveal BMP with a creatinine 1.49, GFR 48, NT proBNP elevated at 707.  Labs from 6/3/2022 reveal BMP with a creatinine of 1.49 and glucose of 279 and NT proBNP of 707.  Labs from 4/24/2024 reveal CMP with a creatinine  of 1.49, glucose 201, NT proBNP of 624.  A1c of 8.5.  Lipid profile with cholesterol 165, triglycerides 221, HDL 31, LDL 98.         ASSESSMENT:        #  chronic CHF, ischemic cardiomyopathy 35%, status post ICD 10/8/2019  # CAD status post CABG, PCI  # COPD, tobacco abuse   # hypertension ,  #Dyslipidemia  #Hyperglycemia, and type 2 diabetes  #  CKD  # PAD, bilateral amputee     Recommendations:     Reviewed EKG results with patient.  Patient has CHF, cardiomyopathy, CAD, PCI but has chronic hypotension limiting guideline directed medical therapy  Patient is off Entresto and ACE inhibitor's, ARB, Arni, due to CKD.  Advised follow-up with nephrology.  We will continue medical management with Eliquis, clopidogrel,, atorvastatin, carvedilol,  and Aldactone as tolerated to help with CHF, CAD, hypertension, dyslipidemia.  Since patient is on Eliquis and Plavix his aspirin has been stopped.  Patient now has chronic hypotension and cannot give him Entresto, ACE, ARB, Arni.    Patient would benefit from diabetes control, A1c 8 5/31/2021 is elevated at 9.3.  A1c 4/7/2022 was 11.3.  Is 8.5 on 4/24/2024.  Counseled on smoking cessation.    Reviewed CHF care with patient.  Follow-up with PMD for diabetes.  Patient's device check is revealing normal function we will follow-up in device clinic.       Procedures:     Stress myoview 4/8/2022  1.abnormal perfusion, large inferior wall defect consistent with scar and MI, small to moderate reversible lateral defect consistent with ischemia  2.  Severe LV dysfunction.  LVEF of 27%.     Echocardiogram 4/7/2022  LVE with severe global left ventricular hypokinesis, estimated LV ejection fraction of 30%  Normal RV size.  Pacer lead seen.  Moderate left atrial enlargement  Aortic valve, mitral valve, tricuspid valve appears structurally normal, he is to mild mitral and tricuspid regurgitation seen.  Calculated RV systolic pressure of 21 mmHg  No pericardial effusion seen.  Proximal aorta  appears normal in size.     Cardiac cath 6/1/2021 which revealed severe triple-vessel disease with patent LIMA to LAD, SVG to PDA, occluded SVG to diagonal, patent left main, severe disease in proximal LAD leading to diagonal, severe disease in mid LCx which is in a tortuous segment.     Lexiscan Cardiolite 1/25/2023 which revealed small anterolateral ischemia EF of 37% and old inferior wall MI     Echocardiogram 3/26/2024 reveals EF of 35 to 40% with diastolic dysfunction.     Lexiscan Cardiolite 5/6/2024 revealed large fixed inferior and inferolateral MI with EF of 48%      ECG 12 Lead    Date/Time: 12/4/2024 12:36 PM  Performed by: Juarez Wing MD    Authorized by: Juarez Wing MD  Comparison: compared with previous ECG from 4/8/2024  Comparison to previous ECG: EKG done today reviewed/interpreted by me reveals sinus rhythm with rate of 73 bpm with IVCD and nonspecific ST-T changes.  No new change compared to EKG from 4/8/2024          Copied text in this portion of the note has been reviewed and is accurate as of 12/4/2024  The following portions of the patient's history were reviewed and updated as appropriate: allergies, current medications, past family history, past medical history, past social history, past surgical history and problem list.    Assessment:         MDM       Diagnosis Plan   1. Chronic HFrEF (heart failure with reduced ejection fraction)        2. Ischemic cardiomyopathy        3. Presence of automatic cardioverter/defibrillator (AICD)        4. Coronary artery disease of bypass graft of native heart with stable angina pectoris        5. Dyslipidemia        6. Chronic hypotension               Plan:               Past Medical History:  Past Medical History:   Diagnosis Date    CAD (coronary artery disease)     S/P CABG    Chest pain 05/31/2021    Chronic systolic CHF (congestive heart failure)     COPD (chronic obstructive pulmonary disease)     DM2 (diabetes  mellitus, type 2)     Dyslipidemia     Encounter for monitoring antiplatelet therapy     Hypertension     Myocardial infarction     inferior wall MI--03/13    Non-pressure chronic ulcer of other part of left foot with fat layer exposed     DEBI (obstructive sleep apnea)     noncompliant with CPAP    Peripheral vascular disease     S/P left fem-pop bypass    Tobacco use      Past Surgical History:  Past Surgical History:   Procedure Laterality Date    ABOVE KNEE LEG AMPUTATION Left     AMPUTATION FOOT / TOE      left    APPENDECTOMY      at age 8 or 9    BIVENTRICULAR ASSIST DEVICE/LEFT VENTRICULAR ASSIST DEVICE INSERTION N/A 05/13/2020    Procedure: Left Ventricular Assist Device;  Surgeon: Juarez Wing MD;  Location:  [facility] CATH INVASIVE LOCATION;  Service: Cardiovascular;  Laterality: N/A;    CARDIAC CATHETERIZATION      3-; [facility] 9-    CARDIAC CATHETERIZATION Right 06/27/2019    Procedure: Cardiac Catheterization/with grafts groin access;  Surgeon: Juarez Wing MD;  Location: [facility] CATH INVASIVE LOCATION;  Service: Cardiovascular    CARDIAC CATHETERIZATION N/A 05/08/2020    Procedure: Left Heart Cath with grafts;  Surgeon: Juarez Wing MD;  Location: [facility] CATH INVASIVE LOCATION;  Service: Cardiovascular;  Laterality: N/A;    CARDIAC CATHETERIZATION N/A 05/13/2020    Procedure: Percutaneous Coronary Intervention, Impella backup;  Surgeon: Juarez Wing MD;  Location: [facility] CATH INVASIVE LOCATION;  Service: Cardiovascular;  Laterality: N/A;    CARDIAC CATHETERIZATION N/A 06/01/2021    Procedure: Left Heart Cath, possible pci;  Surgeon: Juarez Wing MD;  Location: [facility] CATH INVASIVE LOCATION;  Service: Cardiovascular;  Laterality: N/A;    CARDIAC ELECTROPHYSIOLOGY PROCEDURE N/A 10/08/2019    Procedure: ICD SC new, ST.SHIV ;  Surgeon: Juarez Wing MD;  Location: [facility] CATH INVASIVE LOCATION;  Service: Cardiovascular     CARDIAC SURGERY  2013    COLONOSCOPY      CORONARY ARTERY BYPASS GRAFT      x3, 3-18-13-LIMA to LAD, and reverse individual SVG to lateral marginal and to PDA    FEMORAL POPLITEAL BYPASS Left 01/09/2019    [facility]/ Dr. Jero Hatch    HAND SURGERY Left     crushed hand    PACEMAKER IMPLANTATION      TOE SURGERY      toe nail removal of big toe- age 8 or 9      Allergies:  No Known Allergies  Home Meds:  Current Meds:     Current Outpatient Medications:     albuterol (PROVENTIL) (2.5 MG/3ML) 0.083% nebulizer solution, Take 2.5 mg by nebulization Every 6 (Six) Hours As Needed for Wheezing or Shortness of Air., Disp: , Rfl:     apixaban (ELIQUIS) 5 MG tablet tablet, Take 1 tablet by mouth 2 (Two) Times a Day., Disp: , Rfl:     atorvastatin (LIPITOR) 40 MG tablet, Take 1 tablet by mouth Daily., Disp: , Rfl:     buPROPion XL (WELLBUTRIN XL) 150 MG 24 hr tablet, Take 1 tablet by mouth Every Evening., Disp: , Rfl:     carvedilol (COREG) 3.125 MG tablet, Take 1 tablet by mouth 2 (Two) Times a Day With Meals., Disp: 180 tablet, Rfl: 1    clopidogrel (PLAVIX) 75 MG tablet, Take 1 tablet by mouth Daily., Disp: , Rfl:     DULoxetine (CYMBALTA) 30 MG capsule, , Disp: , Rfl:     fenofibrate 160 MG tablet, Take 1 tablet by mouth Every Evening., Disp: , Rfl:     furosemide (LASIX) 40 MG tablet, Take 0.5 tablets by mouth 2 (Two) Times a Day., Disp: , Rfl:     gabapentin (NEURONTIN) 300 MG capsule, , Disp: , Rfl:     insulin glargine (LANTUS, SEMGLEE) 100 UNIT/ML injection, Inject 40 Units under the skin into the appropriate area as directed Every Night., Disp: , Rfl:     pantoprazole (PROTONIX) 40 MG EC tablet, Take 1 tablet by mouth Daily., Disp: , Rfl:     Semaglutide,0.25 or 0.5MG/DOS, (Ozempic, 0.25 or 0.5 MG/DOSE,) 2 MG/1.5ML solution pen-injector, Inject  under the skin into the appropriate area as directed 1 (One) Time Per Week., Disp: , Rfl:     vitamin B-12 (CYANOCOBALAMIN) 1000 MCG tablet, Take 1 tablet by mouth Daily.,  Disp: , Rfl:     Accu-Chek Marley Plus test strip, , Disp: , Rfl:     Farxiga 10 MG tablet, Take 1 tablet by mouth once daily, Disp: 90 tablet, Rfl: 0    glimepiride (AMARYL) 4 MG tablet, Take 1 tablet by mouth 2 (Two) Times a Day., Disp: , Rfl:     HYDROcodone-acetaminophen (NORCO) 7.5-325 MG per tablet, Take 1 tablet by mouth Every 12 (Twelve) Hours., Disp: , Rfl:     Janumet  MG per tablet, Take 1 tablet by mouth 2 (Two) Times a Day., Disp: , Rfl:     Lantus SoloStar 100 UNIT/ML injection pen, , Disp: , Rfl:     midodrine (PROAMATINE) 2.5 MG tablet, Take 1 tablet by mouth 2 (Two) Times a Day. (Patient not taking: Reported on 2024), Disp: , Rfl:     nitroglycerin (NITROSTAT) 0.4 MG SL tablet, DISSOLVE ONE TABLET UNDER THE TONGUE AS NEEDED FOR CHEST PAIN (Patient not taking: Reported on 2024), Disp: , Rfl:   Social History:   Social History     Tobacco Use    Smoking status: Every Day     Current packs/day: 1.50     Average packs/day: 1.5 packs/day for 25.0 years (37.5 ttl pk-yrs)     Types: Cigarettes     Passive exposure: Current    Smokeless tobacco: Never   Substance Use Topics    Alcohol use: No      Family History:  Family History   Problem Relation Age of Onset    Hypertension Mother     Diabetes Mother     Heart disease Father         had CABG and re-do/  while recovering-had MI age 40s    Diabetes Father     Pancreatic cancer Sister     Hyperlipidemia Brother     Stroke Brother     Heart disease Paternal Uncle               Review of Systems   Constitutional: Negative for malaise/fatigue.   Cardiovascular:  Negative for chest pain, leg swelling and palpitations.   Respiratory:  Positive for shortness of breath.    Skin:  Negative for rash.   Neurological:  Negative for dizziness, light-headedness and numbness.     All other systems are negative         Objective:     Physical Exam  BP (!) 87/57 (BP Location: Left arm, Patient Position: Sitting, Cuff Size: Adult)   Pulse 78   Ht 170.2  "cm (67\")   Wt 80.3 kg (177 lb)   SpO2 94%   BMI 27.72 kg/m²   General:  Appears in no acute distress  Eyes: Sclera is anicteric,  conjunctiva is clear   HEENT:  No JVD.  No carotid bruits  Respiratory: Respirations regular and unlabored at rest.  Clear to auscultation  Cardiovascular: S1,S2 Regular rate and rhythm. .   Extremities: Bilateral AKA  Skin: Color pink. Skin warm and dry to touch. No rashes  No xanthoma  Neuro: Alert and awake.  In a wheelchair    Lab Reviewed:         Juarez Wing MD  12/4/2024 12:36 EST      EMR Dragon/Transcription:   \"Dictated utilizing Dragon dictation\".        "

## 2025-02-19 NOTE — PROGRESS NOTES
Left message regarding remote check, battery life and next download date.   
(3) no apparent problem

## 2025-04-01 ENCOUNTER — TELEPHONE (OUTPATIENT)
Dept: CARDIOLOGY | Facility: CLINIC | Age: 65
End: 2025-04-01
Payer: MEDICARE

## 2025-04-01 NOTE — TELEPHONE ENCOUNTER
Caller: OMAR GALLAGHER    Relationship to patient: Emergency Contact    Best call back number:[phone]    Patient is needing: PT WAS IN ED OVER THE WEEKEND (SAGASTUME) FOR CARBON MONOXIDE POISONING, THEY ADVISED HIM THAT HIS HEART IS NOW OPERATING AT 20%. CURRENTLY SCHEDULED FOR A FOLLOW UP IN JUNE, WANTS TO KNOW IF THIS NEEDS TO BE MOVED UP OR NOT.

## 2025-04-01 NOTE — TELEPHONE ENCOUNTER
Please schedule patient for a follow up for earliest opening. Request that patient bring discharge papers from [place] to the appointment and current medication list.

## 2025-04-07 ENCOUNTER — OFFICE VISIT (OUTPATIENT)
Dept: CARDIOLOGY | Facility: CLINIC | Age: 65
End: 2025-04-07
Payer: MEDICARE

## 2025-04-07 VITALS — DIASTOLIC BLOOD PRESSURE: 73 MMHG | SYSTOLIC BLOOD PRESSURE: 116 MMHG | OXYGEN SATURATION: 94 % | HEART RATE: 73 BPM

## 2025-04-07 DIAGNOSIS — I50.22 CHRONIC HFREF (HEART FAILURE WITH REDUCED EJECTION FRACTION): Primary | ICD-10-CM

## 2025-04-07 DIAGNOSIS — I10 ESSENTIAL HYPERTENSION: ICD-10-CM

## 2025-04-07 DIAGNOSIS — I95.89 CHRONIC HYPOTENSION: ICD-10-CM

## 2025-04-07 DIAGNOSIS — Z95.810 PRESENCE OF AUTOMATIC CARDIOVERTER/DEFIBRILLATOR (AICD): ICD-10-CM

## 2025-04-07 DIAGNOSIS — I25.708 CORONARY ARTERY DISEASE OF BYPASS GRAFT OF NATIVE HEART WITH STABLE ANGINA PECTORIS: ICD-10-CM

## 2025-04-07 DIAGNOSIS — I25.5 ISCHEMIC CARDIOMYOPATHY: ICD-10-CM

## 2025-04-07 DIAGNOSIS — E78.5 DYSLIPIDEMIA: ICD-10-CM

## 2025-04-07 PROCEDURE — 99214 OFFICE O/P EST MOD 30 MIN: CPT | Performed by: INTERNAL MEDICINE

## 2025-04-07 PROCEDURE — 3074F SYST BP LT 130 MM HG: CPT | Performed by: INTERNAL MEDICINE

## 2025-04-07 PROCEDURE — 3078F DIAST BP <80 MM HG: CPT | Performed by: INTERNAL MEDICINE

## 2025-04-07 PROCEDURE — 1159F MED LIST DOCD IN RCRD: CPT | Performed by: INTERNAL MEDICINE

## 2025-04-07 PROCEDURE — 1160F RVW MEDS BY RX/DR IN RCRD: CPT | Performed by: INTERNAL MEDICINE

## 2025-04-07 RX ORDER — LOSARTAN POTASSIUM 25 MG/1
25 TABLET ORAL DAILY
Qty: 30 TABLET | Refills: 11 | Status: SHIPPED | OUTPATIENT
Start: 2025-04-07

## 2025-04-07 NOTE — PROGRESS NOTES
Subjective:     Encounter Date:04/07/2025      Patient ID: Bobby Steele Jr. is a 65 y.o. male.    Chief Complaint and history of present illness:     Follow-up for chronic hypotension, HFrEF, ischemic cardiomyopathy, CAD, CABG, PCI, dyslipidemia, hypertension, diabetes, CKD.     History of Present Illness  :      Mr. Bobby Steele Jr. has PMH of        # NSTEMI  5/12/19, SHILPA to SVG to RCA and proximal LAD leading into a small diagonal, cath 5/11/2020 underwent SHILPA to SVG to RCA and native proximal LCx with Impella assistance.  Cath 6/1/2021 revealed patent LIMA to LAD, SVG to PDA, occluded SVG to diagonal, patent stent in left main, severe disease in tortuous segment in mid LCx and LAD going to diagonal, unchanged from previous cath.  # CAD status post CABG X3 V  #Chronic HFrEF due to systolic dysfunction from ischemic cardiomyopathy with EF of 35%, status post ICD 10/8/2019  # COPD, continue tobacco abuse  # Hypertension   # Hyperlipidemia  # Diabetes with hemoglobin A1c of 10 on 5/2/2020  #.  CKD 3 and 4  # PAD, gangrene of left foot, amputation  #Positive family for premature heart disease with father MI 53     Here for  follow-up.  Patient is bilateral amputee in a wheelchair.  Continues to smoke in spite of counseling.  Patient denies any chest pain.  Has been having cough and congestion and bronchitis.     Patient's arterial blood pressure is 116/73, heart rate 73 bpm, O2 sat of 94% on room air..      Labs from 12/27/2020 reveal cholesterol 111 triglycerides 124 HDL 40 LDL 49.  Labs from 5/31/2021 reveal hemoglobin A1c of 9.3.  Labs from 6/1/2021 and 6/2/2021 reveal proBNP 1481, CMP with a potassium of 3.4, BUN 27, creatinine 1.24, GFR 59. Labs from 7/28/2021 revealed BUN 34 creatinine 1.71 GFR 41, glucose 399.  Labs from 6/3/2022 reveal BMP with a creatinine 1.49, GFR 48, NT proBNP elevated at 707.  Labs from 6/3/2022 reveal BMP with a creatinine of 1.49 and glucose of 279 and NT proBNP of 707.  Labs  from 4/24/2024 reveal CMP with a creatinine of 1.49, glucose 201, NT proBNP of 624.  A1c of 8.5.  Lipid profile with cholesterol 165, triglycerides 221, HDL 31, LDL 98.         ASSESSMENT:         #  chronic CHF, ischemic cardiomyopathy 35%, status post ICD 10/8/2019  # CAD status post CABG, PCI  # COPD, tobacco abuse   # hypertension ,  #Dyslipidemia  #Hyperglycemia, and type 2 diabetes  #  CKD  # PAD, bilateral amputee     Recommendations:    Patient's renal function is better.  Will add losartan 25 mg.  Patient has chronic hypotension which prevented from getting guideline directed medical therapy in the past.  Will follow-up closely in a month and repeat labs and evaluate hemodynamics to see if he is tolerating losartan  Continue medical management with Eliquis, atorvastatin, carvedilol, Farxiga, losartan as tolerated.  Patient previously was on midodrine is off.  Patient has chronic hypotension  Advised him to check blood pressure at home.  Counseled on smoking cessation.  Patient states he is decreased but does not start.  PMD for diabetes control.  Reviewed CHF with patient.  Patient's device was checked and clinic and was found to be functioning well.  Will follow-up in device clinic.        Procedures:     Stress myoview 4/8/2022  1.abnormal perfusion, large inferior wall defect consistent with scar and MI, small to moderate reversible lateral defect consistent with ischemia  2.  Severe LV dysfunction.  LVEF of 27%.     Echocardiogram 4/7/2022  LVE with severe global left ventricular hypokinesis, estimated LV ejection fraction of 30%  Normal RV size.  Pacer lead seen.  Moderate left atrial enlargement  Aortic valve, mitral valve, tricuspid valve appears structurally normal, he is to mild mitral and tricuspid regurgitation seen.  Calculated RV systolic pressure of 21 mmHg  No pericardial effusion seen.  Proximal aorta appears normal in size.     Cardiac cath 6/1/2021 which revealed severe triple-vessel  disease with patent LIMA to LAD, SVG to PDA, occluded SVG to diagonal, patent left main, severe disease in proximal LAD leading to diagonal, severe disease in mid LCx which is in a tortuous segment.     Lexiscan Cardiolite 1/25/2023 which revealed small anterolateral ischemia EF of 37% and old inferior wall MI     Echocardiogram 3/26/2024 reveals EF of 35 to 40% with diastolic dysfunction.     Lexiscan Cardiolite 5/6/2024 revealed large fixed inferior and inferolateral MI with EF of 48%        Procedures    EKG done 12/4/2024 reviewed/interpreted by me reveals sinus rhythm with rate of 73 bpm.      ECHOCARDIOGRAM:  Results for orders placed during the hospital encounter of 04/03/24    Adult Transthoracic Echo Complete W/ Cont if Necessary Per Protocol    Interpretation Summary    Left ventricular systolic function is moderately decreased. Left ventricular ejection fraction appears to be 36 - 40%.    The left ventricular cavity is moderately dilated.    The findings are consistent with dilated cardiomyopathy.    Left ventricular diastolic function is consistent with (grade I) impaired relaxation.    The left atrial cavity is severely dilated.    The right atrial cavity is severely  dilated.    Estimated right ventricular systolic pressure from tricuspid regurgitation is normal (<35 mmHg).    Conclusion      Moderate left ventricular enlargement seen.  Moderate global left ventricular hypokinesis, estimated LV ejection fraction of 35 to 40%  Abnormal relaxation pattern consistent with diastolic dysfunction seen.  Normal RV size.  Pacemaker lead seen.  Severe biatrial enlargement seen  Pulmonic valve is not well visualized.  Aortic valve, mitral valve, tricuspid valve appears structurally normal, trace mitral and tricuspid regurgitation seen.  No pericardial effusion seen.  Proximal aorta appears normal in size.      STRESS TEST  Results for orders placed during the hospital encounter of 05/06/24    Stress Test With  Myocardial Perfusion One Day    Interpretation Summary    Left ventricular ejection fraction is borderline normal (Calculated EF = 48%).    LEXISCAN CARDIOLITE REPORT    DATE OF PROCEDURE:  24    INDICATION FOR PROCEDURE:  Chest pain, CAD, CABG, preop    PROCEDURE PERFORMED: Lexiscan Cardiolite    PROCEDURE COMMENTS:    After informed consent was obtained.  Patient's resting heart rate was 75 beats per, resting blood pressure was 116/70, resting EKG revealed sinus rhythm at the rate of 75 bpm with LVH and repole.  Patient was given 0.4 mg of regadenosine for stress testing.  There was no significant change in heart rate, blood pressure, symptoms with regadenoson injection.  Patient tolerated procedure well.  Complications were none.    NUCLEAR IMAGIN.  There was large severe defect involving whole of inferior wall, which is fixed consistent with inferior wall MI, no ischemia seen.  2.  Gated images reveal inferior hypokinesis, LVEF of 48%.    CONCLUSION:  1.  Lexiscan Cardiolite with abnormal perfusion with inferior wall MI and scar, negative for ischemia.  2.  Mild LV dysfunction.  LVEF of 48%.    RECOMMENDATIONS:    Clinical correlation recommended.      Juarez Wing MD  24  16:16 EDT          HEART CATHETERIZATION  Results for orders placed during the hospital encounter of 21    Cardiac Catheterization/Vascular Study    Narrative  Table formatting from the original result was not included.  2021      Heart Cath Report    NAME:              Bobby Steele JrEmir  :                1960  AGE/SEX:        61 y.o. male  MRN:                [id number]        Procedures Performed    1. Left heart catheterization  2. Selective coronary angiography  3. Left ventriculography  4. LIMA to LAD  5. SVG to RCA and SVG to diagonal    :   Juarez Wing MD    Vascular Access Site: Femoral    Indication for procedure: Recurrent prolonged chest pain consistent with  unstable angina, CAD, CABG, PCI, chronic HFrEF, cardiomyopathy, diabetes, dyslipidemia, hypertension, PAD  Hemodynamics    Pressures    Ao:    100/60 mmHg  LV:    100/3 mmHg  End-diastolic pressure:  3  mmHg  No significant aortic valvular gradient on pullback    Coronary Angiography    Left Main :  The left main   has mild calcified luminal irregularities less than 30%    Left Anterior Descending : Starts of is good caliber vessel there is a stent patent in proximal portion.  At the edge of the stent there is a 70% to 80% narrowing which is calcified.  Right after that there is aneurysmal dilatation in the LAD and a large diagonal comes of right at that spot.  There is tortuous loop in the segment of the vessel.  The diagonal ostium has 70% narrowing.    Left Circumflex : Has stent patent in proximal portion.  Has complex 360 degree loop in midportion and has 70% narrowing in this loop.  Distal circumflex divides into 2.  And then is occluded 100% chronically.    Right Coronary Artery :  The right coronary artery is dominant vessel, 100% occluded proximally, SVG is patent    Dominance:  []  Left  [x]  Right  []  Co-Dominant    Lima %: To LAD is patent in the origin body and insertion native LAD beyond the LIMA is without disease.    SVG(s) %: To RCA is patent in the origin body and insertion there is a stent patent in distal portion of the graft  with excellent  long-term results.  Native PDA beyond the graft is small caliber vessel with good antegrade flow, no retrograde flow    SVG(s) %: To diagonal is occluded chronically 100%.      Left Ventriculography:    Estimated Ejection Fraction: Difficult evaluate on this hand-injection  Wall motion abnormalities: Severe global hypokinesis  Mitral Regurgitation:  None seen on this hand-injection    Impression:    1. Severe triple-vessel disease with patent LIMA to LAD, SVG to PDA, occluded SVG to diagonal, patent left main  2. Severe disease in mid LCx in a tortuous  segment of the vessel, best suited for medical management  3. Severe disease in LAD going into diagonal which is unchanged from previous cath    Recommendations:    1. Continue medical management and risk factor modification      Juarez Wing MD  6/1/2021  16:59 EDT  Electronically signed by Juarez Wing MD, 06/01/21, 4:59 PM EDT.      Copied text in this portion of the note has been reviewed and is accurate as of 4/7/2025  The following portions of the patient's history were reviewed and updated as appropriate: allergies, current medications, past family history, past medical history, past social history, past surgical history and problem list.    Assessment:         MDM       Diagnosis Plan   1. Chronic HFrEF (heart failure with reduced ejection fraction)  BNP    Basic Metabolic Panel      2. Essential hypertension  BNP    Basic Metabolic Panel      3. Chronic hypotension        4. Ischemic cardiomyopathy        5. Presence of automatic cardioverter/defibrillator (AICD)        6. Coronary artery disease of bypass graft of native heart with stable angina pectoris        7. Dyslipidemia               Plan:               Past Medical History:  Past Medical History:   Diagnosis Date    CAD (coronary artery disease)     S/P CABG    Chest pain 05/31/2021    Chronic systolic CHF (congestive heart failure)     COPD (chronic obstructive pulmonary disease)     DM2 (diabetes mellitus, type 2)     Dyslipidemia     Encounter for monitoring antiplatelet therapy     Hypertension     Myocardial infarction     inferior wall MI--03/13    Non-pressure chronic ulcer of other part of left foot with fat layer exposed 6/1/2021    DEBI (obstructive sleep apnea)     noncompliant with CPAP    Peripheral vascular disease     S/P left fem-pop bypass    Tobacco use      Past Surgical History:  Past Surgical History:   Procedure Laterality Date    ABOVE KNEE LEG AMPUTATION Left     AMPUTATION FOOT / TOE      left     APPENDECTOMY      at age 8 or 9    BIVENTRICULAR ASSIST DEVICE/LEFT VENTRICULAR ASSIST DEVICE INSERTION N/A 05/13/2020    Procedure: Left Ventricular Assist Device;  Surgeon: Juarez Wing MD;  Location: [facility] CATH INVASIVE LOCATION;  Service: Cardiovascular;  Laterality: N/A;    CARDIAC CATHETERIZATION      3-; [facility] 9-    CARDIAC CATHETERIZATION Right 06/27/2019    Procedure: Cardiac Catheterization/with grafts groin access;  Surgeon: Juarez Wing MD;  Location: [facility] CATH INVASIVE LOCATION;  Service: Cardiovascular    CARDIAC CATHETERIZATION N/A 05/08/2020    Procedure: Left Heart Cath with grafts;  Surgeon: Juarez Wing MD;  Location: [facility] CATH INVASIVE LOCATION;  Service: Cardiovascular;  Laterality: N/A;    CARDIAC CATHETERIZATION N/A 05/13/2020    Procedure: Percutaneous Coronary Intervention, Impella backup;  Surgeon: Juarez Wing MD;  Location: [facility] CATH INVASIVE LOCATION;  Service: Cardiovascular;  Laterality: N/A;    CARDIAC CATHETERIZATION N/A 06/01/2021    Procedure: Left Heart Cath, possible pci;  Surgeon: Juarez Wing MD;  Location: [facility] CATH INVASIVE LOCATION;  Service: Cardiovascular;  Laterality: N/A;    CARDIAC ELECTROPHYSIOLOGY PROCEDURE N/A 10/08/2019    Procedure: ICD SC new, ST.SHIV ;  Surgeon: Juarez Wing MD;  Location: [facility] CATH INVASIVE LOCATION;  Service: Cardiovascular    CARDIAC SURGERY  2013    COLONOSCOPY      CORONARY ARTERY BYPASS GRAFT      x3, 3-18-13-LIMA to LAD, and reverse individual SVG to lateral marginal and to PDA    FEMORAL POPLITEAL BYPASS Left 01/09/2019    [facility]/ Dr. Jero Hatch    HAND SURGERY Left     crushed hand    PACEMAKER IMPLANTATION      TOE SURGERY      toe nail removal of big toe- age 8 or 9      Allergies:  No Known Allergies  Home Meds:  Current Meds:     Current Outpatient Medications:     Accu-Chek Marley Plus test strip, , Disp: , Rfl:     albuterol  (PROVENTIL) (2.5 MG/3ML) 0.083% nebulizer solution, Take 2.5 mg by nebulization Every 6 (Six) Hours As Needed for Wheezing or Shortness of Air., Disp: , Rfl:     apixaban (ELIQUIS) 5 MG tablet tablet, Take 1 tablet by mouth 2 (Two) Times a Day., Disp: , Rfl:     atorvastatin (LIPITOR) 40 MG tablet, Take 1 tablet by mouth Daily., Disp: , Rfl:     buPROPion XL (WELLBUTRIN XL) 150 MG 24 hr tablet, Take 1 tablet by mouth Every Evening., Disp: , Rfl:     carvedilol (COREG) 3.125 MG tablet, Take 1 tablet by mouth 2 (Two) Times a Day With Meals., Disp: 180 tablet, Rfl: 1    clopidogrel (PLAVIX) 75 MG tablet, Take 1 tablet by mouth Daily., Disp: , Rfl:     DULoxetine (CYMBALTA) 30 MG capsule, , Disp: , Rfl:     Farxiga 10 MG tablet, Take 1 tablet by mouth once daily, Disp: 90 tablet, Rfl: 0    fenofibrate 160 MG tablet, Take 1 tablet by mouth Every Evening., Disp: , Rfl:     furosemide (LASIX) 40 MG tablet, Take 0.5 tablets by mouth 2 (Two) Times a Day., Disp: , Rfl:     Janumet  MG per tablet, Take 1 tablet by mouth 2 (Two) Times a Day., Disp: , Rfl:     nitroglycerin (NITROSTAT) 0.4 MG SL tablet, , Disp: , Rfl:     pantoprazole (PROTONIX) 40 MG EC tablet, Take 1 tablet by mouth Daily., Disp: , Rfl:     Semaglutide,0.25 or 0.5MG/DOS, (Ozempic, 0.25 or 0.5 MG/DOSE,) 2 MG/1.5ML solution pen-injector, Inject  under the skin into the appropriate area as directed 1 (One) Time Per Week., Disp: , Rfl:     vitamin B-12 (CYANOCOBALAMIN) 1000 MCG tablet, Take 1 tablet by mouth Daily., Disp: , Rfl:     gabapentin (NEURONTIN) 300 MG capsule, , Disp: , Rfl:     glimepiride (AMARYL) 4 MG tablet, Take 1 tablet by mouth 2 (Two) Times a Day. (Patient not taking: Reported on 4/7/2025), Disp: , Rfl:     HYDROcodone-acetaminophen (NORCO) 7.5-325 MG per tablet, Take 1 tablet by mouth Every 12 (Twelve) Hours., Disp: , Rfl:     insulin glargine (LANTUS, SEMGLEE) 100 UNIT/ML injection, Inject 40 Units under the skin into the appropriate  area as directed Every Night. (Patient not taking: Reported on 2025), Disp: , Rfl:     Lantus SoloStar 100 UNIT/ML injection pen, , Disp: , Rfl:     losartan (Cozaar) 25 MG tablet, Take 1 tablet by mouth Daily., Disp: 30 tablet, Rfl: 11    midodrine (PROAMATINE) 2.5 MG tablet, Take 1 tablet by mouth 2 (Two) Times a Day. (Patient not taking: Reported on 2025), Disp: , Rfl:   Social History:   Social History     Tobacco Use    Smoking status: Every Day     Current packs/day: 1.50     Average packs/day: 1.5 packs/day for 25.0 years (37.5 ttl pk-yrs)     Types: Cigarettes     Passive exposure: Current    Smokeless tobacco: Never    Tobacco comments:     About 1 pack a day per pt--25   Substance Use Topics    Alcohol use: No      Family History:  Family History   Problem Relation Age of Onset    Hypertension Mother     Diabetes Mother     Heart disease Father         had CABG and re-do/  while recovering-had MI age 40s    Diabetes Father     Pancreatic cancer Sister     Hyperlipidemia Brother     Stroke Brother     Heart disease Paternal Uncle               Review of Systems   Constitutional: Negative for malaise/fatigue.   Cardiovascular:  Negative for chest pain, dyspnea on exertion, leg swelling and palpitations.   Respiratory:  Positive for shortness of breath. Negative for cough.    Gastrointestinal:  Negative for abdominal pain, nausea and vomiting.   Neurological:  Positive for dizziness and light-headedness. Negative for focal weakness, headaches and numbness.   All other systems reviewed and are negative.    All other systems are negative         Objective:     Physical Exam  /73 (BP Location: Right arm, Patient Position: Sitting)   Pulse 73   SpO2 94%   General:  Appears in no acute distress  Eyes: Sclera is anicteric,  conjunctiva is clear   HEENT:  No JVD.  No carotid bruits  Respiratory: Respirations regular and unlabored at rest.  Clear to auscultation  Cardiovascular: S1,S2 Regular  "rate and rhythm. .   Extremities: Bilateral AKA  Skin: Color pink. Skin warm and dry to touch. No rashes  No xanthoma  Neuro: Alert and awake.  Bilateral AKA, wheelchair-bound    Lab Reviewed:         Juarez Wing MD  4/7/2025 16:15 EDT      EMR Dragon/Transcription:   \"Dictated utilizing Dragon dictation\".        "

## 2025-05-09 ENCOUNTER — OFFICE VISIT (OUTPATIENT)
Dept: CARDIOLOGY | Facility: CLINIC | Age: 65
End: 2025-05-09
Payer: MEDICARE

## 2025-05-09 VITALS — OXYGEN SATURATION: 95 % | HEART RATE: 79 BPM | SYSTOLIC BLOOD PRESSURE: 140 MMHG | DIASTOLIC BLOOD PRESSURE: 81 MMHG

## 2025-05-09 DIAGNOSIS — Z95.810 PRESENCE OF AUTOMATIC CARDIOVERTER/DEFIBRILLATOR (AICD): Chronic | ICD-10-CM

## 2025-05-09 DIAGNOSIS — I73.9 PERIPHERAL VASCULAR DISEASE: Chronic | ICD-10-CM

## 2025-05-09 DIAGNOSIS — E78.2 MIXED HYPERLIPIDEMIA: ICD-10-CM

## 2025-05-09 DIAGNOSIS — I50.22 CHRONIC SYSTOLIC CONGESTIVE HEART FAILURE: Primary | Chronic | ICD-10-CM

## 2025-05-09 DIAGNOSIS — I25.5 ISCHEMIC CARDIOMYOPATHY: Chronic | ICD-10-CM

## 2025-05-09 DIAGNOSIS — F17.200 TOBACCO USE DISORDER: ICD-10-CM

## 2025-05-09 PROCEDURE — 3077F SYST BP >= 140 MM HG: CPT | Performed by: NURSE PRACTITIONER

## 2025-05-09 PROCEDURE — 3079F DIAST BP 80-89 MM HG: CPT | Performed by: NURSE PRACTITIONER

## 2025-05-09 PROCEDURE — 99214 OFFICE O/P EST MOD 30 MIN: CPT | Performed by: NURSE PRACTITIONER

## 2025-05-09 PROCEDURE — 1159F MED LIST DOCD IN RCRD: CPT | Performed by: NURSE PRACTITIONER

## 2025-05-09 PROCEDURE — 1160F RVW MEDS BY RX/DR IN RCRD: CPT | Performed by: NURSE PRACTITIONER

## 2025-05-09 RX ORDER — INSULIN DEGLUDEC 200 U/ML
45 INJECTION, SOLUTION SUBCUTANEOUS 2 TIMES DAILY
COMMUNITY
Start: 2025-04-01

## 2025-05-09 RX ORDER — IPRATROPIUM BROMIDE AND ALBUTEROL SULFATE 2.5; .5 MG/3ML; MG/3ML
3 SOLUTION RESPIRATORY (INHALATION)
COMMUNITY

## 2025-05-09 RX ORDER — LORAZEPAM 0.5 MG/1
0.5 TABLET ORAL EVERY 6 HOURS PRN
COMMUNITY

## 2025-05-09 RX ORDER — BUDESONIDE 0.5 MG/2ML
0.5 INHALANT ORAL
COMMUNITY

## 2025-05-09 NOTE — PROGRESS NOTES
Subjective:     Encounter Date:05/09/2025        Patient ID: Bobby Steele Jr. is a 65 y.o. male.    Chief Complaint : 1 month follow up for chronic HFrEF, ischemic cardiomyopathy, s/p ICD. PVD s/p bilateral AKA     History of Present Illness      Mr. Bobby Steele Jr. has PMH of        # NSTEMI  5/12/19, SHILPA to SVG to RCA and proximal LAD leading into a small diagonal, cath 5/11/2020 underwent SHILPA to SVG to RCA and native proximal LCx with Impella assistance.  Cath 6/1/2021 revealed patent LIMA to LAD, SVG to PDA, occluded SVG to diagonal, patent stent in left main, severe disease in tortuous segment in mid LCx and LAD going to diagonal, unchanged from previous cath.  # CAD status post CABG X3 V  # Ischemic cardiomyopathy with EF of 35%, status post ICD 10/8/2019  # COPD, continue tobacco abuse  # Hypertension   # Hyperlipidemia  # Diabetes with hemoglobin A1c of 10 on 5/2/2020  #.  CKD 3-4 (Dr. Humeml)  #Gangrene of left foot  status post AKA (?2-3/2023) now bilateral aka   #Positive family for premature heart disease with father MI 53    Here for one month follow up with lab results.  He was started on losartan last month as his renal function had returned back to normal and blood pressure had been doing much better.  Patient denies any chest pain.  He reports chronic shortness of breath.  Patient was recently in the hospital with bronchitis/acute COPD; continues to smoke but has cut way back from 2 packs per day to about 4 cigarettes per day.      Patient is not sure if he is taking midodrine or not (does not think he is anymore).       Blood pressure today is 140/81 HR 79 oxygen 95%   unable to obtain weight as patient is in wheelchair and bilateral AKA.            Labs      Labs 4/28/2025 from [facility]   (normal <195), K 4.5 glucose 314 bun 24 creatinine 1.08        Procedures    Stress myoview 4/8/2022  1.abnormal perfusion, large inferior wall defect consistent with scar and MI,  small to moderate reversible lateral defect consistent with ischemia  2.  Severe LV dysfunction.  LVEF of 27%.    Stress myoview 1/25/2023  1.  Lexiscan Cardiolite with abnormal perfusion, large inferior wall MI/scar with small cole-infarct ischemia.  Small area of anterolateral ischemia.  2.  Moderate to severe LV dysfunction.  LVEF of 37%.     Stress myoview 5/6/2024  1.  There was large severe defect involving whole of inferior wall, which is fixed consistent with inferior wall MI, no ischemia seen.  2.  Gated images reveal inferior hypokinesis, LVEF of 48%.         Echocardiogram 4/7/2022  LVE with severe global left ventricular hypokinesis, estimated LV ejection fraction of 30%  Normal RV size.  Pacer lead seen.  Moderate left atrial enlargement  Aortic valve, mitral valve, tricuspid valve appears structurally normal, he is to mild mitral and tricuspid regurgitation seen.  Calculated RV systolic pressure of 21 mmHg  No pericardial effusion seen.  Proximal aorta appears normal in size.    Echocardiogram 4/3/2024    Left ventricular systolic function is moderately decreased. Left ventricular ejection fraction appears to be 36 - 40%.    The left ventricular cavity is moderately dilated.    The findings are consistent with dilated cardiomyopathy.    Left ventricular diastolic function is consistent with (grade I) impaired relaxation.    The left atrial cavity is severely dilated.    The right atrial cavity is severely  dilated.    Estimated right ventricular systolic pressure from tricuspid regurgitation is normal (<35 mmHg).     cardiac cath 6/1/2021 which revealed severe triple-vessel disease with patent LIMA to LAD, SVG to PDA, occluded SVG to diagonal, patent left main, severe disease in proximal LAD leading to diagonal, severe disease in mid LCx which is in a tortuous segment.           Copied text in this portion of the note has been reviewed and is accurate as of 5/17/2025  The following portions of the  patient's history were reviewed and updated as appropriate: allergies, current medications, past family history, past medical history, past social history, past surgical history and problem list.    Assessment:         [facility]       Diagnosis Plan   1. Chronic systolic congestive heart failure        2. Peripheral vascular disease        3. Mixed hyperlipidemia        4. Ischemic cardiomyopathy        5. Presence of automatic cardioverter/defibrillator (AICD)        6. Tobacco use disorder                   Plan:       Chart reviewed  Labs reviewed and renal function is normal   Patient's blood pressure is stable on Losartan   (Uncertain if he is on midodrine or not, he is going to check his medications at home as his medications are in pill packs and call if he is still on it).       Discussed heart failure with patient   Patient is currently NYHA class II-III     Continue carvedilol 3.125mg BID; farxiga 10mg daily  lasix 40mg (20mg BID)  losartan 25mg daily   Advised patient to monitor blood pressure at home.     Continue Eliquis, plavix, statin + tricor for CAD     Advised complete smoking cessation-    Keep follow ups with PCP and nephrology   Will keep scheduled follow up next month to monitor blood pressure since restarting losartan and check volume status.     Electronically signed by JOY Ashton, 05/17/25, 6:42 PM EDT.      Past Medical History:  Past Medical History:   Diagnosis Date    CAD (coronary artery disease)     S/P CABG    Chest pain 05/31/2021    Chronic systolic CHF (congestive heart failure)     COPD (chronic obstructive pulmonary disease)     DM2 (diabetes mellitus, type 2)     Dyslipidemia     Encounter for monitoring antiplatelet therapy     Hypertension     Myocardial infarction     inferior wall MI--03/13    Non-pressure chronic ulcer of other part of left foot with fat layer exposed 6/1/2021    DEBI (obstructive sleep apnea)     noncompliant with CPAP    Peripheral vascular disease      S/P left fem-pop bypass    Tobacco use      Past Surgical History:  Past Surgical History:   Procedure Laterality Date    ABOVE KNEE LEG AMPUTATION Left     AMPUTATION FOOT / TOE      left    APPENDECTOMY      at age 8 or 9    BIVENTRICULAR ASSIST DEVICE/LEFT VENTRICULAR ASSIST DEVICE INSERTION N/A 05/13/2020    Procedure: Left Ventricular Assist Device;  Surgeon: Juarez Wing MD;  Location: [facility] CATH INVASIVE LOCATION;  Service: Cardiovascular;  Laterality: N/A;    CARDIAC CATHETERIZATION      3-; [facility] 9-    CARDIAC CATHETERIZATION Right 06/27/2019    Procedure: Cardiac Catheterization/with grafts groin access;  Surgeon: Juarez Wing MD;  Location: [facility] CATH INVASIVE LOCATION;  Service: Cardiovascular    CARDIAC CATHETERIZATION N/A 05/08/2020    Procedure: Left Heart Cath with grafts;  Surgeon: Juarez Wing MD;  Location: [facility] CATH INVASIVE LOCATION;  Service: Cardiovascular;  Laterality: N/A;    CARDIAC CATHETERIZATION N/A 05/13/2020    Procedure: Percutaneous Coronary Intervention, Impella backup;  Surgeon: Juarez Wing MD;  Location: [facility] CATH INVASIVE LOCATION;  Service: Cardiovascular;  Laterality: N/A;    CARDIAC CATHETERIZATION N/A 06/01/2021    Procedure: Left Heart Cath, possible pci;  Surgeon: Juarez Wing MD;  Location: [facility] CATH INVASIVE LOCATION;  Service: Cardiovascular;  Laterality: N/A;    CARDIAC ELECTROPHYSIOLOGY PROCEDURE N/A 10/08/2019    Procedure: ICD SC new, ST.SHIV ;  Surgeon: Juarez Wing MD;  Location: [facility] CATH INVASIVE LOCATION;  Service: Cardiovascular    CARDIAC SURGERY  2013    COLONOSCOPY      CORONARY ARTERY BYPASS GRAFT      x3, 3-18-13-LIMA to LAD, and reverse individual SVG to lateral marginal and to PDA    FEMORAL POPLITEAL BYPASS Left 01/09/2019    [facility]/ Dr. Jero Hatch    HAND SURGERY Left     crushed hand    PACEMAKER IMPLANTATION      TOE SURGERY      toe nail removal of  big toe- age 8 or 9      Allergies:  No Known Allergies  Home Meds:  Current Meds:     Current Outpatient Medications:     Accu-Chek Marley Plus test strip, , Disp: , Rfl:     albuterol (PROVENTIL) (2.5 MG/3ML) 0.083% nebulizer solution, Take 2.5 mg by nebulization Every 6 (Six) Hours As Needed for Wheezing or Shortness of Air., Disp: , Rfl:     apixaban (ELIQUIS) 5 MG tablet tablet, Take 1 tablet by mouth 2 (Two) Times a Day., Disp: , Rfl:     atorvastatin (LIPITOR) 40 MG tablet, Take 1 tablet by mouth Daily., Disp: , Rfl:     budesonide (PULMICORT) 0.5 MG/2ML nebulizer solution, Take 2 mL by nebulization Daily., Disp: , Rfl:     buPROPion XL (WELLBUTRIN XL) 150 MG 24 hr tablet, Take 1 tablet by mouth Every Evening., Disp: , Rfl:     carvedilol (COREG) 3.125 MG tablet, Take 1 tablet by mouth 2 (Two) Times a Day With Meals., Disp: 180 tablet, Rfl: 1    clopidogrel (PLAVIX) 75 MG tablet, Take 1 tablet by mouth Daily., Disp: , Rfl:     DULoxetine (CYMBALTA) 30 MG capsule, , Disp: , Rfl:     Farxiga 10 MG tablet, Take 1 tablet by mouth once daily, Disp: 90 tablet, Rfl: 0    fenofibrate 160 MG tablet, Take 1 tablet by mouth Every Evening., Disp: , Rfl:     furosemide (LASIX) 40 MG tablet, Take 0.5 tablets by mouth 2 (Two) Times a Day., Disp: , Rfl:     HYDROcodone-acetaminophen (NORCO) 7.5-325 MG per tablet, Take 1 tablet by mouth Every 12 (Twelve) Hours., Disp: , Rfl:     ipratropium-albuterol (DUO-NEB) 0.5-2.5 mg/3 ml nebulizer, Take 3 mL by nebulization 4 (Four) Times a Day., Disp: , Rfl:     Janumet  MG per tablet, Take 1 tablet by mouth 2 (Two) Times a Day., Disp: , Rfl:     LORazepam (ATIVAN) 0.5 MG tablet, Take 1 tablet by mouth Every 6 (Six) Hours As Needed., Disp: , Rfl:     losartan (Cozaar) 25 MG tablet, Take 1 tablet by mouth Daily., Disp: 30 tablet, Rfl: 11    midodrine (PROAMATINE) 2.5 MG tablet, Take 1 tablet by mouth 2 (Two) Times a Day., Disp: , Rfl:     nitroglycerin (NITROSTAT) 0.4 MG SL tablet,  , Disp: , Rfl:     pantoprazole (PROTONIX) 40 MG EC tablet, Take 1 tablet by mouth Daily., Disp: , Rfl:     Semaglutide,0.25 or 0.5MG/DOS, (Ozempic, 0.25 or 0.5 MG/DOSE,) 2 MG/1.5ML solution pen-injector, Inject  under the skin into the appropriate area as directed 1 (One) Time Per Week., Disp: , Rfl:     Tresiba FlexTouch 200 UNIT/ML solution pen-injector pen injection, Inject 45 Units under the skin into the appropriate area as directed 2 (Two) Times a Day., Disp: , Rfl:     vitamin B-12 (CYANOCOBALAMIN) 1000 MCG tablet, Take 1 tablet by mouth Daily., Disp: , Rfl:     gabapentin (NEURONTIN) 300 MG capsule, , Disp: , Rfl:     glimepiride (AMARYL) 4 MG tablet, Take 1 tablet by mouth 2 (Two) Times a Day. (Patient not taking: Reported on 2025), Disp: , Rfl:     insulin glargine (LANTUS, SEMGLEE) 100 UNIT/ML injection, Inject 40 Units under the skin into the appropriate area as directed Every Night. (Patient not taking: Reported on 2025), Disp: , Rfl:     Lantus SoloStar 100 UNIT/ML injection pen, , Disp: , Rfl:   Social History:   Social History     Tobacco Use    Smoking status: Every Day     Current packs/day: 1.50     Average packs/day: 1.5 packs/day for 25.0 years (37.5 ttl pk-yrs)     Types: Cigarettes     Passive exposure: Current    Smokeless tobacco: Never    Tobacco comments:     About 1 pack a day per pt--25   Substance Use Topics    Alcohol use: No      Family History:  Family History   Problem Relation Age of Onset    Hypertension Mother     Diabetes Mother     Heart disease Father         had CABG and re-do/  while recovering-had MI age 40s    Diabetes Father     Pancreatic cancer Sister     Hyperlipidemia Brother     Stroke Brother     Heart disease Paternal Uncle               Review of Systems   Constitutional: Negative for fever and malaise/fatigue.   Cardiovascular:  Negative for chest pain, dyspnea on exertion, leg swelling and palpitations.   Respiratory:  Positive for shortness of  breath. Negative for cough.    Skin:  Negative for rash.   Gastrointestinal:  Negative for abdominal pain, nausea and vomiting.   Neurological:  Negative for focal weakness, headaches and numbness.   All other systems reviewed and are negative.    All other systems are negative         Objective:     Vitals reviewed.   Constitutional:       Appearance: Not in distress. Frail. Chronically ill-appearing.   Neck:      Vascular: No JVR. JVD normal.   Pulmonary:      Effort: Pulmonary effort is normal.      Breath sounds: Decreased air movement present. No wheezing. No rhonchi. No rales.   Chest:      Chest wall: Not tender to palpatation.   Cardiovascular:      PMI at left midclavicular line. Normal rate. Regular rhythm. Normal S1. Normal S2.       Murmurs: There is no murmur.      No gallop.  No click. No rub.   Abdominal:      General: Bowel sounds are normal.      Palpations: Abdomen is soft.      Tenderness: There is no abdominal tenderness.   Musculoskeletal: Normal range of motion.         General: No tenderness.      Comments: Bilateral lower extremity AKA  Skin:     General: Skin is warm and dry.   Neurological:      General: No focal deficit present.      Mental Status: Alert and oriented to person, place and time.       /81 (BP Location: Left arm, Patient Position: Sitting, Cuff Size: Adult)   Pulse 79   SpO2 95%   General:  Appears in no acute distress in wheelchair   Eyes: Sclera is anicteric,  conjunctiva is clear   HEENT:  No JVD.    Respiratory: Respirations regular and unlabored at rest. Bilateral anterior upper lobe expiratory wheezing noted   Cardiovascular: S1,S2 Regular rate and rhythm. No murmur, rub or gallop auscultated.   Extremities: No digital clubbing or cyanosis, trace edema to right lower extremity   Skin: Color pink. PAD/stasis changes noted.  Left AKA noted   Neuro: Alert and awake.    Lab Reviewed:         Aranza Ramirez, APRN  5/17/2025 18:19 EDT        EMR Dragon/Transcription:  "  \"Dictated utilizing Dragon dictation\".          "

## 2025-05-20 ENCOUNTER — APPOINTMENT (OUTPATIENT)
Dept: GENERAL RADIOLOGY | Facility: HOSPITAL | Age: 65
DRG: 291 | End: 2025-05-20
Payer: MEDICARE

## 2025-05-20 ENCOUNTER — HOSPITAL ENCOUNTER (INPATIENT)
Facility: HOSPITAL | Age: 65
LOS: 1 days | Discharge: HOME | DRG: 291 | End: 2025-05-22
Attending: EMERGENCY MEDICINE
Payer: MEDICARE

## 2025-05-20 DIAGNOSIS — R73.9 HYPERGLYCEMIA: ICD-10-CM

## 2025-05-20 DIAGNOSIS — N18.31 CHRONIC RENAL IMPAIRMENT, STAGE 3A: ICD-10-CM

## 2025-05-20 DIAGNOSIS — I50.9 ACUTE CONGESTIVE HEART FAILURE, UNSPECIFIED HEART FAILURE TYPE: Primary | ICD-10-CM

## 2025-05-20 DIAGNOSIS — I50.23 ACUTE ON CHRONIC SYSTOLIC CONGESTIVE HEART FAILURE: ICD-10-CM

## 2025-05-20 DIAGNOSIS — I25.5 ISCHEMIC CARDIOMYOPATHY: Chronic | ICD-10-CM

## 2025-05-20 DIAGNOSIS — I50.23 ACUTE ON CHRONIC SYSTOLIC CHF (CONGESTIVE HEART FAILURE): ICD-10-CM

## 2025-05-20 LAB
ALBUMIN SERPL-MCNC: 3.8 G/DL (ref 3.5–5.2)
ALBUMIN/GLOB SERPL: 1.4 G/DL
ALP SERPL-CCNC: 94 U/L (ref 39–117)
ALT SERPL W P-5'-P-CCNC: 80 U/L (ref 1–41)
ANION GAP SERPL CALCULATED.3IONS-SCNC: 10.8 MMOL/L (ref 5–15)
APTT PPP: 25.1 SECONDS (ref 22.7–35.4)
ARTERIAL PATENCY WRIST A: POSITIVE
AST SERPL-CCNC: 47 U/L (ref 1–40)
ATMOSPHERIC PRESS: ABNORMAL MM[HG]
BASE EXCESS BLDA CALC-SCNC: 1 MMOL/L (ref 0–3)
BASOPHILS # BLD MANUAL: 0.07 10*3/MM3 (ref 0–0.2)
BASOPHILS NFR BLD MANUAL: 1 % (ref 0–1.5)
BDY SITE: ABNORMAL
BILIRUB SERPL-MCNC: 0.4 MG/DL (ref 0–1.2)
BUN SERPL-MCNC: 16 MG/DL (ref 8–23)
BUN/CREAT SERPL: 18.6 (ref 7–25)
CALCIUM SPEC-SCNC: 9.6 MG/DL (ref 8.6–10.5)
CHLORIDE SERPL-SCNC: 102 MMOL/L (ref 98–107)
CO2 BLDA-SCNC: 28.2 MMOL/L (ref 22–29)
CO2 SERPL-SCNC: 22.2 MMOL/L (ref 22–29)
CREAT SERPL-MCNC: 0.86 MG/DL (ref 0.76–1.27)
DEPRECATED RDW RBC AUTO: 44.9 FL (ref 37–54)
EGFRCR SERPLBLD CKD-EPI 2021: 96.1 ML/MIN/1.73
EOSINOPHIL # BLD MANUAL: 0.14 10*3/MM3 (ref 0–0.4)
EOSINOPHIL NFR BLD MANUAL: 2 % (ref 0.3–6.2)
ERYTHROCYTE [DISTWIDTH] IN BLOOD BY AUTOMATED COUNT: 13.4 % (ref 12.3–15.4)
GEN 5 1HR TROPONIN T REFLEX: 52 NG/L
GLOBULIN UR ELPH-MCNC: 2.8 GM/DL
GLUCOSE SERPL-MCNC: 472 MG/DL (ref 65–99)
HCO3 BLDA-SCNC: 26.8 MMOL/L (ref 21–28)
HCT VFR BLD AUTO: 45.2 % (ref 37.5–51)
HEMODILUTION: NO
HGB BLD-MCNC: 15.1 G/DL (ref 13–17.7)
HOLD SPECIMEN: NORMAL
HOLD SPECIMEN: NORMAL
INHALED O2 CONCENTRATION: 28 %
INR PPP: 1.04 (ref 0.9–1.1)
LYMPHOCYTES # BLD MANUAL: 1.23 10*3/MM3 (ref 0.7–3.1)
LYMPHOCYTES NFR BLD MANUAL: 8 % (ref 5–12)
MCH RBC QN AUTO: 30.2 PG (ref 26.6–33)
MCHC RBC AUTO-ENTMCNC: 33.4 G/DL (ref 31.5–35.7)
MCV RBC AUTO: 90.4 FL (ref 79–97)
MODALITY: ABNORMAL
MONOCYTES # BLD: 0.55 10*3/MM3 (ref 0.1–0.9)
NEUTROPHILS # BLD AUTO: 4.84 10*3/MM3 (ref 1.7–7)
NEUTROPHILS NFR BLD MANUAL: 71 % (ref 42.7–76)
NT-PROBNP SERPL-MCNC: 1480 PG/ML (ref 0–900)
PCO2 BLDA: 45.6 MM HG (ref 35–48)
PH BLDA: 7.38 PH UNITS (ref 7.35–7.45)
PLAT MORPH BLD: NORMAL
PLATELET # BLD AUTO: 138 10*3/MM3 (ref 140–450)
PMV BLD AUTO: 12.7 FL (ref 6–12)
PO2 BLD: 277 MM[HG] (ref 0–500)
PO2 BLDA: 77.5 MM HG (ref 83–108)
POTASSIUM SERPL-SCNC: 4.5 MMOL/L (ref 3.5–5.2)
PROT SERPL-MCNC: 6.6 G/DL (ref 6–8.5)
PROTHROMBIN TIME: 13.5 SECONDS (ref 11.7–14.2)
RBC # BLD AUTO: 5 10*6/MM3 (ref 4.14–5.8)
RBC MORPH BLD: NORMAL
SAO2 % BLDCOA: 94.9 % (ref 94–98)
SCAN SLIDE: NORMAL
SODIUM SERPL-SCNC: 135 MMOL/L (ref 136–145)
TROPONIN T % DELTA: 4
TROPONIN T NUMERIC DELTA: 2 NG/L
TROPONIN T SERPL HS-MCNC: 50 NG/L
VARIANT LYMPHS NFR BLD MANUAL: 13 % (ref 19.6–45.3)
VARIANT LYMPHS NFR BLD MANUAL: 5 % (ref 0–5)
WBC MORPH BLD: NORMAL
WBC NRBC COR # BLD AUTO: 6.82 10*3/MM3 (ref 3.4–10.8)
WHOLE BLOOD HOLD COAG: NORMAL
WHOLE BLOOD HOLD SPECIMEN: NORMAL

## 2025-05-20 PROCEDURE — 99285 EMERGENCY DEPT VISIT HI MDM: CPT

## 2025-05-20 PROCEDURE — 63710000001 INSULIN REGULAR HUMAN PER 5 UNITS: Performed by: EMERGENCY MEDICINE

## 2025-05-20 PROCEDURE — 25010000002 FUROSEMIDE PER 20 MG: Performed by: EMERGENCY MEDICINE

## 2025-05-20 PROCEDURE — 82803 BLOOD GASES ANY COMBINATION: CPT | Performed by: EMERGENCY MEDICINE

## 2025-05-20 PROCEDURE — 93005 ELECTROCARDIOGRAM TRACING: CPT | Performed by: EMERGENCY MEDICINE

## 2025-05-20 PROCEDURE — 71045 X-RAY EXAM CHEST 1 VIEW: CPT

## 2025-05-20 PROCEDURE — 85007 BL SMEAR W/DIFF WBC COUNT: CPT | Performed by: EMERGENCY MEDICINE

## 2025-05-20 PROCEDURE — 85730 THROMBOPLASTIN TIME PARTIAL: CPT | Performed by: EMERGENCY MEDICINE

## 2025-05-20 PROCEDURE — 85025 COMPLETE CBC W/AUTO DIFF WBC: CPT | Performed by: EMERGENCY MEDICINE

## 2025-05-20 PROCEDURE — 84484 ASSAY OF TROPONIN QUANT: CPT | Performed by: EMERGENCY MEDICINE

## 2025-05-20 PROCEDURE — 85610 PROTHROMBIN TIME: CPT | Performed by: EMERGENCY MEDICINE

## 2025-05-20 PROCEDURE — 83880 ASSAY OF NATRIURETIC PEPTIDE: CPT | Performed by: EMERGENCY MEDICINE

## 2025-05-20 PROCEDURE — 82948 REAGENT STRIP/BLOOD GLUCOSE: CPT

## 2025-05-20 PROCEDURE — 93005 ELECTROCARDIOGRAM TRACING: CPT

## 2025-05-20 PROCEDURE — 83036 HEMOGLOBIN GLYCOSYLATED A1C: CPT

## 2025-05-20 PROCEDURE — 80053 COMPREHEN METABOLIC PANEL: CPT | Performed by: EMERGENCY MEDICINE

## 2025-05-20 PROCEDURE — 36415 COLL VENOUS BLD VENIPUNCTURE: CPT

## 2025-05-20 PROCEDURE — 36600 WITHDRAWAL OF ARTERIAL BLOOD: CPT | Performed by: EMERGENCY MEDICINE

## 2025-05-20 RX ORDER — FUROSEMIDE 10 MG/ML
40 INJECTION INTRAMUSCULAR; INTRAVENOUS ONCE
Status: COMPLETED | OUTPATIENT
Start: 2025-05-20 | End: 2025-05-20

## 2025-05-20 RX ORDER — SODIUM CHLORIDE 0.9 % (FLUSH) 0.9 %
10 SYRINGE (ML) INJECTION AS NEEDED
Status: DISCONTINUED | OUTPATIENT
Start: 2025-05-20 | End: 2025-05-22 | Stop reason: HOSPADM

## 2025-05-20 RX ADMIN — INSULIN HUMAN 5 UNITS: 100 INJECTION, SOLUTION PARENTERAL at 23:20

## 2025-05-20 RX ADMIN — FUROSEMIDE 40 MG: 10 INJECTION, SOLUTION INTRAMUSCULAR; INTRAVENOUS at 23:21

## 2025-05-20 NOTE — Clinical Note
Level of Care: Med/Surg [1]   Admitting Physician: KALEB CASILLAS [[id number]]   Attending Physician: KALEB CASILLAS [[id number]]

## 2025-05-21 ENCOUNTER — APPOINTMENT (OUTPATIENT)
Dept: CARDIOLOGY | Facility: HOSPITAL | Age: 65
DRG: 291 | End: 2025-05-21
Payer: MEDICARE

## 2025-05-21 ENCOUNTER — APPOINTMENT (OUTPATIENT)
Dept: CT IMAGING | Facility: HOSPITAL | Age: 65
DRG: 291 | End: 2025-05-21
Payer: MEDICARE

## 2025-05-21 PROBLEM — I50.9 ACUTE EXACERBATION OF CHF (CONGESTIVE HEART FAILURE): Status: ACTIVE | Noted: 2025-05-21

## 2025-05-21 LAB
AORTIC DIMENSIONLESS INDEX: 0.86 (DI)
AV MEAN PRESS GRAD SYS DOP V1V2: 2 MMHG
AV VMAX SYS DOP: 103 CM/SEC
B PARAPERT DNA SPEC QL NAA+PROBE: NOT DETECTED
B PERT DNA SPEC QL NAA+PROBE: NOT DETECTED
BH CV ECHO LEFT VENTRICLE GLOBAL LONGITUDINAL STRAIN: -4.7 %
BH CV ECHO MEAS - ACS: 2 CM
BH CV ECHO MEAS - AO MAX PG: 4.2 MMHG
BH CV ECHO MEAS - AO V2 VTI: 15.5 CM
BH CV ECHO MEAS - AVA(I,D): 3.9 CM2
BH CV ECHO MEAS - EDV(CUBED): 157.5 ML
BH CV ECHO MEAS - EDV(MOD-SP4): 158 ML
BH CV ECHO MEAS - EF(MOD-SP4): 21.5 %
BH CV ECHO MEAS - ESV(CUBED): 117.6 ML
BH CV ECHO MEAS - ESV(MOD-SP4): 124 ML
BH CV ECHO MEAS - FS: 9.3 %
BH CV ECHO MEAS - IVS/LVPW: 1.08 CM
BH CV ECHO MEAS - IVSD: 1.3 CM
BH CV ECHO MEAS - LA DIMENSION: 4.7 CM
BH CV ECHO MEAS - LAT PEAK E' VEL: 7.1 CM/SEC
BH CV ECHO MEAS - LV MASS(C)D: 279.8 GRAMS
BH CV ECHO MEAS - LV MAX PG: 3 MMHG
BH CV ECHO MEAS - LV MEAN PG: 2 MMHG
BH CV ECHO MEAS - LV V1 MAX: 86.5 CM/SEC
BH CV ECHO MEAS - LV V1 VTI: 13.3 CM
BH CV ECHO MEAS - LVIDD: 5.4 CM
BH CV ECHO MEAS - LVIDS: 4.9 CM
BH CV ECHO MEAS - LVOT AREA: 4.5 CM2
BH CV ECHO MEAS - LVOT DIAM: 2.4 CM
BH CV ECHO MEAS - LVPWD: 1.2 CM
BH CV ECHO MEAS - MED PEAK E' VEL: 6.5 CM/SEC
BH CV ECHO MEAS - MV A MAX VEL: 87.5 CM/SEC
BH CV ECHO MEAS - MV DEC SLOPE: 320 CM/SEC2
BH CV ECHO MEAS - MV DEC TIME: 0.15 SEC
BH CV ECHO MEAS - MV E MAX VEL: 71.3 CM/SEC
BH CV ECHO MEAS - MV E/A: 0.81
BH CV ECHO MEAS - MV MAX PG: 3.7 MMHG
BH CV ECHO MEAS - MV MEAN PG: 2 MMHG
BH CV ECHO MEAS - MV P1/2T: 52.7 MSEC
BH CV ECHO MEAS - MV V2 VTI: 15.8 CM
BH CV ECHO MEAS - MVA(P1/2T): 4.2 CM2
BH CV ECHO MEAS - MVA(VTI): 3.8 CM2
BH CV ECHO MEAS - PA ACC TIME: 0.08 SEC
BH CV ECHO MEAS - PA V2 MAX: 101 CM/SEC
BH CV ECHO MEAS - RAP SYSTOLE: 8 MMHG
BH CV ECHO MEAS - RV MAX PG: 3 MMHG
BH CV ECHO MEAS - RV V1 MAX: 86.2 CM/SEC
BH CV ECHO MEAS - RV V1 VTI: 13.3 CM
BH CV ECHO MEAS - RVSP: 25.1 MMHG
BH CV ECHO MEAS - SV(LVOT): 60.2 ML
BH CV ECHO MEAS - SV(MOD-SP4): 34 ML
BH CV ECHO MEAS - TAPSE (>1.6): 1.07 CM
BH CV ECHO MEAS - TR MAX PG: 17.1 MMHG
BH CV ECHO MEAS - TR MAX VEL: 207 CM/SEC
BH CV ECHO MEASUREMENTS AVERAGE E/E' RATIO: 10.49
BH CV XLRA - TDI S': 8.8 CM/SEC
C PNEUM DNA NPH QL NAA+NON-PROBE: NOT DETECTED
CHOLEST SERPL-MCNC: 184 MG/DL (ref 0–200)
FLUAV SUBTYP SPEC NAA+PROBE: NOT DETECTED
FLUBV RNA ISLT QL NAA+PROBE: NOT DETECTED
GLUCOSE BLDC GLUCOMTR-MCNC: 321 MG/DL (ref 70–105)
GLUCOSE BLDC GLUCOMTR-MCNC: 331 MG/DL (ref 70–105)
GLUCOSE BLDC GLUCOMTR-MCNC: 331 MG/DL (ref 70–105)
GLUCOSE BLDC GLUCOMTR-MCNC: 388 MG/DL (ref 70–105)
GLUCOSE BLDC GLUCOMTR-MCNC: 391 MG/DL (ref 70–105)
HADV DNA SPEC NAA+PROBE: NOT DETECTED
HBA1C MFR BLD: 11.4 % (ref 4.8–5.6)
HCOV 229E RNA SPEC QL NAA+PROBE: NOT DETECTED
HCOV HKU1 RNA SPEC QL NAA+PROBE: NOT DETECTED
HCOV NL63 RNA SPEC QL NAA+PROBE: NOT DETECTED
HCOV OC43 RNA SPEC QL NAA+PROBE: NOT DETECTED
HDLC SERPL-MCNC: 32 MG/DL (ref 40–60)
HMPV RNA NPH QL NAA+NON-PROBE: NOT DETECTED
HPIV1 RNA ISLT QL NAA+PROBE: NOT DETECTED
HPIV2 RNA SPEC QL NAA+PROBE: NOT DETECTED
HPIV3 RNA NPH QL NAA+PROBE: NOT DETECTED
HPIV4 P GENE NPH QL NAA+PROBE: NOT DETECTED
LDLC SERPL CALC-MCNC: 108 MG/DL (ref 0–100)
LDLC/HDLC SERPL: 3.16 {RATIO}
LV EF BIPLANE MOD: 22 %
M PNEUMO IGG SER IA-ACNC: NOT DETECTED
QT INTERVAL: 332 MS
QTC INTERVAL: 416 MS
RHINOVIRUS RNA SPEC NAA+PROBE: NOT DETECTED
RSV RNA NPH QL NAA+NON-PROBE: NOT DETECTED
SARS-COV-2 RNA RESP QL NAA+PROBE: NOT DETECTED
SINUS: 3.9 CM
TRIGL SERPL-MCNC: 255 MG/DL (ref 0–150)
VLDLC SERPL-MCNC: 44 MG/DL (ref 5–40)

## 2025-05-21 PROCEDURE — 99222 1ST HOSP IP/OBS MODERATE 55: CPT | Performed by: INTERNAL MEDICINE

## 2025-05-21 PROCEDURE — 80061 LIPID PANEL: CPT

## 2025-05-21 PROCEDURE — 93356 MYOCRD STRAIN IMG SPCKL TRCK: CPT

## 2025-05-21 PROCEDURE — 82948 REAGENT STRIP/BLOOD GLUCOSE: CPT

## 2025-05-21 PROCEDURE — 94799 UNLISTED PULMONARY SVC/PX: CPT

## 2025-05-21 PROCEDURE — 94664 DEMO&/EVAL PT USE INHALER: CPT

## 2025-05-21 PROCEDURE — 63710000001 INSULIN GLARGINE PER 5 UNITS

## 2025-05-21 PROCEDURE — 93306 TTE W/DOPPLER COMPLETE: CPT

## 2025-05-21 PROCEDURE — 97161 PT EVAL LOW COMPLEX 20 MIN: CPT

## 2025-05-21 PROCEDURE — 25010000002 SULFUR HEXAFLUORIDE MICROSPH 60.7-25 MG RECONSTITUTED SUSPENSION

## 2025-05-21 PROCEDURE — 94640 AIRWAY INHALATION TREATMENT: CPT

## 2025-05-21 PROCEDURE — 93356 MYOCRD STRAIN IMG SPCKL TRCK: CPT | Performed by: INTERNAL MEDICINE

## 2025-05-21 PROCEDURE — 93306 TTE W/DOPPLER COMPLETE: CPT | Performed by: INTERNAL MEDICINE

## 2025-05-21 PROCEDURE — 0202U NFCT DS 22 TRGT SARS-COV-2: CPT | Performed by: INTERNAL MEDICINE

## 2025-05-21 PROCEDURE — 63710000001 INSULIN LISPRO (HUMAN) PER 5 UNITS

## 2025-05-21 PROCEDURE — 71250 CT THORAX DX C-: CPT

## 2025-05-21 PROCEDURE — 25010000002 FUROSEMIDE PER 20 MG: Performed by: NURSE PRACTITIONER

## 2025-05-21 PROCEDURE — 94761 N-INVAS EAR/PLS OXIMETRY MLT: CPT

## 2025-05-21 RX ORDER — FUROSEMIDE 10 MG/ML
40 INJECTION INTRAMUSCULAR; INTRAVENOUS DAILY
Status: DISCONTINUED | OUTPATIENT
Start: 2025-05-21 | End: 2025-05-22

## 2025-05-21 RX ORDER — HEPARIN SODIUM 5000 [USP'U]/ML
5000 INJECTION, SOLUTION INTRAVENOUS; SUBCUTANEOUS EVERY 8 HOURS SCHEDULED
Status: DISCONTINUED | OUTPATIENT
Start: 2025-05-21 | End: 2025-05-21

## 2025-05-21 RX ORDER — NALOXONE HCL 0.4 MG/ML
0.4 VIAL (ML) INJECTION
Status: DISCONTINUED | OUTPATIENT
Start: 2025-05-21 | End: 2025-05-22 | Stop reason: HOSPADM

## 2025-05-21 RX ORDER — SODIUM CHLORIDE 0.9 % (FLUSH) 0.9 %
10 SYRINGE (ML) INJECTION EVERY 12 HOURS SCHEDULED
Status: DISCONTINUED | OUTPATIENT
Start: 2025-05-21 | End: 2025-05-22 | Stop reason: HOSPADM

## 2025-05-21 RX ORDER — NICOTINE POLACRILEX 4 MG
15 LOZENGE BUCCAL
Status: DISCONTINUED | OUTPATIENT
Start: 2025-05-21 | End: 2025-05-22 | Stop reason: HOSPADM

## 2025-05-21 RX ORDER — SODIUM CHLORIDE 0.9 % (FLUSH) 0.9 %
10 SYRINGE (ML) INJECTION AS NEEDED
Status: DISCONTINUED | OUTPATIENT
Start: 2025-05-21 | End: 2025-05-22 | Stop reason: HOSPADM

## 2025-05-21 RX ORDER — DEXTROSE MONOHYDRATE 25 G/50ML
25 INJECTION, SOLUTION INTRAVENOUS
Status: DISCONTINUED | OUTPATIENT
Start: 2025-05-21 | End: 2025-05-22 | Stop reason: HOSPADM

## 2025-05-21 RX ORDER — BUPROPION HYDROCHLORIDE 150 MG/1
150 TABLET ORAL EVERY EVENING
Status: DISCONTINUED | OUTPATIENT
Start: 2025-05-21 | End: 2025-05-22 | Stop reason: HOSPADM

## 2025-05-21 RX ORDER — CLOPIDOGREL BISULFATE 75 MG/1
75 TABLET ORAL DAILY
Status: DISCONTINUED | OUTPATIENT
Start: 2025-05-21 | End: 2025-05-22 | Stop reason: HOSPADM

## 2025-05-21 RX ORDER — POLYETHYLENE GLYCOL 3350 17 G/17G
17 POWDER, FOR SOLUTION ORAL DAILY PRN
Status: DISCONTINUED | OUTPATIENT
Start: 2025-05-21 | End: 2025-05-22 | Stop reason: HOSPADM

## 2025-05-21 RX ORDER — NITROGLYCERIN 0.4 MG/1
0.4 TABLET SUBLINGUAL
Status: DISCONTINUED | OUTPATIENT
Start: 2025-05-21 | End: 2025-05-22 | Stop reason: HOSPADM

## 2025-05-21 RX ORDER — BUDESONIDE 0.5 MG/2ML
0.5 INHALANT ORAL
Status: DISCONTINUED | OUTPATIENT
Start: 2025-05-21 | End: 2025-05-22 | Stop reason: HOSPADM

## 2025-05-21 RX ORDER — NICOTINE 21 MG/24HR
1 PATCH, TRANSDERMAL 24 HOURS TRANSDERMAL
Status: DISCONTINUED | OUTPATIENT
Start: 2025-05-21 | End: 2025-05-22 | Stop reason: HOSPADM

## 2025-05-21 RX ORDER — MECOBALAMIN 5000 MCG
1 LOZENGE ORAL DAILY
COMMUNITY

## 2025-05-21 RX ORDER — ACETAMINOPHEN 160 MG/5ML
650 SOLUTION ORAL EVERY 4 HOURS PRN
Status: DISCONTINUED | OUTPATIENT
Start: 2025-05-21 | End: 2025-05-22 | Stop reason: HOSPADM

## 2025-05-21 RX ORDER — BISACODYL 5 MG/1
5 TABLET, DELAYED RELEASE ORAL DAILY PRN
Status: DISCONTINUED | OUTPATIENT
Start: 2025-05-21 | End: 2025-05-22 | Stop reason: HOSPADM

## 2025-05-21 RX ORDER — IBUPROFEN 600 MG/1
1 TABLET ORAL
Status: DISCONTINUED | OUTPATIENT
Start: 2025-05-21 | End: 2025-05-22 | Stop reason: HOSPADM

## 2025-05-21 RX ORDER — ACETAMINOPHEN 650 MG/1
650 SUPPOSITORY RECTAL EVERY 4 HOURS PRN
Status: DISCONTINUED | OUTPATIENT
Start: 2025-05-21 | End: 2025-05-22 | Stop reason: HOSPADM

## 2025-05-21 RX ORDER — ACETAMINOPHEN 325 MG/1
650 TABLET ORAL EVERY 4 HOURS PRN
Status: DISCONTINUED | OUTPATIENT
Start: 2025-05-21 | End: 2025-05-22 | Stop reason: HOSPADM

## 2025-05-21 RX ORDER — BISACODYL 10 MG
10 SUPPOSITORY, RECTAL RECTAL DAILY PRN
Status: DISCONTINUED | OUTPATIENT
Start: 2025-05-21 | End: 2025-05-22 | Stop reason: HOSPADM

## 2025-05-21 RX ORDER — CARVEDILOL 3.12 MG/1
3.12 TABLET ORAL 2 TIMES DAILY WITH MEALS
Status: DISCONTINUED | OUTPATIENT
Start: 2025-05-21 | End: 2025-05-22 | Stop reason: HOSPADM

## 2025-05-21 RX ORDER — INSULIN LISPRO 100 [IU]/ML
2-7 INJECTION, SOLUTION INTRAVENOUS; SUBCUTANEOUS
Status: DISCONTINUED | OUTPATIENT
Start: 2025-05-21 | End: 2025-05-22 | Stop reason: HOSPADM

## 2025-05-21 RX ORDER — AMOXICILLIN 250 MG
2 CAPSULE ORAL 2 TIMES DAILY PRN
Status: DISCONTINUED | OUTPATIENT
Start: 2025-05-21 | End: 2025-05-22 | Stop reason: HOSPADM

## 2025-05-21 RX ORDER — ATORVASTATIN CALCIUM 40 MG/1
40 TABLET, FILM COATED ORAL DAILY
Status: DISCONTINUED | OUTPATIENT
Start: 2025-05-21 | End: 2025-05-22 | Stop reason: HOSPADM

## 2025-05-21 RX ORDER — SODIUM CHLORIDE 9 MG/ML
40 INJECTION, SOLUTION INTRAVENOUS AS NEEDED
Status: DISCONTINUED | OUTPATIENT
Start: 2025-05-21 | End: 2025-05-22 | Stop reason: HOSPADM

## 2025-05-21 RX ORDER — GABAPENTIN 300 MG/1
300 CAPSULE ORAL 2 TIMES DAILY
COMMUNITY

## 2025-05-21 RX ORDER — FENOFIBRATE 145 MG/1
145 TABLET, FILM COATED ORAL DAILY
Status: DISCONTINUED | OUTPATIENT
Start: 2025-05-21 | End: 2025-05-22 | Stop reason: HOSPADM

## 2025-05-21 RX ADMIN — CLOPIDOGREL BISULFATE 75 MG: 75 TABLET, FILM COATED ORAL at 08:02

## 2025-05-21 RX ADMIN — BUPROPION HYDROCHLORIDE 150 MG: 150 TABLET, EXTENDED RELEASE ORAL at 17:01

## 2025-05-21 RX ADMIN — CARVEDILOL 3.12 MG: 3.12 TABLET, FILM COATED ORAL at 08:02

## 2025-05-21 RX ADMIN — Medication 10 ML: at 20:47

## 2025-05-21 RX ADMIN — INSULIN LISPRO 6 UNITS: 100 INJECTION, SOLUTION INTRAVENOUS; SUBCUTANEOUS at 20:47

## 2025-05-21 RX ADMIN — APIXABAN 5 MG: 5 TABLET, FILM COATED ORAL at 20:46

## 2025-05-21 RX ADMIN — INSULIN LISPRO 5 UNITS: 100 INJECTION, SOLUTION INTRAVENOUS; SUBCUTANEOUS at 17:01

## 2025-05-21 RX ADMIN — INSULIN GLARGINE 20 UNITS: 100 INJECTION, SOLUTION SUBCUTANEOUS at 20:47

## 2025-05-21 RX ADMIN — INSULIN LISPRO 5 UNITS: 100 INJECTION, SOLUTION INTRAVENOUS; SUBCUTANEOUS at 07:56

## 2025-05-21 RX ADMIN — INSULIN LISPRO 6 UNITS: 100 INJECTION, SOLUTION INTRAVENOUS; SUBCUTANEOUS at 11:24

## 2025-05-21 RX ADMIN — FUROSEMIDE 40 MG: 10 INJECTION, SOLUTION INTRAMUSCULAR; INTRAVENOUS at 13:30

## 2025-05-21 RX ADMIN — Medication 10 ML: at 08:02

## 2025-05-21 RX ADMIN — FENOFIBRATE 145 MG: 145 TABLET ORAL at 08:02

## 2025-05-21 RX ADMIN — APIXABAN 5 MG: 5 TABLET, FILM COATED ORAL at 08:02

## 2025-05-21 RX ADMIN — Medication 10 ML: at 01:08

## 2025-05-21 RX ADMIN — ATORVASTATIN CALCIUM 40 MG: 40 TABLET, FILM COATED ORAL at 08:02

## 2025-05-21 RX ADMIN — INSULIN GLARGINE 20 UNITS: 100 INJECTION, SOLUTION SUBCUTANEOUS at 04:34

## 2025-05-21 RX ADMIN — BUDESONIDE 0.5 MG: 0.5 INHALANT RESPIRATORY (INHALATION) at 07:59

## 2025-05-21 RX ADMIN — CARVEDILOL 3.12 MG: 3.12 TABLET, FILM COATED ORAL at 17:01

## 2025-05-21 RX ADMIN — SULFUR HEXAFLUORIDE 3 ML: KIT at 05:07

## 2025-05-21 NOTE — PLAN OF CARE
Goal Outcome Evaluation:  Plan of Care Reviewed With: patient           Outcome Evaluation: 65 y.o. male with a CMH of HFrEF (EF 36 to 60% April 2024), status post ICD, CAD status post three-vessel stent, type 2 diabetes complicated with PVD status post bilateral AKA, COPD, hyperlipidemia, CKD stage III who presented to [facility] on 5/20/2025 with 2-day history of shortness of breath. Pt reports feeling better today and hopes to go home.  He reports at baseline, he lives alone in a handicap accessible apt and functions independently from a w/c.  He uses a sliding board for vehicle transfers but not for w/c and commode transfers.  Pt denies any skilled therapy needs at this time, does not have his w/c here with him and is currently in the ER on a stretcher.  He demonstrates ability to sit self up on EOB with good balance.  Will sign off at this time, pt plans home at d/c and denies any equipment needs.    Anticipated Discharge Disposition (PT): home

## 2025-05-21 NOTE — ED PROVIDER NOTES
"Subjective   History of Present Illness  Chief complaint: Patient 65-year-old history of CHF as well as COPD.  He has been short of breath for 2 days.  Worse when he is lying down.  He is sits back up and takes a nebulizer and seems to improve.  He is not on oxygen at home.  He states he went to [facility] and was sent to [place] and actually woke up there intubated secondary to \"elevated carbon dioxide levels\".  He is not having chest pain.  He states he feels some better now.  He was placed on oxygen by EMS.  His blood sugar was noted to be 500    Context:    Duration:    Timing:    Severity:    Associated Symptoms:        PCP:  LMP:      Review of Systems   Constitutional:  Negative for fever.   HENT:  Positive for congestion.    Respiratory:  Positive for cough and shortness of breath.    Cardiovascular:  Negative for chest pain.   Gastrointestinal:  Negative for abdominal pain.   Neurological: Negative.        Past Medical History:   Diagnosis Date    CAD (coronary artery disease)     S/P CABG    Chest pain 05/31/2021    Chronic systolic CHF (congestive heart failure)     COPD (chronic obstructive pulmonary disease)     DM2 (diabetes mellitus, type 2)     Dyslipidemia     Encounter for monitoring antiplatelet therapy     Hypertension     Myocardial infarction     inferior wall MI--03/13    Non-pressure chronic ulcer of other part of left foot with fat layer exposed 6/1/2021    DEBI (obstructive sleep apnea)     noncompliant with CPAP    Peripheral vascular disease     S/P left fem-pop bypass    Tobacco use        No Known Allergies    Past Surgical History:   Procedure Laterality Date    ABOVE KNEE LEG AMPUTATION Left     AMPUTATION FOOT / TOE      left    APPENDECTOMY      at age 8 or 9    BIVENTRICULAR ASSIST DEVICE/LEFT VENTRICULAR ASSIST DEVICE INSERTION N/A 05/13/2020    Procedure: Left Ventricular Assist Device;  Surgeon: Juarez Wing MD;  Location: [facility] CATH INVASIVE LOCATION;  " Service: Cardiovascular;  Laterality: N/A;    CARDIAC CATHETERIZATION      3-; [facility] 2014    CARDIAC CATHETERIZATION Right 2019    Procedure: Cardiac Catheterization/with grafts groin access;  Surgeon: Juarez Wing MD;  Location:  [facility] CATH INVASIVE LOCATION;  Service: Cardiovascular    CARDIAC CATHETERIZATION N/A 2020    Procedure: Left Heart Cath with grafts;  Surgeon: Juarez Wing MD;  Location: [facility] CATH INVASIVE LOCATION;  Service: Cardiovascular;  Laterality: N/A;    CARDIAC CATHETERIZATION N/A 2020    Procedure: Percutaneous Coronary Intervention, Impella backup;  Surgeon: Juarez Wing MD;  Location:  [facility] CATH INVASIVE LOCATION;  Service: Cardiovascular;  Laterality: N/A;    CARDIAC CATHETERIZATION N/A 2021    Procedure: Left Heart Cath, possible pci;  Surgeon: Juarez Wing MD;  Location: [facility] CATH INVASIVE LOCATION;  Service: Cardiovascular;  Laterality: N/A;    CARDIAC ELECTROPHYSIOLOGY PROCEDURE N/A 10/08/2019    Procedure: ICD SC new, ST.SHIV ;  Surgeon: Juarez Wing MD;  Location: [facility] CATH INVASIVE LOCATION;  Service: Cardiovascular    CARDIAC SURGERY      COLONOSCOPY      CORONARY ARTERY BYPASS GRAFT      x3, 3-18-13-LIMA to LAD, and reverse individual SVG to lateral marginal and to PDA    FEMORAL POPLITEAL BYPASS Left 2019    [facility]/ Dr. Jero Hatch    HAND SURGERY Left     crushed hand    PACEMAKER IMPLANTATION      TOE SURGERY      toe nail removal of big toe- age 8 or 9       Family History   Problem Relation Age of Onset    Hypertension Mother     Diabetes Mother     Heart disease Father         had CABG and re-do/  while recovering-had MI age 40s    Diabetes Father     Pancreatic cancer Sister     Hyperlipidemia Brother     Stroke Brother     Heart disease Paternal Uncle        Social History     Socioeconomic History    Marital status:    Tobacco Use    Smoking status:  Every Day     Current packs/day: 1.50     Average packs/day: 1.5 packs/day for 25.0 years (37.5 ttl pk-yrs)     Types: Cigarettes     Passive exposure: Current    Smokeless tobacco: Never    Tobacco comments:     About 1 pack a day per pt--4/7/25   Vaping Use    Vaping status: Never Used   Substance and Sexual Activity    Alcohol use: No    Drug use: No    Sexual activity: Defer           Objective   Physical Exam  Vitals and nursing note reviewed.   Constitutional:       General: He is not in acute distress.     Appearance: He is ill-appearing.   HENT:      Head: Normocephalic and atraumatic.   Cardiovascular:      Rate and Rhythm: Tachycardia present.   Pulmonary:      Effort: Pulmonary effort is normal.      Breath sounds: Examination of the right-lower field reveals rales. Examination of the left-lower field reveals rales. Decreased breath sounds and rales present.   Musculoskeletal:      Comments: Bilateral AKA   Neurological:      Mental Status: He is alert.   Psychiatric:         Mood and Affect: Mood normal.         Procedures           ED Course                                           Results for orders placed or performed during the hospital encounter of 05/20/25   ECG 12 Lead Dyspnea    Collection Time: 05/20/25  9:00 PM   Result Value Ref Range    QT Interval 332 ms    QTC Interval 416 ms   Comprehensive Metabolic Panel    Collection Time: 05/20/25  9:25 PM    Specimen: Arm, Right; Blood   Result Value Ref Range    Glucose 472 (C) 65 - 99 mg/dL    BUN 16 8 - 23 mg/dL    Creatinine 0.86 0.76 - 1.27 mg/dL    Sodium 135 (L) 136 - 145 mmol/L    Potassium 4.5 3.5 - 5.2 mmol/L    Chloride 102 98 - 107 mmol/L    CO2 22.2 22.0 - 29.0 mmol/L    Calcium 9.6 8.6 - 10.5 mg/dL    Total Protein 6.6 6.0 - 8.5 g/dL    Albumin 3.8 3.5 - 5.2 g/dL    ALT (SGPT) 80 (H) 1 - 41 U/L    AST (SGOT) 47 (H) 1 - 40 U/L    Alkaline Phosphatase 94 39 - 117 U/L    Total Bilirubin 0.4 0.0 - 1.2 mg/dL    Globulin 2.8 gm/dL    A/G  Ratio 1.4 g/dL    BUN/Creatinine Ratio 18.6 7.0 - 25.0    Anion Gap 10.8 5.0 - 15.0 mmol/L    eGFR 96.1 >60.0 mL/min/1.73   Protime-INR    Collection Time: 05/20/25  9:25 PM    Specimen: Arm, Right; Blood   Result Value Ref Range    Protime 13.5 11.7 - 14.2 Seconds    INR 1.04 0.90 - 1.10   aPTT    Collection Time: 05/20/25  9:25 PM    Specimen: Arm, Right; Blood   Result Value Ref Range    PTT 25.1 22.7 - 35.4 seconds   BNP    Collection Time: 05/20/25  9:25 PM    Specimen: Arm, Right; Blood   Result Value Ref Range    proBNP 1,480.0 (H) 0.0 - 900.0 pg/mL   High Sensitivity Troponin T    Collection Time: 05/20/25  9:25 PM    Specimen: Arm, Right; Blood   Result Value Ref Range    HS Troponin T 50 (H) <22 ng/L   CBC Auto Differential    Collection Time: 05/20/25  9:25 PM    Specimen: Arm, Right; Blood   Result Value Ref Range    WBC 6.82 3.40 - 10.80 10*3/mm3    RBC 5.00 4.14 - 5.80 10*6/mm3    Hemoglobin 15.1 13.0 - 17.7 g/dL    Hematocrit 45.2 37.5 - 51.0 %    MCV 90.4 79.0 - 97.0 fL    MCH 30.2 26.6 - 33.0 pg    MCHC 33.4 31.5 - 35.7 g/dL    RDW 13.4 12.3 - 15.4 %    RDW-SD 44.9 37.0 - 54.0 fl    MPV 12.7 (H) 6.0 - 12.0 fL    Platelets 138 (L) 140 - 450 10*3/mm3   Scan Slide    Collection Time: 05/20/25  9:25 PM    Specimen: Arm, Right; Blood   Result Value Ref Range    Scan Slide     Manual Differential    Collection Time: 05/20/25  9:25 PM    Specimen: Arm, Right; Blood   Result Value Ref Range    Neutrophil % 71.0 42.7 - 76.0 %    Lymphocyte % 13.0 (L) 19.6 - 45.3 %    Monocyte % 8.0 5.0 - 12.0 %    Eosinophil % 2.0 0.3 - 6.2 %    Basophil % 1.0 0.0 - 1.5 %    Atypical Lymphocyte % 5.0 0.0 - 5.0 %    Neutrophils Absolute 4.84 1.70 - 7.00 10*3/mm3    Lymphocytes Absolute 1.23 0.70 - 3.10 10*3/mm3    Monocytes Absolute 0.55 0.10 - 0.90 10*3/mm3    Eosinophils Absolute 0.14 0.00 - 0.40 10*3/mm3    Basophils Absolute 0.07 0.00 - 0.20 10*3/mm3    RBC Morphology Normal Normal    WBC Morphology Normal Normal     Platelet Morphology Normal Normal   Green Top (Gel)    Collection Time: 05/20/25  9:25 PM   Result Value Ref Range    Extra Tube Hold for add-ons.    Lavender Top    Collection Time: 05/20/25  9:25 PM   Result Value Ref Range    Extra Tube hold for add-on    Gold Top - SST    Collection Time: 05/20/25  9:25 PM   Result Value Ref Range    Extra Tube Hold for add-ons.    Light Blue Top    Collection Time: 05/20/25  9:25 PM   Result Value Ref Range    Extra Tube Hold for add-ons.    Blood Gas, Arterial -    Collection Time: 05/20/25 10:18 PM    Specimen: Arterial Blood   Result Value Ref Range    Site Right Radial     Dennis's Test Positive     pH, Arterial 7.378 7.350 - 7.450 pH units    pCO2, Arterial 45.6 35.0 - 48.0 mm Hg    pO2, Arterial 77.5 (L) 83.0 - 108.0 mm Hg    HCO3, Arterial 26.8 21.0 - 28.0 mmol/L    Base Excess, Arterial 1.0 0.0 - 3.0 mmol/L    O2 Saturation, Arterial 94.9 94.0 - 98.0 %    CO2 Content 28.2 22 - 29 mmol/L    Barometric Pressure for Blood Gas      Modality Cannula     FIO2 28 %    Hemodilution No     PO2/FIO2 277 0 - 500   High Sensitivity Troponin T 1Hr    Collection Time: 05/20/25 10:22 PM    Specimen: Arm, Right; Blood   Result Value Ref Range    HS Troponin T 52 (H) <22 ng/L    Troponin T Numeric Delta 2 ng/L    Troponin T % Delta 4 Abnormal if >/= 20%   POC Glucose Once    Collection Time: 05/20/25 11:59 PM    Specimen: Blood   Result Value Ref Range    Glucose 331 (H) 70 - 105 mg/dL             XR Chest 1 View  Result Date: 5/20/2025  Impression: Findings suggest mild congestive heart failure. Electronically Signed: Justin Saba MD  5/20/2025 10:10 PM EDT  Workstation ID: [id number]        Medical Decision Making  Patient was seen evaluated with above problem    Differential diagnosis includes was not limited to CHF, COPD, pneumonia, pneumothorax    Patient's EKG was interpreted by myself as below.  Troponin was 1552.  He was hyperglycemic and was provided insulin.  His pH was normal  and he was not in DKA.  However he is volume overloaded with fluid in his lungs on chest x-ray.  This was reviewed by myself.  At this point time patient will be admitted for acute exacerbation of CHF.  I spoke with  who agrees to admit the patient    Problems Addressed:  Acute congestive heart failure, unspecified heart failure type: complicated acute illness or injury    Amount and/or Complexity of Data Reviewed  Labs: ordered. Decision-making details documented in ED Course.     Details: Labs reviewed by myself  Radiology: ordered and independent interpretation performed.     Details: X-ray reviewed by myself as above  ECG/medicine tests: ordered and independent interpretation performed.     Details: EKG interpreted by myself sinus rhythm left atrial lodgment poor R wave progression rate of 94    Risk  OTC drugs.  Prescription drug management.  Decision regarding hospitalization.        Final diagnoses:   None   Acute exacerbation of Chf  Hyperglycemia    ED Disposition  ED Disposition       None            No follow-up provider specified.       Medication List      No changes were made to your prescriptions during this visit.            Barrie Magallanes,   05/21/25 0010

## 2025-05-21 NOTE — PROGRESS NOTES
Nonbillable note  Patient seen and examined at bedside, he sitting upright and having his breakfast.  Says he remains swollen in his extremities ever since his amputation.  Follows with Dr. Wing in outpatient setting.  Awaiting cardiology consult.

## 2025-05-21 NOTE — THERAPY EVALUATION
Patient Name: Bobby Steele Jr.  : 1960    MRN: [id number]                              Today's Date: 2025       Admit Date: 2025    Visit Dx:     ICD-10-CM ICD-9-CM   1. Acute congestive heart failure, unspecified heart failure type  I50.9 428.0   2. Hyperglycemia  R73.9 790.29     Patient Active Problem List   Diagnosis    Chronic systolic congestive heart failure    Elevated troponin    Peripheral vascular disease    Mixed hyperlipidemia    Tobacco use disorder    Primary hypertension    Overweight (BMI 25.0-29.9)    Ischemic cardiomyopathy    Coronary artery disease with hx of myocardial infarct w/o hx of CABG    Panlobular emphysema    Presence of automatic cardioverter/defibrillator (AICD)    Paresthesias    GERD without esophagitis    DEBI (obstructive sleep apnea)    Type 2 diabetes mellitus with hyperglycemia, with long-term current use of insulin    Acute on chronic systolic CHF (congestive heart failure)    Bronchitis    Depression    Open wound of left foot    Abnormal nuclear stress test    Chest pain in adult    Chronic renal insufficiency    Congestive heart failure (CHF)    Chest pain in adult    COPD exacerbation    ASCVD (arteriosclerotic cardiovascular disease)    Elevated troponin I level    Paresthesia    Type 2 diabetes mellitus with nephropathy    Unstable angina    Acute exacerbation of CHF (congestive heart failure)     Past Medical History:   Diagnosis Date    CAD (coronary artery disease)     S/P CABG    Chest pain 2021    Chronic systolic CHF (congestive heart failure)     COPD (chronic obstructive pulmonary disease)     DM2 (diabetes mellitus, type 2)     Dyslipidemia     Encounter for monitoring antiplatelet therapy     Hypertension     Myocardial infarction     inferior wall MI--    Non-pressure chronic ulcer of other part of left foot with fat layer exposed 2021    DEBI (obstructive sleep apnea)     noncompliant with CPAP    Peripheral vascular disease      S/P left fem-pop bypass    Tobacco use      Past Surgical History:   Procedure Laterality Date    ABOVE KNEE LEG AMPUTATION Left     AMPUTATION FOOT / TOE      left    APPENDECTOMY      at age 8 or 9    BIVENTRICULAR ASSIST DEVICE/LEFT VENTRICULAR ASSIST DEVICE INSERTION N/A 05/13/2020    Procedure: Left Ventricular Assist Device;  Surgeon: Juarez Wing MD;  Location: [facility] CATH INVASIVE LOCATION;  Service: Cardiovascular;  Laterality: N/A;    CARDIAC CATHETERIZATION      3-; [facility] 9-    CARDIAC CATHETERIZATION Right 06/27/2019    Procedure: Cardiac Catheterization/with grafts groin access;  Surgeon: Juarez Wing MD;  Location: [facility] CATH INVASIVE LOCATION;  Service: Cardiovascular    CARDIAC CATHETERIZATION N/A 05/08/2020    Procedure: Left Heart Cath with grafts;  Surgeon: Juarez Wing MD;  Location: [facility] CATH INVASIVE LOCATION;  Service: Cardiovascular;  Laterality: N/A;    CARDIAC CATHETERIZATION N/A 05/13/2020    Procedure: Percutaneous Coronary Intervention, Impella backup;  Surgeon: Juarze Wing MD;  Location: [facility] CATH INVASIVE LOCATION;  Service: Cardiovascular;  Laterality: N/A;    CARDIAC CATHETERIZATION N/A 06/01/2021    Procedure: Left Heart Cath, possible pci;  Surgeon: Juarez Wing MD;  Location: [facility] CATH INVASIVE LOCATION;  Service: Cardiovascular;  Laterality: N/A;    CARDIAC ELECTROPHYSIOLOGY PROCEDURE N/A 10/08/2019    Procedure: ICD SC new, ST.SHIV ;  Surgeon: Juarez Wing MD;  Location: [facility] CATH INVASIVE LOCATION;  Service: Cardiovascular    CARDIAC SURGERY  2013    COLONOSCOPY      CORONARY ARTERY BYPASS GRAFT      x3, 3-18-13-LIMA to LAD, and reverse individual SVG to lateral marginal and to PDA    FEMORAL POPLITEAL BYPASS Left 01/09/2019    [facility]/ Dr. Jero Hatch    HAND SURGERY Left     crushed hand    PACEMAKER IMPLANTATION      TOE SURGERY      toe nail removal of big toe- age 8 or 9       General Information       Row Name 05/21/25 1320          Physical Therapy Time and Intention    Document Type evaluation  -     Mode of Treatment physical therapy  -       Row Name 05/21/25 1320          General Information    Patient Profile Reviewed yes  -     Prior Level of Function independent:;transfer;w/c or scooter  -     Existing Precautions/Restrictions no known precautions/restrictions  -     Barriers to Rehab medically complex  -       Row Name 05/21/25 1320          Living Environment    Current Living Arrangements home  -     People in Home alone  reports he lives in a handicap accessible apt and his wife still lives in their home  -       Row Name 05/21/25 1320          Home Main Entrance    Number of Stairs, Main Entrance other (see comments)  ramp entry  -       Row Name 05/21/25 1320          Stairs Within Home, Primary    Number of Stairs, Within Home, Primary none  -       Row Name 05/21/25 1320          Cognition    Orientation Status (Cognition) oriented x 4  -[facility] Name 05/21/25 1320          Safety Issues/Impairments Affecting Functional Mobility    Impairments Affecting Function (Mobility) endurance/activity tolerance;shortness of breath  -               User Key  (r) = Recorded By, (t) = Taken By, (c) = Cosigned By      Initials Name Provider Type     Kailey Anderson, PT Physical Therapist                   Mobility       [facility] Name 05/21/25 1322          Bed Mobility    Bed Mobility bed mobility (all) activities  -     All Activities, [place] (Bed Mobility) modified independence  -     Assistive Device (Bed Mobility) bed rails  -     Comment, (Bed Mobility) pt has a hospital bed at home  -       Row Name 05/21/25 1322          Transfers    Comment, (Transfers) pt does not have his w/c here and is currently on a stretcher in the ER  -       Row Name 05/21/25 1322          Bed-Chair Transfer    Bed-Chair [place] (Transfers) unable to assess   -[facility] Name 05/21/25 1322          Sit-Stand Transfer    Sit-Stand [place] (Transfers) not tested  -[facility]       Row Name 05/21/25 1322          Gait/Stairs (Locomotion)    [place] Level (Gait) not tested  -               User Key  (r) = Recorded By, (t) = Taken By, (c) = Cosigned By      Initials Name Provider Type     Kailey Anderson PT Physical Therapist                   Obj/Interventions       Row Name 05/21/25 1322          Range of Motion Comprehensive    General Range of Motion bilateral upper extremity ROM WFL  -     Comment, General Range of Motion BLE AKA, functional hip ROM  -[facility] Name 05/21/25 1322          Strength Comprehensive (MMT)    Comment, General Manual Muscle Testing (MMT) Assessment UEs 5/5  -[facility]       Row Name 05/21/25 1322          Balance    Balance Assessment sitting static balance;sitting dynamic balance  -     Static Sitting Balance independent  -     Dynamic Sitting Balance supervision  -     Position, Sitting Balance sitting edge of bed  -               User Key  (r) = Recorded By, (t) = Taken By, (c) = Cosigned By      Initials Name Provider Type     Kailey Anderson, PANKAJ Physical Therapist                   Goals/Plan    No documentation.                  Clinical Impression       Row Name 05/21/25 1323          Pain    Pretreatment Pain Rating 0/10 - no pain  -     Posttreatment Pain Rating 0/10 - no pain  -[facility]       Row Name 05/21/25 [facility]3          Plan of Care Review    Plan of Care Reviewed With patient  -     Outcome Evaluation 65 y.o. male with a CMH of HFrEF (EF 36 to 60% April 2024), status post ICD, CAD status post three-vessel stent, type 2 diabetes complicated with PVD status post bilateral AKA, COPD, hyperlipidemia, CKD stage III who presented to [facility] on 5/20/2025 with 2-day history of shortness of breath. Pt reports feeling better today and hopes to go home.  He reports at baseline, he lives alone in a handicap  accessible apt and functions independently from a w/c.  He uses a sliding board for vehicle transfers but not for w/c and commode transfers.  Pt denies any skilled therapy needs at this time, does not have his w/c here with him and is currently in the ER on a stretcher.  He demonstrates ability to sit self up on EOB with good balance.  Will sign off at this time, pt plans home at d/c and denies any equipment needs.  -       Row Name 05/21/25 1323          Therapy Assessment/Plan (PT)    Criteria for Skilled Interventions Met (PT) no;does not meet criteria for skilled intervention  -     Therapy Frequency (PT) evaluation only  -       Row Name 05/21/25 1323          Vital Signs    O2 Delivery Pre Treatment room air  -     O2 Delivery Intra Treatment room air  -     Post SpO2 (%) 95  -     O2 Delivery Post Treatment room air  -       Row Name 05/21/25 1323          Positioning and Restraints    Pre-Treatment Position in bed  -     Post Treatment Position bed  -     In Bed notified nsg;call light within reach  -               User Key  (r) = Recorded By, (t) = Taken By, (c) = Cosigned By      Initials Name Provider Type     Kailey Anderson, PT Physical Therapist                   Outcome Measures       Row Name 05/21/25 1327 05/21/25 0802       How much help from another person do you currently need...    Turning from your back to your side while in flat bed without using bedrails? 4  - 4  -    Moving from lying on back to sitting on the side of a flat bed without bedrails? 3  - 4  -JW    Moving to and from a bed to a chair (including a wheelchair)? 3  - 4  -JW    Standing up from a chair using your arms (e.g., wheelchair, bedside chair)? -- 4  -JW    Climbing 3-5 steps with a railing? 1  - 4  -JW    To walk in hospital room? 1  - 4  -JW    AM-PAC 6 Clicks Score (PT) -- 24  -    Highest Level of Mobility Goal -- Walk 250 Feet or More - 8  -      Row Name 05/21/25 1324           Functional Assessment    Outcome Measure Options AM-PAC 6 Clicks Basic Mobility (PT)  -               User Key  (r) = Recorded By, (t) = Taken By, (c) = Cosigned By      Initials Name Provider Type     Kailey Anderson, PANKAJ Physical Therapist    Kei Terry, RN Registered Nurse                                 Physical Therapy Education       Title: PT OT SLP Therapies (Done)       Topic: Physical Therapy (Done)       Point: Mobility training (Done)       Learning Progress Summary            Patient Acceptance, E,TB, VU by  at 5/21/2025 1328                                      User Key       Initials Effective Dates Name Provider Type Discipline     06/16/21 -  Kailey Anderson PT Physical Therapist PT                  PT Recommendation and Plan     Outcome Evaluation: 65 y.o. male with a CMH of HFrEF (EF 36 to 60% April 2024), status post ICD, CAD status post three-vessel stent, type 2 diabetes complicated with PVD status post bilateral AKA, COPD, hyperlipidemia, CKD stage III who presented to [facility] on 5/20/2025 with 2-day history of shortness of breath. Pt reports feeling better today and hopes to go home.  He reports at baseline, he lives alone in a handicap accessible apt and functions independently from a w/c.  He uses a sliding board for vehicle transfers but not for w/c and commode transfers.  Pt denies any skilled therapy needs at this time, does not have his w/c here with him and is currently in the ER on a stretcher.  He demonstrates ability to sit self up on EOB with good balance.  Will sign off at this time, pt plans home at d/c and denies any equipment needs.     Time Calculation:         PT Charges       Row Name 05/21/25 1328             Time Calculation    Start Time 0945  -      Stop Time 1000  -      Time Calculation (min) 15 min  -      PT Received On 05/21/25  -         Time Calculation- PT    Total Timed Code Minutes- PT 0 minute(s)  -                User Key  (r)  = Recorded By, (t) = Taken By, (c) = Cosigned By      Initials Name Provider Type     Kailey Anderson, PT Physical Therapist                  Therapy Charges for Today       Code Description Service Date Service Provider Modifiers Qty    [id number] HC PT EVAL LOW COMPLEXITY 3 5/21/2025 Kailey Anderson, PT GP 1            PT G-Codes  Outcome Measure Options: AM-PAC 6 Clicks Basic Mobility (PT)  AM-PAC 6 Clicks Score (PT): 24  PT Discharge Summary  Anticipated Discharge Disposition (PT): home    Kailey Anderson PT  5/21/2025

## 2025-05-21 NOTE — SIGNIFICANT NOTE
05/21/25 1016   OTHER   Discipline occupational therapist   Rehab Time/Intention   Session Not Performed other (see comments)  (PT evaluated pt and cleared pt for OT. Pt is at baseline with ADLs and mobility. OT will complete orders and sign off.)   Therapy Assessment/Plan (PT)   Criteria for Skilled Interventions Met (PT) no;does not meet criteria for skilled intervention

## 2025-05-21 NOTE — CONSULTS
Cardiology Consult Note    Patient Identification:  Name: Bobby Steele Jr.  Age: 65 y.o.  Sex: male  :  1960  MRN: [id number]             Requesting Physician :  Dr. Piedra, Hospitalist     Reason for Consultation / Chief Complaint :   Acute on Chronic HFrEF     History of Present Illness:      Mr. Bobby Steele Jr. has PMH of        # NSTEMI  19, SHILPA to SVG to RCA and proximal LAD leading into a small diagonal, cath 2020 underwent SHILPA to SVG to RCA and native proximal LCx with Impella assistance.  Cath 2021 revealed patent LIMA to LAD, SVG to PDA, occluded SVG to diagonal, patent stent in left main, severe disease in tortuous segment in mid LCx and LAD going to diagonal, unchanged from previous cath.  # CAD status post CABG X3 V  #Chronic HFrEF due to systolic dysfunction from ischemic cardiomyopathy with EF of 35%, status post ICD 10/8/2019  # COPD, continue tobacco abuse  # Hypertension   # Hyperlipidemia  # Diabetes with hemoglobin A1c of 10 on 2020  #.  CKD 3 and 4  # PAD, gangrene of left foot, amputation  #Positive family for premature heart disease with father MI 53     Presented to the ED on 2025 with worsening shortness of breath. Patient reports cough with brownish sputum and progressively worsening shortness of breath.  He denies any chest pain.  Patient was recently admitted at [facility] with COPD exacerbation and on the ventilator.  He reports this is how it started therefore he started using his nebulizer however shortness of breath became worse and he came to the ED.     Labs in the ED showed HS troponin of 50--52 pro bnp 1480 glucose 472 AST 47 ALT 80 A1C 11.40 plts 138 CXR suggestive of mild congestive heart failure   EKG sinus rhythm     Echocardiogram 2025    Left ventricular systolic function is severely decreased. Left ventricular ejection fraction appears to be 21 - 25%.    Left ventricular diastolic function is consistent with (grade I) impaired  relaxation.    The right ventricular cavity is mild to moderately dilated.    The left atrial cavity is moderate to severely dilated.    Estimated right ventricular systolic pressure from tricuspid regurgitation is normal (<35 mmHg).      Will check respiratory viral panel and CT Chest  Continue IV diuretics as well.      Further assessment and plan per Dr. Wing     Electronically signed by JOY Ashton, 05/21/25, 12:44 PM EDT.    Cardiology attending addendum :    I have personally performed a face-to-face diagnostic evaluation, physical exam and reviewed data on this patient.  I have reviewed documentation done by me and nurse practitioner  and corrected as needed.  And agree with the different components of documentation.Greater than 50% of the time spent in the care of this patient was provided by attending consultant/me.          ASSESSMENT:      #.  Shortness of breath, cough  # Acute on chronic CHF, ischemic cardiomyopathy 35%, status post ICD 10/8/2019  # CAD status post CABG, PCI  # COPD, tobacco abuse   # hypertension ,  #Dyslipidemia  #Hyperglycemia, and type 2 diabetes  #  CKD  # PAD, bilateral amputee     Recommendations:     Patient recently was in [facility] with respiratory failure.  Presented with cough and shortness of breath.  Denies any chest pain.  Troponin is elevated but flat.  Probably due to CHF.  LFTs are elevated.  Chest x-ray reveals mild congestive heart failure.  Will check CT chest and respiratory viral panel.  Patient has ischemic cardiomyopathy with EF of 21 to 25% by echo 5/21/2025, also has RV enlargement and left atrial enlargement and grade 1 diastolic dysfunction.  Diuresis with intravenous furosemide as tolerated  Monitor renal function urine output electrolytes closely.  Patient unfortunately has chronic hypotension and is not a good candidate for guideline directed medical therapy  Patient has been on midodrine on and off.  Continue medical management with  Eliquis, atorvastatin, carvedilol, clopidogrel, fenofibrate  .  Smoking cessation.  Patient would benefit from diabetes control.  Patient has ICD will follow-up with device clinic.        Procedures:     Stress myoview 4/8/2022  1.abnormal perfusion, large inferior wall defect consistent with scar and MI, small to moderate reversible lateral defect consistent with ischemia  2.  Severe LV dysfunction.  LVEF of 27%.     Echocardiogram 4/7/2022  LVE with severe global left ventricular hypokinesis, estimated LV ejection fraction of 30%  Normal RV size.  Pacer lead seen.  Moderate left atrial enlargement  Aortic valve, mitral valve, tricuspid valve appears structurally normal, he is to mild mitral and tricuspid regurgitation seen.  Calculated RV systolic pressure of 21 mmHg  No pericardial effusion seen.  Proximal aorta appears normal in size.     Cardiac cath 6/1/2021 which revealed severe triple-vessel disease with patent LIMA to LAD, SVG to PDA, occluded SVG to diagonal, patent left main, severe disease in proximal LAD leading to diagonal, severe disease in mid LCx which is in a tortuous segment.     Lexiscan Cardiolite 1/25/2023 which revealed small anterolateral ischemia EF of 37% and old inferior wall MI     Echocardiogram 3/26/2024 reveals EF of 35 to 40% with diastolic dysfunction.     Lexiscan Cardiolite 5/6/2024 revealed large fixed inferior and inferolateral MI with EF of 48%                       Cardiographics  ECG: EKG tracing was  personally reviewed/interpreted by me  ECG 12 Lead Dyspnea   Final Result   HEART RATE=94  bpm   RR Unbobfma=506  ms   AR Pexxwwew=096  ms   P Horizontal Axis=-44  deg   P Front Axis=74  deg   QRSD Ugixstyf=350  ms   QT Uexwyfcs=861  ms   ZJfZ=909  ms   QRS Axis=85  deg   T Wave Axis=269  deg   - ABNORMAL ECG -   Sinus rhythm   Probable  left atrial enlargement   Probable  anteroseptal infarct, recent   When compared with ECG of 07-Apr-2022 02:40:33,   Significant repolarization  change   Significant axis, voltage or hypertrophy change   Electronically Signed By: Roni Nettles (Stanislaw) 2025-05-21 07:15:59   Date and Time of Study:2025-05-20 21:00:57          Telemetry: Sinus rhythm             Diagnosis Plan   1. Acute congestive heart failure, unspecified heart failure type        2. Hyperglycemia                             Past Medical History:  Past Medical History:   Diagnosis Date    CAD (coronary artery disease)     S/P CABG    Chest pain 05/31/2021    Chronic systolic CHF (congestive heart failure)     COPD (chronic obstructive pulmonary disease)     DM2 (diabetes mellitus, type 2)     Dyslipidemia     Encounter for monitoring antiplatelet therapy     Hypertension     Myocardial infarction     inferior wall MI--03/13    Non-pressure chronic ulcer of other part of left foot with fat layer exposed 6/1/2021    DEBI (obstructive sleep apnea)     noncompliant with CPAP    Peripheral vascular disease     S/P left fem-pop bypass    Tobacco use      Past Surgical History:  Past Surgical History:   Procedure Laterality Date    ABOVE KNEE LEG AMPUTATION Left     AMPUTATION FOOT / TOE      left    APPENDECTOMY      at age 8 or 9    BIVENTRICULAR ASSIST DEVICE/LEFT VENTRICULAR ASSIST DEVICE INSERTION N/A 05/13/2020    Procedure: Left Ventricular Assist Device;  Surgeon: Juarez Wing MD;  Location: [facility] CATH INVASIVE LOCATION;  Service: Cardiovascular;  Laterality: N/A;    CARDIAC CATHETERIZATION      3-; [facility] 9-    CARDIAC CATHETERIZATION Right 06/27/2019    Procedure: Cardiac Catheterization/with grafts groin access;  Surgeon: Juarez Wing MD;  Location: [facility] CATH INVASIVE LOCATION;  Service: Cardiovascular    CARDIAC CATHETERIZATION N/A 05/08/2020    Procedure: Left Heart Cath with grafts;  Surgeon: Juarez Wing MD;  Location: [facility] CATH INVASIVE LOCATION;  Service: Cardiovascular;  Laterality: N/A;    CARDIAC CATHETERIZATION N/A 05/13/2020     Procedure: Percutaneous Coronary Intervention, Impella backup;  Surgeon: uJarez Wing MD;  Location: [facility] CATH INVASIVE LOCATION;  Service: Cardiovascular;  Laterality: N/A;    CARDIAC CATHETERIZATION N/A 06/01/2021    Procedure: Left Heart Cath, possible pci;  Surgeon: Juarez Wing MD;  Location: [facility] CATH INVASIVE LOCATION;  Service: Cardiovascular;  Laterality: N/A;    CARDIAC ELECTROPHYSIOLOGY PROCEDURE N/A 10/08/2019    Procedure: ICD SC new, ST.SHIV ;  Surgeon: Juarez Wing MD;  Location: [facility] CATH INVASIVE LOCATION;  Service: Cardiovascular    CARDIAC SURGERY  2013    COLONOSCOPY      CORONARY ARTERY BYPASS GRAFT      x3, 3-18-13-LIMA to LAD, and reverse individual SVG to lateral marginal and to PDA    FEMORAL POPLITEAL BYPASS Left 01/09/2019    [facility]/ Dr. Jero Hatch    HAND SURGERY Left     crushed hand    PACEMAKER IMPLANTATION      TOE SURGERY      toe nail removal of big toe- age 8 or 9      Allergies:  No Known Allergies  Home Meds:  (Not in a hospital admission)    Current Meds:     Current Facility-Administered Medications:     acetaminophen (TYLENOL) tablet 650 mg, 650 mg, Oral, Q4H PRN **OR** acetaminophen (TYLENOL) 160 MG/5ML oral solution 650 mg, 650 mg, Oral, Q4H PRN **OR** acetaminophen (TYLENOL) suppository 650 mg, 650 mg, Rectal, Q4H PRN, Sylvain Piedra MD    apixaban (ELIQUIS) tablet 5 mg, 5 mg, Oral, BID, Sylvain Piedra MD, 5 mg at 05/21/25 0802    atorvastatin (LIPITOR) tablet 40 mg, 40 mg, Oral, Daily, Sylvain Piedra MD, 40 mg at 05/21/25 0802    sennosides-docusate (PERICOLACE) 8.6-50 MG per tablet 2 tablet, 2 tablet, Oral, BID PRN **AND** polyethylene glycol (MIRALAX) packet 17 g, 17 g, Oral, Daily PRN **AND** bisacodyl (DULCOLAX) EC tablet 5 mg, 5 mg, Oral, Daily PRN **AND** bisacodyl (DULCOLAX) suppository 10 mg, 10 mg, Rectal, Daily PRN, Sylvain Piedra MD    budesonide (PULMICORT) nebulizer solution 0.5 mg, 0.5 mg,  Nebulization, Daily - RT, Sylvain Piedra MD, 0.5 mg at 05/21/25 0759    buPROPion XL (WELLBUTRIN XL) 24 hr tablet 150 mg, 150 mg, Oral, Q PM, Sylvain Piedra MD    Calcium Replacement - Follow Nurse / BPA Driven Protocol, , Not Applicable, PRN, Sylvain Piedra MD    carvedilol (COREG) tablet 3.125 mg, 3.125 mg, Oral, BID With Meals, Sylvain Piedra MD, 3.125 mg at 05/21/25 0802    clopidogrel (PLAVIX) tablet 75 mg, 75 mg, Oral, Daily, Sylvain Piedra MD, 75 mg at 05/21/25 0802    dextrose (D50W) (25 g/50 mL) IV injection 25 g, 25 g, Intravenous, Q15 Min PRN, Sylvain Piedra MD    dextrose (GLUTOSE) oral gel 15 g, 15 g, Oral, Q15 Min PRN, Sylvain Piedra MD    fenofibrate (TRICOR) tablet 145 mg, 145 mg, Oral, Daily, Sylvain Piedra MD, 145 mg at 05/21/25 0802    glucagon (GLUCAGEN) injection 1 mg, 1 mg, Intramuscular, Q15 Min PRN, Sylvain Piedra MD    HYDROmorphone (DILAUDID) injection 0.5 mg, 0.5 mg, Intravenous, Q2H PRN **AND** naloxone (NARCAN) injection 0.4 mg, 0.4 mg, Intravenous, Q5 Min PRN, Sylvain Piedra MD    insulin glargine (LANTUS, SEMGLEE) injection 20 Units, 20 Units, Subcutaneous, Nightly, Sylvain Piedra MD, 20 Units at 05/21/25 0434    insulin lispro (HUMALOG/ADMELOG) injection 2-7 Units, 2-7 Units, Subcutaneous, 4x Daily AC & at Bedtime, Sylvain Piedra MD, 6 Units at 05/21/25 1124    Magnesium Standard Dose Replacement - Follow Nurse / BPA Driven Protocol, , Not Applicable, PRN, Sylvain Piedra MD    morphine injection 1 mg, 1 mg, Intravenous, Q4H PRN **AND** naloxone (NARCAN) injection 0.4 mg, 0.4 mg, Intravenous, Q5 Min PRN, Sylvain Piedra MD    nicotine (NICODERM CQ) 21 MG/24HR patch 1 patch, 1 patch, Transdermal, Q24H, Sylvain Piedra MD    nitroglycerin (NITROSTAT) SL tablet 0.4 mg, 0.4 mg, Sublingual, Q5 Min PRN, Sylvain Piedra MD    Phosphorus Replacement - Follow Nurse / BPA Driven Protocol, , Not Applicable, PRN, Sylvain Piedra MD     Potassium Replacement - Follow Nurse / BPA Driven Protocol, , Not Applicable, PRN, Sylvain Piedra MD    [COMPLETED] Insert Peripheral IV, , , Once **AND** sodium chloride 0.9 % flush 10 mL, 10 mL, Intravenous, PRN, Barrie Magallanes,     sodium chloride 0.9 % flush 10 mL, 10 mL, Intravenous, Q12H, Sylvain Piedra MD, 10 mL at 05/21/25 0802    sodium chloride 0.9 % flush 10 mL, 10 mL, Intravenous, PRN, Sylvain Piedra MD    sodium chloride 0.9 % infusion 40 mL, 40 mL, Intravenous, PRN, Sylvain Piedra MD    Current Outpatient Medications:     apixaban (ELIQUIS) 5 MG tablet tablet, Take 1 tablet by mouth 2 (Two) Times a Day., Disp: , Rfl:     buPROPion XL (WELLBUTRIN XL) 150 MG 24 hr tablet, Take 1 tablet by mouth Every Evening., Disp: , Rfl:     carvedilol (COREG) 3.125 MG tablet, Take 1 tablet by mouth 2 (Two) Times a Day With Meals., Disp: 180 tablet, Rfl: 1    clopidogrel (PLAVIX) 75 MG tablet, Take 1 tablet by mouth Daily., Disp: , Rfl:     DULoxetine (CYMBALTA) 30 MG capsule, Take 1 capsule by mouth Daily., Disp: , Rfl:     Farxiga 10 MG tablet, Take 1 tablet by mouth once daily, Disp: 90 tablet, Rfl: 0    fenofibrate 160 MG tablet, Take 1 tablet by mouth Every Evening., Disp: , Rfl:     furosemide (LASIX) 20 MG tablet, Take 1 tablet by mouth 2 (Two) Times a Day., Disp: , Rfl:     gabapentin (NEURONTIN) 300 MG capsule, Take 1 capsule by mouth 2 (Two) Times a Day., Disp: , Rfl:     Janumet  MG per tablet, Take 1 tablet by mouth 2 (Two) Times a Day., Disp: , Rfl:     losartan (Cozaar) 25 MG tablet, Take 1 tablet by mouth Daily., Disp: 30 tablet, Rfl: 11    Methylcobalamin (B12) 5000 MCG sublingual tablet, Place 1 tablet under the tongue Daily., Disp: , Rfl:     pantoprazole (PROTONIX) 40 MG EC tablet, Take 1 tablet by mouth Daily., Disp: , Rfl:     Tresiba FlexTouch 200 UNIT/ML solution pen-injector pen injection, Inject 45 Units under the skin into the appropriate area as directed 2 (Two)  "Times a Day., Disp: , Rfl:     vitamin D3 125 MCG (5000 UT) capsule capsule, Take 1 capsule by mouth Daily., Disp: , Rfl:     albuterol (PROVENTIL) (2.5 MG/3ML) 0.083% nebulizer solution, Take 2.5 mg by nebulization Every 6 (Six) Hours As Needed for Wheezing or Shortness of Air., Disp: , Rfl:     atorvastatin (LIPITOR) 40 MG tablet, Take 1 tablet by mouth Daily., Disp: , Rfl:   Social History:   Social History     Tobacco Use    Smoking status: Every Day     Current packs/day: 1.50     Average packs/day: 1.5 packs/day for 25.0 years (37.5 ttl pk-yrs)     Types: Cigarettes     Passive exposure: Current    Smokeless tobacco: Never    Tobacco comments:     About 1 pack a day per pt--25   Substance Use Topics    Alcohol use: No      Family History:  Family History   Problem Relation Age of Onset    Hypertension Mother     Diabetes Mother     Heart disease Father         had CABG and re-do/  while recovering-had MI age 40s    Diabetes Father     Pancreatic cancer Sister     Hyperlipidemia Brother     Stroke Brother     Heart disease Paternal Uncle         Review of Systems : Review of Systems   Constitutional: Positive for malaise/fatigue. Negative for fever.   Cardiovascular:  Positive for dyspnea on exertion. Negative for chest pain.   Respiratory:  Positive for cough, shortness of breath, sputum production and wheezing.    Neurological:  Positive for weakness.   All other systems reviewed and are negative.           Constitutional:  Temp:  [97.6 °F (36.4 °C)-98.4 °F (36.9 °C)] 98.4 °F (36.9 °C)  Heart Rate:  [] 95  Resp:  [18-20] 19  BP: (102-153)/(61-96) 146/75    Physical Exam   /75 (BP Location: Right arm, Patient Position: Lying)   Pulse 95   Temp 98.4 °F (36.9 °C) (Oral)   Resp 19   Ht 170.2 cm (67\")   Wt 82.3 kg (181 lb 8 oz)   SpO2 96%   BMI 28.43 kg/m²   Physical Exam  General:  Appears in no acute distress; chronically ill appearing   Eyes: Sclerae are anicteric,  conjunctivae are " clear   HEENT:  No JVD. Thyroid not visibly enlarged. No mucosal pallor or cyanosis  Respiratory: Respirations regular and unlabored at rest.  Diffuse rhonchi   Cardiovascular: S1,S2 Regular rate and rhythm. No murmur, rub or gallop auscultated. No pretibial pitting edema  Gastrointestinal: Abdomen nondistended.  Musculoskeletal:  bilateral AKA   Extremities: bilateral AKA   Skin: Color pink. Skin warm and dry to touch.   Neuro: Alert and awake, no lateralizing deficits appreciated        Echocardiogram:   Results for orders placed during the hospital encounter of 05/20/25    Adult Transthoracic Echo Complete W/ Cont if Necessary Per Protocol    Interpretation Summary    Left ventricular systolic function is severely decreased. Left ventricular ejection fraction appears to be 21 - 25%.    Left ventricular diastolic function is consistent with (grade I) impaired relaxation.    The right ventricular cavity is mild to moderately dilated.    The left atrial cavity is moderate to severely dilated.    Estimated right ventricular systolic pressure from tricuspid regurgitation is normal (<35 mmHg).      STRESS TEST  Results for orders placed during the hospital encounter of 05/06/24    Stress Test With Myocardial Perfusion One Day    Interpretation Summary    Left ventricular ejection fraction is borderline normal (Calculated EF = 48%).    LEXISCAN CARDIOLITE REPORT    DATE OF PROCEDURE:  05/06/24    INDICATION FOR PROCEDURE:  Chest pain, CAD, CABG, preop    PROCEDURE PERFORMED: Lexiscan Cardiolite    PROCEDURE COMMENTS:    After informed consent was obtained.  Patient's resting heart rate was 75 beats per, resting blood pressure was 116/70, resting EKG revealed sinus rhythm at the rate of 75 bpm with LVH and repole.  Patient was given 0.4 mg of regadenosine for stress testing.  There was no significant change in heart rate, blood pressure, symptoms with regadenoson injection.  Patient tolerated procedure well.   Complications were none.    NUCLEAR IMAGIN.  There was large severe defect involving whole of inferior wall, which is fixed consistent with inferior wall MI, no ischemia seen.  2.  Gated images reveal inferior hypokinesis, LVEF of 48%.    CONCLUSION:  1.  Lexiscan Cardiolite with abnormal perfusion with inferior wall MI and scar, negative for ischemia.  2.  Mild LV dysfunction.  LVEF of 48%.    RECOMMENDATIONS:    Clinical correlation recommended.      Juarez Wing MD  24  16:16 EDT        Cardiolite (Tc-99m sestamibi) stress test    HEART CATHETERIZATION  Results for orders placed during the hospital encounter of 21    Cardiac Catheterization/Vascular Study    Narrative  Table formatting from the original result was not included.  2021      Heart Cath Report    NAME:              Bobby Steele Jr.  :                1960  AGE/SEX:        61 y.o. male  MRN:                [id number]        Procedures Performed    1. Left heart catheterization  2. Selective coronary angiography  3. Left ventriculography  4. LIMA to LAD  5. SVG to RCA and SVG to diagonal    :   Juarez Wing MD    Vascular Access Site: Femoral    Indication for procedure: Recurrent prolonged chest pain consistent with unstable angina, CAD, CABG, PCI, chronic HFrEF, cardiomyopathy, diabetes, dyslipidemia, hypertension, PAD  Hemodynamics    Pressures    Ao:    100/60 mmHg  LV:    100/3 mmHg  End-diastolic pressure:  3  mmHg  No significant aortic valvular gradient on pullback    Coronary Angiography    Left Main :  The left main   has mild calcified luminal irregularities less than 30%    Left Anterior Descending : Starts of is good caliber vessel there is a stent patent in proximal portion.  At the edge of the stent there is a 70% to 80% narrowing which is calcified.  Right after that there is aneurysmal dilatation in the LAD and a large diagonal comes of right at that spot.  There is  tortuous loop in the segment of the vessel.  The diagonal ostium has 70% narrowing.    Left Circumflex : Has stent patent in proximal portion.  Has complex 360 degree loop in midportion and has 70% narrowing in this loop.  Distal circumflex divides into 2.  And then is occluded 100% chronically.    Right Coronary Artery :  The right coronary artery is dominant vessel, 100% occluded proximally, SVG is patent    Dominance:  []  Left  [x]  Right  []  Co-Dominant    Lima %: To LAD is patent in the origin body and insertion native LAD beyond the LIMA is without disease.    SVG(s) %: To RCA is patent in the origin body and insertion there is a stent patent in distal portion of the graft  with excellent  long-term results.  Native PDA beyond the graft is small caliber vessel with good antegrade flow, no retrograde flow    SVG(s) %: To diagonal is occluded chronically 100%.      Left Ventriculography:    Estimated Ejection Fraction: Difficult evaluate on this hand-injection  Wall motion abnormalities: Severe global hypokinesis  Mitral Regurgitation:  None seen on this hand-injection    Impression:    1. Severe triple-vessel disease with patent LIMA to LAD, SVG to PDA, occluded SVG to diagonal, patent left main  2. Severe disease in mid LCx in a tortuous segment of the vessel, best suited for medical management  3. Severe disease in LAD going into diagonal which is unchanged from previous cath    Recommendations:    1. Continue medical management and risk factor modification      Juarez Wing MD  6/1/2021  16:59 EDT  Electronically signed by Juarez Wing MD, 06/01/21, 4:59 PM EDT.        Imaging  Chest X-ray:   Imaging Results (Last 24 Hours)       Procedure Component Value Units Date/Time    XR Chest 1 View [[id number]] Collected: 05/20/25 2208     Updated: 05/20/25 2212    Narrative:      XR CHEST 1 VW    Date of Exam: 5/20/2025 9:48 PM EDT    Indication: soa    Comparison:  4/7/2020    Findings:  Stable mild cardiomegaly. Stable left-sided AICD. Prior median sternotomy. There appears to be mild pulmonary edema. No evidence of pneumothorax or large pleural effusion. There are no dense areas of airspace consolidation.      Impression:      Impression:  Findings suggest mild congestive heart failure.      Electronically Signed: Justin Saba MD    5/20/2025 10:10 PM EDT    Workstation ID: [id number]            Lab Review: I have reviewed the labs  Results from last 7 days   Lab Units 05/20/25 2222 05/20/25 2125   HSTROP T ng/L 52* 50*         Results from last 7 days   Lab Units 05/20/25 2125   SODIUM mmol/L 135*   POTASSIUM mmol/L 4.5   BUN mg/dL 16   CREATININE mg/dL 0.86   CALCIUM mg/dL 9.6         Results from last 7 days   Lab Units 05/20/25 2125   PROBNP pg/mL 1,480.0*     Results from last 7 days   Lab Units 05/20/25 2125   WBC 10*3/mm3 6.82   HEMOGLOBIN g/dL 15.1   HEMATOCRIT % 45.2   PLATELETS 10*3/mm3 138*     Results from last 7 days   Lab Units 05/20/25 2125   INR  1.04   APTT seconds 25.1             Juarez Wing MD  5/21/2025, 13:01 EDT      EMR Dragon/Transcription:   Dictated utilizing Dragon dictation

## 2025-05-21 NOTE — NURSING NOTE
"Pt initially refusing advanced directive/living will paperwork. Throughout admission, patient repeatedly made statements like \" well you wont do much since I only had 2 legs\", or \"its okay for me to be on a ventilator but not for very long\", or \"I don't want a feeding tube\" when discussing code status and plan of care. This RN educated patient on the process of filling out an advanced directive and that it sounds like he really should have one. Patient still refusing, but agreeable to speaking with  about it.   "

## 2025-05-21 NOTE — H&P
[facility] Medicine Services  History & Physical    Patient Name: Bobby Steele Jr.  : 1960  MRN: [id number]  Primary Care Physician:  Yamile Agarwal MD  Date of admission: 2025  Date and Time of Service: 2025 at 2300    Subjective      Chief Complaint: shortness of breath    History of Present Illness: Bobby Steele Jr. is a 65 y.o. male with a CMH of HFrEF (EF 36 to 60% 2024), status post ICD, CAD status post three-vessel stent, type 2 diabetes complicated with PVD status post bilateral AKA, COPD, hyperlipidemia, CKD stage III who presented to [facility] on 2025 with 2-day history of shortness of breath.  Worse with laying down not improved with home oxygen.  Denies chest pain, coughing, recent sick contacts or fevers.  Upon arrival to the ED, hypoxic requiring 2 L nasal cannula otherwise vitally stable.  Notable labs include proBNP 1480, troponin 50-52, serum glucose 472, hemoglobin A1c 11.40.,  Chest x-ray showed findings suggestive of mild congestive heart failure.  Received Lasix and insulin with some improvement.      Review of Systems   Constitutional:  Negative for chills, diaphoresis, fatigue and fever.   Respiratory:  Positive for apnea and shortness of breath. Negative for cough, choking, chest tightness and wheezing.    Cardiovascular:  Negative for chest pain, palpitations and leg swelling.   Gastrointestinal:  Negative for abdominal distention, abdominal pain, diarrhea, nausea and vomiting.   Genitourinary:  Negative for dysuria.   Musculoskeletal:  Negative for arthralgias.   Skin:  Negative for color change and pallor.   Neurological:  Negative for dizziness and facial asymmetry.   Psychiatric/Behavioral:  Negative for agitation and behavioral problems.        Personal History     Past Medical History:   Diagnosis Date    CAD (coronary artery disease)     S/P CABG    Chest pain 2021    Chronic systolic CHF (congestive heart failure)     COPD  (chronic obstructive pulmonary disease)     DM2 (diabetes mellitus, type 2)     Dyslipidemia     Encounter for monitoring antiplatelet therapy     Hypertension     Myocardial infarction     inferior wall MI--03/13    Non-pressure chronic ulcer of other part of left foot with fat layer exposed 6/1/2021    DEBI (obstructive sleep apnea)     noncompliant with CPAP    Peripheral vascular disease     S/P left fem-pop bypass    Tobacco use        Past Surgical History:   Procedure Laterality Date    ABOVE KNEE LEG AMPUTATION Left     AMPUTATION FOOT / TOE      left    APPENDECTOMY      at age 8 or 9    BIVENTRICULAR ASSIST DEVICE/LEFT VENTRICULAR ASSIST DEVICE INSERTION N/A 05/13/2020    Procedure: Left Ventricular Assist Device;  Surgeon: Juarez Wing MD;  Location: [facility] CATH INVASIVE LOCATION;  Service: Cardiovascular;  Laterality: N/A;    CARDIAC CATHETERIZATION      3-; [facility] 9-    CARDIAC CATHETERIZATION Right 06/27/2019    Procedure: Cardiac Catheterization/with grafts groin access;  Surgeon: Juarez Wing MD;  Location: [facility] CATH INVASIVE LOCATION;  Service: Cardiovascular    CARDIAC CATHETERIZATION N/A 05/08/2020    Procedure: Left Heart Cath with grafts;  Surgeon: Juarez Wing MD;  Location: [facility] CATH INVASIVE LOCATION;  Service: Cardiovascular;  Laterality: N/A;    CARDIAC CATHETERIZATION N/A 05/13/2020    Procedure: Percutaneous Coronary Intervention, Impella backup;  Surgeon: Juarez Wing MD;  Location: [facility] CATH INVASIVE LOCATION;  Service: Cardiovascular;  Laterality: N/A;    CARDIAC CATHETERIZATION N/A 06/01/2021    Procedure: Left Heart Cath, possible pci;  Surgeon: Juarez Wing MD;  Location: [facility] CATH INVASIVE LOCATION;  Service: Cardiovascular;  Laterality: N/A;    CARDIAC ELECTROPHYSIOLOGY PROCEDURE N/A 10/08/2019    Procedure: ICD SC new, ST.SHIV ;  Surgeon: Juarez Wing MD;  Location: [facility] CATH  INVASIVE LOCATION;  Service: Cardiovascular    CARDIAC SURGERY  2013    COLONOSCOPY      CORONARY ARTERY BYPASS GRAFT      x3, 3-18-13-LIMA to LAD, and reverse individual SVG to lateral marginal and to PDA    FEMORAL POPLITEAL BYPASS Left 01/09/2019    [facility]/ Dr. Jero Hatch    HAND SURGERY Left     crushed hand    PACEMAKER IMPLANTATION      TOE SURGERY      toe nail removal of big toe- age 8 or 9       Family History: family history includes Diabetes in his father and mother; Heart disease in his father and paternal uncle; Hyperlipidemia in his brother; Hypertension in his mother; Pancreatic cancer in his sister; Stroke in his brother. Otherwise pertinent FHx was reviewed and not pertinent to current issue.    Social History:  reports that he has been smoking cigarettes. He has a 37.5 pack-year smoking history. He has been exposed to tobacco smoke. He has never used smokeless tobacco. He reports that he does not drink alcohol and does not use drugs.    Home Medications:  Prior to Admission Medications       Prescriptions Last Dose Informant Patient Reported? Taking?    Accu-Chek Marley Plus test strip   Yes No    albuterol (PROVENTIL) (2.5 MG/3ML) 0.083% nebulizer solution   Yes No    Take 2.5 mg by nebulization Every 6 (Six) Hours As Needed for Wheezing or Shortness of Air.    apixaban (ELIQUIS) 5 MG tablet tablet   Yes No    Take 1 tablet by mouth 2 (Two) Times a Day.    atorvastatin (LIPITOR) 40 MG tablet   Yes No    Take 1 tablet by mouth Daily.    budesonide (PULMICORT) 0.5 MG/2ML nebulizer solution   Yes No    Take 2 mL by nebulization Daily.    buPROPion XL (WELLBUTRIN XL) 150 MG 24 hr tablet   Yes No    Take 1 tablet by mouth Every Evening.    carvedilol (COREG) 3.125 MG tablet   No No    Take 1 tablet by mouth 2 (Two) Times a Day With Meals.    clopidogrel (PLAVIX) 75 MG tablet   Yes No    Take 1 tablet by mouth Daily.    DULoxetine (CYMBALTA) 30 MG capsule   Yes No    Farxiga 10 MG tablet   No No    Take  1 tablet by mouth once daily    fenofibrate 160 MG tablet   Yes No    Take 1 tablet by mouth Every Evening.    furosemide (LASIX) 40 MG tablet   Yes No    Take 0.5 tablets by mouth 2 (Two) Times a Day.    HYDROcodone-acetaminophen (NORCO) 7.5-325 MG per tablet   Yes No    Take 1 tablet by mouth Every 12 (Twelve) Hours.    ipratropium-albuterol (DUO-NEB) 0.5-2.5 mg/3 ml nebulizer   Yes No    Take 3 mL by nebulization 4 (Four) Times a Day.    Janumet  MG per tablet   Yes No    Take 1 tablet by mouth 2 (Two) Times a Day.    LORazepam (ATIVAN) 0.5 MG tablet   Yes No    Take 1 tablet by mouth Every 6 (Six) Hours As Needed.    losartan (Cozaar) 25 MG tablet   No No    Take 1 tablet by mouth Daily.    midodrine (PROAMATINE) 2.5 MG tablet   Yes No    Take 1 tablet by mouth 2 (Two) Times a Day.    nitroglycerin (NITROSTAT) 0.4 MG SL tablet   Yes No    pantoprazole (PROTONIX) 40 MG EC tablet   Yes No    Take 1 tablet by mouth Daily.    Semaglutide,0.25 or 0.5MG/DOS, (Ozempic, 0.25 or 0.5 MG/DOSE,) 2 MG/1.5ML solution pen-injector   Yes No    Inject  under the skin into the appropriate area as directed 1 (One) Time Per Week.    Tresiba FlexTouch 200 UNIT/ML solution pen-injector pen injection   Yes No    Inject 45 Units under the skin into the appropriate area as directed 2 (Two) Times a Day.    vitamin B-12 (CYANOCOBALAMIN) 1000 MCG tablet   Yes No    Take 1 tablet by mouth Daily.              Allergies:  No Known Allergies    Objective      Vitals:   Temp:  [98.1 °F (36.7 °C)] 98.1 °F (36.7 °C)  Heart Rate:  [] 89  Resp:  [20] 20  BP: (102-140)/(61-96) 102/61  Body mass index is 27.72 kg/m².  Physical Exam  Constitutional:       Appearance: Normal appearance.   HENT:      Head: Normocephalic.      Right Ear: Tympanic membrane normal.      Left Ear: Tympanic membrane normal.      Mouth/Throat:      Mouth: Mucous membranes are moist.   Eyes:      Pupils: Pupils are equal, round, and reactive to light.    Cardiovascular:      Rate and Rhythm: Normal rate and regular rhythm.      Heart sounds: No murmur heard.  Pulmonary:      Effort: Respiratory distress present.      Breath sounds: No wheezing.   Abdominal:      General: There is no distension.      Palpations: There is no mass.      Tenderness: There is no abdominal tenderness.   Musculoskeletal:         General: Normal range of motion.      Comments: Bilateral AKA   Skin:     General: Skin is warm and dry.      Capillary Refill: Capillary refill takes less than 2 seconds.   Neurological:      General: No focal deficit present.      Mental Status: He is alert and oriented to person, place, and time.         Diagnostic Data:  Lab Results (last 24 hours)       Procedure Component Value Units Date/Time    POC Glucose Once [[id number]]  (Abnormal) Collected: 05/20/25 2359    Specimen: Blood Updated: 05/21/25 0002     Glucose 331 mg/dL      Comment: Serial Number: [id number]:  [id number]       High Sensitivity Troponin T 1Hr [[id number]]  (Abnormal) Collected: 05/20/25 2222    Specimen: Blood from Arm, Right Updated: 05/20/25 2250     HS Troponin T 52 ng/L      Troponin T Numeric Delta 2 ng/L      Troponin T % Delta 4    Narrative:      High Sensitive Troponin T Reference Range:  <14.0 ng/L- Negative Female for AMI  <22.0 ng/L- Negative Male for AMI  >=14 - Abnormal Female indicating possible myocardial injury.  >=22 - Abnormal Male indicating possible myocardial injury.   Clinicians would have to utilize clinical acumen, EKG, Troponin, and serial changes to determine if it is an Acute Myocardial Infarction or myocardial injury due to an underlying chronic condition.         Blood Gas, Arterial - [[id number]]  (Abnormal) Collected: 05/20/25 2218    Specimen: Arterial Blood Updated: 05/20/25 2221     Site Right Radial     Dennis's Test Positive     pH, Arterial 7.378 pH units      pCO2, Arterial 45.6 mm Hg      pO2, Arterial 77.5 mm Hg      HCO3, Arterial 26.8 mmol/L       Base Excess, Arterial 1.0 mmol/L      Comment: Serial Number: [id number]:  [id number]        O2 Saturation, Arterial 94.9 %      CO2 Content 28.2 mmol/L      Barometric Pressure for Blood Gas --     Comment: N/A        Modality Cannula     FIO2 28 %      Hemodilution No     PO2/FIO2 277    CBC & Differential [[id number]]  (Abnormal) Collected: 05/20/25 2125    Specimen: Blood from Arm, Right Updated: 05/20/25 2202    Narrative:      The following orders were created for panel order CBC & Differential.  Procedure                               Abnormality         Status                     ---------                               -----------         ------                     CBC Auto Differential[[id number]]        Abnormal            Final result               Scan Slide[[id number]]                                       Final result                 Please view results for these tests on the individual orders.    CBC Auto Differential [[id number]]  (Abnormal) Collected: 05/20/25 2125    Specimen: Blood from Arm, Right Updated: 05/20/25 2202     WBC 6.82 10*3/mm3      RBC 5.00 10*6/mm3      Hemoglobin 15.1 g/dL      Hematocrit 45.2 %      MCV 90.4 fL      MCH 30.2 pg      MCHC 33.4 g/dL      RDW 13.4 %      RDW-SD 44.9 fl      MPV 12.7 fL      Platelets 138 10*3/mm3     Narrative:      The previously reported component NRBC is no longer being reported. Previous result was 0.0 /100 WBC (Reference Range: 0.0-0.2 /100 WBC) on 5/20/2025 at 2200 EDT.    Scan Slide [[id number]] Collected: 05/20/25 2125    Specimen: Blood from Arm, Right Updated: 05/20/25 2202     Scan Slide --     Comment: See Manual Differential Results       Manual Differential [[id number]]  (Abnormal) Collected: 05/20/25 2125    Specimen: Blood from Arm, Right Updated: 05/20/25 2202     Neutrophil % 71.0 %      Lymphocyte % 13.0 %      Monocyte % 8.0 %      Eosinophil % 2.0 %      Basophil % 1.0 %      Atypical Lymphocyte % 5.0 %      Neutrophils Absolute  4.84 10*3/mm3      Lymphocytes Absolute 1.23 10*3/mm3      Monocytes Absolute 0.55 10*3/mm3      Eosinophils Absolute 0.14 10*3/mm3      Basophils Absolute 0.07 10*3/mm3      RBC Morphology Normal     WBC Morphology Normal     Platelet Morphology Normal    Comprehensive Metabolic Panel [[id number]]  (Abnormal) Collected: 05/20/25 2125    Specimen: Blood from Arm, Right Updated: 05/20/25 2157     Glucose 472 mg/dL      BUN 16 mg/dL      Creatinine 0.86 mg/dL      Sodium 135 mmol/L      Potassium 4.5 mmol/L      Chloride 102 mmol/L      CO2 22.2 mmol/L      Calcium 9.6 mg/dL      Total Protein 6.6 g/dL      Albumin 3.8 g/dL      ALT (SGPT) 80 U/L      AST (SGOT) 47 U/L      Alkaline Phosphatase 94 U/L      Total Bilirubin 0.4 mg/dL      Globulin 2.8 gm/dL      A/G Ratio 1.4 g/dL      BUN/Creatinine Ratio 18.6     Anion Gap 10.8 mmol/L      eGFR 96.1 mL/min/1.73     Narrative:      GFR Categories in Chronic Kidney Disease (CKD)              GFR Category          GFR (mL/min/1.73)    Interpretation  G1                    90 or greater        Normal or high (1)  G2                    60-89                Mild decrease (1)  G3a                   45-59                Mild to moderate decrease  G3b                   30-44                Moderate to severe decrease  G4                    15-29                Severe decrease  G5                    14 or less           Kidney failure    (1)In the absence of evidence of kidney disease, neither GFR category G1 or G2 fulfill the criteria for CKD.    eGFR calculation 2021 CKD-EPI creatinine equation, which does not include race as a factor    BNP [[id number]]  (Abnormal) Collected: 05/20/25 2125    Specimen: Blood from Arm, Right Updated: 05/20/25 2157     proBNP 1,480.0 pg/mL     Narrative:      This assay is used as an aid in the diagnosis of individuals suspected of having heart failure. It can be used as an aid in the diagnosis of acute decompensated heart failure (ADHF) in  patients presenting with signs and symptoms of ADHF to the emergency department (ED). In addition, NT-proBNP of <300 pg/mL indicates ADHF is not likely.    Age Range Result Interpretation  NT-proBNP Concentration (pg/mL:      <50             Positive            >450                   Gray                 300-450                    Negative             <300    50-75           Positive            >900                  Gray                300-900                  Negative            <300      >75             Positive            >1800                  Gray                300-1800                  Negative            <300    High Sensitivity Troponin T [[id number]]  (Abnormal) Collected: 05/20/25 2125    Specimen: Blood from Arm, Right Updated: 05/20/25 2157     HS Troponin T 50 ng/L     Narrative:      High Sensitive Troponin T Reference Range:  <14.0 ng/L- Negative Female for AMI  <22.0 ng/L- Negative Male for AMI  >=14 - Abnormal Female indicating possible myocardial injury.  >=22 - Abnormal Male indicating possible myocardial injury.   Clinicians would have to utilize clinical acumen, EKG, Troponin, and serial changes to determine if it is an Acute Myocardial Infarction or myocardial injury due to an underlying chronic condition.         Protime-INR [[id number]]  (Normal) Collected: 05/20/25 2125    Specimen: Blood from Arm, Right Updated: 05/20/25 2147     Protime 13.5 Seconds      INR 1.04    aPTT [[id number]]  (Normal) Collected: 05/20/25 2125    Specimen: Blood from Arm, Right Updated: 05/20/25 2147     PTT 25.1 seconds     [place] Draw [[id number]] Collected: 05/20/25 2125    Specimen: Blood from Arm, Right Updated: 05/20/25 2131    Narrative:      The following orders were created for panel order [place] Draw.  Procedure                               Abnormality         Status                     ---------                               -----------         ------                     Green Top (Gel)[[id number]]                                   Final result               Lavender Top[[id number]]                                     Final result               Gold Top - SST[[id number]]                                   Final result               Light Blue Top[[id number]]                                   Final result                 Please view results for these tests on the individual orders.    Green Top (Gel) [[id number]] Collected: 05/20/25 2125    Specimen: Blood from Arm, Right Updated: 05/20/25 2131     Extra Tube Hold for add-ons.     Comment: Auto resulted.       Lavender Top [[id number]] Collected: 05/20/25 2125    Specimen: Blood from Arm, Right Updated: 05/20/25 2131     Extra Tube hold for add-on     Comment: Auto resulted       Gold Top - SST [[id number]] Collected: 05/20/25 2125    Specimen: Blood from Arm, Right Updated: 05/20/25 2131     Extra Tube Hold for add-ons.     Comment: Auto resulted.       Light Blue Top [[id number]] Collected: 05/20/25 2125    Specimen: Blood from Arm, Right Updated: 05/20/25 2131     Extra Tube Hold for add-ons.     Comment: Auto resulted                Imaging Results (Last 24 Hours)       Procedure Component Value Units Date/Time    XR Chest 1 View [[id number]] Collected: 05/20/25 2208     Updated: 05/20/25 2212    Narrative:      XR CHEST 1 VW    Date of Exam: 5/20/2025 9:48 PM EDT    Indication: soa    Comparison: 4/7/2020    Findings:  Stable mild cardiomegaly. Stable left-sided AICD. Prior median sternotomy. There appears to be mild pulmonary edema. No evidence of pneumothorax or large pleural effusion. There are no dense areas of airspace consolidation.      Impression:      Impression:  Findings suggest mild congestive heart failure.      Electronically Signed: Justin Saba MD    5/20/2025 10:10 PM EDT    Workstation ID: [id number]              Assessment & Plan        This is a 65 y.o. male with:    Active and Resolved Problems  Active Hospital Problems    Diagnosis  POA    **Acute  exacerbation of CHF (congestive heart failure) [I50.9]  Yes      Resolved Hospital Problems   No resolved problems to display.       Acute hypoxic respiratory failure secondary to CHF exacerbation  HFrEF status post ICD EF April 2024 36 to 40%  Continue IV Lasix 40 mg twice daily  Obtain echocardiogram  Strict I's and O's  Daily weights  Low-sodium diet  Resume Coreg  Cardiology consult for GDMT management    Type 2 diabetes complicated with PVD and bilateral AKA  Hemoglobin A1c 11.4  Start on Lantus 20 units and insulin sliding scale    CAD status post three-vessel stent  COPD  Hyperlipidemia  CKD stage III  Resume pertinent meds list of meds however medication reconciliation needs to be completed    Full code  DVT prophylaxis with Eliquis  Dispo: Pending clinical course      VTE Prophylaxis:  Pharmacologic VTE prophylaxis orders are present.        The patient desires to be as follows:    CODE STATUS:    Code Status (Patient has no pulse and is not breathing): CPR (Attempt to Resuscitate)  Medical Interventions (Patient has pulse or is breathing): Full Support        Suzie Steele, who can be contacted at [phone], is the designated person to make medical decisions on the patient's behalf if He is incapable of doing so. This was clarified with patient and/or next of kin on 5/20/2025 during the course of this H&P.    Admission Status:  I believe this patient meets inpatient status.    Expected Length of Stay: greater than 2 nights    PDMP and Medication Dispenses via Sidebar reviewed and consistent with patient reported medications.    I discussed the patient's findings and my recommendations with patient.      Signature:     This document has been electronically signed by Sylvain Piedra MD on May 21, 2025 00:49 EDT   [facility] Hospitalist Team

## 2025-05-22 ENCOUNTER — READMISSION MANAGEMENT (OUTPATIENT)
Dept: CALL CENTER | Facility: HOSPITAL | Age: 65
End: 2025-05-22
Payer: MEDICARE

## 2025-05-22 VITALS
WEIGHT: 171.3 LBS | HEIGHT: 67 IN | HEART RATE: 83 BPM | OXYGEN SATURATION: 94 % | BODY MASS INDEX: 26.89 KG/M2 | SYSTOLIC BLOOD PRESSURE: 145 MMHG | TEMPERATURE: 97.7 F | RESPIRATION RATE: 22 BRPM | DIASTOLIC BLOOD PRESSURE: 90 MMHG

## 2025-05-22 LAB
ANION GAP SERPL CALCULATED.3IONS-SCNC: 10.2 MMOL/L (ref 5–15)
ANION GAP SERPL CALCULATED.3IONS-SCNC: 11.2 MMOL/L (ref 5–15)
BUN SERPL-MCNC: 20 MG/DL (ref 8–23)
BUN SERPL-MCNC: 24 MG/DL (ref 8–23)
BUN/CREAT SERPL: 18.5 (ref 7–25)
BUN/CREAT SERPL: 20.6 (ref 7–25)
CALCIUM SPEC-SCNC: 9.6 MG/DL (ref 8.6–10.5)
CALCIUM SPEC-SCNC: 9.8 MG/DL (ref 8.6–10.5)
CHLORIDE SERPL-SCNC: 101 MMOL/L (ref 98–107)
CHLORIDE SERPL-SCNC: 101 MMOL/L (ref 98–107)
CO2 SERPL-SCNC: 25.8 MMOL/L (ref 22–29)
CO2 SERPL-SCNC: 25.8 MMOL/L (ref 22–29)
CREAT SERPL-MCNC: 0.97 MG/DL (ref 0.76–1.27)
CREAT SERPL-MCNC: 1.3 MG/DL (ref 0.76–1.27)
EGFRCR SERPLBLD CKD-EPI 2021: 61 ML/MIN/1.73
EGFRCR SERPLBLD CKD-EPI 2021: 86.6 ML/MIN/1.73
GLUCOSE BLDC GLUCOMTR-MCNC: 231 MG/DL (ref 70–105)
GLUCOSE BLDC GLUCOMTR-MCNC: 277 MG/DL (ref 70–105)
GLUCOSE SERPL-MCNC: 242 MG/DL (ref 65–99)
GLUCOSE SERPL-MCNC: 287 MG/DL (ref 65–99)
POTASSIUM SERPL-SCNC: 4 MMOL/L (ref 3.5–5.2)
POTASSIUM SERPL-SCNC: 4.3 MMOL/L (ref 3.5–5.2)
SODIUM SERPL-SCNC: 137 MMOL/L (ref 136–145)
SODIUM SERPL-SCNC: 138 MMOL/L (ref 136–145)

## 2025-05-22 PROCEDURE — 63710000001 INSULIN GLARGINE PER 5 UNITS: Performed by: STUDENT IN AN ORGANIZED HEALTH CARE EDUCATION/TRAINING PROGRAM

## 2025-05-22 PROCEDURE — 82948 REAGENT STRIP/BLOOD GLUCOSE: CPT

## 2025-05-22 PROCEDURE — 80048 BASIC METABOLIC PNL TOTAL CA: CPT | Performed by: NURSE PRACTITIONER

## 2025-05-22 PROCEDURE — 80048 BASIC METABOLIC PNL TOTAL CA: CPT | Performed by: STUDENT IN AN ORGANIZED HEALTH CARE EDUCATION/TRAINING PROGRAM

## 2025-05-22 PROCEDURE — 99232 SBSQ HOSP IP/OBS MODERATE 35: CPT | Performed by: NURSE PRACTITIONER

## 2025-05-22 PROCEDURE — 63710000001 INSULIN LISPRO (HUMAN) PER 5 UNITS

## 2025-05-22 RX ORDER — BUDESONIDE 0.5 MG/2ML
0.5 INHALANT ORAL
Qty: 60 ML | Refills: 0 | Status: SHIPPED | OUTPATIENT
Start: 2025-05-22 | End: 2025-06-21

## 2025-05-22 RX ADMIN — INSULIN LISPRO 3 UNITS: 100 INJECTION, SOLUTION INTRAVENOUS; SUBCUTANEOUS at 08:15

## 2025-05-22 RX ADMIN — BUDESONIDE 0.5 MG: 0.5 INHALANT RESPIRATORY (INHALATION) at 06:40

## 2025-05-22 RX ADMIN — INSULIN GLARGINE 15 UNITS: 100 INJECTION, SOLUTION SUBCUTANEOUS at 08:15

## 2025-05-22 RX ADMIN — CARVEDILOL 3.12 MG: 3.12 TABLET, FILM COATED ORAL at 08:15

## 2025-05-22 RX ADMIN — APIXABAN 5 MG: 5 TABLET, FILM COATED ORAL at 08:15

## 2025-05-22 RX ADMIN — FENOFIBRATE 145 MG: 145 TABLET ORAL at 08:15

## 2025-05-22 RX ADMIN — ATORVASTATIN CALCIUM 40 MG: 40 TABLET, FILM COATED ORAL at 08:15

## 2025-05-22 RX ADMIN — Medication 10 ML: at 08:16

## 2025-05-22 RX ADMIN — CLOPIDOGREL BISULFATE 75 MG: 75 TABLET, FILM COATED ORAL at 08:15

## 2025-05-22 NOTE — ACP (ADVANCE CARE PLANNING)
visited patient in regards to advance care planning consult.   explained purpose of HCR form.  Patient did not want to complete form without spouse being present.   left copy of form and encouraged patient to reach out if he would like to complete form.

## 2025-05-22 NOTE — CONSULTS
visited patient regarding spiritual care consult and advance care planning consult.  Patient being discharged but expressed concern that he was being discharged prematurely.   encouraged patient to have conversation with doctor if he believed there was a medical concern that needed to be addressed.   provided supportive presence and supportive conversation.

## 2025-05-22 NOTE — OUTREACH NOTE
Prep Survey      Flowsheet Row Responses   [facility] facility patient discharged from? [facility]   Is LACE score < 7 ? No   Eligibility Readm Mgmt   Discharge diagnosis CHF   Does the patient have one of the following disease processes/diagnoses(primary or secondary)? CHF   Does the patient have Home health ordered? No   Is there a DME ordered? No   Prep survey completed? Yes            ROMARIO CARDOZA - Registered Nurse

## 2025-05-22 NOTE — CONSULTS
"Diabetes Education  Assessment/Teaching    Patient Name:  Bobby Steele Jr.  YOB: 1960  MRN: [id number]  Admit Date:  5/20/2025      Assessment Date:  5/22/2025  Flowsheet Row Most Recent Value   General Information     Referral From: MD order  [Consult received to teach bs monitoring. A1c this adm 11.4%. Adm bs 472.]   Height 170.2 cm (67\")   Weight 77.7 kg (171 lb 4.8 oz)   Weight Method Bed scale   Pregnancy Assessment    Diabetes History    What type of diabetes do you have? Type 2   Length of Diabetes Diagnosis --  [Dx 15+ years ago.]   Do you test your blood sugar at home? yes   Frequency of checks not routine   Meter type unsure of name, has new meter and supplies   Who performs the test? self   Have you had low blood sugar? (<70mg/dl) no   Education Preferences    Nutrition Information    Assessment Topics    DM Goals             Flowsheet Row Most Recent Value   DM Education Needs    Meter Has own   Frequency of Testing 3 times a day  [Discussed importance of checking bs 3 times/day ac. Encouraged pt to record bs and take readings to PCP visits.]   Blood Glucose Target Range Discussed A1c result of 11.4%. Discussed healthy bs range and healthy A1c target. Discussed importance of bs control.   Medication Insulin, Oral, Other injectables, Pen  [Pt taking Tresiba 45 units bid, Janumet 50/500 bid and Farxiga 10 mg daily. Pt states also taking weekly injection. GLP-1 agonist not on home med list. Pt unsure of name of this med.]   Reducing Risks A1C testing   Healthy Eating --  [Pt usually eating 1 meal and snacks throughout the day. Encouraged pt to try to eat 3 meals/day and discussed not snacking on high CHO foods. Pt states has been eating corn chips and will try reducing consumption of corn chips.]   Motivation Engaged   Teaching Method Explanation, Discussion   Patient Response Verbalized understanding              Other Comments:  Pt with long diabetes hx. Pt does have PCP. Discussed " importance of routine follow up for diabetes management. Pt states he has all his diabetes meds at home. Discussed importance of taking as prescribed. Pt agreeable to checking bs 3 times/day and taking readings to PCP. Pt states has new bs meter and new testing supplies. Pt verbalized understanding of info reviewed. Pt with no additional questions at this time.         Electronically signed by:  Lianna Oates RN  05/22/25 13:07 EDT

## 2025-05-22 NOTE — CASE MANAGEMENT/SOCIAL WORK
Case Management Discharge Note      Final Note: Home.      Selected Continued Care - Discharged on 5/22/2025 Admission date: 5/20/2025 - Discharge disposition: Home or Self Care       Transportation Services  Private: Car    Final Discharge Disposition Code: 01 - home or self-care

## 2025-05-22 NOTE — DISCHARGE PLACEMENT REQUEST
"Marylou Steele ALEKS Edwards (65 y.o. Male)       Date of Birth   1960    Social Security Number       Address   [place] [address]    Home Phone   [phone]    MRN   [id number]       Orthodox   Sabianism    Marital Status                               Admission Date   5/20/2025    Admission Type   Emergency    Admitting Provider   Sylvain Piedra MD    Attending Provider   Ashley Cummins MD    Department, Room/Bed   [facility] EMERGENCY DEPARTMENT, EDH4/4       Discharge Date       Discharge Disposition       Discharge Destination                                 Attending Provider: Ashley Cummins MD    Allergies: No Known Allergies    Isolation: None   Infection: None   Code Status: CPR    Ht: 170.2 cm (67\")   Wt: 82.3 kg (181 lb 8 oz)    Admission Cmt: None   Principal Problem: Acute exacerbation of CHF (congestive heart failure) [I50.9]                   Active Insurance as of 5/20/2025       Primary Coverage       Payor Plan Insurance Group Employer/Plan Group    HUMANA MEDICARE REPLACEMENT HUMANA MEDICARE ADVANTAGE HMO [id number]       Payor Plan Address Payor Plan Phone Number Payor Plan Fax Number Effective Dates    [address] [address] [phone]  4/1/2025 - None Entered    [facility] [address]         Subscriber Name Subscriber Birth Date Member ID       MARYLOU STEELE ALEKS EDWARDS 1960 [id number]               Secondary Coverage       Payor Plan Insurance Group Employer/Plan Group    [place]        Payor Plan Address Payor Plan Phone Number Payor Plan Fax Number Effective Dates    [address] [address]   4/1/2022 - None Entered    [place] [address]         Subscriber Name Subscriber Birth Date Member ID       MARYLOU STEELE ALEKS EDWARDS 1960 [id number]                     Emergency Contacts        (Rel.) Home Phone Work Phone Mobile Phone    OMAR STEELE (Spouse) [phone] -- [phone]                "

## 2025-05-22 NOTE — CONSULTS
"Heart Failure Program  Nurse Navigator  Discharge Planning    Patient Name:Bobby Steele Jr.  :1960  Cardiologist:Maciej  Current Admission Date: 2025   Previous Admission: 3/29/2025  Admission frequency: 2 admissions in 6 months    Heart Failure history per record:    Symptoms on admission:c/o SOA, orthopnea. Recent hospitalized at [facility] for elevated CO2 per record. Pt advises of medication adherence.       Admission Weight:  Flowsheet Rows      Flowsheet Row First Filed Value   Admission Height 170.2 cm (67\") Documented at 2025   Admission Weight 82.3 kg (181 lb 8 oz) Documented at 2025 0345              Current Home Medications:  Prior to Admission medications    Medication Sig Start Date End Date Taking? Authorizing Provider   apixaban (ELIQUIS) 5 MG tablet tablet Take 1 tablet by mouth 2 (Two) Times a Day.   Yes ProviderShirley MD   budesonide (PULMICORT) 0.5 MG/2ML nebulizer solution Take 2 mL by nebulization Daily for 30 days. 25 Yes Reid Ji MD   buPROPion XL (WELLBUTRIN XL) 150 MG 24 hr tablet Take 1 tablet by mouth Every Evening. 24  Yes ProviderShirley MD   carvedilol (COREG) 3.125 MG tablet Take 1 tablet by mouth 2 (Two) Times a Day With Meals. 20  Yes Juarez Wing MD   clopidogrel (PLAVIX) 75 MG tablet Take 1 tablet by mouth Daily.   Yes ProviderShirley MD   DULoxetine (CYMBALTA) 30 MG capsule Take 1 capsule by mouth Daily.   Yes Provider, MD Shirley   Farxiga 10 MG tablet Take 1 tablet by mouth once daily 10/6/22  Yes Aranza Ramirez APRN   fenofibrate 160 MG tablet Take 1 tablet by mouth Every Evening.   Yes Shirley Eng MD   furosemide (LASIX) 20 MG tablet Take 1 tablet by mouth 2 (Two) Times a Day.   Yes ProviderShirley MD   gabapentin (NEURONTIN) 300 MG capsule Take 1 capsule by mouth 2 (Two) Times a Day.   Yes Shirley Eng MD   Janumet  MG per tablet Take 1 tablet " by mouth 2 (Two) Times a Day.   Yes Shirley Eng MD   losartan (Cozaar) 25 MG tablet Take 1 tablet by mouth Daily. 4/7/25  Yes Juarez Wing MD   Methylcobalamin (B12) 5000 MCG sublingual tablet Place 1 tablet under the tongue Daily.   Yes Shirley Eng MD   pantoprazole (PROTONIX) 40 MG EC tablet Take 1 tablet by mouth Daily.   Yes Shirley Eng MD   Tresiba FlexTouch 200 UNIT/ML solution pen-injector pen injection Inject 45 Units under the skin into the appropriate area as directed 2 (Two) Times a Day. 4/1/25  Yes Shirley Eng MD   vitamin D3 125 MCG (5000 UT) capsule capsule Take 1 capsule by mouth Daily.   Yes Shirley Eng MD   albuterol (PROVENTIL) (2.5 MG/3ML) 0.083% nebulizer solution Take 2.5 mg by nebulization Every 6 (Six) Hours As Needed for Wheezing or Shortness of Air.    Shirley Eng MD   atorvastatin (LIPITOR) 40 MG tablet Take 1 tablet by mouth Daily.    ProviderShirley MD       Social history:   Pt from home, lives with spouse who sent him to the hospital per pt.     Smoking status:     Diagnostics Testing:  proBNP level: 1480    Echocardiogram:Results for orders placed during the hospital encounter of 05/20/25    Adult Transthoracic Echo Complete W/ Cont if Necessary Per Protocol    Interpretation Summary    Left ventricular systolic function is severely decreased. Left ventricular ejection fraction appears to be 21 - 25%.    Left ventricular diastolic function is consistent with (grade I) impaired relaxation.    The right ventricular cavity is mild to moderately dilated.    The left atrial cavity is moderate to severely dilated.    Estimated right ventricular systolic pressure from tricuspid regurgitation is normal (<35 mmHg).        Patient Assessment:   Pt sitting up in wheelchair. Bilateral above knee amputations. Denies SOA now, no edema today.     Current O2:    Home O2:      Education provided to patient:  yes- Heart  Failure disease education  yes -Symptom identification/management  yes -Daily Weights  yes- Diet education  yes- Fluid restriction (if ordered)  yes- Activity education  yes- Medication education   - Smoking cessation  yes- Follow-up Appointments   -Provided information on how to access [facility] My HF Guide/Heart Failure Interactive workbook    Acceptance of learning: acceptance, cooperative    Heart Failure education interactive teaching session time: 20 minutes    GWTG: EF 21-25%    Identified needs/barriers:   Volume status, GDMT - coreg. Needs 7 day apt and cardiology apt    Intervention:   Education, HF clinic apt made per pt request

## 2025-05-22 NOTE — CASE MANAGEMENT/SOCIAL WORK
Discharge Planning Assessment   Angel     Patient Name: Bobby Steele Jr.  MRN: [id number]  Today's Date: 5/22/2025    Admit Date: 5/20/2025    Plan: Routine home.   Discharge Needs Assessment       Row Name 05/22/25 1221       Living Environment    People in Home alone    Unique Family Situation wife lives at their home, pt lives in handicap accessible apartment    Current Living Arrangements apartment    Potentially Unsafe Housing Conditions none    In the past 12 months has the electric, gas, oil, or water company threatened to shut off services in your home? No    Primary Care Provided by self    Provides Primary Care For no one    Family Caregiver if Needed spouse    Family Caregiver Names Wife-Suzie    Quality of Family Relationships helpful;involved;supportive    Able to Return to Prior Arrangements yes       Resource/Environmental Concerns    Resource/Environmental Concerns none    Transportation Concerns none       Transportation Needs    In the past 12 months, has lack of transportation kept you from medical appointments or from getting medications? no    In the past 12 months, has lack of transportation kept you from meetings, work, or from getting things needed for daily living? No       Food Insecurity    Within the past 12 months, you worried that your food would run out before you got the money to buy more. Never true    Within the past 12 months, the food you bought just didn't last and you didn't have money to get more. Never true       Transition Planning    Patient/Family Anticipates Transition to home with family    Patient/Family Anticipated Services at Transition none    Transportation Anticipated family or friend will provide       Discharge Needs Assessment    Readmission Within the Last 30 Days no previous admission in last 30 days    Equipment Currently Used at Home wheelchair;bp cuff;glucometer;cpap;nebulizer    Concerns to be Addressed denies needs/concerns at this time;no discharge  needs identified    Do you want help finding or keeping work or a job? Patient declined    Do you want help with school or training? For example, starting or completing job training or getting a high school diploma, GED or equivalent No    Anticipated Changes Related to Illness none    Equipment Needed After Discharge none    Provided Post Acute Provider List? N/A    Provided Post Acute Provider Quality & Resource List? N/A                   Discharge Plan       Row Name 05/22/25 4076       Plan    Plan Routine home.    Patient/Family in Agreement with Plan yes    Plan Comments Pt with discharge orders in. CM met with patient at bedside prior to leaving. Pt lives in handicap accessible apartment while wife Suzie lives in their home. Pt does not drive but is working on getting accessible equipment to be able to drive his van. Independent with ADL's. PCP and pharmacy confirmed ([facility] in [place]). DME includes 2 wheelchairs, BP cuff, glucometer, nebulizer machine, and an old CPAP that pt states he is supposed to be getting replaced soon. Pt inquired about home O2 set up. CM explained that without a qualifying walk test with RT, CM unable to set up home O2. Encouraged pt to follow up with PCP and/or may need OP sleep study. Pt did not have any O2 on while completing assessment. Pt denies current HHC/therapy services but does have a history of having hospice services but cancelled it once he was able to get a wheelchair for BLE amputation(?). Asked if pt had any services through Medicaid and he does not. Will ask that SW make [facility] referral. Wife to provide tranport home.              Demographic Summary       Row Name 05/22/25 1218       General Information    Admission Type inpatient    Arrived From emergency department    Referral Source admission list    Reason for Consult care coordination/care conference;discharge planning    Preferred Language English       Contact Information    Permission Granted to  Share Info With                    Functional Status       Row Name 05/22/25 1220       Functional Status    Usual Activity Tolerance moderate    Current Activity Tolerance moderate       Functional Status, IADL    Medications independent    Meal Preparation independent    Housekeeping independent    Laundry independent    Shopping independent    If for any reason you need help with day-to-day activities such as bathing, preparing meals, shopping, managing finances, etc., do you get the help you need? I could use a little more help             Megan Naegele, RN     Office Phone: [phone]  Office Cell: [phone]

## 2025-05-22 NOTE — DISCHARGE SUMMARY
"             [facility] Medicine Services  Discharge Summary    Date of Service: 2025  Patient Name: Bobby Steele Jr.  : 1960  MRN: [id number]    Date of Admission: 2025  Discharge Diagnosis: Acute exacerbation of CHF (congestive heart failure)  Date of Discharge: 2025  Primary Care Physician: Yamile Agarwal MD      Presenting Problem:   Hyperglycemia [R73.9]  Acute exacerbation of CHF (congestive heart failure) [I50.9]  Acute congestive heart failure, unspecified heart failure type [I50.9]    Active and Resolved Hospital Problems:  Active Hospital Problems    Diagnosis POA    **Acute exacerbation of CHF (congestive heart failure) [I50.9] Yes      Resolved Hospital Problems   No resolved problems to display.         Hospital Course     HPI:    \"Bobby Steele Jr. is a 65 y.o. male with a CMH of HFrEF (EF 36 to 60% 2024), status post ICD, CAD status post three-vessel stent, type 2 diabetes complicated with PVD status post bilateral AKA, COPD, hyperlipidemia, CKD stage III who presented to [facility] on 2025 with 2-day history of shortness of breath.  Worse with laying down not improved with home oxygen.  Denies chest pain, coughing, recent sick contacts or fevers.  Upon arrival to the ED, hypoxic requiring 2 L nasal cannula otherwise vitally stable.  Notable labs include proBNP 1480, troponin 50-52, serum glucose 472, hemoglobin A1c 11.40.,  Chest x-ray showed findings suggestive of mild congestive heart failure.  Received Lasix and insulin with some improvement. \"    Hospital Course:  Acute hypoxic respiratory failure secondary to CHF exacerbation  HFrEF status post ICD EF 2024 36 to 40%  Continue IV Lasix 40 mg twice daily switched to oral at discharge   Obtain echocardiogram  Strict I's and O's  Daily weights  Low-sodium diet  Resume Coreg  Cardiology recommendations appreciated, optimize GDMT as tolerated.      Type 2 diabetes complicated with PVD and " bilateral AKA  Hemoglobin A1c 11.4  Resume home regimen at discharge  Outpatient PCP/Endo follow up     CAD status post three-vessel stent  COPD  Hyperlipidemia  CKD stage III  Resume home meds     Full code  DVT prophylaxis with Eliquis    Patient wishes to go home today.  Discussed with cardiology and patient stable from cardiology perspective for discharge today.  Recommended outpatient follow-up    DISCHARGE Follow Up Recommendations for labs and diagnostics:   Follow up with primary care provider within 3 days of this discharge.   Follow-up with cardiology as an outpatient.  Monitor CMP as an outpatient.  Monitor point-of-care glucose and follow-up with PCP/endocrinology.        Day of Discharge     Vital Signs:  Temp:  [97.7 °F (36.5 °C)-98.4 °F (36.9 °C)] 97.7 °F (36.5 °C)  Heart Rate:  [82-96] 83  Resp:  [19-26] 22  BP: (113-146)/(75-90) 145/90          Pertinent  and/or Most Recent Results     LAB RESULTS:      Lab 05/20/25 2125   WBC 6.82   HEMOGLOBIN 15.1   HEMATOCRIT 45.2   PLATELETS 138*   NEUTROS ABS 4.84   EOS ABS 0.14   MCV 90.4   PROTIME 13.5   APTT 25.1         Lab 05/22/25  0826 05/22/25  0011 05/20/25 2125   SODIUM 138 137 135*   POTASSIUM 4.0 4.3 4.5   CHLORIDE 101 101 102   CO2 25.8 25.8 22.2   ANION GAP 11.2 10.2 10.8   BUN 20 24* 16   CREATININE 0.97 1.30* 0.86   EGFR 86.6 61.0 96.1   GLUCOSE 242* 287* 472*   CALCIUM 9.8 9.6 9.6   HEMOGLOBIN A1C  --   --  11.40*         Lab 05/20/25 2125   TOTAL PROTEIN 6.6   ALBUMIN 3.8   GLOBULIN 2.8   ALT (SGPT) 80*   AST (SGOT) 47*   BILIRUBIN 0.4   ALK PHOS 94         Lab 05/20/25 2222 05/20/25 2125   PROBNP  --  1,480.0*   HSTROP T 52* 50*   PROTIME  --  13.5   INR  --  1.04         Lab 05/21/25  0429   CHOLESTEROL 184   LDL CHOL 108*   HDL CHOL 32*   TRIGLYCERIDES 255*             Lab 05/20/25 2218   PH, ARTERIAL 7.378   PCO2, ARTERIAL 45.6   PO2 ART 77.5*   O2 SATURATION ART 94.9   FIO2 28   HCO3 ART 26.8   BASE EXCESS ART 1.0     Brief Urine  Lab Results       None          Microbiology Results (last 10 days)       Procedure Component Value - Date/Time    Respiratory Panel PCR w/COVID-19(SARS-CoV-2) RUBY/CALEB/AJ/PAD/COR/GARY In-House, NP Swab in UTM/VTM, 2 HR TAT - Swab, Nasopharynx [[id number]]  (Normal) Collected: 05/21/25 1339    Lab Status: Final result Specimen: Swab from Nasopharynx Updated: 05/21/25 1437     ADENOVIRUS, PCR Not Detected     Coronavirus 229E Not Detected     Coronavirus HKU1 Not Detected     Coronavirus NL63 Not Detected     Coronavirus OC43 Not Detected     COVID19 Not Detected     Human Metapneumovirus Not Detected     Human Rhinovirus/Enterovirus Not Detected     Influenza A PCR Not Detected     Influenza B PCR Not Detected     Parainfluenza Virus 1 Not Detected     Parainfluenza Virus 2 Not Detected     Parainfluenza Virus 3 Not Detected     Parainfluenza Virus 4 Not Detected     RSV, PCR Not Detected     Bordetella pertussis pcr Not Detected     Bordetella parapertussis PCR Not Detected     Chlamydophila pneumoniae PCR Not Detected     Mycoplasma pneumo by PCR Not Detected    Narrative:      In the setting of a positive respiratory panel with a viral infection PLUS a negative procalcitonin without other underlying concern for bacterial infection, consider observing off antibiotics or discontinuation of antibiotics and continue supportive care. If the respiratory panel is positive for atypical bacterial infection (Bordetella pertussis, Chlamydophila pneumoniae, or Mycoplasma pneumoniae), consider antibiotic de-escalation to target atypical bacterial infection.            CT Chest Without Contrast Diagnostic  Result Date: 5/21/2025  Impression: Impression: 1.Emphysema. 2.No acute intrathoracic pathology. Resolution of findings of CHF from prior exam. Electronically Signed: Feliciano Leo MD  5/21/2025 2:51 PM EDT  Workstation ID: [id number]    XR Chest 1 View  Result Date: 5/20/2025  Impression: Impression: Findings suggest mild  congestive heart failure. Electronically Signed: Justin Saba MD  5/20/2025 10:10 PM EDT  Workstation ID: [id number]      Results for orders placed during the hospital encounter of 05/31/21    Duplex Venous Lower Extremity - Bilateral CAR    Interpretation Summary  · Normal bilateral lower extremity venous duplex scan. Bilateral greater saphenous veins previously harvested above knee. Left leg bypass noted patent with normal triphasic arterial waveforms.      Results for orders placed during the hospital encounter of 05/31/21    Duplex Venous Lower Extremity - Bilateral CAR    Interpretation Summary  · Normal bilateral lower extremity venous duplex scan. Bilateral greater saphenous veins previously harvested above knee. Left leg bypass noted patent with normal triphasic arterial waveforms.      Results for orders placed during the hospital encounter of 05/20/25    Adult Transthoracic Echo Complete W/ Cont if Necessary Per Protocol    Interpretation Summary    Left ventricular systolic function is severely decreased. Left ventricular ejection fraction appears to be 21 - 25%.    Left ventricular diastolic function is consistent with (grade I) impaired relaxation.    The right ventricular cavity is mild to moderately dilated.    The left atrial cavity is moderate to severely dilated.    Estimated right ventricular systolic pressure from tricuspid regurgitation is normal (<35 mmHg).      Labs Pending at Discharge:  Pending Results       None            Procedures Performed           Consults:   Consults       Date and Time Order Name Status Description    5/21/2025  5:52 PM Inpatient Palliative Care MD Consult      5/21/2025 12:46 AM Inpatient Cardiology Consult Completed     5/20/2025 11:02 PM Hospitalist (on-call MD unless specified)                Discharge Details        Discharge Medications        Continue These Medications        Instructions Start Date   albuterol (2.5 MG/3ML) 0.083% nebulizer solution  Commonly  known as: PROVENTIL   2.5 mg, Every 6 Hours PRN      apixaban 5 MG tablet tablet  Commonly known as: ELIQUIS   5 mg, 2 Times Daily      atorvastatin 40 MG tablet  Commonly known as: LIPITOR   40 mg, Daily      B12 5000 MCG sublingual tablet   1 tablet, Daily      budesonide 0.5 MG/2ML nebulizer solution  Commonly known as: PULMICORT   0.5 mg, Nebulization, Daily - RT      buPROPion  MG 24 hr tablet  Commonly known as: WELLBUTRIN XL   150 mg, Every Evening      carvedilol 3.125 MG tablet  Commonly known as: COREG   3.125 mg, Oral, 2 Times Daily With Meals      clopidogrel 75 MG tablet  Commonly known as: PLAVIX   75 mg, Daily      DULoxetine 30 MG capsule  Commonly known as: CYMBALTA   Take 1 capsule by mouth Daily.      Farxiga 10 MG tablet  Generic drug: dapagliflozin Propanediol   Take 1 tablet by mouth once daily      fenofibrate 160 MG tablet   1 tablet, Every Evening      furosemide 20 MG tablet  Commonly known as: LASIX   20 mg, 2 Times Daily      gabapentin 300 MG capsule  Commonly known as: NEURONTIN   300 mg, 2 Times Daily      Janumet  MG per tablet  Generic drug: sitaGLIPtin-metFORMIN   1 tablet, 2 Times Daily      losartan 25 MG tablet  Commonly known as: Cozaar   25 mg, Oral, Daily      pantoprazole 40 MG EC tablet  Commonly known as: PROTONIX   40 mg, Daily      Tresiba FlexTouch 200 UNIT/ML solution pen-injector pen injection  Generic drug: Insulin Degludec   45 Units, 2 Times Daily      vitamin D3 125 MCG (5000 UT) capsule capsule   5,000 Units, Daily               No Known Allergies      Discharge Disposition:   Home or Self Care    Diet:  Hospital:  Diet Order   Procedures    Diet: Cardiac, Diabetic; Healthy Heart (2-3 Na+); Consistent Carbohydrate; Fluid Consistency: Thin (IDDSI 0)         Discharge Activity:   Activity Instructions    Resume regular activity as tolerated             CODE STATUS:  Code Status and Medical Interventions: CPR (Attempt to Resuscitate); Full Support    Ordered at: 05/21/25 0046     Code Status (Patient has no pulse and is not breathing):    CPR (Attempt to Resuscitate)     Medical Interventions (Patient has pulse or is breathing):    Full Support         Future Appointments   Date Time Provider Department Center   6/5/2025  1:30 PM [facility]   6/5/2025  1:45 PM Aranza Ramirez APRN [facility]           Time spent on Discharge including face to face service:  >35 minutes    Signature: Electronically signed by Reid Ji MD, 05/22/25, 11:01 EDT.  [facility] Hospitalist Team

## 2025-05-22 NOTE — PLAN OF CARE
Goal Outcome Evaluation:  Plan of Care Reviewed With: patient        Progress: improving  Outcome Evaluation: Patient alert and oriented, up in wheelchair, VSS, call light in reach

## 2025-05-22 NOTE — PROGRESS NOTES
Cardiology Progress Note    Patient Identification:  Name: Bobby Steele Jr.  Age: 65 y.o.  Sex: male  :  1960  MRN: [id number]                 Follow Up / Chief Complaint: Shortness of breath, possible CHF   Chief Complaint   Patient presents with    Shortness of Breath       Interval History: Patient presented with shortness of breath, cough with brownish sputum.  CXR with some mild CHF, respiratory viral panel negative.  CT chest showed emphysema with resolution of CHF.        Subjective: Patient seen and examined; chart and labs reviewed discussed with hospitalist   Patient denies any chest pain, shortness of breath has improved with 2 doses of IV diuretics and nebulizer treatments.         Objective:Labs in the ED showed HS troponin of 50--52 pro bnp 1480 glucose 472 AST 47 ALT 80 A1C 11.40 plts 138 CXR suggestive of mild congestive heart failure   EKG sinus rhythm     CT chest 2025  1.Emphysema.  2.No acute intrathoracic pathology. Resolution of findings of CHF from prior exam.    2025 potassium 4.3 BUN 24 creatinine 1.3 glucose 287        History of present illness:    Mr. Bobby Steele Jr. has PMH of      # NSTEMI  19, SHILPA to SVG to RCA and proximal LAD leading into a small diagonal, cath 2020 underwent SHILPA to SVG to RCA and native proximal LCx with Impella assistance.  Cath 2021 revealed patent LIMA to LAD, SVG to PDA, occluded SVG to diagonal, patent stent in left main, severe disease in tortuous segment in mid LCx and LAD going to diagonal, unchanged from previous cath.  # CAD status post CABG X3 V  #Chronic HFrEF due to systolic dysfunction from ischemic cardiomyopathy with EF of 35%, status post ICD 10/8/2019  # COPD, continue tobacco abuse  # Hypertension   # Hyperlipidemia  # Diabetes with hemoglobin A1c of 10 on 2020  #.  CKD 3 and 4  # PAD, gangrene of left foot, amputation  #Positive family for premature heart disease with father MI 53     Presented to the ED on  5/20/2025 with worsening shortness of breath. Patient reports cough with brownish sputum and progressively worsening shortness of breath.  He denies any chest pain.  Patient was recently admitted at [facility] with COPD exacerbation and on the ventilator.  He reports this is how it started therefore he started using his nebulizer however shortness of breath became worse and he came to the ED.           Echocardiogram 5/20/2025    Left ventricular systolic function is severely decreased. Left ventricular ejection fraction appears to be 21 - 25%.    Left ventricular diastolic function is consistent with (grade I) impaired relaxation.    The right ventricular cavity is mild to moderately dilated.    The left atrial cavity is moderate to severely dilated.    Estimated right ventricular systolic pressure from tricuspid regurgitation is normal (<35 mmHg).    Assessment:    #.  Shortness of breath, cough  # Acute on chronic CHF, ischemic cardiomyopathy 35%, status post ICD 10/8/2019  # CAD status post CABG, PCI  # COPD, tobacco abuse   # hypertension ,  #Dyslipidemia  #Hyperglycemia, and type 2 diabetes  #  CKD  # PAD, bilateral amputee    Plan:  Patient recently was in [facility] with respiratory failure and was on the ventilator with COPD exerbation  Presented with cough and shortness of breath.  Denies any chest pain.  Troponin is elevated but flat.  Probably due to CHF/demand ischemia   LFTs are elevated patient denies any abdominal pain.  Possibly elevated due to congestion.   Chest x-ray reveals mild congestive heart failure.  Will check CT chest and respiratory viral panel.  CT showed emphysema and resolution of heart failure.  Patient has ischemic cardiomyopathy with EF of 21 to 25% by echo 5/21/2025, also has RV enlargement and left atrial enlargement and grade 1 diastolic dysfunction.  Patient was diuresed with 2 doses of IV Lasix  Creatinine mildly worsened to 1.3  Monitor renal function urine output  electrolytes closely.  Advised hospitalist to increase patient's home Lasix to 40 mg daily  Will continue losartan and carvedilol    Patient unfortunately has chronic hypotension and is not a good candidate for guideline directed medical therapy  Patient has been on midodrine on and off.  Continue medical management with Eliquis, atorvastatin, carvedilol, clopidogrel, fenofibrate  .  Smoking cessation.  Patient is down to 4 cigarettes/day from 4 packs/day  Patient would benefit from diabetes control.    Patient has appointment on 6/5/2025 for follow-up and ICD check  Will repeat BMP prior to appointment    Copied text in this portion of the note has been reviewed and is accurate as of 5/22/2025    Past Medical History:  Past Medical History:   Diagnosis Date    CAD (coronary artery disease)     S/P CABG    Chest pain 05/31/2021    Chronic systolic CHF (congestive heart failure)     COPD (chronic obstructive pulmonary disease)     DM2 (diabetes mellitus, type 2)     Dyslipidemia     Encounter for monitoring antiplatelet therapy     Hypertension     Myocardial infarction     inferior wall MI--03/13    Non-pressure chronic ulcer of other part of left foot with fat layer exposed 6/1/2021    DEBI (obstructive sleep apnea)     noncompliant with CPAP    Peripheral vascular disease     S/P left fem-pop bypass    Tobacco use      Past Surgical History:  Past Surgical History:   Procedure Laterality Date    ABOVE KNEE LEG AMPUTATION Left     AMPUTATION FOOT / TOE      left    APPENDECTOMY      at age 8 or 9    BIVENTRICULAR ASSIST DEVICE/LEFT VENTRICULAR ASSIST DEVICE INSERTION N/A 05/13/2020    Procedure: Left Ventricular Assist Device;  Surgeon: Juarez Wing MD;  Location: [facility] CATH INVASIVE LOCATION;  Service: Cardiovascular;  Laterality: N/A;    CARDIAC CATHETERIZATION      3-; [facility] 9-    CARDIAC CATHETERIZATION Right 06/27/2019    Procedure: Cardiac Catheterization/with grafts groin access;   Surgeon: Juarez Wing MD;  Location: [facility] CATH INVASIVE LOCATION;  Service: Cardiovascular    CARDIAC CATHETERIZATION N/A 2020    Procedure: Left Heart Cath with grafts;  Surgeon: Juarez Wing MD;  Location: [facility] CATH INVASIVE LOCATION;  Service: Cardiovascular;  Laterality: N/A;    CARDIAC CATHETERIZATION N/A 2020    Procedure: Percutaneous Coronary Intervention, Impella backup;  Surgeon: Juarez Wing MD;  Location: [facility] CATH INVASIVE LOCATION;  Service: Cardiovascular;  Laterality: N/A;    CARDIAC CATHETERIZATION N/A 2021    Procedure: Left Heart Cath, possible pci;  Surgeon: Juarez Wing MD;  Location: [facility] CATH INVASIVE LOCATION;  Service: Cardiovascular;  Laterality: N/A;    CARDIAC ELECTROPHYSIOLOGY PROCEDURE N/A 10/08/2019    Procedure: ICD SC new, ST.SHIV ;  Surgeon: Juarez Wing MD;  Location: [facility] CATH INVASIVE LOCATION;  Service: Cardiovascular    CARDIAC SURGERY      COLONOSCOPY      CORONARY ARTERY BYPASS GRAFT      x3, 3-18--LIMA to LAD, and reverse individual SVG to lateral marginal and to PDA    FEMORAL POPLITEAL BYPASS Left 2019    [facility]/ Dr. Jero Hatch    HAND SURGERY Left     crushed hand    PACEMAKER IMPLANTATION      TOE SURGERY      toe nail removal of big toe- age 8 or 9        Social History:   Social History     Tobacco Use    Smoking status: Every Day     Current packs/day: 0.50     Average packs/day: 1.5 packs/day for 25.3 years (37.7 ttl pk-yrs)     Types: Cigarettes     Start date: 2025     Passive exposure: Current    Smokeless tobacco: Never    Tobacco comments:     About 1 pack a day per pt--25   Substance Use Topics    Alcohol use: No      Family History:  Family History   Problem Relation Age of Onset    Hypertension Mother     Diabetes Mother     Heart disease Father         had CABG and re-do/  while recovering-had MI age 40s    Diabetes Father     Pancreatic  "cancer Sister     Hyperlipidemia Brother     Stroke Brother     Heart disease Paternal Uncle           Allergies:  No Known Allergies  Scheduled Meds:  apixaban, 5 mg, BID  atorvastatin, 40 mg, Daily  budesonide, 0.5 mg, Daily - RT  buPROPion XL, 150 mg, Q PM  carvedilol, 3.125 mg, BID With Meals  clopidogrel, 75 mg, Daily  fenofibrate, 145 mg, Daily  [Held by provider] furosemide, 40 mg, Daily  insulin glargine, 15 Units, Once  insulin glargine, 40 Units, Nightly  insulin lispro, 2-7 Units, 4x Daily AC & at Bedtime  nicotine, 1 patch, Q24H  sodium chloride, 10 mL, Q12H          Review of Systems:   ROS  Review of Systems   Constitution: Negative for chills and fever.   Cardiovascular: Negative for chest pain and palpitations.   Respiratory: Negative for cough and hemoptysis.    Gastrointestinal: Negative for nausea.        Constitutional:  Temp:  [97.7 °F (36.5 °C)-98.4 °F (36.9 °C)] 97.7 °F (36.5 °C)  Heart Rate:  [82-96] 83  Resp:  [19-26] 22  BP: (113-146)/(75-90) 145/90    Physical Exam   /90 (BP Location: Left arm, Patient Position: Sitting)   Pulse 83   Temp 97.7 °F (36.5 °C) (Oral)   Resp 22   Ht 170.2 cm (67\")   Wt 77.7 kg (171 lb 4.8 oz)   SpO2 94%   BMI 26.83 kg/m²   General:  Appears in no acute distress  Eyes: Sclera is anicteric,  conjunctiva is clear   HEENT:  No JVD. Thyroid not visibly enlarged. No mucosal pallor or cyanosis  Respiratory: Respirations regular and unlabored at rest.  Mild rhonchi  Cardiovascular: S1,S2 Regular rate and rhythm. No murmur, rub or gallop auscultated.  .  No edema appreciated bilateral AKA  Gastrointestinal: Abdomen nondistended.  Musculoskeletal: bilateral AKA   Extremities: bilateral AKA   Skin: Color pink.  Multiple scabs noted on on upper extremities all in various healing stages  Neuro: Alert and awake.    INTAKE AND OUTPUT:    Intake/Output Summary (Last 24 hours) at 5/22/2025 0815  Last data filed at 5/21/2025 1110  Gross per 24 hour   Intake -- "   Output 600 ml   Net -600 ml       Cardiographics  Telemetry: Reviewed and interpreted by me reveals sinus rhythm     ECG:   ECG 12 Lead Dyspnea   Final Result   HEART RATE=94  bpm   RR Lgurbpzc=358  ms   NH Frcvhcms=232  ms   P Horizontal Axis=-44  deg   P Front Axis=74  deg   QRSD Gbexrnec=669  ms   QT Sxzkhodz=630  ms   SYjN=128  ms   QRS Axis=85  deg   T Wave Axis=269  deg   - ABNORMAL ECG -   Sinus rhythm   Probable  left atrial enlargement   Probable  anteroseptal infarct, recent   When compared with ECG of 07-Apr-2022 02:40:33,   Significant repolarization change   Significant axis, voltage or hypertrophy change   Electronically Signed By: Roni Nettles (Stanislaw) 2025-05-21 07:15:59   Date and Time of Study:2025-05-20 21:00:57      Telemetry Scan   Final Result        I have personally reviewed EKG    Echocardiogram: Results for orders placed during the hospital encounter of 05/20/25    Adult Transthoracic Echo Complete W/ Cont if Necessary Per Protocol    Interpretation Summary    Left ventricular systolic function is severely decreased. Left ventricular ejection fraction appears to be 21 - 25%.    Left ventricular diastolic function is consistent with (grade I) impaired relaxation.    The right ventricular cavity is mild to moderately dilated.    The left atrial cavity is moderate to severely dilated.    Estimated right ventricular systolic pressure from tricuspid regurgitation is normal (<35 mmHg).      STRESS TEST  Results for orders placed during the hospital encounter of 05/06/24    Stress Test With Myocardial Perfusion One Day    Interpretation Summary    Left ventricular ejection fraction is borderline normal (Calculated EF = 48%).    LEXISCAN CARDIOLITE REPORT    DATE OF PROCEDURE:  05/06/24    INDICATION FOR PROCEDURE:  Chest pain, CAD, CABG, preop    PROCEDURE PERFORMED: Lexiscan Cardiolite    PROCEDURE COMMENTS:    After informed consent was obtained.  Patient's resting heart rate was 75 beats per,  resting blood pressure was 116/70, resting EKG revealed sinus rhythm at the rate of 75 bpm with LVH and repole.  Patient was given 0.4 mg of regadenosine for stress testing.  There was no significant change in heart rate, blood pressure, symptoms with regadenoson injection.  Patient tolerated procedure well.  Complications were none.    NUCLEAR IMAGIN.  There was large severe defect involving whole of inferior wall, which is fixed consistent with inferior wall MI, no ischemia seen.  2.  Gated images reveal inferior hypokinesis, LVEF of 48%.    CONCLUSION:  1.  Lexiscan Cardiolite with abnormal perfusion with inferior wall MI and scar, negative for ischemia.  2.  Mild LV dysfunction.  LVEF of 48%.    RECOMMENDATIONS:    Clinical correlation recommended.      Juarez Wing MD  24  16:16 EDT      HEART CATHETERIZATION  Results for orders placed during the hospital encounter of 21    Cardiac Catheterization/Vascular Study    Narrative  Table formatting from the original result was not included.  2021      Heart Cath Report    NAME:              Bobby Steele JrEmir  :                1960  AGE/SEX:        61 y.o. male  MRN:                [id number]        Procedures Performed    1. Left heart catheterization  2. Selective coronary angiography  3. Left ventriculography  4. LIMA to LAD  5. SVG to RCA and SVG to diagonal    :   Juarez Wing MD    Vascular Access Site: Femoral    Indication for procedure: Recurrent prolonged chest pain consistent with unstable angina, CAD, CABG, PCI, chronic HFrEF, cardiomyopathy, diabetes, dyslipidemia, hypertension, PAD      Procedure Note    After discussing the risks, benefits, and alternatives of the procedure, informed consent was obtained.  Moderate conscious sedation was given utilizing IV Versed and fentanyl administered by RN with continuous EKG oximetry and hemodynamic monitoring supervised by me throughout the entire  case, conscious sedation time was 60 minutes.  I was present with the patient for the duration of moderate sedation and supervised staff who had no other duties and monitored the patient for the entire procedure patient had Edwards 2-3 sedation scale. the vascular access site was prepped and draped in the usual sterile fashion.  2% lidocaine was used for local anesthesia. Appropriate landmarks were assessed.  A 6 Algerian short sheath was inserted in the artery using the modified Seldinger technique.    Selective coronary angiography was performed with JL4 and JR4 diagnostic catheters. Left ventriculogram  was performed with an angled pigtail catheter.  IMT was used to do LIMA injection JR4 and multipurpose was used to do SVG injection.  All exchanges were performed over the wire.  No specimens were removed.  There were no apparent acute or early complications.  The patient tolerated the procedure well and was transferred to the recovery area in stable condition.    Artery hemostasis will be achieved with  manual pressure in OPCV.    Complications:  None  Blood Loss: minimal    Hemodynamics    Pressures    Ao:    100/60 mmHg  LV:    100/3 mmHg  End-diastolic pressure:  3  mmHg  No significant aortic valvular gradient on pullback    Coronary Angiography    Left Main :  The left main   has mild calcified luminal irregularities less than 30%    Left Anterior Descending : Starts of is good caliber vessel there is a stent patent in proximal portion.  At the edge of the stent there is a 70% to 80% narrowing which is calcified.  Right after that there is aneurysmal dilatation in the LAD and a large diagonal comes of right at that spot.  There is tortuous loop in the segment of the vessel.  The diagonal ostium has 70% narrowing.    Left Circumflex : Has stent patent in proximal portion.  Has complex 360 degree loop in midportion and has 70% narrowing in this loop.  Distal circumflex divides into 2.  And then is occluded 100%  chronically.    Right Coronary Artery :  The right coronary artery is dominant vessel, 100% occluded proximally, SVG is patent    Dominance:  []  Left  [x]  Right  []  Co-Dominant    Lima %: To LAD is patent in the origin body and insertion native LAD beyond the LIMA is without disease.    SVG(s) %: To RCA is patent in the origin body and insertion there is a stent patent in distal portion of the graft  with excellent  long-term results.  Native PDA beyond the graft is small caliber vessel with good antegrade flow, no retrograde flow    SVG(s) %: To diagonal is occluded chronically 100%.      Left Ventriculography:    Estimated Ejection Fraction: Difficult evaluate on this hand-injection  Wall motion abnormalities: Severe global hypokinesis  Mitral Regurgitation:  None seen on this hand-injection    Impression:    1. Severe triple-vessel disease with patent LIMA to LAD, SVG to PDA, occluded SVG to diagonal, patent left main  2. Severe disease in mid LCx in a tortuous segment of the vessel, best suited for medical management  3. Severe disease in LAD going into diagonal which is unchanged from previous cath    Recommendations:    1. Continue medical management and risk factor modification        I sincerely appreciate the opportunity to participate in your patient's care. Please feel free to contact me anytime if I can be of assistance in this or any other way.      Pertinent History    Past Medical History:  Diagnosis Date   CAD (coronary artery disease)  S/P CABG   Chest pain 05/31/2021   Chronic systolic CHF (congestive heart failure) (CMS/[facility])   COPD (chronic obstructive pulmonary disease) (CMS/[facility])   DM2 (diabetes mellitus, type 2) (CMS/[facility])   Dyslipidemia   Encounter for monitoring antiplatelet therapy   Hypertension   Myocardial infarction (CMS/[facility])  inferior wall MI--03/13   DEBI (obstructive sleep apnea)  noncompliant with CPAP   Peripheral vascular disease (CMS/[facility])  S/P left fem-pop bypass   Tobacco  use    Past Surgical History:  Procedure Laterality Date   AMPUTATION FOOT / TOE  left   APPENDECTOMY  at age 8 or 9   BIVENTRICULAR ASSIST DEVICE/LEFT VENTRICULAR ASSIST DEVICE INSERTION N/A 5/13/2020  Procedure: Left Ventricular Assist Device;  Surgeon: Juarez Wing MD;  Location: [facility] CATH INVASIVE LOCATION;  Service: Cardiovascular;  Laterality: N/A;   CARDIAC CATHETERIZATION  3-; [facility] 9-   CARDIAC CATHETERIZATION Right 6/27/2019  Procedure: Cardiac Catheterization/with grafts groin access;  Surgeon: Juarez Wing MD;  Location: [facility] CATH INVASIVE LOCATION;  Service: Cardiovascular   CARDIAC CATHETERIZATION N/A 5/8/2020  Procedure: Left Heart Cath with grafts;  Surgeon: Juarez Wing MD;  Location: [facility] CATH INVASIVE LOCATION;  Service: Cardiovascular;  Laterality: N/A;   CARDIAC CATHETERIZATION N/A 5/13/2020  Procedure: Percutaneous Coronary Intervention, Impella backup;  Surgeon: Juarez Wing MD;  Location: [facility] CATH INVASIVE LOCATION;  Service: Cardiovascular;  Laterality: N/A;   CARDIAC ELECTROPHYSIOLOGY PROCEDURE N/A 10/8/2019  Procedure: ICD SC new, ST.SHIV ;  Surgeon: Juarez Wing MD;  Location: [facility] CATH INVASIVE LOCATION;  Service: Cardiovascular   CARDIAC SURGERY  2013   COLONOSCOPY   CORONARY ARTERY BYPASS GRAFT  x3, 3-18-13-LIMA to LAD, and reverse individual SVG to lateral marginal and to PDA   FEMORAL POPLITEAL BYPASS Left 01/09/2019  [facility]/ Dr. Jero Hatch   HAND SURGERY Left  crushed hand   PACEMAKER IMPLANTATION   TOE SURGERY  toe nail removal of big toe- age 8 or 9    Prior to Admission medications  Medication Sig Start Date End Date Taking? Authorizing Provider  albuterol (PROVENTIL) (2.5 MG/3ML) 0.083% nebulizer solution Take 2.5 mg by nebulization Every 6 (Six) Hours As Needed for Wheezing or Shortness of Air.   Yes Provider, MD Shirley  albuterol sulfate  (90 Base) MCG/ACT inhaler Inhale 2  puffs Every 4 (Four) Hours As Needed for Wheezing or Shortness of Air.   Yes Shirley Eng MD  aspirin (aspirin) 81 MG EC tablet Take 81 mg by mouth Daily.   Yes Shirley Eng MD  atorvastatin (LIPITOR) 40 MG tablet Take 40 mg by mouth Daily.   Yes Shirley Eng MD  bumetanide (BUMEX) 2 MG tablet Take 2 mg by mouth 3 (Three) Times a Day.   Yes Shirley Eng MD  carvedilol (COREG) 3.125 MG tablet Take 1 tablet by mouth 2 (Two) Times a Day With Meals. 9/17/20  Yes Juarez Wing MD  clopidogrel (PLAVIX) 75 MG tablet Take 75 mg by mouth Daily.   Yes Shirley Eng MD  DULoxetine (CYMBALTA) 60 MG capsule TAKE 1 CAPSULE BY MOUTH ONCE DAILY FOR 90 DAYS 11/7/20  Yes Shirley Eng MD  fenofibrate (TRICOR) 145 MG tablet Take 1 tablet by mouth once daily 4/22/21  Yes Juarez Wing MD  glimepiride (AMARYL) 4 MG tablet Take 4 mg by mouth 2 (Two) Times a Day.   Yes ProviderShirley MD  Insulin Glargine (LANTUS SOLOSTAR) 100 UNIT/ML injection pen Inject 10 Units under the skin into the appropriate area as directed Every Night.  Patient taking differently: Inject 40 Units under the skin into the appropriate area as directed Every Night. 5/8/20  Yes Claire Bustamante MD  isosorbide mononitrate (IMDUR) 30 MG 24 hr tablet Take 30 mg by mouth Daily.   Yes ProviderShirley MD  spironolactone (ALDACTONE) 25 MG tablet Take 25 mg by mouth Daily.   Yes Provider, MD Shirley  collagenase (Santyl) 250 UNIT/GM ointment Apply  topically to the appropriate area as directed Daily. 12/31/20   Linda Vigil DO  nitroglycerin (NITROSTAT) 2 % ointment Place 0.5 inches on the skin as directed by provider Daily. 12/1/20   Juarez Wing MD      Pre-Procedure Notes  H&P Performed  [x]  Yes []  No       []  N/A    Indications:  []  ACS <= 24 HRS  []  ACS >24 HRS  []  New Onset Angina <= 2 mos  []  Worsening Angina  []  Resuscitated Cardiac Arrest  []   Angina on Exertion:  []  Suspected CAD  []  Valvular Disease  []  Pericardial Disease  []  Cardiac Arrythmia  []  Cardiomyopathy  []  LV Dysfunction  []  Syncope  []  Post Cardiac Transplant  []  Eval. For Exercise Clearance  []  Other  []  Pre-Operative Evaluation  If Pre-Op Eval:  Evaluation for Surgery Type:  []  Cardiac Surgery   []  Non-Cardiac Surgery  Functional Capacity:  []  <4 METS  []  >=4 METS w/o symptoms  []  >= 4 METS with symptoms  []  Unknown  Surgical Risk:  []  Low  []  Intermediate  []  High Risk: Vascular  []  High Risk Non-Vascular    Risks, Benefits, & Complications Discussed:  [x]  Yes  []  No  []  N/A    Questions Answered:  [x]  Yes  []  No  []  N/A    Consent Obtained:  [x]  Yes  []  No  []  N/A    CHF: [x]  Yes  []  No  If Yes:  Newly Diagnosed?  []  Yes  [x]  No  If Yes:  HF Type:  []  Diastolic  [x]  Systolic  []  Unknown      Juarez Wing MD  6/1/2021  16:59 EDT  Electronically signed by Juarez Wing MD, 06/01/21, 4:59 PM EDT.      Lab Review   I have reviewed the labs  Results from last 7 days   Lab Units 05/20/25 2222 05/20/25 2125   HSTROP T ng/L 52* 50*         Results from last 7 days   Lab Units 05/22/25  0011   SODIUM mmol/L 137   POTASSIUM mmol/L 4.3   BUN mg/dL 24*   CREATININE mg/dL 1.30*   CALCIUM mg/dL 9.6         Results from last 7 days   Lab Units 05/20/25 2125   WBC 10*3/mm3 6.82   HEMOGLOBIN g/dL 15.1   HEMATOCRIT % 45.2   PLATELETS 10*3/mm3 138*     Results from last 7 days   Lab Units 05/20/25 2125   INR  1.04   APTT seconds 25.1       RADIOLOGY:  Imaging Results (Last 24 Hours)       Procedure Component Value Units Date/Time    CT Chest Without Contrast Diagnostic [[id number]] Collected: 05/21/25 1449     Updated: 05/21/25 1454    Narrative:      CT CHEST WO CONTRAST DIAGNOSTIC    Date of Exam: 5/21/2025 1:45 PM EDT    Indication: cough, abnormal cxr.    Comparison: None available.    Technique: Axial CT images were obtained of the chest  "without contrast administration.  Sagittal and coronal reconstructions were performed.  Automated exposure control and iterative reconstruction methods were used.      Findings:  Central airways are patent. There is biapical pleural-parenchymal scarring. Calcified right lower lobe pulmonary nodule. No suspicious nodules are identified. There are changes of emphysema. No evidence of pulmonary edema is identified.    Thyroid is normal. No axillary or mediastinal adenopathy. Status post CABG with severe coronary artery calcifications. Aorta and pulmonary artery are normal in caliber. Esophagus is unremarkable. Limited evaluation of the upper abdomen demonstrates   cholelithiasis without evidence of cholecystitis.    No aggressive appearing lytic or sclerotic bone lesions are identified.      Impression:      Impression:  1.Emphysema.  2.No acute intrathoracic pathology. Resolution of findings of CHF from prior exam.        Electronically Signed: Feliciano Leo MD    5/21/2025 2:51 PM EDT    Workstation ID: [id number]                  )5/22/2025  JOY Ashton    Electronically signed by JOY Ashton, 05/22/25, 12:23 PM EDT.    EMR Dragon/Transcription:   \"Dictated utilizing Dragon dictation\".   "

## 2025-05-23 NOTE — CASE MANAGEMENT/SOCIAL WORK
Social Work Assessment  [facility]     Patient Name: Bobby Steele Jr.  MRN: [id number]  Today's Date: 5/23/2025    Admit Date: 5/20/2025       Discharge Plan       Row Name 05/23/25 1538       Plan    Plan Comments LSW received request from RN CM to place referral to [facility] for in-home services. LSW placed referral to [facility] today (5/23) for patient to receive services.             ANGELIKA Renteria, LSW    Phone: [phone]  Fax: [phone]  [email or web address]

## 2025-05-28 PROBLEM — I25.10 ASCVD (ARTERIOSCLEROTIC CARDIOVASCULAR DISEASE): Status: RESOLVED | Noted: 2022-09-21 | Resolved: 2025-05-28

## 2025-05-28 PROBLEM — I25.10 CAD (CORONARY ARTERY DISEASE): Chronic | Status: ACTIVE | Noted: 2025-05-28

## 2025-05-28 PROBLEM — E88.810 METABOLIC SYNDROME X: Status: ACTIVE | Noted: 2025-05-28

## 2025-05-28 PROBLEM — I50.23 ACUTE ON CHRONIC SYSTOLIC CHF (CONGESTIVE HEART FAILURE): Status: RESOLVED | Noted: 2021-05-31 | Resolved: 2025-05-28

## 2025-05-28 PROBLEM — N18.30 CKD (CHRONIC KIDNEY DISEASE) STAGE 3, GFR 30-59 ML/MIN: Chronic | Status: ACTIVE | Noted: 2025-05-28

## 2025-05-28 PROBLEM — I50.22 CHRONIC SYSTOLIC CONGESTIVE HEART FAILURE: Chronic | Status: RESOLVED | Noted: 2019-06-26 | Resolved: 2025-05-28

## 2025-05-29 ENCOUNTER — READMISSION MANAGEMENT (OUTPATIENT)
Dept: CALL CENTER | Facility: HOSPITAL | Age: 65
End: 2025-05-29
Payer: MEDICARE

## 2025-05-29 NOTE — OUTREACH NOTE
CHF Week 1 Survey      Flowsheet Row Responses   [facility] patient discharged from? [facility]   Does the patient have one of the following disease processes/diagnoses(primary or secondary)? CHF   CHF Week 1 attempt successful? Yes   Call start time 1202   Call end time 1208   Discharge diagnosis CHF   Is patient permission given to speak with other caregiver? Yes   List who call center can speak with spouse- Suzie   Person spoke with today (if not patient) and relationship spouse- Suzie   Meds reviewed with patient/caregiver? Yes   Does the patient have all medications ordered at discharge? N/A   Is the patient taking all medications as directed (includes completed medication regime)? Yes   Comments regarding appointments has been to see cardiology   Does the patient have a primary care provider?  Yes   Does the patient have an appointment with their PCP within 7 days of discharge? Yes   Comments regarding PCP has seen PCP for a follow up appt   Has the patient kept scheduled appointments due by today? Yes   What is the Home health agency?  [facility]   Has home health visited the patient within 72 hours of discharge? Yes   Home health comments  was there earlier to see patient   Pulse Ox monitoring Intermittent   Pulse Ox device source Patient   O2 Sat comments spouse states O2 sats on room air have been 98%   O2 Sat: education provided Sat levels, Monitoring frequency, When to seek care   O2 Sat education comments advised if O2 sats drop below 90% to seek urgent care at ED, spouse voiced understanding.   Psychosocial issues? No   Did the patient receive a copy of their discharge instructions? Yes   Nursing interventions Reviewed instructions with patient   What is the patient's perception of their health status since discharge? Improving   Nursing interventions Nurse provided patient education   Is the patient able to teach back signs and symptoms of worsening condition? (i.e. weight gain, shortness of  air, etc.) Yes   If the patient is a current smoker, are they able to teach back resources for cessation? [id number]  [spouse states patient has cut way back, encouraged cessation]   Is the patient/caregiver able to teach back the hierarchy of who to call/visit for symptoms/problems? PCP, Specialist, Home health nurse, Urgent Care, ED, 911 Yes   Additional teach back comments spouse states patient has no legs and is unable to check his weight daily, HH is trying to see if they can get a special scale   CHF Zone this Call Green Zone   Green Zone No new or worsening shortness of breath, Patient reports doing well, Physical activity level is normal for you, No new swelling -  feet, ankles and legs look normal for you, No chest pain   Green Zone Interventions Daily weight check, Meds as directed, Low sodium diet, Follow up visits planned    CHF Week 1 call completed? Yes   Is the patient interested in additional calls from an ambulatory ? No   Would this patient benefit from a Referral to [facility] Social Work? No   Wrap up additional comments Per spouse, patient is doing well, HH has been to see him and he has had a few follow up appts, all concerns addressed.   Call end time 1208            Yumiko NUR - Registered Nurse

## 2025-06-05 ENCOUNTER — OFFICE VISIT (OUTPATIENT)
Dept: CARDIOLOGY | Facility: CLINIC | Age: 65
End: 2025-06-05
Payer: MEDICARE

## 2025-06-05 ENCOUNTER — CLINICAL SUPPORT NO REQUIREMENTS (OUTPATIENT)
Dept: CARDIOLOGY | Facility: CLINIC | Age: 65
End: 2025-06-05
Payer: MEDICARE

## 2025-06-05 VITALS — OXYGEN SATURATION: 94 % | HEART RATE: 90 BPM | DIASTOLIC BLOOD PRESSURE: 55 MMHG | SYSTOLIC BLOOD PRESSURE: 103 MMHG

## 2025-06-05 DIAGNOSIS — Z95.810 PRESENCE OF AUTOMATIC CARDIOVERTER/DEFIBRILLATOR (AICD): Chronic | ICD-10-CM

## 2025-06-05 DIAGNOSIS — F17.200 TOBACCO USE DISORDER: ICD-10-CM

## 2025-06-05 DIAGNOSIS — Z09 HOSPITAL DISCHARGE FOLLOW-UP: Primary | ICD-10-CM

## 2025-06-05 DIAGNOSIS — I25.10 CORONARY ARTERY DISEASE WITH HX OF MYOCARDIAL INFARCT W/O HX OF CABG: Chronic | ICD-10-CM

## 2025-06-05 DIAGNOSIS — I25.2 CORONARY ARTERY DISEASE WITH HX OF MYOCARDIAL INFARCT W/O HX OF CABG: Chronic | ICD-10-CM

## 2025-06-05 DIAGNOSIS — Z89.612 S/P BILATERAL ABOVE KNEE AMPUTATION: ICD-10-CM

## 2025-06-05 DIAGNOSIS — Z89.611 S/P BILATERAL ABOVE KNEE AMPUTATION: ICD-10-CM

## 2025-06-05 DIAGNOSIS — I25.5 ISCHEMIC CARDIOMYOPATHY: Chronic | ICD-10-CM

## 2025-06-05 DIAGNOSIS — I25.5 ISCHEMIC CARDIOMYOPATHY: Primary | Chronic | ICD-10-CM

## 2025-06-05 RX ORDER — ATORVASTATIN CALCIUM 40 MG/1
40 TABLET, FILM COATED ORAL DAILY
COMMUNITY
Start: 2025-05-23

## 2025-06-05 NOTE — PROGRESS NOTES
"    Subjective:     Encounter Date:06/05/2025      Patient ID: Bobby Steele Jr. is a 65 y.o. male.    Chief Complaint: 1 month follow up/hospital follow up and device check  chronic HFrEF     History of Present Illness    Mr. Bobby Steele Jr. has PMH of      # NSTEMI  5/12/19, SHILPA to SVG to RCA and proximal LAD leading into a small diagonal, cath 5/11/2020 underwent SHILPA to SVG to RCA and native proximal LCx with Impella assistance.  Cath 6/1/2021 revealed patent LIMA to LAD, SVG to PDA, occluded SVG to diagonal, patent stent in left main, severe disease in tortuous segment in mid LCx and LAD going to diagonal, unchanged from previous cath.  # CAD status post CABG X3 V  #Chronic HFrEF due to systolic dysfunction from ischemic cardiomyopathy with EF of 35%, status post ICD 10/8/2019  # COPD, continue tobacco abuse  # Hypertension   # Hyperlipidemia  # Diabetes with hemoglobin A1c of 10 on 5/2/2020  #.  CKD 3 \  # PAD, gangrene of left foot, amputation -- bilateral AKA   #Positive family for premature heart disease with father MI 53     Presented to the ED on 5/20/2025 with worsening shortness of breath. Patient reports cough with brownish sputum and progressively worsening shortness of breath.  He denies any chest pain.  Patient was recently admitted at [facility] with COPD exacerbation and on the ventilator.  He reports this is how it started therefore he started using his nebulizer however shortness of breath became worse and he came to the ED. Patient was diuresed and given duonebs. He was discharged home on 5/22/2025      He is here today 6/5/2025 for hospital follow up (previously scheduled 1 month follow up) and device check.  He denies any chest pain, edema.  Shortness of breath is chronic and unchanged.  No current cough/congestion.  He reports diarrhea for the last 5 days, but does not feel \"sick\".  He reports trying to drink Vit D milk, bread, etc  to help.     Blood pressure in office today was 93/57 left " arm 103/55 right arm HR 90 oxygen 94% on room air. Unable to obtain weight due to bilateral AKA     Patient reports his home BP was 190/87 BEFORE his medications this morning.             Echocardiogram 5/20/2025    Left ventricular systolic function is severely decreased. Left ventricular ejection fraction appears to be 21 - 25%.    Left ventricular diastolic function is consistent with (grade I) impaired relaxation.    The right ventricular cavity is mild to moderately dilated.    The left atrial cavity is moderate to severely dilated.    Estimated right ventricular systolic pressure from tricuspid regurgitation is normal (<35 mmHg).       Lab Review:   Labs in the ED showed HS troponin of 50--52 pro bnp 1480 glucose 472 AST 47 ALT 80 A1C 11.40 plts 138 CXR suggestive of mild congestive heart failure   EKG sinus rhythm      CT chest 5/21/2025  1.Emphysema.  2.No acute intrathoracic pathology. Resolution of findings of CHF from prior exam.     5/22/2025 potassium 4.3 BUN 24 creatinine 1.3 glucose 287           The following portions of the patient's history were reviewed and updated as appropriate: allergies, current medications, past family history, past medical history, past social history, past surgical history, and problem list.      Review of Systems   Constitutional: Negative for malaise/fatigue.   Cardiovascular:  Negative for chest pain, dyspnea on exertion, leg swelling and palpitations.   Respiratory:  Positive for shortness of breath. Negative for cough.         COPD per patient   Gastrointestinal:  Negative for abdominal pain, nausea and vomiting.   Neurological:  Negative for dizziness, headaches, light-headedness, numbness and weakness.   All other systems reviewed and are negative.        Past Medical History:   Diagnosis Date    CAD (coronary artery disease)     S/P CABG    Chest pain 05/31/2021    Chronic systolic CHF (congestive heart failure)     COPD (chronic obstructive pulmonary disease)      DM2 (diabetes mellitus, type 2)     Dyslipidemia     Encounter for monitoring antiplatelet therapy     Hypertension     Myocardial infarction     inferior wall MI--03/13    Non-pressure chronic ulcer of other part of left foot with fat layer exposed 6/1/2021    DEBI (obstructive sleep apnea)     noncompliant with CPAP    Peripheral vascular disease     S/P left fem-pop bypass    Tobacco use      Past Surgical History:   Procedure Laterality Date    ABOVE KNEE LEG AMPUTATION Left     AMPUTATION FOOT / TOE      left    APPENDECTOMY      at age 8 or 9    BIVENTRICULAR ASSIST DEVICE/LEFT VENTRICULAR ASSIST DEVICE INSERTION N/A 05/13/2020    Procedure: Left Ventricular Assist Device;  Surgeon: Juarez Wing MD;  Location: [facility] CATH INVASIVE LOCATION;  Service: Cardiovascular;  Laterality: N/A;    CARDIAC CATHETERIZATION      3-; [facility] [address] 9-    CARDIAC CATHETERIZATION Right 06/27/2019    Procedure: Cardiac Catheterization/with grafts groin access;  Surgeon: Juarez Wing MD;  Location: [facility] CATH INVASIVE LOCATION;  Service: Cardiovascular    CARDIAC CATHETERIZATION N/A 05/08/2020    Procedure: Left Heart Cath with grafts;  Surgeon: Juarez Wing MD;  Location: [facility] CATH INVASIVE LOCATION;  Service: Cardiovascular;  Laterality: N/A;    CARDIAC CATHETERIZATION N/A 05/13/2020    Procedure: Percutaneous Coronary Intervention, Impella backup;  Surgeon: Juarez Wing MD;  Location: [facility] CATH INVASIVE LOCATION;  Service: Cardiovascular;  Laterality: N/A;    CARDIAC CATHETERIZATION N/A 06/01/2021    Procedure: Left Heart Cath, possible pci;  Surgeon: Juarez Wing MD;  Location: [facility] CATH INVASIVE LOCATION;  Service: Cardiovascular;  Laterality: N/A;    CARDIAC ELECTROPHYSIOLOGY PROCEDURE N/A 10/08/2019    Procedure: ICD SC new, ST.SHIV ;  Surgeon: Juarez Wing MD;  Location: [facility] CATH INVASIVE LOCATION;  Service: Cardiovascular     CARDIAC SURGERY  2013    COLONOSCOPY      CORONARY ARTERY BYPASS GRAFT      x3, 3-18-13-LIMA to LAD, and reverse individual SVG to lateral marginal and to PDA    FEMORAL POPLITEAL BYPASS Left 2019    [facility]/ Dr. Jero Hatch    HAND SURGERY Left     crushed hand    PACEMAKER IMPLANTATION      TOE SURGERY      toe nail removal of big toe- age 8 or 9     /55 (BP Location: Right arm, Patient Position: Sitting, Cuff Size: Adult)   Pulse 90   SpO2 94%   Family History   Problem Relation Age of Onset    Hypertension Mother     Diabetes Mother     Heart disease Father         had CABG and re-do/  while recovering-had MI age 40s    Diabetes Father     Pancreatic cancer Sister     Hyperlipidemia Brother     Stroke Brother     Heart disease Paternal Uncle        Current Outpatient Medications:     albuterol (PROVENTIL) (2.5 MG/3ML) 0.083% nebulizer solution, Take 2.5 mg by nebulization Every 6 (Six) Hours As Needed for Wheezing or Shortness of Air., Disp: , Rfl:     apixaban (ELIQUIS) 5 MG tablet tablet, Take 1 tablet by mouth 2 (Two) Times a Day., Disp: , Rfl:     atorvastatin (LIPITOR) 40 MG tablet, Take 1 tablet by mouth Daily., Disp: , Rfl:     budesonide (PULMICORT) 0.5 MG/2ML nebulizer solution, Take 2 mL by nebulization Daily for 30 days., Disp: 60 mL, Rfl: 0    buPROPion XL (WELLBUTRIN XL) 150 MG 24 hr tablet, Take 1 tablet by mouth Every Evening., Disp: , Rfl:     carvedilol (COREG) 3.125 MG tablet, Take 1 tablet by mouth 2 (Two) Times a Day With Meals., Disp: 180 tablet, Rfl: 1    clopidogrel (PLAVIX) 75 MG tablet, Take 1 tablet by mouth Daily., Disp: , Rfl:     DULoxetine (CYMBALTA) 30 MG capsule, Take 1 capsule by mouth Daily., Disp: , Rfl:     Farxiga 10 MG tablet, Take 1 tablet by mouth once daily, Disp: 90 tablet, Rfl: 0    fenofibrate 160 MG tablet, Take 1 tablet by mouth Every Evening., Disp: , Rfl:     furosemide (LASIX) 20 MG tablet, Take 1 tablet by mouth 2 (Two) Times a Day., Disp: , Rfl:      gabapentin (NEURONTIN) 300 MG capsule, Take 1 capsule by mouth 2 (Two) Times a Day., Disp: , Rfl:     Janumet  MG per tablet, Take 1 tablet by mouth 2 (Two) Times a Day., Disp: , Rfl:     losartan (Cozaar) 25 MG tablet, Take 1 tablet by mouth Daily., Disp: 30 tablet, Rfl: 11    Methylcobalamin (B12) 5000 MCG sublingual tablet, Place 1 tablet under the tongue Daily., Disp: , Rfl:     pantoprazole (PROTONIX) 40 MG EC tablet, Take 1 tablet by mouth Daily., Disp: , Rfl:     Tresiba FlexTouch 200 UNIT/ML solution pen-injector pen injection, Inject 45 Units under the skin into the appropriate area as directed 2 (Two) Times a Day. (Patient taking differently: Inject 50 Units under the skin into the appropriate area as directed 2 (Two) Times a Day.), Disp: , Rfl:     vitamin D3 125 MCG (5000 UT) capsule capsule, Take 1 capsule by mouth Daily., Disp: , Rfl:   No Known Allergies  Social History     Socioeconomic History    Marital status:    Tobacco Use    Smoking status: Every Day     Current packs/day: 0.50     Average packs/day: 1.5 packs/day for 25.4 years (37.7 ttl pk-yrs)     Types: Cigarettes     Start date: 1/21/2025     Passive exposure: Current    Smokeless tobacco: Never    Tobacco comments:     About 1 pack a day per pt--4/7/25   Vaping Use    Vaping status: Never Used   Substance and Sexual Activity    Alcohol use: No    Drug use: Never    Sexual activity: Defer                Objective:     Vitals reviewed.   Constitutional:       Appearance: Not in distress. Frail. Chronically ill-appearing.   Neck:      Vascular: No JVR. JVD normal.   Pulmonary:      Effort: Pulmonary effort is normal.      Breath sounds: Decreased air movement present. No wheezing. No rhonchi. No rales.   Chest:      Chest wall: Not tender to palpatation.   Cardiovascular:      PMI at left midclavicular line. Normal rate. Regular rhythm. Normal S1. Normal S2.       Murmurs: There is no murmur.      No gallop.  No click. No  rub.   Pulses:     Comments: Bilateral AKA   Edema:     Peripheral edema absent.   Abdominal:      General: Bowel sounds are normal.      Palpations: Abdomen is soft.      Tenderness: There is no abdominal tenderness.   Musculoskeletal: Normal range of motion.         General: No tenderness.      Right Lower Extremity: Right leg is amputated above knee.      Left Lower Extremity: Left leg is amputated above knee. Skin:     General: Skin is warm and dry.   Neurological:      General: No focal deficit present.      Mental Status: Alert and oriented to person, place and time.       Procedures                  Assessment:     [facility]       Diagnosis Plan   1. Hospital discharge follow-up        2. Coronary artery disease with hx of myocardial infarct w/o hx of CABG        3. Ischemic cardiomyopathy        4. Presence of automatic cardioverter/defibrillator (AICD)        5. Tobacco use disorder        6. S/P bilateral above knee amputation                       Plan:   Chart reviewed   Labs/hospitalization reviewed   Reviewed echocardiogram with patient and wife   --- reviewed heart failure again with patient and wife   Discussed LV dysfunction AND right sided HF due to COPD     Continue losartan 25mg daily, coreg 3.125mg BID, farxiga 10mg daily, lasix 20mg BID.     Advised if feeling more short of breath, having swelling to increase lasix to 40mg BID for 3 days.     Monitor blood pressure closely at home.        Continue plavix, statin beta blocker for CAD   Continue Eliquis     - Daily weight:  same time every day, same clothing --- cannot weigh due to being bilateral AKA   --- patient's wife reports they were going to try to go to a local nursing home to use a scale.   - Low Salt (sodium) diet   - Watch fluid intake         Advised patient he can take over the counter Imodium for his diarrhea    Discussed smoking cessation.  Patient reports he will continue smoking until he dies.     Follow up with Dr. Wing in 3  months at [place] office    Follow up with me in 6 months with device check     Device check today without any events battery life 4.8 years           Electronically signed by JOY Ashton, 06/06/25, 9:41 AM EDT.        This document is intended for medical expert use only.  Reading of this document by patients and/or patient's family without participating medical staff guidance may result in misinterpretation and unintended morbidity. Any interpretation of such data is the responsibility of the patient and/or family member responsible for the patient in concert with their primary or specialist providers, not to be left for sources of online search as such as [employer], [employer] or similar queries.  Relying on these approaches to knowledge may result in misinterpretation, misguided goals of care and even death should patient or family members try recommendations outside of the realm of professional medical care in a supervised inpatient environment.

## 2025-06-05 NOTE — PATIENT INSTRUCTIONS
- Low Salt (sodium) diet   - Watch fluid intake  no more than 2 liters per day       If feeling more swollen, increase furosemide to 40mg twice daily for 3 days

## 2025-06-06 PROBLEM — Z89.612 S/P BILATERAL ABOVE KNEE AMPUTATION: Status: ACTIVE | Noted: 2025-06-06

## 2025-06-06 PROBLEM — Z89.611 S/P BILATERAL ABOVE KNEE AMPUTATION: Status: ACTIVE | Noted: 2025-06-06

## 2025-06-06 PROBLEM — S91.302A OPEN WOUND OF LEFT FOOT: Chronic | Status: RESOLVED | Noted: 2021-05-31 | Resolved: 2025-06-06

## 2025-06-09 ENCOUNTER — READMISSION MANAGEMENT (OUTPATIENT)
Dept: CALL CENTER | Facility: HOSPITAL | Age: 65
End: 2025-06-09
Payer: MEDICARE

## 2025-06-09 NOTE — OUTREACH NOTE
CHF Week 2 Survey      Flowsheet Row Responses   [facility] patient discharged from? [facility]   Does the patient have one of the following disease processes/diagnoses(primary or secondary)? CHF   Week 2 attempt successful? No   Unsuccessful attempts Attempt 2   Revoke Other            Ivis H - Registered Nurse

## 2025-06-29 ENCOUNTER — HOSPITAL ENCOUNTER (EMERGENCY)
Facility: HOSPITAL | Age: 65
Discharge: HOME | End: 2025-06-29
Attending: EMERGENCY MEDICINE | Admitting: EMERGENCY MEDICINE
Payer: MEDICARE

## 2025-06-29 ENCOUNTER — APPOINTMENT (OUTPATIENT)
Dept: GENERAL RADIOLOGY | Facility: HOSPITAL | Age: 65
End: 2025-06-29
Payer: MEDICARE

## 2025-06-29 VITALS
WEIGHT: 171.3 LBS | RESPIRATION RATE: 19 BRPM | DIASTOLIC BLOOD PRESSURE: 81 MMHG | HEIGHT: 67 IN | SYSTOLIC BLOOD PRESSURE: 124 MMHG | OXYGEN SATURATION: 96 % | HEART RATE: 86 BPM | TEMPERATURE: 97.5 F | BODY MASS INDEX: 26.89 KG/M2

## 2025-06-29 DIAGNOSIS — R79.89 ELEVATED TROPONIN: Primary | ICD-10-CM

## 2025-06-29 DIAGNOSIS — R06.02 SHORTNESS OF BREATH: ICD-10-CM

## 2025-06-29 LAB
ALBUMIN SERPL-MCNC: 3.7 G/DL (ref 3.5–5.2)
ALBUMIN/GLOB SERPL: 1.4 G/DL
ALP SERPL-CCNC: 75 U/L (ref 39–117)
ALT SERPL W P-5'-P-CCNC: 61 U/L (ref 1–41)
ANION GAP SERPL CALCULATED.3IONS-SCNC: 10.1 MMOL/L (ref 5–15)
AST SERPL-CCNC: 40 U/L (ref 1–40)
BASOPHILS # BLD AUTO: 0.04 10*3/MM3 (ref 0–0.2)
BASOPHILS NFR BLD AUTO: 0.4 % (ref 0–1.5)
BILIRUB SERPL-MCNC: 0.4 MG/DL (ref 0–1.2)
BUN SERPL-MCNC: 16.5 MG/DL (ref 8–23)
BUN/CREAT SERPL: 17.7 (ref 7–25)
CALCIUM SPEC-SCNC: 8.9 MG/DL (ref 8.6–10.5)
CHLORIDE SERPL-SCNC: 103 MMOL/L (ref 98–107)
CO2 SERPL-SCNC: 25.9 MMOL/L (ref 22–29)
CREAT SERPL-MCNC: 0.93 MG/DL (ref 0.76–1.27)
DEPRECATED RDW RBC AUTO: 46.5 FL (ref 37–54)
EGFRCR SERPLBLD CKD-EPI 2021: 91.1 ML/MIN/1.73
EOSINOPHIL # BLD AUTO: 0.12 10*3/MM3 (ref 0–0.4)
EOSINOPHIL NFR BLD AUTO: 1.2 % (ref 0.3–6.2)
ERYTHROCYTE [DISTWIDTH] IN BLOOD BY AUTOMATED COUNT: 13.8 % (ref 12.3–15.4)
GEN 5 1HR TROPONIN T REFLEX: 61 NG/L
GLOBULIN UR ELPH-MCNC: 2.7 GM/DL
GLUCOSE BLDC GLUCOMTR-MCNC: 302 MG/DL (ref 70–105)
GLUCOSE SERPL-MCNC: 432 MG/DL (ref 65–99)
HCT VFR BLD AUTO: 44.9 % (ref 37.5–51)
HGB BLD-MCNC: 14.7 G/DL (ref 13–17.7)
IMM GRANULOCYTES # BLD AUTO: 0.02 10*3/MM3 (ref 0–0.05)
IMM GRANULOCYTES NFR BLD AUTO: 0.2 % (ref 0–0.5)
LYMPHOCYTES # BLD AUTO: 1.68 10*3/MM3 (ref 0.7–3.1)
LYMPHOCYTES NFR BLD AUTO: 17.4 % (ref 19.6–45.3)
MCH RBC QN AUTO: 29.9 PG (ref 26.6–33)
MCHC RBC AUTO-ENTMCNC: 32.7 G/DL (ref 31.5–35.7)
MCV RBC AUTO: 91.4 FL (ref 79–97)
MONOCYTES # BLD AUTO: 0.61 10*3/MM3 (ref 0.1–0.9)
MONOCYTES NFR BLD AUTO: 6.3 % (ref 5–12)
NEUTROPHILS NFR BLD AUTO: 7.2 10*3/MM3 (ref 1.7–7)
NEUTROPHILS NFR BLD AUTO: 74.5 % (ref 42.7–76)
NRBC BLD AUTO-RTO: 0 /100 WBC (ref 0–0.2)
NT-PROBNP SERPL-MCNC: 1113 PG/ML (ref 0–900)
PLATELET # BLD AUTO: 143 10*3/MM3 (ref 140–450)
PMV BLD AUTO: 11.8 FL (ref 6–12)
POTASSIUM SERPL-SCNC: 4 MMOL/L (ref 3.5–5.2)
PROT SERPL-MCNC: 6.4 G/DL (ref 6–8.5)
RBC # BLD AUTO: 4.91 10*6/MM3 (ref 4.14–5.8)
SODIUM SERPL-SCNC: 139 MMOL/L (ref 136–145)
TROPONIN T % DELTA: 11
TROPONIN T NUMERIC DELTA: 6 NG/L
TROPONIN T SERPL HS-MCNC: 55 NG/L
TROPONIN T SERPL HS-MCNC: 65 NG/L
WBC NRBC COR # BLD AUTO: 9.67 10*3/MM3 (ref 3.4–10.8)

## 2025-06-29 PROCEDURE — 36415 COLL VENOUS BLD VENIPUNCTURE: CPT

## 2025-06-29 PROCEDURE — 93005 ELECTROCARDIOGRAM TRACING: CPT | Performed by: EMERGENCY MEDICINE

## 2025-06-29 PROCEDURE — 71045 X-RAY EXAM CHEST 1 VIEW: CPT

## 2025-06-29 PROCEDURE — 84484 ASSAY OF TROPONIN QUANT: CPT | Performed by: EMERGENCY MEDICINE

## 2025-06-29 PROCEDURE — 63710000001 INSULIN REGULAR HUMAN PER 5 UNITS: Performed by: EMERGENCY MEDICINE

## 2025-06-29 PROCEDURE — 93005 ELECTROCARDIOGRAM TRACING: CPT

## 2025-06-29 PROCEDURE — 83880 ASSAY OF NATRIURETIC PEPTIDE: CPT | Performed by: EMERGENCY MEDICINE

## 2025-06-29 PROCEDURE — 82948 REAGENT STRIP/BLOOD GLUCOSE: CPT | Performed by: EMERGENCY MEDICINE

## 2025-06-29 PROCEDURE — 85025 COMPLETE CBC W/AUTO DIFF WBC: CPT | Performed by: EMERGENCY MEDICINE

## 2025-06-29 PROCEDURE — 99284 EMERGENCY DEPT VISIT MOD MDM: CPT

## 2025-06-29 PROCEDURE — G0378 HOSPITAL OBSERVATION PER HR: HCPCS

## 2025-06-29 PROCEDURE — 80053 COMPREHEN METABOLIC PANEL: CPT | Performed by: EMERGENCY MEDICINE

## 2025-06-29 RX ORDER — SODIUM CHLORIDE 0.9 % (FLUSH) 0.9 %
10 SYRINGE (ML) INJECTION AS NEEDED
Status: DISCONTINUED | OUTPATIENT
Start: 2025-06-29 | End: 2025-06-29 | Stop reason: HOSPADM

## 2025-06-29 RX ORDER — ASPIRIN 81 MG/1
324 TABLET, CHEWABLE ORAL ONCE
Status: COMPLETED | OUTPATIENT
Start: 2025-06-29 | End: 2025-06-29

## 2025-06-29 RX ORDER — POTASSIUM CHLORIDE 1500 MG/1
40 TABLET, EXTENDED RELEASE ORAL ONCE
Status: DISCONTINUED | OUTPATIENT
Start: 2025-06-29 | End: 2025-06-29 | Stop reason: HOSPADM

## 2025-06-29 RX ORDER — POTASSIUM CHLORIDE 1.5 G/1.58G
40 POWDER, FOR SOLUTION ORAL ONCE
Status: COMPLETED | OUTPATIENT
Start: 2025-06-29 | End: 2025-06-29

## 2025-06-29 RX ADMIN — POTASSIUM CHLORIDE 40 MEQ: 1.5 POWDER, FOR SOLUTION ORAL at 10:08

## 2025-06-29 RX ADMIN — ASPIRIN 81 MG CHEWABLE TABLET 324 MG: 81 TABLET CHEWABLE at 11:33

## 2025-06-29 RX ADMIN — INSULIN HUMAN 5 UNITS: 100 INJECTION, SOLUTION PARENTERAL at 09:17

## 2025-06-29 NOTE — ED PROVIDER NOTES
Subjective   History of Present Illness  Patient is an 64y/o M with a history of COPD, CHF and bilateral AKAs who presents with acute onset shortness of breath experienced while performing morning activities in the bathroom. The patient reports difficulty breathing, which prompted use of a home nebulizer treatment and opening the front door for fresh air. The patient denies chest pain and states that the shortness of breath has improved since arrival. The patient does not routinely use supplemental oxygen but is scheduled for upcoming sleep study and oxygen evaluation. Patient denies having any pain with the incident. The patient received a nebulizer treatment at home and 400 mL of normal saline via EMS prior to arrival. No steroids have been administered.   Review of Systems  See HPI.  Past Medical History:   Diagnosis Date    CAD (coronary artery disease)     S/P CABG    Chest pain 05/31/2021    Chronic systolic CHF (congestive heart failure)     COPD (chronic obstructive pulmonary disease)     DM2 (diabetes mellitus, type 2)     Dyslipidemia     Encounter for monitoring antiplatelet therapy     Hypertension     Myocardial infarction     inferior wall MI--03/13    Non-pressure chronic ulcer of other part of left foot with fat layer exposed 6/1/2021    DEBI (obstructive sleep apnea)     noncompliant with CPAP    Peripheral vascular disease     S/P left fem-pop bypass    Tobacco use        No Known Allergies    Past Surgical History:   Procedure Laterality Date    ABOVE KNEE LEG AMPUTATION Left     AMPUTATION FOOT / TOE      left    APPENDECTOMY      at age 8 or 9    BIVENTRICULAR ASSIST DEVICE/LEFT VENTRICULAR ASSIST DEVICE INSERTION N/A 05/13/2020    Procedure: Left Ventricular Assist Device;  Surgeon: Juarez Wing MD;  Location: [facility] CATH INVASIVE LOCATION;  Service: Cardiovascular;  Laterality: N/A;    CARDIAC CATHETERIZATION      3-; [facility] 9-    CARDIAC CATHETERIZATION Right 06/27/2019     Procedure: Cardiac Catheterization/with grafts groin access;  Surgeon: Juarez Wing MD;  Location: [facility] CATH INVASIVE LOCATION;  Service: Cardiovascular    CARDIAC CATHETERIZATION N/A 2020    Procedure: Left Heart Cath with grafts;  Surgeon: Juarez Wing MD;  Location: [facility] CATH INVASIVE LOCATION;  Service: Cardiovascular;  Laterality: N/A;    CARDIAC CATHETERIZATION N/A 2020    Procedure: Percutaneous Coronary Intervention, Impella backup;  Surgeon: Juarez Wing MD;  Location: [facility] CATH INVASIVE LOCATION;  Service: Cardiovascular;  Laterality: N/A;    CARDIAC CATHETERIZATION N/A 2021    Procedure: Left Heart Cath, possible pci;  Surgeon: Juarez Wing MD;  Location: [facility] CATH INVASIVE LOCATION;  Service: Cardiovascular;  Laterality: N/A;    CARDIAC ELECTROPHYSIOLOGY PROCEDURE N/A 10/08/2019    Procedure: ICD SC new, ST.SHIV ;  Surgeon: Juarez Wing MD;  Location: [facility] CATH INVASIVE LOCATION;  Service: Cardiovascular    CARDIAC SURGERY      COLONOSCOPY      CORONARY ARTERY BYPASS GRAFT      x3, 3--13-LIMA to LAD, and reverse individual SVG to lateral marginal and to PDA    FEMORAL POPLITEAL BYPASS Left 2019    [facility]/ Dr. Jero Hatch    HAND SURGERY Left     crushed hand    PACEMAKER IMPLANTATION      TOE SURGERY      toe nail removal of big toe- age 8 or 9       Family History   Problem Relation Age of Onset    Hypertension Mother     Diabetes Mother     Heart disease Father         had CABG and re-do/  while recovering-had MI age 40s    Diabetes Father     Pancreatic cancer Sister     Hyperlipidemia Brother     Stroke Brother     Heart disease Paternal Uncle        Social History     Socioeconomic History    Marital status:    Tobacco Use    Smoking status: Every Day     Current packs/day: 0.50     Average packs/day: 1.5 packs/day for 25.4 years (37.7 ttl pk-yrs)     Types: Cigarettes     Start  "date: 1/21/2025     Passive exposure: Current    Smokeless tobacco: Never    Tobacco comments:     About 1 pack a day per pt--4/7/25   Vaping Use    Vaping status: Never Used   Substance and Sexual Activity    Alcohol use: No    Drug use: Never    Sexual activity: Defer           Objective   Physical Exam  No acute distress. Normocephalic. No scleral icterus. Moist oral mucosa. No observable neck masses on external visualization. Bilateral rhonchi present. No respiratory distress. No tachypnea or increased work of breathing. Normal heart rate. Intact radial pulses. Abdomen soft and nontender without peritoneal signs. Bilateral lower extremity AKA. Alert. Normal speech.  Procedures           ED Course      /81   Pulse 86   Temp 97.5 °F (36.4 °C)   Resp 19   Ht 170.2 cm (67\")   Wt 77.7 kg (171 lb 4.8 oz)   SpO2 96%   BMI 26.83 kg/m²   Labs Reviewed   COMPREHENSIVE METABOLIC PANEL - Abnormal; Notable for the following components:       Result Value    Glucose 432 (*)     ALT (SGPT) 61 (*)     All other components within normal limits    Narrative:     GFR Categories in Chronic Kidney Disease (CKD)              GFR Category          GFR (mL/min/1.73)    Interpretation  G1                    90 or greater        Normal or high (1)  G2                    60-89                Mild decrease (1)  G3a                   45-59                Mild to moderate decrease  G3b                   30-44                Moderate to severe decrease  G4                    15-29                Severe decrease  G5                    14 or less           Kidney failure    (1)In the absence of evidence of kidney disease, neither GFR category G1 or G2 fulfill the criteria for CKD.    eGFR calculation 2021 CKD-EPI creatinine equation, which does not include race as a factor   TROPONIN - Abnormal; Notable for the following components:    HS Troponin T 55 (*)     All other components within normal limits    Narrative:     High " Sensitive Troponin T Reference Range:  <14.0 ng/L- Negative Female for AMI  <22.0 ng/L- Negative Male for AMI  >=14 - Abnormal Female indicating possible myocardial injury.  >=22 - Abnormal Male indicating possible myocardial injury.   Clinicians would have to utilize clinical acumen, EKG, Troponin, and serial changes to determine if it is an Acute Myocardial Infarction or myocardial injury due to an underlying chronic condition.        BNP (IN-HOUSE) - Abnormal; Notable for the following components:    proBNP 1,113.0 (*)     All other components within normal limits    Narrative:     This assay is used as an aid in the diagnosis of individuals suspected of having heart failure. It can be used as an aid in the diagnosis of acute decompensated heart failure (ADHF) in patients presenting with signs and symptoms of ADHF to the emergency department (ED). In addition, NT-proBNP of <300 pg/mL indicates ADHF is not likely.    Age Range Result Interpretation  NT-proBNP Concentration (pg/mL:      <50             Positive            >450                   Gray                 300-450                    Negative             <300    50-75           Positive            >900                  Gray                300-900                  Negative            <300      >75             Positive            >1800                  Gray                300-1800                  Negative            <300   CBC WITH AUTO DIFFERENTIAL - Abnormal; Notable for the following components:    Lymphocyte % 17.4 (*)     Neutrophils, Absolute 7.20 (*)     All other components within normal limits   HIGH SENSITIVITIY TROPONIN T 1HR - Abnormal; Notable for the following components:    HS Troponin T 61 (*)     All other components within normal limits    Narrative:     High Sensitive Troponin T Reference Range:  <14.0 ng/L- Negative Female for AMI  <22.0 ng/L- Negative Male for AMI  >=14 - Abnormal Female indicating possible myocardial injury.  >=22 -  Abnormal Male indicating possible myocardial injury.   Clinicians would have to utilize clinical acumen, EKG, Troponin, and serial changes to determine if it is an Acute Myocardial Infarction or myocardial injury due to an underlying chronic condition.        TROPONIN - Abnormal; Notable for the following components:    HS Troponin T 65 (*)     All other components within normal limits    Narrative:     High Sensitive Troponin T Reference Range:  <14.0 ng/L- Negative Female for AMI  <22.0 ng/L- Negative Male for AMI  >=14 - Abnormal Female indicating possible myocardial injury.  >=22 - Abnormal Male indicating possible myocardial injury.   Clinicians would have to utilize clinical acumen, EKG, Troponin, and serial changes to determine if it is an Acute Myocardial Infarction or myocardial injury due to an underlying chronic condition.        POCT GLUCOSE FINGERSTICK - Abnormal; Notable for the following components:    Glucose 302 (*)     All other components within normal limits   CBC AND DIFFERENTIAL    Narrative:     The following orders were created for panel order CBC & Differential.  Procedure                               Abnormality         Status                     ---------                               -----------         ------                     CBC Auto Differential[[id number]]        Abnormal            Final result                 Please view results for these tests on the individual orders.     XR Chest 1 View   Final Result   Impression:   Chronic findings without active pulmonary process.         Electronically Signed: Calvin Emery MD     6/29/2025 8:28 AM EDT     Workstation ID: [id number]                                                         Medical Decision Making  Problems Addressed:  Elevated troponin: complicated acute illness or injury  Shortness of breath: complicated acute illness or injury    Amount and/or Complexity of Data Reviewed  Labs: ordered.  Radiology: ordered.  ECG/medicine  tests: ordered.    Risk  OTC drugs.  Prescription drug management.    EKG interpretation: Rate 112, sinus tachycardia, normal QTc, no significant change from 8/20/2025 EKG.    Preliminary interpretation by the Emergency Provider:  XR of the chest, 1 Views:   Interpretation: No PTX  An official final read will be made by the attending radiologist.      Pt w/o chest pain, but troponin rising.  Concern pt may have had cardiac event causing SOB.  Asymptomatic now.  Satting well on RA.  Pt originally okay with observation stay, but changed his mind and asked to be discharged.  Discussed risks and pt still prefers to be discharged home.  Advised to return at any time if he changes his mind or symptoms return.    Final diagnoses:   Elevated troponin   Shortness of breath       ED Disposition  ED Disposition       ED Disposition   Discharge    Condition   Stable    Comment   --               Yamile Agarwal MD  [address] [facility] [place] [address]  [phone]    In 3 days           Medication List      No changes were made to your prescriptions during this visit.            Martinez Cadet MD  06/29/25 [id number]

## 2025-06-29 NOTE — ED NOTES
Pt brought in from home via EMS due to SOA worse than his normal this morning- pt has a hx of COPD, denies use of oxygen at home. Pt reports he was trying to get to his nebulizer because he couldn't breathe but he couldn't open the Albuterol package so he yelled for his neighbor to call 911. EMS reports they gave him a breathing tx en route along with about 400mL of NS. Pt denies any SOA currently. Pt denies CP or any other issues currently. Pt currently 97% on RA, nonlabored breathing.

## 2025-06-30 LAB
QT INTERVAL: 323 MS
QTC INTERVAL: 441 MS

## 2025-08-05 ENCOUNTER — OFFICE VISIT (OUTPATIENT)
Dept: CARDIOLOGY | Facility: CLINIC | Age: 65
End: 2025-08-05
Payer: MEDICARE

## 2025-08-05 VITALS — OXYGEN SATURATION: 90 % | DIASTOLIC BLOOD PRESSURE: 63 MMHG | HEART RATE: 93 BPM | SYSTOLIC BLOOD PRESSURE: 112 MMHG

## 2025-08-05 DIAGNOSIS — Z89.612 S/P BILATERAL ABOVE KNEE AMPUTATION: ICD-10-CM

## 2025-08-05 DIAGNOSIS — Z89.611 S/P BILATERAL ABOVE KNEE AMPUTATION: ICD-10-CM

## 2025-08-05 DIAGNOSIS — I25.5 ISCHEMIC CARDIOMYOPATHY: Chronic | ICD-10-CM

## 2025-08-05 DIAGNOSIS — I25.2 CORONARY ARTERY DISEASE WITH HX OF MYOCARDIAL INFARCT W/O HX OF CABG: Chronic | ICD-10-CM

## 2025-08-05 DIAGNOSIS — F17.200 TOBACCO USE DISORDER: ICD-10-CM

## 2025-08-05 DIAGNOSIS — Z95.810 PRESENCE OF AUTOMATIC CARDIOVERTER/DEFIBRILLATOR (AICD): Chronic | ICD-10-CM

## 2025-08-05 DIAGNOSIS — I73.9 PVD (PERIPHERAL VASCULAR DISEASE): ICD-10-CM

## 2025-08-05 DIAGNOSIS — R79.89 ELEVATED TROPONIN: ICD-10-CM

## 2025-08-05 DIAGNOSIS — I25.10 CORONARY ARTERY DISEASE WITH HX OF MYOCARDIAL INFARCT W/O HX OF CABG: Chronic | ICD-10-CM

## 2025-08-05 DIAGNOSIS — Z09 HOSPITAL DISCHARGE FOLLOW-UP: Primary | ICD-10-CM

## 2025-08-05 DIAGNOSIS — E11.21 TYPE 2 DIABETES MELLITUS WITH NEPHROPATHY: ICD-10-CM

## 2025-08-05 RX ORDER — ISOSORBIDE MONONITRATE 30 MG/1
30 TABLET, EXTENDED RELEASE ORAL DAILY
Qty: 30 TABLET | Refills: 5 | Status: SHIPPED | OUTPATIENT
Start: 2025-08-05 | End: 2026-02-01

## 2025-08-05 RX ORDER — PANTOPRAZOLE SODIUM 40 MG/1
40 TABLET, DELAYED RELEASE ORAL DAILY
Qty: 30 TABLET | Refills: 11 | Status: SHIPPED | OUTPATIENT
Start: 2025-08-05

## 2025-08-22 ENCOUNTER — LAB (OUTPATIENT)
Dept: LAB | Facility: HOSPITAL | Age: 65
End: 2025-08-22
Payer: MEDICARE

## 2025-08-22 ENCOUNTER — HOSPITAL ENCOUNTER (OUTPATIENT)
Facility: HOSPITAL | Age: 65
Setting detail: HOSPITAL OUTPATIENT SURGERY
Discharge: HOME | End: 2025-08-22
Attending: INTERNAL MEDICINE | Admitting: INTERNAL MEDICINE
Payer: MEDICARE

## 2025-08-22 ENCOUNTER — HOSPITAL ENCOUNTER (OUTPATIENT)
Dept: GENERAL RADIOLOGY | Facility: HOSPITAL | Age: 65
Setting detail: HOSPITAL OUTPATIENT SURGERY
Discharge: HOME | End: 2025-08-22
Payer: MEDICARE

## 2025-08-22 DIAGNOSIS — F17.200 TOBACCO USE DISORDER: ICD-10-CM

## 2025-08-22 DIAGNOSIS — E11.21 TYPE 2 DIABETES MELLITUS WITH NEPHROPATHY: ICD-10-CM

## 2025-08-22 DIAGNOSIS — I25.2 CORONARY ARTERY DISEASE WITH HX OF MYOCARDIAL INFARCT W/O HX OF CABG: Chronic | ICD-10-CM

## 2025-08-22 DIAGNOSIS — I25.5 ISCHEMIC CARDIOMYOPATHY: Chronic | ICD-10-CM

## 2025-08-22 DIAGNOSIS — R79.89 ELEVATED TROPONIN: ICD-10-CM

## 2025-08-22 DIAGNOSIS — I25.10 CORONARY ARTERY DISEASE WITH HX OF MYOCARDIAL INFARCT W/O HX OF CABG: Chronic | ICD-10-CM

## 2025-08-22 PROBLEM — I25.708 CORONARY ARTERY DISEASE OF BYPASS GRAFT OF NATIVE HEART WITH STABLE ANGINA PECTORIS: Status: ACTIVE | Noted: 2025-08-22

## 2025-08-22 PROCEDURE — 71046 X-RAY EXAM CHEST 2 VIEWS: CPT

## (undated) DEVICE — PK TRY HEART CATH 50

## (undated) DEVICE — ANGIOGRAPHIC CATHETER: Brand: IMPULSE™

## (undated) DEVICE — STPCK 3WY HP ROT

## (undated) DEVICE — SKIN PREP TRAY W/CHG: Brand: MEDLINE INDUSTRIES, INC.

## (undated) DEVICE — BALN EUPHORA 2X12MM

## (undated) DEVICE — GUIDE CATHETER: Brand: MACH1™

## (undated) DEVICE — CATH DIAG IMPULSE FL4 6F 100CM

## (undated) DEVICE — 3M™ IOBAN™ 2 ANTIMICROBIAL INCISE DRAPE 6650EZ: Brand: IOBAN™ 2

## (undated) DEVICE — WR CLIP ROTOBLATOR

## (undated) DEVICE — GW WWIJ35260 WHOLEY WIRE V04: Brand: WHOLEY™

## (undated) DEVICE — CATH DIAG IMPULSE PIG .056 6F 110CM

## (undated) DEVICE — CONTRST ISOVUE300 61PCT 50ML

## (undated) DEVICE — BOWL PLSTC MD 16OZ BLU STRL

## (undated) DEVICE — ELECTRD DEFIB M/FUNC PROPADZ RADIOL 2PK

## (undated) DEVICE — REFLEX ONE, SKIN STAPLER, 35 WIDE: Brand: REFLEX

## (undated) DEVICE — MEDICINE CUP, GRADUATED, STER: Brand: MEDLINE

## (undated) DEVICE — CATH DIAG IMPULSE PIG 5F 100CM

## (undated) DEVICE — BALN EUPHORA 2X20MM

## (undated) DEVICE — KT PK ANGIOPLASTY ACC 9 MERIT

## (undated) DEVICE — CATH DIAG IMPULSE MPA 6F 100CM

## (undated) DEVICE — CATH DIAG IMPULSE FR4 6F 100CM

## (undated) DEVICE — GW RUNTHROUGH NS HYPERCOAT .014 3X180CM

## (undated) DEVICE — GW CHOICE PLS FLOP .014 182CM

## (undated) DEVICE — GW DIAG EMERALD HEPCOAT MOVE JTIP STD .035 3MM 150CM

## (undated) DEVICE — GW WHOLEY HITORQ MOD/J .035 145CM

## (undated) DEVICE — PACEMAKER CDS: Brand: MEDLINE INDUSTRIES, INC.

## (undated) DEVICE — BALN EUPHORA 3X12MM

## (undated) DEVICE — 6F .070 XB 3.5 100CM: Brand: VISTA BRITE TIP

## (undated) DEVICE — RADIFOCUS OBTURATOR: Brand: RADIFOCUS

## (undated) DEVICE — TBG NAMIC PRESS MONTR A/ F/M 12IN

## (undated) DEVICE — ELCA™ CORONARY LASER ATHERECTOMY CATHETER: Brand: ELCA™

## (undated) DEVICE — INTRO CLASSIC SAFESHEATH SHT 8F 13CM

## (undated) DEVICE — 3M™ PATIENT PLATE, CORDED, SPLIT, LARGE, 40 PER CASE, 1179: Brand: 3M™

## (undated) DEVICE — HI-TORQUE BALANCE MIDDLEWEIGHT UNIVERSAL II GUIDE WIRE STRAIGHT TIP PAK  190 CM: Brand: HI-TORQUE BALANCE MIDDLEWEIGHT UNIVERSAL II

## (undated) DEVICE — INTRO PERFORMER CHECKFLO/LG RAD/BND NO/GW 14F .038IN 30CM

## (undated) DEVICE — DEV INFL COMPAK W/ACCESSPLUS IN4530

## (undated) DEVICE — CATH DIAG IMPULSE IMT 6F 100CM

## (undated) DEVICE — PAD HEMOST NEPTUNE 2X2IN

## (undated) DEVICE — SYR LL TP 10ML STRL

## (undated) DEVICE — CATH DIAG IMPULSE FL3.5 6F 100CM

## (undated) DEVICE — PERCLOSE PROGLIDE™ SUTURE-MEDIATED CLOSURE SYSTEM: Brand: PERCLOSE PROGLIDE™

## (undated) DEVICE — PINNACLE INTRODUCER SHEATH: Brand: PINNACLE

## (undated) DEVICE — KT CATH CAD SUP IMPELLA2.5 .025 PIG13F

## (undated) DEVICE — CABL BIPOL W/ALLGTR CLIP/SM 12FT

## (undated) DEVICE — SUT SILK 2/0 FS BLK 18IN 685G